# Patient Record
Sex: FEMALE | Race: WHITE | NOT HISPANIC OR LATINO | Employment: PART TIME | URBAN - METROPOLITAN AREA
[De-identification: names, ages, dates, MRNs, and addresses within clinical notes are randomized per-mention and may not be internally consistent; named-entity substitution may affect disease eponyms.]

---

## 2021-05-24 ENCOUNTER — RECORDS - HEALTHEAST (OUTPATIENT)
Dept: ADMINISTRATIVE | Facility: CLINIC | Age: 56
End: 2021-05-24

## 2021-05-26 ENCOUNTER — RECORDS - HEALTHEAST (OUTPATIENT)
Dept: ADMINISTRATIVE | Facility: CLINIC | Age: 56
End: 2021-05-26

## 2021-06-01 ENCOUNTER — RECORDS - HEALTHEAST (OUTPATIENT)
Dept: ADMINISTRATIVE | Facility: CLINIC | Age: 56
End: 2021-06-01

## 2021-07-13 ENCOUNTER — RECORDS - HEALTHEAST (OUTPATIENT)
Dept: ADMINISTRATIVE | Facility: CLINIC | Age: 56
End: 2021-07-13

## 2021-07-21 ENCOUNTER — RECORDS - HEALTHEAST (OUTPATIENT)
Dept: ADMINISTRATIVE | Facility: CLINIC | Age: 56
End: 2021-07-21

## 2022-08-29 ENCOUNTER — TRANSFERRED RECORDS (OUTPATIENT)
Dept: HEALTH INFORMATION MANAGEMENT | Facility: CLINIC | Age: 57
End: 2022-08-29

## 2023-03-17 ENCOUNTER — PATIENT OUTREACH (OUTPATIENT)
Dept: ONCOLOGY | Facility: CLINIC | Age: 58
End: 2023-03-17
Payer: COMMERCIAL

## 2023-03-17 ENCOUNTER — TRANSCRIBE ORDERS (OUTPATIENT)
Dept: OTHER | Age: 58
End: 2023-03-17

## 2023-03-17 DIAGNOSIS — Z85.43 PERSONAL HISTORY OF OVARIAN CANCER: Primary | ICD-10-CM

## 2023-03-17 NOTE — PROGRESS NOTES
New Patient Hematology / Oncology Nurse Navigator Note     Referral Date: 3/16/23    Referring provider: Dr. Patino, transfer from [facility]    Referring Clinic/Organization: [facility]      Referred to: GynOnc    Requested provider (if applicable): Dr. Patino    Evaluation for : Transfer of care, ovarian cancer      Clinical History (per Nurse review of records provided):      3/15 Office Visit with Dr. Patino at [facility]:  SCHEDULING:  -CA-125 in 3 months ().  -Abdomen, pelvic CT in 3 months ([facility]).   -RTC in 3 months for surveillance (MD, in-person, [facility] clinic). You should receive a phone call to schedule, but if you do not: [facility] [phone]    Clinical Assessment / Barriers to Care (Per Nurse):  Pt lives in [facility] Location: Care Everywhere     Records Needed:   Records from  per protocol (do NOT need to request path) including CT scan being done at [facility] in      Additional testing needed prior to consult:   CT, labs being done through /[facility] prior    Referral updates and Plan:   Chart reviewed. Will route to NPS to arrange in-person consult with Dr. Patino in 3 months as requested.     Flor Merrill, BSN, RN, PHN, OCN  Hematology/Oncology Nurse Navigator  [facility]  [phone]

## 2023-03-22 NOTE — TELEPHONE ENCOUNTER
Imaging Received  June 2, 2023 8:01 AM ABT   Action: Images from  received and resolved to PACS.     Imaging Received  March 22, 2023 3:48 PM ABT   Action: Images from HP received and resolved to PACS.     RECORDS STATUS - ALL OTHER DIAGNOSIS      RECORDS RECEIVED FROM:    DATE RECEIVED:    NOTES STATUS DETAILS   OFFICE NOTE from referring provider Self-Transfer of care    OFFICE NOTE from medical oncologist [facility] 03/15/23: Dr. Charis Patino   DISCHARGE SUMMARY from hospital [facility] 08/29/22: [facility]   OPERATIVE REPORT [facility] 08/29/22: Pelvic exam under anesthesia, diagnostic laparoscopy, conversion to laparotomy for optimal interval tumor debulking to no gross residual disease including peritoneal and diaphragm biopsies, bilateral salpingo-oophorectomy   MEDICATION LIST [facility]    LABS     PATHOLOGY REPORTS Reports in [facility] (slides not requested per provider protocol) 08/29/22: [id number]  06/16/22: [id number]   ANYTHING RELATED TO DIAGNOSIS [facility] Most recent 03/15/23   IMAGING (NEED IMAGES & REPORT)     CT SCANS PACS LV:  05/25/23: CT Abd Pel    HP:  02/16/23-08/19/22: CT Chest Abd Pel   ULTRASOUND PACS HP:  06/21/22: US Abd  05/24/22: US Pelvic

## 2023-06-04 ENCOUNTER — HEALTH MAINTENANCE LETTER (OUTPATIENT)
Age: 58
End: 2023-06-04

## 2023-06-04 NOTE — PROGRESS NOTES
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       History of Present Illness:  Jacki \"Michelle\" IVY Smith (she/her/hers)  is a 58 year old seen at the [facility] at the [facility] for exploration of clinical trial options in the setting of progressive stage IIIC high-grade serous ovarian carcinoma. History as follows:    Ovarian Cancer History:  22: Presented to an ED in [place] for evaluation of abdominal bloating and discomfort.  -Abdomen, pelvic CT: Radiographic Stage CAL ovarian cancer. I have personally reviewed the CT images.   22: [id number].   22: Pelvic ultrasound: c/w metastatic cancer. I have personally reviewed the ultrasound images.   22: Pelvic exam under anesthesia, diagnostic laparoscopy, biopsies, evacuation of ascites.   -Findings: On pelvic exam under anesthesia, there was a 6 cm firm, fixed mass adherent to the vaginal cuff. Vagina otherwise smooth. On laparoscopic examination, there was ascites filling the pelvis/abdomen, with >1 liter of fluid evacuated. The palpated mass was arising from the right ovary. Left ovary relatively normal in appearance. There was diffuse carcinomatosis covering the entire peritoneal surface falciform ligament, liver capsule, and mesentery. The omentum was replaced with tumor. Findings were not amenable to optimal tumor debulking so biopsies were taken for histologic examination.   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      22, 22, 22: Cycles 1-3 of neoadjuvant chemotherapy with " IV carboplatin + paclitaxel 175 mg/m2 every 21 days.   -Cycles 1,2: Carboplatin AUC 6.   -Cycle 3: Carboplatin AUC 5 with dose-reduction due to thrombcytopenia.   -8/19/22: Chest, abdomen, pelvic CT: Partial response. I have personally reviewed the CT images.     8/29/22: Pelvic exam under anesthesia, diagnostic laparoscopy, conversion to laparotomy for optimal interval tumor debulking to no gross residual disease including peritoneal and diaphragm biopsies, bilateral salpingo-oophorectomy, infragastric omentectomy, bilateral hemidiaphragm stripping, peritoneal and mesenteric argon beam ablation, appendectomy.   -Pathology: High-grade serous carcinoma involving all resected specimens.     9/28/22, 10/19/22, 11/17/22: Cycles 4-6 of primary chemotherapy with IV carboplatin AUC 5 + paclitaxel 175 mg/m2.  -12/2/22: Chest, abdomen, pelvic CT: No definitive evidence of disease. I have personally reviewed the CT images.   CHEST: A few tiny perifissural nodules are unchanged right upper lobe compatible with subpleural lymph nodes.  ABDOMEN: Trace amount of residual ascites in the cul-de-sac. Mild peritoneal thickening persists within the left midabdomen. No measurable peritoneal implants identified.  MUSCULOSKELETAL: Tiny fat-containing left inguinal hernia unchanged.  -12/7/22: CA-125=23.   -2/16/23: Chest, abdomen, pelvic CT to evaluate bloating: Stable findings, no definitive evidence of disease. I have personally reviewed the CT images.   ABDOMEN, PELVIS: There is mild peritoneal thickening in the left mid abdomen again visualized without measurable peritoneal implants noted. Tiny amount of fluid in the pelvis. This is decreased from the prior exam.  -5/25/23: Admitted  to [facility] for management of malignant ascites s/p therapeutic paracentesis, and partial SBO resolved with conservative management.   -5/25/23: Abdomen, pelvic CT: Progressive disease.       Genetic/Genomic/Molecular Testing History:  10/5/22:  [facility], reflexed to Common Hereditary Cancer Panel.   -No identifiable germline variants identified in ABRAXAS1, AKT1, APC, DEION, AXIN2, BARD1, BMPR1A, BRCA1, BRCA2, BRIP1, CDC73, CDH1, CDK4, CDKN2A, CHEK2, CTNNA1, DICER1, EPCAM, FANCC, FANCM, GREM1, HOXB13, KIT, MEN1, MLH1, MRE11, MSH2, MSH6, MUTYH, NBN, NF1, NTHL1, PALB2, PDGFRA, PIK3CA, PMS2, POLD1, POLE, PTEN, RAD50, RAD51C, RAD51D, RECQL, RINT1, SDHA, SDHB, SDHC, SDHD, SMAD4, SMARCA4, STK11, TP53, TSC1, TSC2, VHL, XRCC2.   -Variant of uncertain significance in MSH3 c.16C>T (p.Jao3Fay)    10/9/22 (tumor 8/29/22): [facility] Testing Results.  -Genomic ROXI low (6%)   -TMB low (1mut/Mb)  -Microsatellite stable/Mismatch Repair proficient  -PD-L1- (CPS 0%)  -NTRK 1/2/3 fusion not detected.   -ER-, KS+  -Genomic alterations in:  --TP53  -No genomic alterations in BRCA1,2.      Subjective:  Michelle presents to the [facility] clinic today for discussion of management including possible clinical trial options for progressive ovarian cancer. She is accompanied by her  and daughter.  She was recently admitted to [facility] for abdominal discomrot and diagnosed with malignant ascites and partial small bowel obstruction, resolved quickly with conservative management and therapeutic paracentesis.   Michelle has been eating well since discharge from the hospital. +bowel movements. Occasional abdominal pain, but unclear if this is due to disease or the knowledge that disease is there.   She is anxious about treatment recommendations. She continues to take her antidepressant, and is requesting a refill of her ativan.       Review of Systems:   ROS: 10 point ROS neg other than the symptoms noted above in the HPI.       Past Medical History:  Past Medical History:   Diagnosis Date     Female stress incontinence      Ovarian cancer, right (H) 06/16/2022    Stage IIIC high-grade serous carcinoma     Recurrent cold sores        Past Surgical History:  Past Surgical  History:   Procedure Laterality Date     APPENDECTOMY  2022    Part of ovarian cancer tumor debulking     BLADDER SUSPENSION  2009     HYSTERECTOMY  2009    For prolapse     LAPAROSCOPY DIAGNOSTIC (GYN)  2022     SALPINGO-OOPHORECTOMY, COMBINED Bilateral 2022    Pelvic exam under anesthesia, diagnostic laparoscopy, conversion to laparotomy for optimal interval tumor debulking to no gross residual disease including peritoneal and diaphragm biopsies, bilateral salpingo-oophorectomy, infragastric omentectomy, bilateral hemidiaphragm stripping, peritoneal and mesenteric argon beam ablation, appendectomy.     TONSILLECTOMY  1973     TUBAL LIGATION Bilateral 1998       Gynecologic History:  Surgical menopause. Hysterectomy in  for prolapse.  Was on HRT with estrogen for less than 2 years  No history of abnormal cervical cancer screening.  No history of STIs.  Sexually active, no dyspareunia, no postcoital bleeding.      Obstetric History:  OB History    Para Term  AB Living   3 3 3 0 0 3   SAB IAB Ectopic Multiple Live Births   0 0 0 0 3      # Outcome Date GA Lbr Sukumar/2nd Weight Sex Delivery Anes PTL Lv   3 Term      Vag-Spont      2 Term      Vag-Spont      1 Term      Vag-Spont           Current Medications:   Current Outpatient Medications   Medication     citalopram (CELEXA) 40 MG tablet     cyclobenzaprine (FLEXERIL) 5 MG tablet     diphenhydrAMINE-acetaminophen (TYLENOL PM)  MG tablet     docusate sodium (DSS) 100 MG capsule     ibuprofen (ADVIL/MOTRIN) 200 MG capsule     lidocaine-prilocaine (EMLA) 2.5-2.5 % external cream     LORazepam (ATIVAN) 0.5 MG tablet     Multiple Vitamin (MULTI-VITAMIN DAILY PO)     ondansetron (ZOFRAN) 8 MG tablet     prochlorperazine (COMPAZINE) 10 MG tablet     sennosides (SENOKOT) 8.6 MG tablet     valACYclovir (VALTREX) 1000 mg tablet     No current facility-administered medications for this visit.        Allergies:   No  "Known Allergies        Social History:  Patient lives with Eligio and two daughters. Works as a . She does not have Advanced Directives, but has identified Eligio Smith as her desired Healthcare Power of .  Social History     Tobacco Use     Smoking status: Former     Packs/day: 1.00     Years: 42.00     Pack years: 42.00     Types: Cigarettes     Quit date: 2022     Years since quittin.0     Smokeless tobacco: Never   Vaping Use     Vaping status: Not on file   Substance Use Topics     Alcohol use: Yes     Comment: Beer ~Q3 months.       History   Drug Use Unknown       Family History:   The patient's family history is notable for a first-degree paternal relative with prostate cancer, and a first-degree relative with breast cancer and melanoma, and a second-degree maternal relative with colon cancer. No known family history of ovarian, uterine, urothelial/renal, or pancreatic cancers.   Family History   Problem Relation Age of Onset     Prostate Cancer Father      Breast Cancer Sister 48     Melanoma Sister      Skin Cancer Sister      Colon Cancer Maternal Grandmother        Physical Exam:   /60 (BP Location: Right arm, Patient Position: Sitting, Cuff Size: Adult Regular)   Pulse 58   Temp 98.3  F (36.8  C) (Tympanic)   Resp 16   Ht 1.571 m (5' 1.85\")   Wt 67.5 kg (148 lb 14.4 oz)   SpO2 100%   BMI 27.37 kg/m    Body mass index is 27.37 kg/m .    General Appearance: healthy and alert, no distress     HEENT:  no thyromegaly, no palpable nodules or masses        Cardiovascular: regular rate and rhythm, no gallops, rubs or murmurs     Respiratory: lungs clear, no rales, rhonchi or wheezes, normal diaphragmatic excursion    Musculoskeletal: extremities non tender and without edema    Skin: no lesions or rashes     Neurological: normal gait, no gross defects     Psychiatric: appropriate mood and affect                               Hematological: normal cervical, " supraclavicular and inguinal lymph nodes     Gastrointestinal:       abdomen soft, non-tender, non-distended, no organomegaly or masses    Genitourinary: Deferred.      Laboratory Examination:  5/25/23 CBC: WBC 6.7, hemoglobin 13.0, platelets 236.   6/2/23 Creatinine: 0.92.  5/26/23 BMP: Electrolytes within normal limits.  5/26/23 Magnesium: 2.1.  CA-125 trend (since surveillance):  12/7/22  23  3/15/23  38  6/2/23  99      Radiographic Examination:  5/25/23: Abdomen, pelvic CT: Progressive disease.   LOWER CHEST: OMAIRA.   BOWEL: Development of moderate malignant ascites with small peritoneal and serosal implants present. There is mild distention of a few loops of small bowel within the right side of the abdomen with regions of narrowing present compatible with an early or partial small bowel obstruction. Wall thickening in a few of the involved segments with fecalization of the internal contents of the small bowel compatible with stasis.   PELVIC ORGANS: Lobulated 4.5 cm soft tissue mass in the dependent pelvis in the expected location of the uterus.      Performance Status:   ECOG Grade 0.       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     Malignant partial small bowel obstruction, resolved with bowel rest.     A total of 60 minutes was spent with the patient, 50 minutes of which were spent in counseling the patient and/or treatment planning; an additional 5 minutes was spent in chart review/documentation.      Plan:     1.)    Ovarian cancer: I reviewed the CT result with Michelle and discussed management options. Discussed options of second-line chemotherapy vs supportive care without anticancer therapy. Currently she is not eligible for any clinical trials at [facility], but we discussed potential options for future therapy. Also discussed limitations in therapy  given current platinum shortages. Discussed the palliative nature of all options.    Future Clinical Trial Options:  -Phase II elmer-ovary trial (8084EX848; [id number])l: This is a clinical trial of pembrolizumab for the immunoreactive subtype of ovarian cancer. Pre-screening is required, and only tumor that are the TCGA immunoreactive subtype will be eligible for the trial. Up to 5 prior lines of therapy allowed.  Pre-screening consent for tumor testing signed today.   -Phase I TORL-1-23 clinical trial ([id number]; [id number]):  This is a phase I study of the monoclonal antibody TORL-1-23 against CLDN6. CLDN6 is expressed in approximately 30% of ovarian carcinomas, 11% of endometrial carcinomas, and non-small cell lung cancer. The therapy is administered via IV every 3 weeks for up to 36 cycles (~2 years), progressive disease or unacceptable toxicity. Part 1 (dose-escalation) is not limited to CLDN-6+ tumor; part 2 (dose-expansion) is limited to CLDN-6+ tumors. Up to 4 prior lines of therapy are allowed.   -Phase I Fusion FPX-01-01 (6699RW952; [id number]): This is a phase I trial of a radioimmunotherapy agent targeting IGF-1R, which is highly expressed in ovarian cancer. This trial consists of a 2-step enrollment, with the first enrollment comprising imaging for IGF-1R expression, and the second enrollment only occurring among patients with adequate IGF-1R tumor expression--only these patients will receive the therapeutic agent. No limit on prior number of therapies.   -Phase I HCW-9218 (5880DJ360; [id number]): This is a phase I immunotherapy trial testing a novel bifunctional fusion protein consisting of two TGFBRII domains (human tissue factor & human IL-15) and a second soluble fusion of two human TGFBRII domains and a sushi domain IL-15Ralpha. It is administered subcutaneously every 3 weeks.  No limit on number of previous therapies.   --Phase I [employer] trial of TC-510 for advanced  mesothelin-expressing cancer (9571XT597; [id number]): This is a first-in-human dose-escalation and expansion immunotherapy study. TC-510 is a novel T cell therapy engineered to express a PD-1:CD28 switch receptor independent of the T cell receptor complex. The T cell therapy targets mesothelin fused to the CD3E subunit. To be eligible, >50% of tumor cells must express mesothelin on IHC. Eligible study participants undergo leukopheresis followed by lymphodepleting chemotherapy (bridging therapy allowed prior to lymphodepleting chemo) then TC-510 infusion either as a single or fractionated infusion. Eligibility allows up to 5 previous therapies. She cannot have had paracentesis within 4 weeks prior to TCR infusion or anticipated within 8 weeks after infusion.     After discussion of risks/benefits of all options, Michelle would like to proceed with second-line therapy with palliative intent with IV carboplatin AUC 5 + pegylated lipoosomal doxorubicin (PLD) 30 mg/m2 every 28 days. Plan as follows:  -Return return to the infusion center to initiate therapy in 1-2 weeks. Will eventually plan for therapy at [facility] or [facility].  -Plan for 4 cycles of therapy followed by repeat chest, abdomen, pelvic CT to assess disease response (NP for chemotherapy visits, virtual visits okay; MD for imaging prior to cycle 5, in-person).   -Pre-screening consent signed today for tumor testing for the immunoreactive subtype for possible future participation in the phase II elmer-ovary study.  -Will send tumor to [facility] for folate receptor-alpha testing for consideration of therapy with mirvetuximab in the platinum-resistant setting.     Side-effects:  -No residual  --Chemotherapy counseling and written information provided today by CHIKA Mcclain.  --Referral to palliative care for supportive care.     2.) Genetic risk factors were assessed and the patient does not meet the qualifications for a referral.      3.) Labs and/or tests ordered  include:  1) Prechemotherapy labs: CBC with diff, CMP, magnesium, CA-125.      4.) Health maintenance issues addressed today include none.    5.) Code status:  Full-code.    6.) Prescriptions: None; standard prophylactic antiemetics to be prescribed at the time of chemothearpy.       Charis Patino MD, MS, FACOG, FACS  6/6/2023  9:07 AM          CC  Patient Care Team:  Linda Robert APRN [facility] as PCP - General Patino, Charis Meadows MD as MD (Gynecologic Oncology)  SELF, REFERRED

## 2023-06-04 NOTE — PATIENT INSTRUCTIONS
SCHEDULING:  -Pre-screening for the elmer-ovary study (Amanda salmon)  -Send tumor to [facility] for folate-receptor alpha testing only.  -Infusion visit in 1-2 weeks to initiate chemotherapy with IV carboplatin + doxil (eventually would like therapy at [facility] or [facility]).  -Pre-chemotherapy labs: CBC with diff, CMP, magnesium, CA-125.   -RTC 4 weeks later for consideration of cycle #2 of therapy (NP, virtual visit).      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  Treatment History:  -6/22/22-8/4/22: Neoadjuvant chemotherapy with IV carboplatin + paclitaxel x3 cycles. Partial response.   -8/29/22: Pelvic exam under anesthesia, diagnostic laparoscopy (look inside the pelvis/abdomen with a camera), conversion to laparotomy (large incision) for optimal interval tumor debulking to no gross residual disease including peritoneal and diaphragm biopsies, bilateral salpingo-oophorectomy (removal of bilateral ovaries & fallopian tubes), infragastric omentectomy (removal of the fat curtain hanging off the stomach/colon), bilateral hemidiaphragm stripping, peritoneal and mesenteric argon beam ablation (destruction of tumor), appendectomy.   -9/28/22-11/17/22: First-line chemotherapy with carboplatin + paclitaxel x3 cycles (total 6 cycles).         PLAN:  1) Ovarian cancer: Plan for second-line chemotherapy with palliative intent.  DRUGS: Carboplatin + pegylated liposomal doxorubicin (PLD/doxil)  SCHEDULE: 1 day every 4 weeks  PLAN: 4 cycles followed by repeat chest, abdomen, pelvic CT to assess disease response.     Chemotherapy counseling and written information provided by CHIKA Mcclain.     -Will send tumor for testing for the immunoreactive subtype for possible future enrollment in the phase II immunotherapy trial of pembrolizumab.  -Will send tumor for folate receptor alpha testing for possible future treatment with mirvetuximab.     2) Genetic testing: negative  for any identifiable germline variants. No additional testing recommended for you or your family.      Please call with any questions or concerns. [phone]       Charis Patino MD, MS, FACOG, FACS  6/6/2023  9:10 AM

## 2023-06-06 ENCOUNTER — OFFICE VISIT (OUTPATIENT)
Dept: ONCOLOGY | Facility: CLINIC | Age: 58
End: 2023-06-06
Attending: OBSTETRICS & GYNECOLOGY
Payer: COMMERCIAL

## 2023-06-06 ENCOUNTER — ALLIED HEALTH/NURSE VISIT (OUTPATIENT)
Dept: ONCOLOGY | Facility: CLINIC | Age: 58
End: 2023-06-06

## 2023-06-06 VITALS
OXYGEN SATURATION: 100 % | RESPIRATION RATE: 16 BRPM | HEIGHT: 62 IN | BODY MASS INDEX: 27.4 KG/M2 | DIASTOLIC BLOOD PRESSURE: 60 MMHG | HEART RATE: 58 BPM | WEIGHT: 148.9 LBS | SYSTOLIC BLOOD PRESSURE: 110 MMHG | TEMPERATURE: 98.3 F

## 2023-06-06 DIAGNOSIS — C56.1 OVARIAN CANCER, RIGHT (H): Primary | ICD-10-CM

## 2023-06-06 DIAGNOSIS — F41.9 ANXIETY: ICD-10-CM

## 2023-06-06 PROCEDURE — 99205 OFFICE O/P NEW HI 60 MIN: CPT | Performed by: OBSTETRICS & GYNECOLOGY

## 2023-06-06 PROCEDURE — 99213 OFFICE O/P EST LOW 20 MIN: CPT | Performed by: OBSTETRICS & GYNECOLOGY

## 2023-06-06 RX ORDER — CITALOPRAM HYDROBROMIDE 40 MG/1
1 TABLET ORAL DAILY
COMMUNITY
Start: 2012-07-01 | End: 2024-01-26

## 2023-06-06 RX ORDER — PROCHLORPERAZINE MALEATE 10 MG
10 TABLET ORAL PRN
COMMUNITY
Start: 2022-06-20 | End: 2023-07-11

## 2023-06-06 RX ORDER — SENNOSIDES 8.6 MG
TABLET ORAL
COMMUNITY
Start: 2019-09-02

## 2023-06-06 RX ORDER — LORAZEPAM 0.5 MG/1
TABLET ORAL
COMMUNITY
Start: 2023-05-05 | End: 2024-02-28

## 2023-06-06 RX ORDER — ONDANSETRON 8 MG/1
8 TABLET, FILM COATED ORAL
COMMUNITY
Start: 2022-06-20 | End: 2023-06-13 | Stop reason: ALTCHOICE

## 2023-06-06 RX ORDER — DOCUSATE SODIUM 100 MG/1
100 CAPSULE, LIQUID FILLED ORAL PRN
COMMUNITY
Start: 2019-09-01

## 2023-06-06 RX ORDER — OMEGA-3 FATTY ACIDS/FISH OIL 300-1000MG
CAPSULE ORAL
COMMUNITY
Start: 1978-09-30 | End: 2023-09-28

## 2023-06-06 RX ORDER — VALACYCLOVIR HYDROCHLORIDE 1 G/1
1000 TABLET, FILM COATED ORAL PRN
COMMUNITY
Start: 2001-05-31

## 2023-06-06 RX ORDER — CYCLOBENZAPRINE HCL 5 MG
TABLET ORAL
COMMUNITY
Start: 2023-03-16

## 2023-06-06 RX ORDER — LIDOCAINE/PRILOCAINE 2.5 %-2.5%
CREAM (GRAM) TOPICAL
COMMUNITY
Start: 2022-07-11 | End: 2023-06-13 | Stop reason: ALTCHOICE

## 2023-06-06 RX ORDER — LORAZEPAM 0.5 MG/1
0.5 TABLET ORAL EVERY 6 HOURS PRN
Qty: 20 TABLET | Refills: 3 | Status: SHIPPED | OUTPATIENT
Start: 2023-06-06 | End: 2024-01-19

## 2023-06-06 ASSESSMENT — PAIN SCALES - GENERAL: PAINLEVEL: NO PAIN (0)

## 2023-06-06 NOTE — NURSING NOTE
Return appt after cycle 4    Chemo carbo/doxil  Q 4 weeks  4 cycles  CT scan after cycle 4th    Path sent to [facility] for folate receptor alpha testing

## 2023-06-06 NOTE — LETTER
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       History of Present Illness:  Jacki \"Michelle\" IVY Smith (she/her/hers)  is a 58 year old seen at the [facility] at the [facility] for exploration of clinical trial options in the setting of progressive stage IIIC high-grade serous ovarian carcinoma. History as follows:    Ovarian Cancer History:  22: Presented to an ED in [place] for evaluation of abdominal bloating and discomfort.  -Abdomen, pelvic CT: Radiographic Stage CAL ovarian cancer. I have personally reviewed the CT images.   22: [id number].   22: Pelvic ultrasound: c/w metastatic cancer. I have personally reviewed the ultrasound images.   22: Pelvic exam under anesthesia, diagnostic laparoscopy, biopsies, evacuation of ascites.   -Findings: On pelvic exam under anesthesia, there was a 6 cm firm, fixed mass adherent to the vaginal cuff. Vagina otherwise smooth. On laparoscopic examination, there was ascites filling the pelvis/abdomen, with >1 liter of fluid evacuated. The palpated mass was arising from the right ovary. Left ovary relatively normal in appearance. There was diffuse carcinomatosis covering the entire peritoneal surface falciform ligament, liver capsule, and mesentery. The omentum was replaced with tumor. Findings were not amenable to optimal tumor debulking so biopsies were taken for histologic examination.   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      6/22/22, 7/13/22, 8/4/22: Cycles 1-3 of neoadjuvant chemotherapy with IV carboplatin + paclitaxel 175 mg/m2 every 21 days.   -Cycles 1,2: Carboplatin AUC 6.   -Cycle 3: Carboplatin AUC 5 with dose-reduction due to thrombcytopenia.   -8/19/22: Chest, abdomen, pelvic CT: Partial response. I have personally reviewed the CT images.     8/29/22: Pelvic exam under anesthesia, diagnostic laparoscopy, conversion to laparotomy for optimal interval tumor debulking to no gross residual disease including peritoneal and diaphragm biopsies, bilateral salpingo-oophorectomy, infragastric omentectomy, bilateral hemidiaphragm stripping, peritoneal and mesenteric argon beam ablation, appendectomy.   -Pathology: High-grade serous carcinoma involving all resected specimens.     9/28/22, 10/19/22, 11/17/22: Cycles 4-6 of primary chemotherapy with IV carboplatin AUC 5 + paclitaxel 175 mg/m2.  -12/2/22: Chest, abdomen, pelvic CT: No definitive evidence of disease. I have personally reviewed the CT images.   CHEST: A few tiny perifissural nodules are unchanged right upper lobe compatible with subpleural lymph nodes.  ABDOMEN: Trace amount of residual ascites in the cul-de-sac. Mild peritoneal thickening persists within the left midabdomen. No measurable peritoneal implants identified.  MUSCULOSKELETAL: Tiny fat-containing left inguinal hernia unchanged.  -12/7/22: CA-125=23.   -2/16/23: Chest, abdomen, pelvic CT to evaluate bloating: Stable findings, no definitive evidence of disease. I have personally reviewed the CT images.   ABDOMEN, PELVIS: There is mild peritoneal thickening in the left mid abdomen again visualized without measurable peritoneal implants noted. Tiny amount of fluid in the pelvis. This is decreased from the prior exam.  -5/25/23: Admitted  to [facility] for management of malignant ascites s/p therapeutic paracentesis, and partial SBO resolved with conservative management.   -5/25/23: Abdomen,  pelvic CT: Progressive disease.       Genetic/Genomic/Molecular Testing History:  10/5/22: [facility], reflexed to Common Hereditary Cancer Panel.   -No identifiable germline variants identified in ABRAXAS1, AKT1, APC, DEION, AXIN2, BARD1, BMPR1A, BRCA1, BRCA2, BRIP1, CDC73, CDH1, CDK4, CDKN2A, CHEK2, CTNNA1, DICER1, EPCAM, FANCC, FANCM, GREM1, HOXB13, KIT, MEN1, MLH1, MRE11, MSH2, MSH6, MUTYH, NBN, NF1, NTHL1, PALB2, PDGFRA, PIK3CA, PMS2, POLD1, POLE, PTEN, RAD50, RAD51C, RAD51D, RECQL, RINT1, SDHA, SDHB, SDHC, SDHD, SMAD4, SMARCA4, STK11, TP53, TSC1, TSC2, VHL, XRCC2.   -Variant of uncertain significance in MSH3 c.16C>T (p.Vfy2Skt)    10/9/22 (tumor 8/29/22): [facility] Testing Results.  -Genomic ROXI low (6%)   -TMB low (1mut/Mb)  -Microsatellite stable/Mismatch Repair proficient  -PD-L1- (CPS 0%)  -NTRK 1/2/3 fusion not detected.   -ER-, UT+  -Genomic alterations in:  --TP53  -No genomic alterations in BRCA1,2.      Subjective:  Michelle presents to the [facility] clinic today for discussion of management including possible clinical trial options for progressive ovarian cancer. She is accompanied by her  and daughter.  She was recently admitted to [facility] for abdominal discomrot and diagnosed with malignant ascites and partial small bowel obstruction, resolved quickly with conservative management and therapeutic paracentesis.   Michelle has been eating well since discharge from the hospital. +bowel movements. Occasional abdominal pain, but unclear if this is due to disease or the knowledge that disease is there.   She is anxious about treatment recommendations. She continues to take her antidepressant, and is requesting a refill of her ativan.       Review of Systems:   ROS: 10 point ROS neg other than the symptoms noted above in the HPI.       Past Medical History:  Past Medical History:   Diagnosis Date    Female stress incontinence     Ovarian cancer, right (H) 06/16/2022    Stage IIIC  high-grade serous carcinoma    Recurrent cold sores        Past Surgical History:  Past Surgical History:   Procedure Laterality Date    APPENDECTOMY  2022    Part of ovarian cancer tumor debulking    BLADDER SUSPENSION  2009    HYSTERECTOMY  2009    For prolapse    LAPAROSCOPY DIAGNOSTIC (GYN)  2022    SALPINGO-OOPHORECTOMY, COMBINED Bilateral 2022    Pelvic exam under anesthesia, diagnostic laparoscopy, conversion to laparotomy for optimal interval tumor debulking to no gross residual disease including peritoneal and diaphragm biopsies, bilateral salpingo-oophorectomy, infragastric omentectomy, bilateral hemidiaphragm stripping, peritoneal and mesenteric argon beam ablation, appendectomy.    TONSILLECTOMY  1973    TUBAL LIGATION Bilateral 1998       Gynecologic History:  Surgical menopause. Hysterectomy in  for prolapse.  Was on HRT with estrogen for less than 2 years  No history of abnormal cervical cancer screening.  No history of STIs.  Sexually active, no dyspareunia, no postcoital bleeding.      Obstetric History:  OB History    Para Term  AB Living   3 3 3 0 0 3   SAB IAB Ectopic Multiple Live Births   0 0 0 0 3      # Outcome Date GA Lbr Sukumar/2nd Weight Sex Delivery Anes PTL Lv   3 Term      Vag-Spont      2 Term      Vag-Spont      1 Term      Vag-Spont           Current Medications:   Current Outpatient Medications   Medication    citalopram (CELEXA) 40 MG tablet    cyclobenzaprine (FLEXERIL) 5 MG tablet    diphenhydrAMINE-acetaminophen (TYLENOL PM)  MG tablet    docusate sodium (DSS) 100 MG capsule    ibuprofen (ADVIL/MOTRIN) 200 MG capsule    lidocaine-prilocaine (EMLA) 2.5-2.5 % external cream    LORazepam (ATIVAN) 0.5 MG tablet    Multiple Vitamin (MULTI-VITAMIN DAILY PO)    ondansetron (ZOFRAN) 8 MG tablet    prochlorperazine (COMPAZINE) 10 MG tablet    sennosides (SENOKOT) 8.6 MG tablet    valACYclovir (VALTREX) 1000 mg tablet     No current  "facility-administered medications for this visit.        Allergies:   No Known Allergies        Social History:  Patient lives with Eligio and two daughters. Works as a . She does not have Advanced Directives, but has identified Eligio Smith as her desired Healthcare Power of .  Social History     Tobacco Use    Smoking status: Former     Packs/day: 1.00     Years: 42.00     Pack years: 42.00     Types: Cigarettes     Quit date: 2022     Years since quittin.0    Smokeless tobacco: Never   Vaping Use    Vaping status: Not on file   Substance Use Topics    Alcohol use: Yes     Comment: Beer ~Q3 months.       History   Drug Use Unknown       Family History:   The patient's family history is notable for a first-degree paternal relative with prostate cancer, and a first-degree relative with breast cancer and melanoma, and a second-degree maternal relative with colon cancer. No known family history of ovarian, uterine, urothelial/renal, or pancreatic cancers.   Family History   Problem Relation Age of Onset    Prostate Cancer Father     Breast Cancer Sister 48    Melanoma Sister     Skin Cancer Sister     Colon Cancer Maternal Grandmother        Physical Exam:   /60 (BP Location: Right arm, Patient Position: Sitting, Cuff Size: Adult Regular)   Pulse 58   Temp 98.3  F (36.8  C) (Tympanic)   Resp 16   Ht 1.571 m (5' 1.85\")   Wt 67.5 kg (148 lb 14.4 oz)   SpO2 100%   BMI 27.37 kg/m    Body mass index is 27.37 kg/m .    General Appearance: healthy and alert, no distress     HEENT:  no thyromegaly, no palpable nodules or masses        Cardiovascular: regular rate and rhythm, no gallops, rubs or murmurs     Respiratory: lungs clear, no rales, rhonchi or wheezes, normal diaphragmatic excursion    Musculoskeletal: extremities non tender and without edema    Skin: no lesions or rashes     Neurological: normal gait, no gross defects     Psychiatric: appropriate mood and affect         "                       Hematological: normal cervical, supraclavicular and inguinal lymph nodes     Gastrointestinal:       abdomen soft, non-tender, non-distended, no organomegaly or masses    Genitourinary: Deferred.      Laboratory Examination:  5/25/23 CBC: WBC 6.7, hemoglobin 13.0, platelets 236.   6/2/23 Creatinine: 0.92.  5/26/23 BMP: Electrolytes within normal limits.  5/26/23 Magnesium: 2.1.  CA-125 trend (since surveillance):  12/7/22  23  3/15/23  38  6/2/23  99      Radiographic Examination:  5/25/23: Abdomen, pelvic CT: Progressive disease.   LOWER CHEST: OMAIRA.   BOWEL: Development of moderate malignant ascites with small peritoneal and serosal implants present. There is mild distention of a few loops of small bowel within the right side of the abdomen with regions of narrowing present compatible with an early or partial small bowel obstruction. Wall thickening in a few of the involved segments with fecalization of the internal contents of the small bowel compatible with stasis.   PELVIC ORGANS: Lobulated 4.5 cm soft tissue mass in the dependent pelvis in the expected location of the uterus.      Performance Status:   ECOG Grade 0.       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     Malignant partial small bowel obstruction, resolved with bowel rest.     A total of 60 minutes was spent with the patient, 50 minutes of which were spent in counseling the patient and/or treatment planning; an additional 5 minutes was spent in chart review/documentation.      Plan:     1.)    Ovarian cancer: I reviewed the CT result with Michelle and discussed management options. Discussed options of second-line chemotherapy vs supportive care without anticancer therapy. Currently she is not eligible for any clinical trials at [facility], but we discussed potential options for  future therapy. Also discussed limitations in therapy given current platinum shortages. Discussed the palliative nature of all options.    Future Clinical Trial Options:  -Phase II elmer-ovary trial (6875FP559; [id number])l: This is a clinical trial of pembrolizumab for the immunoreactive subtype of ovarian cancer. Pre-screening is required, and only tumor that are the TCGA immunoreactive subtype will be eligible for the trial. Up to 5 prior lines of therapy allowed.  Pre-screening consent for tumor testing signed today.   -Phase I TORL-1-23 clinical trial (8554VW275; [id number]):  This is a phase I study of the monoclonal antibody TORL-1-23 against CLDN6. CLDN6 is expressed in approximately 30% of ovarian carcinomas, 11% of endometrial carcinomas, and non-small cell lung cancer. The therapy is administered via IV every 3 weeks for up to 36 cycles (~2 years), progressive disease or unacceptable toxicity. Part 1 (dose-escalation) is not limited to CLDN-6+ tumor; part 2 (dose-expansion) is limited to CLDN-6+ tumors. Up to 4 prior lines of therapy are allowed.   -Phase I Fusion FPX-01-01 (5838UJ588; [id number]): This is a phase I trial of a radioimmunotherapy agent targeting IGF-1R, which is highly expressed in ovarian cancer. This trial consists of a 2-step enrollment, with the first enrollment comprising imaging for IGF-1R expression, and the second enrollment only occurring among patients with adequate IGF-1R tumor expression--only these patients will receive the therapeutic agent. No limit on prior number of therapies.   -Phase I HCW-9218 (9878MV995; [id number]): This is a phase I immunotherapy trial testing a novel bifunctional fusion protein consisting of two TGFBRII domains (human tissue factor & human IL-15) and a second soluble fusion of two human TGFBRII domains and a sushi domain IL-15Ralpha. It is administered subcutaneously every 3 weeks.  No limit on number of previous therapies.   --Phase I TCR^2  Therapeutics trial of TC-510 for advanced mesothelin-expressing cancer (4140XC261; [id number]): This is a first-in-human dose-escalation and expansion immunotherapy study. TC-510 is a novel T cell therapy engineered to express a PD-1:CD28 switch receptor independent of the T cell receptor complex. The T cell therapy targets mesothelin fused to the CD3E subunit. To be eligible, >50% of tumor cells must express mesothelin on IHC. Eligible study participants undergo leukopheresis followed by lymphodepleting chemotherapy (bridging therapy allowed prior to lymphodepleting chemo) then TC-510 infusion either as a single or fractionated infusion. Eligibility allows up to 5 previous therapies. She cannot have had paracentesis within 4 weeks prior to TCR infusion or anticipated within 8 weeks after infusion.     After discussion of risks/benefits of all options, Michelle would like to proceed with second-line therapy with palliative intent with IV carboplatin AUC 5 + pegylated lipoosomal doxorubicin (PLD) 30 mg/m2 every 28 days. Plan as follows:  -Return return to the infusion center to initiate therapy in 1-2 weeks. Will eventually plan for therapy at [facility] or [facility].  -Plan for 4 cycles of therapy followed by repeat chest, abdomen, pelvic CT to assess disease response (NP for chemotherapy visits, virtual visits okay; MD for imaging prior to cycle 5, in-person).   -Pre-screening consent signed today for tumor testing for the immunoreactive subtype for possible future participation in the phase II elmer-ovary study.  -Will send tumor to [facility] for folate receptor-alpha testing for consideration of therapy with mirvetuximab in the platinum-resistant setting.     Side-effects:  -No residual  --Chemotherapy counseling and written information provided today by CHIKA Mcclain.  --Referral to palliative care for supportive care.     2.) Genetic risk factors were assessed and the patient does not meet the qualifications for a  referral.      3.) Labs and/or tests ordered include:  1) Prechemotherapy labs: CBC with diff, CMP, magnesium, CA-125.      4.) Health maintenance issues addressed today include none.    5.) Code status:  Full-code.    6.) Prescriptions: None; standard prophylactic antiemetics to be prescribed at the time of chemothearpy.       Charis Patino MD, MS, FACOG, FACS  6/6/2023  9:07 AM          CC  Patient Care Team:  Linda Robert APRN CNP as PCP - General  Carey, Charis Meadows MD as MD (Gynecologic Oncology)  SELF, REFERRED

## 2023-06-06 NOTE — PROGRESS NOTES
Date: 6/6/2023     Jamilavary Clinical Trial  [facility]# [id number]     PRESCREENING CONSENT:     The prescreening consent form, including purpose, risks and benefits, was reviewed with Jacki Smith and all questions were answered before she signed. The patient understands that the study involves an active treatment phase as well as a post-treatment follow up phase.      Present during the discussion were myself, the patient, her spouse and her daughter. A copy of the signed forms were provided to the patient. No procedures specific to this study were performed prior to the patient signing the consent form.     Consent version date: [id number]  Consent obtained by: Amanda Garcia  HIPAA authorization version date: [id number]        Amanda Garcia, Clinical Research Coordinator  Department of Obstetrics, Gynecology and Women's Health  [employer] [facility] [employer]

## 2023-06-06 NOTE — NURSING NOTE
"Oncology Rooming Note    June 6, 2023 8:02 AM   Jacki Smith is a 58 year old female who presents for:    Chief Complaint   Patient presents with     Oncology Clinic Visit     OVARIAN CANCER. RIGHT        Initial Vitals: /60 (BP Location: Right arm, Patient Position: Sitting, Cuff Size: Adult Regular)   Pulse 58   Temp 98.3  F (36.8  C) (Tympanic)   Resp 16   Ht 1.571 m (5' 1.85\")   Wt 67.5 kg (148 lb 14.4 oz)   SpO2 100%   BMI 27.37 kg/m   Estimated body mass index is 27.37 kg/m  as calculated from the following:    Height as of this encounter: 1.571 m (5' 1.85\").    Weight as of this encounter: 67.5 kg (148 lb 14.4 oz). Body surface area is 1.72 meters squared.  No Pain (0) Comment: Data Unavailable   No LMP recorded. Patient has had a hysterectomy.  Allergies reviewed: Yes  Medications reviewed: Yes    Medications: MEDICATION REFILLS NEEDED TODAY. Provider was NOT notified. refill needed on Ativan   Pharmacy name entered into [employer]: [id number] [place], [place] - [address]    Clinical concerns: New patient.        Allie Peace CMA            "

## 2023-06-06 NOTE — LETTER
"    2023         RE: Jacki Smith  [address] [place] [address]        Dear Colleague,    Thank you for referring your patient, Jacki Smith, to the [facility]. Please see a copy of my visit note below.    Consult Note on Referred Patient  [facility]      Date: 2023       Primary Oncologist:  Charis Patino MD  [facility]      Primary Care Provider:  JALEN Woods, CNP  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       History of Present Illness:  Jacki \"Michelle\" IVY Smith (she/her/hers)  is a 58 year old seen at the [facility] at the [facility] for exploration of clinical trial options in the setting of progressive stage IIIC high-grade serous ovarian carcinoma. History as follows:    Ovarian Cancer History:  22: Presented to an ED in [place] for evaluation of abdominal bloating and discomfort.  -Abdomen, pelvic CT: Radiographic Stage CAL ovarian cancer. I have personally reviewed the CT images.   22: [id number].   22: Pelvic ultrasound: c/w metastatic cancer. I have personally reviewed the ultrasound images.   22: Pelvic exam under anesthesia, diagnostic laparoscopy, biopsies, evacuation of ascites.   -Findings: On pelvic exam under anesthesia, there was a 6 cm firm, fixed mass adherent to the vaginal cuff. Vagina otherwise smooth. On laparoscopic examination, there was ascites filling the pelvis/abdomen, with >1 liter of fluid evacuated. The palpated mass was arising from the right ovary. Left ovary relatively normal in appearance. There was diffuse carcinomatosis covering the entire peritoneal surface falciform ligament, liver capsule, and mesentery. The omentum was replaced with tumor. Findings were not amenable to " optimal tumor debulking so biopsies were taken for histologic examination.   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      6/22/22, 7/13/22, 8/4/22: Cycles 1-3 of neoadjuvant chemotherapy with IV carboplatin + paclitaxel 175 mg/m2 every 21 days.   -Cycles 1,2: Carboplatin AUC 6.   -Cycle 3: Carboplatin AUC 5 with dose-reduction due to thrombcytopenia.   -8/19/22: Chest, abdomen, pelvic CT: Partial response. I have personally reviewed the CT images.     8/29/22: Pelvic exam under anesthesia, diagnostic laparoscopy, conversion to laparotomy for optimal interval tumor debulking to no gross residual disease including peritoneal and diaphragm biopsies, bilateral salpingo-oophorectomy, infragastric omentectomy, bilateral hemidiaphragm stripping, peritoneal and mesenteric argon beam ablation, appendectomy.   -Pathology: High-grade serous carcinoma involving all resected specimens.     9/28/22, 10/19/22, 11/17/22: Cycles 4-6 of primary chemotherapy with IV carboplatin AUC 5 + paclitaxel 175 mg/m2.  -12/2/22: Chest, abdomen, pelvic CT: No definitive evidence of disease. I have personally reviewed the CT images.   CHEST: A few tiny perifissural nodules are unchanged right upper lobe compatible with subpleural lymph nodes.  ABDOMEN: Trace amount of residual ascites in the cul-de-sac. Mild peritoneal thickening persists within the left midabdomen. No measurable peritoneal implants identified.  MUSCULOSKELETAL: Tiny fat-containing left inguinal hernia unchanged.  -12/7/22: CA-125=23.   -2/16/23: Chest, abdomen, pelvic CT to evaluate bloating: Stable findings, no definitive evidence of disease. I have personally reviewed the CT images.   ABDOMEN, PELVIS: There is mild peritoneal thickening in the left mid abdomen again visualized without measurable peritoneal implants noted. Tiny amount of fluid in the pelvis. This is decreased from the prior exam.  -5/25/23:  Admitted  to [facility] for management of malignant ascites s/p therapeutic paracentesis, and partial SBO resolved with conservative management.   -5/25/23: Abdomen, pelvic CT: Progressive disease.       Genetic/Genomic/Molecular Testing History:  10/5/22: [facility], reflexed to Common Hereditary Cancer Panel.   -No identifiable germline variants identified in ABRAXAS1, AKT1, APC, DEION, AXIN2, BARD1, BMPR1A, BRCA1, BRCA2, BRIP1, CDC73, CDH1, CDK4, CDKN2A, CHEK2, CTNNA1, DICER1, EPCAM, FANCC, FANCM, GREM1, HOXB13, KIT, MEN1, MLH1, MRE11, MSH2, MSH6, MUTYH, NBN, NF1, NTHL1, PALB2, PDGFRA, PIK3CA, PMS2, POLD1, POLE, PTEN, RAD50, RAD51C, RAD51D, RECQL, RINT1, SDHA, SDHB, SDHC, SDHD, SMAD4, SMARCA4, STK11, TP53, TSC1, TSC2, VHL, XRCC2.   -Variant of uncertain significance in MSH3 c.16C>T (p.[id number])    10/9/22 (tumor 8/29/22): [facility] Testing Results.  -Genomic ROXI low (6%)   -TMB low (1mut/Mb)  -Microsatellite stable/Mismatch Repair proficient  -PD-L1- (CPS 0%)  -NTRK 1/2/3 fusion not detected.   -ER-, MS+  -Genomic alterations in:  --TP53  -No genomic alterations in BRCA1,2.      Subjective:  Michelle presents to the [facility] clinic today for discussion of management including possible clinical trial options for progressive ovarian cancer. She is accompanied by her  and daughter.  She was recently admitted to [facility] for abdominal discomrot and diagnosed with malignant ascites and partial small bowel obstruction, resolved quickly with conservative management and therapeutic paracentesis.   Michelle has been eating well since discharge from the hospital. +bowel movements. Occasional abdominal pain, but unclear if this is due to disease or the knowledge that disease is there.   She is anxious about treatment recommendations. She continues to take her antidepressant, and is requesting a refill of her ativan.       Review of Systems:   ROS: 10 point ROS neg other than the symptoms noted above  in the HPI.       Past Medical History:  Past Medical History:   Diagnosis Date    Female stress incontinence     Ovarian cancer, right (H) 2022    Stage IIIC high-grade serous carcinoma    Recurrent cold sores        Past Surgical History:  Past Surgical History:   Procedure Laterality Date    APPENDECTOMY  2022    Part of ovarian cancer tumor debulking    BLADDER SUSPENSION  2009    HYSTERECTOMY  2009    For prolapse    LAPAROSCOPY DIAGNOSTIC (GYN)  2022    SALPINGO-OOPHORECTOMY, COMBINED Bilateral 2022    Pelvic exam under anesthesia, diagnostic laparoscopy, conversion to laparotomy for optimal interval tumor debulking to no gross residual disease including peritoneal and diaphragm biopsies, bilateral salpingo-oophorectomy, infragastric omentectomy, bilateral hemidiaphragm stripping, peritoneal and mesenteric argon beam ablation, appendectomy.    TONSILLECTOMY  1973    TUBAL LIGATION Bilateral 1998       Gynecologic History:  Surgical menopause. Hysterectomy in  for prolapse.  Was on HRT with estrogen for less than 2 years  No history of abnormal cervical cancer screening.  No history of STIs.  Sexually active, no dyspareunia, no postcoital bleeding.      Obstetric History:  OB History    Para Term  AB Living   3 3 3 0 0 3   SAB IAB Ectopic Multiple Live Births   0 0 0 0 3      # Outcome Date GA Lbr Sukumar/2nd Weight Sex Delivery Anes PTL Lv   3 Term      Vag-Spont      2 Term      Vag-Spont      1 Term      Vag-Spont           Current Medications:   Current Outpatient Medications   Medication    citalopram (CELEXA) 40 MG tablet    cyclobenzaprine (FLEXERIL) 5 MG tablet    diphenhydrAMINE-acetaminophen (TYLENOL PM)  MG tablet    docusate sodium (DSS) 100 MG capsule    ibuprofen (ADVIL/MOTRIN) 200 MG capsule    lidocaine-prilocaine (EMLA) 2.5-2.5 % external cream    LORazepam (ATIVAN) 0.5 MG tablet    Multiple Vitamin (MULTI-VITAMIN DAILY PO)     "ondansetron (ZOFRAN) 8 MG tablet    prochlorperazine (COMPAZINE) 10 MG tablet    sennosides (SENOKOT) 8.6 MG tablet    valACYclovir (VALTREX) 1000 mg tablet     No current facility-administered medications for this visit.        Allergies:   No Known Allergies        Social History:  Patient lives with Eligio and two daughters. Works as a . She does not have Advanced Directives, but has identified Eligio Smith as her desired Healthcare Power of .  Social History     Tobacco Use    Smoking status: Former     Packs/day: 1.00     Years: 42.00     Pack years: 42.00     Types: Cigarettes     Quit date: 2022     Years since quittin.0    Smokeless tobacco: Never   Vaping Use    Vaping status: Not on file   Substance Use Topics    Alcohol use: Yes     Comment: Beer ~Q3 months.       History   Drug Use Unknown       Family History:   The patient's family history is notable for a first-degree paternal relative with prostate cancer, and a first-degree relative with breast cancer and melanoma, and a second-degree maternal relative with colon cancer. No known family history of ovarian, uterine, urothelial/renal, or pancreatic cancers.   Family History   Problem Relation Age of Onset    Prostate Cancer Father     Breast Cancer Sister 48    Melanoma Sister     Skin Cancer Sister     Colon Cancer Maternal Grandmother        Physical Exam:   /60 (BP Location: Right arm, Patient Position: Sitting, Cuff Size: Adult Regular)   Pulse 58   Temp 98.3  F (36.8  C) (Tympanic)   Resp 16   Ht 1.571 m (5' 1.85\")   Wt 67.5 kg (148 lb 14.4 oz)   SpO2 100%   BMI 27.37 kg/m    Body mass index is 27.37 kg/m .    General Appearance: healthy and alert, no distress     HEENT:  no thyromegaly, no palpable nodules or masses        Cardiovascular: regular rate and rhythm, no gallops, rubs or murmurs     Respiratory: lungs clear, no rales, rhonchi or wheezes, normal diaphragmatic " excursion    Musculoskeletal: extremities non tender and without edema    Skin: no lesions or rashes     Neurological: normal gait, no gross defects     Psychiatric: appropriate mood and affect                               Hematological: normal cervical, supraclavicular and inguinal lymph nodes     Gastrointestinal:       abdomen soft, non-tender, non-distended, no organomegaly or masses    Genitourinary: Deferred.      Laboratory Examination:  5/25/23 CBC: WBC 6.7, hemoglobin 13.0, platelets 236.   6/2/23 Creatinine: 0.92.  5/26/23 BMP: Electrolytes within normal limits.  5/26/23 Magnesium: 2.1.  CA-125 trend (since surveillance):  12/7/22  23  3/15/23  38  6/2/23  99      Radiographic Examination:  5/25/23: Abdomen, pelvic CT: Progressive disease.   LOWER CHEST: OMAIRA.   BOWEL: Development of moderate malignant ascites with small peritoneal and serosal implants present. There is mild distention of a few loops of small bowel within the right side of the abdomen with regions of narrowing present compatible with an early or partial small bowel obstruction. Wall thickening in a few of the involved segments with fecalization of the internal contents of the small bowel compatible with stasis.   PELVIC ORGANS: Lobulated 4.5 cm soft tissue mass in the dependent pelvis in the expected location of the uterus.      Performance Status:   ECOG Grade 0.       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     Malignant partial small bowel obstruction, resolved with bowel rest.     A total of 60 minutes was spent with the patient, 50 minutes of which were spent in counseling the patient and/or treatment planning; an additional 5 minutes was spent in chart review/documentation.      Plan:     1.)    Ovarian cancer: I reviewed the CT result with Michelle and discussed management  options. Discussed options of second-line chemotherapy vs supportive care without anticancer therapy. Currently she is not eligible for any clinical trials at [facility], but we discussed potential options for future therapy. Also discussed limitations in therapy given current platinum shortages. Discussed the palliative nature of all options.    Future Clinical Trial Options:  -Phase II elmer-ovary trial (1512AN251; [id number])l: This is a clinical trial of pembrolizumab for the immunoreactive subtype of ovarian cancer. Pre-screening is required, and only tumor that are the TCGA immunoreactive subtype will be eligible for the trial. Up to 5 prior lines of therapy allowed.  Pre-screening consent for tumor testing signed today.   -Phase I TORL-1-23 clinical trial ([id number]; [id number]):  This is a phase I study of the monoclonal antibody TORL-1-23 against CLDN6. CLDN6 is expressed in approximately 30% of ovarian carcinomas, 11% of endometrial carcinomas, and non-small cell lung cancer. The therapy is administered via IV every 3 weeks for up to 36 cycles (~2 years), progressive disease or unacceptable toxicity. Part 1 (dose-escalation) is not limited to CLDN-6+ tumor; part 2 (dose-expansion) is limited to CLDN-6+ tumors. Up to 4 prior lines of therapy are allowed.   -Phase I Fusion FPX-01-01 (0018QB799; [id number]): This is a phase I trial of a radioimmunotherapy agent targeting IGF-1R, which is highly expressed in ovarian cancer. This trial consists of a 2-step enrollment, with the first enrollment comprising imaging for IGF-1R expression, and the second enrollment only occurring among patients with adequate IGF-1R tumor expression--only these patients will receive the therapeutic agent. No limit on prior number of therapies.   -Phase I HCW-9218 (1985YI002; [id number]): This is a phase I immunotherapy trial testing a novel bifunctional fusion protein consisting of two TGFBRII domains (human tissue factor & human IL-15)  and a second soluble fusion of two human TGFBRII domains and a sushi domain IL-15Ralpha. It is administered subcutaneously every 3 weeks.  No limit on number of previous therapies.   --Phase I [employer] trial of TC-510 for advanced mesothelin-expressing cancer ([id number]; [id number]): This is a first-in-human dose-escalation and expansion immunotherapy study. TC-510 is a novel T cell therapy engineered to express a PD-1:CD28 switch receptor independent of the T cell receptor complex. The T cell therapy targets mesothelin fused to the CD3E subunit. To be eligible, >50% of tumor cells must express mesothelin on IHC. Eligible study participants undergo leukopheresis followed by lymphodepleting chemotherapy (bridging therapy allowed prior to lymphodepleting chemo) then TC-510 infusion either as a single or fractionated infusion. Eligibility allows up to 5 previous therapies. She cannot have had paracentesis within 4 weeks prior to TCR infusion or anticipated within 8 weeks after infusion.     After discussion of risks/benefits of all options, Michelle would like to proceed with second-line therapy with palliative intent with IV carboplatin AUC 5 + pegylated lipoosomal doxorubicin (PLD) 30 mg/m2 every 28 days. Plan as follows:  -Return return to the infusion center to initiate therapy in 1-2 weeks. Will eventually plan for therapy at [facility] or [facility].  -Plan for 4 cycles of therapy followed by repeat chest, abdomen, pelvic CT to assess disease response (NP for chemotherapy visits, virtual visits okay; MD for imaging prior to cycle 5, in-person).   -Pre-screening consent signed today for tumor testing for the immunoreactive subtype for possible future participation in the phase II elmer-ovary study.  -Will send tumor to [facility] for folate receptor-alpha testing for consideration of therapy with mirvetuximab in the platinum-resistant setting.     Side-effects:  -No residual  --Chemotherapy counseling and written  information provided today by CHIKA Mcclain.  --Referral to palliative care for supportive care.     2.) Genetic risk factors were assessed and the patient does not meet the qualifications for a referral.      3.) Labs and/or tests ordered include:  1) Prechemotherapy labs: CBC with diff, CMP, magnesium, CA-125.      4.) Health maintenance issues addressed today include none.    5.) Code status:  Full-code.    6.) Prescriptions: None; standard prophylactic antiemetics to be prescribed at the time of chemothearpy.       Charis Patino MD, MS, FACOG, FACS  6/6/2023  9:07 AM          CC  Patient Care Team:  Linda Robert APRN RUTHIE as PCP - General  Charis Patino MD as MD (Gynecologic Oncology)

## 2023-06-12 RX ORDER — DEXAMETHASONE 4 MG/1
8 TABLET ORAL DAILY
Qty: 6 TABLET | Refills: 2 | Status: SHIPPED | OUTPATIENT
Start: 2023-06-12 | End: 2024-01-26

## 2023-06-12 RX ORDER — LIDOCAINE/PRILOCAINE 2.5 %-2.5%
CREAM (GRAM) TOPICAL PRN
Qty: 30 G | Refills: 6 | Status: SHIPPED | OUTPATIENT
Start: 2023-06-12 | End: 2024-04-12

## 2023-06-12 RX ORDER — ONDANSETRON 8 MG/1
8 TABLET, FILM COATED ORAL EVERY 8 HOURS PRN
Qty: 30 TABLET | Refills: 2 | Status: SHIPPED | OUTPATIENT
Start: 2023-06-12 | End: 2024-04-12

## 2023-06-12 RX ORDER — PROCHLORPERAZINE MALEATE 10 MG
10 TABLET ORAL EVERY 6 HOURS PRN
Qty: 30 TABLET | Refills: 2 | Status: SHIPPED | OUTPATIENT
Start: 2023-06-12 | End: 2024-04-12

## 2023-06-12 NOTE — PROGRESS NOTES
Virtual Visit Details    Type of service:  Video Visit   Video Start Time:1058  Video End Time:1117    Originating Location (pt. 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  She is here today for follow up and disease management by video.     Ovarian Cancer History:  22: Presented to an ED in [place] for evaluation of abdominal bloating and discomfort.  -Abdomen, pelvic CT: Radiographic Stage CAL ovarian cancer.   22: [id number].   22: Pelvic ultrasound: c/w metastatic cancer.    22: Pelvic exam under anesthesia, diagnostic laparoscopy, biopsies, evacuation of ascites.   -Findings: On pelvic exam under anesthesia, there was a 6 cm firm, fixed mass adherent to the vaginal cuff. Vagina otherwise smooth. On laparoscopic examination, there was ascites filling the pelvis/abdomen, with >1 liter of fluid evacuated. The palpated mass was arising from the right ovary. Left ovary relatively normal in appearance. There was diffuse carcinomatosis covering the entire peritoneal surface falciform ligament, liver capsule, and mesentery. The omentum was replaced with tumor. Findings were not amenable to optimal tumor debulking so biopsies were taken for histologic examination.   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      22, 22, 22: Cycles 1-3 of neoadjuvant chemotherapy with IV carboplatin + paclitaxel 175 mg/m2 every 21 days.   -Cycles 1,2: Carboplatin AUC 6.   -Cycle 3: Carboplatin AUC 5 with dose-reduction due to thrombcytopenia.   -22: Chest, abdomen, pelvic CT: Partial  response.    8/29/22: Pelvic exam under anesthesia, diagnostic laparoscopy, conversion to laparotomy for optimal interval tumor debulking to no gross residual disease including peritoneal and diaphragm biopsies, bilateral salpingo-oophorectomy, infragastric omentectomy, bilateral hemidiaphragm stripping, peritoneal and mesenteric argon beam ablation, appendectomy.   -Pathology: High-grade serous carcinoma involving all resected specimens.     9/28/22, 10/19/22, 11/17/22: Cycles 4-6 of primary chemotherapy with IV carboplatin AUC 5 + paclitaxel 175 mg/m2.  -12/2/22: Chest, abdomen, pelvic CT: No definitive evidence of disease.    CHEST: A few tiny perifissural nodules are unchanged right upper lobe compatible with subpleural lymph nodes.  ABDOMEN: Trace amount of residual ascites in the cul-de-sac. Mild peritoneal thickening persists within the left midabdomen. No measurable peritoneal implants identified.  MUSCULOSKELETAL: Tiny fat-containing left inguinal hernia unchanged.  -12/7/22: CA-125=23.   -2/16/23: Chest, abdomen, pelvic CT to evaluate bloating: Stable findings, no definitive evidence of disease.   ABDOMEN, PELVIS: There is mild peritoneal thickening in the left mid abdomen again visualized without measurable peritoneal implants noted. Tiny amount of fluid in the pelvis. This is decreased from the prior exam.  -5/25/23: Admitted  to [facility] for management of malignant ascites s/p therapeutic paracentesis, and partial SBO resolved with conservative management.   -5/25/23: Abdomen, pelvic CT: Progressive disease.        Genetic/Genomic/Molecular Testing History:  10/5/22: [facility], reflexed to Common Hereditary Cancer Panel.   -No identifiable germline variants identified in ABRAXAS1, AKT1, APC, DEION, AXIN2, BARD1, BMPR1A, BRCA1, BRCA2, BRIP1, CDC73, CDH1, CDK4, CDKN2A, CHEK2, CTNNA1, DICER1, EPCAM, FANCC, FANCM, GREM1, HOXB13, KIT, MEN1, MLH1, MRE11, MSH2, MSH6, MUTYH, NBN, NF1, NTHL1,  PALB2, PDGFRA, PIK3CA, PMS2, POLD1, POLE, PTEN, RAD50, RAD51C, RAD51D, RECQL, RINT1, SDHA, SDHB, SDHC, SDHD, SMAD4, SMARCA4, STK11, TP53, TSC1, TSC2, VHL, XRCC2.   -Variant of uncertain significance in MSH3 c.16C>T (p.Nty2Vov)    10/9/22 (tumor 8/29/22): [facility] Testing Results.  -Genomic ROXI low (6%)   -TMB low (1mut/Mb)  -Microsatellite stable/Mismatch Repair proficient  -PD-L1- (CPS 0%)  -NTRK 1/2/3 fusion not detected.   -ER-, NE+  -Genomic alterations in:  --TP53  -No genomic alterations in BRCA1,2.      Plan: carboplatin AUC 5 + pegylated lipoosomal doxorubicin (PLD) 30 mg/m2 every 28 days x 4 cycles then CT      6/13/23: Cycle #1 Carboplatin/Doxil.  pending.         Today she reports she is doing well. She states she tolerated her Carboplatin treatment well and states her nausea was not bad. She denies any vaginal bleeding, no changes in her bowel or bladder habits, no nausea/emesis, no lower extremity edema, and no difficulties eating or sleeping. She denies any abdominal discomfort/bloating, no fevers or chills, and no chest pain or shortness of breath. She has some questions about her new regimen.         Review of Systems:    Systemic           no weight changes; no fever; no chills; no night sweats; no appetite changes  Skin           no rashes, or lesions  Eye           no irritation; no changes in vision  Day-Laryngeal           no dysphagia; no hoarseness   Pulmonary    no cough; no shortness of breath  Cardiovascular    no chest pain; no palpitations  Gastrointestinal    no diarrhea; no constipation; no abdominal pain; no changes in bowel habits; no blood in stool  Genitourinary   no urinary frequency; no urinary urgency; no dysuria; no pain; no abnormal vaginal discharge; no abnormal vaginal bleeding  Breast    no breast discharge; no breast changes; no breast pain  Musculoskeletal    no myalgias; no arthralgias; no back pain  Psychiatric           no depressed mood; no anxiety    Hematologic               no tender lymph nodes; no noticeable swellings or lumps   Endocrine    no hot flashes; no heat/cold intolerance         Neurological   no tremor; no numbness and tingling; no headaches; no difficulty sleeping      Past Medical History:    Past Medical History:   Diagnosis Date     Female stress incontinence      Ovarian cancer, right (H) 06/16/2022    Stage IIIC high-grade serous carcinoma     Recurrent cold sores          Past Surgical History:    Past Surgical History:   Procedure Laterality Date     APPENDECTOMY  08/29/2022    Part of ovarian cancer tumor debulking     BLADDER SUSPENSION  08/13/2009     HYSTERECTOMY  08/13/2009    For prolapse     LAPAROSCOPY DIAGNOSTIC (GYN)  06/16/2022     SALPINGO-OOPHORECTOMY, COMBINED Bilateral 08/29/2022    Pelvic exam under anesthesia, diagnostic laparoscopy, conversion to laparotomy for optimal interval tumor debulking to no gross residual disease including peritoneal and diaphragm biopsies, bilateral salpingo-oophorectomy, infragastric omentectomy, bilateral hemidiaphragm stripping, peritoneal and mesenteric argon beam ablation, appendectomy.     TONSILLECTOMY  1973     TUBAL LIGATION Bilateral 09/01/1998         Health Maintenance Due   Topic Date Due     YEARLY PREVENTIVE VISIT  Never done     ADVANCE CARE PLANNING  Never done     HEPATITIS B IMMUNIZATION (1 of 3 - 3-dose series) Never done     COLORECTAL CANCER SCREENING  Never done     HIV SCREENING  Never done     HEPATITIS C SCREENING  Never done     PAP  Never done     LIPID  Never done     ZOSTER IMMUNIZATION (1 of 2) Never done     DTAP/TDAP/TD IMMUNIZATION (2 - Td or Tdap) 12/15/2021     PHQ-2 (once per calendar year)  Never done     MAMMO SCREENING  01/27/2023     LUNG CANCER SCREENING  06/10/2023       Current Medications:     Current Outpatient Medications   Medication Sig Dispense Refill     citalopram (CELEXA) 40 MG tablet Take 1 tablet by mouth daily       cyclobenzaprine (FLEXERIL) 5 MG tablet  Take 1-2 Tablets (5-10 mg) by mouth three times a day.       dexamethasone (DECADRON) 4 MG tablet Take 2 tablets (8 mg) by mouth daily Take for 3 days, starting the day after chemo. Take with food. 6 tablet 2     diphenhydrAMINE-acetaminophen (TYLENOL PM)  MG tablet Take 1 tablet by mouth as needed       docusate sodium (DSS) 100 MG capsule Take 100 mg by mouth as needed       ibuprofen (ADVIL/MOTRIN) 200 MG capsule        lidocaine-prilocaine (EMLA) 2.5-2.5 % external cream Apply topically as needed (pain due to port access) Apply to port 30 minutes prior to lab appointment. 30 g 6     lidocaine-prilocaine (EMLA) 2.5-2.5 % external cream Apply to port 30-60 minutes prior to appointment       LORazepam (ATIVAN) 0.5 MG tablet Take 1-2 Tablets (0.5-1 mg) by mouth every 4 hours as needed for Anxiety (and or nausea and vomiting).*       LORazepam (ATIVAN) 0.5 MG tablet Take 1 tablet (0.5 mg) by mouth every 6 hours as needed for anxiety 20 tablet 3     Multiple Vitamin (MULTI-VITAMIN DAILY PO) Take 1 tablet by mouth daily       ondansetron (ZOFRAN) 8 MG tablet Take 1 tablet (8 mg) by mouth every 8 hours as needed for nausea (vomiting) 30 tablet 2     ondansetron (ZOFRAN) 8 MG tablet Take 8 mg by mouth       prochlorperazine (COMPAZINE) 10 MG tablet Take 1 tablet (10 mg) by mouth every 6 hours as needed for nausea or vomiting 30 tablet 2     prochlorperazine (COMPAZINE) 10 MG tablet Take 10 mg by mouth as needed       sennosides (SENOKOT) 8.6 MG tablet        valACYclovir (VALTREX) 1000 mg tablet Take 1,000 mg by mouth as needed           Allergies:      No Known Allergies     Social History:     Social History     Tobacco Use     Smoking status: Former     Packs/day: 1.00     Years: 42.00     Pack years: 42.00     Types: Cigarettes     Quit date: 2022     Years since quittin.0     Smokeless tobacco: Never   Vaping Use     Vaping status: Not on file   Substance Use Topics     Alcohol use: Yes     Comment:  Beer ~Q3 months.       History   Drug Use Unknown         Family History:     The patient's family history is notable for:    Family History   Problem Relation Age of Onset     Prostate Cancer Father      Breast Cancer Sister 48     Melanoma Sister      Skin Cancer Sister      Colon Cancer Maternal Grandmother          Physical Exam:     There were no vitals taken for this visit.  There is no height or weight on file to calculate BMI.    General Appearance: healthy and alert, no distress    Eyes:  Eyes grossly normal to inspection.  No discharge or erythema, or obvious scleral/conjunctival abnormalities.    Respiratory: No audible wheeze, cough, or visible cyanosis.  No visible retractions or increased work of breathing.     Musculoskeletal: extremities non tender and without edema    Skin: no lesions or rashes on visible skin    Neurological: normal gait, no gross defects     Psychiatric: appropriate mood and affect. Mentation appears normal, affect normal/bright, judgement and insight intact, normal speech and appearance well-groomed                            The rest of a comprehensive physical examination is deferred due to video visit restrictions.      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  She is here today for follow up and disease management by video.     28 minutes spent on the date of the encounter doing chart review, history and exam, documentation and further activities as noted above      Plan:     1.)        Reviewed CBC, CMP, Mg, and Ok to proceed with planned treatment. Discussed importance of eating smaller meals with lean proteins/hard boiled eggs more often, adequate hydration of at least 64 oz water throughout the day, and pacing activities. Reviewed skin care precautions while on Doxil. She will return in 4 weeks for her next visit; provider visit scheduled only thus far. She prefers  to have her labs done the day before to avoid any delays in treatment start. Reviewed signs and symptoms for when she should contact the clinic or seek additional care. Patient to contact the clinic with any questions or concerns in the interim.  Answered all of her questions to the best of my ability.     2.) Genetic risk factors were assessed and she has a VUS No identifiable germline variants identified in ABRAXAS1, AKT1, APC, DEION, AXIN2, BARD1, BMPR1A, BRCA1, BRCA2, BRIP1, CDC73, CDH1, CDK4, CDKN2A, CHEK2, CTNNA1, DICER1, EPCAM, FANCC, FANCM, GREM1, HOXB13, KIT, MEN1, MLH1, MRE11, MSH2, MSH6, MUTYH, NBN, NF1, NTHL1, PALB2, PDGFRA, PIK3CA, PMS2, POLD1, POLE, PTEN, RAD50, RAD51C, RAD51D, RECQL, RINT1, SDHA, SDHB, SDHC, SDHD, SMAD4, SMARCA4, STK11, TP53, TSC1, TSC2, VHL, XRCC2.     3.) Labs and/or tests ordered include: CBC, CMP, Mg, .     4.) Health maintenance issues addressed today include annual health maintenance and non-gynecologic issues with PCP.    JALEN Zamarripa, WHNP-BC, ANP-BC  Women's Health Nurse Practitioner  Adult Nurse Practitioner  Division of Gynecologic Oncology          CC  Patient Care Team:  Linda Robert APRN CNP as PCP - General  Charis Patino MD as MD (Gynecologic Oncology)  SELF, REFERRED

## 2023-06-13 ENCOUNTER — VIRTUAL VISIT (OUTPATIENT)
Dept: ONCOLOGY | Facility: CLINIC | Age: 58
End: 2023-06-13
Attending: NURSE PRACTITIONER
Payer: COMMERCIAL

## 2023-06-13 ENCOUNTER — LAB (OUTPATIENT)
Dept: INFUSION THERAPY | Facility: CLINIC | Age: 58
End: 2023-06-13
Attending: OBSTETRICS & GYNECOLOGY
Payer: COMMERCIAL

## 2023-06-13 VITALS
HEART RATE: 58 BPM | SYSTOLIC BLOOD PRESSURE: 137 MMHG | RESPIRATION RATE: 18 BRPM | WEIGHT: 151 LBS | DIASTOLIC BLOOD PRESSURE: 81 MMHG | TEMPERATURE: 97.6 F | OXYGEN SATURATION: 99 % | BODY MASS INDEX: 27.75 KG/M2

## 2023-06-13 DIAGNOSIS — C56.1 OVARIAN CANCER, RIGHT (H): Primary | ICD-10-CM

## 2023-06-13 DIAGNOSIS — Z51.11 ENCOUNTER FOR ANTINEOPLASTIC CHEMOTHERAPY: ICD-10-CM

## 2023-06-13 LAB
ALBUMIN SERPL-MCNC: 3.7 G/DL (ref 3.5–5)
ALP SERPL-CCNC: 94 U/L (ref 45–120)
ALT SERPL W P-5'-P-CCNC: 11 U/L (ref 0–45)
ANION GAP SERPL CALCULATED.3IONS-SCNC: 8 MMOL/L (ref 5–18)
AST SERPL W P-5'-P-CCNC: 19 U/L (ref 0–40)
BASOPHILS # BLD AUTO: 0 10E3/UL (ref 0–0.2)
BASOPHILS NFR BLD AUTO: 1 %
BILIRUB SERPL-MCNC: 0.4 MG/DL (ref 0–1)
BUN SERPL-MCNC: 12 MG/DL (ref 8–22)
CALCIUM SERPL-MCNC: 9.1 MG/DL (ref 8.5–10.5)
CANCER AG125 SERPL-ACNC: 78 U/ML
CHLORIDE BLD-SCNC: 101 MMOL/L (ref 98–107)
CO2 SERPL-SCNC: 26 MMOL/L (ref 22–31)
CREAT SERPL-MCNC: 0.9 MG/DL (ref 0.6–1.1)
EOSINOPHIL # BLD AUTO: 0.3 10E3/UL (ref 0–0.7)
EOSINOPHIL NFR BLD AUTO: 5 %
ERYTHROCYTE [DISTWIDTH] IN BLOOD BY AUTOMATED COUNT: 13.3 % (ref 10–15)
GFR SERPL CREATININE-BSD FRML MDRD: 74 ML/MIN/1.73M2
GLUCOSE BLD-MCNC: 80 MG/DL (ref 70–125)
HCT VFR BLD AUTO: 35.5 % (ref 35–47)
HGB BLD-MCNC: 11.5 G/DL (ref 11.7–15.7)
IMM GRANULOCYTES # BLD: 0 10E3/UL
IMM GRANULOCYTES NFR BLD: 0 %
LYMPHOCYTES # BLD AUTO: 0.9 10E3/UL (ref 0.8–5.3)
LYMPHOCYTES NFR BLD AUTO: 19 %
MAGNESIUM SERPL-MCNC: 1.8 MG/DL (ref 1.8–2.6)
MCH RBC QN AUTO: 29 PG (ref 26.5–33)
MCHC RBC AUTO-ENTMCNC: 32.4 G/DL (ref 31.5–36.5)
MCV RBC AUTO: 90 FL (ref 78–100)
MONOCYTES # BLD AUTO: 0.4 10E3/UL (ref 0–1.3)
MONOCYTES NFR BLD AUTO: 8 %
NEUTROPHILS # BLD AUTO: 3.3 10E3/UL (ref 1.6–8.3)
NEUTROPHILS NFR BLD AUTO: 67 %
NRBC # BLD AUTO: 0 10E3/UL
NRBC BLD AUTO-RTO: 0 /100
PLATELET # BLD AUTO: 186 10E3/UL (ref 150–450)
POTASSIUM BLD-SCNC: 4.1 MMOL/L (ref 3.5–5)
PROT SERPL-MCNC: 6.5 G/DL (ref 6–8)
RBC # BLD AUTO: 3.96 10E6/UL (ref 3.8–5.2)
SODIUM SERPL-SCNC: 135 MMOL/L (ref 136–145)
WBC # BLD AUTO: 4.9 10E3/UL (ref 4–11)

## 2023-06-13 PROCEDURE — 86304 IMMUNOASSAY TUMOR CA 125: CPT | Performed by: OBSTETRICS & GYNECOLOGY

## 2023-06-13 PROCEDURE — 258N000003 HC RX IP 258 OP 636: Performed by: NURSE PRACTITIONER

## 2023-06-13 PROCEDURE — 36415 COLL VENOUS BLD VENIPUNCTURE: CPT | Performed by: OBSTETRICS & GYNECOLOGY

## 2023-06-13 PROCEDURE — 250N000011 HC RX IP 250 OP 636: Performed by: NURSE PRACTITIONER

## 2023-06-13 PROCEDURE — 96417 CHEMO IV INFUS EACH ADDL SEQ: CPT

## 2023-06-13 PROCEDURE — 96375 TX/PRO/DX INJ NEW DRUG ADDON: CPT

## 2023-06-13 PROCEDURE — 83735 ASSAY OF MAGNESIUM: CPT | Performed by: OBSTETRICS & GYNECOLOGY

## 2023-06-13 PROCEDURE — 85025 COMPLETE CBC W/AUTO DIFF WBC: CPT | Performed by: OBSTETRICS & GYNECOLOGY

## 2023-06-13 PROCEDURE — 99214 OFFICE O/P EST MOD 30 MIN: CPT | Mod: 95 | Performed by: NURSE PRACTITIONER

## 2023-06-13 PROCEDURE — 258N000003 HC RX IP 258 OP 636: Performed by: OBSTETRICS & GYNECOLOGY

## 2023-06-13 PROCEDURE — 80053 COMPREHEN METABOLIC PANEL: CPT | Performed by: OBSTETRICS & GYNECOLOGY

## 2023-06-13 PROCEDURE — 250N000011 HC RX IP 250 OP 636: Performed by: OBSTETRICS & GYNECOLOGY

## 2023-06-13 PROCEDURE — 96367 TX/PROPH/DG ADDL SEQ IV INF: CPT

## 2023-06-13 PROCEDURE — 96413 CHEMO IV INFUSION 1 HR: CPT

## 2023-06-13 RX ORDER — HEPARIN SODIUM (PORCINE) LOCK FLUSH IV SOLN 100 UNIT/ML 100 UNIT/ML
5 SOLUTION INTRAVENOUS
Status: DISCONTINUED | OUTPATIENT
Start: 2023-06-13 | End: 2023-06-13 | Stop reason: HOSPADM

## 2023-06-13 RX ORDER — METHYLPREDNISOLONE SODIUM SUCCINATE 125 MG/2ML
125 INJECTION, POWDER, LYOPHILIZED, FOR SOLUTION INTRAMUSCULAR; INTRAVENOUS
Status: CANCELLED
Start: 2023-06-13

## 2023-06-13 RX ORDER — ALBUTEROL SULFATE 0.83 MG/ML
2.5 SOLUTION RESPIRATORY (INHALATION)
Status: DISCONTINUED | OUTPATIENT
Start: 2023-06-13 | End: 2023-06-13 | Stop reason: HOSPADM

## 2023-06-13 RX ORDER — ALBUTEROL SULFATE 0.83 MG/ML
2.5 SOLUTION RESPIRATORY (INHALATION)
Status: CANCELLED | OUTPATIENT
Start: 2023-06-13

## 2023-06-13 RX ORDER — PALONOSETRON 0.05 MG/ML
0.25 INJECTION, SOLUTION INTRAVENOUS ONCE
Status: COMPLETED | OUTPATIENT
Start: 2023-06-13 | End: 2023-06-13

## 2023-06-13 RX ORDER — LORAZEPAM 2 MG/ML
0.5 INJECTION INTRAMUSCULAR EVERY 4 HOURS PRN
Status: CANCELLED | OUTPATIENT
Start: 2023-06-13

## 2023-06-13 RX ORDER — EPINEPHRINE 1 MG/ML
0.3 INJECTION, SOLUTION INTRAMUSCULAR; SUBCUTANEOUS EVERY 5 MIN PRN
Status: CANCELLED | OUTPATIENT
Start: 2023-06-13

## 2023-06-13 RX ORDER — HEPARIN SODIUM (PORCINE) LOCK FLUSH IV SOLN 100 UNIT/ML 100 UNIT/ML
5 SOLUTION INTRAVENOUS
Status: CANCELLED | OUTPATIENT
Start: 2023-06-13

## 2023-06-13 RX ORDER — PALONOSETRON 0.05 MG/ML
0.25 INJECTION, SOLUTION INTRAVENOUS ONCE
Status: CANCELLED | OUTPATIENT
Start: 2023-06-13

## 2023-06-13 RX ORDER — METHYLPREDNISOLONE SODIUM SUCCINATE 125 MG/2ML
125 INJECTION, POWDER, LYOPHILIZED, FOR SOLUTION INTRAMUSCULAR; INTRAVENOUS
Status: DISCONTINUED | OUTPATIENT
Start: 2023-06-13 | End: 2023-06-13 | Stop reason: HOSPADM

## 2023-06-13 RX ORDER — DIPHENHYDRAMINE HYDROCHLORIDE 50 MG/ML
50 INJECTION INTRAMUSCULAR; INTRAVENOUS
Status: DISCONTINUED | OUTPATIENT
Start: 2023-06-13 | End: 2023-06-13 | Stop reason: HOSPADM

## 2023-06-13 RX ORDER — HEPARIN SODIUM,PORCINE 10 UNIT/ML
5 VIAL (ML) INTRAVENOUS
Status: CANCELLED | OUTPATIENT
Start: 2023-06-13

## 2023-06-13 RX ORDER — DIPHENHYDRAMINE HYDROCHLORIDE 50 MG/ML
50 INJECTION INTRAMUSCULAR; INTRAVENOUS
Status: CANCELLED
Start: 2023-06-13

## 2023-06-13 RX ORDER — EPINEPHRINE 1 MG/ML
0.3 INJECTION, SOLUTION INTRAMUSCULAR; SUBCUTANEOUS EVERY 5 MIN PRN
Status: DISCONTINUED | OUTPATIENT
Start: 2023-06-13 | End: 2023-06-13 | Stop reason: HOSPADM

## 2023-06-13 RX ORDER — ALBUTEROL SULFATE 90 UG/1
1-2 AEROSOL, METERED RESPIRATORY (INHALATION)
Status: DISCONTINUED | OUTPATIENT
Start: 2023-06-13 | End: 2023-06-13 | Stop reason: HOSPADM

## 2023-06-13 RX ORDER — ALBUTEROL SULFATE 90 UG/1
1-2 AEROSOL, METERED RESPIRATORY (INHALATION)
Status: CANCELLED
Start: 2023-06-13

## 2023-06-13 RX ORDER — MEPERIDINE HYDROCHLORIDE 50 MG/ML
25 INJECTION INTRAMUSCULAR; INTRAVENOUS; SUBCUTANEOUS EVERY 30 MIN PRN
Status: DISCONTINUED | OUTPATIENT
Start: 2023-06-13 | End: 2023-06-13 | Stop reason: HOSPADM

## 2023-06-13 RX ORDER — MEPERIDINE HYDROCHLORIDE 25 MG/ML
25 INJECTION INTRAMUSCULAR; INTRAVENOUS; SUBCUTANEOUS EVERY 30 MIN PRN
Status: CANCELLED | OUTPATIENT
Start: 2023-06-13

## 2023-06-13 RX ADMIN — HEPARIN 5 ML: 100 SYRINGE at 14:32

## 2023-06-13 RX ADMIN — DEXTROSE MONOHYDRATE 250 ML: 50 INJECTION, SOLUTION INTRAVENOUS at 11:24

## 2023-06-13 RX ADMIN — DOXORUBICIN HYDROCHLORIDE 52 MG: 2 INJECTABLE, LIPOSOMAL INTRAVENOUS at 12:21

## 2023-06-13 RX ADMIN — FOSAPREPITANT: 150 INJECTION, POWDER, LYOPHILIZED, FOR SOLUTION INTRAVENOUS at 11:32

## 2023-06-13 RX ADMIN — PALONOSETRON 0.25 MG: 0.05 INJECTION, SOLUTION INTRAVENOUS at 11:25

## 2023-06-13 RX ADMIN — CARBOPLATIN 395 MG: 10 INJECTION, SOLUTION INTRAVENOUS at 13:42

## 2023-06-13 NOTE — PROGRESS NOTES
The patient attended chemotherapy class today.  The patient was educated on:  -New home Rxs, including dexamethasone, zofran, compazine and EMLA cream.  -Described a typical day at the treatment center and what the patient can expect  -Chemotherapy side effects and appropriate management of these side effects. SE discussed included and not limited to: Fatigue, nausea and vomiting, constipation, diarrhea, mucositis, alopecia, peripheral neuropathy, pain, anemia, thrombocytopenia, and neutropenia.    -Reasons to call the physician, including, fever>100.4, shaking chills, unusual bleeding or bruising, shortness of breath, vomiting>12hours, nausea >24 hours, unable to eat/drink >24 hours, painful urination, blood in urine or stool, soreness at the IV site, and painful mouth sores.  The triage phone number was given to the patient and the patient was encouraged to call with any questions.  The patient was given a tour of the infusion center.  All questions were addressed to the best of my abilities and the patient was encouraged to call with any questions.    Michelle also received eduction from her care team at the [facility].

## 2023-06-13 NOTE — NURSING NOTE
Is the patient currently in the state of [place]? YES    Visit mode:VIDEO    If the visit is dropped, the patient can be reconnected by: VIDEO VISIT: Text to cell phone: [phone]    Will anyone else be joining the visit? NO      How would you like to obtain your AVS? MyChart    Are changes needed to the allergy or medication list? NO  Patient verified medications and allergies are correct via eCheck-in. Patient confirms no changes at this time and/or since last reviewed by clinic staff.      Reason for visit: RECHECK (Oncology Video Visit Return, ovarian cancer)

## 2023-06-13 NOTE — LETTER
2023         RE: Jacki Smith  [address] [facility] [address]        Dear Colleague,    Thank you for referring your patient, Jacki Smith, to the [facility]. Please see a copy of my visit note below.    Virtual Visit Details    Type of service:  Video Visit   Video Start Time:1058  Video End Time:1117    Originating Location (pt. 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  She is here today for follow up and disease management by video.     Ovarian Cancer History:  22: Presented to an ED in [place] for evaluation of abdominal bloating and discomfort.  -Abdomen, pelvic CT: Radiographic Stage CAL ovarian cancer.   22: [id number].   22: Pelvic ultrasound: c/w metastatic cancer.    22: Pelvic exam under anesthesia, diagnostic laparoscopy, biopsies, evacuation of ascites.   -Findings: On pelvic exam under anesthesia, there was a 6 cm firm, fixed mass adherent to the vaginal cuff. Vagina otherwise smooth. On laparoscopic examination, there was ascites filling the pelvis/abdomen, with >1 liter of fluid evacuated. The palpated mass was arising from the right ovary. Left ovary relatively normal in appearance. There was diffuse carcinomatosis covering the entire peritoneal surface falciform ligament, liver capsule, and mesentery. The omentum was replaced with tumor. Findings were not amenable to optimal tumor debulking so biopsies were taken for histologic examination.   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      22, 22, 22:  Cycles 1-3 of neoadjuvant chemotherapy with IV carboplatin + paclitaxel 175 mg/m2 every 21 days.   -Cycles 1,2: Carboplatin AUC 6.   -Cycle 3: Carboplatin AUC 5 with dose-reduction due to thrombcytopenia.   -8/19/22: Chest, abdomen, pelvic CT: Partial response.    8/29/22: Pelvic exam under anesthesia, diagnostic laparoscopy, conversion to laparotomy for optimal interval tumor debulking to no gross residual disease including peritoneal and diaphragm biopsies, bilateral salpingo-oophorectomy, infragastric omentectomy, bilateral hemidiaphragm stripping, peritoneal and mesenteric argon beam ablation, appendectomy.   -Pathology: High-grade serous carcinoma involving all resected specimens.     9/28/22, 10/19/22, 11/17/22: Cycles 4-6 of primary chemotherapy with IV carboplatin AUC 5 + paclitaxel 175 mg/m2.  -12/2/22: Chest, abdomen, pelvic CT: No definitive evidence of disease.    CHEST: A few tiny perifissural nodules are unchanged right upper lobe compatible with subpleural lymph nodes.  ABDOMEN: Trace amount of residual ascites in the cul-de-sac. Mild peritoneal thickening persists within the left midabdomen. No measurable peritoneal implants identified.  MUSCULOSKELETAL: Tiny fat-containing left inguinal hernia unchanged.  -12/7/22: CA-125=23.   -2/16/23: Chest, abdomen, pelvic CT to evaluate bloating: Stable findings, no definitive evidence of disease.   ABDOMEN, PELVIS: There is mild peritoneal thickening in the left mid abdomen again visualized without measurable peritoneal implants noted. Tiny amount of fluid in the pelvis. This is decreased from the prior exam.  -5/25/23: Admitted  to [facility] for management of malignant ascites s/p therapeutic paracentesis, and partial SBO resolved with conservative management.   -5/25/23: Abdomen, pelvic CT: Progressive disease.        Genetic/Genomic/Molecular Testing History:  10/5/22: [facility], reflexed to Common Hereditary Cancer Panel.   -No  identifiable germline variants identified in ABRAXAS1, AKT1, APC, DEION, AXIN2, BARD1, BMPR1A, BRCA1, BRCA2, BRIP1, CDC73, CDH1, CDK4, CDKN2A, CHEK2, CTNNA1, DICER1, EPCAM, FANCC, FANCM, GREM1, HOXB13, KIT, MEN1, MLH1, MRE11, MSH2, MSH6, MUTYH, NBN, NF1, NTHL1, PALB2, PDGFRA, PIK3CA, PMS2, POLD1, POLE, PTEN, RAD50, RAD51C, RAD51D, RECQL, RINT1, SDHA, SDHB, SDHC, SDHD, SMAD4, SMARCA4, STK11, TP53, TSC1, TSC2, VHL, XRCC2.   -Variant of uncertain significance in MSH3 c.16C>T (p.Tnr6Cnb)    10/9/22 (tumor 8/29/22): [facility] Testing Results.  -Genomic ROXI low (6%)   -TMB low (1mut/Mb)  -Microsatellite stable/Mismatch Repair proficient  -PD-L1- (CPS 0%)  -NTRK 1/2/3 fusion not detected.   -ER-, IN+  -Genomic alterations in:  --TP53  -No genomic alterations in BRCA1,2.      Plan: carboplatin AUC 5 + pegylated lipoosomal doxorubicin (PLD) 30 mg/m2 every 28 days x 4 cycles then CT      6/13/23: Cycle #1 Carboplatin/Doxil.  pending.         Today she reports she is doing well. She states she tolerated her Carboplatin treatment well and states her nausea was not bad. She denies any vaginal bleeding, no changes in her bowel or bladder habits, no nausea/emesis, no lower extremity edema, and no difficulties eating or sleeping. She denies any abdominal discomfort/bloating, no fevers or chills, and no chest pain or shortness of breath. She has some questions about her new regimen.         Review of Systems:    Systemic           no weight changes; no fever; no chills; no night sweats; no appetite changes  Skin           no rashes, or lesions  Eye           no irritation; no changes in vision  Day-Laryngeal           no dysphagia; no hoarseness   Pulmonary    no cough; no shortness of breath  Cardiovascular    no chest pain; no palpitations  Gastrointestinal    no diarrhea; no constipation; no abdominal pain; no changes in bowel habits; no blood in stool  Genitourinary   no urinary frequency; no urinary urgency; no dysuria; no  pain; no abnormal vaginal discharge; no abnormal vaginal bleeding  Breast    no breast discharge; no breast changes; no breast pain  Musculoskeletal    no myalgias; no arthralgias; no back pain  Psychiatric           no depressed mood; no anxiety    Hematologic              no tender lymph nodes; no noticeable swellings or lumps   Endocrine    no hot flashes; no heat/cold intolerance         Neurological   no tremor; no numbness and tingling; no headaches; no difficulty sleeping      Past Medical History:    Past Medical History:   Diagnosis Date     Female stress incontinence      Ovarian cancer, right (H) 06/16/2022    Stage IIIC high-grade serous carcinoma     Recurrent cold sores          Past Surgical History:    Past Surgical History:   Procedure Laterality Date     APPENDECTOMY  08/29/2022    Part of ovarian cancer tumor debulking     BLADDER SUSPENSION  08/13/2009     HYSTERECTOMY  08/13/2009    For prolapse     LAPAROSCOPY DIAGNOSTIC (GYN)  06/16/2022     SALPINGO-OOPHORECTOMY, COMBINED Bilateral 08/29/2022    Pelvic exam under anesthesia, diagnostic laparoscopy, conversion to laparotomy for optimal interval tumor debulking to no gross residual disease including peritoneal and diaphragm biopsies, bilateral salpingo-oophorectomy, infragastric omentectomy, bilateral hemidiaphragm stripping, peritoneal and mesenteric argon beam ablation, appendectomy.     TONSILLECTOMY  1973     TUBAL LIGATION Bilateral 09/01/1998         Health Maintenance Due   Topic Date Due     YEARLY PREVENTIVE VISIT  Never done     ADVANCE CARE PLANNING  Never done     HEPATITIS B IMMUNIZATION (1 of 3 - 3-dose series) Never done     COLORECTAL CANCER SCREENING  Never done     HIV SCREENING  Never done     HEPATITIS C SCREENING  Never done     PAP  Never done     LIPID  Never done     ZOSTER IMMUNIZATION (1 of 2) Never done     DTAP/TDAP/TD IMMUNIZATION (2 - Td or Tdap) 12/15/2021     PHQ-2 (once per calendar year)  Never done     MAMMO  SCREENING  01/27/2023     LUNG CANCER SCREENING  06/10/2023       Current Medications:     Current Outpatient Medications   Medication Sig Dispense Refill     citalopram (CELEXA) 40 MG tablet Take 1 tablet by mouth daily       cyclobenzaprine (FLEXERIL) 5 MG tablet Take 1-2 Tablets (5-10 mg) by mouth three times a day.       dexamethasone (DECADRON) 4 MG tablet Take 2 tablets (8 mg) by mouth daily Take for 3 days, starting the day after chemo. Take with food. 6 tablet 2     diphenhydrAMINE-acetaminophen (TYLENOL PM)  MG tablet Take 1 tablet by mouth as needed       docusate sodium (DSS) 100 MG capsule Take 100 mg by mouth as needed       ibuprofen (ADVIL/MOTRIN) 200 MG capsule        lidocaine-prilocaine (EMLA) 2.5-2.5 % external cream Apply topically as needed (pain due to port access) Apply to port 30 minutes prior to lab appointment. 30 g 6     lidocaine-prilocaine (EMLA) 2.5-2.5 % external cream Apply to port 30-60 minutes prior to appointment       LORazepam (ATIVAN) 0.5 MG tablet Take 1-2 Tablets (0.5-1 mg) by mouth every 4 hours as needed for Anxiety (and or nausea and vomiting).*       LORazepam (ATIVAN) 0.5 MG tablet Take 1 tablet (0.5 mg) by mouth every 6 hours as needed for anxiety 20 tablet 3     Multiple Vitamin (MULTI-VITAMIN DAILY PO) Take 1 tablet by mouth daily       ondansetron (ZOFRAN) 8 MG tablet Take 1 tablet (8 mg) by mouth every 8 hours as needed for nausea (vomiting) 30 tablet 2     ondansetron (ZOFRAN) 8 MG tablet Take 8 mg by mouth       prochlorperazine (COMPAZINE) 10 MG tablet Take 1 tablet (10 mg) by mouth every 6 hours as needed for nausea or vomiting 30 tablet 2     prochlorperazine (COMPAZINE) 10 MG tablet Take 10 mg by mouth as needed       sennosides (SENOKOT) 8.6 MG tablet        valACYclovir (VALTREX) 1000 mg tablet Take 1,000 mg by mouth as needed           Allergies:      No Known Allergies     Social History:     Social History     Tobacco Use     Smoking status: Former      Packs/day: 1.00     Years: 42.00     Pack years: 42.00     Types: Cigarettes     Quit date: 2022     Years since quittin.0     Smokeless tobacco: Never   Vaping Use     Vaping status: Not on file   Substance Use Topics     Alcohol use: Yes     Comment: Beer ~Q3 months.       History   Drug Use Unknown         Family History:     The patient's family history is notable for:    Family History   Problem Relation Age of Onset     Prostate Cancer Father      Breast Cancer Sister 48     Melanoma Sister      Skin Cancer Sister      Colon Cancer Maternal Grandmother          Physical Exam:     There were no vitals taken for this visit.  There is no height or weight on file to calculate BMI.    General Appearance: healthy and alert, no distress    Eyes:  Eyes grossly normal to inspection.  No discharge or erythema, or obvious scleral/conjunctival abnormalities.    Respiratory: No audible wheeze, cough, or visible cyanosis.  No visible retractions or increased work of breathing.     Musculoskeletal: extremities non tender and without edema    Skin: no lesions or rashes on visible skin    Neurological: normal gait, no gross defects     Psychiatric: appropriate mood and affect. Mentation appears normal, affect normal/bright, judgement and insight intact, normal speech and appearance well-groomed                            The rest of a comprehensive physical examination is deferred due to video visit restrictions.      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  She is here today for follow up and disease management by video.     28 minutes spent on the date of the encounter doing chart review, history and exam, documentation and further activities as noted above      Plan:     1.)        Reviewed CBC, CMP, Mg, and Ok to proceed with planned treatment. Discussed importance of eating smaller meals with lean  proteins/hard boiled eggs more often, adequate hydration of at least 64 oz water throughout the day, and pacing activities. Reviewed skin care precautions while on Doxil. She will return in 4 weeks for her next visit; provider visit scheduled only thus far. She prefers to have her labs done the day before to avoid any delays in treatment start. Reviewed signs and symptoms for when she should contact the clinic or seek additional care. Patient to contact the clinic with any questions or concerns in the interim.  Answered all of her questions to the best of my ability.     2.) Genetic risk factors were assessed and she has a VUS No identifiable germline variants identified in ABRAXAS1, AKT1, APC, DEION, AXIN2, BARD1, BMPR1A, BRCA1, BRCA2, BRIP1, CDC73, CDH1, CDK4, CDKN2A, CHEK2, CTNNA1, DICER1, EPCAM, FANCC, FANCM, GREM1, HOXB13, KIT, MEN1, MLH1, MRE11, MSH2, MSH6, MUTYH, NBN, NF1, NTHL1, PALB2, PDGFRA, PIK3CA, PMS2, POLD1, POLE, PTEN, RAD50, RAD51C, RAD51D, RECQL, RINT1, SDHA, SDHB, SDHC, SDHD, SMAD4, SMARCA4, STK11, TP53, TSC1, TSC2, VHL, XRCC2.     3.) Labs and/or tests ordered include: CBC, CMP, Mg, .     4.) Health maintenance issues addressed today include annual health maintenance and non-gynecologic issues with PCP.    JALEN Zamarripa, NP-BC, ANP-BC  Women's Health Nurse Practitioner  Adult Nurse Practitioner  Division of Gynecologic Oncology          CC  Patient Care Team:  Linda Robert APRN CNP as PCP - General  Charis Patino MD as MD (Gynecologic Oncology)  SELF, REFERRED      Again, thank you for allowing me to participate in the care of your patient.        Sincerely,        JALEN Owen CNP

## 2023-06-20 ENCOUNTER — PRE VISIT (OUTPATIENT)
Dept: ONCOLOGY | Facility: CLINIC | Age: 58
End: 2023-06-20
Payer: COMMERCIAL

## 2023-06-21 ENCOUNTER — VIRTUAL VISIT (OUTPATIENT)
Dept: PALLIATIVE CARE | Facility: CLINIC | Age: 58
End: 2023-06-21
Attending: FAMILY MEDICINE
Payer: COMMERCIAL

## 2023-06-21 DIAGNOSIS — C56.1 OVARIAN CANCER, RIGHT (H): ICD-10-CM

## 2023-06-21 DIAGNOSIS — F41.9 ANXIETY: ICD-10-CM

## 2023-06-21 DIAGNOSIS — Z51.5 PALLIATIVE CARE PATIENT: Primary | ICD-10-CM

## 2023-06-21 PROCEDURE — 99205 OFFICE O/P NEW HI 60 MIN: CPT | Mod: VID | Performed by: FAMILY MEDICINE

## 2023-06-21 RX ORDER — HYDROXYZINE HYDROCHLORIDE 10 MG/1
10-20 TABLET, FILM COATED ORAL EVERY 6 HOURS PRN
Qty: 120 TABLET | Refills: 3 | Status: SHIPPED | OUTPATIENT
Start: 2023-06-21 | End: 2024-08-08

## 2023-06-21 NOTE — PROGRESS NOTES
Virtual Visit Details    Type of service:  Video Visit     Originating Location (pt. Location): Home    Distant Location (provider location):  On-site  Platform used for Video Visit: [facility]     Palliative Care Outpatient Clinic Consultation Note    Patient:  Jacki Smith    Chief Complaint:   Jacki Smith 58 year old female who is presenting to the palliative medicine clinic today at the request of  for a palliative care consultation secondary to high-grade serous carcinoma of the right ovary.   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    Kikes cancer history is well summarized in Rylie Roman's note from June 13th and was reviewed.    Distressing Symptom/s:  Anxiety--uses ativan prn and it causes constipation and some abdominal pain which them makes her worry it is her cancer causing problems and worsens her anxiety.  On many days she can push the intrusive thoughts aside but today has been a particularly bad day.  She has three more cycles of chemo and then staging imaging and the time periods around those episodes are very high in anxiety. She is tolerating her chemo well except she worries that feeling well might mean the chemo isn't working or by chance her chemo was diverted and she didn't receive it.  She's not sure if her anxiety is worse on the days she takes dexamethasone.    Patient's Disease Understanding: Michelle feels her cancer is one that she can 'live with' and she also feels worried that 'any one little thing could go wrong and that will be 'it' and I won't survive.'    Coping:  Struggling with anxiety; prayer can be helpful for her at times;    Social History  Born: [place]  Education: [employer] grad and then Business college -a ten month program in bookkeeping  Living Situation: [facility]  with  (Sukumar) and one daughter (Astrid, 28)  Relationships:  to Sukumar for 31 years  Children: 2 daughters of her own and 1 son and a step daughter; all very close to home; 1 grand child (2 in August) and 2 step grandkids (6, 2 in August)  Actual/Potential Caregiver(s): daughter Astrid  Support System: work family, sisters (older sister had breast ca 9 years ago and is cancer free  Occupation:  at a [employer] in [place]  Hobbies: gardening and conrado; baking; needle point, reading (rom coms)  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     Drug use: Never       Family History  Family History   Problem Relation Age of Onset     Prostate Cancer Father      Breast Cancer Sister 48     Melanoma Sister      Skin Cancer Sister      Colon Cancer Maternal Grandmother      Patient's Involvement with Prior History of Serious Illness in Family: both parents-mother with dementia for 7 years.    Advance Care Planning:  Advance Directive:    Not completed  Where is written copy located: n/a  Health Care Agent Contact Information:  with input from children  POLST:   n/a  CODE STATUS: FULL Code    No Known Allergies  Current Outpatient Medications   Medication Sig Dispense Refill     citalopram (CELEXA) 40 MG tablet Take 1 tablet by mouth daily       cyclobenzaprine (FLEXERIL) 5 MG tablet Take 1-2 Tablets (5-10 mg) by mouth three times a day.       dexamethasone  (DECADRON) 4 MG tablet Take 2 tablets (8 mg) by mouth daily Take for 3 days, starting the day after chemo. Take with food. 6 tablet 2     diphenhydrAMINE-acetaminophen (TYLENOL PM)  MG tablet Take 1 tablet by mouth as needed       docusate sodium (DSS) 100 MG capsule Take 100 mg by mouth as needed       ibuprofen (ADVIL/MOTRIN) 200 MG capsule        lidocaine-prilocaine (EMLA) 2.5-2.5 % external cream Apply topically as needed (pain due to port access) Apply to port 30 minutes prior to lab appointment. 30 g 6     LORazepam (ATIVAN) 0.5 MG tablet Take 1-2 Tablets (0.5-1 mg) by mouth every 4 hours as needed for Anxiety (and or nausea and vomiting).*       LORazepam (ATIVAN) 0.5 MG tablet Take 1 tablet (0.5 mg) by mouth every 6 hours as needed for anxiety 20 tablet 3     Multiple Vitamin (MULTI-VITAMIN DAILY PO) Take 1 tablet by mouth daily       ondansetron (ZOFRAN) 8 MG tablet Take 1 tablet (8 mg) by mouth every 8 hours as needed for nausea (vomiting) 30 tablet 2     prochlorperazine (COMPAZINE) 10 MG tablet Take 1 tablet (10 mg) by mouth every 6 hours as needed for nausea or vomiting 30 tablet 2     prochlorperazine (COMPAZINE) 10 MG tablet Take 10 mg by mouth as needed       sennosides (SENOKOT) 8.6 MG tablet        valACYclovir (VALTREX) 1000 mg tablet Take 1,000 mg by mouth as needed       Past Medical History:   Diagnosis Date     Female stress incontinence      Ovarian cancer, right (H) 06/16/2022    Stage IIIC high-grade serous carcinoma     Recurrent cold sores      Past Surgical History:   Procedure Laterality Date     APPENDECTOMY  08/29/2022    Part of ovarian cancer tumor debulking     BLADDER SUSPENSION  08/13/2009     HYSTERECTOMY  08/13/2009    For prolapse     LAPAROSCOPY DIAGNOSTIC (GYN)  06/16/2022     SALPINGO-OOPHORECTOMY, COMBINED Bilateral 08/29/2022    Pelvic exam under anesthesia, diagnostic laparoscopy, conversion to laparotomy for optimal interval tumor debulking to no gross residual  disease including peritoneal and diaphragm biopsies, bilateral salpingo-oophorectomy, infragastric omentectomy, bilateral hemidiaphragm stripping, peritoneal and mesenteric argon beam ablation, appendectomy.     TONSILLECTOMY  1973     TUBAL LIGATION Bilateral 1998       REVIEW OF SYSTEMS:   ROS: 10 point ROS neg other than the symptoms noted above in the HPI and here:  Palliative Symptom Review (0=no symptom/no concern, 1=mild, 2=moderate, 3=severe):      Pain: 0      Fatigue: 0      Nausea: 0      Constipation: 1-2      Diarrhea: 0      Depressive Symptoms: 0      Anxiety: 3      Drowsiness: 0      Poor Appetite: 0      Shortness of Breath: 0      Insomnia: slight trouble falling asleep      Overall (0 good/no concerns, 3 very poor):  2    GENERAL APPEARANCE: healthy, alert and no distress; neatly groomed, tearful  EYES: Eyes grossly normal to inspection, PERRLA, conjunctivae and sclerae without injection or discharge, EOM intact   RESP:  no increased work of breathing; speaks in complete sentences;   MS: No musculoskeletal defects are noted  SKIN: No suspicious lesions or rashes, hydration status appears adequate with normal skin turgor   PSYCH: Alert and oriented x3; speech- coherent , normal rate and volume; able to articulate logical thoughts, able to abstract reason, no tangential thoughts, no hallucinations or delusions, mentation appears normal, Mood is euthymic. Affect is appropriate for this mood state and bright. Thought content is free of suicidal ideation, hallucinations, and delusions.  Eye contact is good during conversation.       Data Reviewed:  LABS: 2023 Cr 0.90; alb 3.7; Hgb 11.5;  IMAGIN/10/2022 CT CHEST W/CONTRAST    IMPRESSION:   1.  No definitive findings of metastatic disease within the chest.     2.  Small bilateral pleural effusions and tiny 3-4 mm subpleural triangular nodular density in the right middle lobe which could be an incidental lymph node. No additional  suspicious pleural nodularity is seen. Special attention on follow up imaging is suggested.     3.  Upper abdominal ascites with peritoneal thickening, similar to the recent CT of the abdomen.      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    Recommendations & Counseling:  Continue oncology care with Dr. Patino and the GYN Oncology team  Hydroxyzine 10-20 mg po q 6 hours prn anxiety   OK to use ativan for severe anxiety but try hydroxyzine first  Will consider adding mirtazapine if the above isn't helpful  AVS has directions for using senna and/or Miralax for managing constipation with goal of an easy to pass BM QOD    Referral to PC  for supportive counseling  CHERRY has other ideas for skill building to help with anxiety    Michelle was directed to the [employer] program for ACP documents and she is encouraged to complete them by her next visit.    Follow up in 4 weeks, sooner prn; [facility] will call for symptom update in a week.    Counseling: All of the above was explained to the patient in lay language. The patient has verbalized a clear understanding of the discussion, asked appropriate questions, which have been answered to patient's apparent satisfaction. The patient is in agreement with the above plan.    74 minutes spent on the date of the encounter doing chart review, history and exam, patient education & counseling, documentation and other activities as noted above.    Anish Monroy MD MS FAAFP    [facility] Palliative Care Service  Office [phone]  Fax [phone]

## 2023-06-21 NOTE — LETTER
6/21/2023       RE: Jacki Smith  [address] [facility] [address]     Dear Colleague,    Thank you for referring your patient, [facility] Smith[facility]. Please see a copy of my visit note below.    Virtual Visit Details    Type of service:  Video Visit     Originating Location (pt. 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    Michelle's cancer history is well summarized in Rylie Roman's note from June 13th and was reviewed.    Distressing Symptom/s:  Anxiety--uses ativan prn and it causes constipation and some abdominal pain which them makes her worry it is her cancer causing problems and worsens her anxiety.  On many days she can push the intrusive thoughts aside but today has been a particularly bad day.  She has three more cycles of chemo and then staging imaging and the time periods around those episodes are very high in anxiety. She is tolerating her chemo well except she worries that feeling well might mean the chemo isn't working or by chance her chemo was diverted and she didn't receive it.  She's not sure if her anxiety is worse on the days she takes dexamethasone.    Patient's Disease Understanding: Michelle feels her cancer is one that she can 'live with' and she also feels worried that  'any one little thing could go wrong and that will be 'it' and I won't survive.'    Coping:  Struggling with anxiety; prayer can be helpful for her at times;    Social History  Born: [place]  Education: [employer] grad and then Business college -a ten month program in bookkeeping  Living Situation: 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    Drug use: Never       Family History  Family History   Problem Relation Age of Onset    Prostate Cancer Father     Breast Cancer Sister 48    Melanoma Sister     Skin Cancer Sister     Colon Cancer Maternal Grandmother      Patient's Involvement with Prior History of Serious Illness in Family: both parents-mother with dementia for 7 years.    Advance Care Planning:  Advance Directive:    Not completed  Where is written copy located: n/a  Health Care Agent Contact Information:  with input from children  POLST:    n/a  CODE STATUS: FULL Code    No Known Allergies  Current Outpatient Medications   Medication Sig Dispense Refill    citalopram (CELEXA) 40 MG tablet Take 1 tablet by mouth daily      cyclobenzaprine (FLEXERIL) 5 MG tablet Take 1-2 Tablets (5-10 mg) by mouth three times a day.      dexamethasone (DECADRON) 4 MG tablet Take 2 tablets (8 mg) by mouth daily Take for 3 days, starting the day after chemo. Take with food. 6 tablet 2    diphenhydrAMINE-acetaminophen (TYLENOL PM)  MG tablet Take 1 tablet by mouth as needed      docusate sodium (DSS) 100 MG capsule Take 100 mg by mouth as needed      ibuprofen (ADVIL/MOTRIN) 200 MG capsule       lidocaine-prilocaine (EMLA) 2.5-2.5 % external cream Apply topically as needed (pain due to port access) Apply to port 30 minutes prior to lab appointment. 30 g 6    LORazepam (ATIVAN) 0.5 MG tablet Take 1-2 Tablets (0.5-1 mg) by mouth every 4 hours as needed for Anxiety (and or nausea and vomiting).*      LORazepam (ATIVAN) 0.5 MG tablet Take 1 tablet (0.5 mg) by mouth every 6 hours as needed for anxiety 20 tablet 3    Multiple Vitamin (MULTI-VITAMIN DAILY PO) Take 1 tablet by mouth daily      ondansetron (ZOFRAN) 8 MG tablet Take 1 tablet (8 mg) by mouth every 8 hours as needed for nausea (vomiting) 30 tablet 2    prochlorperazine (COMPAZINE) 10 MG tablet Take 1 tablet (10 mg) by mouth every 6 hours as needed for nausea or vomiting 30 tablet 2    prochlorperazine (COMPAZINE) 10 MG tablet Take 10 mg by mouth as needed      sennosides (SENOKOT) 8.6 MG tablet       valACYclovir (VALTREX) 1000 mg tablet Take 1,000 mg by mouth as needed       Past Medical History:   Diagnosis Date    Female stress incontinence     Ovarian cancer, right (H) 06/16/2022    Stage IIIC high-grade serous carcinoma    Recurrent cold sores      Past Surgical History:   Procedure Laterality Date    APPENDECTOMY  08/29/2022    Part of ovarian cancer tumor debulking    BLADDER SUSPENSION  08/13/2009     HYSTERECTOMY  2009    For prolapse    LAPAROSCOPY DIAGNOSTIC (GYN)  2022    SALPINGO-OOPHORECTOMY, COMBINED Bilateral 2022    Pelvic exam under anesthesia, diagnostic laparoscopy, conversion to laparotomy for optimal interval tumor debulking to no gross residual disease including peritoneal and diaphragm biopsies, bilateral salpingo-oophorectomy, infragastric omentectomy, bilateral hemidiaphragm stripping, peritoneal and mesenteric argon beam ablation, appendectomy.    TONSILLECTOMY  1973    TUBAL LIGATION Bilateral 1998       REVIEW OF SYSTEMS:   ROS: 10 point ROS neg other than the symptoms noted above in the HPI and here:  Palliative Symptom Review (0=no symptom/no concern, 1=mild, 2=moderate, 3=severe):      Pain: 0      Fatigue: 0      Nausea: 0      Constipation: 1-2      Diarrhea: 0      Depressive Symptoms: 0      Anxiety: 3      Drowsiness: 0      Poor Appetite: 0      Shortness of Breath: 0      Insomnia: slight trouble falling asleep      Overall (0 good/no concerns, 3 very poor):  2    GENERAL APPEARANCE: healthy, alert and no distress; neatly groomed, tearful  EYES: Eyes grossly normal to inspection, PERRLA, conjunctivae and sclerae without injection or discharge, EOM intact   RESP:  no increased work of breathing; speaks in complete sentences;   MS: No musculoskeletal defects are noted  SKIN: No suspicious lesions or rashes, hydration status appears adequate with normal skin turgor   PSYCH: Alert and oriented x3; speech- coherent , normal rate and volume; able to articulate logical thoughts, able to abstract reason, no tangential thoughts, no hallucinations or delusions, mentation appears normal, Mood is euthymic. Affect is appropriate for this mood state and bright. Thought content is free of suicidal ideation, hallucinations, and delusions.  Eye contact is good during conversation.       Data Reviewed:  LABS: 2023 Cr 0.90; alb 3.7; Hgb 11.5;  IMAGIN/10/2022 CT CHEST  W/CONTRAST    IMPRESSION:   1.  No definitive findings of metastatic disease within the chest.     2.  Small bilateral pleural effusions and tiny 3-4 mm subpleural triangular nodular density in the right middle lobe which could be an incidental lymph node. No additional suspicious pleural nodularity is seen. Special attention on follow up imaging is suggested.     3.  Upper abdominal ascites with peritoneal thickening, similar to the recent CT of the abdomen.      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    Recommendations & Counseling:  Continue oncology care with Dr. Patino and the GYN Oncology team  Hydroxyzine 10-20 mg po q 6 hours prn anxiety   OK to use ativan for severe anxiety but try hydroxyzine first  Will consider adding mirtazapine if the above isn't helpful  AVS has directions for using senna and/or Miralax for managing constipation with goal of an easy to pass BM QOD    Referral to   for supportive counseling  AVS has other ideas for skill building to help with anxiety    Michelle was directed to the [employer] program for ACP documents and she is encouraged to complete them by her next visit.    Follow up in 4 weeks, sooner prn; RNCC will call for symptom update in a week.    Counseling: All of the above was explained to the patient in lay language. The patient has verbalized a clear understanding of the discussion, asked appropriate questions, which have been answered to patient's apparent satisfaction. The patient is in agreement with the above plan.    74 minutes spent on the date of the encounter doing chart review, history and exam, patient education & counseling, documentation and other activities as noted above.    Anish Monroy MD MS FAAFP    [facility]iative [facility]  Service  Office [phone]  Fax [phone]

## 2023-06-21 NOTE — PATIENT INSTRUCTIONS
It was good to see you today, Michelle.    Here are the things we talked about:  Chino Cannon's book, Full Catastrophe Living    [facility] guided meditations on YouTube    Get 20 minutes of activity in a day    Use hydroxyzine 10-20 mg (1-2 pills) every six hours as needed for anxiety    Stay on Celexa 40 mg/day    Constipation and Opioid (Pain Medicine) Use    Constipation is when you are not able to have a bowel movement (BM) for several days. Constipation can also mean that stools that are hard or difficult to pass without straining and pushing. Constipation is a common problem and the reason many people are admitted to the hospital. Even if you are not eating, you still need to have a bowel movement at least every other day.    Taking opioids (pain medicine) will make you constipated. This problem will not go away as long as you are taking opioids. Common opioids which are prescribed are: hydrocodone (Vicodin , Norco , Lortab ); morphine (MSContin , MSIR ); oxycodone (OxyContin , Percocet , OxyIR , Roxicodone); hydromorphone (Dilaudid , Exalgo ); fentanyl (Duragesic ); methadone.    What can I do to avoid constipation?  Make sure you are getting plenty of fluids throughout the day--aim for eight 8 ounce glasses of water if possible and also try to get fiber through your diet by eating fresh fruits and vegetables.  You can also use a fiber supplement like Metamucil or Fibercon.  The generic versions available at your pharmacy work just fine.      Most people taking opioids need to take laxatives every day to prevent constipation.  See the guide below for using laxatives.       Step Medicine Directions     1.  You should start taking these medicines the same day you start taking opioids (pain medication).    Senna 8.6mg tablets  Also known as Senokot or Ex-lax.  I sent a prescription for this to your pharmacy. 1-2 tablets twice a day. May increase up to 4 tablets twice a day if needed for a daily bowel  movement and may need to decrease if your bowels become loose.   2.  If you have not had a bowel movement in 48 hours    Miralax (polyethylene glycol) please call if you need a prescription 17g dissolved in 4-8oz of liquid once or twice a day. Start using this once a day and, again, may need to increase to twice a day if not having a daily bowel movement or decrease if stools become loose.         When should I call my doctor?  Call your care team if you have not had a bowel movement 24 hours after following the above steps.    You should also call your team if you have any of the following symptoms:  Watery stools (this can be a sign of severe constipation or too much bowel medicine)  No bowel movement in 72 hours (3 days) in spite of trying the idea outlined above.  Small stools; Hard stools  Pain          Someone from the team will reach out to schedule a follow up appointment in  4 weeks and call sooner if needed    Annabelle [employer] [place] to get directed to the living will documents for [place].  Once you complete them you can bring them to any appointment in our system and the staff will help get them scanned into our medical record.     How to get a hold of us:  For non-urgent matters, MyChart works best.    For more urgent matters, or if you prefer not to use MyChart, call our clinic nurse coordinator Astrid Noble RN at [phone]    We have an on-call number for evenings and weekends. Please call this only if you are having uncontrolled symptoms or serious side effects from your medicines: [phone].     For refills, please give us a week (5 working days) notice. We don't always have providers available everyday to do refills. If you call the day you run out of your medicine, we may not be able to refill it in time, so call 5 days in advance!    Anish Monroy MD MS FAAFP CAQHPM  [facility] Palliative Care Service  Office [phone]  Fax [phone]

## 2023-06-21 NOTE — NURSING NOTE
Is the patient currently in the state of [place]? YES    Visit mode:VIDEO    If the visit is dropped, the patient can be reconnected by: VIDEO VISIT: Text to cell phone: [phone]    Will anyone else be joining the visit? NO      How would you like to obtain your AVS? MyChart    Are changes needed to the allergy or medication list? NO    Reason for visit: Consult    Ana GUZMAN

## 2023-07-06 ENCOUNTER — VIRTUAL VISIT (OUTPATIENT)
Dept: PALLIATIVE CARE | Facility: CLINIC | Age: 58
End: 2023-07-06
Attending: SOCIAL WORKER
Payer: COMMERCIAL

## 2023-07-06 VITALS — WEIGHT: 150 LBS | HEIGHT: 62 IN | BODY MASS INDEX: 27.6 KG/M2

## 2023-07-06 DIAGNOSIS — F43.23 ADJUSTMENT DISORDER WITH MIXED ANXIETY AND DEPRESSED MOOD: Primary | ICD-10-CM

## 2023-07-06 PROCEDURE — 90791 PSYCH DIAGNOSTIC EVALUATION: CPT | Mod: VID | Performed by: SOCIAL WORKER

## 2023-07-06 ASSESSMENT — PAIN SCALES - GENERAL: PAINLEVEL: NO PAIN (0)

## 2023-07-06 NOTE — NURSING NOTE
Is the patient currently in the state of [place]? YES    Visit mode:VIDEO    If the visit is dropped, the patient can be reconnected by: VIDEO VISIT: Text to cell phone: [phone]    Will anyone else be joining the visit? NO      How would you like to obtain your AVS? MyChart    Are changes needed to the allergy or medication list? Patient declined individual medication review by support staff because they deny any changes and state that all information remains accurate since last reviewed/verified.    Reason for visit: Consult      No other pt vitals to report per pt    Jennifer Mcgill VF

## 2023-07-06 NOTE — PROGRESS NOTES
Virtual Visit Details    Type of service:  Video Visit     Originating Location (pt. Location): Home - work in [place]     Distant Location (provider location):  Off-site  Platform used for Video Visit: [employer]     Palliative Care Counseling Services - Initial Assessment    PLEASE NOTE:  THIS IS A MENTAL HEALTH NOTE.  OTHER PROVIDERS VIEWING THIS NOTE SHOULD USE THIS ONLY FOR UNDERSTANDING THE CONTEXT OF THE PATIENT S EXPERIENCE.  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 Seen today for initial palliative care counseling assessment via [employer].  Referred by: Dr. Monroy, Palliative Care    Presenting Issues: Coping with illness and Anxiety    Preferred Name: Michelle     Mental Status Exam: (List all that apply)      Appearance: Appropriate      Eye Contact: Good       Orientation: Yes, x4      Mood: Normal      Affect: Appropriate      Thought Content: Clear         Thought Form: Logical      Psychomotor Behavior: Normal    Family:       Marital status:     Years : 31        Name of spouse/partner: Sukumar      Children: 3 adult children son/2 daughters and step daughters       Parents:       Siblings: two sisters.     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 Managing second course of treatment. Living with cancer and treatment.     Pain/Discomfort Issues: Pain - abdominal     Coping: \"I have tearful days\"    Sleep: Medicine helps her sleep - hydroxyzine.     Sexual Health/Intimacy: Not explored     Mental Health History and Current Review of Symptoms (patient account):     Depression in past?: Yes    Treatment in past?:  Yes medication/celexa   Treatment currently?:  Yes- medication only     Depression symptoms currently?:  - Anhedonia:  Yes  - Hopeless or down mood:  Yes  - Sleep problems (too much or too little):  No  - Fatigue:  Yes  - Appetite (too much or too little):  No  - Excessively negative self perception:  No  - Trouble concentrating:  Yes  - Motor slowness:  No  - Current and/or recent thoughts of suicide:  No    Suicide risk screen:  - Past thoughts of suicide?  No  - Past attempts to end own life?  No  - If yes, how? n.a  - Protective factors currently? n/a  - Safety plan needed currently? n/a    Anxiety in past?: Yes   Treatment in past?  Yes medication and therapy  Treatment currently?  Yes medication     Anxiety symptoms currently?  - Nervous, on edge:  Yes sometimes   - Sleep problems:  No  - Worrying a lot/hard to manage worries:  Yes  Specific recent areas of worry/fear/concern: mostly cancer related   - Tense, hard to relax:  Yes  - Feeling restless, hard to sit still:  Yes  - Irritable:  Yes  - Feeling of dread/ afraid that something awful may happen:  Yes  - How long? Daily since diagnosed with cancer.   - Impacts on daily life? Relationship with spouse has been more stressful.     Grief vs " "Depression:  Endorses symptoms for: Grief    Psychological Trauma and/or Major Losses:   Parents  a few years ago. No trauma history.     Safety Screen:  History of being harmed or controlled by someone close to you?  No  Being hurt or controlled by someone close to you?  No  Worried will be harmed in future?  No  Worried will harm someone else in future?  No    CD History:   Using alcohol actively? No    Amount per week: 0  Current concerns (self or other) about alcohol or drug use? No  Past concerns and/or CD treatment? No      Salome/Spirituality:       Belong to a salome community: Yes         Identify with a particular Cheondoism: Yazdanism       Identify as spiritual: Yes      How find expression: Meditation    Hope:      What do you hope for:    \"I want to be better and I want to live\".       What gives you hope:    When test results come back and are positive or stable.     Internal Resources (positive memories, sources of irma): Seeing my family - Kids are grown - Her daughter that she lives with is sweet and kind. Reinstalled above ground pool.     Perceived Needs: Support following more anxious days.     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 Seen today for initial palliative care counseling assessment via [facility].  In addition to their medical diagnosis also meets criteria for adjustment disorder with mixed anxiety and depression. Their symptoms include feeling hopeless, not interested in activies once enjoyed, trouble concentrating, feeling nervous, irritability, sense of dread.  These symptoms began on diagnosis of cancer, however, intensified with hospital/ED visits . .  and the client has experienced functional impairment in relationships since those visits.   Michelle presents as a  woman, facing cancer and treatment. Their relationships are stressed with her disease. No cultural " concerns. Their medical condition has the potential to influence their mental health in the following ways: decreased mood as physical symptoms increase..   Other mental health diagnoses that were considered include: depressive episode and generalized anxiety disorder. The symptoms related to these possible diagnoses can currently be explained by adjusting to managing cancer. Diagnosis may change as more information is shared.   It is medically necessary for this client to receive outpatient psychotherapy services at this time. If their current mental health symptoms go untreated it is likely mood will decrease. My recommendations for services for this client include narrative therapy/mindfulness techniques. The client's prognosis from a mental health perspective is good.    Intervention: Initial palliative care counseling / clinical social work evaluation was conducted.  Palliative Care Counseling interventions available were discussed, including counseling related to serious illness, behavioral interventions for symptom management, consultation regarding goals of care/health care directive/POLST, and other interventions specific to the patient's situation or concerns.     Plan: 2-4 week follow up with Palliative Care SW.  Sent brief 5 minute guided meditations to start.     DSM5 Diagnoses:   Adjustment Disorders  309.28 (F43.23) With mixed anxiety and depressed mood    Time Spent with Patient/Family: 30 min  (Start 2:01p, end 2:30pm)    DEONTE Bob, [facility]   Palliative Care    [phone]      DO NOT SEND ANY LETTERS

## 2023-07-06 NOTE — LETTER
2023       RE: Jacki Smith  [address] [facility] [id number]     Dear Colleague,    Thank you for referring your patient, Jacki Smith, [facility]. Please see a copy of my visit note below.      Palliative Care Counseling Services - Initial Assessment    PLEASE NOTE:  THIS IS A MENTAL HEALTH NOTE.  OTHER PROVIDERS VIEWING THIS NOTE SHOULD USE THIS ONLY FOR UNDERSTANDING THE CONTEXT OF THE PATIENT S EXPERIENCE.  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 Seen today for initial palliative care counseling assessment via [facility].  Referred by: Dr. Monroy, Palliative Care    Presenting Issues: Coping with illness and Anxiety    Preferred Name: Michelle     Mental Status Exam: (List all that apply)      Appearance: Appropriate      Eye Contact: Good       Orientation: Yes, x4      Mood: Normal      Affect: Appropriate      Thought Content: Clear         Thought Form: Logical      Psychomotor Behavior: Normal    Family:       Marital status:     Years : 31        Name of spouse/partner: Sukumar      Children: 3 adult children son/2 daughters and step daughters       Parents:       Siblings: two sisters.     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 Managing second course of treatment. Living with cancer and treatment.     Pain/Discomfort Issues: Pain - abdominal     Coping: \"I have tearful days\"    Sleep: Medicine helps her sleep - hydroxyzine.     Sexual Health/Intimacy: Not explored     Mental Health History and Current Review of Symptoms (patient account):     Depression in past?: Yes    Treatment in past?:  Yes medication/celexa   Treatment currently?:  Yes- medication only     Depression symptoms currently?:  - Anhedonia:  Yes  - Hopeless or down mood:  Yes  - Sleep problems (too much or too little):  No  - Fatigue:  Yes  - Appetite (too much or too little):  No  - Excessively negative self perception:  No  - Trouble concentrating:  Yes  - Motor slowness:  No  - Current and/or recent thoughts of suicide:  No    Suicide risk screen:  - Past thoughts of suicide?  No  - Past attempts to end own life?  No  - If yes, how? n.a  - Protective factors currently? n/a  - Safety plan needed currently? n/a    Anxiety in past?: Yes   Treatment in past?  Yes medication and therapy  Treatment currently?  Yes medication     Anxiety symptoms currently?  - Nervous, on edge:  Yes sometimes   - Sleep problems:  No  - Worrying a lot/hard to manage worries:  Yes  Specific recent areas of worry/fear/concern: mostly cancer related   - Tense, hard to relax:  Yes  - Feeling restless, hard to sit still:  Yes  - Irritable:  Yes  - Feeling of dread/ afraid that something awful may happen:  Yes  - How long? Daily since diagnosed with " "cancer.   - Impacts on daily life? Relationship with spouse has been more stressful.     Grief vs Depression:  Endorses symptoms for: Grief    Psychological Trauma and/or Major Losses:   Parents  a few years ago. No trauma history.     Safety Screen:  History of being harmed or controlled by someone close to you?  No  Being hurt or controlled by someone close to you?  No  Worried will be harmed in future?  No  Worried will harm someone else in future?  No    CD History:   Using alcohol actively? No    Amount per week: 0  Current concerns (self or other) about alcohol or drug use? No  Past concerns and/or CD treatment? No      Salome/Spirituality:       Belong to a salome community: Yes         Identify with a particular Nondenominational: Mandaen       Identify as spiritual: Yes      How find expression: Meditation    Hope:      What do you hope for:    \"I want to be better and I want to live\".       What gives you hope:    When test results come back and are positive or stable.     Internal Resources (positive memories, sources of irma): Seeing my family - Kids are grown - Her daughter that she lives with is sweet and kind. Reinstalled above ground pool.     Perceived Needs: Support following more anxious days.     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 Seen today for initial palliative care counseling assessment via [facility].  In addition to their medical diagnosis also meets criteria for adjustment disorder with mixed anxiety and depression. Their symptoms include feeling hopeless, not interested in activies once enjoyed, trouble concentrating, feeling nervous, irritability, sense of dread.  These symptoms began on diagnosis of cancer, however, intensified with hospital/ED visits . .  and the client has experienced functional impairment in relationships since those visits.   Michelle presents as a  " woman, facing cancer and treatment. Their relationships are stressed with her disease. No cultural concerns. Their medical condition has the potential to influence their mental health in the following ways: decreased mood as physical symptoms increase..   Other mental health diagnoses that were considered include: depressive episode and generalized anxiety disorder. The symptoms related to these possible diagnoses can currently be explained by adjusting to managing cancer. Diagnosis may change as more information is shared.   It is medically necessary for this client to receive outpatient psychotherapy services at this time. If their current mental health symptoms go untreated it is likely mood will decrease. My recommendations for services for this client include narrative therapy/mindfulness techniques. The client's prognosis from a mental health perspective is good.    Intervention: Initial palliative care counseling / clinical social work evaluation was conducted.  Palliative Care Counseling interventions available were discussed, including counseling related to serious illness, behavioral interventions for symptom management, consultation regarding goals of care/health care directive/POLST, and other interventions specific to the patient's situation or concerns.     Plan: 2-4 week follow up with Palliative Care SW.  Sent brief 5 minute guided meditations to start.     DSM5 Diagnoses:   Adjustment Disorders  309.28 (F43.23) With mixed anxiety and depressed mood    Time Spent with Patient/Family: 30 min  (Start 2:01p, end 2:30pm)      DO NOT SEND ANY LETTERS            Again, thank you for allowing me to participate in the care of your patient.      Sincerely,    TAMI Bob

## 2023-07-10 NOTE — PROGRESS NOTES
Virtual Visit Details    Type of service:  Video Visit     Originating Location (pt. Location): Home    Distant Location (provider location):  On-site  Platform used for Video Visit: PinaWell                            Follow Up Notes on Referred Patient    Date: 2023      RE: Jacki Smith  : 1965  RUDDY: 2023        Jacki Smith is a 58 year old woman with a diagnosis of progressive Stage IIIC high-grade serous carcinoma of the right ovary.  She is here today for follow up and disease management by video.      Ovarian Cancer History:  22: Presented to an ED in [place] for evaluation of abdominal bloating and discomfort.  -Abdomen, pelvic CT: Radiographic Stage CAL ovarian cancer.   22: [id number].   22: Pelvic ultrasound: c/w metastatic cancer.    22: Pelvic exam under anesthesia, diagnostic laparoscopy, biopsies, evacuation of ascites.   -Findings: On pelvic exam under anesthesia, there was a 6 cm firm, fixed mass adherent to the vaginal cuff. Vagina otherwise smooth. On laparoscopic examination, there was ascites filling the pelvis/abdomen, with >1 liter of fluid evacuated. The palpated mass was arising from the right ovary. Left ovary relatively normal in appearance. There was diffuse carcinomatosis covering the entire peritoneal surface falciform ligament, liver capsule, and mesentery. The omentum was replaced with tumor. Findings were not amenable to optimal tumor debulking so biopsies were taken for histologic examination.   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      22, 22, 22: Cycles 1-3 of neoadjuvant chemotherapy with IV carboplatin + paclitaxel 175 mg/m2 every 21 days.   -Cycles 1,2: Carboplatin AUC 6.   -Cycle 3: Carboplatin AUC 5 with dose-reduction due to thrombcytopenia.   -22: Chest, abdomen, pelvic CT: Partial response.    22: Pelvic exam under anesthesia,  diagnostic laparoscopy, conversion to laparotomy for optimal interval tumor debulking to no gross residual disease including peritoneal and diaphragm biopsies, bilateral salpingo-oophorectomy, infragastric omentectomy, bilateral hemidiaphragm stripping, peritoneal and mesenteric argon beam ablation, appendectomy.   -Pathology: High-grade serous carcinoma involving all resected specimens.     9/28/22, 10/19/22, 11/17/22: Cycles 4-6 of primary chemotherapy with IV carboplatin AUC 5 + paclitaxel 175 mg/m2.  -12/2/22: Chest, abdomen, pelvic CT: No definitive evidence of disease.    CHEST: A few tiny perifissural nodules are unchanged right upper lobe compatible with subpleural lymph nodes.  ABDOMEN: Trace amount of residual ascites in the cul-de-sac. Mild peritoneal thickening persists within the left midabdomen. No measurable peritoneal implants identified.  MUSCULOSKELETAL: Tiny fat-containing left inguinal hernia unchanged.  -12/7/22: CA-125=23.   -2/16/23: Chest, abdomen, pelvic CT to evaluate bloating: Stable findings, no definitive evidence of disease.   ABDOMEN, PELVIS: There is mild peritoneal thickening in the left mid abdomen again visualized without measurable peritoneal implants noted. Tiny amount of fluid in the pelvis. This is decreased from the prior exam.  -5/25/23: Admitted  to [facility] for management of malignant ascites s/p therapeutic paracentesis, and partial SBO resolved with conservative management.   -5/25/23: Abdomen, pelvic CT: Progressive disease.        Genetic/Genomic/Molecular Testing History:  10/5/22: [facility], reflexed to Common Hereditary Cancer Panel.   -No identifiable germline variants identified in ABRAXAS1, AKT1, APC, DEION, AXIN2, BARD1, BMPR1A, BRCA1, BRCA2, BRIP1, CDC73, CDH1, CDK4, CDKN2A, CHEK2, CTNNA1, DICER1, EPCAM, FANCC, FANCM, GREM1, HOXB13, KIT, MEN1, MLH1, MRE11, MSH2, MSH6, MUTYH, NBN, NF1, NTHL1, PALB2, PDGFRA, PIK3CA, PMS2, POLD1, POLE, PTEN,  "RAD50, RAD51C, RAD51D, RECQL, RINT1, SDHA, SDHB, SDHC, SDHD, SMAD4, SMARCA4, STK11, TP53, TSC1, TSC2, VHL, XRCC2.   -Variant of uncertain significance in MSH3 c.16C>T ([id number])    10/9/22 (tumor 8/29/22): [facility] Testing Results.  -Genomic ROXI low (6%)   -TMB low (1mut/Mb)  -Microsatellite stable/Mismatch Repair proficient  -PD-L1- (CPS 0%)  -NTRK 1/2/3 fusion not detected.   -ER-, CO+  -Genomic alterations in:  --TP53  -No genomic alterations in BRCA1,2.        Plan: carboplatin AUC 5 + pegylated lipoosomal doxorubicin (PLD) 30 mg/m2 every 28 days x 4 cycles then CT        6/13/23: Cycle #1 Carboplatin/Doxil.  78.  6/28/23: ED for abdominal pain. CT ap IMPRESSION:   1.  Moderate volume ascites, modestly decreased compared to most recent prior CT. Redemonstrated evidence of thickening and enhancement of the peritoneal lining consistent with carcinomatosis. An enhancing pelvic mass measures slightly smaller than on 05/25/2023.     2.  Fluid-filled loops of small bowel throughout the abdomen with gradual caliber narrowing in the terminal ileum. A partial or intermittent obstructive process is not excludable. No free air.    7/11/23: Cycle #2 Carboplatin/Doxil planned for 7/12.  78.          Today she reports her first cycle went really well; in fact she was wondering if she actually got treatment. She did take Decadron for the 3 days and it did make her feel jittery; she denies having nausea. She denies any skin issues. She \"always\" has issues with constipation and takes 1-2 stool softeners/day as well as Miralax prn. She notices that if she eats too much fiber it gets \"really bad\" and she states she loves her fruits/fiber foods. She drinks 4-5 glasses of water/day in addition to coffee/soda/juice. She denies any vaginal bleeding, no changes in her bowel or bladder habits, no nausea/emesis, no lower extremity edema, and no difficulties eating or sleeping. She denies any abdominal discomfort/bloating, no " fevers or chills, and no chest pain or shortness of breath.        Review of Systems:    Systemic           no weight changes; no fever; no chills; no night sweats; no appetite changes  Skin           no rashes, or lesions  Eye           no irritation; no changes in vision  Day-Laryngeal           no dysphagia; no hoarseness   Pulmonary    no cough; no shortness of breath  Cardiovascular    no chest pain; no palpitations  Gastrointestinal    no diarrhea; no constipation; no abdominal pain; no changes in bowel habits; no blood in stool  Genitourinary   no urinary frequency; no urinary urgency; no dysuria; no pain; no abnormal vaginal discharge; no abnormal vaginal bleeding  Breast    no breast discharge; no breast changes; no breast pain  Musculoskeletal    no myalgias; no arthralgias; no back pain  Psychiatric           no depressed mood; no anxiety    Hematologic              no tender lymph nodes; no noticeable swellings or lumps   Endocrine    no hot flashes; no heat/cold intolerance         Neurological   no tremor; no numbness and tingling; no headaches; no difficulty sleeping      Past Medical History:    Past Medical History:   Diagnosis Date     Female stress incontinence      Ovarian cancer, right (H) 06/16/2022    Stage IIIC high-grade serous carcinoma     Recurrent cold sores          Past Surgical History:    Past Surgical History:   Procedure Laterality Date     APPENDECTOMY  08/29/2022    Part of ovarian cancer tumor debulking     BLADDER SUSPENSION  08/13/2009     HYSTERECTOMY  08/13/2009    For prolapse     LAPAROSCOPY DIAGNOSTIC (GYN)  06/16/2022     SALPINGO-OOPHORECTOMY, COMBINED Bilateral 08/29/2022    Pelvic exam under anesthesia, diagnostic laparoscopy, conversion to laparotomy for optimal interval tumor debulking to no gross residual disease including peritoneal and diaphragm biopsies, bilateral salpingo-oophorectomy, infragastric omentectomy, bilateral hemidiaphragm stripping, peritoneal and  mesenteric argon beam ablation, appendectomy.     TONSILLECTOMY  1973     TUBAL LIGATION Bilateral 09/01/1998         Health Maintenance Due   Topic Date Due     YEARLY PREVENTIVE VISIT  Never done     ADVANCE CARE PLANNING  Never done     HEPATITIS B IMMUNIZATION (1 of 3 - 3-dose series) Never done     COLORECTAL CANCER SCREENING  Never done     HIV SCREENING  Never done     HEPATITIS C SCREENING  Never done     PAP  Never done     LIPID  Never done     ZOSTER IMMUNIZATION (1 of 2) Never done     DTAP/TDAP/TD IMMUNIZATION (2 - Td or Tdap) 12/15/2021     PHQ-2 (once per calendar year)  Never done     MAMMO SCREENING  01/27/2023     LUNG CANCER SCREENING  06/10/2023       Current Medications:     Current Outpatient Medications   Medication Sig Dispense Refill     citalopram (CELEXA) 40 MG tablet Take 1 tablet by mouth daily       cyclobenzaprine (FLEXERIL) 5 MG tablet Take 1-2 Tablets (5-10 mg) by mouth three times a day.       dexamethasone (DECADRON) 4 MG tablet Take 2 tablets (8 mg) by mouth daily Take for 3 days, starting the day after chemo. Take with food. 6 tablet 2     diphenhydrAMINE-acetaminophen (TYLENOL PM)  MG tablet Take 1 tablet by mouth as needed       docusate sodium (DSS) 100 MG capsule Take 100 mg by mouth as needed       hydrOXYzine (ATARAX) 10 MG tablet Take 1-2 tablets (10-20 mg) by mouth every 6 hours as needed for itching 120 tablet 3     ibuprofen (ADVIL/MOTRIN) 200 MG capsule        lidocaine-prilocaine (EMLA) 2.5-2.5 % external cream Apply topically as needed (pain due to port access) Apply to port 30 minutes prior to lab appointment. 30 g 6     LORazepam (ATIVAN) 0.5 MG tablet Take 1-2 Tablets (0.5-1 mg) by mouth every 4 hours as needed for Anxiety (and or nausea and vomiting).*       LORazepam (ATIVAN) 0.5 MG tablet Take 1 tablet (0.5 mg) by mouth every 6 hours as needed for anxiety 20 tablet 3     Multiple Vitamin (MULTI-VITAMIN DAILY PO) Take 1 tablet by mouth daily        ondansetron (ZOFRAN) 8 MG tablet Take 1 tablet (8 mg) by mouth every 8 hours as needed for nausea (vomiting) 30 tablet 2     prochlorperazine (COMPAZINE) 10 MG tablet Take 1 tablet (10 mg) by mouth every 6 hours as needed for nausea or vomiting 30 tablet 2     prochlorperazine (COMPAZINE) 10 MG tablet Take 10 mg by mouth as needed       sennosides (SENOKOT) 8.6 MG tablet        valACYclovir (VALTREX) 1000 mg tablet Take 1,000 mg by mouth as needed           Allergies:      No Known Allergies     Social History:     Social History     Tobacco Use     Smoking status: Former     Packs/day: 1.00     Years: 42.00     Pack years: 42.00     Types: Cigarettes     Quit date: 2022     Years since quittin.1     Smokeless tobacco: Never   Substance Use Topics     Alcohol use: Yes     Comment: Beer ~Q3 months.       History   Drug Use Unknown         Family History:     The patient's family history is notable for:    Family History   Problem Relation Age of Onset     Prostate Cancer Father      Breast Cancer Sister 48     Melanoma Sister      Skin Cancer Sister      Colon Cancer Maternal Grandmother          Physical Exam:     There were no vitals taken for this visit.  There is no height or weight on file to calculate BMI.    General Appearance: healthy and alert, no distress    Eyes:  Eyes grossly normal to inspection.  No discharge or erythema, or obvious scleral/conjunctival abnormalities.    Respiratory: No audible wheeze, cough, or visible cyanosis.  No visible retractions or increased work of breathing.     Musculoskeletal: extremities non tender and without edema    Skin: no lesions or rashes on visible skin    Neurological: normal gait, no gross defects     Psychiatric: appropriate mood and affect. Mentation appears normal, affect normal/bright, judgement and insight intact, normal speech and appearance well-groomed                            The rest of a comprehensive physical examination is deferred due to  video visit restrictions.      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  She is here today for follow up and disease management by video.     32 minutes spent on the date of the encounter doing chart review, history and exam, documentation and further activities as noted above      Plan:     1.)        Reviewed CBC, CMP, Mg,  and Ok to proceed with planned treatment tomorrow.  She will return in 4 weeks for her next cycle; this is already scheduled. Discussed she does not need to take Decadron for the 3 days following her infusion given she did not have nausea (and she had symptoms). Reviewed signs and symptoms for when she should contact the clinic or seek additional care. Patient to contact the clinic with any questions or concerns in the interim. .Discussed importance of eating smaller meals with lean proteins/hard boiled eggs more often, adequate hydration of at least 64 oz water throughout the day, and pacing activities. Answered all of her questions to the best of my ability.     2.) Genetic risk factors were assessed and she has a VUS No identifiable germline variants identified in ABRAXAS1, AKT1, APC, DEION, AXIN2, BARD1, BMPR1A, BRCA1, BRCA2, BRIP1, CDC73, CDH1, CDK4, CDKN2A, CHEK2, CTNNA1, DICER1, EPCAM, FANCC, FANCM, GREM1, HOXB13, KIT, MEN1, MLH1, MRE11, MSH2, MSH6, MUTYH, NBN, NF1, NTHL1, PALB2, PDGFRA, PIK3CA, PMS2, POLD1, POLE, PTEN, RAD50, RAD51C, RAD51D, RECQL, RINT1, SDHA, SDHB, SDHC, SDHD, SMAD4, SMARCA4, STK11, TP53, TSC1, TSC2, VHL, XRCC2.      3.) Labs and/or tests ordered include:  CBC, CMP, Mg, .      4.) Health maintenance issues addressed today include annual health maintenance and non-gynecologic issues with PCP.    5.)        Hyponatremia: encouraged to drink 16-32 oz Gatorade zero, Pedialyte, or Propel for sodium.     6.)         Reviewed having a good bowel regimen; adequate  fluids, use of stool softeners and Miralax.     JALEN Zamarripa, WHNP-BC, ANP-BC  Women's Health Nurse Practitioner  Adult Nurse Practitioner  Division of Gynecologic Oncology          CC  Patient Care Team:  Linda Robert APRN CNP as PCP - General  [facility], Charis Meadows MD as MD (Gynecologic Oncology)  Susannah Roman APRN CNP as Assigned Cancer Care Provider  SELF, REFERRED

## 2023-07-11 ENCOUNTER — LAB (OUTPATIENT)
Dept: INFUSION THERAPY | Facility: CLINIC | Age: 58
End: 2023-07-11
Attending: INTERNAL MEDICINE
Payer: COMMERCIAL

## 2023-07-11 ENCOUNTER — VIRTUAL VISIT (OUTPATIENT)
Dept: ONCOLOGY | Facility: CLINIC | Age: 58
End: 2023-07-11
Attending: NURSE PRACTITIONER
Payer: COMMERCIAL

## 2023-07-11 VITALS — HEIGHT: 62 IN | WEIGHT: 151 LBS | BODY MASS INDEX: 27.79 KG/M2

## 2023-07-11 DIAGNOSIS — Z51.11 ENCOUNTER FOR ANTINEOPLASTIC CHEMOTHERAPY: ICD-10-CM

## 2023-07-11 DIAGNOSIS — C56.1 OVARIAN CANCER, RIGHT (H): Primary | ICD-10-CM

## 2023-07-11 DIAGNOSIS — E87.1 HYPONATREMIA: ICD-10-CM

## 2023-07-11 LAB
ALBUMIN SERPL-MCNC: 3.7 G/DL (ref 3.5–5)
ALP SERPL-CCNC: 116 U/L (ref 45–120)
ALT SERPL W P-5'-P-CCNC: 14 U/L (ref 0–45)
ANION GAP SERPL CALCULATED.3IONS-SCNC: 8 MMOL/L (ref 5–18)
AST SERPL W P-5'-P-CCNC: 21 U/L (ref 0–40)
BASOPHILS # BLD AUTO: 0.1 10E3/UL (ref 0–0.2)
BASOPHILS NFR BLD AUTO: 1 %
BILIRUB SERPL-MCNC: 0.2 MG/DL (ref 0–1)
BUN SERPL-MCNC: 11 MG/DL (ref 8–22)
CALCIUM SERPL-MCNC: 9.2 MG/DL (ref 8.5–10.5)
CANCER AG125 SERPL-ACNC: 78 U/ML
CHLORIDE BLD-SCNC: 100 MMOL/L (ref 98–107)
CO2 SERPL-SCNC: 25 MMOL/L (ref 22–31)
CREAT SERPL-MCNC: 0.93 MG/DL (ref 0.6–1.1)
EOSINOPHIL # BLD AUTO: 0.1 10E3/UL (ref 0–0.7)
EOSINOPHIL NFR BLD AUTO: 2 %
ERYTHROCYTE [DISTWIDTH] IN BLOOD BY AUTOMATED COUNT: 14.1 % (ref 10–15)
GFR SERPL CREATININE-BSD FRML MDRD: 71 ML/MIN/1.73M2
GLUCOSE BLD-MCNC: 87 MG/DL (ref 70–125)
HCT VFR BLD AUTO: 36 % (ref 35–47)
HGB BLD-MCNC: 11.7 G/DL (ref 11.7–15.7)
IMM GRANULOCYTES # BLD: 0 10E3/UL
IMM GRANULOCYTES NFR BLD: 1 %
LYMPHOCYTES # BLD AUTO: 1 10E3/UL (ref 0.8–5.3)
LYMPHOCYTES NFR BLD AUTO: 25 %
MAGNESIUM SERPL-MCNC: 2 MG/DL (ref 1.8–2.6)
MCH RBC QN AUTO: 29 PG (ref 26.5–33)
MCHC RBC AUTO-ENTMCNC: 32.5 G/DL (ref 31.5–36.5)
MCV RBC AUTO: 89 FL (ref 78–100)
MONOCYTES # BLD AUTO: 0.5 10E3/UL (ref 0–1.3)
MONOCYTES NFR BLD AUTO: 14 %
NEUTROPHILS # BLD AUTO: 2.3 10E3/UL (ref 1.6–8.3)
NEUTROPHILS NFR BLD AUTO: 57 %
NRBC # BLD AUTO: 0 10E3/UL
NRBC BLD AUTO-RTO: 0 /100
PLATELET # BLD AUTO: 264 10E3/UL (ref 150–450)
POTASSIUM BLD-SCNC: 4.3 MMOL/L (ref 3.5–5)
PROT SERPL-MCNC: 6.9 G/DL (ref 6–8)
RBC # BLD AUTO: 4.04 10E6/UL (ref 3.8–5.2)
SODIUM SERPL-SCNC: 133 MMOL/L (ref 136–145)
WBC # BLD AUTO: 3.9 10E3/UL (ref 4–11)

## 2023-07-11 PROCEDURE — 83735 ASSAY OF MAGNESIUM: CPT | Performed by: OBSTETRICS & GYNECOLOGY

## 2023-07-11 PROCEDURE — 80053 COMPREHEN METABOLIC PANEL: CPT | Performed by: OBSTETRICS & GYNECOLOGY

## 2023-07-11 PROCEDURE — 86304 IMMUNOASSAY TUMOR CA 125: CPT | Performed by: OBSTETRICS & GYNECOLOGY

## 2023-07-11 PROCEDURE — 36415 COLL VENOUS BLD VENIPUNCTURE: CPT | Performed by: OBSTETRICS & GYNECOLOGY

## 2023-07-11 PROCEDURE — 85025 COMPLETE CBC W/AUTO DIFF WBC: CPT | Performed by: OBSTETRICS & GYNECOLOGY

## 2023-07-11 PROCEDURE — 99214 OFFICE O/P EST MOD 30 MIN: CPT | Mod: VID | Performed by: NURSE PRACTITIONER

## 2023-07-11 RX ORDER — HEPARIN SODIUM (PORCINE) LOCK FLUSH IV SOLN 100 UNIT/ML 100 UNIT/ML
5 SOLUTION INTRAVENOUS
Status: CANCELLED | OUTPATIENT
Start: 2023-07-12

## 2023-07-11 RX ORDER — METHYLPREDNISOLONE SODIUM SUCCINATE 125 MG/2ML
125 INJECTION, POWDER, LYOPHILIZED, FOR SOLUTION INTRAMUSCULAR; INTRAVENOUS
Status: CANCELLED
Start: 2023-07-12

## 2023-07-11 RX ORDER — ALBUTEROL SULFATE 90 UG/1
1-2 AEROSOL, METERED RESPIRATORY (INHALATION)
Status: CANCELLED
Start: 2023-07-12

## 2023-07-11 RX ORDER — LORAZEPAM 2 MG/ML
0.5 INJECTION INTRAMUSCULAR EVERY 4 HOURS PRN
Status: CANCELLED | OUTPATIENT
Start: 2023-07-12

## 2023-07-11 RX ORDER — EPINEPHRINE 1 MG/ML
0.3 INJECTION, SOLUTION INTRAMUSCULAR; SUBCUTANEOUS EVERY 5 MIN PRN
Status: CANCELLED | OUTPATIENT
Start: 2023-07-12

## 2023-07-11 RX ORDER — HEPARIN SODIUM,PORCINE 10 UNIT/ML
5 VIAL (ML) INTRAVENOUS
Status: CANCELLED | OUTPATIENT
Start: 2023-07-12

## 2023-07-11 RX ORDER — PALONOSETRON 0.05 MG/ML
0.25 INJECTION, SOLUTION INTRAVENOUS ONCE
Status: CANCELLED | OUTPATIENT
Start: 2023-07-12

## 2023-07-11 RX ORDER — ALBUTEROL SULFATE 0.83 MG/ML
2.5 SOLUTION RESPIRATORY (INHALATION)
Status: CANCELLED | OUTPATIENT
Start: 2023-07-12

## 2023-07-11 RX ORDER — DIPHENHYDRAMINE HYDROCHLORIDE 50 MG/ML
50 INJECTION INTRAMUSCULAR; INTRAVENOUS
Status: CANCELLED
Start: 2023-07-12

## 2023-07-11 RX ORDER — MEPERIDINE HYDROCHLORIDE 25 MG/ML
25 INJECTION INTRAMUSCULAR; INTRAVENOUS; SUBCUTANEOUS EVERY 30 MIN PRN
Status: CANCELLED | OUTPATIENT
Start: 2023-07-12

## 2023-07-11 ASSESSMENT — PAIN SCALES - GENERAL: PAINLEVEL: NO PAIN (0)

## 2023-07-11 NOTE — LETTER
2023         RE: Jacki Smith  [address] [facility] [address]        Dear Colleague,    Thank you for referring your patient, Jacki Smith, to the [facility]. Please see a copy of my visit note below.                  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  She is here today for follow up and disease management by video.      Ovarian Cancer History:  22: Presented to an ED in [place] for evaluation of abdominal bloating and discomfort.  -Abdomen, pelvic CT: Radiographic Stage CAL ovarian cancer.   22: CA-144=181.   22: Pelvic ultrasound: c/w metastatic cancer.    22: Pelvic exam under anesthesia, diagnostic laparoscopy, biopsies, evacuation of ascites.   -Findings: On pelvic exam under anesthesia, there was a 6 cm firm, fixed mass adherent to the vaginal cuff. Vagina otherwise smooth. On laparoscopic examination, there was ascites filling the pelvis/abdomen, with >1 liter of fluid evacuated. The palpated mass was arising from the right ovary. Left ovary relatively normal in appearance. There was diffuse carcinomatosis covering the entire peritoneal surface falciform ligament, liver capsule, and mesentery. The omentum was replaced with tumor. Findings were not amenable to optimal tumor debulking so biopsies were taken for histologic examination.   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      22, 22, 22: Cycles 1-3 of neoadjuvant chemotherapy with IV carboplatin + paclitaxel 175 mg/m2 every 21 days.   -Cycles 1,2: Carboplatin AUC 6.   -Cycle 3: Carboplatin AUC 5 with dose-reduction due to thrombcytopenia.   -22: Chest, abdomen, pelvic CT:  Partial response.    8/29/22: Pelvic exam under anesthesia, diagnostic laparoscopy, conversion to laparotomy for optimal interval tumor debulking to no gross residual disease including peritoneal and diaphragm biopsies, bilateral salpingo-oophorectomy, infragastric omentectomy, bilateral hemidiaphragm stripping, peritoneal and mesenteric argon beam ablation, appendectomy.   -Pathology: High-grade serous carcinoma involving all resected specimens.     9/28/22, 10/19/22, 11/17/22: Cycles 4-6 of primary chemotherapy with IV carboplatin AUC 5 + paclitaxel 175 mg/m2.  -12/2/22: Chest, abdomen, pelvic CT: No definitive evidence of disease.    CHEST: A few tiny perifissural nodules are unchanged right upper lobe compatible with subpleural lymph nodes.  ABDOMEN: Trace amount of residual ascites in the cul-de-sac. Mild peritoneal thickening persists within the left midabdomen. No measurable peritoneal implants identified.  MUSCULOSKELETAL: Tiny fat-containing left inguinal hernia unchanged.  -12/7/22: CA-125=23.   -2/16/23: Chest, abdomen, pelvic CT to evaluate bloating: Stable findings, no definitive evidence of disease.   ABDOMEN, PELVIS: There is mild peritoneal thickening in the left mid abdomen again visualized without measurable peritoneal implants noted. Tiny amount of fluid in the pelvis. This is decreased from the prior exam.  -5/25/23: Admitted  to [facility] for management of malignant ascites s/p therapeutic paracentesis, and partial SBO resolved with conservative management.   -5/25/23: Abdomen, pelvic CT: Progressive disease.        Genetic/Genomic/Molecular Testing History:  10/5/22: [facility], reflexed to Common Hereditary Cancer Panel.   -No identifiable germline variants identified in ABRAXAS1, AKT1, APC, DEION, AXIN2, BARD1, BMPR1A, BRCA1, BRCA2, BRIP1, CDC73, CDH1, CDK4, CDKN2A, CHEK2, CTNNA1, DICER1, EPCAM, FANCC, FANCM, GREM1, HOXB13, KIT, MEN1, MLH1, MRE11, MSH2, MSH6, MUTYH, NBN,  "NF1, NTHL1, PALB2, PDGFRA, PIK3CA, PMS2, POLD1, POLE, PTEN, RAD50, RAD51C, RAD51D, RECQL, RINT1, SDHA, SDHB, SDHC, SDHD, SMAD4, SMARCA4, STK11, TP53, TSC1, TSC2, VHL, XRCC2.   -Variant of uncertain significance in MSH3 c.16C>T (p.Air0Uwr)    10/9/22 (tumor 8/29/22): [facility] Testing Results.  -Genomic ROXI low (6%)   -TMB low (1mut/Mb)  -Microsatellite stable/Mismatch Repair proficient  -PD-L1- (CPS 0%)  -NTRK 1/2/3 fusion not detected.   -ER-, WI+  -Genomic alterations in:  --TP53  -No genomic alterations in BRCA1,2.        Plan: carboplatin AUC 5 + pegylated lipoosomal doxorubicin (PLD) 30 mg/m2 every 28 days x 4 cycles then CT        6/13/23: Cycle #1 Carboplatin/Doxil.  78.  6/28/23: ED for abdominal pain. CT ap IMPRESSION:   1.  Moderate volume ascites, modestly decreased compared to most recent prior CT. Redemonstrated evidence of thickening and enhancement of the peritoneal lining consistent with carcinomatosis. An enhancing pelvic mass measures slightly smaller than on 05/25/2023.     2.  Fluid-filled loops of small bowel throughout the abdomen with gradual caliber narrowing in the terminal ileum. A partial or intermittent obstructive process is not excludable. No free air.    7/11/23: Cycle #2 Carboplatin/Doxil planned for 7/12.  78.          Today she reports her first cycle went really well; in fact she was wondering if she actually got treatment. She did take Decadron for the 3 days and it did make her feel jittery; she denies having nausea. She denies any skin issues. She \"always\" has issues with constipation and takes 1-2 stool softeners/day as well as Miralax prn. She notices that if she eats too much fiber it gets \"really bad\" and she states she loves her fruits/fiber foods. She drinks 4-5 glasses of water/day in addition to coffee/soda/juice. She denies any vaginal bleeding, no changes in her bowel or bladder habits, no nausea/emesis, no lower extremity edema, and no difficulties eating or " sleeping. She denies any abdominal discomfort/bloating, no fevers or chills, and no chest pain or shortness of breath.        Review of Systems:    Systemic           no weight changes; no fever; no chills; no night sweats; no appetite changes  Skin           no rashes, or lesions  Eye           no irritation; no changes in vision  Day-Laryngeal           no dysphagia; no hoarseness   Pulmonary    no cough; no shortness of breath  Cardiovascular    no chest pain; no palpitations  Gastrointestinal    no diarrhea; no constipation; no abdominal pain; no changes in bowel habits; no blood in stool  Genitourinary   no urinary frequency; no urinary urgency; no dysuria; no pain; no abnormal vaginal discharge; no abnormal vaginal bleeding  Breast    no breast discharge; no breast changes; no breast pain  Musculoskeletal    no myalgias; no arthralgias; no back pain  Psychiatric           no depressed mood; no anxiety    Hematologic              no tender lymph nodes; no noticeable swellings or lumps   Endocrine    no hot flashes; no heat/cold intolerance         Neurological   no tremor; no numbness and tingling; no headaches; no difficulty sleeping      Past Medical History:    Past Medical History:   Diagnosis Date    Female stress incontinence     Ovarian cancer, right (H) 06/16/2022    Stage IIIC high-grade serous carcinoma    Recurrent cold sores          Past Surgical History:    Past Surgical History:   Procedure Laterality Date    APPENDECTOMY  08/29/2022    Part of ovarian cancer tumor debulking    BLADDER SUSPENSION  08/13/2009    HYSTERECTOMY  08/13/2009    For prolapse    LAPAROSCOPY DIAGNOSTIC (GYN)  06/16/2022    SALPINGO-OOPHORECTOMY, COMBINED Bilateral 08/29/2022    Pelvic exam under anesthesia, diagnostic laparoscopy, conversion to laparotomy for optimal interval tumor debulking to no gross residual disease including peritoneal and diaphragm biopsies, bilateral salpingo-oophorectomy, infragastric omentectomy,  bilateral hemidiaphragm stripping, peritoneal and mesenteric argon beam ablation, appendectomy.    TONSILLECTOMY  1973    TUBAL LIGATION Bilateral 09/01/1998         Health Maintenance Due   Topic Date Due    YEARLY PREVENTIVE VISIT  Never done    ADVANCE CARE PLANNING  Never done    HEPATITIS B IMMUNIZATION (1 of 3 - 3-dose series) Never done    COLORECTAL CANCER SCREENING  Never done    HIV SCREENING  Never done    HEPATITIS C SCREENING  Never done    PAP  Never done    LIPID  Never done    ZOSTER IMMUNIZATION (1 of 2) Never done    DTAP/TDAP/TD IMMUNIZATION (2 - Td or Tdap) 12/15/2021    PHQ-2 (once per calendar year)  Never done    MAMMO SCREENING  01/27/2023    LUNG CANCER SCREENING  06/10/2023       Current Medications:     Current Outpatient Medications   Medication Sig Dispense Refill    citalopram (CELEXA) 40 MG tablet Take 1 tablet by mouth daily      cyclobenzaprine (FLEXERIL) 5 MG tablet Take 1-2 Tablets (5-10 mg) by mouth three times a day.      dexamethasone (DECADRON) 4 MG tablet Take 2 tablets (8 mg) by mouth daily Take for 3 days, starting the day after chemo. Take with food. 6 tablet 2    diphenhydrAMINE-acetaminophen (TYLENOL PM)  MG tablet Take 1 tablet by mouth as needed      docusate sodium (DSS) 100 MG capsule Take 100 mg by mouth as needed      hydrOXYzine (ATARAX) 10 MG tablet Take 1-2 tablets (10-20 mg) by mouth every 6 hours as needed for itching 120 tablet 3    ibuprofen (ADVIL/MOTRIN) 200 MG capsule       lidocaine-prilocaine (EMLA) 2.5-2.5 % external cream Apply topically as needed (pain due to port access) Apply to port 30 minutes prior to lab appointment. 30 g 6    LORazepam (ATIVAN) 0.5 MG tablet Take 1-2 Tablets (0.5-1 mg) by mouth every 4 hours as needed for Anxiety (and or nausea and vomiting).*      LORazepam (ATIVAN) 0.5 MG tablet Take 1 tablet (0.5 mg) by mouth every 6 hours as needed for anxiety 20 tablet 3    Multiple Vitamin (MULTI-VITAMIN DAILY PO) Take 1 tablet by  mouth daily      ondansetron (ZOFRAN) 8 MG tablet Take 1 tablet (8 mg) by mouth every 8 hours as needed for nausea (vomiting) 30 tablet 2    prochlorperazine (COMPAZINE) 10 MG tablet Take 1 tablet (10 mg) by mouth every 6 hours as needed for nausea or vomiting 30 tablet 2    prochlorperazine (COMPAZINE) 10 MG tablet Take 10 mg by mouth as needed      sennosides (SENOKOT) 8.6 MG tablet       valACYclovir (VALTREX) 1000 mg tablet Take 1,000 mg by mouth as needed           Allergies:      No Known Allergies     Social History:     Social History     Tobacco Use    Smoking status: Former     Packs/day: 1.00     Years: 42.00     Pack years: 42.00     Types: Cigarettes     Quit date: 2022     Years since quittin.1    Smokeless tobacco: Never   Substance Use Topics    Alcohol use: Yes     Comment: Beer ~Q3 months.       History   Drug Use Unknown         Family History:     The patient's family history is notable for:    Family History   Problem Relation Age of Onset    Prostate Cancer Father     Breast Cancer Sister 48    Melanoma Sister     Skin Cancer Sister     Colon Cancer Maternal Grandmother          Physical Exam:     There were no vitals taken for this visit.  There is no height or weight on file to calculate BMI.    General Appearance: healthy and alert, no distress    Eyes:  Eyes grossly normal to inspection.  No discharge or erythema, or obvious scleral/conjunctival abnormalities.    Respiratory: No audible wheeze, cough, or visible cyanosis.  No visible retractions or increased work of breathing.     Musculoskeletal: extremities non tender and without edema    Skin: no lesions or rashes on visible skin    Neurological: normal gait, no gross defects     Psychiatric: appropriate mood and affect. Mentation appears normal, affect normal/bright, judgement and insight intact, normal speech and appearance well-groomed                            The rest of a comprehensive physical examination is deferred due  to video visit restrictions.      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  She is here today for follow up and disease management by video.     32 minutes spent on the date of the encounter doing chart review, history and exam, documentation and further activities as noted above      Plan:     1.)        Reviewed CBC, CMP, Mg,  and Ok to proceed with planned treatment tomorrow.  She will return in 4 weeks for her next cycle; this is already scheduled. Discussed she does not need to take Decadron for the 3 days following her infusion given she did not have nausea (and she had symptoms). Reviewed signs and symptoms for when she should contact the clinic or seek additional care. Patient to contact the clinic with any questions or concerns in the interim. .Discussed importance of eating smaller meals with lean proteins/hard boiled eggs more often, adequate hydration of at least 64 oz water throughout the day, and pacing activities. Answered all of her questions to the best of my ability.     2.) Genetic risk factors were assessed and she has a VUS No identifiable germline variants identified in ABRAXAS1, AKT1, APC, DEION, AXIN2, BARD1, BMPR1A, BRCA1, BRCA2, BRIP1, CDC73, CDH1, CDK4, CDKN2A, CHEK2, CTNNA1, DICER1, EPCAM, FANCC, FANCM, GREM1, HOXB13, KIT, MEN1, MLH1, MRE11, MSH2, MSH6, MUTYH, NBN, NF1, NTHL1, PALB2, PDGFRA, PIK3CA, PMS2, POLD1, POLE, PTEN, RAD50, RAD51C, RAD51D, RECQL, RINT1, SDHA, SDHB, SDHC, SDHD, SMAD4, SMARCA4, STK11, TP53, TSC1, TSC2, VHL, XRCC2.      3.) Labs and/or tests ordered include:  CBC, CMP, Mg, .      4.) Health maintenance issues addressed today include annual health maintenance and non-gynecologic issues with PCP.    5.)        Hyponatremia: encouraged to drink 16-32 oz Gatorade zero, Pedialyte, or Propel for sodium.     6.)         Reviewed having a good bowel regimen; adequate  fluids, use of stool softeners and Miralax.     JALEN Zamarripa, WHNP-BC, ANP-BC  Women's Health Nurse Practitioner  Adult Nurse Practitioner  Division of Gynecologic Oncology      CC  Patient Care Team:  Linda Robert APRN CNP as PCP - General

## 2023-07-12 ENCOUNTER — INFUSION THERAPY VISIT (OUTPATIENT)
Dept: INFUSION THERAPY | Facility: CLINIC | Age: 58
End: 2023-07-12
Attending: INTERNAL MEDICINE
Payer: COMMERCIAL

## 2023-07-12 VITALS
HEART RATE: 63 BPM | SYSTOLIC BLOOD PRESSURE: 125 MMHG | BODY MASS INDEX: 27.84 KG/M2 | OXYGEN SATURATION: 100 % | WEIGHT: 152.2 LBS | RESPIRATION RATE: 16 BRPM | TEMPERATURE: 98.2 F | DIASTOLIC BLOOD PRESSURE: 59 MMHG

## 2023-07-12 DIAGNOSIS — C56.1 OVARIAN CANCER, RIGHT (H): Primary | ICD-10-CM

## 2023-07-12 PROCEDURE — 258N000003 HC RX IP 258 OP 636: Performed by: NURSE PRACTITIONER

## 2023-07-12 PROCEDURE — 96367 TX/PROPH/DG ADDL SEQ IV INF: CPT

## 2023-07-12 PROCEDURE — 96417 CHEMO IV INFUS EACH ADDL SEQ: CPT

## 2023-07-12 PROCEDURE — 96375 TX/PRO/DX INJ NEW DRUG ADDON: CPT

## 2023-07-12 PROCEDURE — 250N000011 HC RX IP 250 OP 636: Mod: JZ | Performed by: NURSE PRACTITIONER

## 2023-07-12 PROCEDURE — 96413 CHEMO IV INFUSION 1 HR: CPT

## 2023-07-12 RX ORDER — ALBUTEROL SULFATE 90 UG/1
1-2 AEROSOL, METERED RESPIRATORY (INHALATION)
Status: DISCONTINUED | OUTPATIENT
Start: 2023-07-12 | End: 2023-07-12 | Stop reason: HOSPADM

## 2023-07-12 RX ORDER — EPINEPHRINE 1 MG/ML
0.3 INJECTION, SOLUTION INTRAMUSCULAR; SUBCUTANEOUS EVERY 5 MIN PRN
Status: DISCONTINUED | OUTPATIENT
Start: 2023-07-12 | End: 2023-07-12 | Stop reason: HOSPADM

## 2023-07-12 RX ORDER — DIPHENHYDRAMINE HYDROCHLORIDE 50 MG/ML
50 INJECTION INTRAMUSCULAR; INTRAVENOUS
Status: DISCONTINUED | OUTPATIENT
Start: 2023-07-12 | End: 2023-07-12 | Stop reason: HOSPADM

## 2023-07-12 RX ORDER — HEPARIN SODIUM (PORCINE) LOCK FLUSH IV SOLN 100 UNIT/ML 100 UNIT/ML
5 SOLUTION INTRAVENOUS
Status: DISCONTINUED | OUTPATIENT
Start: 2023-07-12 | End: 2023-07-12 | Stop reason: HOSPADM

## 2023-07-12 RX ORDER — MEPERIDINE HYDROCHLORIDE 50 MG/ML
25 INJECTION INTRAMUSCULAR; INTRAVENOUS; SUBCUTANEOUS EVERY 30 MIN PRN
Status: DISCONTINUED | OUTPATIENT
Start: 2023-07-12 | End: 2023-07-12 | Stop reason: HOSPADM

## 2023-07-12 RX ORDER — PALONOSETRON 0.05 MG/ML
0.25 INJECTION, SOLUTION INTRAVENOUS ONCE
Status: COMPLETED | OUTPATIENT
Start: 2023-07-12 | End: 2023-07-12

## 2023-07-12 RX ORDER — ALBUTEROL SULFATE 0.83 MG/ML
2.5 SOLUTION RESPIRATORY (INHALATION)
Status: DISCONTINUED | OUTPATIENT
Start: 2023-07-12 | End: 2023-07-12 | Stop reason: HOSPADM

## 2023-07-12 RX ORDER — METHYLPREDNISOLONE SODIUM SUCCINATE 125 MG/2ML
125 INJECTION, POWDER, LYOPHILIZED, FOR SOLUTION INTRAMUSCULAR; INTRAVENOUS
Status: DISCONTINUED | OUTPATIENT
Start: 2023-07-12 | End: 2023-07-12 | Stop reason: HOSPADM

## 2023-07-12 RX ADMIN — DEXTROSE MONOHYDRATE 250 ML: 50 INJECTION, SOLUTION INTRAVENOUS at 08:54

## 2023-07-12 RX ADMIN — CARBOPLATIN 385 MG: 10 INJECTION, SOLUTION INTRAVENOUS at 10:51

## 2023-07-12 RX ADMIN — PALONOSETRON 0.25 MG: 0.05 INJECTION, SOLUTION INTRAVENOUS at 08:54

## 2023-07-12 RX ADMIN — FOSAPREPITANT: 150 INJECTION, POWDER, LYOPHILIZED, FOR SOLUTION INTRAVENOUS at 09:03

## 2023-07-12 RX ADMIN — DOXORUBICIN HYDROCHLORIDE 50 MG: 2 INJECTION, SUSPENSION, LIPOSOMAL INTRAVENOUS at 09:36

## 2023-07-12 RX ADMIN — HEPARIN 5 ML: 100 SYRINGE at 11:50

## 2023-07-12 NOTE — PROGRESS NOTES
Infusion Nursing Note:   Jacki HAYNES Rangelaldair presents today for Provider visit, labs and .    Patient seen by provider today: Yes:    present during visit today: Not Applicable.    Note: Patient presented for MD visit, labs, and Chemo. Patient was premedicated with Emend and Aloxi.      Intravenous Access:  Implanted Port.    Treatment Conditions:  Results reviewed, labs MET treatment parameters, ok to proceed with treatment.  Biological Infusion Checklist:  ~~~ NOTE: If the patient answers yes to any of the questions below, hold the infusion and contact ordering provider or on-call provider.    1. Have you recently had an elevated temperature, fever, chills, productive cough, coughing for 3 weeks or longer or hemoptysis,  abnormal vital signs, night sweats,  chest pain or have you noticed a decrease in your appetite, unexplained weight loss or fatigue? No  2. Do you have any open wounds or new incisions? No  3. Do you have any upcoming hospitalizations or surgeries? Does not include esophagogastroduodenoscopy, colonoscopy, endoscopic retrograde cholangiopancreatography (ERCP), endoscopic ultrasound (EUS), dental procedures or joint aspiration/steroid injections No  4. Do you currently have any signs of illness or infection or are you on any antibiotics? No  5. Have you had any new, sudden or worsening abdominal pain? No  6. Have you or anyone in your household received a live vaccination in the past 4 weeks? Please note: No live vaccines while on biologic/chemotherapy until 6 months after the last treatment. Patient can receive the flu vaccine (shot only), pneumovax and the Covid vaccine. It is optimal for the patient to get these vaccines mid cycle, but they can be given at any time as long as it is not on the day of the infusion. No  7. Have you recently been diagnosed with any new nervous system diseases (ie. Multiple sclerosis, Guillain Ray, seizures, neurological changes) or cancer diagnosis? Are  you on any form of radiation or chemotherapy? No  8. Are you pregnant or breast feeding or do you have plans of pregnancy in the future? No  9. Have you been having any signs of worsening depression or suicidal ideations?  (benlysta only) No  10. Have there been any other new onset medical symptoms? No  11. Have you had any new blood clots? (IVIG only) No        Post Infusion Assessment:  Patient tolerated infusion without incident.  Blood return noted pre and post infusion.  Blood return noted during administration.  No evidence of extravasations.  Access discontinued per protocol.       Discharge Plan:   Patient discharged in stable condition accompanied by: shantel.      Samuel Valencia RN

## 2023-07-13 ENCOUNTER — TELEPHONE (OUTPATIENT)
Dept: ONCOLOGY | Facility: CLINIC | Age: 58
End: 2023-07-13
Payer: COMMERCIAL

## 2023-07-13 NOTE — TELEPHONE ENCOUNTER
Writer contacted patient regarding tissue results for research study [id number]. 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 Study requires immunoreactive molecular subtype. Results have been forwarded to Dr. Patino and study PI Dr. Meeks. Patient voiced understanding of results and would like to discuss this at her next visit on 8/11/23.

## 2023-07-26 ENCOUNTER — VIRTUAL VISIT (OUTPATIENT)
Dept: PALLIATIVE CARE | Facility: CLINIC | Age: 58
End: 2023-07-26
Attending: FAMILY MEDICINE
Payer: COMMERCIAL

## 2023-07-26 VITALS — WEIGHT: 150 LBS | HEIGHT: 62 IN | BODY MASS INDEX: 27.6 KG/M2

## 2023-07-26 DIAGNOSIS — F43.23 ADJUSTMENT DISORDER WITH MIXED ANXIETY AND DEPRESSED MOOD: ICD-10-CM

## 2023-07-26 DIAGNOSIS — Z51.5 PALLIATIVE CARE PATIENT: Primary | ICD-10-CM

## 2023-07-26 DIAGNOSIS — C56.1 OVARIAN CANCER, RIGHT (H): ICD-10-CM

## 2023-07-26 PROCEDURE — 99214 OFFICE O/P EST MOD 30 MIN: CPT | Mod: VID | Performed by: FAMILY MEDICINE

## 2023-07-26 ASSESSMENT — PAIN SCALES - GENERAL: PAINLEVEL: NO PAIN (0)

## 2023-07-26 NOTE — PROGRESS NOTES
Virtual Visit Details    Type of service:  Video Visit     Originating Location (pt. 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     Recommendations & Counseling:  Continue oncology care with Dr. Patino and the GYN Oncology team  Continue Hydroxyzine 5-20 mg po q 6 hours prn anxiety     OK to use ativan for severe anxiety but try hydroxyzine first  Will consider adding mirtazapine if the above isn't helpful       Referral to PC  for supportive counseling  AVS has other ideas for skill building to help with anxiety     Michelle was directed to the [employer] program for ACP documents and she is encouraged to complete them by her next visit.     Follow up in early October (after next staging scan), sooner prn; [facility] will call for symptom update in a week.     Counseling: All of the above was explained to the patient in lay language. The patient has verbalized a clear understanding of the discussion, asked appropriate questions, which have been answered to patient's apparent satisfaction. The patient is in agreement with the above plan.      Chief Complaint/Patient ID:  Jcaki Smith is a 58 year old woman with a diagnosis of Progressive Stage IIIC high-grade serous carcinoma of the right ovary. She has not needed any paracentesis since the end of May.    Last Palliative care appointment: 2023 with me     Reviewed: yes, no concerns    Interim History:  Jacki Smith is a 58 year old female who is seen today for  follow up with Palliative Care via billable video visit.  Her anxiety is well controlled with prn hydroxyzine and she hasn't used lorazepam for a couple of weeks.     Pain:  none    Appetite/Nausea: good     Bowels: constipation managed with Miralax, increased fiber and fluids     Sleep: well if uses hydroxyzine at hs     Mood: anxiety much better controlled      Coping: much better; Michelle feels like she has a sense for the rhythm of her cancer treatments and continuing to work full time helps keep her mind busy.    Family History- Reviewed in Epic.    No Known Allergies    Social History:  Pertinent changes to social history/social situation since last visit: none  Key support resources: family and work friends  Advance Directive Status:  not completed yet.    Social History     Tobacco Use    Smoking status: Former     Packs/day: 1.00     Years: 42.00     Pack years: 42.00     Types: Cigarettes     Quit date: 2022     Years since quittin.1    Smokeless tobacco: Never   Substance Use Topics    Alcohol use: Yes     Comment: Beer ~Q3 months.    Drug use: Never         No Known Allergies  Current Outpatient Medications   Medication Sig Dispense Refill    citalopram (CELEXA) 40 MG tablet Take 1 tablet by mouth daily      cyclobenzaprine (FLEXERIL) 5 MG tablet Take 1-2 Tablets (5-10 mg) by mouth three times a day.      dexamethasone (DECADRON) 4 MG tablet Take 2 tablets (8 mg) by mouth daily Take for 3 days, starting the day after chemo. Take with food. 6 tablet 2    diphenhydrAMINE-acetaminophen (TYLENOL PM)  MG tablet Take 1 tablet by mouth as needed      docusate sodium (DSS) 100 MG capsule Take 100 mg by mouth as needed      hydrOXYzine (ATARAX) 10 MG tablet Take 1-2 tablets (10-20 mg) by mouth every 6 hours as needed for itching 120 tablet 3    ibuprofen (ADVIL/MOTRIN) 200 MG capsule       lidocaine-prilocaine (EMLA) 2.5-2.5 % external cream Apply topically as needed (pain due to port access) Apply  to port 30 minutes prior to lab appointment. 30 g 6    LORazepam (ATIVAN) 0.5 MG tablet Take 1-2 Tablets (0.5-1 mg) by mouth every 4 hours as needed for Anxiety (and or nausea and vomiting).*      LORazepam (ATIVAN) 0.5 MG tablet Take 1 tablet (0.5 mg) by mouth every 6 hours as needed for anxiety 20 tablet 3    Multiple Vitamin (MULTI-VITAMIN DAILY PO) Take 1 tablet by mouth daily      ondansetron (ZOFRAN) 8 MG tablet Take 1 tablet (8 mg) by mouth every 8 hours as needed for nausea (vomiting) 30 tablet 2    prochlorperazine (COMPAZINE) 10 MG tablet Take 1 tablet (10 mg) by mouth every 6 hours as needed for nausea or vomiting 30 tablet 2    sennosides (SENOKOT) 8.6 MG tablet       valACYclovir (VALTREX) 1000 mg tablet Take 1,000 mg by mouth as needed       Past Medical History:   Diagnosis Date    Female stress incontinence     Ovarian cancer, right (H) 06/16/2022    Stage IIIC high-grade serous carcinoma    Recurrent cold sores      Past Surgical History:   Procedure Laterality Date    APPENDECTOMY  08/29/2022    Part of ovarian cancer tumor debulking    BLADDER SUSPENSION  08/13/2009    HYSTERECTOMY  08/13/2009    For prolapse    LAPAROSCOPY DIAGNOSTIC (GYN)  06/16/2022    SALPINGO-OOPHORECTOMY, COMBINED Bilateral 08/29/2022    Pelvic exam under anesthesia, diagnostic laparoscopy, conversion to laparotomy for optimal interval tumor debulking to no gross residual disease including peritoneal and diaphragm biopsies, bilateral salpingo-oophorectomy, infragastric omentectomy, bilateral hemidiaphragm stripping, peritoneal and mesenteric argon beam ablation, appendectomy.    TONSILLECTOMY  1973    TUBAL LIGATION Bilateral 09/01/1998       Physical Exam:   GENERAL APPEARANCE: healthy, alert and no distress; neatly groomed  EYES: Eyes grossly normal to inspection, PERRLA, conjunctivae and sclerae without injection or discharge, EOM intact   RESP:  no increased work of breathing; speaks in complete sentences;   MS: No  musculoskeletal defects are noted  SKIN: No suspicious lesions or rashes, hydration status appears adequate with normal skin turgor   PSYCH: Alert and oriented x3; speech- coherent , normal rate and volume; able to articulate logical thoughts, able to abstract reason, no tangential thoughts, no hallucinations or delusions, mentation appears normal, Mood is euthymic. Affect is appropriate for this mood state and bright. Thought content is free of suicidal ideation, hallucinations, and delusions.  Eye contact is good during conversation.       Key Data Reviewed:  LABS: 7/11/2023- Cr 0.93, Albumin 3.7,  Hgb 11.7,      IMAGING: no recent imaging to review    25 minutes spent on the date of the encounter doing chart review, history and exam, patient education & counseling, documentation and other activities as noted above.    Anish Monroy MD MS FAAFP CAQHPM  [facility]  Office [phone]  Fax [phone]

## 2023-07-26 NOTE — NURSING NOTE
Is the patient currently in the state of [place]? YES    Visit mode:VIDEO    If the visit is dropped, the patient can be reconnected by: VIDEO VISIT: Text to cell phone: [phone]    Will anyone else be joining the visit? NO    How would you like to obtain your AVS? MyChart    Are changes needed to the allergy or medication list? NO    Reason for visit: FRED Murray, VF/LPN

## 2023-07-26 NOTE — LETTER
2023       RE: Jacki Smith  [address] [facility] [address]     Dear Colleague,    [facility] you [facility] referring your [facility], [facility] Smith[facility]. Please see a copy of my visit note below.    Virtual Visit Details    Type of service:  Video Visit     Originating Location (pt. Location): Other work    Distant Location (provider location):  On-site  Platform used for Video Visit: 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     Recommendations & Counseling:  Continue oncology care with Dr. Patino and the GYN Oncology team  Continue Hydroxyzine 5-20 mg po q 6 hours prn anxiety     OK to use ativan for severe anxiety but try hydroxyzine first  Will consider adding mirtazapine if the above isn't helpful       Referral to PC  for supportive counseling  AVS has other ideas for skill building to help with anxiety     Michelle was directed to the [place] [employer] program for ACP documents and she is encouraged to complete them by her next visit.     Follow up in early October (after next staging scan), sooner prn; [facility] will call for symptom update in a week.     Counseling: All of the above was explained to the patient in lay language. The patient has verbalized a clear understanding of the discussion, asked appropriate questions, which have been answered to patient's apparent satisfaction. The patient is in agreement with the above plan.      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 She has not needed any paracentesis since the end of May.    Last Palliative care appointment: 2023 with me     Reviewed: yes, no concerns    Interim History:  Jacki Smith is a 58 year old female who is seen today for follow up with Palliative Care via billable video visit.  Her anxiety is well controlled with prn hydroxyzine and she hasn't used lorazepam for a couple of weeks.     Pain:  none    Appetite/Nausea: good     Bowels: constipation managed with Miralax, increased fiber and fluids     Sleep: well if uses hydroxyzine at hs     Mood: anxiety much better controlled      Coping: much better; Michelle feels like she has a sense for the rhythm of her cancer treatments and continuing to work full time helps keep her mind busy.    Family History- Reviewed in Epic.    No Known Allergies    Social History:  Pertinent changes to social history/social situation since last visit: none  Key support resources: family and work friends  Advance Directive Status:  not completed yet.    Social History     Tobacco Use    Smoking status: Former     Packs/day: 1.00     Years: 42.00     Pack years: 42.00     Types: Cigarettes     Quit date: 2022     Years since quittin.1    Smokeless tobacco: Never   Substance Use Topics    Alcohol use: Yes     Comment: Beer ~Q3 months.    Drug use: Never         No Known Allergies  Current Outpatient Medications   Medication Sig Dispense Refill    citalopram (CELEXA) 40 MG tablet Take 1 tablet by mouth daily      cyclobenzaprine (FLEXERIL) 5 MG tablet Take 1-2 Tablets (5-10 mg) by mouth three times a day.      dexamethasone (DECADRON) 4 MG tablet Take 2 tablets (8 mg) by mouth daily Take for 3 days, starting the day after chemo. Take with food. 6 tablet 2    diphenhydrAMINE-acetaminophen (TYLENOL PM)  MG tablet Take 1 tablet by mouth as needed      docusate sodium (DSS) 100 MG capsule Take 100 mg  by mouth as needed      hydrOXYzine (ATARAX) 10 MG tablet Take 1-2 tablets (10-20 mg) by mouth every 6 hours as needed for itching 120 tablet 3    ibuprofen (ADVIL/MOTRIN) 200 MG capsule       lidocaine-prilocaine (EMLA) 2.5-2.5 % external cream Apply topically as needed (pain due to port access) Apply to port 30 minutes prior to lab appointment. 30 g 6    LORazepam (ATIVAN) 0.5 MG tablet Take 1-2 Tablets (0.5-1 mg) by mouth every 4 hours as needed for Anxiety (and or nausea and vomiting).*      LORazepam (ATIVAN) 0.5 MG tablet Take 1 tablet (0.5 mg) by mouth every 6 hours as needed for anxiety 20 tablet 3    Multiple Vitamin (MULTI-VITAMIN DAILY PO) Take 1 tablet by mouth daily      ondansetron (ZOFRAN) 8 MG tablet Take 1 tablet (8 mg) by mouth every 8 hours as needed for nausea (vomiting) 30 tablet 2    prochlorperazine (COMPAZINE) 10 MG tablet Take 1 tablet (10 mg) by mouth every 6 hours as needed for nausea or vomiting 30 tablet 2    sennosides (SENOKOT) 8.6 MG tablet       valACYclovir (VALTREX) 1000 mg tablet Take 1,000 mg by mouth as needed       Past Medical History:   Diagnosis Date    Female stress incontinence     Ovarian cancer, right (H) 06/16/2022    Stage IIIC high-grade serous carcinoma    Recurrent cold sores      Past Surgical History:   Procedure Laterality Date    APPENDECTOMY  08/29/2022    Part of ovarian cancer tumor debulking    BLADDER SUSPENSION  08/13/2009    HYSTERECTOMY  08/13/2009    For prolapse    LAPAROSCOPY DIAGNOSTIC (GYN)  06/16/2022    SALPINGO-OOPHORECTOMY, COMBINED Bilateral 08/29/2022    Pelvic exam under anesthesia, diagnostic laparoscopy, conversion to laparotomy for optimal interval tumor debulking to no gross residual disease including peritoneal and diaphragm biopsies, bilateral salpingo-oophorectomy, infragastric omentectomy, bilateral hemidiaphragm stripping, peritoneal and mesenteric argon beam ablation, appendectomy.    TONSILLECTOMY  1973    TUBAL LIGATION Bilateral  09/01/1998       Physical Exam:   GENERAL APPEARANCE: healthy, alert and no distress; neatly groomed  EYES: Eyes grossly normal to inspection, PERRLA, conjunctivae and sclerae without injection or discharge, EOM intact   RESP:  no increased work of breathing; speaks in complete sentences;   MS: No musculoskeletal defects are noted  SKIN: No suspicious lesions or rashes, hydration status appears adequate with normal skin turgor   PSYCH: Alert and oriented x3; speech- coherent , normal rate and volume; able to articulate logical thoughts, able to abstract reason, no tangential thoughts, no hallucinations or delusions, mentation appears normal, Mood is euthymic. Affect is appropriate for this mood state and bright. Thought content is free of suicidal ideation, hallucinations, and delusions.  Eye contact is good during conversation.       Key Data Reviewed:  LABS: 7/11/2023- Cr 0.93, Albumin 3.7,  Hgb 11.7,      IMAGING: no recent imaging to review    25 minutes spent on the date of the encounter doing chart review, history and exam, patient education & counseling, documentation and other activities as noted above.          Again, thank you for allowing me to participate in the care of your patient.      Sincerely,    Anish Monroy MD

## 2023-07-26 NOTE — PATIENT INSTRUCTIONS
"It was good to see you today, Michelle.  I'm glad things are going better for you.    Here are the things we talked about:  Keep using the hydroxyzine 1/2-1 tablets every six hours as needed for anxiety.    We talked about starting to look at completing a healthcare directive.  I recommend you check out- https://www.wismed.org/[place]/wismed/about-us/advance-care-planning/wismed/about-us/advance-care-planning.aspx     They have several tools and some advice about getting started.  Under the \"How do I document my choices\" section, I am a fan of the full length healthcare directive (for adults with serious illness) because I think it has a good combination of the medical questions that are important as well as the social and personal questions that allow healthcare providers to get to know you as a person if you are unable to speak for yourself.  You do not have to complete every question on the document.  I generally recommend patients start with 3 or 4 questions on the goals page and work from there.     There is also a Short Form, which primarily serves to designate health care agents. These are people who can speak on your behalf about your healthcare wishes/goals if you are not able to speak for yourself.    Once you complete this you can bring a copy to any of your in person visits and team members can scan it and get it uploaded into our system.  Or you can email me a copy [email or web address]    Someone from the team will reach out to schedule a follow up appointment in early October and I can always see you sooner, if needed.     How to get a hold of us:  For non-urgent matters, MyChart works best.    For more urgent matters, or if you prefer not to use MyChart, call our clinic nurse coordinator Astrid Noble RN at [phone]    We have an on-call number for evenings and weekends. Please call this only if you are having uncontrolled symptoms or serious side effects from your medicines: [phone]. "     For refills, please give us a week (5 working days) notice. We don't always have providers available everyday to do refills. If you call the day you run out of your medicine, we may not be able to refill it in time, so call 5 days in advance!    Anish Monroy MD MS FAAFP CAQHPM  [facility]  Office [phone]  Fax [phone]

## 2023-08-11 ENCOUNTER — LAB (OUTPATIENT)
Dept: INFUSION THERAPY | Facility: CLINIC | Age: 58
End: 2023-08-11
Attending: INTERNAL MEDICINE
Payer: COMMERCIAL

## 2023-08-11 ENCOUNTER — VIRTUAL VISIT (OUTPATIENT)
Dept: ONCOLOGY | Facility: CLINIC | Age: 58
End: 2023-08-11
Attending: NURSE PRACTITIONER
Payer: COMMERCIAL

## 2023-08-11 VITALS — BODY MASS INDEX: 27.44 KG/M2 | WEIGHT: 150 LBS

## 2023-08-11 DIAGNOSIS — Z51.11 ENCOUNTER FOR ANTINEOPLASTIC CHEMOTHERAPY: ICD-10-CM

## 2023-08-11 DIAGNOSIS — C56.1 OVARIAN CANCER, RIGHT (H): Primary | ICD-10-CM

## 2023-08-11 LAB
ALBUMIN SERPL-MCNC: 3.7 G/DL (ref 3.5–5)
ALP SERPL-CCNC: 119 U/L (ref 45–120)
ALT SERPL W P-5'-P-CCNC: 16 U/L (ref 0–45)
ANION GAP SERPL CALCULATED.3IONS-SCNC: 8 MMOL/L (ref 5–18)
AST SERPL W P-5'-P-CCNC: 27 U/L (ref 0–40)
BASOPHILS # BLD AUTO: 0.1 10E3/UL (ref 0–0.2)
BASOPHILS NFR BLD AUTO: 1 %
BILIRUB SERPL-MCNC: 0.2 MG/DL (ref 0–1)
BUN SERPL-MCNC: 12 MG/DL (ref 8–22)
CALCIUM SERPL-MCNC: 9.9 MG/DL (ref 8.5–10.5)
CANCER AG125 SERPL-ACNC: 68 U/ML
CHLORIDE BLD-SCNC: 102 MMOL/L (ref 98–107)
CO2 SERPL-SCNC: 25 MMOL/L (ref 22–31)
CREAT SERPL-MCNC: 1.07 MG/DL (ref 0.6–1.1)
EOSINOPHIL # BLD AUTO: 0.2 10E3/UL (ref 0–0.7)
EOSINOPHIL NFR BLD AUTO: 4 %
ERYTHROCYTE [DISTWIDTH] IN BLOOD BY AUTOMATED COUNT: 15.7 % (ref 10–15)
GFR SERPL CREATININE-BSD FRML MDRD: 60 ML/MIN/1.73M2
GLUCOSE BLD-MCNC: 71 MG/DL (ref 70–125)
HCT VFR BLD AUTO: 37 % (ref 35–47)
HGB BLD-MCNC: 12.1 G/DL (ref 11.7–15.7)
IMM GRANULOCYTES # BLD: 0 10E3/UL
IMM GRANULOCYTES NFR BLD: 1 %
LYMPHOCYTES # BLD AUTO: 0.9 10E3/UL (ref 0.8–5.3)
LYMPHOCYTES NFR BLD AUTO: 17 %
MAGNESIUM SERPL-MCNC: 1.8 MG/DL (ref 1.8–2.6)
MCH RBC QN AUTO: 29.7 PG (ref 26.5–33)
MCHC RBC AUTO-ENTMCNC: 32.7 G/DL (ref 31.5–36.5)
MCV RBC AUTO: 91 FL (ref 78–100)
MONOCYTES # BLD AUTO: 0.4 10E3/UL (ref 0–1.3)
MONOCYTES NFR BLD AUTO: 8 %
NEUTROPHILS # BLD AUTO: 3.6 10E3/UL (ref 1.6–8.3)
NEUTROPHILS NFR BLD AUTO: 69 %
NRBC # BLD AUTO: 0 10E3/UL
NRBC BLD AUTO-RTO: 0 /100
PLATELET # BLD AUTO: 246 10E3/UL (ref 150–450)
POTASSIUM BLD-SCNC: 4.3 MMOL/L (ref 3.5–5)
PROT SERPL-MCNC: 6.9 G/DL (ref 6–8)
RBC # BLD AUTO: 4.07 10E6/UL (ref 3.8–5.2)
SODIUM SERPL-SCNC: 135 MMOL/L (ref 136–145)
WBC # BLD AUTO: 5.3 10E3/UL (ref 4–11)

## 2023-08-11 PROCEDURE — 86304 IMMUNOASSAY TUMOR CA 125: CPT | Performed by: OBSTETRICS & GYNECOLOGY

## 2023-08-11 PROCEDURE — 85025 COMPLETE CBC W/AUTO DIFF WBC: CPT | Performed by: OBSTETRICS & GYNECOLOGY

## 2023-08-11 PROCEDURE — 99214 OFFICE O/P EST MOD 30 MIN: CPT | Mod: 95 | Performed by: NURSE PRACTITIONER

## 2023-08-11 PROCEDURE — 83735 ASSAY OF MAGNESIUM: CPT | Performed by: OBSTETRICS & GYNECOLOGY

## 2023-08-11 PROCEDURE — 80053 COMPREHEN METABOLIC PANEL: CPT | Performed by: OBSTETRICS & GYNECOLOGY

## 2023-08-11 PROCEDURE — 36415 COLL VENOUS BLD VENIPUNCTURE: CPT | Performed by: OBSTETRICS & GYNECOLOGY

## 2023-08-11 RX ORDER — HEPARIN SODIUM (PORCINE) LOCK FLUSH IV SOLN 100 UNIT/ML 100 UNIT/ML
5 SOLUTION INTRAVENOUS
Status: CANCELLED | OUTPATIENT
Start: 2023-08-11

## 2023-08-11 RX ORDER — LORAZEPAM 2 MG/ML
0.5 INJECTION INTRAMUSCULAR EVERY 4 HOURS PRN
Status: CANCELLED | OUTPATIENT
Start: 2023-08-11

## 2023-08-11 RX ORDER — HEPARIN SODIUM,PORCINE 10 UNIT/ML
5 VIAL (ML) INTRAVENOUS
Status: CANCELLED | OUTPATIENT
Start: 2023-08-11

## 2023-08-11 RX ORDER — HEPARIN SODIUM (PORCINE) LOCK FLUSH IV SOLN 100 UNIT/ML 100 UNIT/ML
SOLUTION INTRAVENOUS
Status: COMPLETED
Start: 2023-08-11 | End: 2023-08-11

## 2023-08-11 RX ORDER — MEPERIDINE HYDROCHLORIDE 25 MG/ML
25 INJECTION INTRAMUSCULAR; INTRAVENOUS; SUBCUTANEOUS EVERY 30 MIN PRN
Status: CANCELLED | OUTPATIENT
Start: 2023-08-11

## 2023-08-11 RX ORDER — ALBUTEROL SULFATE 0.83 MG/ML
2.5 SOLUTION RESPIRATORY (INHALATION)
Status: CANCELLED | OUTPATIENT
Start: 2023-08-11

## 2023-08-11 RX ORDER — EPINEPHRINE 1 MG/ML
0.3 INJECTION, SOLUTION INTRAMUSCULAR; SUBCUTANEOUS EVERY 5 MIN PRN
Status: CANCELLED | OUTPATIENT
Start: 2023-08-11

## 2023-08-11 RX ORDER — DIPHENHYDRAMINE HYDROCHLORIDE 50 MG/ML
50 INJECTION INTRAMUSCULAR; INTRAVENOUS
Status: CANCELLED
Start: 2023-08-11

## 2023-08-11 RX ORDER — METHYLPREDNISOLONE SODIUM SUCCINATE 125 MG/2ML
125 INJECTION, POWDER, LYOPHILIZED, FOR SOLUTION INTRAMUSCULAR; INTRAVENOUS
Status: CANCELLED
Start: 2023-08-11

## 2023-08-11 RX ORDER — ALBUTEROL SULFATE 90 UG/1
1-2 AEROSOL, METERED RESPIRATORY (INHALATION)
Status: CANCELLED
Start: 2023-08-11

## 2023-08-11 RX ORDER — PALONOSETRON 0.05 MG/ML
0.25 INJECTION, SOLUTION INTRAVENOUS ONCE
Status: CANCELLED | OUTPATIENT
Start: 2023-08-11

## 2023-08-11 ASSESSMENT — PAIN SCALES - GENERAL: PAINLEVEL: NO PAIN (0)

## 2023-08-11 NOTE — LETTER
2023         RE: Jacki Smith  [address] [facility] [address]        Dear Colleague,    Thank you for referring your patient, Jacki Smith, to the [facility]. Please see a copy of my visit note below.    Virtual Visit Details    Type of service:  Video Visit     Originating Location (pt. Location): Home    Distant Location (provider location):  On-site  Platform used for Video Visit: 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  She presents for follow up and disease management by video.     Oncology History:  22: Presented to an ED in [place] for evaluation of abdominal bloating and discomfort.  -Abdomen, pelvic CT: Radiographic Stage CAL ovarian cancer.   22: [id number].   22: Pelvic ultrasound: c/w metastatic cancer.    22: Pelvic exam under anesthesia, diagnostic laparoscopy, biopsies, evacuation of ascites.   -Findings: On pelvic exam under anesthesia, there was a 6 cm firm, fixed mass adherent to the vaginal cuff. Vagina otherwise smooth. On laparoscopic examination, there was ascites filling the pelvis/abdomen, with >1 liter of fluid evacuated. The palpated mass was arising from the right ovary. Left ovary relatively normal in appearance. There was diffuse carcinomatosis covering the entire peritoneal surface falciform ligament, liver capsule, and mesentery. The omentum was replaced with tumor. Findings were not amenable to optimal tumor debulking so biopsies were taken for histologic examination.   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      22, 22,  8/4/22: Cycles 1-3 of neoadjuvant chemotherapy with IV carboplatin + paclitaxel 175 mg/m2 every 21 days.   -Cycles 1,2: Carboplatin AUC 6.   -Cycle 3: Carboplatin AUC 5 with dose-reduction due to thrombcytopenia.   -8/19/22: Chest, abdomen, pelvic CT: Partial response.    8/29/22: Pelvic exam under anesthesia, diagnostic laparoscopy, conversion to laparotomy for optimal interval tumor debulking to no gross residual disease including peritoneal and diaphragm biopsies, bilateral salpingo-oophorectomy, infragastric omentectomy, bilateral hemidiaphragm stripping, peritoneal and mesenteric argon beam ablation, appendectomy.   -Pathology: High-grade serous carcinoma involving all resected specimens.     9/28/22, 10/19/22, 11/17/22: Cycles 4-6 of primary chemotherapy with IV carboplatin AUC 5 + paclitaxel 175 mg/m2.  10/5/22: [facility], reflexed to Common Hereditary Cancer Panel.   -No identifiable germline variants identified in ABRAXAS1, AKT1, APC, DEION, AXIN2, BARD1, BMPR1A, BRCA1, BRCA2, BRIP1, CDC73, CDH1, CDK4, CDKN2A, CHEK2, CTNNA1, DICER1, EPCAM, FANCC, FANCM, GREM1, HOXB13, KIT, MEN1, MLH1, MRE11, MSH2, MSH6, MUTYH, NBN, NF1, NTHL1, PALB2, PDGFRA, PIK3CA, PMS2, POLD1, POLE, PTEN, RAD50, RAD51C, RAD51D, RECQL, RINT1, SDHA, SDHB, SDHC, SDHD, SMAD4, SMARCA4, STK11, TP53, TSC1, TSC2, VHL, XRCC2.   -Variant of uncertain significance in MSH3 c.16C>T ([id number])  10/9/22 (tumor 8/29/22): [facility] Testing Results.  -Genomic ROXI low (6%)   -TMB low (1mut/Mb)  -Microsatellite stable/Mismatch Repair proficient  -PD-L1- (CPS 0%)  -NTRK 1/2/3 fusion not detected.   -ER-, ME+  -Genomic alterations in:  --TP53  -No genomic alterations in BRCA1,2.  -12/2/22: Chest, abdomen, pelvic CT: No definitive evidence of disease.    CHEST: A few tiny perifissural nodules are unchanged right upper lobe compatible with subpleural lymph nodes.  ABDOMEN: Trace amount of residual ascites in the cul-de-sac. Mild peritoneal thickening  "persists within the left midabdomen. No measurable peritoneal implants identified.  MUSCULOSKELETAL: Tiny fat-containing left inguinal hernia unchanged.  -12/7/22: CA-125=23.   -2/16/23: Chest, abdomen, pelvic CT to evaluate bloating: Stable findings, no definitive evidence of disease.   ABDOMEN, PELVIS: There is mild peritoneal thickening in the left mid abdomen again visualized without measurable peritoneal implants noted. Tiny amount of fluid in the pelvis. This is decreased from the prior exam.  -5/25/23: Admitted  to [facility] for management of malignant ascites s/p therapeutic paracentesis, and partial SBO resolved with conservative management.   -5/25/23: Abdomen, pelvic CT: Progressive disease.           Plan: Carboplatin AUC 5 + pegylated lipoosomal doxorubicin (PLD) 30 mg/m2 every 28 days x 4 cycles then CT        6/13/23: Cycle #1 Carboplatin/Doxil.  78.    6/28/23: ED for abdominal pain.   CT ap IMPRESSION:   1.  Moderate volume ascites, modestly decreased compared to most recent prior CT. Redemonstrated evidence of thickening and enhancement of the peritoneal lining consistent with carcinomatosis. An enhancing pelvic mass measures slightly smaller than on 05/25/2023.   2.  Fluid-filled loops of small bowel throughout the abdomen with gradual caliber narrowing in the terminal ileum. A partial or intermittent obstructive process is not excludable. No free air.     7/11/23: Cycle #2 Carboplatin/Doxil planned for 7/12.  78.  8/14/23: Cycle #3 Carboplatin/Doxil planned.   68.              Today she reports feeling \"really good\" and ready for her next cycle of treatment.  She feels as though she is having no side effects to her current regimen other than being \"a little more tired\".  No nausea or vomiting after chemo.  She has perhaps gained a little bit of weight.  She continues with baseline constipation managed with stool softeners daily and MiraLAX as needed.  She also tries to " eat a lot of fiber and drink a lot of water.  She feels as though her constipation has improved since her last cycle.  No rashes or skin lesions.  No mouth sores.  No leg swelling.  No fevers or chills.  No chest pain or shortness of breath.            Review of Systems:    Systemic           no weight changes; no fever; no chills; no night sweats; no appetite changes  Skin           no rashes, or lesions  Eye           no irritation; no changes in vision  Day-Laryngeal           no dysphagia; no hoarseness   Pulmonary    no cough; no shortness of breath  Cardiovascular    no chest pain; no palpitations  Gastrointestinal    no diarrhea; + constipation; no abdominal pain; no changes in bowel habits; no blood in stool  Genitourinary   no urinary frequency; no urinary urgency; no dysuria; no pain; no abnormal vaginal discharge; no abnormal vaginal bleeding  Breast   no breast discharge; no breast changes; no breast pain  Musculoskeletal    no myalgias; no arthralgias; no back pain  Psychiatric           no depressed mood; no anxiety    Hematologic   no tender lymph nodes; no noticeable swellings or lumps   Endocrine    no hot flashes; no heat/cold intolerance         Neurological   no tremor; no numbness and tingling; no headaches; no difficulty sleeping      Past Medical History:    Past Medical History:   Diagnosis Date     Female stress incontinence      Ovarian cancer, right (H) 06/16/2022    Stage IIIC high-grade serous carcinoma     Recurrent cold sores          Past Surgical History:    Past Surgical History:   Procedure Laterality Date     APPENDECTOMY  08/29/2022    Part of ovarian cancer tumor debulking     BLADDER SUSPENSION  08/13/2009     HYSTERECTOMY  08/13/2009    For prolapse     LAPAROSCOPY DIAGNOSTIC (GYN)  06/16/2022     SALPINGO-OOPHORECTOMY, COMBINED Bilateral 08/29/2022    Pelvic exam under anesthesia, diagnostic laparoscopy, conversion to laparotomy for optimal interval tumor debulking to no  gross residual disease including peritoneal and diaphragm biopsies, bilateral salpingo-oophorectomy, infragastric omentectomy, bilateral hemidiaphragm stripping, peritoneal and mesenteric argon beam ablation, appendectomy.     TONSILLECTOMY  1973     TUBAL LIGATION Bilateral 09/01/1998         Health Maintenance Due   Topic Date Due     YEARLY PREVENTIVE VISIT  Never done     ADVANCE CARE PLANNING  Never done     HEPATITIS B IMMUNIZATION (1 of 3 - 3-dose series) Never done     COLORECTAL CANCER SCREENING  Never done     HIV SCREENING  Never done     HEPATITIS C SCREENING  Never done     PAP  Never done     LIPID  Never done     ZOSTER IMMUNIZATION (1 of 2) Never done     DTAP/TDAP/TD IMMUNIZATION (2 - Td or Tdap) 12/15/2021     PHQ-2 (once per calendar year)  Never done     LUNG CANCER SCREENING  06/10/2023       Current Medications:     Current Outpatient Medications   Medication Sig Dispense Refill     citalopram (CELEXA) 40 MG tablet Take 1 tablet by mouth daily       cyclobenzaprine (FLEXERIL) 5 MG tablet Take 1-2 Tablets (5-10 mg) by mouth three times a day.       dexamethasone (DECADRON) 4 MG tablet Take 2 tablets (8 mg) by mouth daily Take for 3 days, starting the day after chemo. Take with food. 6 tablet 2     diphenhydrAMINE-acetaminophen (TYLENOL PM)  MG tablet Take 1 tablet by mouth as needed       docusate sodium (DSS) 100 MG capsule Take 100 mg by mouth as needed       hydrOXYzine (ATARAX) 10 MG tablet Take 1-2 tablets (10-20 mg) by mouth every 6 hours as needed for itching 120 tablet 3     ibuprofen (ADVIL/MOTRIN) 200 MG capsule        lidocaine-prilocaine (EMLA) 2.5-2.5 % external cream Apply topically as needed (pain due to port access) Apply to port 30 minutes prior to lab appointment. 30 g 6     LORazepam (ATIVAN) 0.5 MG tablet Take 1-2 Tablets (0.5-1 mg) by mouth every 4 hours as needed for Anxiety (and or nausea and vomiting).*       LORazepam (ATIVAN) 0.5 MG tablet Take 1 tablet (0.5 mg)  by mouth every 6 hours as needed for anxiety 20 tablet 3     Multiple Vitamin (MULTI-VITAMIN DAILY PO) Take 1 tablet by mouth daily       ondansetron (ZOFRAN) 8 MG tablet Take 1 tablet (8 mg) by mouth every 8 hours as needed for nausea (vomiting) 30 tablet 2     prochlorperazine (COMPAZINE) 10 MG tablet Take 1 tablet (10 mg) by mouth every 6 hours as needed for nausea or vomiting 30 tablet 2     sennosides (SENOKOT) 8.6 MG tablet        valACYclovir (VALTREX) 1000 mg tablet Take 1,000 mg by mouth as needed           Allergies:      No Known Allergies     Social History:     Social History     Tobacco Use     Smoking status: Former     Packs/day: 1.00     Years: 42.00     Pack years: 42.00     Types: Cigarettes     Quit date: 2022     Years since quittin.2     Smokeless tobacco: Never   Substance Use Topics     Alcohol use: Yes     Comment: Beer ~Q3 months.       History   Drug Use Unknown         Family History:     The patient's family history is notable for:    Family History   Problem Relation Age of Onset     Prostate Cancer Father      Breast Cancer Sister 48     Melanoma Sister      Skin Cancer Sister      Colon Cancer Maternal Grandmother          Physical Exam:     Wt 68 kg (150 lb)   BMI 27.44 kg/m    Body mass index is 27.44 kg/m .    General Appearance: healthy and alert, no distress    Eyes:  Eyes grossly normal to inspection.  No discharge or erythema, or obvious scleral/conjunctival abnormalities.    Respiratory: No audible wheeze, cough, or visible cyanosis.  No visible retractions or increased work of breathing.     Musculoskeletal: extremities non tender and without edema    Skin: no lesions or rashes on visible skin    Neurological: normal gait, no gross defects     Psychiatric: appropriate mood and affect. Mentation appears normal, affect normal/bright, judgement and insight intact, normal speech and appearance well-groomed                            The rest of a comprehensive physical  examination is deferred due to video visit restrictions.    Lab Results   Component Value Date    WBC 5.3 08/11/2023     Lab Results   Component Value Date    RBC 4.07 08/11/2023     Lab Results   Component Value Date    HGB 12.1 08/11/2023     Lab Results   Component Value Date    HCT 37.0 08/11/2023     No components found for: MCT  Lab Results   Component Value Date    MCV 91 08/11/2023     Lab Results   Component Value Date    MCH 29.7 08/11/2023     Lab Results   Component Value Date    MCHC 32.7 08/11/2023     Lab Results   Component Value Date    RDW 15.7 08/11/2023     Lab Results   Component Value Date     08/11/2023     % Neutrophils   Date Value Ref Range Status   08/11/2023 69 % Final     % Lymphocytes   Date Value Ref Range Status   08/11/2023 17 % Final     % Monocytes   Date Value Ref Range Status   08/11/2023 8 % Final     % Eosinophils   Date Value Ref Range Status   08/11/2023 4 % Final     % Basophils   Date Value Ref Range Status   08/11/2023 1 % Final     Absolute Immature Granulocytes   Date Value Ref Range Status   08/11/2023 0.0 <=0.4 10e3/uL Final     Last Comprehensive Metabolic Panel:  Sodium   Date Value Ref Range Status   08/11/2023 135 (L) 136 - 145 mmol/L Final     Potassium   Date Value Ref Range Status   08/11/2023 4.3 3.5 - 5.0 mmol/L Final     Chloride   Date Value Ref Range Status   08/11/2023 102 98 - 107 mmol/L Final     Carbon Dioxide (CO2)   Date Value Ref Range Status   08/11/2023 25 22 - 31 mmol/L Final     Anion Gap   Date Value Ref Range Status   08/11/2023 8 5 - 18 mmol/L Final     Glucose   Date Value Ref Range Status   08/11/2023 71 70 - 125 mg/dL Final     Urea Nitrogen   Date Value Ref Range Status   08/11/2023 12 8 - 22 mg/dL Final     Creatinine   Date Value Ref Range Status   08/11/2023 1.07 0.60 - 1.10 mg/dL Final     GFR Estimate   Date Value Ref Range Status   08/11/2023 60 (L) >60 mL/min/1.73m2 Final     Calcium   Date Value Ref Range Status   08/11/2023  9.9 8.5 - 10.5 mg/dL Final     Bilirubin Total   Date Value Ref Range Status   08/11/2023 0.2 0.0 - 1.0 mg/dL Final     Alkaline Phosphatase   Date Value Ref Range Status   08/11/2023 119 45 - 120 U/L Final     ALT   Date Value Ref Range Status   08/11/2023 16 0 - 45 U/L Final     AST   Date Value Ref Range Status   08/11/2023 27 0 - 40 U/L Final     Lab Results   Component Value Date    ALBUMIN 3.7 08/11/2023     Lab Results   Component Value Date    PROTTOTAL 6.9 08/11/2023     Magnesium   Date Value Ref Range Status   08/11/2023 1.8 1.8 - 2.6 mg/dL Final         Assessment:    Jacki Smith is a 58 year old woman with a diagnosis of 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  She presents for follow up and disease management by video.     30 minutes spent on the date of the encounter doing chart review, history and exam, documentation, and further activities as noted above.      Plan:     1.) Ovarian cancer:  OK to proceed with planned treatment Monday as labs are WDL.  RTC in 4 weeks for labs, provider visit, and next cycle; this is already scheduled.  Plan for CT CAP and follow up with Dr. Patino after cycle 4.  Message sent to [facility] about coordination of CT scan and follow up with Dr. Patino at the beginning of October.  Reviewed that her treatment goal is no longer cure but to decrease her disease burden as much as possible to allow her to live her life.  Reviewed signs and symptoms for when she should contact the clinic or seek additional care.  Patient to contact the clinic with any questions or concerns in the interim.      Genetic risk factors were assessed and she has a VUS No identifiable germline variants identified in ABRAXAS1, AKT1, APC, DEION, AXIN2, BARD1, BMPR1A, BRCA1, BRCA2, BRIP1, CDC73, CDH1, CDK4, CDKN2A, CHEK2, CTNNA1, DICER1, EPCAM, FANCC, FANCM, GREM1, HOXB13, KIT, MEN1, MLH1, MRE11, MSH2, MSH6, MUTYH, NBN, NF1, NTHL1, PALB2, PDGFRA, PIK3CA, PMS2, POLD1,  POLE, PTEN, RAD50, RAD51C, RAD51D, RECQL, RINT1, SDHA, SDHB, SDHC, SDHD, SMAD4, SMARCA4, STK11, TP53, TSC1, TSC2, VHL, XRCC2.    Labs and/or tests ordered include:  CBC. CMP. Mag. .     2.) Health maintenance:  Issues addressed today include following up with PCP for annual health maintenance and non-gynecologic issues.     Echo Sanz DNP, APRN, FNP-C  Nurse Practitioner  Division of Gynecologic Oncology  Pager: [phone]     CC  Patient Care Team:  Linda Robert APRN CNP as PCP - Charis Verdugo MD as MD (Gynecologic Oncology)  Susannah Roman APRN CNP as Assigned Cancer Care Provider  Anish Monroy MD as Assigned Palliative Care Provider  SELF, REFERRED      Again, thank you for allowing me to participate in the care of your patient.        Sincerely,        JALEN Barrera CNP

## 2023-08-11 NOTE — NURSING NOTE
Is the patient currently in the state of [place]? NO    Visit mode:VIDEO    If the visit is dropped, the patient can be reconnected by: VIDEO VISIT: Text to cell phone: [phone]    Will anyone else be joining the visit? NO      How would you like to obtain your AVS? MyChart    Are changes needed to the allergy or medication list? NO    Patient declined individual allergy and medication review by support staff because patient denies any changes since echeck-in completion and states all information entered during echeck-in remains accurate.    Reason for visit: RECHECK (No other vitals to report today/)    Abdulkadir Hall, VF/CMA

## 2023-08-11 NOTE — PROGRESS NOTES
Virtual Visit Details    Type of service:  Video Visit     Originating Location (pt. 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  She presents for follow up and disease management by video.     Oncology History:  22: Presented to an ED in [place] for evaluation of abdominal bloating and discomfort.  -Abdomen, pelvic CT: Radiographic Stage CAL ovarian cancer.   22: [id number].   22: Pelvic ultrasound: c/w metastatic cancer.    22: Pelvic exam under anesthesia, diagnostic laparoscopy, biopsies, evacuation of ascites.   -Findings: On pelvic exam under anesthesia, there was a 6 cm firm, fixed mass adherent to the vaginal cuff. Vagina otherwise smooth. On laparoscopic examination, there was ascites filling the pelvis/abdomen, with >1 liter of fluid evacuated. The palpated mass was arising from the right ovary. Left ovary relatively normal in appearance. There was diffuse carcinomatosis covering the entire peritoneal surface falciform ligament, liver capsule, and mesentery. The omentum was replaced with tumor. Findings were not amenable to optimal tumor debulking so biopsies were taken for histologic examination.   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      22, 22, 22: Cycles 1-3 of neoadjuvant chemotherapy with IV carboplatin + paclitaxel 175 mg/m2 every 21 days.   -Cycles 1,2: Carboplatin AUC 6.   -Cycle 3: Carboplatin AUC 5 with dose-reduction due to thrombcytopenia.   -22: Chest, abdomen, pelvic CT:  Partial response.    8/29/22: Pelvic exam under anesthesia, diagnostic laparoscopy, conversion to laparotomy for optimal interval tumor debulking to no gross residual disease including peritoneal and diaphragm biopsies, bilateral salpingo-oophorectomy, infragastric omentectomy, bilateral hemidiaphragm stripping, peritoneal and mesenteric argon beam ablation, appendectomy.   -Pathology: High-grade serous carcinoma involving all resected specimens.     9/28/22, 10/19/22, 11/17/22: Cycles 4-6 of primary chemotherapy with IV carboplatin AUC 5 + paclitaxel 175 mg/m2.  10/5/22: [facility], reflexed to Common Hereditary Cancer Panel.   -No identifiable germline variants identified in ABRAXAS1, AKT1, APC, DEION, AXIN2, BARD1, BMPR1A, BRCA1, BRCA2, BRIP1, CDC73, CDH1, CDK4, CDKN2A, CHEK2, CTNNA1, DICER1, EPCAM, FANCC, FANCM, GREM1, HOXB13, KIT, MEN1, MLH1, MRE11, MSH2, MSH6, MUTYH, NBN, NF1, NTHL1, PALB2, PDGFRA, PIK3CA, PMS2, POLD1, POLE, PTEN, RAD50, RAD51C, RAD51D, RECQL, RINT1, SDHA, SDHB, SDHC, SDHD, SMAD4, SMARCA4, STK11, TP53, TSC1, TSC2, VHL, XRCC2.   -Variant of uncertain significance in MSH3 c.16C>T (p.[id number])  10/9/22 (tumor 8/29/22): [facility] Testing Results.  -Genomic ROXI low (6%)   -TMB low (1mut/Mb)  -Microsatellite stable/Mismatch Repair proficient  -PD-L1- (CPS 0%)  -NTRK 1/2/3 fusion not detected.   -ER-, MT+  -Genomic alterations in:  --TP53  -No genomic alterations in BRCA1,2.  -12/2/22: Chest, abdomen, pelvic CT: No definitive evidence of disease.    CHEST: A few tiny perifissural nodules are unchanged right upper lobe compatible with subpleural lymph nodes.  ABDOMEN: Trace amount of residual ascites in the cul-de-sac. Mild peritoneal thickening persists within the left midabdomen. No measurable peritoneal implants identified.  MUSCULOSKELETAL: Tiny fat-containing left inguinal hernia unchanged.  -12/7/22: CA-125=23.   -2/16/23: Chest, abdomen, pelvic CT to evaluate bloating: Stable findings, no  "definitive evidence of disease.   ABDOMEN, PELVIS: There is mild peritoneal thickening in the left mid abdomen again visualized without measurable peritoneal implants noted. Tiny amount of fluid in the pelvis. This is decreased from the prior exam.  -5/25/23: Admitted  to [facility] for management of malignant ascites s/p therapeutic paracentesis, and partial SBO resolved with conservative management.   -5/25/23: Abdomen, pelvic CT: Progressive disease.           Plan: Carboplatin AUC 5 + pegylated lipoosomal doxorubicin (PLD) 30 mg/m2 every 28 days x 4 cycles then CT        6/13/23: Cycle #1 Carboplatin/Doxil.  78.    6/28/23: ED for abdominal pain.   CT ap IMPRESSION:   1.  Moderate volume ascites, modestly decreased compared to most recent prior CT. Redemonstrated evidence of thickening and enhancement of the peritoneal lining consistent with carcinomatosis. An enhancing pelvic mass measures slightly smaller than on 05/25/2023.   2.  Fluid-filled loops of small bowel throughout the abdomen with gradual caliber narrowing in the terminal ileum. A partial or intermittent obstructive process is not excludable. No free air.     7/11/23: Cycle #2 Carboplatin/Doxil planned for 7/12.  78.  8/14/23: Cycle #3 Carboplatin/Doxil planned.   68.              Today she reports feeling \"really good\" and ready for her next cycle of treatment.  She feels as though she is having no side effects to her current regimen other than being \"a little more tired\".  No nausea or vomiting after chemo.  She has perhaps gained a little bit of weight.  She continues with baseline constipation managed with stool softeners daily and MiraLAX as needed.  She also tries to eat a lot of fiber and drink a lot of water.  She feels as though her constipation has improved since her last cycle.  No rashes or skin lesions.  No mouth sores.  No leg swelling.  No fevers or chills.  No chest pain or shortness of " breath.            Review of Systems:    Systemic           no weight changes; no fever; no chills; no night sweats; no appetite changes  Skin           no rashes, or lesions  Eye           no irritation; no changes in vision  Day-Laryngeal           no dysphagia; no hoarseness   Pulmonary    no cough; no shortness of breath  Cardiovascular    no chest pain; no palpitations  Gastrointestinal    no diarrhea; + constipation; no abdominal pain; no changes in bowel habits; no blood in stool  Genitourinary   no urinary frequency; no urinary urgency; no dysuria; no pain; no abnormal vaginal discharge; no abnormal vaginal bleeding  Breast   no breast discharge; no breast changes; no breast pain  Musculoskeletal    no myalgias; no arthralgias; no back pain  Psychiatric           no depressed mood; no anxiety    Hematologic   no tender lymph nodes; no noticeable swellings or lumps   Endocrine    no hot flashes; no heat/cold intolerance         Neurological   no tremor; no numbness and tingling; no headaches; no difficulty sleeping      Past Medical History:    Past Medical History:   Diagnosis Date    Female stress incontinence     Ovarian cancer, right (H) 06/16/2022    Stage IIIC high-grade serous carcinoma    Recurrent cold sores          Past Surgical History:    Past Surgical History:   Procedure Laterality Date    APPENDECTOMY  08/29/2022    Part of ovarian cancer tumor debulking    BLADDER SUSPENSION  08/13/2009    HYSTERECTOMY  08/13/2009    For prolapse    LAPAROSCOPY DIAGNOSTIC (GYN)  06/16/2022    SALPINGO-OOPHORECTOMY, COMBINED Bilateral 08/29/2022    Pelvic exam under anesthesia, diagnostic laparoscopy, conversion to laparotomy for optimal interval tumor debulking to no gross residual disease including peritoneal and diaphragm biopsies, bilateral salpingo-oophorectomy, infragastric omentectomy, bilateral hemidiaphragm stripping, peritoneal and mesenteric argon beam ablation, appendectomy.    TONSILLECTOMY  1973     TUBAL LIGATION Bilateral 09/01/1998         Health Maintenance Due   Topic Date Due    YEARLY PREVENTIVE VISIT  Never done    ADVANCE CARE PLANNING  Never done    HEPATITIS B IMMUNIZATION (1 of 3 - 3-dose series) Never done    COLORECTAL CANCER SCREENING  Never done    HIV SCREENING  Never done    HEPATITIS C SCREENING  Never done    PAP  Never done    LIPID  Never done    ZOSTER IMMUNIZATION (1 of 2) Never done    DTAP/TDAP/TD IMMUNIZATION (2 - Td or Tdap) 12/15/2021    PHQ-2 (once per calendar year)  Never done    LUNG CANCER SCREENING  06/10/2023       Current Medications:     Current Outpatient Medications   Medication Sig Dispense Refill    citalopram (CELEXA) 40 MG tablet Take 1 tablet by mouth daily      cyclobenzaprine (FLEXERIL) 5 MG tablet Take 1-2 Tablets (5-10 mg) by mouth three times a day.      dexamethasone (DECADRON) 4 MG tablet Take 2 tablets (8 mg) by mouth daily Take for 3 days, starting the day after chemo. Take with food. 6 tablet 2    diphenhydrAMINE-acetaminophen (TYLENOL PM)  MG tablet Take 1 tablet by mouth as needed      docusate sodium (DSS) 100 MG capsule Take 100 mg by mouth as needed      hydrOXYzine (ATARAX) 10 MG tablet Take 1-2 tablets (10-20 mg) by mouth every 6 hours as needed for itching 120 tablet 3    ibuprofen (ADVIL/MOTRIN) 200 MG capsule       lidocaine-prilocaine (EMLA) 2.5-2.5 % external cream Apply topically as needed (pain due to port access) Apply to port 30 minutes prior to lab appointment. 30 g 6    LORazepam (ATIVAN) 0.5 MG tablet Take 1-2 Tablets (0.5-1 mg) by mouth every 4 hours as needed for Anxiety (and or nausea and vomiting).*      LORazepam (ATIVAN) 0.5 MG tablet Take 1 tablet (0.5 mg) by mouth every 6 hours as needed for anxiety 20 tablet 3    Multiple Vitamin (MULTI-VITAMIN DAILY PO) Take 1 tablet by mouth daily      ondansetron (ZOFRAN) 8 MG tablet Take 1 tablet (8 mg) by mouth every 8 hours as needed for nausea (vomiting) 30 tablet 2     prochlorperazine (COMPAZINE) 10 MG tablet Take 1 tablet (10 mg) by mouth every 6 hours as needed for nausea or vomiting 30 tablet 2    sennosides (SENOKOT) 8.6 MG tablet       valACYclovir (VALTREX) 1000 mg tablet Take 1,000 mg by mouth as needed           Allergies:      No Known Allergies     Social History:     Social History     Tobacco Use    Smoking status: Former     Packs/day: 1.00     Years: 42.00     Pack years: 42.00     Types: Cigarettes     Quit date: 2022     Years since quittin.2    Smokeless tobacco: Never   Substance Use Topics    Alcohol use: Yes     Comment: Beer ~Q3 months.       History   Drug Use Unknown         Family History:     The patient's family history is notable for:    Family History   Problem Relation Age of Onset    Prostate Cancer Father     Breast Cancer Sister 48    Melanoma Sister     Skin Cancer Sister     Colon Cancer Maternal Grandmother          Physical Exam:     Wt 68 kg (150 lb)   BMI 27.44 kg/m    Body mass index is 27.44 kg/m .    General Appearance: healthy and alert, no distress    Eyes:  Eyes grossly normal to inspection.  No discharge or erythema, or obvious scleral/conjunctival abnormalities.    Respiratory: No audible wheeze, cough, or visible cyanosis.  No visible retractions or increased work of breathing.     Musculoskeletal: extremities non tender and without edema    Skin: no lesions or rashes on visible skin    Neurological: normal gait, no gross defects     Psychiatric: appropriate mood and affect. Mentation appears normal, affect normal/bright, judgement and insight intact, normal speech and appearance well-groomed                            The rest of a comprehensive physical examination is deferred due to video visit restrictions.    Lab Results   Component Value Date    WBC 5.3 2023     Lab Results   Component Value Date    RBC 4.07 2023     Lab Results   Component Value Date    HGB 12.1 2023     Lab Results   Component  Value Date    HCT 37.0 08/11/2023     No components found for: MCT  Lab Results   Component Value Date    MCV 91 08/11/2023     Lab Results   Component Value Date    MCH 29.7 08/11/2023     Lab Results   Component Value Date    MCHC 32.7 08/11/2023     Lab Results   Component Value Date    RDW 15.7 08/11/2023     Lab Results   Component Value Date     08/11/2023     % Neutrophils   Date Value Ref Range Status   08/11/2023 69 % Final     % Lymphocytes   Date Value Ref Range Status   08/11/2023 17 % Final     % Monocytes   Date Value Ref Range Status   08/11/2023 8 % Final     % Eosinophils   Date Value Ref Range Status   08/11/2023 4 % Final     % Basophils   Date Value Ref Range Status   08/11/2023 1 % Final     Absolute Immature Granulocytes   Date Value Ref Range Status   08/11/2023 0.0 <=0.4 10e3/uL Final     Last Comprehensive Metabolic Panel:  Sodium   Date Value Ref Range Status   08/11/2023 135 (L) 136 - 145 mmol/L Final     Potassium   Date Value Ref Range Status   08/11/2023 4.3 3.5 - 5.0 mmol/L Final     Chloride   Date Value Ref Range Status   08/11/2023 102 98 - 107 mmol/L Final     Carbon Dioxide (CO2)   Date Value Ref Range Status   08/11/2023 25 22 - 31 mmol/L Final     Anion Gap   Date Value Ref Range Status   08/11/2023 8 5 - 18 mmol/L Final     Glucose   Date Value Ref Range Status   08/11/2023 71 70 - 125 mg/dL Final     Urea Nitrogen   Date Value Ref Range Status   08/11/2023 12 8 - 22 mg/dL Final     Creatinine   Date Value Ref Range Status   08/11/2023 1.07 0.60 - 1.10 mg/dL Final     GFR Estimate   Date Value Ref Range Status   08/11/2023 60 (L) >60 mL/min/1.73m2 Final     Calcium   Date Value Ref Range Status   08/11/2023 9.9 8.5 - 10.5 mg/dL Final     Bilirubin Total   Date Value Ref Range Status   08/11/2023 0.2 0.0 - 1.0 mg/dL Final     Alkaline Phosphatase   Date Value Ref Range Status   08/11/2023 119 45 - 120 U/L Final     ALT   Date Value Ref Range Status   08/11/2023 16 0 - 45  U/L Final     AST   Date Value Ref Range Status   08/11/2023 27 0 - 40 U/L Final     Lab Results   Component Value Date    ALBUMIN 3.7 08/11/2023     Lab Results   Component Value Date    PROTTOTAL 6.9 08/11/2023     Magnesium   Date Value Ref Range Status   08/11/2023 1.8 1.8 - 2.6 mg/dL Final         Assessment:    Jacki Smith is a 58 year old woman with a diagnosis of 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  She presents for follow up and disease management by video.     30 minutes spent on the date of the encounter doing chart review, history and exam, documentation, and further activities as noted above.      Plan:     1.) Ovarian cancer:  OK to proceed with planned treatment Monday as labs are WDL.  RTC in 4 weeks for labs, provider visit, and next cycle; this is already scheduled.  Plan for CT CAP and follow up with Dr. Patino after cycle 4.  Message sent to [facility] about coordination of CT scan and follow up with Dr. Patino at the beginning of October.  Reviewed that her treatment goal is no longer cure but to decrease her disease burden as much as possible to allow her to live her life.  Reviewed signs and symptoms for when she should contact the clinic or seek additional care.  Patient to contact the clinic with any questions or concerns in the interim.      Genetic risk factors were assessed and she has a VUS No identifiable germline variants identified in ABRAXAS1, AKT1, APC, DEION, AXIN2, BARD1, BMPR1A, BRCA1, BRCA2, BRIP1, CDC73, CDH1, CDK4, CDKN2A, CHEK2, CTNNA1, DICER1, EPCAM, FANCC, FANCM, GREM1, HOXB13, KIT, MEN1, MLH1, MRE11, MSH2, MSH6, MUTYH, NBN, NF1, NTHL1, PALB2, PDGFRA, PIK3CA, PMS2, POLD1, POLE, PTEN, RAD50, RAD51C, RAD51D, RECQL, RINT1, SDHA, SDHB, SDHC, SDHD, SMAD4, SMARCA4, STK11, TP53, TSC1, TSC2, VHL, XRCC2.    Labs and/or tests ordered include:  CBC. CMP. Mag. .     2.) Health maintenance:  Issues addressed today include following up with PCP  for annual health maintenance and non-gynecologic issues.     Echo Sanz, DNP, APRN, FNP-C  Nurse Practitioner  Division of Gynecologic Oncology  Pager: [phone]     CC  Patient Care Team:  Linda Robert APRN CNP as PCP - General  [facility] CampusCharis MD as MD (Gynecologic Oncology)  Susannah Roman APRN CNP as Assigned Cancer Care Provider  Anish Monroy MD as Assigned Palliative Care Provider  SELF, REFERRED

## 2023-08-14 ENCOUNTER — INFUSION THERAPY VISIT (OUTPATIENT)
Dept: INFUSION THERAPY | Facility: CLINIC | Age: 58
End: 2023-08-14
Attending: INTERNAL MEDICINE
Payer: COMMERCIAL

## 2023-08-14 VITALS
TEMPERATURE: 98.3 F | RESPIRATION RATE: 16 BRPM | OXYGEN SATURATION: 100 % | DIASTOLIC BLOOD PRESSURE: 71 MMHG | HEART RATE: 57 BPM | SYSTOLIC BLOOD PRESSURE: 137 MMHG

## 2023-08-14 DIAGNOSIS — C56.1 OVARIAN CANCER, RIGHT (H): Primary | ICD-10-CM

## 2023-08-14 PROCEDURE — 258N000003 HC RX IP 258 OP 636: Performed by: NURSE PRACTITIONER

## 2023-08-14 PROCEDURE — 96367 TX/PROPH/DG ADDL SEQ IV INF: CPT

## 2023-08-14 PROCEDURE — 96413 CHEMO IV INFUSION 1 HR: CPT

## 2023-08-14 PROCEDURE — 96417 CHEMO IV INFUS EACH ADDL SEQ: CPT

## 2023-08-14 PROCEDURE — 96375 TX/PRO/DX INJ NEW DRUG ADDON: CPT

## 2023-08-14 PROCEDURE — 250N000011 HC RX IP 250 OP 636: Mod: JZ | Performed by: NURSE PRACTITIONER

## 2023-08-14 RX ORDER — DIPHENHYDRAMINE HYDROCHLORIDE 50 MG/ML
50 INJECTION INTRAMUSCULAR; INTRAVENOUS
Status: DISCONTINUED | OUTPATIENT
Start: 2023-08-14 | End: 2023-08-14 | Stop reason: HOSPADM

## 2023-08-14 RX ORDER — LORAZEPAM 2 MG/ML
0.5 INJECTION INTRAMUSCULAR EVERY 4 HOURS PRN
Status: DISCONTINUED | OUTPATIENT
Start: 2023-08-14 | End: 2023-08-14 | Stop reason: HOSPADM

## 2023-08-14 RX ORDER — METHYLPREDNISOLONE SODIUM SUCCINATE 125 MG/2ML
125 INJECTION, POWDER, LYOPHILIZED, FOR SOLUTION INTRAMUSCULAR; INTRAVENOUS
Status: DISCONTINUED | OUTPATIENT
Start: 2023-08-14 | End: 2023-08-14 | Stop reason: HOSPADM

## 2023-08-14 RX ORDER — MEPERIDINE HYDROCHLORIDE 50 MG/ML
25 INJECTION INTRAMUSCULAR; INTRAVENOUS; SUBCUTANEOUS EVERY 30 MIN PRN
Status: DISCONTINUED | OUTPATIENT
Start: 2023-08-14 | End: 2023-08-14 | Stop reason: HOSPADM

## 2023-08-14 RX ORDER — HEPARIN SODIUM (PORCINE) LOCK FLUSH IV SOLN 100 UNIT/ML 100 UNIT/ML
5 SOLUTION INTRAVENOUS
Status: DISCONTINUED | OUTPATIENT
Start: 2023-08-14 | End: 2023-08-14 | Stop reason: HOSPADM

## 2023-08-14 RX ORDER — PALONOSETRON 0.05 MG/ML
0.25 INJECTION, SOLUTION INTRAVENOUS ONCE
Status: COMPLETED | OUTPATIENT
Start: 2023-08-14 | End: 2023-08-14

## 2023-08-14 RX ORDER — ALBUTEROL SULFATE 90 UG/1
1-2 AEROSOL, METERED RESPIRATORY (INHALATION)
Status: DISCONTINUED | OUTPATIENT
Start: 2023-08-14 | End: 2023-08-14 | Stop reason: HOSPADM

## 2023-08-14 RX ORDER — ALBUTEROL SULFATE 0.83 MG/ML
2.5 SOLUTION RESPIRATORY (INHALATION)
Status: DISCONTINUED | OUTPATIENT
Start: 2023-08-14 | End: 2023-08-14 | Stop reason: HOSPADM

## 2023-08-14 RX ORDER — HEPARIN SODIUM,PORCINE 10 UNIT/ML
5 VIAL (ML) INTRAVENOUS
Status: DISCONTINUED | OUTPATIENT
Start: 2023-08-14 | End: 2023-08-14 | Stop reason: HOSPADM

## 2023-08-14 RX ORDER — EPINEPHRINE 1 MG/ML
0.3 INJECTION, SOLUTION INTRAMUSCULAR; SUBCUTANEOUS EVERY 5 MIN PRN
Status: DISCONTINUED | OUTPATIENT
Start: 2023-08-14 | End: 2023-08-14 | Stop reason: HOSPADM

## 2023-08-14 RX ADMIN — DOXORUBICIN HYDROCHLORIDE 50 MG: 2 INJECTABLE, LIPOSOMAL INTRAVENOUS at 10:06

## 2023-08-14 RX ADMIN — FOSAPREPITANT: 150 INJECTION, POWDER, LYOPHILIZED, FOR SOLUTION INTRAVENOUS at 09:01

## 2023-08-14 RX ADMIN — CARBOPLATIN 385 MG: 10 INJECTION, SOLUTION INTRAVENOUS at 11:22

## 2023-08-14 RX ADMIN — HEPARIN 5 ML: 100 SYRINGE at 12:13

## 2023-08-14 RX ADMIN — DEXTROSE MONOHYDRATE 250 ML: 50 INJECTION, SOLUTION INTRAVENOUS at 08:48

## 2023-08-14 RX ADMIN — PALONOSETRON 0.25 MG: 0.05 INJECTION, SOLUTION INTRAVENOUS at 09:45

## 2023-08-14 NOTE — PROGRESS NOTES
Infusion Nursing Note:  Jacki Smith presents today for Infusions of Doxil and Carboplatin.    Patient seen by provider today: No   present during visit today: Not Applicable.    Note: Patient ambulated into Infusion Care accompanied by her ,Sukumar. Patient is alert and oriented and VSS. Labs were drawn on 08/11/23 PORT accessed with slow blood return. Premeds administered and PPE donned. Doxil and Carboplatin double checked with Tammie ALMANZAR RN. .Patient tolerated infusions of Doxil and Carboplatin without any problems. PORT flushed with Normal Saline and 6 ml Heparin and de-accessed. Dry 2 x 2 gauze taped over PORT site.      Intravenous Access:  Implanted Port.    Treatment Conditions:  Results reviewed, labs MET treatment parameters, ok to proceed with treatment.      Post Infusion Assessment:  Patient tolerated infusion without incident.  Site patent and intact, free from redness, edema or discomfort.  No evidence of extravasations.  Access discontinued per protocol.       Discharge Plan:   Patient and/or family verbalized understanding of discharge instructions and all questions answered.  Patient discharged in stable condition accompanied by: .  Departure Mode: Ambulatory.      Cristin Rinaldi RN,OCN

## 2023-08-22 DIAGNOSIS — C56.1 OVARIAN CANCER, RIGHT (H): Primary | ICD-10-CM

## 2023-09-12 ENCOUNTER — LAB (OUTPATIENT)
Dept: INFUSION THERAPY | Facility: CLINIC | Age: 58
End: 2023-09-12
Attending: INTERNAL MEDICINE
Payer: COMMERCIAL

## 2023-09-12 ENCOUNTER — VIRTUAL VISIT (OUTPATIENT)
Dept: ONCOLOGY | Facility: CLINIC | Age: 58
End: 2023-09-12
Attending: NURSE PRACTITIONER
Payer: COMMERCIAL

## 2023-09-12 VITALS — BODY MASS INDEX: 27.44 KG/M2 | WEIGHT: 150 LBS

## 2023-09-12 DIAGNOSIS — C56.1 OVARIAN CANCER, RIGHT (H): Primary | ICD-10-CM

## 2023-09-12 DIAGNOSIS — Z51.11 ENCOUNTER FOR ANTINEOPLASTIC CHEMOTHERAPY: ICD-10-CM

## 2023-09-12 LAB
ALBUMIN SERPL-MCNC: 3.6 G/DL (ref 3.5–5)
ALP SERPL-CCNC: 119 U/L (ref 45–120)
ALT SERPL W P-5'-P-CCNC: 16 U/L (ref 0–45)
ANION GAP SERPL CALCULATED.3IONS-SCNC: 4 MMOL/L (ref 5–18)
AST SERPL W P-5'-P-CCNC: 23 U/L (ref 0–40)
BASOPHILS # BLD AUTO: 0 10E3/UL (ref 0–0.2)
BASOPHILS NFR BLD AUTO: 1 %
BILIRUB SERPL-MCNC: 0.4 MG/DL (ref 0–1)
BUN SERPL-MCNC: 16 MG/DL (ref 8–22)
CALCIUM SERPL-MCNC: 9.2 MG/DL (ref 8.5–10.5)
CANCER AG125 SERPL-ACNC: 50 U/ML
CHLORIDE BLD-SCNC: 101 MMOL/L (ref 98–107)
CO2 SERPL-SCNC: 29 MMOL/L (ref 22–31)
CREAT SERPL-MCNC: 0.96 MG/DL (ref 0.6–1.1)
EGFRCR SERPLBLD CKD-EPI 2021: 68 ML/MIN/1.73M2
EOSINOPHIL # BLD AUTO: 0.2 10E3/UL (ref 0–0.7)
EOSINOPHIL NFR BLD AUTO: 4 %
ERYTHROCYTE [DISTWIDTH] IN BLOOD BY AUTOMATED COUNT: 16.2 % (ref 10–15)
GLUCOSE BLD-MCNC: 89 MG/DL (ref 70–125)
HCT VFR BLD AUTO: 35 % (ref 35–47)
HGB BLD-MCNC: 11.2 G/DL (ref 11.7–15.7)
IMM GRANULOCYTES # BLD: 0 10E3/UL
IMM GRANULOCYTES NFR BLD: 1 %
LYMPHOCYTES # BLD AUTO: 0.8 10E3/UL (ref 0.8–5.3)
LYMPHOCYTES NFR BLD AUTO: 20 %
MAGNESIUM SERPL-MCNC: 1.9 MG/DL (ref 1.8–2.6)
MCH RBC QN AUTO: 30 PG (ref 26.5–33)
MCHC RBC AUTO-ENTMCNC: 32 G/DL (ref 31.5–36.5)
MCV RBC AUTO: 94 FL (ref 78–100)
MONOCYTES # BLD AUTO: 0.4 10E3/UL (ref 0–1.3)
MONOCYTES NFR BLD AUTO: 10 %
NEUTROPHILS # BLD AUTO: 2.6 10E3/UL (ref 1.6–8.3)
NEUTROPHILS NFR BLD AUTO: 64 %
NRBC # BLD AUTO: 0 10E3/UL
NRBC BLD AUTO-RTO: 0 /100
PLATELET # BLD AUTO: 151 10E3/UL (ref 150–450)
POTASSIUM BLD-SCNC: 4.4 MMOL/L (ref 3.5–5)
PROT SERPL-MCNC: 6.7 G/DL (ref 6–8)
RBC # BLD AUTO: 3.73 10E6/UL (ref 3.8–5.2)
SODIUM SERPL-SCNC: 134 MMOL/L (ref 136–145)
WBC # BLD AUTO: 4.1 10E3/UL (ref 4–11)

## 2023-09-12 PROCEDURE — 99213 OFFICE O/P EST LOW 20 MIN: CPT | Mod: 95 | Performed by: NURSE PRACTITIONER

## 2023-09-12 PROCEDURE — 80053 COMPREHEN METABOLIC PANEL: CPT | Performed by: OBSTETRICS & GYNECOLOGY

## 2023-09-12 PROCEDURE — 36415 COLL VENOUS BLD VENIPUNCTURE: CPT | Performed by: OBSTETRICS & GYNECOLOGY

## 2023-09-12 PROCEDURE — 86304 IMMUNOASSAY TUMOR CA 125: CPT | Performed by: OBSTETRICS & GYNECOLOGY

## 2023-09-12 PROCEDURE — 85025 COMPLETE CBC W/AUTO DIFF WBC: CPT | Performed by: OBSTETRICS & GYNECOLOGY

## 2023-09-12 PROCEDURE — 83735 ASSAY OF MAGNESIUM: CPT | Performed by: OBSTETRICS & GYNECOLOGY

## 2023-09-12 RX ORDER — HEPARIN SODIUM (PORCINE) LOCK FLUSH IV SOLN 100 UNIT/ML 100 UNIT/ML
5 SOLUTION INTRAVENOUS
Status: CANCELLED | OUTPATIENT
Start: 2023-09-12

## 2023-09-12 RX ORDER — MEPERIDINE HYDROCHLORIDE 25 MG/ML
25 INJECTION INTRAMUSCULAR; INTRAVENOUS; SUBCUTANEOUS EVERY 30 MIN PRN
Status: CANCELLED | OUTPATIENT
Start: 2023-09-12

## 2023-09-12 RX ORDER — METHYLPREDNISOLONE SODIUM SUCCINATE 125 MG/2ML
125 INJECTION, POWDER, LYOPHILIZED, FOR SOLUTION INTRAMUSCULAR; INTRAVENOUS
Status: CANCELLED
Start: 2023-09-12

## 2023-09-12 RX ORDER — HEPARIN SODIUM,PORCINE 10 UNIT/ML
5 VIAL (ML) INTRAVENOUS
Status: CANCELLED | OUTPATIENT
Start: 2023-09-12

## 2023-09-12 RX ORDER — PALONOSETRON 0.05 MG/ML
0.25 INJECTION, SOLUTION INTRAVENOUS ONCE
Status: CANCELLED | OUTPATIENT
Start: 2023-09-12

## 2023-09-12 RX ORDER — ALBUTEROL SULFATE 0.83 MG/ML
2.5 SOLUTION RESPIRATORY (INHALATION)
Status: CANCELLED | OUTPATIENT
Start: 2023-09-12

## 2023-09-12 RX ORDER — EPINEPHRINE 1 MG/ML
0.3 INJECTION, SOLUTION INTRAMUSCULAR; SUBCUTANEOUS EVERY 5 MIN PRN
Status: CANCELLED | OUTPATIENT
Start: 2023-09-12

## 2023-09-12 RX ORDER — DIPHENHYDRAMINE HYDROCHLORIDE 50 MG/ML
50 INJECTION INTRAMUSCULAR; INTRAVENOUS
Status: CANCELLED
Start: 2023-09-12

## 2023-09-12 RX ORDER — HEPARIN SODIUM (PORCINE) LOCK FLUSH IV SOLN 100 UNIT/ML 100 UNIT/ML
5 SOLUTION INTRAVENOUS
Status: DISCONTINUED | OUTPATIENT
Start: 2023-09-12 | End: 2023-09-12 | Stop reason: HOSPADM

## 2023-09-12 RX ORDER — LORAZEPAM 2 MG/ML
0.5 INJECTION INTRAMUSCULAR EVERY 4 HOURS PRN
Status: CANCELLED | OUTPATIENT
Start: 2023-09-12

## 2023-09-12 RX ORDER — ALBUTEROL SULFATE 90 UG/1
1-2 AEROSOL, METERED RESPIRATORY (INHALATION)
Status: CANCELLED
Start: 2023-09-12

## 2023-09-12 RX ORDER — HEPARIN SODIUM (PORCINE) LOCK FLUSH IV SOLN 100 UNIT/ML 100 UNIT/ML
SOLUTION INTRAVENOUS
Status: COMPLETED
Start: 2023-09-12 | End: 2023-09-12

## 2023-09-12 ASSESSMENT — PAIN SCALES - GENERAL: PAINLEVEL: NO PAIN (0)

## 2023-09-12 NOTE — NURSING NOTE
Is the patient currently in the state of [place]? YES    Visit mode:VIDEO    If the visit is dropped, the patient can be reconnected by: VIDEO VISIT: Text to cell phone:   Telephone Information:   Mobile [phone]       Will anyone else be joining the visit? NO    How would you like to obtain your AVS? MyChart    Are changes needed to the allergy or medication list? Pt stated no changes to allergies and Pt stated no med changes    Reason for visit: FRED Murray LPN

## 2023-09-12 NOTE — PROGRESS NOTES
Virtual Visit Details    Type of service:  Video Visit     Originating Location (pt. 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 She is here for follow up and disease management by video.      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  She presents for follow up and disease management by video.      Oncology History:  22: Presented to an ED in [place] for evaluation of abdominal bloating and discomfort.  -Abdomen, pelvic CT: Radiographic Stage CAL ovarian cancer.   22: CA-125 439.   22: Pelvic ultrasound: c/w metastatic cancer.    22: Pelvic exam under anesthesia, diagnostic laparoscopy, biopsies, evacuation of ascites.   -Findings: On pelvic exam under anesthesia, there was a 6 cm firm, fixed mass adherent to the vaginal cuff. Vagina otherwise smooth. On laparoscopic examination, there was ascites filling the pelvis/abdomen, with >1 liter of fluid evacuated. The palpated mass was arising from the right ovary. Left ovary relatively normal in appearance. There was diffuse carcinomatosis covering the entire peritoneal surface falciform ligament, liver capsule, and mesentery. The omentum was replaced with tumor. Findings were not amenable to optimal tumor debulking so biopsies were taken for histologic examination.   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      22, 22, 22: Cycles 1-3 of neoadjuvant chemotherapy with IV carboplatin + paclitaxel 175 mg/m2 every 21 days.   -Cycles 1,2: Carboplatin AUC 6.    -Cycle 3: Carboplatin AUC 5 with dose-reduction due to thrombcytopenia.   -8/19/22: Chest, abdomen, pelvic CT: Partial response.    8/29/22: Pelvic exam under anesthesia, diagnostic laparoscopy, conversion to laparotomy for optimal interval tumor debulking to no gross residual disease including peritoneal and diaphragm biopsies, bilateral salpingo-oophorectomy, infragastric omentectomy, bilateral hemidiaphragm stripping, peritoneal and mesenteric argon beam ablation, appendectomy.   -Pathology: High-grade serous carcinoma involving all resected specimens.     9/28/22, 10/19/22, 11/17/22: Cycles 4-6 of primary chemotherapy with IV carboplatin AUC 5 + paclitaxel 175 mg/m2.  10/5/22: [facility], reflexed to Common Hereditary Cancer Panel.   -No identifiable germline variants identified in ABRAXAS1, AKT1, APC, DEION, AXIN2, BARD1, BMPR1A, BRCA1, BRCA2, BRIP1, CDC73, CDH1, CDK4, CDKN2A, CHEK2, CTNNA1, DICER1, EPCAM, FANCC, FANCM, GREM1, HOXB13, KIT, MEN1, MLH1, MRE11, MSH2, MSH6, MUTYH, NBN, NF1, NTHL1, PALB2, PDGFRA, PIK3CA, PMS2, POLD1, POLE, PTEN, RAD50, RAD51C, RAD51D, RECQL, RINT1, SDHA, SDHB, SDHC, SDHD, SMAD4, SMARCA4, STK11, TP53, TSC1, TSC2, VHL, XRCC2.   -Variant of uncertain significance in MSH3 c.16C>T (p.Ozh2Gdw)  10/9/22 (tumor 8/29/22): [facility] Testing Results.  -Genomic ROXI low (6%)   -TMB low (1mut/Mb)  -Microsatellite stable/Mismatch Repair proficient  -PD-L1- (CPS 0%)  -NTRK 1/2/3 fusion not detected.   -ER-, AL+  -Genomic alterations in:  --TP53  -No genomic alterations in BRCA1,2.  -12/2/22: Chest, abdomen, pelvic CT: No definitive evidence of disease.    CHEST: A few tiny perifissural nodules are unchanged right upper lobe compatible with subpleural lymph nodes.  ABDOMEN: Trace amount of residual ascites in the cul-de-sac. Mild peritoneal thickening persists within the left midabdomen. No measurable peritoneal implants identified.  MUSCULOSKELETAL: Tiny fat-containing left inguinal hernia  unchanged.  -12/7/22: CA-125=23.   -2/16/23: Chest, abdomen, pelvic CT to evaluate bloating: Stable findings, no definitive evidence of disease.   ABDOMEN, PELVIS: There is mild peritoneal thickening in the left mid abdomen again visualized without measurable peritoneal implants noted. Tiny amount of fluid in the pelvis. This is decreased from the prior exam.  -5/25/23: Admitted  to [facility] for management of malignant ascites s/p therapeutic paracentesis, and partial SBO resolved with conservative management.   -5/25/23: Abdomen, pelvic CT: Progressive disease.      Plan: Carboplatin AUC 5 + pegylated lipoosomal doxorubicin (PLD) 30 mg/m2 every 28 days x 4 cycles then CT        6/13/23: Cycle #1 Carboplatin/Doxil.  78.     6/28/23: ED for abdominal pain.   CT ap IMPRESSION:   1.  Moderate volume ascites, modestly decreased compared to most recent prior CT. Redemonstrated evidence of thickening and enhancement of the peritoneal lining consistent with carcinomatosis. An enhancing pelvic mass measures slightly smaller than on 05/25/2023.   2.  Fluid-filled loops of small bowel throughout the abdomen with gradual caliber narrowing in the terminal ileum. A partial or intermittent obstructive process is not excludable. No free air.     7/11/23: Cycle #2 Carboplatin/Doxil planned for 7/12.  78.  8/14/23: Cycle #3 Carboplatin/Doxil planned. 68.  9/12/23: Cycle #4 Carboplatin/Doxil planned for 9/13.  50.        Today she states she is tolerating her treatment well. She denies any skin issues (no rashes, redness, or peeling) and states she is eating well. She is drinking about 40 oz water/day along with 3 cups of coffee and 2 sodas. She has not taken Decadron since her first cycle (did not have nausea); she will take Compazine if needed and took it 1-2 times after her last cycle. She has not needed any Zofran. She is taking a stool softener daily for her bowels.       Review of  Systems:    Systemic           no weight changes; no fever; no chills; no night sweats; no appetite changes  Skin           no rashes, or lesions  Eye           no irritation; no changes in vision  Day-Laryngeal           no dysphagia; no hoarseness   Pulmonary    no cough; no shortness of breath  Cardiovascular    no chest pain; no palpitations  Gastrointestinal    no diarrhea; no constipation; no abdominal pain; no changes in bowel habits; no blood in stool  Genitourinary   no urinary frequency; no urinary urgency; no dysuria; no pain; no abnormal vaginal discharge; no abnormal vaginal bleeding  Breast   no breast discharge; no breast changes; no breast pain  Musculoskeletal    no myalgias; no arthralgias; no back pain  Psychiatric           no depressed mood; no anxiety    Hematologic             no tender lymph nodes; no noticeable swellings or lumps   Endocrine    no hot flashes; no heat/cold intolerance         Neurological   no tremor; no numbness and tingling; no headaches; no difficulty sleeping      Past Medical History:    Past Medical History:   Diagnosis Date    Female stress incontinence     Ovarian cancer, right (H) 06/16/2022    Stage IIIC high-grade serous carcinoma    Recurrent cold sores          Past Surgical History:    Past Surgical History:   Procedure Laterality Date    APPENDECTOMY  08/29/2022    Part of ovarian cancer tumor debulking    BLADDER SUSPENSION  08/13/2009    HYSTERECTOMY  08/13/2009    For prolapse    LAPAROSCOPY DIAGNOSTIC (GYN)  06/16/2022    SALPINGO-OOPHORECTOMY, COMBINED Bilateral 08/29/2022    Pelvic exam under anesthesia, diagnostic laparoscopy, conversion to laparotomy for optimal interval tumor debulking to no gross residual disease including peritoneal and diaphragm biopsies, bilateral salpingo-oophorectomy, infragastric omentectomy, bilateral hemidiaphragm stripping, peritoneal and mesenteric argon beam ablation, appendectomy.    TONSILLECTOMY  1973    TUBAL LIGATION  Bilateral 09/01/1998         Health Maintenance Due   Topic Date Due    YEARLY PREVENTIVE VISIT  Never done    ADVANCE CARE PLANNING  Never done    HEPATITIS B IMMUNIZATION (1 of 3 - 3-dose series) Never done    COLORECTAL CANCER SCREENING  Never done    HIV SCREENING  Never done    HEPATITIS C SCREENING  Never done    PAP  Never done    LIPID  Never done    ZOSTER IMMUNIZATION (1 of 2) Never done    DTAP/TDAP/TD IMMUNIZATION (2 - Td or Tdap) 12/15/2021    COVID-19 Vaccine (5 - Pfizer risk series) 11/18/2022    PHQ-2 (once per calendar year)  Never done    LUNG CANCER SCREENING  06/10/2023    INFLUENZA VACCINE (1) 09/01/2023       Current Medications:     Current Outpatient Medications   Medication Sig Dispense Refill    citalopram (CELEXA) 40 MG tablet Take 1 tablet by mouth daily      cyclobenzaprine (FLEXERIL) 5 MG tablet Take 1-2 Tablets (5-10 mg) by mouth three times a day.      dexamethasone (DECADRON) 4 MG tablet Take 2 tablets (8 mg) by mouth daily Take for 3 days, starting the day after chemo. Take with food. 6 tablet 2    diphenhydrAMINE-acetaminophen (TYLENOL PM)  MG tablet Take 1 tablet by mouth as needed      docusate sodium (DSS) 100 MG capsule Take 100 mg by mouth as needed      hydrOXYzine (ATARAX) 10 MG tablet Take 1-2 tablets (10-20 mg) by mouth every 6 hours as needed for itching 120 tablet 3    ibuprofen (ADVIL/MOTRIN) 200 MG capsule       lidocaine-prilocaine (EMLA) 2.5-2.5 % external cream Apply topically as needed (pain due to port access) Apply to port 30 minutes prior to lab appointment. 30 g 6    LORazepam (ATIVAN) 0.5 MG tablet Take 1-2 Tablets (0.5-1 mg) by mouth every 4 hours as needed for Anxiety (and or nausea and vomiting).*      LORazepam (ATIVAN) 0.5 MG tablet Take 1 tablet (0.5 mg) by mouth every 6 hours as needed for anxiety 20 tablet 3    Multiple Vitamin (MULTI-VITAMIN DAILY PO) Take 1 tablet by mouth daily      ondansetron (ZOFRAN) 8 MG tablet Take 1 tablet (8 mg) by  mouth every 8 hours as needed for nausea (vomiting) 30 tablet 2    prochlorperazine (COMPAZINE) 10 MG tablet Take 1 tablet (10 mg) by mouth every 6 hours as needed for nausea or vomiting 30 tablet 2    sennosides (SENOKOT) 8.6 MG tablet       valACYclovir (VALTREX) 1000 mg tablet Take 1,000 mg by mouth as needed           Allergies:      No Known Allergies     Social History:     Social History     Tobacco Use    Smoking status: Former     Packs/day: 1.00     Years: 42.00     Pack years: 42.00     Types: Cigarettes     Quit date: 2022     Years since quittin.3    Smokeless tobacco: Never   Substance Use Topics    Alcohol use: Yes     Comment: Beer ~Q3 months.       History   Drug Use Unknown         Family History:     The patient's family history is notable for:    Family History   Problem Relation Age of Onset    Prostate Cancer Father     Breast Cancer Sister 48    Melanoma Sister     Skin Cancer Sister     Colon Cancer Maternal Grandmother          Physical Exam:     Wt 68 kg (150 lb)   BMI 27.44 kg/m    Body mass index is 27.44 kg/m .    General Appearance: healthy and alert, no distress    Eyes:  Eyes grossly normal to inspection.  No discharge or erythema, or obvious scleral/conjunctival abnormalities.    Respiratory: No audible wheeze, cough, or visible cyanosis.  No visible retractions or increased work of breathing.     Musculoskeletal: extremities non tender and without edema    Skin: no lesions or rashes on visible skin    Neurological: normal gait, no gross defects     Psychiatric: appropriate mood and affect. Mentation appears normal, affect normal/bright, judgement and insight intact, normal speech and appearance well-groomed                            The rest of a comprehensive physical examination is deferred due to video visit restrictions.      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 She is  here for follow up and disease management by video.     20 minutes spent on the date of the encounter doing chart review, history and exam, documentation and further activities as noted above      Plan:     1.)        Reviewed CBC, CMP, Mg, and Ok to proceed with planned treatment tomorrow. She will have imaging and then see Dr. Patino, this is already scheduled. Encouraged her to increase her water intake or drink an electrolyte beverage to help with her sodium; and to decrease her caffeine intake. Discussed importance of eating smaller meals with lean proteins/hard boiled eggs more often, adequate hydration of at least 64 oz water throughout the day, and pacing activities. Reviewed signs and symptoms for when she should contact the clinic or seek additional care. Patient to contact the clinic with any questions or concerns in the interim.  Answered all of her questions to the best of my ability.     2.) Genetic risk factors were assessed and she has a VUS No identifiable germline variants identified in ABRAXAS1, AKT1, APC, DEION, AXIN2, BARD1, BMPR1A, BRCA1, BRCA2, BRIP1, CDC73, CDH1, CDK4, CDKN2A, CHEK2, CTNNA1, DICER1, EPCAM, FANCC, FANCM, GREM1, HOXB13, KIT, MEN1, MLH1, MRE11, MSH2, MSH6, MUTYH, NBN, NF1, NTHL1, PALB2, PDGFRA, PIK3CA, PMS2, POLD1, POLE, PTEN, RAD50, RAD51C, RAD51D, RECQL, RINT1, SDHA, SDHB, SDHC, SDHD, SMAD4, SMARCA4, STK11, TP53, TSC1, TSC2, VHL, XRCC2.       3.) Labs and/or tests ordered include:  CBC, CMP, Mg, . .     4.) Health maintenance issues addressed today include annual health maintenance and non-gynecologic issues with PCP.    JALEN Zamarripa, WHNP-BC, ANP-BC  Women's Health Nurse Practitioner  Adult Nurse Practitioner  Division of Gynecologic Oncology  .      CC  Patient Care Team:  Domenica Christianson MD as PCP - Charis Verdugo MD as MD (Gynecologic Oncology)  Susannah Roman APRN CNP as Assigned Cancer Care Provider  Anish Monroy MD as Assigned  Palliative Care Provider  SELF, REFERRED

## 2023-09-12 NOTE — LETTER
2023         RE: Jacki Smith  [address] [facility] [address]        Dear Colleague,    Thank you for referring your patient, Jacki Smith, to the [facility]. Please see a copy of my visit note below.                    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 She is here for follow up and disease management by video.      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  She presents for follow up and disease management by video.      Oncology History:  22: Presented to an ED in [place] for evaluation of abdominal bloating and discomfort.  -Abdomen, pelvic CT: Radiographic Stage CAL ovarian cancer.   22: CA-125 439.   22: Pelvic ultrasound: c/w metastatic cancer.    22: Pelvic exam under anesthesia, diagnostic laparoscopy, biopsies, evacuation of ascites.   -Findings: On pelvic exam under anesthesia, there was a 6 cm firm, fixed mass adherent to the vaginal cuff. Vagina otherwise smooth. On laparoscopic examination, there was ascites filling the pelvis/abdomen, with >1 liter of fluid evacuated. The palpated mass was arising from the right ovary. Left ovary relatively normal in appearance. There was diffuse carcinomatosis covering the entire peritoneal surface falciform ligament, liver capsule, and mesentery. The omentum was replaced with tumor. Findings were not amenable to optimal tumor debulking so biopsies were taken for histologic examination.   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      22, 22, 22: Cycles 1-3 of neoadjuvant chemotherapy with IV carboplatin +  paclitaxel 175 mg/m2 every 21 days.   -Cycles 1,2: Carboplatin AUC 6.   -Cycle 3: Carboplatin AUC 5 with dose-reduction due to thrombcytopenia.   -8/19/22: Chest, abdomen, pelvic CT: Partial response.    8/29/22: Pelvic exam under anesthesia, diagnostic laparoscopy, conversion to laparotomy for optimal interval tumor debulking to no gross residual disease including peritoneal and diaphragm biopsies, bilateral salpingo-oophorectomy, infragastric omentectomy, bilateral hemidiaphragm stripping, peritoneal and mesenteric argon beam ablation, appendectomy.   -Pathology: High-grade serous carcinoma involving all resected specimens.     9/28/22, 10/19/22, 11/17/22: Cycles 4-6 of primary chemotherapy with IV carboplatin AUC 5 + paclitaxel 175 mg/m2.  10/5/22: [facility], reflexed to Common Hereditary Cancer Panel.   -No identifiable germline variants identified in ABRAXAS1, AKT1, APC, DEION, AXIN2, BARD1, BMPR1A, BRCA1, BRCA2, BRIP1, CDC73, CDH1, CDK4, CDKN2A, CHEK2, CTNNA1, DICER1, EPCAM, FANCC, FANCM, GREM1, HOXB13, KIT, MEN1, MLH1, MRE11, MSH2, MSH6, MUTYH, NBN, NF1, NTHL1, PALB2, PDGFRA, PIK3CA, PMS2, POLD1, POLE, PTEN, RAD50, RAD51C, RAD51D, RECQL, RINT1, SDHA, SDHB, SDHC, SDHD, SMAD4, SMARCA4, STK11, TP53, TSC1, TSC2, VHL, XRCC2.   -Variant of uncertain significance in MSH3 c.16C>T (p.[id number])  10/9/22 (tumor 8/29/22): [facility] Testing Results.  -Genomic ROXI low (6%)   -TMB low (1mut/Mb)  -Microsatellite stable/Mismatch Repair proficient  -PD-L1- (CPS 0%)  -NTRK 1/2/3 fusion not detected.   -ER-, AZ+  -Genomic alterations in:  --TP53  -No genomic alterations in BRCA1,2.  -12/2/22: Chest, abdomen, pelvic CT: No definitive evidence of disease.    CHEST: A few tiny perifissural nodules are unchanged right upper lobe compatible with subpleural lymph nodes.  ABDOMEN: Trace amount of residual ascites in the cul-de-sac. Mild peritoneal thickening persists within the left midabdomen. No measurable peritoneal implants  identified.  MUSCULOSKELETAL: Tiny fat-containing left inguinal hernia unchanged.  -12/7/22: CA-125=23.   -2/16/23: Chest, abdomen, pelvic CT to evaluate bloating: Stable findings, no definitive evidence of disease.   ABDOMEN, PELVIS: There is mild peritoneal thickening in the left mid abdomen again visualized without measurable peritoneal implants noted. Tiny amount of fluid in the pelvis. This is decreased from the prior exam.  -5/25/23: Admitted  to [facility] for management of malignant ascites s/p therapeutic paracentesis, and partial SBO resolved with conservative management.   -5/25/23: Abdomen, pelvic CT: Progressive disease.      Plan: Carboplatin AUC 5 + pegylated lipoosomal doxorubicin (PLD) 30 mg/m2 every 28 days x 4 cycles then CT        6/13/23: Cycle #1 Carboplatin/Doxil.  78.     6/28/23: ED for abdominal pain.   CT ap IMPRESSION:   1.  Moderate volume ascites, modestly decreased compared to most recent prior CT. Redemonstrated evidence of thickening and enhancement of the peritoneal lining consistent with carcinomatosis. An enhancing pelvic mass measures slightly smaller than on 05/25/2023.   2.  Fluid-filled loops of small bowel throughout the abdomen with gradual caliber narrowing in the terminal ileum. A partial or intermittent obstructive process is not excludable. No free air.     7/11/23: Cycle #2 Carboplatin/Doxil planned for 7/12.  78.  8/14/23: Cycle #3 Carboplatin/Doxil planned. 68.  9/12/23: Cycle #4 Carboplatin/Doxil planned for 9/13.  50.        Today she states she is tolerating her treatment well. She denies any skin issues (no rashes, redness, or peeling) and states she is eating well. She is drinking about 40 oz water/day along with 3 cups of coffee and 2 sodas. She has not taken Decadron since her first cycle (did not have nausea); she will take Compazine if needed and took it 1-2 times after her last cycle. She has not needed any Zofran. She is  taking a stool softener daily for her bowels.       Review of Systems:    Systemic           no weight changes; no fever; no chills; no night sweats; no appetite changes  Skin           no rashes, or lesions  Eye           no irritation; no changes in vision  Day-Laryngeal           no dysphagia; no hoarseness   Pulmonary    no cough; no shortness of breath  Cardiovascular    no chest pain; no palpitations  Gastrointestinal    no diarrhea; no constipation; no abdominal pain; no changes in bowel habits; no blood in stool  Genitourinary   no urinary frequency; no urinary urgency; no dysuria; no pain; no abnormal vaginal discharge; no abnormal vaginal bleeding  Breast   no breast discharge; no breast changes; no breast pain  Musculoskeletal    no myalgias; no arthralgias; no back pain  Psychiatric           no depressed mood; no anxiety    Hematologic             no tender lymph nodes; no noticeable swellings or lumps   Endocrine    no hot flashes; no heat/cold intolerance         Neurological   no tremor; no numbness and tingling; no headaches; no difficulty sleeping      Past Medical History:    Past Medical History:   Diagnosis Date    Female stress incontinence     Ovarian cancer, right (H) 06/16/2022    Stage IIIC high-grade serous carcinoma    Recurrent cold sores          Past Surgical History:    Past Surgical History:   Procedure Laterality Date    APPENDECTOMY  08/29/2022    Part of ovarian cancer tumor debulking    BLADDER SUSPENSION  08/13/2009    HYSTERECTOMY  08/13/2009    For prolapse    LAPAROSCOPY DIAGNOSTIC (GYN)  06/16/2022    SALPINGO-OOPHORECTOMY, COMBINED Bilateral 08/29/2022    Pelvic exam under anesthesia, diagnostic laparoscopy, conversion to laparotomy for optimal interval tumor debulking to no gross residual disease including peritoneal and diaphragm biopsies, bilateral salpingo-oophorectomy, infragastric omentectomy, bilateral hemidiaphragm stripping, peritoneal and mesenteric argon beam  ablation, appendectomy.    TONSILLECTOMY  1973    TUBAL LIGATION Bilateral 09/01/1998         Health Maintenance Due   Topic Date Due    YEARLY PREVENTIVE VISIT  Never done    ADVANCE CARE PLANNING  Never done    HEPATITIS B IMMUNIZATION (1 of 3 - 3-dose series) Never done    COLORECTAL CANCER SCREENING  Never done    HIV SCREENING  Never done    HEPATITIS C SCREENING  Never done    PAP  Never done    LIPID  Never done    ZOSTER IMMUNIZATION (1 of 2) Never done    DTAP/TDAP/TD IMMUNIZATION (2 - Td or Tdap) 12/15/2021    COVID-19 Vaccine (5 - Pfizer risk series) 11/18/2022    PHQ-2 (once per calendar year)  Never done    LUNG CANCER SCREENING  06/10/2023    INFLUENZA VACCINE (1) 09/01/2023       Current Medications:     Current Outpatient Medications   Medication Sig Dispense Refill    citalopram (CELEXA) 40 MG tablet Take 1 tablet by mouth daily      cyclobenzaprine (FLEXERIL) 5 MG tablet Take 1-2 Tablets (5-10 mg) by mouth three times a day.      dexamethasone (DECADRON) 4 MG tablet Take 2 tablets (8 mg) by mouth daily Take for 3 days, starting the day after chemo. Take with food. 6 tablet 2    diphenhydrAMINE-acetaminophen (TYLENOL PM)  MG tablet Take 1 tablet by mouth as needed      docusate sodium (DSS) 100 MG capsule Take 100 mg by mouth as needed      hydrOXYzine (ATARAX) 10 MG tablet Take 1-2 tablets (10-20 mg) by mouth every 6 hours as needed for itching 120 tablet 3    ibuprofen (ADVIL/MOTRIN) 200 MG capsule       lidocaine-prilocaine (EMLA) 2.5-2.5 % external cream Apply topically as needed (pain due to port access) Apply to port 30 minutes prior to lab appointment. 30 g 6    LORazepam (ATIVAN) 0.5 MG tablet Take 1-2 Tablets (0.5-1 mg) by mouth every 4 hours as needed for Anxiety (and or nausea and vomiting).*      LORazepam (ATIVAN) 0.5 MG tablet Take 1 tablet (0.5 mg) by mouth every 6 hours as needed for anxiety 20 tablet 3    Multiple Vitamin (MULTI-VITAMIN DAILY PO) Take 1 tablet by mouth daily       ondansetron (ZOFRAN) 8 MG tablet Take 1 tablet (8 mg) by mouth every 8 hours as needed for nausea (vomiting) 30 tablet 2    prochlorperazine (COMPAZINE) 10 MG tablet Take 1 tablet (10 mg) by mouth every 6 hours as needed for nausea or vomiting 30 tablet 2    sennosides (SENOKOT) 8.6 MG tablet       valACYclovir (VALTREX) 1000 mg tablet Take 1,000 mg by mouth as needed           Allergies:      No Known Allergies     Social History:     Social History     Tobacco Use    Smoking status: Former     Packs/day: 1.00     Years: 42.00     Pack years: 42.00     Types: Cigarettes     Quit date: 2022     Years since quittin.3    Smokeless tobacco: Never   Substance Use Topics    Alcohol use: Yes     Comment: Beer ~Q3 months.       History   Drug Use Unknown         Family History:     The patient's family history is notable for:    Family History   Problem Relation Age of Onset    Prostate Cancer Father     Breast Cancer Sister 48    Melanoma Sister     Skin Cancer Sister     Colon Cancer Maternal Grandmother          Physical Exam:     Wt 68 kg (150 lb)   BMI 27.44 kg/m    Body mass index is 27.44 kg/m .    General Appearance: healthy and alert, no distress    Eyes:  Eyes grossly normal to inspection.  No discharge or erythema, or obvious scleral/conjunctival abnormalities.    Respiratory: No audible wheeze, cough, or visible cyanosis.  No visible retractions or increased work of breathing.     Musculoskeletal: extremities non tender and without edema    Skin: no lesions or rashes on visible skin    Neurological: normal gait, no gross defects     Psychiatric: appropriate mood and affect. Mentation appears normal, affect normal/bright, judgement and insight intact, normal speech and appearance well-groomed                            The rest of a comprehensive physical examination is deferred due to video visit restrictions.      Assessment:    Jacki Smtih is a 58 year old woman with a Progressive stage  IIIC high-grade serous carcinoma of the right ovary. She is here for follow up and disease management by video.     20 minutes spent on the date of the encounter doing chart review, history and exam, documentation and further activities as noted above      Plan:     1.)        Reviewed CBC, CMP, Mg, and Ok to proceed with planned treatment tomorrow. She will have imaging and then see Dr. Patino, this is already scheduled. Encouraged her to increase her water intake or drink an electrolyte beverage to help with her sodium; and to decrease her caffeine intake. Discussed importance of eating smaller meals with lean proteins/hard boiled eggs more often, adequate hydration of at least 64 oz water throughout the day, and pacing activities. Reviewed signs and symptoms for when she should contact the clinic or seek additional care. Patient to contact the clinic with any questions or concerns in the interim.  Answered all of her questions to the best of my ability.     2.) Genetic risk factors were assessed and she has a VUS No identifiable germline variants identified in ABRAXAS1, AKT1, APC, DEION, AXIN2, BARD1, BMPR1A, BRCA1, BRCA2, BRIP1, CDC73, CDH1, CDK4, CDKN2A, CHEK2, CTNNA1, DICER1, EPCAM, FANCC, FANCM, GREM1, HOXB13, KIT, MEN1, MLH1, MRE11, MSH2, MSH6, MUTYH, NBN, NF1, NTHL1, PALB2, PDGFRA, PIK3CA, PMS2, POLD1, POLE, PTEN, RAD50, RAD51C, RAD51D, RECQL, RINT1, SDHA, SDHB, SDHC, SDHD, SMAD4, SMARCA4, STK11, TP53, TSC1, TSC2, VHL, XRCC2.       3.) Labs and/or tests ordered include:  CBC, CMP, Mg, . .     4.) Health maintenance issues addressed today include annual health maintenance and non-gynecologic issues with PCP.    JALEN Zamarripa, WHNP-BC, ANP-BC  Women's Health Nurse Practitioner  Adult Nurse Practitioner  Division of Gynecologic Oncology  .      CC  Patient Care Team:  Domenica Christianson MD as PCP - General

## 2023-09-13 ENCOUNTER — INFUSION THERAPY VISIT (OUTPATIENT)
Dept: INFUSION THERAPY | Facility: CLINIC | Age: 58
End: 2023-09-13
Attending: INTERNAL MEDICINE
Payer: COMMERCIAL

## 2023-09-13 VITALS
SYSTOLIC BLOOD PRESSURE: 121 MMHG | TEMPERATURE: 97.7 F | BODY MASS INDEX: 27.75 KG/M2 | WEIGHT: 151.7 LBS | RESPIRATION RATE: 18 BRPM | DIASTOLIC BLOOD PRESSURE: 65 MMHG | HEART RATE: 64 BPM

## 2023-09-13 DIAGNOSIS — C56.1 OVARIAN CANCER, RIGHT (H): Primary | ICD-10-CM

## 2023-09-13 PROCEDURE — 96375 TX/PRO/DX INJ NEW DRUG ADDON: CPT

## 2023-09-13 PROCEDURE — 258N000003 HC RX IP 258 OP 636: Performed by: NURSE PRACTITIONER

## 2023-09-13 PROCEDURE — 96367 TX/PROPH/DG ADDL SEQ IV INF: CPT

## 2023-09-13 PROCEDURE — 96413 CHEMO IV INFUSION 1 HR: CPT

## 2023-09-13 PROCEDURE — 250N000011 HC RX IP 250 OP 636: Mod: JZ | Performed by: NURSE PRACTITIONER

## 2023-09-13 PROCEDURE — 96417 CHEMO IV INFUS EACH ADDL SEQ: CPT

## 2023-09-13 RX ORDER — HEPARIN SODIUM,PORCINE 10 UNIT/ML
5 VIAL (ML) INTRAVENOUS
Status: DISCONTINUED | OUTPATIENT
Start: 2023-09-13 | End: 2023-09-13 | Stop reason: HOSPADM

## 2023-09-13 RX ORDER — HEPARIN SODIUM (PORCINE) LOCK FLUSH IV SOLN 100 UNIT/ML 100 UNIT/ML
5 SOLUTION INTRAVENOUS
Status: DISCONTINUED | OUTPATIENT
Start: 2023-09-13 | End: 2023-09-13 | Stop reason: HOSPADM

## 2023-09-13 RX ORDER — MEPERIDINE HYDROCHLORIDE 50 MG/ML
25 INJECTION INTRAMUSCULAR; INTRAVENOUS; SUBCUTANEOUS EVERY 30 MIN PRN
Status: DISCONTINUED | OUTPATIENT
Start: 2023-09-13 | End: 2023-09-13 | Stop reason: HOSPADM

## 2023-09-13 RX ORDER — METHYLPREDNISOLONE SODIUM SUCCINATE 125 MG/2ML
125 INJECTION, POWDER, LYOPHILIZED, FOR SOLUTION INTRAMUSCULAR; INTRAVENOUS
Status: DISCONTINUED | OUTPATIENT
Start: 2023-09-13 | End: 2023-09-13 | Stop reason: HOSPADM

## 2023-09-13 RX ORDER — EPINEPHRINE 1 MG/ML
0.3 INJECTION, SOLUTION INTRAMUSCULAR; SUBCUTANEOUS EVERY 5 MIN PRN
Status: DISCONTINUED | OUTPATIENT
Start: 2023-09-13 | End: 2023-09-13 | Stop reason: HOSPADM

## 2023-09-13 RX ORDER — LORAZEPAM 2 MG/ML
0.5 INJECTION INTRAMUSCULAR EVERY 4 HOURS PRN
Status: DISCONTINUED | OUTPATIENT
Start: 2023-09-13 | End: 2023-09-13 | Stop reason: HOSPADM

## 2023-09-13 RX ORDER — ALBUTEROL SULFATE 0.83 MG/ML
2.5 SOLUTION RESPIRATORY (INHALATION)
Status: DISCONTINUED | OUTPATIENT
Start: 2023-09-13 | End: 2023-09-13 | Stop reason: HOSPADM

## 2023-09-13 RX ORDER — ALBUTEROL SULFATE 90 UG/1
1-2 AEROSOL, METERED RESPIRATORY (INHALATION)
Status: DISCONTINUED | OUTPATIENT
Start: 2023-09-13 | End: 2023-09-13 | Stop reason: HOSPADM

## 2023-09-13 RX ORDER — DIPHENHYDRAMINE HYDROCHLORIDE 50 MG/ML
50 INJECTION INTRAMUSCULAR; INTRAVENOUS
Status: DISCONTINUED | OUTPATIENT
Start: 2023-09-13 | End: 2023-09-13 | Stop reason: HOSPADM

## 2023-09-13 RX ORDER — PALONOSETRON 0.05 MG/ML
0.25 INJECTION, SOLUTION INTRAVENOUS ONCE
Status: COMPLETED | OUTPATIENT
Start: 2023-09-13 | End: 2023-09-13

## 2023-09-13 RX ADMIN — PALONOSETRON 0.25 MG: 0.05 INJECTION, SOLUTION INTRAVENOUS at 08:53

## 2023-09-13 RX ADMIN — FOSAPREPITANT: 150 INJECTION, POWDER, LYOPHILIZED, FOR SOLUTION INTRAVENOUS at 09:07

## 2023-09-13 RX ADMIN — DOXORUBICIN HYDROCHLORIDE 50 MG: 2 INJECTION, SUSPENSION, LIPOSOMAL INTRAVENOUS at 09:57

## 2023-09-13 RX ADMIN — CARBOPLATIN 375 MG: 10 INJECTION, SOLUTION INTRAVENOUS at 11:02

## 2023-09-13 RX ADMIN — HEPARIN 5 ML: 100 SYRINGE at 11:56

## 2023-09-13 RX ADMIN — DEXTROSE MONOHYDRATE 250 ML: 50 INJECTION, SOLUTION INTRAVENOUS at 10:00

## 2023-09-13 NOTE — PROGRESS NOTES
Infusion Nursing Note:  Jacki Smith presents today for Doxorubicin and carboplatin.    Patient seen by provider today: No   present during visit today: Not Applicable.    Note: pre meds aloxi and emend/dex given.      Intravenous Access:  Implanted Port.    Treatment Conditions:  Lab Results   Component Value Date    HGB 11.2 (L) 09/12/2023    WBC 4.1 09/12/2023    ANEUTAUTO 2.6 09/12/2023     09/12/2023        Lab Results   Component Value Date     (L) 09/12/2023    POTASSIUM 4.4 09/12/2023    MAG 1.9 09/12/2023    CR 0.96 09/12/2023    KINDRA 9.2 09/12/2023    BILITOTAL 0.4 09/12/2023    ALBUMIN 3.6 09/12/2023    ALT 16 09/12/2023    AST 23 09/12/2023       Results reviewed, labs MET treatment parameters, ok to proceed with treatment.      Post Infusion Assessment:  Patient tolerated infusion without incident.  Blood return noted pre and post infusion.  Site patent and intact, free from redness, edema or discomfort.  Access discontinued per protocol.       Discharge Plan:   Patient and/or family verbalized understanding of discharge instructions and all questions answered.      Ligia Arellano RN

## 2023-09-27 ENCOUNTER — HOSPITAL ENCOUNTER (OUTPATIENT)
Dept: CT IMAGING | Facility: CLINIC | Age: 58
Discharge: HOME | End: 2023-09-27
Attending: OBSTETRICS & GYNECOLOGY | Admitting: OBSTETRICS & GYNECOLOGY
Payer: COMMERCIAL

## 2023-09-27 DIAGNOSIS — C56.1 OVARIAN CANCER, RIGHT (H): ICD-10-CM

## 2023-09-27 PROCEDURE — 250N000011 HC RX IP 250 OP 636: Mod: JZ | Performed by: OBSTETRICS & GYNECOLOGY

## 2023-09-27 PROCEDURE — 74177 CT ABD & PELVIS W/CONTRAST: CPT

## 2023-09-27 PROCEDURE — 250N000011 HC RX IP 250 OP 636: Mod: JZ | Performed by: RADIOLOGY

## 2023-09-27 RX ORDER — IOPAMIDOL 755 MG/ML
100 INJECTION, SOLUTION INTRAVASCULAR ONCE
Status: COMPLETED | OUTPATIENT
Start: 2023-09-27 | End: 2023-09-27

## 2023-09-27 RX ORDER — HEPARIN SODIUM (PORCINE) LOCK FLUSH IV SOLN 100 UNIT/ML 100 UNIT/ML
5-10 SOLUTION INTRAVENOUS
Status: DISCONTINUED | OUTPATIENT
Start: 2023-09-27 | End: 2023-09-28 | Stop reason: HOSPADM

## 2023-09-27 RX ORDER — HEPARIN SODIUM,PORCINE 10 UNIT/ML
5-10 VIAL (ML) INTRAVENOUS EVERY 24 HOURS
Status: DISCONTINUED | OUTPATIENT
Start: 2023-09-27 | End: 2023-09-28 | Stop reason: HOSPADM

## 2023-09-27 RX ORDER — HEPARIN SODIUM,PORCINE 10 UNIT/ML
5-10 VIAL (ML) INTRAVENOUS
Status: DISCONTINUED | OUTPATIENT
Start: 2023-09-27 | End: 2023-09-28 | Stop reason: HOSPADM

## 2023-09-27 RX ADMIN — IOPAMIDOL 100 ML: 755 INJECTION, SOLUTION INTRAVENOUS at 14:06

## 2023-09-27 RX ADMIN — HEPARIN 5 ML: 100 SYRINGE at 14:31

## 2023-09-28 ENCOUNTER — PATIENT OUTREACH (OUTPATIENT)
Dept: ONCOLOGY | Facility: CLINIC | Age: 58
End: 2023-09-28
Payer: COMMERCIAL

## 2023-09-28 DIAGNOSIS — I82.90 VTE (VENOUS THROMBOEMBOLISM): Primary | ICD-10-CM

## 2023-09-28 RX ORDER — APIXABAN 5 MG (74)
KIT ORAL
Qty: 74 EACH | Refills: 4 | Status: SHIPPED | OUTPATIENT
Start: 2023-09-28 | End: 2023-10-03

## 2023-09-28 NOTE — PROGRESS NOTES
Radiologist from [facility] calling with test results from CT done yesterday  Per Dr Smith  Incidental finding   non occlusive clot at tip of port    Information sent to md

## 2023-09-28 NOTE — PROGRESS NOTES
Prescription for apixaban sent to her local [facility] pharmacy.  -Take 10 mg  twice daily x7 days, then 5 mg twice daily for as long as port-a-cath in place.   -DISCONTINUE ibuprofen.       Charis Patino MD, MS, FACOG, FACS  9/28/2023  12:59 PM

## 2023-10-02 NOTE — PROGRESS NOTES
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 Chemotherapy encounter.       History of Present Illness:  Jacki \"Michelle\" IVY Smith (she/her/hers)  is a 58 year old seen at the [facility] for management of progressive stage IIIC high-grade serous ovarian carcinoma. History as follows:    Ovarian Cancer History:  22: Presented to an ED in [place] for evaluation of abdominal bloating and discomfort.  -Abdomen, pelvic CT: Radiographic Stage CAL ovarian cancer. I have personally reviewed the CT images.   22: CA-492=[id number].   22: Pelvic ultrasound: c/w metastatic cancer. I have personally reviewed the ultrasound images.   22: Pelvic exam under anesthesia, diagnostic laparoscopy, biopsies, evacuation of ascites.   -Findings: On pelvic exam under anesthesia, there was a 6 cm firm, fixed mass adherent to the vaginal cuff. Vagina otherwise smooth. On laparoscopic examination, there was ascites filling the pelvis/abdomen, with >1 liter of fluid evacuated. The palpated mass was arising from the right ovary. Left ovary relatively normal in appearance. There was diffuse carcinomatosis covering the entire peritoneal surface falciform ligament, liver capsule, and mesentery. The omentum was replaced with tumor. Findings were not amenable to optimal tumor debulking so biopsies were taken for histologic examination.   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      22, 22, 22: Cycles 1-3 of neoadjuvant chemotherapy with IV carboplatin + paclitaxel 175 " mg/m2 every 21 days.   -Cycles 1,2: Carboplatin AUC 6.   -Cycle 3: Carboplatin AUC 5 with dose-reduction due to thrombcytopenia.   -8/19/22: Chest, abdomen, pelvic CT: Partial response. I have personally reviewed the CT images.     8/29/22: Pelvic exam under anesthesia, diagnostic laparoscopy, conversion to laparotomy for optimal interval tumor debulking to no gross residual disease including peritoneal and diaphragm biopsies, bilateral salpingo-oophorectomy, infragastric omentectomy, bilateral hemidiaphragm stripping, peritoneal and mesenteric argon beam ablation, appendectomy.   -Pathology: High-grade serous carcinoma involving all resected specimens.     9/28/22, 10/19/22, 11/17/22: Cycles 4-6 of primary chemotherapy with IV carboplatin AUC 5 + paclitaxel 175 mg/m2.  -12/2/22: Chest, abdomen, pelvic CT: No definitive evidence of disease. I have personally reviewed the CT images.   -12/7/22: CA-125=23.   -2/16/23: Chest, abdomen, pelvic CT to evaluate bloating: Stable findings, no definitive evidence of disease. I have personally reviewed the CT images.   -5/25/23: Admitted  to [facility] for management of malignant ascites s/p therapeutic paracentesis, and partial SBO resolved with conservative management.   5/25/23: Abdomen, pelvic CT: Progressive disease.   LOWER CHEST: OMAIRA.   BOWEL: Development of moderate malignant ascites with small peritoneal and serosal implants present. There is mild distention of a few loops of small bowel within the right side of the abdomen with regions of narrowing present compatible with an early or partial small bowel obstruction. Wall thickening in a few of the involved segments with fecalization of the internal contents of the small bowel compatible with stasis.   PELVIC ORGANS: Lobulated 4.5 cm soft tissue mass in the dependent pelvis in the expected location of the uterus.  -6/13/23: CA-125=78.    6/13/23, 7/12/23, 8/14/23, 9/13/23: Cycles 1-4 of second-line chemotherapy  with IV carboplatin AUC 5 + pegylated liposomal doxorubicin (PLD) 30 mg/m2 every 28 days.       Genetic/Genomic/Molecular Testing History:  10/5/22: [facility], reflexed to Common Hereditary Cancer Panel.   -No identifiable germline variants identified in ABRAXAS1, AKT1, APC, DEION, AXIN2, BARD1, BMPR1A, BRCA1, BRCA2, BRIP1, CDC73, CDH1, CDK4, CDKN2A, CHEK2, CTNNA1, DICER1, EPCAM, FANCC, FANCM, GREM1, HOXB13, KIT, MEN1, MLH1, MRE11, MSH2, MSH6, MUTYH, NBN, NF1, NTHL1, PALB2, PDGFRA, PIK3CA, PMS2, POLD1, POLE, PTEN, RAD50, RAD51C, RAD51D, RECQL, RINT1, SDHA, SDHB, SDHC, SDHD, SMAD4, SMARCA4, STK11, TP53, TSC1, TSC2, VHL, XRCC2.   -Variant of uncertain significance in MSH3 c.16C>T (p.Ewr2Iqv)    10/9/22 (tumor 8/29/22): [facility] Testing Results.  -Genomic ROXI low (6%)   -TMB low (1mut/Mb)  -Microsatellite stable/Mismatch Repair proficient  -PD-L1- (CPS 0%)  -NTRK 1/2/3 fusion not detected.   -ER-, VT+  -Genomic alterations in:  --TP53  -No genomic alterations in BRCA1,2.    Jamila-ovary clinical trial screening: Negative for the immunoreactive subtype.     6/19/23 (tumor 8/29/22): [facility] Test results.   -FOLR1+ (2+, 75%).       Subjective:  Michelle returns to the gyn onc clinic today for review of CT results and consideration of cycle #5 of chemotherapy, accompanied by her  and daughter.  Michelle reports that she has no side-effects related to chemotherapy. Her daughter reports increased irritability after chemotherapy.   Michelle reports that she has some mild fatigue, but overall is able to maintain activities.   Michelle has to eat fiber to maintain bowel movements, and occasionally adds a stool softener or water.   After her CT she was prescribed apixabn but has not started  it due to a $700 co-pay.         Past Medical History:  Past Medical History:   Diagnosis Date    Female stress incontinence     Ovarian cancer, right (H) 06/16/2022    Stage IIIC high-grade serous carcinoma    Recurrent cold sores         Past Surgical History:  Past Surgical History:   Procedure Laterality Date    APPENDECTOMY  2022    Part of ovarian cancer tumor debulking    BLADDER SUSPENSION  2009    HYSTERECTOMY  2009    For prolapse    LAPAROSCOPY DIAGNOSTIC (GYN)  2022    SALPINGO-OOPHORECTOMY, COMBINED Bilateral 2022    Pelvic exam under anesthesia, diagnostic laparoscopy, conversion to laparotomy for optimal interval tumor debulking to no gross residual disease including peritoneal and diaphragm biopsies, bilateral salpingo-oophorectomy, infragastric omentectomy, bilateral hemidiaphragm stripping, peritoneal and mesenteric argon beam ablation, appendectomy.    TONSILLECTOMY  1973    TUBAL LIGATION Bilateral 1998       Gynecologic History:  Surgical menopause. Hysterectomy in  for prolapse.  Was on HRT with estrogen for less than 2 years  No history of abnormal cervical cancer screening.  No history of STIs.  Sexually active, no dyspareunia, no postcoital bleeding.      Obstetric History:  OB History    Para Term  AB Living   3 3 3 0 0 3   SAB IAB Ectopic Multiple Live Births   0 0 0 0 3      # Outcome Date GA Lbr Sukumar/2nd Weight Sex Delivery Anes PTL Lv   3 Term      Vag-Spont      2 Term      Vag-Spont      1 Term      Vag-Spont           Current Medications:   Current Outpatient Medications   Medication    Apixaban Starter Pack (ELIQUIS DVT/PE STARTER PACK) 5 MG TBPK    citalopram (CELEXA) 40 MG tablet    cyclobenzaprine (FLEXERIL) 5 MG tablet    dexamethasone (DECADRON) 4 MG tablet    diphenhydrAMINE-acetaminophen (TYLENOL PM)  MG tablet    docusate sodium (DSS) 100 MG capsule    hydrOXYzine (ATARAX) 10 MG tablet    lidocaine-prilocaine (EMLA) 2.5-2.5 % external cream    LORazepam (ATIVAN) 0.5 MG tablet    LORazepam (ATIVAN) 0.5 MG tablet    Multiple Vitamin (MULTI-VITAMIN DAILY PO)    ondansetron (ZOFRAN) 8 MG tablet    prochlorperazine (COMPAZINE) 10 MG tablet     sennosides (SENOKOT) 8.6 MG tablet    valACYclovir (VALTREX) 1000 mg tablet     No current facility-administered medications for this visit.        Allergies:   No Known Allergies        Social History:  Patient lives with Eligio and two daughters. Works as a . She does not have Advanced Directives, but has identified Eligio Smith as her desired Healthcare Power of .  Social History     Tobacco Use    Smoking status: Former     Packs/day: 1.00     Years: 42.00     Pack years: 42.00     Types: Cigarettes     Quit date: 2022     Years since quittin.3    Smokeless tobacco: Never   Substance Use Topics    Alcohol use: Yes     Comment: Beer ~Q3 months.       History   Drug Use Unknown       Family History:   The patient's family history is notable for a first-degree paternal relative with prostate cancer, and a first-degree relative with breast cancer and melanoma, and a second-degree maternal relative with colon cancer. No known family history of ovarian, uterine, urothelial/renal, or pancreatic cancers.   Family History   Problem Relation Age of Onset    Prostate Cancer Father     Breast Cancer Sister 48    Melanoma Sister     Skin Cancer Sister     Colon Cancer Maternal Grandmother        Physical Exam:   /74   Pulse 66   Temp 98  F (36.7  C)   Wt 68.9 kg (152 lb)   SpO2 98%   BMI 27.80 kg/m    Body mass index is 27.8 kg/m .    General Appearance: healthy and alert, no distress     Musculoskeletal: extremities non tender and without edema    Skin: no lesions or rashes. Bruised left 3rd toe.      Neurological: normal gait, no gross defects     Psychiatric: appropriate mood and affect                                 Laboratory Examination:  CA-125 trend (since second-line therapy):  23  78  23  78  23  68  23  50      Radiographic Examination:  23: Chest, abdomen, pelvic CT: Partial response. Catheter-associated thrombus.   LUNGS AND PLEURA: Mild  emphysema. Stable subpleural 4 mm nodule in the right middle lobe. There is a small thrombus at the tip of the catheter.   BOWEL: Persistent but marginally interval decrease in the volume of the ascites. There is persistent peritoneal and omental nodular thickening. No obstruction.   PELVIC ORGANS: Slightly decreased size of the pelvic mass measuring 3 cm (previously 4.5 cm). There is interval development of calcification within the mass.       Performance Status:   ECOG Grade 0.       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     History of malignant partial small bowel obstruction, resolved with bowel rest.           Plan:     1.)    Ovarian cancer: I reviewed the CT results with Michelle and provided her with a copy of the CT report; she was also previously provided with a copy of the CT report via [facility]. Given the partial response, recommend continuing current chemotherapy.     After discussion of risks/benefits, Michelle would like to continue second-line chemotherapy with palliative intent with IV carboplatin AUC 5 + PLD 30 mg/m2 every 28 days. Plan as follows:   -Proceed with cycle #5 of chemotherapy 10/11/23 as scheduled. CANCEL NP visit 10/10/23 since Michelle saw me today.   -Plan for a total of 8 cycles followed by repeat chest, abdomen, pelvic CT to assess disease response (NP for chemotherapy visits; MD for imaging).  -Reviewed FOLR1 test results, which indicate mirvetuximab is a treatment option when she develops platinum-resistant disease.    -Will add to the [facility] pre-screening waitlist for testing for future therapy.     Side-effects:  None.    2.)  Catheter-associated thrombus: Prescription for apixaban, but not filled due to $700 co-pay. Will work on options for free drug. Also discussed option of  LMWH, but patent was unable to tolerate daily dosing when prescribed as postop prophylaxis.   Continue anticoagulation until port-a-cath removed, with plan to keep port-a-cath in place as long as it allows blood draws and infusions.     3.) Genetic risk factors were assessed: s/p germline testing which was negative for identifiable germline pathogenic variants (see HPI).     4.) Labs and/or tests ordered include:  1) Prechemotherapy labs: CBC with diff, CMP, magnesium, CA-125.      5.) Health maintenance issues addressed today include none.    6.) Code status:  Full-code.    7.) Prescriptions: None.      A total of 45 minutes was spent with the patient, 40 minutes of which were spent in counseling the patient and/or treatment planning; an additional 5 minutes was spent in chart review/documentation.      Charis Patino MD, MS, FACOG, FACS  10/3/2023  11:54 AM            CC  Patient Care Team:  Domenica Christianson MD as PCP - Charis Verdugo MD as MD (Gynecologic Oncology)  Susannah Roman APRN CNP as Assigned Cancer Care Provider  Anish Monroy MD as Assigned Palliative Care Provider  SELF, REFERRED

## 2023-10-02 NOTE — PATIENT INSTRUCTIONS
SCHEDULING:  -Continue with plan for cycle #5 of chemotherapy 10/11/23  -CANCEL NP visit 10/10/23 since patient had a chemotherapy visit with me today.   -Keep 11/7/23 NP visit for consideration of cycle #6.   -Work on free drug coverage for apixaban or other DOAC if available.       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  Treatment History:  -6/22/22-8/4/22: Neoadjuvant chemotherapy with IV carboplatin + paclitaxel x3 cycles. Partial response.   -8/29/22: Pelvic exam under anesthesia, diagnostic laparoscopy (look inside the pelvis/abdomen with a camera), conversion to laparotomy (large incision) for optimal interval tumor debulking to no gross residual disease including peritoneal and diaphragm biopsies, bilateral salpingo-oophorectomy (removal of bilateral ovaries & fallopian tubes), infragastric omentectomy (removal of the fat curtain hanging off the stomach/colon), bilateral hemidiaphragm stripping, peritoneal and mesenteric argon beam ablation (destruction of tumor), appendectomy.   -9/28/22-11/17/22: First-line chemotherapy with carboplatin + paclitaxel x3 cycles (total 6 cycles).   -6/13/23-Present: Second-line chemotherapy with IV carboplatin + pegylated liposomal doxorubicin (PLD/doxil).         PLAN:  1) Ovarian cancer: Continue second-line chemotherapy with palliative intent.  DRUGS: Carboplatin + pegylated liposomal doxorubicin (PLD/doxil)  SCHEDULE: 1 day every 4 weeks  PLAN: Total of 8 cycles followed by repeat chest, abdomen, pelvic CT to assess disease response.   -Tumor testing for the immunoreactive subtype negative, so will not consider enrollment in the Jamila-ovary clinical trial in the future.  -Folate receptor alpha testing positive, so will consider mirvetuximab therapy in the future when the cancer becomes platinum-resistant.     2) Genetic testing: negative for any identifiable germline variants.  No additional testing recommended for you or your family.    3) Catheter-associated clot: Plan for anticoagulation until port-a-cath removed, and plan to continue port-a-cath unless it does not allow blood draw or infusion.       Please call with any questions or concerns. [phone]       Charis Patino MD, MS, FACOG, FACS  10/3/2023  11:55 AM

## 2023-10-03 ENCOUNTER — ONCOLOGY VISIT (OUTPATIENT)
Dept: ONCOLOGY | Facility: CLINIC | Age: 58
End: 2023-10-03
Attending: OBSTETRICS & GYNECOLOGY
Payer: COMMERCIAL

## 2023-10-03 VITALS
BODY MASS INDEX: 27.8 KG/M2 | SYSTOLIC BLOOD PRESSURE: 116 MMHG | DIASTOLIC BLOOD PRESSURE: 74 MMHG | TEMPERATURE: 98 F | HEART RATE: 66 BPM | OXYGEN SATURATION: 98 % | WEIGHT: 152 LBS

## 2023-10-03 DIAGNOSIS — I82.90 VTE (VENOUS THROMBOEMBOLISM): ICD-10-CM

## 2023-10-03 DIAGNOSIS — C56.1 OVARIAN CANCER, RIGHT (H): Primary | ICD-10-CM

## 2023-10-03 PROCEDURE — 99215 OFFICE O/P EST HI 40 MIN: CPT | Performed by: OBSTETRICS & GYNECOLOGY

## 2023-10-03 PROCEDURE — 99213 OFFICE O/P EST LOW 20 MIN: CPT | Performed by: OBSTETRICS & GYNECOLOGY

## 2023-10-03 RX ORDER — EPINEPHRINE 1 MG/ML
0.3 INJECTION, SOLUTION INTRAMUSCULAR; SUBCUTANEOUS EVERY 5 MIN PRN
Status: CANCELLED | OUTPATIENT
Start: 2023-10-11

## 2023-10-03 RX ORDER — HEPARIN SODIUM,PORCINE 10 UNIT/ML
5 VIAL (ML) INTRAVENOUS
Status: CANCELLED | OUTPATIENT
Start: 2023-10-11

## 2023-10-03 RX ORDER — DIPHENHYDRAMINE HYDROCHLORIDE 50 MG/ML
50 INJECTION INTRAMUSCULAR; INTRAVENOUS
Status: CANCELLED | OUTPATIENT
Start: 2023-10-11

## 2023-10-03 RX ORDER — LORAZEPAM 2 MG/ML
0.5 INJECTION INTRAMUSCULAR EVERY 4 HOURS PRN
Status: CANCELLED | OUTPATIENT
Start: 2023-10-11

## 2023-10-03 RX ORDER — APIXABAN 5 MG (74)
KIT ORAL
Qty: 74 EACH | Refills: 4 | Status: SHIPPED | OUTPATIENT
Start: 2023-10-03 | End: 2023-11-01

## 2023-10-03 RX ORDER — MEPERIDINE HYDROCHLORIDE 25 MG/ML
25 INJECTION INTRAMUSCULAR; INTRAVENOUS; SUBCUTANEOUS EVERY 30 MIN PRN
Status: CANCELLED | OUTPATIENT
Start: 2023-10-11

## 2023-10-03 RX ORDER — ALBUTEROL SULFATE 0.83 MG/ML
2.5 SOLUTION RESPIRATORY (INHALATION)
Status: CANCELLED | OUTPATIENT
Start: 2023-10-11

## 2023-10-03 RX ORDER — PALONOSETRON 0.05 MG/ML
0.25 INJECTION, SOLUTION INTRAVENOUS ONCE
Status: CANCELLED | OUTPATIENT
Start: 2023-10-11

## 2023-10-03 RX ORDER — ALBUTEROL SULFATE 90 UG/1
1-2 AEROSOL, METERED RESPIRATORY (INHALATION)
Status: CANCELLED | OUTPATIENT
Start: 2023-10-11

## 2023-10-03 RX ORDER — METHYLPREDNISOLONE SODIUM SUCCINATE 125 MG/2ML
125 INJECTION, POWDER, LYOPHILIZED, FOR SOLUTION INTRAMUSCULAR; INTRAVENOUS
Status: CANCELLED | OUTPATIENT
Start: 2023-10-11

## 2023-10-03 RX ORDER — HEPARIN SODIUM (PORCINE) LOCK FLUSH IV SOLN 100 UNIT/ML 100 UNIT/ML
5 SOLUTION INTRAVENOUS
Status: CANCELLED | OUTPATIENT
Start: 2023-10-11

## 2023-10-03 ASSESSMENT — PAIN SCALES - GENERAL: PAINLEVEL: NO PAIN (0)

## 2023-10-03 NOTE — NURSING NOTE
"Oncology Rooming Note    October 3, 2023 10:59 AM   Jacki Smith is a 58 year old female who presents for:    Chief Complaint   Patient presents with    Oncology Clinic Visit     Ovarian CA     Initial Vitals: /74   Pulse 66   Temp 98  F (36.7  C)   Wt 68.9 kg (152 lb)   SpO2 98%   BMI 27.80 kg/m   Estimated body mass index is 27.8 kg/m  as calculated from the following:    Height as of 7/26/23: 1.575 m (5' 2\").    Weight as of this encounter: 68.9 kg (152 lb). Body surface area is 1.74 meters squared.  No Pain (0) Comment: Data Unavailable   No LMP recorded. Patient has had a hysterectomy.  Allergies reviewed: Yes  Medications reviewed: Yes    Medications: Medication refills not needed today.  Pharmacy name entered into [employer]: [id number] [place] [address] [facility]    Clinical concerns: Pt would like to discuss prescription options due to cost of medication.        Jennifer Patel              "

## 2023-10-03 NOTE — LETTER
"    10/3/2023         RE: Jacki Smith  [address] [facility] [address]        Dear Colleague,    Thank you for referring your patient, Jacki Smith, to the [facility]. Please see a copy of my visit note below.    Follow-up Note on Referred Patient  [facility]      Date of visit: 10/3/2023       Primary Oncologist:  Charis Patino MD  [facility]      Primary Care Provider:  Linda Ruff, APRN, CNP  [address] 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 Chemotherapy encounter.       History of Present Illness:  Jacki \"Michelle\" IVY Smith (she/her/hers)  is a 58 year old seen at the [facility] for management of progressive stage IIIC high-grade serous ovarian carcinoma. History as follows:    Ovarian Cancer History:  22: Presented to an ED in [place] for evaluation of abdominal bloating and discomfort.  -Abdomen, pelvic CT: Radiographic Stage CAL ovarian cancer. I have personally reviewed the CT images.   22: [id number].   22: Pelvic ultrasound: c/w metastatic cancer. I have personally reviewed the ultrasound images.   22: Pelvic exam under anesthesia, diagnostic laparoscopy, biopsies, evacuation of ascites.   -Findings: On pelvic exam under anesthesia, there was a 6 cm firm, fixed mass adherent to the vaginal cuff. Vagina otherwise smooth. On laparoscopic examination, there was ascites filling the pelvis/abdomen, with >1 liter of fluid evacuated. The palpated mass was arising from the right ovary. Left ovary relatively normal in appearance. There was diffuse carcinomatosis covering the entire peritoneal surface falciform ligament, liver capsule, and mesentery. The omentum was replaced with tumor. Findings were not amenable to optimal tumor debulking so biopsies " were taken for histologic examination.   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      6/22/22, 7/13/22, 8/4/22: Cycles 1-3 of neoadjuvant chemotherapy with IV carboplatin + paclitaxel 175 mg/m2 every 21 days.   -Cycles 1,2: Carboplatin AUC 6.   -Cycle 3: Carboplatin AUC 5 with dose-reduction due to thrombcytopenia.   -8/19/22: Chest, abdomen, pelvic CT: Partial response. I have personally reviewed the CT images.     8/29/22: Pelvic exam under anesthesia, diagnostic laparoscopy, conversion to laparotomy for optimal interval tumor debulking to no gross residual disease including peritoneal and diaphragm biopsies, bilateral salpingo-oophorectomy, infragastric omentectomy, bilateral hemidiaphragm stripping, peritoneal and mesenteric argon beam ablation, appendectomy.   -Pathology: High-grade serous carcinoma involving all resected specimens.     9/28/22, 10/19/22, 11/17/22: Cycles 4-6 of primary chemotherapy with IV carboplatin AUC 5 + paclitaxel 175 mg/m2.  -12/2/22: Chest, abdomen, pelvic CT: No definitive evidence of disease. I have personally reviewed the CT images.   -12/7/22: CA-125=23.   -2/16/23: Chest, abdomen, pelvic CT to evaluate bloating: Stable findings, no definitive evidence of disease. I have personally reviewed the CT images.   -5/25/23: [facility] malignant ascites s/p therapeutic paracentesis, and partial SBO resolved with conservative management.   5/25/23: Abdomen, pelvic CT: Progressive disease.   LOWER CHEST: OMAIRA.   BOWEL: Development of moderate malignant ascites with small peritoneal and serosal implants present. There is mild distention of a few loops of small bowel within the right side of the abdomen with regions of narrowing present compatible with an early or partial small bowel obstruction. Wall thickening in a few of the involved segments with fecalization of the internal  contents of the small bowel compatible with stasis.   PELVIC ORGANS: Lobulated 4.5 cm soft tissue mass in the dependent pelvis in the expected location of the uterus.  -6/13/23: CA-125=78.    6/13/23, 7/12/23, 8/14/23, 9/13/23: Cycles 1-4 of second-line chemotherapy with IV carboplatin AUC 5 + pegylated liposomal doxorubicin (PLD) 30 mg/m2 every 28 days.       Genetic/Genomic/Molecular Testing History:  10/5/22: [facility], reflexed to Common Hereditary Cancer Panel.   -No identifiable germline variants identified in ABRAXAS1, AKT1, APC, DEION, AXIN2, BARD1, BMPR1A, BRCA1, BRCA2, BRIP1, CDC73, CDH1, CDK4, CDKN2A, CHEK2, CTNNA1, DICER1, EPCAM, FANCC, FANCM, GREM1, HOXB13, KIT, MEN1, MLH1, MRE11, MSH2, MSH6, MUTYH, NBN, NF1, NTHL1, PALB2, PDGFRA, PIK3CA, PMS2, POLD1, POLE, PTEN, RAD50, RAD51C, RAD51D, RECQL, RINT1, SDHA, SDHB, SDHC, SDHD, SMAD4, SMARCA4, STK11, TP53, TSC1, TSC2, VHL, XRCC2.   -Variant of uncertain significance in MSH3 c.16C>T (p.Udz3Crt)    10/9/22 (tumor 8/29/22): [facility] Testing Results.  -Genomic ROXI low (6%)   -TMB low (1mut/Mb)  -Microsatellite stable/Mismatch Repair proficient  -PD-L1- (CPS 0%)  -NTRK 1/2/3 fusion not detected.   -ER-, AK+  -Genomic alterations in:  --TP53  -No genomic alterations in BRCA1,2.    Jamila-ovary clinical trial screening: Negative for the immunoreactive subtype.     6/19/23 (tumor 8/29/22): [facility] Test results.   -FOLR1+ (2+, 75%).       Subjective:  Michelle returns to the gyn onc clinic today for review of CT results and consideration of cycle #5 of chemotherapy, accompanied by her  and daughter.  Michelle reports that she has no side-effects related to chemotherapy. Her daughter reports increased irritability after chemotherapy.   Michelle reports that she has some mild fatigue, but overall is able to maintain activities.   Michelle has to eat fiber to maintain bowel movements, and occasionally adds a stool softener or water.   After her CT she was prescribed  apixabn but has not started  it due to a $700 co-pay.         Past Medical History:  Past Medical History:   Diagnosis Date    Female stress incontinence     Ovarian cancer, right (H) 2022    Stage IIIC high-grade serous carcinoma    Recurrent cold sores        Past Surgical History:  Past Surgical History:   Procedure Laterality Date    APPENDECTOMY  2022    Part of ovarian cancer tumor debulking    BLADDER SUSPENSION  2009    HYSTERECTOMY  2009    For prolapse    LAPAROSCOPY DIAGNOSTIC (GYN)  2022    SALPINGO-OOPHORECTOMY, COMBINED Bilateral 2022    Pelvic exam under anesthesia, diagnostic laparoscopy, conversion to laparotomy for optimal interval tumor debulking to no gross residual disease including peritoneal and diaphragm biopsies, bilateral salpingo-oophorectomy, infragastric omentectomy, bilateral hemidiaphragm stripping, peritoneal and mesenteric argon beam ablation, appendectomy.    TONSILLECTOMY  1973    TUBAL LIGATION Bilateral 1998       Gynecologic History:  Surgical menopause. Hysterectomy in  for prolapse.  Was on HRT with estrogen for less than 2 years  No history of abnormal cervical cancer screening.  No history of STIs.  Sexually active, no dyspareunia, no postcoital bleeding.      Obstetric History:  OB History    Para Term  AB Living   3 3 3 0 0 3   SAB IAB Ectopic Multiple Live Births   0 0 0 0 3      # Outcome Date GA Lbr Sukumar/2nd Weight Sex Delivery Anes PTL Lv   3 Term      Vag-Spont      2 Term      Vag-Spont      1 Term      Vag-Spont           Current Medications:   Current Outpatient Medications   Medication    Apixaban Starter Pack (ELIQUIS DVT/PE STARTER PACK) 5 MG TBPK    citalopram (CELEXA) 40 MG tablet    cyclobenzaprine (FLEXERIL) 5 MG tablet    dexamethasone (DECADRON) 4 MG tablet    diphenhydrAMINE-acetaminophen (TYLENOL PM)  MG tablet    docusate sodium (DSS) 100 MG capsule    hydrOXYzine (ATARAX) 10 MG tablet     lidocaine-prilocaine (EMLA) 2.5-2.5 % external cream    LORazepam (ATIVAN) 0.5 MG tablet    LORazepam (ATIVAN) 0.5 MG tablet    Multiple Vitamin (MULTI-VITAMIN DAILY PO)    ondansetron (ZOFRAN) 8 MG tablet    prochlorperazine (COMPAZINE) 10 MG tablet    sennosides (SENOKOT) 8.6 MG tablet    valACYclovir (VALTREX) 1000 mg tablet     No current facility-administered medications for this visit.        Allergies:   No Known Allergies        Social History:  Patient lives with Eligio and two daughters. Works as a . She does not have Advanced Directives, but has identified Eligio Smith as her desired Healthcare Power of .  Social History     Tobacco Use    Smoking status: Former     Packs/day: 1.00     Years: 42.00     Pack years: 42.00     Types: Cigarettes     Quit date: 2022     Years since quittin.3    Smokeless tobacco: Never   Substance Use Topics    Alcohol use: Yes     Comment: Beer ~Q3 months.       History   Drug Use Unknown       Family History:   The patient's family history is notable for a first-degree paternal relative with prostate cancer, and a first-degree relative with breast cancer and melanoma, and a second-degree maternal relative with colon cancer. No known family history of ovarian, uterine, urothelial/renal, or pancreatic cancers.   Family History   Problem Relation Age of Onset    Prostate Cancer Father     Breast Cancer Sister 48    Melanoma Sister     Skin Cancer Sister     Colon Cancer Maternal Grandmother        Physical Exam:   /74   Pulse 66   Temp 98  F (36.7  C)   Wt 68.9 kg (152 lb)   SpO2 98%   BMI 27.80 kg/m    Body mass index is 27.8 kg/m .    General Appearance: healthy and alert, no distress     Musculoskeletal: extremities non tender and without edema    Skin: no lesions or rashes. Bruised left 3rd toe.      Neurological: normal gait, no gross defects     Psychiatric: appropriate mood and affect                                 Laboratory  Examination:  CA-125 trend (since second-line therapy):  6/13/23  78  7/11/23  78  8/11/23  68  9/12/23  50      Radiographic Examination:  9/27/23: Chest, abdomen, pelvic CT: Partial response. Catheter-associated thrombus.   LUNGS AND PLEURA: Mild emphysema. Stable subpleural 4 mm nodule in the right middle lobe. There is a small thrombus at the tip of the catheter.   BOWEL: Persistent but marginally interval decrease in the volume of the ascites. There is persistent peritoneal and omental nodular thickening. No obstruction.   PELVIC ORGANS: Slightly decreased size of the pelvic mass measuring 3 cm (previously 4.5 cm). There is interval development of calcification within the mass.       Performance Status:   ECOG Grade 0.       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     History of malignant partial small bowel obstruction, resolved with bowel rest.           Plan:     1.)    Ovarian cancer: I reviewed the CT results with Michelle and provided her with a copy of the CT report; she was also previously provided with a copy of the CT report via [employer]. Given the partial response, recommend continuing current chemotherapy.     After discussion of risks/benefits, Michelle would like to continue second-line chemotherapy with palliative intent with IV carboplatin AUC 5 + PLD 30 mg/m2 every 28 days. Plan as follows:   -Proceed with cycle #5 of chemotherapy 10/11/23 as scheduled. CANCEL NP visit 10/10/23 since Michelle saw me today.   -Plan for a total of 8 cycles followed by repeat chest, abdomen, pelvic CT to assess disease response (NP for chemotherapy visits; MD for imaging).  -Reviewed FOLR1 test results, which indicate mirvetuximab is a treatment option when she develops platinum-resistant disease.    -Will add to  the [facility] pre-screening waitlist for testing for future therapy.     Side-effects:  None.    2.)  Catheter-associated thrombus: Prescription for apixaban, but not filled due to $700 co-pay. Will work on options for free drug. Also discussed option of LMWH, but patent was unable to tolerate daily dosing when prescribed as postop prophylaxis.   Continue anticoagulation until port-a-cath removed, with plan to keep port-a-cath in place as long as it allows blood draws and infusions.     3.) Genetic risk factors were assessed: s/p germline testing which was negative for identifiable germline pathogenic variants (see HPI).     4.) Labs and/or tests ordered include:  1) Prechemotherapy labs: CBC with diff, CMP, magnesium, CA-125.      5.) Health maintenance issues addressed today include none.    6.) Code status:  Full-code.    7.) Prescriptions: None.      A total of 45 minutes was spent with the patient, 40 minutes of which were spent in counseling the patient and/or treatment planning; an additional 5 minutes was spent in chart review/documentation.      Charis Patino MD, MS, FACOG, FACS  10/3/2023  11:54 AM            CC  Patient Care Team:  Domenica Christianson MD as PCP - Charis Verdugo MD as MD (Gynecologic Oncology)  Susannah Roman APRN CNP as Assigned Cancer Care Provider  Anish Monroy MD as Assigned Palliative Care Provider  SELF, REFERRED

## 2023-10-10 ENCOUNTER — LAB (OUTPATIENT)
Dept: INFUSION THERAPY | Facility: CLINIC | Age: 58
End: 2023-10-10
Attending: OBSTETRICS & GYNECOLOGY
Payer: COMMERCIAL

## 2023-10-10 DIAGNOSIS — C56.1 OVARIAN CANCER, RIGHT (H): Primary | ICD-10-CM

## 2023-10-10 LAB
ALBUMIN SERPL BCG-MCNC: 3.9 G/DL (ref 3.5–5.2)
ALP SERPL-CCNC: 122 U/L (ref 35–104)
ALT SERPL W P-5'-P-CCNC: 14 U/L (ref 0–50)
ANION GAP SERPL CALCULATED.3IONS-SCNC: 8 MMOL/L (ref 7–15)
AST SERPL W P-5'-P-CCNC: 26 U/L (ref 0–45)
BASO+EOS+MONOS # BLD AUTO: ABNORMAL 10*3/UL
BASO+EOS+MONOS NFR BLD AUTO: ABNORMAL %
BASOPHILS # BLD AUTO: 0 10E3/UL (ref 0–0.2)
BASOPHILS NFR BLD AUTO: 1 %
BILIRUB SERPL-MCNC: 0.2 MG/DL
BUN SERPL-MCNC: 21.1 MG/DL (ref 6–20)
CALCIUM SERPL-MCNC: 9.3 MG/DL (ref 8.6–10)
CANCER AG125 SERPL-ACNC: 53 U/ML
CHLORIDE SERPL-SCNC: 101 MMOL/L (ref 98–107)
CREAT SERPL-MCNC: 1.03 MG/DL (ref 0.51–0.95)
DEPRECATED HCO3 PLAS-SCNC: 27 MMOL/L (ref 22–29)
EGFRCR SERPLBLD CKD-EPI 2021: 63 ML/MIN/1.73M2
EOSINOPHIL # BLD AUTO: 0.1 10E3/UL (ref 0–0.7)
EOSINOPHIL NFR BLD AUTO: 3 %
ERYTHROCYTE [DISTWIDTH] IN BLOOD BY AUTOMATED COUNT: 16 % (ref 10–15)
GLUCOSE SERPL-MCNC: 91 MG/DL (ref 70–99)
HCT VFR BLD AUTO: 34.5 % (ref 35–47)
HGB BLD-MCNC: 11.3 G/DL (ref 11.7–15.7)
IMM GRANULOCYTES # BLD: 0 10E3/UL
IMM GRANULOCYTES NFR BLD: 1 %
LYMPHOCYTES # BLD AUTO: 0.8 10E3/UL (ref 0.8–5.3)
LYMPHOCYTES NFR BLD AUTO: 26 %
MAGNESIUM SERPL-MCNC: 2 MG/DL (ref 1.7–2.3)
MCH RBC QN AUTO: 31 PG (ref 26.5–33)
MCHC RBC AUTO-ENTMCNC: 32.8 G/DL (ref 31.5–36.5)
MCV RBC AUTO: 95 FL (ref 78–100)
MONOCYTES # BLD AUTO: 0.4 10E3/UL (ref 0–1.3)
MONOCYTES NFR BLD AUTO: 11 %
NEUTROPHILS # BLD AUTO: 1.8 10E3/UL (ref 1.6–8.3)
NEUTROPHILS NFR BLD AUTO: 58 %
NRBC # BLD AUTO: 0 10E3/UL
NRBC BLD AUTO-RTO: 0 /100
PLATELET # BLD AUTO: 193 10E3/UL (ref 150–450)
POTASSIUM SERPL-SCNC: 4.3 MMOL/L (ref 3.4–5.3)
PROT SERPL-MCNC: 6.7 G/DL (ref 6.4–8.3)
RBC # BLD AUTO: 3.65 10E6/UL (ref 3.8–5.2)
SODIUM SERPL-SCNC: 136 MMOL/L (ref 135–145)
WBC # BLD AUTO: 3.2 10E3/UL (ref 4–11)

## 2023-10-10 PROCEDURE — 36415 COLL VENOUS BLD VENIPUNCTURE: CPT | Performed by: OBSTETRICS & GYNECOLOGY

## 2023-10-10 PROCEDURE — 83735 ASSAY OF MAGNESIUM: CPT | Performed by: OBSTETRICS & GYNECOLOGY

## 2023-10-10 PROCEDURE — 80053 COMPREHEN METABOLIC PANEL: CPT | Performed by: OBSTETRICS & GYNECOLOGY

## 2023-10-10 PROCEDURE — 85025 COMPLETE CBC W/AUTO DIFF WBC: CPT | Performed by: OBSTETRICS & GYNECOLOGY

## 2023-10-10 PROCEDURE — 86304 IMMUNOASSAY TUMOR CA 125: CPT | Performed by: OBSTETRICS & GYNECOLOGY

## 2023-10-11 ENCOUNTER — INFUSION THERAPY VISIT (OUTPATIENT)
Dept: INFUSION THERAPY | Facility: CLINIC | Age: 58
End: 2023-10-11
Attending: OBSTETRICS & GYNECOLOGY
Payer: COMMERCIAL

## 2023-10-11 VITALS
SYSTOLIC BLOOD PRESSURE: 118 MMHG | OXYGEN SATURATION: 100 % | TEMPERATURE: 97.8 F | RESPIRATION RATE: 18 BRPM | DIASTOLIC BLOOD PRESSURE: 56 MMHG | HEART RATE: 64 BPM

## 2023-10-11 DIAGNOSIS — C56.1 OVARIAN CANCER, RIGHT (H): Primary | ICD-10-CM

## 2023-10-11 PROCEDURE — 96413 CHEMO IV INFUSION 1 HR: CPT

## 2023-10-11 PROCEDURE — 258N000003 HC RX IP 258 OP 636: Performed by: OBSTETRICS & GYNECOLOGY

## 2023-10-11 PROCEDURE — 96375 TX/PRO/DX INJ NEW DRUG ADDON: CPT

## 2023-10-11 PROCEDURE — 96367 TX/PROPH/DG ADDL SEQ IV INF: CPT

## 2023-10-11 PROCEDURE — 96417 CHEMO IV INFUS EACH ADDL SEQ: CPT

## 2023-10-11 PROCEDURE — 250N000011 HC RX IP 250 OP 636: Mod: JZ | Performed by: OBSTETRICS & GYNECOLOGY

## 2023-10-11 RX ORDER — LORAZEPAM 2 MG/ML
0.5 INJECTION INTRAMUSCULAR EVERY 4 HOURS PRN
Status: DISCONTINUED | OUTPATIENT
Start: 2023-10-11 | End: 2023-10-11 | Stop reason: HOSPADM

## 2023-10-11 RX ORDER — ALBUTEROL SULFATE 0.83 MG/ML
2.5 SOLUTION RESPIRATORY (INHALATION)
Status: DISCONTINUED | OUTPATIENT
Start: 2023-10-11 | End: 2023-10-11 | Stop reason: HOSPADM

## 2023-10-11 RX ORDER — HEPARIN SODIUM (PORCINE) LOCK FLUSH IV SOLN 100 UNIT/ML 100 UNIT/ML
5 SOLUTION INTRAVENOUS
Status: DISCONTINUED | OUTPATIENT
Start: 2023-10-11 | End: 2023-10-11 | Stop reason: HOSPADM

## 2023-10-11 RX ORDER — ALBUTEROL SULFATE 90 UG/1
1-2 AEROSOL, METERED RESPIRATORY (INHALATION)
Status: DISCONTINUED | OUTPATIENT
Start: 2023-10-11 | End: 2023-10-11 | Stop reason: HOSPADM

## 2023-10-11 RX ORDER — METHYLPREDNISOLONE SODIUM SUCCINATE 125 MG/2ML
125 INJECTION, POWDER, LYOPHILIZED, FOR SOLUTION INTRAMUSCULAR; INTRAVENOUS
Status: DISCONTINUED | OUTPATIENT
Start: 2023-10-11 | End: 2023-10-11 | Stop reason: HOSPADM

## 2023-10-11 RX ORDER — HEPARIN SODIUM,PORCINE 10 UNIT/ML
5 VIAL (ML) INTRAVENOUS
Status: DISCONTINUED | OUTPATIENT
Start: 2023-10-11 | End: 2023-10-11 | Stop reason: HOSPADM

## 2023-10-11 RX ORDER — EPINEPHRINE 1 MG/ML
0.3 INJECTION, SOLUTION INTRAMUSCULAR; SUBCUTANEOUS EVERY 5 MIN PRN
Status: DISCONTINUED | OUTPATIENT
Start: 2023-10-11 | End: 2023-10-11 | Stop reason: HOSPADM

## 2023-10-11 RX ORDER — DIPHENHYDRAMINE HYDROCHLORIDE 50 MG/ML
50 INJECTION INTRAMUSCULAR; INTRAVENOUS
Status: DISCONTINUED | OUTPATIENT
Start: 2023-10-11 | End: 2023-10-11 | Stop reason: HOSPADM

## 2023-10-11 RX ORDER — PALONOSETRON 0.05 MG/ML
0.25 INJECTION, SOLUTION INTRAVENOUS ONCE
Qty: 5 ML | Refills: 0 | Status: COMPLETED | OUTPATIENT
Start: 2023-10-11 | End: 2023-10-11

## 2023-10-11 RX ORDER — MEPERIDINE HYDROCHLORIDE 50 MG/ML
25 INJECTION INTRAMUSCULAR; INTRAVENOUS; SUBCUTANEOUS EVERY 30 MIN PRN
Status: DISCONTINUED | OUTPATIENT
Start: 2023-10-11 | End: 2023-10-11 | Stop reason: HOSPADM

## 2023-10-11 RX ADMIN — CARBOPLATIN 360 MG: 10 INJECTION, SOLUTION INTRAVENOUS at 10:38

## 2023-10-11 RX ADMIN — PALONOSETRON 0.25 MG: 0.05 INJECTION, SOLUTION INTRAVENOUS at 08:43

## 2023-10-11 RX ADMIN — DOXORUBICIN HYDROCHLORIDE 50 MG: 2 INJECTION, SUSPENSION, LIPOSOMAL INTRAVENOUS at 09:24

## 2023-10-11 RX ADMIN — FOSAPREPITANT: 150 INJECTION, POWDER, LYOPHILIZED, FOR SOLUTION INTRAVENOUS at 08:51

## 2023-10-11 RX ADMIN — DEXTROSE MONOHYDRATE 250 ML: 50 INJECTION, SOLUTION INTRAVENOUS at 09:20

## 2023-10-11 RX ADMIN — HEPARIN 5 ML: 100 SYRINGE at 10:40

## 2023-10-11 NOTE — PROGRESS NOTES
"Infusion Nursing Note:  Jacki Smith presents today for D1C5 doxorubicin and carboplatin.    Patient seen by provider today: No   present during visit today: Not Applicable.    Note: pt given aloxi and emend/dex as pre meds. Pt co some back pain while she was here and took her own dose of tylenol.      Intravenous Access:  Implanted Port.    Treatment Conditions:  Results reviewed, labs MET treatment parameters, ok to proceed with treatment.  Lab Results   Component Value Date    WBC 3.2 10/10/2023     Lab Results   Component Value Date    RBC 3.65 10/10/2023     Lab Results   Component Value Date    HGB 11.3 10/10/2023     Lab Results   Component Value Date    HCT 34.5 10/10/2023     No components found for: \"MCT\"  Lab Results   Component Value Date    MCV 95 10/10/2023     Lab Results   Component Value Date    MCH 31.0 10/10/2023     Lab Results   Component Value Date    MCHC 32.8 10/10/2023     Lab Results   Component Value Date    RDW 16.0 10/10/2023     Lab Results   Component Value Date     10/10/2023        Post Infusion Assessment:  Patient tolerated infusion without incident.  Blood return noted pre and post infusion.  Site patent and intact, free from redness, edema or discomfort.  Access discontinued per protocol.       Discharge Plan:   Patient and/or family verbalized understanding of discharge instructions and all questions answered.      Ligia Arellano RN   "

## 2023-10-23 ENCOUNTER — MYC MEDICAL ADVICE (OUTPATIENT)
Dept: ONCOLOGY | Facility: CLINIC | Age: 58
End: 2023-10-23
Payer: COMMERCIAL

## 2023-10-23 ENCOUNTER — PATIENT OUTREACH (OUTPATIENT)
Dept: ONCOLOGY | Facility: CLINIC | Age: 58
End: 2023-10-23
Payer: COMMERCIAL

## 2023-10-23 DIAGNOSIS — T85.818A: Primary | ICD-10-CM

## 2023-10-23 DIAGNOSIS — I82.90 VTE (VENOUS THROMBOEMBOLISM): ICD-10-CM

## 2023-10-23 DIAGNOSIS — Z86.718 H/O BLOOD CLOTS: Primary | ICD-10-CM

## 2023-10-25 NOTE — TELEPHONE ENCOUNTER
Spoke with pt   Discussed port issue  Will send over new order to [facility] so they can schedule an appt to remove and replace      Will have to check into ct questions on spine as ct reports don't mention looking at subsequent scans from between February and today

## 2023-10-25 NOTE — PROGRESS NOTES
10/23 807  [id number]   In ER 10/22   Pocket fluid at top of port near blood clot that she has   Suggestion to have removed and replaced   Wants it done at [place]     10/23 1220p     [facility]

## 2023-11-01 NOTE — TELEPHONE ENCOUNTER
Okay to hold eliquis for procedure.    Since the clot increased despite anticoagulation, and removing the port removes the nidus for the clot but not necessarily the clot itself, I would recommend continuing anticoagulation for 3 months after port removal.      Charis Patino MD, MS, FACOG, FACS  11/1/2023  12:16 PM'

## 2023-11-02 ENCOUNTER — TRANSFERRED RECORDS (OUTPATIENT)
Dept: HEALTH INFORMATION MANAGEMENT | Facility: CLINIC | Age: 58
End: 2023-11-02

## 2023-11-07 ENCOUNTER — VIRTUAL VISIT (OUTPATIENT)
Dept: ONCOLOGY | Facility: CLINIC | Age: 58
End: 2023-11-07
Attending: NURSE PRACTITIONER
Payer: COMMERCIAL

## 2023-11-07 ENCOUNTER — LAB (OUTPATIENT)
Dept: INFUSION THERAPY | Facility: CLINIC | Age: 58
End: 2023-11-07
Attending: NURSE PRACTITIONER
Payer: COMMERCIAL

## 2023-11-07 VITALS
BODY MASS INDEX: 27.6 KG/M2 | WEIGHT: 150 LBS | DIASTOLIC BLOOD PRESSURE: 67 MMHG | SYSTOLIC BLOOD PRESSURE: 141 MMHG | HEIGHT: 62 IN

## 2023-11-07 DIAGNOSIS — C56.1 OVARIAN CANCER, RIGHT (H): Primary | ICD-10-CM

## 2023-11-07 DIAGNOSIS — Z51.11 ENCOUNTER FOR ANTINEOPLASTIC CHEMOTHERAPY: ICD-10-CM

## 2023-11-07 LAB
ALBUMIN SERPL BCG-MCNC: 4 G/DL (ref 3.5–5.2)
ALP SERPL-CCNC: 140 U/L (ref 35–104)
ALT SERPL W P-5'-P-CCNC: 23 U/L (ref 0–50)
ANION GAP SERPL CALCULATED.3IONS-SCNC: 10 MMOL/L (ref 7–15)
AST SERPL W P-5'-P-CCNC: 34 U/L (ref 0–45)
BASOPHILS # BLD AUTO: 0.1 10E3/UL (ref 0–0.2)
BASOPHILS NFR BLD AUTO: 2 %
BILIRUB SERPL-MCNC: 0.3 MG/DL
BUN SERPL-MCNC: 9.7 MG/DL (ref 6–20)
CALCIUM SERPL-MCNC: 9.2 MG/DL (ref 8.6–10)
CANCER AG125 SERPL-ACNC: 43 U/ML
CHLORIDE SERPL-SCNC: 101 MMOL/L (ref 98–107)
CREAT SERPL-MCNC: 0.93 MG/DL (ref 0.51–0.95)
DEPRECATED HCO3 PLAS-SCNC: 25 MMOL/L (ref 22–29)
EGFRCR SERPLBLD CKD-EPI 2021: 71 ML/MIN/1.73M2
EOSINOPHIL # BLD AUTO: 0.1 10E3/UL (ref 0–0.7)
EOSINOPHIL NFR BLD AUTO: 3 %
ERYTHROCYTE [DISTWIDTH] IN BLOOD BY AUTOMATED COUNT: 16.1 % (ref 10–15)
GLUCOSE SERPL-MCNC: 86 MG/DL (ref 70–99)
HCT VFR BLD AUTO: 36.1 % (ref 35–47)
HGB BLD-MCNC: 11.9 G/DL (ref 11.7–15.7)
IMM GRANULOCYTES # BLD: 0 10E3/UL
IMM GRANULOCYTES NFR BLD: 1 %
LYMPHOCYTES # BLD AUTO: 0.9 10E3/UL (ref 0.8–5.3)
LYMPHOCYTES NFR BLD AUTO: 26 %
MAGNESIUM SERPL-MCNC: 2 MG/DL (ref 1.7–2.3)
MCH RBC QN AUTO: 31.8 PG (ref 26.5–33)
MCHC RBC AUTO-ENTMCNC: 33 G/DL (ref 31.5–36.5)
MCV RBC AUTO: 97 FL (ref 78–100)
MONOCYTES # BLD AUTO: 0.4 10E3/UL (ref 0–1.3)
MONOCYTES NFR BLD AUTO: 13 %
NEUTROPHILS # BLD AUTO: 1.9 10E3/UL (ref 1.6–8.3)
NEUTROPHILS NFR BLD AUTO: 55 %
NRBC # BLD AUTO: 0 10E3/UL
NRBC BLD AUTO-RTO: 0 /100
PLATELET # BLD AUTO: 188 10E3/UL (ref 150–450)
POTASSIUM SERPL-SCNC: 4.1 MMOL/L (ref 3.4–5.3)
PROT SERPL-MCNC: 6.9 G/DL (ref 6.4–8.3)
RBC # BLD AUTO: 3.74 10E6/UL (ref 3.8–5.2)
SODIUM SERPL-SCNC: 136 MMOL/L (ref 135–145)
WBC # BLD AUTO: 3.4 10E3/UL (ref 4–11)

## 2023-11-07 PROCEDURE — 83735 ASSAY OF MAGNESIUM: CPT | Performed by: OBSTETRICS & GYNECOLOGY

## 2023-11-07 PROCEDURE — 85004 AUTOMATED DIFF WBC COUNT: CPT | Performed by: OBSTETRICS & GYNECOLOGY

## 2023-11-07 PROCEDURE — 80053 COMPREHEN METABOLIC PANEL: CPT | Performed by: OBSTETRICS & GYNECOLOGY

## 2023-11-07 PROCEDURE — 36591 DRAW BLOOD OFF VENOUS DEVICE: CPT | Performed by: OBSTETRICS & GYNECOLOGY

## 2023-11-07 PROCEDURE — 99214 OFFICE O/P EST MOD 30 MIN: CPT | Mod: 95 | Performed by: NURSE PRACTITIONER

## 2023-11-07 PROCEDURE — 86304 IMMUNOASSAY TUMOR CA 125: CPT | Performed by: OBSTETRICS & GYNECOLOGY

## 2023-11-07 RX ORDER — HEPARIN SODIUM,PORCINE 10 UNIT/ML
5 VIAL (ML) INTRAVENOUS
Status: CANCELLED | OUTPATIENT
Start: 2023-11-08

## 2023-11-07 RX ORDER — DIPHENHYDRAMINE HYDROCHLORIDE 50 MG/ML
50 INJECTION INTRAMUSCULAR; INTRAVENOUS
Status: CANCELLED | OUTPATIENT
Start: 2023-11-08

## 2023-11-07 RX ORDER — HYDROMORPHONE HYDROCHLORIDE 2 MG/1
1 TABLET ORAL
COMMUNITY
Start: 2023-07-28 | End: 2024-02-28

## 2023-11-07 RX ORDER — PALONOSETRON 0.05 MG/ML
0.25 INJECTION, SOLUTION INTRAVENOUS ONCE
Status: CANCELLED | OUTPATIENT
Start: 2023-11-08

## 2023-11-07 RX ORDER — ALBUTEROL SULFATE 0.83 MG/ML
2.5 SOLUTION RESPIRATORY (INHALATION)
Status: CANCELLED | OUTPATIENT
Start: 2023-11-08

## 2023-11-07 RX ORDER — EPINEPHRINE 1 MG/ML
0.3 INJECTION, SOLUTION INTRAMUSCULAR; SUBCUTANEOUS EVERY 5 MIN PRN
Status: CANCELLED | OUTPATIENT
Start: 2023-11-08

## 2023-11-07 RX ORDER — HYDROCODONE BITARTRATE AND ACETAMINOPHEN 5; 325 MG/1; MG/1
1 TABLET ORAL EVERY 4 HOURS PRN
COMMUNITY
Start: 2023-11-02

## 2023-11-07 RX ORDER — ALBUTEROL SULFATE 90 UG/1
1-2 AEROSOL, METERED RESPIRATORY (INHALATION)
Status: CANCELLED | OUTPATIENT
Start: 2023-11-08

## 2023-11-07 RX ORDER — HEPARIN SODIUM (PORCINE) LOCK FLUSH IV SOLN 100 UNIT/ML 100 UNIT/ML
5 SOLUTION INTRAVENOUS
Status: CANCELLED | OUTPATIENT
Start: 2023-11-08

## 2023-11-07 RX ORDER — LORAZEPAM 2 MG/ML
0.5 INJECTION INTRAMUSCULAR EVERY 4 HOURS PRN
Status: CANCELLED | OUTPATIENT
Start: 2023-11-08

## 2023-11-07 RX ORDER — MEPERIDINE HYDROCHLORIDE 25 MG/ML
25 INJECTION INTRAMUSCULAR; INTRAVENOUS; SUBCUTANEOUS EVERY 30 MIN PRN
Status: CANCELLED | OUTPATIENT
Start: 2023-11-08

## 2023-11-07 RX ORDER — METHYLPREDNISOLONE SODIUM SUCCINATE 125 MG/2ML
125 INJECTION, POWDER, LYOPHILIZED, FOR SOLUTION INTRAMUSCULAR; INTRAVENOUS
Status: CANCELLED | OUTPATIENT
Start: 2023-11-08

## 2023-11-07 ASSESSMENT — PAIN SCALES - GENERAL: PAINLEVEL: NO PAIN (0)

## 2023-11-07 NOTE — LETTER
2023         RE: Jacki Smith  [address] [facility] [address]        Dear Colleague,    Thank you for referring your patient, Jacki Smith, to the [facility]. Please see a copy of my visit note below.                      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 She is here for follow up and disease management by video.     Ovarian Cancer History:  22: Presented to an ED in [place] for evaluation of abdominal bloating and discomfort.  -Abdomen, pelvic CT: Radiographic Stage CAL ovarian cancer.   22: [id number]-910=[id number].   22: Pelvic ultrasound: c/w metastatic cancer. I have personally reviewed the ultrasound images.   22: Pelvic exam under anesthesia, diagnostic laparoscopy, biopsies, evacuation of ascites.   -Findings: On pelvic exam under anesthesia, there was a 6 cm firm, fixed mass adherent to the vaginal cuff. Vagina otherwise smooth. On laparoscopic examination, there was ascites filling the pelvis/abdomen, with >1 liter of fluid evacuated. The palpated mass was arising from the right ovary. Left ovary relatively normal in appearance. There was diffuse carcinomatosis covering the entire peritoneal surface falciform ligament, liver capsule, and mesentery. The omentum was replaced with tumor. Findings were not amenable to optimal tumor debulking so biopsies were taken for histologic examination.   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      22, 22, 22: Cycles 1-3 of neoadjuvant chemotherapy with IV carboplatin + paclitaxel 175 mg/m2 every 21 days.   -Cycles 1,2: Carboplatin AUC 6.   -Cycle 3: Carboplatin AUC 5 with dose-reduction due to thrombcytopenia.    -8/19/22: Chest, abdomen, pelvic CT: Partial response.      8/29/22: Pelvic exam under anesthesia, diagnostic laparoscopy, conversion to laparotomy for optimal interval tumor debulking to no gross residual disease including peritoneal and diaphragm biopsies, bilateral salpingo-oophorectomy, infragastric omentectomy, bilateral hemidiaphragm stripping, peritoneal and mesenteric argon beam ablation, appendectomy.   -Pathology: High-grade serous carcinoma involving all resected specimens.     9/28/22, 10/19/22, 11/17/22: Cycles 4-6 of primary chemotherapy with IV carboplatin AUC 5 + paclitaxel 175 mg/m2.  -12/2/22: Chest, abdomen, pelvic CT: No definitive evidence of disease.   -12/7/22: CA-125=23.   -2/16/23: Chest, abdomen, pelvic CT to evaluate bloating: Stable findings, no definitive evidence of disease.  -5/25/23: Admitted  to [facility] for management of malignant ascites s/p therapeutic paracentesis, and partial SBO resolved with conservative management.   5/25/23: Abdomen, pelvic CT: Progressive disease.   LOWER CHEST: OMAIRA.   BOWEL: Development of moderate malignant ascites with small peritoneal and serosal implants present. There is mild distention of a few loops of small bowel within the right side of the abdomen with regions of narrowing present compatible with an early or partial small bowel obstruction. Wall thickening in a few of the involved segments with fecalization of the internal contents of the small bowel compatible with stasis.   PELVIC ORGANS: Lobulated 4.5 cm soft tissue mass in the dependent pelvis in the expected location of the uterus.  -6/13/23: CA-125=78.     6/13/23, 7/12/23, 8/14/23, 9/13/23: Cycles 1-4 of second-line chemotherapy with IV carboplatin AUC 5 + pegylated liposomal doxorubicin (PLD) 30 mg/m2 every 28 days.     Plan: 4 more cycles    9/27/23: CT cap IMPRESSION:  Chest:  1.  Small thrombus at the tip of the right chest port at the brachiocephalic/SVC confluence versus  less likely fibrin sheath.  2.  Stable 4 mm subpleural nodule in the right middle lobe. No new or enlarging pulmonary nodules.     Abdomen and pelvis:  1.  Slightly decreased size of the pelvic mass measuring 3 cm with interlobular and calcification suggestive of favorable response to treatment.  2.  Persistent but marginally decreased volume of the malignant ascites. Persistent manifestations of peritoneal carcinomatosis.     10/11/23: Cycle 5  carboplatin AUC 5 + pegylated liposomal doxorubicin (PLD) 30 mg/m2 every 28 days.  11/7/23: Cycle 6 carboplatin AUC 5 + pegylated liposomal doxorubicin (PLD) 30 mg/m2 every 28 days planned for 11/8.  43.    Genetic/Genomic/Molecular Testing History:  10/5/22: [facility], reflexed to Common Hereditary Cancer Panel.   -No identifiable germline variants identified in ABRAXAS1, AKT1, APC, DEION, AXIN2, BARD1, BMPR1A, BRCA1, BRCA2, BRIP1, CDC73, CDH1, CDK4, CDKN2A, CHEK2, CTNNA1, DICER1, EPCAM, FANCC, FANCM, GREM1, HOXB13, KIT, MEN1, MLH1, MRE11, MSH2, MSH6, MUTYH, NBN, NF1, NTHL1, PALB2, PDGFRA, PIK3CA, PMS2, POLD1, POLE, PTEN, RAD50, RAD51C, RAD51D, RECQL, RINT1, SDHA, SDHB, SDHC, SDHD, SMAD4, SMARCA4, STK11, TP53, TSC1, TSC2, VHL, XRCC2.   -Variant of uncertain significance in MSH3 c.16C>T (p.[id number])    10/9/22 (tumor 8/29/22): [facility] Testing Results.  -Genomic ROXI low (6%)   -TMB low (1mut/Mb)  -Microsatellite stable/Mismatch Repair proficient  -PD-L1- (CPS 0%)  -NTRK 1/2/3 fusion not detected.   -ER-, NE+  -Genomic alterations in:  --TP53  -No genomic alterations in BRCA1,2.     Jamila-ovary clinical trial screening: Negative for the immunoreactive subtype.      6/19/23 (tumor 8/29/22): [facility] Test results.   -FOLR1+ (2+, 75%).        Today she reports she is doing well. She reports the following:         Nausea - taking very rarely; take Compazine  Decadron - not taking  Bowels - take stool softeners daily 1-2/day  Skin-denies any issues  Hydration - drinking  "coffee/soda ~3 cups coffee and 2 cups soda/day; crystal light, non caffeine tea  Chest pain/SOB/leg swelling - denies  Urinating well  Taking Eliquis and wondering how long she needs to take it and if she can take 1/2 dose due to some dizziness  Had chest port removed last week; \"cleaned out vein\" and repositioned port  Will get some dizziness if she turns her head too quickly  Last eye exam a year ago  Works at [employer] all day long      Review of Systems:    Systemic           no weight changes; no fever; no chills; no night sweats; no appetite changes  Skin           no rashes, or lesions  Eye           no irritation; no changes in vision  Day-Laryngeal           no dysphagia; no hoarseness   Pulmonary    no cough; no shortness of breath  Cardiovascular    no chest pain; no palpitations  Gastrointestinal    no diarrhea; no constipation; no abdominal pain; no changes in bowel habits; no blood in stool  Genitourinary   no urinary frequency; no urinary urgency; no dysuria; no pain; no abnormal vaginal discharge; no abnormal vaginal bleeding  Breast   no breast discharge; no breast changes; no breast pain  Musculoskeletal    no myalgias; no arthralgias; no back pain  Psychiatric           no depressed mood; no anxiety    Hematologic              no tender lymph nodes; no noticeable swellings or lumps   Endocrine    no hot flashes; no heat/cold intolerance         Neurological   no tremor; no numbness and tingling; no headaches; no difficulty sleeping      Past Medical History:    Past Medical History:   Diagnosis Date    Female stress incontinence     Ovarian cancer, right (H) 06/16/2022    Stage IIIC high-grade serous carcinoma    Recurrent cold sores          Past Surgical History:    Past Surgical History:   Procedure Laterality Date    APPENDECTOMY  08/29/2022    Part of ovarian cancer tumor debulking    BLADDER SUSPENSION  08/13/2009    HYSTERECTOMY  08/13/2009    For prolapse    LAPAROSCOPY DIAGNOSTIC (GYN)  " 06/16/2022    SALPINGO-OOPHORECTOMY, COMBINED Bilateral 08/29/2022    Pelvic exam under anesthesia, diagnostic laparoscopy, conversion to laparotomy for optimal interval tumor debulking to no gross residual disease including peritoneal and diaphragm biopsies, bilateral salpingo-oophorectomy, infragastric omentectomy, bilateral hemidiaphragm stripping, peritoneal and mesenteric argon beam ablation, appendectomy.    TONSILLECTOMY  1973    TUBAL LIGATION Bilateral 09/01/1998         Health Maintenance Due   Topic Date Due    YEARLY PREVENTIVE VISIT  Never done    ADVANCE CARE PLANNING  Never done    HEPATITIS B IMMUNIZATION (1 of 3 - 3-dose series) Never done    Pneumococcal Vaccine: Pediatrics (0 to 5 Years) and At-Risk Patients (6 to 64 Years) (1 - PCV) Never done    COLORECTAL CANCER SCREENING  Never done    HIV SCREENING  Never done    HEPATITIS C SCREENING  Never done    ZOSTER IMMUNIZATION (1 of 2) Never done    PAP  Never done    LIPID  Never done    DTAP/TDAP/TD IMMUNIZATION (2 - Td or Tdap) 12/15/2021    PHQ-2 (once per calendar year)  Never done    INFLUENZA VACCINE (1) 09/01/2023    COVID-19 Vaccine (5 - 2023-24 season) 09/01/2023       Current Medications:     Current Outpatient Medications   Medication Sig Dispense Refill    apixaban ANTICOAGULANT (ELIQUIS) 5 MG tablet Take 1 tablet (5 mg) by mouth 2 times daily 60 tablet 11    citalopram (CELEXA) 40 MG tablet Take 1 tablet by mouth daily      cyclobenzaprine (FLEXERIL) 5 MG tablet Take 1-2 Tablets (5-10 mg) by mouth three times a day.      diphenhydrAMINE-acetaminophen (TYLENOL PM)  MG tablet Take 1 tablet by mouth as needed      docusate sodium (DSS) 100 MG capsule Take 100 mg by mouth as needed      HYDROcodone-acetaminophen (NORCO) 5-325 MG tablet Take 1 tablet by mouth every 4 hours as needed      HYDROmorphone (DILAUDID) 2 MG tablet 1 tablet      hydrOXYzine (ATARAX) 10 MG tablet Take 1-2 tablets (10-20 mg) by mouth every 6 hours as needed for  "itching 120 tablet 3    lidocaine-prilocaine (EMLA) 2.5-2.5 % external cream Apply topically as needed (pain due to port access) Apply to port 30 minutes prior to lab appointment. 30 g 6    LORazepam (ATIVAN) 0.5 MG tablet Take 1-2 Tablets (0.5-1 mg) by mouth every 4 hours as needed for Anxiety (and or nausea and vomiting).*      Multiple Vitamin (MULTI-VITAMIN DAILY PO) Take 1 tablet by mouth daily      ondansetron (ZOFRAN) 8 MG tablet Take 1 tablet (8 mg) by mouth every 8 hours as needed for nausea (vomiting) 30 tablet 2    prochlorperazine (COMPAZINE) 10 MG tablet Take 1 tablet (10 mg) by mouth every 6 hours as needed for nausea or vomiting 30 tablet 2    sennosides (SENOKOT) 8.6 MG tablet       valACYclovir (VALTREX) 1000 mg tablet Take 1,000 mg by mouth as needed      dexamethasone (DECADRON) 4 MG tablet Take 2 tablets (8 mg) by mouth daily Take for 3 days, starting the day after chemo. Take with food. (Patient not taking: Reported on 2023) 6 tablet 2    LORazepam (ATIVAN) 0.5 MG tablet Take 1 tablet (0.5 mg) by mouth every 6 hours as needed for anxiety (Patient not taking: Reported on 2023) 20 tablet 3         Allergies:      No Known Allergies     Social History:     Social History     Tobacco Use    Smoking status: Former     Packs/day: 1.00     Years: 42.00     Additional pack years: 0.00     Total pack years: 42.00     Types: Cigarettes     Quit date: 2022     Years since quittin.4    Smokeless tobacco: Never   Substance Use Topics    Alcohol use: Yes     Comment: Beer ~Q3 months.       History   Drug Use Unknown         Family History:     The patient's family history is notable for     Family History   Problem Relation Age of Onset    Prostate Cancer Father     Breast Cancer Sister 48    Melanoma Sister     Skin Cancer Sister     Colon Cancer Maternal Grandmother          Physical Exam:     BP (!) 141/67   Ht 1.575 m (5' 2\")   Wt 68 kg (150 lb)   BMI 27.44 kg/m    Body mass index is " 27.44 kg/m .    General Appearance: healthy and alert, no distress    Eyes:  Eyes grossly normal to inspection.  No discharge or erythema, or obvious scleral/conjunctival abnormalities.    Respiratory: No audible wheeze, cough, or visible cyanosis.  No visible retractions or increased work of breathing.     Musculoskeletal: extremities non tender and without edema    Skin: no lesions or rashes on visible skin    Neurological: normal gait, no gross defects     Psychiatric: appropriate mood and affect. Mentation appears normal, affect normal/bright, judgement and insight intact, normal speech and appearance well-groomed                            The rest of a comprehensive physical examination is deferred due to video visit restrictions.      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 She is here for follow up and disease management by video..     31 minutes spent on the date of the encounter doing chart review, history and exam, documentation and further activities as noted above      Plan:     1.)        Reviewed CBC, CMP, Mg, and Ok to proceed with planned treatment tomorrow.  She will return in 3 weeks for her next cycle; this is already scheduled. Discussed importance of eating smaller meals with lean proteins/hard boiled eggs more often, adequate hydration of at least 64 oz water throughout the day, and pacing activities. Patient to contact the clinic with any questions or concerns in the interim.  Reviewed signs and symptoms for when she should contact the clinic or seek additional care. Answered all of her questions to the best of my ability.     2.) Genetic risk factors were assessed and she has a VUS No identifiable germline variants identified in ABRAXAS1, AKT1, APC, DEION, AXIN2, BARD1, BMPR1A, BRCA1, BRCA2, BRIP1, CDC73, CDH1, CDK4, CDKN2A, CHEK2, CTNNA1, DICER1, EPCAM, FANCC, FANCM, GREM1, HOXB13, KIT,  MEN1, MLH1, MRE11, MSH2, MSH6, MUTYH, NBN, NF1, NTHL1, PALB2, PDGFRA, PIK3CA, PMS2, POLD1, POLE, PTEN, RAD50, RAD51C, RAD51D, RECQL, RINT1, SDHA, SDHB, SDHC, SDHD, SMAD4, SMARCA4, STK11, TP53, TSC1, TSC2, VHL, XRCC2.       3.) Labs and/or tests ordered include:  CBC, CMP, Mg..     4.) Health maintenance issues addressed today include annual health maintenance and non-gynecologic issues with PCP. Discussed slow position changes, staying well hydrated (increasing her water intake given her caffeine intake), and getting an eye exam.     5.)        Eliquis: per Dr. Patino's 10/3 note: Continue anticoagulation until port-a-cath removed, with plan to keep port-a-cath in place as long as it allows blood draws and infusions. Will clarify if she is referring to the port that was removed/cleaned or a her port during treatment.     JALEN Zamarripa, WHNP-BC, ANP-BC  Women's Health Nurse Practitioner  Adult Nurse Practitioner  Division of Gynecologic Oncology  .      CC  Patient Care Team:  Domenica Christianson MD as PCP - General

## 2023-11-07 NOTE — NURSING NOTE
Is the patient currently in the state of [place]? YES    Visit mode:VIDEO    If the visit is dropped, the patient can be reconnected by: VIDEO VISIT: Text to cell phone:   Telephone Information:   Mobile [phone]       Will anyone else be joining the visit? NO  (If patient encounters technical issues they should call [phone]    How would you like to obtain your AVS? MyChart    Are changes needed to the allergy or medication list? No    Reason for visit: RECHECK    Kayleigh ROSSF

## 2023-11-07 NOTE — PROGRESS NOTES
Virtual Visit Details    Type of service:  Video Visit     Originating Location (pt. 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 She is here for follow up and disease management by video.     Ovarian Cancer History:  22: Presented to an ED in [place] for evaluation of abdominal bloating and discomfort.  -Abdomen, pelvic CT: Radiographic Stage CAL ovarian cancer.   22: [id number].   22: Pelvic ultrasound: c/w metastatic cancer. I have personally reviewed the ultrasound images.   22: Pelvic exam under anesthesia, diagnostic laparoscopy, biopsies, evacuation of ascites.   -Findings: On pelvic exam under anesthesia, there was a 6 cm firm, fixed mass adherent to the vaginal cuff. Vagina otherwise smooth. On laparoscopic examination, there was ascites filling the pelvis/abdomen, with >1 liter of fluid evacuated. The palpated mass was arising from the right ovary. Left ovary relatively normal in appearance. There was diffuse carcinomatosis covering the entire peritoneal surface falciform ligament, liver capsule, and mesentery. The omentum was replaced with tumor. Findings were not amenable to optimal tumor debulking so biopsies were taken for histologic examination.   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      22, 22, 22: Cycles 1-3 of neoadjuvant chemotherapy with IV carboplatin + paclitaxel 175 mg/m2 every 21 days.   -Cycles 1,2: Carboplatin AUC 6.   -Cycle 3: Carboplatin AUC 5 with dose-reduction due to thrombcytopenia.   -22: Chest, abdomen, pelvic CT: Partial response.       8/29/22: Pelvic exam under anesthesia, diagnostic laparoscopy, conversion to laparotomy for optimal interval tumor debulking to no gross residual disease including peritoneal and diaphragm biopsies, bilateral salpingo-oophorectomy, infragastric omentectomy, bilateral hemidiaphragm stripping, peritoneal and mesenteric argon beam ablation, appendectomy.   -Pathology: High-grade serous carcinoma involving all resected specimens.     9/28/22, 10/19/22, 11/17/22: Cycles 4-6 of primary chemotherapy with IV carboplatin AUC 5 + paclitaxel 175 mg/m2.  -12/2/22: Chest, abdomen, pelvic CT: No definitive evidence of disease.   -12/7/22: CA-125=23.   -2/16/23: Chest, abdomen, pelvic CT to evaluate bloating: Stable findings, no definitive evidence of disease.  -5/25/23: Admitted  to [facility] for management of malignant ascites s/p therapeutic paracentesis, and partial SBO resolved with conservative management.   5/25/23: Abdomen, pelvic CT: Progressive disease.   LOWER CHEST: OMAIRA.   BOWEL: Development of moderate malignant ascites with small peritoneal and serosal implants present. There is mild distention of a few loops of small bowel within the right side of the abdomen with regions of narrowing present compatible with an early or partial small bowel obstruction. Wall thickening in a few of the involved segments with fecalization of the internal contents of the small bowel compatible with stasis.   PELVIC ORGANS: Lobulated 4.5 cm soft tissue mass in the dependent pelvis in the expected location of the uterus.  -6/13/23: CA-125=78.     6/13/23, 7/12/23, 8/14/23, 9/13/23: Cycles 1-4 of second-line chemotherapy with IV carboplatin AUC 5 + pegylated liposomal doxorubicin (PLD) 30 mg/m2 every 28 days.     Plan: 4 more cycles    9/27/23: CT cap IMPRESSION:  Chest:  1.  Small thrombus at the tip of the right chest port at the brachiocephalic/SVC confluence versus less likely fibrin sheath.  2.  Stable 4 mm subpleural  nodule in the right middle lobe. No new or enlarging pulmonary nodules.     Abdomen and pelvis:  1.  Slightly decreased size of the pelvic mass measuring 3 cm with interlobular and calcification suggestive of favorable response to treatment.  2.  Persistent but marginally decreased volume of the malignant ascites. Persistent manifestations of peritoneal carcinomatosis.     10/11/23: Cycle 5  carboplatin AUC 5 + pegylated liposomal doxorubicin (PLD) 30 mg/m2 every 28 days.  11/7/23: Cycle 6 carboplatin AUC 5 + pegylated liposomal doxorubicin (PLD) 30 mg/m2 every 28 days planned for 11/8.  43.    Genetic/Genomic/Molecular Testing History:  10/5/22: [facility], reflexed to Common Hereditary Cancer Panel.   -No identifiable germline variants identified in ABRAXAS1, AKT1, APC, DEION, AXIN2, BARD1, BMPR1A, BRCA1, BRCA2, BRIP1, CDC73, CDH1, CDK4, CDKN2A, CHEK2, CTNNA1, DICER1, EPCAM, FANCC, FANCM, GREM1, HOXB13, KIT, MEN1, MLH1, MRE11, MSH2, MSH6, MUTYH, NBN, NF1, NTHL1, PALB2, PDGFRA, PIK3CA, PMS2, POLD1, POLE, PTEN, RAD50, RAD51C, RAD51D, RECQL, RINT1, SDHA, SDHB, SDHC, SDHD, SMAD4, SMARCA4, STK11, TP53, TSC1, TSC2, VHL, XRCC2.   -Variant of uncertain significance in MSH3 c.16C>T (p.[id number])    10/9/22 (tumor 8/29/22): [facility] Testing Results.  -Genomic ROXI low (6%)   -TMB low (1mut/Mb)  -Microsatellite stable/Mismatch Repair proficient  -PD-L1- (CPS 0%)  -NTRK 1/2/3 fusion not detected.   -ER-, AZ+  -Genomic alterations in:  --TP53  -No genomic alterations in BRCA1,2.     Jamila-ovary clinical trial screening: Negative for the immunoreactive subtype.      6/19/23 (tumor 8/29/22): [facility] Test results.   -FOLR1+ (2+, 75%).        Today she reports she is doing well. She reports the following:         Nausea - taking very rarely; take Compazine  Decadron - not taking  Bowels - take stool softeners daily 1-2/day  Skin-denies any issues  Hydration - drinking coffee/soda ~3 cups coffee and 2 cups soda/day; crystal  "light, non caffeine tea  Chest pain/SOB/leg swelling - denies  Urinating well  Taking Eliquis and wondering how long she needs to take it and if she can take 1/2 dose due to some dizziness  Had chest port removed last week; \"cleaned out vein\" and repositioned port  Will get some dizziness if she turns her head too quickly  Last eye exam a year ago  Works at [employer] all day long      Review of Systems:    Systemic           no weight changes; no fever; no chills; no night sweats; no appetite changes  Skin           no rashes, or lesions  Eye           no irritation; no changes in vision  Day-Laryngeal           no dysphagia; no hoarseness   Pulmonary    no cough; no shortness of breath  Cardiovascular    no chest pain; no palpitations  Gastrointestinal    no diarrhea; no constipation; no abdominal pain; no changes in bowel habits; no blood in stool  Genitourinary   no urinary frequency; no urinary urgency; no dysuria; no pain; no abnormal vaginal discharge; no abnormal vaginal bleeding  Breast   no breast discharge; no breast changes; no breast pain  Musculoskeletal    no myalgias; no arthralgias; no back pain  Psychiatric           no depressed mood; no anxiety    Hematologic              no tender lymph nodes; no noticeable swellings or lumps   Endocrine    no hot flashes; no heat/cold intolerance         Neurological   no tremor; no numbness and tingling; no headaches; no difficulty sleeping      Past Medical History:    Past Medical History:   Diagnosis Date    Female stress incontinence     Ovarian cancer, right (H) 06/16/2022    Stage IIIC high-grade serous carcinoma    Recurrent cold sores          Past Surgical History:    Past Surgical History:   Procedure Laterality Date    APPENDECTOMY  08/29/2022    Part of ovarian cancer tumor debulking    BLADDER SUSPENSION  08/13/2009    HYSTERECTOMY  08/13/2009    For prolapse    LAPAROSCOPY DIAGNOSTIC (GYN)  06/16/2022    SALPINGO-OOPHORECTOMY, COMBINED Bilateral " 08/29/2022    Pelvic exam under anesthesia, diagnostic laparoscopy, conversion to laparotomy for optimal interval tumor debulking to no gross residual disease including peritoneal and diaphragm biopsies, bilateral salpingo-oophorectomy, infragastric omentectomy, bilateral hemidiaphragm stripping, peritoneal and mesenteric argon beam ablation, appendectomy.    TONSILLECTOMY  1973    TUBAL LIGATION Bilateral 09/01/1998         Health Maintenance Due   Topic Date Due    YEARLY PREVENTIVE VISIT  Never done    ADVANCE CARE PLANNING  Never done    HEPATITIS B IMMUNIZATION (1 of 3 - 3-dose series) Never done    Pneumococcal Vaccine: Pediatrics (0 to 5 Years) and At-Risk Patients (6 to 64 Years) (1 - PCV) Never done    COLORECTAL CANCER SCREENING  Never done    HIV SCREENING  Never done    HEPATITIS C SCREENING  Never done    ZOSTER IMMUNIZATION (1 of 2) Never done    PAP  Never done    LIPID  Never done    DTAP/TDAP/TD IMMUNIZATION (2 - Td or Tdap) 12/15/2021    PHQ-2 (once per calendar year)  Never done    INFLUENZA VACCINE (1) 09/01/2023    COVID-19 Vaccine (5 - 2023-24 season) 09/01/2023       Current Medications:     Current Outpatient Medications   Medication Sig Dispense Refill    apixaban ANTICOAGULANT (ELIQUIS) 5 MG tablet Take 1 tablet (5 mg) by mouth 2 times daily 60 tablet 11    citalopram (CELEXA) 40 MG tablet Take 1 tablet by mouth daily      cyclobenzaprine (FLEXERIL) 5 MG tablet Take 1-2 Tablets (5-10 mg) by mouth three times a day.      diphenhydrAMINE-acetaminophen (TYLENOL PM)  MG tablet Take 1 tablet by mouth as needed      docusate sodium (DSS) 100 MG capsule Take 100 mg by mouth as needed      HYDROcodone-acetaminophen (NORCO) 5-325 MG tablet Take 1 tablet by mouth every 4 hours as needed      HYDROmorphone (DILAUDID) 2 MG tablet 1 tablet      hydrOXYzine (ATARAX) 10 MG tablet Take 1-2 tablets (10-20 mg) by mouth every 6 hours as needed for itching 120 tablet 3    lidocaine-prilocaine (EMLA)  "2.5-2.5 % external cream Apply topically as needed (pain due to port access) Apply to port 30 minutes prior to lab appointment. 30 g 6    LORazepam (ATIVAN) 0.5 MG tablet Take 1-2 Tablets (0.5-1 mg) by mouth every 4 hours as needed for Anxiety (and or nausea and vomiting).*      Multiple Vitamin (MULTI-VITAMIN DAILY PO) Take 1 tablet by mouth daily      ondansetron (ZOFRAN) 8 MG tablet Take 1 tablet (8 mg) by mouth every 8 hours as needed for nausea (vomiting) 30 tablet 2    prochlorperazine (COMPAZINE) 10 MG tablet Take 1 tablet (10 mg) by mouth every 6 hours as needed for nausea or vomiting 30 tablet 2    sennosides (SENOKOT) 8.6 MG tablet       valACYclovir (VALTREX) 1000 mg tablet Take 1,000 mg by mouth as needed      dexamethasone (DECADRON) 4 MG tablet Take 2 tablets (8 mg) by mouth daily Take for 3 days, starting the day after chemo. Take with food. (Patient not taking: Reported on 2023) 6 tablet 2    LORazepam (ATIVAN) 0.5 MG tablet Take 1 tablet (0.5 mg) by mouth every 6 hours as needed for anxiety (Patient not taking: Reported on 2023) 20 tablet 3         Allergies:      No Known Allergies     Social History:     Social History     Tobacco Use    Smoking status: Former     Packs/day: 1.00     Years: 42.00     Additional pack years: 0.00     Total pack years: 42.00     Types: Cigarettes     Quit date: 2022     Years since quittin.4    Smokeless tobacco: Never   Substance Use Topics    Alcohol use: Yes     Comment: Beer ~Q3 months.       History   Drug Use Unknown         Family History:     The patient's family history is notable for     Family History   Problem Relation Age of Onset    Prostate Cancer Father     Breast Cancer Sister 48    Melanoma Sister     Skin Cancer Sister     Colon Cancer Maternal Grandmother          Physical Exam:     BP (!) 141/67   Ht 1.575 m (5' 2\")   Wt 68 kg (150 lb)   BMI 27.44 kg/m    Body mass index is 27.44 kg/m .    General Appearance: healthy and " alert, no distress    Eyes:  Eyes grossly normal to inspection.  No discharge or erythema, or obvious scleral/conjunctival abnormalities.    Respiratory: No audible wheeze, cough, or visible cyanosis.  No visible retractions or increased work of breathing.     Musculoskeletal: extremities non tender and without edema    Skin: no lesions or rashes on visible skin    Neurological: normal gait, no gross defects     Psychiatric: appropriate mood and affect. Mentation appears normal, affect normal/bright, judgement and insight intact, normal speech and appearance well-groomed                            The rest of a comprehensive physical examination is deferred due to video visit restrictions.      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 She is here for follow up and disease management by video..     31 minutes spent on the date of the encounter doing chart review, history and exam, documentation and further activities as noted above      Plan:     1.)        Reviewed CBC, CMP, Mg, and Ok to proceed with planned treatment tomorrow.  She will return in 3 weeks for her next cycle; this is already scheduled. Discussed importance of eating smaller meals with lean proteins/hard boiled eggs more often, adequate hydration of at least 64 oz water throughout the day, and pacing activities. Patient to contact the clinic with any questions or concerns in the interim.  Reviewed signs and symptoms for when she should contact the clinic or seek additional care. Answered all of her questions to the best of my ability.     2.) Genetic risk factors were assessed and she has a VUS No identifiable germline variants identified in ABRAXAS1, AKT1, APC, DEION, AXIN2, BARD1, BMPR1A, BRCA1, BRCA2, BRIP1, CDC73, CDH1, CDK4, CDKN2A, CHEK2, CTNNA1, DICER1, EPCAM, FANCC, FANCM, GREM1, HOXB13, KIT, MEN1, MLH1, MRE11, MSH2, MSH6, MUTYH, NBN, NF1,  NTHL1, PALB2, PDGFRA, PIK3CA, PMS2, POLD1, POLE, PTEN, RAD50, RAD51C, RAD51D, RECQL, RINT1, SDHA, SDHB, SDHC, SDHD, SMAD4, SMARCA4, STK11, TP53, TSC1, TSC2, VHL, XRCC2.       3.) Labs and/or tests ordered include:  CBC, CMP, Mg..     4.) Health maintenance issues addressed today include annual health maintenance and non-gynecologic issues with PCP. Discussed slow position changes, staying well hydrated (increasing her water intake given her caffeine intake), and getting an eye exam.     5.)        Eliquis: per Dr. Patino's 10/3 note: Continue anticoagulation until port-a-cath removed, with plan to keep port-a-cath in place as long as it allows blood draws and infusions. Will clarify if she is referring to the port that was removed/cleaned or a her port during treatment.     JALEN Zamarripa, WHNP-BC, ANP-BC  Women's Health Nurse Practitioner  Adult Nurse Practitioner  Division of Gynecologic Oncology  .      CC  Patient Care Team:  Domenica Christianson MD as PCP - Charis Verdugo MD as MD (Gynecologic Oncology)  Susannah Roman APRN CNP as Assigned Cancer Care Provider  Anish Monroy MD as Assigned Palliative Care Provider  SELF, REFERRED

## 2023-11-08 ENCOUNTER — INFUSION THERAPY VISIT (OUTPATIENT)
Dept: INFUSION THERAPY | Facility: CLINIC | Age: 58
End: 2023-11-08
Attending: OBSTETRICS & GYNECOLOGY
Payer: COMMERCIAL

## 2023-11-08 VITALS
RESPIRATION RATE: 18 BRPM | TEMPERATURE: 98 F | SYSTOLIC BLOOD PRESSURE: 116 MMHG | OXYGEN SATURATION: 99 % | HEART RATE: 67 BPM | DIASTOLIC BLOOD PRESSURE: 57 MMHG

## 2023-11-08 DIAGNOSIS — C56.1 OVARIAN CANCER, RIGHT (H): Primary | ICD-10-CM

## 2023-11-08 PROCEDURE — 96361 HYDRATE IV INFUSION ADD-ON: CPT

## 2023-11-08 PROCEDURE — 96367 TX/PROPH/DG ADDL SEQ IV INF: CPT

## 2023-11-08 PROCEDURE — 258N000003 HC RX IP 258 OP 636: Performed by: NURSE PRACTITIONER

## 2023-11-08 PROCEDURE — 96417 CHEMO IV INFUS EACH ADDL SEQ: CPT

## 2023-11-08 PROCEDURE — 96413 CHEMO IV INFUSION 1 HR: CPT

## 2023-11-08 PROCEDURE — 96375 TX/PRO/DX INJ NEW DRUG ADDON: CPT

## 2023-11-08 PROCEDURE — 250N000011 HC RX IP 250 OP 636: Performed by: NURSE PRACTITIONER

## 2023-11-08 RX ORDER — MEPERIDINE HYDROCHLORIDE 50 MG/ML
25 INJECTION INTRAMUSCULAR; INTRAVENOUS; SUBCUTANEOUS EVERY 30 MIN PRN
Status: DISCONTINUED | OUTPATIENT
Start: 2023-11-08 | End: 2023-11-08 | Stop reason: HOSPADM

## 2023-11-08 RX ORDER — EPINEPHRINE 1 MG/ML
0.3 INJECTION, SOLUTION INTRAMUSCULAR; SUBCUTANEOUS EVERY 5 MIN PRN
Status: DISCONTINUED | OUTPATIENT
Start: 2023-11-08 | End: 2023-11-08 | Stop reason: HOSPADM

## 2023-11-08 RX ORDER — HEPARIN SODIUM (PORCINE) LOCK FLUSH IV SOLN 100 UNIT/ML 100 UNIT/ML
5 SOLUTION INTRAVENOUS
Status: DISCONTINUED | OUTPATIENT
Start: 2023-11-08 | End: 2023-11-08 | Stop reason: HOSPADM

## 2023-11-08 RX ORDER — ALBUTEROL SULFATE 0.83 MG/ML
2.5 SOLUTION RESPIRATORY (INHALATION)
Status: DISCONTINUED | OUTPATIENT
Start: 2023-11-08 | End: 2023-11-08 | Stop reason: HOSPADM

## 2023-11-08 RX ORDER — METHYLPREDNISOLONE SODIUM SUCCINATE 125 MG/2ML
125 INJECTION, POWDER, LYOPHILIZED, FOR SOLUTION INTRAMUSCULAR; INTRAVENOUS
Status: DISCONTINUED | OUTPATIENT
Start: 2023-11-08 | End: 2023-11-08 | Stop reason: HOSPADM

## 2023-11-08 RX ORDER — LORAZEPAM 2 MG/ML
0.5 INJECTION INTRAMUSCULAR EVERY 4 HOURS PRN
Status: DISCONTINUED | OUTPATIENT
Start: 2023-11-08 | End: 2023-11-08 | Stop reason: HOSPADM

## 2023-11-08 RX ORDER — DIPHENHYDRAMINE HYDROCHLORIDE 50 MG/ML
50 INJECTION INTRAMUSCULAR; INTRAVENOUS
Status: DISCONTINUED | OUTPATIENT
Start: 2023-11-08 | End: 2023-11-08 | Stop reason: HOSPADM

## 2023-11-08 RX ORDER — PALONOSETRON 0.05 MG/ML
0.25 INJECTION, SOLUTION INTRAVENOUS ONCE
Status: COMPLETED | OUTPATIENT
Start: 2023-11-08 | End: 2023-11-08

## 2023-11-08 RX ORDER — ALBUTEROL SULFATE 90 UG/1
1-2 AEROSOL, METERED RESPIRATORY (INHALATION)
Status: DISCONTINUED | OUTPATIENT
Start: 2023-11-08 | End: 2023-11-08 | Stop reason: HOSPADM

## 2023-11-08 RX ADMIN — CARBOPLATIN 385 MG: 10 INJECTION, SOLUTION INTRAVENOUS at 10:53

## 2023-11-08 RX ADMIN — DOXORUBICIN HYDROCHLORIDE 50 MG: 2 INJECTION, SUSPENSION, LIPOSOMAL INTRAVENOUS at 09:48

## 2023-11-08 RX ADMIN — PALONOSETRON 0.25 MG: 0.05 INJECTION, SOLUTION INTRAVENOUS at 08:47

## 2023-11-08 RX ADMIN — DEXTROSE MONOHYDRATE 250 ML: 50 INJECTION, SOLUTION INTRAVENOUS at 09:18

## 2023-11-08 RX ADMIN — FOSAPREPITANT: 150 INJECTION, POWDER, LYOPHILIZED, FOR SOLUTION INTRAVENOUS at 08:52

## 2023-11-08 RX ADMIN — HEPARIN 5 ML: 100 SYRINGE at 11:39

## 2023-11-08 NOTE — PROGRESS NOTES
Infusion Nursing Note:  Jacki Smith presents today for D1C6 Doxorubicin and carboplatin.    Patient seen by provider today: No   present during visit today: Not Applicable.    Note: Pt had aloxi, emend/dex as premeds.      Intravenous Access:  Implanted Port.    Treatment Conditions:  Lab Results   Component Value Date    HGB 11.9 11/07/2023    WBC 3.4 (L) 11/07/2023    ANEUTAUTO 1.9 11/07/2023     11/07/2023        Lab Results   Component Value Date     11/07/2023    POTASSIUM 4.1 11/07/2023    MAG 2.0 11/07/2023    CR 0.93 11/07/2023    KINDRA 9.2 11/07/2023    BILITOTAL 0.3 11/07/2023    ALBUMIN 4.0 11/07/2023    ALT 23 11/07/2023    AST 34 11/07/2023       Results reviewed, labs MET treatment parameters, ok to proceed with treatment.      Post Infusion Assessment:  Patient tolerated infusion without incident.       Discharge Plan:   Patient and/or family verbalized understanding of discharge instructions and all questions answered.      Ligia Arellano RN

## 2023-11-09 ENCOUNTER — VIRTUAL VISIT (OUTPATIENT)
Dept: PALLIATIVE CARE | Facility: CLINIC | Age: 58
End: 2023-11-09
Attending: FAMILY MEDICINE
Payer: COMMERCIAL

## 2023-11-09 VITALS — HEIGHT: 62 IN | BODY MASS INDEX: 27.79 KG/M2 | WEIGHT: 151 LBS

## 2023-11-09 DIAGNOSIS — C56.1 OVARIAN CANCER, RIGHT (H): ICD-10-CM

## 2023-11-09 DIAGNOSIS — F43.23 ADJUSTMENT DISORDER WITH MIXED ANXIETY AND DEPRESSED MOOD: ICD-10-CM

## 2023-11-09 DIAGNOSIS — Z51.5 PALLIATIVE CARE PATIENT: Primary | ICD-10-CM

## 2023-11-09 PROCEDURE — 99214 OFFICE O/P EST MOD 30 MIN: CPT | Mod: VID | Performed by: FAMILY MEDICINE

## 2023-11-09 ASSESSMENT — PAIN SCALES - GENERAL: PAINLEVEL: NO PAIN (0)

## 2023-11-09 NOTE — PATIENT INSTRUCTIONS
It was good to see you today, Michelle.    Here are the things we talked about:  I don't want to change anything since you're doing well.      Someone from the team will reach out to schedule a follow up appointment in the end of January after your next scan results. And I can always see you sooner, if needed.       How to get a hold of us:  For non-urgent matters, MyChart works best.    For more urgent matters, or if you prefer not to use MyChart, call our clinic nurse coordinator Astrid Noble RN at [phone]    We have an on-call number for evenings and weekends. Please call this only if you are having uncontrolled symptoms or serious side effects from your medicines: [phone].     For refills, please give us a week (5 working days) notice. We don't always have providers available everyday to do refills. If you call the day you run out of your medicine, we may not be able to refill it in time, so call 5 days in advance!    Anish Monroy MD MS FAAFP CAQHPM  [facility]  Office [phone]  Fax [phone]

## 2023-11-09 NOTE — NURSING NOTE
Is the patient currently in the state of [place]? YES    Visit mode:VIDEO    If the visit is dropped, the patient can be reconnected by: VIDEO VISIT: Text to cell phone:   Telephone Information:   Mobile [phone]       Will anyone else be joining the visit? NO  (If patient encounters technical issues they should call [phone]    How would you like to obtain your AVS? MyChart    Are changes needed to the allergy or medication list? Pt stated no changes to allergies and Pt stated no med changes    Reason for visit: FRED Murray LPN

## 2023-11-09 NOTE — PROGRESS NOTES
Virtual Visit Details    Type of service:  Video Visit     Originating Location (pt. Location): Other work    Distant Location (provider location):  On-site  Platform used for Video Visit: 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     Recommendations & Counseling:  Continue oncology care with Dr. Patino and the GYN Oncology team  Continue Hydroxyzine 5-20 mg po q 6 hours prn anxiety      OK to use ativan for severe anxiety but try hydroxyzine first  Will consider adding mirtazapine if the above isn't helpful        Michelle was directed to the [employer] program for ACP documents and she is encouraged to complete them by her next visit.     Follow up in late January (after next staging scan), sooner prn.     Counseling: All of the above was explained to the patient in lay language. The patient has verbalized a clear understanding of the discussion, asked appropriate questions, which have been answered to patient's apparent satisfaction. The patient is in agreement with the above plan.        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 She has not needed any paracentesis since the end of May.       Last Palliative care appointment: 2023 with me     Reviewed: yes, no concerns    Interim History:  Jacki Smith is a 58 year old female who is seen today for follow up with Palliative Care via billable video visit. She is doing well and has 'scanxiety.' Uses hydroxyzine and it helps and she has Ativan in reserve when  things are really bad.  Had port removed and replaced. The area is a little sore but manageable.     Pain:  no pain from her cancer, no neuropathy    Appetite/Nausea: good     Bowels: no constipation     Sleep: no concerns unless when waiting for scans     Mood: feels good that chemo has gone well.  Her kids report she is 'crabby' for about a week after chemo.     Coping:  overall well.  She is pleased the scans show the tumor is shrinking.    Family History- Reviewed in Epic.    No Known Allergies    Social History:  Pertinent changes to social history/social situation since last visit: none  Key support resources: family  Advance Directive Status:  no ACP documents on file.    Social History     Tobacco Use    Smoking status: Former     Packs/day: 1.00     Years: 42.00     Additional pack years: 0.00     Total pack years: 42.00     Types: Cigarettes     Quit date: 2022     Years since quittin.4    Smokeless tobacco: Never   Substance Use Topics    Alcohol use: Yes     Comment: Beer ~Q3 months.    Drug use: Never         No Known Allergies  Current Outpatient Medications   Medication Sig Dispense Refill    apixaban ANTICOAGULANT (ELIQUIS) 5 MG tablet Take 1 tablet (5 mg) by mouth 2 times daily 60 tablet 11    citalopram (CELEXA) 40 MG tablet Take 1 tablet by mouth daily      cyclobenzaprine (FLEXERIL) 5 MG tablet Take 1-2 Tablets (5-10 mg) by mouth three times a day.      diphenhydrAMINE-acetaminophen (TYLENOL PM)  MG tablet Take 1 tablet by mouth as needed      docusate sodium (DSS) 100 MG capsule Take 100 mg by mouth as needed      HYDROcodone-acetaminophen (NORCO) 5-325 MG tablet Take 1 tablet by mouth every 4 hours as needed      HYDROmorphone (DILAUDID) 2 MG tablet 1 tablet      hydrOXYzine (ATARAX) 10 MG tablet Take 1-2 tablets (10-20 mg) by mouth every 6 hours as needed for itching 120 tablet 3    lidocaine-prilocaine (EMLA) 2.5-2.5 % external cream Apply topically as needed (pain due to port  access) Apply to port 30 minutes prior to lab appointment. 30 g 6    LORazepam (ATIVAN) 0.5 MG tablet Take 1-2 Tablets (0.5-1 mg) by mouth every 4 hours as needed for Anxiety (and or nausea and vomiting).*      LORazepam (ATIVAN) 0.5 MG tablet Take 1 tablet (0.5 mg) by mouth every 6 hours as needed for anxiety 20 tablet 3    Multiple Vitamin (MULTI-VITAMIN DAILY PO) Take 1 tablet by mouth daily      ondansetron (ZOFRAN) 8 MG tablet Take 1 tablet (8 mg) by mouth every 8 hours as needed for nausea (vomiting) 30 tablet 2    prochlorperazine (COMPAZINE) 10 MG tablet Take 1 tablet (10 mg) by mouth every 6 hours as needed for nausea or vomiting 30 tablet 2    sennosides (SENOKOT) 8.6 MG tablet       valACYclovir (VALTREX) 1000 mg tablet Take 1,000 mg by mouth as needed      dexamethasone (DECADRON) 4 MG tablet Take 2 tablets (8 mg) by mouth daily Take for 3 days, starting the day after chemo. Take with food. (Patient not taking: Reported on 11/7/2023) 6 tablet 2     Past Medical History:   Diagnosis Date    Female stress incontinence     Ovarian cancer, right (H) 06/16/2022    Stage IIIC high-grade serous carcinoma    Recurrent cold sores      Past Surgical History:   Procedure Laterality Date    APPENDECTOMY  08/29/2022    Part of ovarian cancer tumor debulking    BLADDER SUSPENSION  08/13/2009    HYSTERECTOMY  08/13/2009    For prolapse    LAPAROSCOPY DIAGNOSTIC (GYN)  06/16/2022    SALPINGO-OOPHORECTOMY, COMBINED Bilateral 08/29/2022    Pelvic exam under anesthesia, diagnostic laparoscopy, conversion to laparotomy for optimal interval tumor debulking to no gross residual disease including peritoneal and diaphragm biopsies, bilateral salpingo-oophorectomy, infragastric omentectomy, bilateral hemidiaphragm stripping, peritoneal and mesenteric argon beam ablation, appendectomy.    TONSILLECTOMY  1973    TUBAL LIGATION Bilateral 09/01/1998       Physical Exam:   GENERAL APPEARANCE: vital appearing, alert and no distress;  neatly groomed  EYES: Eyes grossly normal to inspection, PERRLA, conjunctivae and sclerae without injection or discharge, EOM intact   RESP:  no increased work of breathing; speaks in complete sentences;   MS: No musculoskeletal defects are noted  SKIN: No suspicious lesions or rashes, hydration status appears adequate with normal skin turgor   PSYCH: Alert and oriented x3; speech- coherent , normal rate and volume; able to articulate logical thoughts, able to abstract reason, no tangential thoughts, no hallucinations or delusions, mentation appears normal, Mood is euthymic. Affect is appropriate for this mood state and bright. Thought content is free of suicidal ideation, hallucinations, and delusions.  Eye contact is good during conversation.       Key Data Reviewed:  LABS: 11/7/2023- Cr 0.93, Albumin 4.0,  Hgb 11.9,      IMAGING: 10/22/2023  CT CHEST W/CONTRAST  IMPRESSION:   1.  Thrombus in the right brachiocephalic vein adjacent to right chest port tip slightly larger since 09/27/2023.   2.  Indeterminate new sclerosis of C4-C6 vertebral bodies since 02/16/2023, potentially metastatic.   3.  Small bilateral pulmonary nodules measuring up to 4 mm unchanged since December 2022.   4.  Mild emphysema.   5.  Unchanged small upper abdominal ascites.     30 minutes were spent on the day of the encounter in review of medical record, performing video visit, documentation and care coordination as described above    Anish Monroy MD MS FAAFP CAQHPM  [facility]

## 2023-11-09 NOTE — LETTER
2023       RE: Jacki Smith  [address] [facility] [address]     Dear Colleague,    [facility] you [facility] Smith[facility]. Please see a copy of my visit note below.      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     Recommendations & Counseling:  Continue oncology care with Dr. Patino and the GYN Oncology team  Continue Hydroxyzine 5-20 mg po q 6 hours prn anxiety      OK to use ativan for severe anxiety but try hydroxyzine first  Will consider adding mirtazapine if the above isn't helpful        Michelle was directed to the [place] [employer] program for ACP documents and she is encouraged to complete them by her next visit.     Follow up in late January (after next staging scan), sooner prn.     Counseling: All of the above was explained to the patient in lay language. The patient has verbalized a clear understanding of the discussion, asked appropriate questions, which have been answered to patient's apparent satisfaction. The patient is in agreement with the above plan.        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 She has not needed any paracentesis since the end of May.       Last Palliative care appointment: 2023 with me     Reviewed: yes, no concerns    Interim History:  Jacki Smith is a 58 year old female who is seen today for follow up with Palliative Care via billable video  visit. She is doing well and has 'scanxiety.' Uses hydroxyzine and it helps and she has Ativan in reserve when things are really bad.  Had port removed and replaced. The area is a little sore but manageable.     Pain:  no pain from her cancer, no neuropathy    Appetite/Nausea: good     Bowels: no constipation     Sleep: no concerns unless when waiting for scans     Mood: feels good that chemo has gone well.  Her kids report she is 'crabby' for about a week after chemo.     Coping:  overall well.  She is pleased the scans show the tumor is shrinking.    Family History- Reviewed in Epic.    No Known Allergies    Social History:  Pertinent changes to social history/social situation since last visit: none  Key support resources: family  Advance Directive Status:  no ACP documents on file.    Social History     Tobacco Use    Smoking status: Former     Packs/day: 1.00     Years: 42.00     Additional pack years: 0.00     Total pack years: 42.00     Types: Cigarettes     Quit date: 2022     Years since quittin.4    Smokeless tobacco: Never   Substance Use Topics    Alcohol use: Yes     Comment: Beer ~Q3 months.    Drug use: Never         No Known Allergies  Current Outpatient Medications   Medication Sig Dispense Refill    apixaban ANTICOAGULANT (ELIQUIS) 5 MG tablet Take 1 tablet (5 mg) by mouth 2 times daily 60 tablet 11    citalopram (CELEXA) 40 MG tablet Take 1 tablet by mouth daily      cyclobenzaprine (FLEXERIL) 5 MG tablet Take 1-2 Tablets (5-10 mg) by mouth three times a day.      diphenhydrAMINE-acetaminophen (TYLENOL PM)  MG tablet Take 1 tablet by mouth as needed      docusate sodium (DSS) 100 MG capsule Take 100 mg by mouth as needed      HYDROcodone-acetaminophen (NORCO) 5-325 MG tablet Take 1 tablet by mouth every 4 hours as needed      HYDROmorphone (DILAUDID) 2 MG tablet 1 tablet      hydrOXYzine (ATARAX) 10 MG tablet Take 1-2 tablets (10-20 mg) by mouth every 6 hours as needed for itching  120 tablet 3    lidocaine-prilocaine (EMLA) 2.5-2.5 % external cream Apply topically as needed (pain due to port access) Apply to port 30 minutes prior to lab appointment. 30 g 6    LORazepam (ATIVAN) 0.5 MG tablet Take 1-2 Tablets (0.5-1 mg) by mouth every 4 hours as needed for Anxiety (and or nausea and vomiting).*      LORazepam (ATIVAN) 0.5 MG tablet Take 1 tablet (0.5 mg) by mouth every 6 hours as needed for anxiety 20 tablet 3    Multiple Vitamin (MULTI-VITAMIN DAILY PO) Take 1 tablet by mouth daily      ondansetron (ZOFRAN) 8 MG tablet Take 1 tablet (8 mg) by mouth every 8 hours as needed for nausea (vomiting) 30 tablet 2    prochlorperazine (COMPAZINE) 10 MG tablet Take 1 tablet (10 mg) by mouth every 6 hours as needed for nausea or vomiting 30 tablet 2    sennosides (SENOKOT) 8.6 MG tablet       valACYclovir (VALTREX) 1000 mg tablet Take 1,000 mg by mouth as needed      dexamethasone (DECADRON) 4 MG tablet Take 2 tablets (8 mg) by mouth daily Take for 3 days, starting the day after chemo. Take with food. (Patient not taking: Reported on 11/7/2023) 6 tablet 2     Past Medical History:   Diagnosis Date    Female stress incontinence     Ovarian cancer, right (H) 06/16/2022    Stage IIIC high-grade serous carcinoma    Recurrent cold sores      Past Surgical History:   Procedure Laterality Date    APPENDECTOMY  08/29/2022    Part of ovarian cancer tumor debulking    BLADDER SUSPENSION  08/13/2009    HYSTERECTOMY  08/13/2009    For prolapse    LAPAROSCOPY DIAGNOSTIC (GYN)  06/16/2022    SALPINGO-OOPHORECTOMY, COMBINED Bilateral 08/29/2022    Pelvic exam under anesthesia, diagnostic laparoscopy, conversion to laparotomy for optimal interval tumor debulking to no gross residual disease including peritoneal and diaphragm biopsies, bilateral salpingo-oophorectomy, infragastric omentectomy, bilateral hemidiaphragm stripping, peritoneal and mesenteric argon beam ablation, appendectomy.    TONSILLECTOMY  1973    TUBAL  LIGATION Bilateral 09/01/1998       Physical Exam:   GENERAL APPEARANCE: vital appearing, alert and no distress; neatly groomed  EYES: Eyes grossly normal to inspection, PERRLA, conjunctivae and sclerae without injection or discharge, EOM intact   RESP:  no increased work of breathing; speaks in complete sentences;   MS: No musculoskeletal defects are noted  SKIN: No suspicious lesions or rashes, hydration status appears adequate with normal skin turgor   PSYCH: Alert and oriented x3; speech- coherent , normal rate and volume; able to articulate logical thoughts, able to abstract reason, no tangential thoughts, no hallucinations or delusions, mentation appears normal, Mood is euthymic. Affect is appropriate for this mood state and bright. Thought content is free of suicidal ideation, hallucinations, and delusions.  Eye contact is good during conversation.       Key Data Reviewed:  LABS: 11/7/2023- Cr 0.93, Albumin 4.0,  Hgb 11.9,      IMAGING: 10/22/2023  CT CHEST W/CONTRAST  IMPRESSION:   1.  Thrombus in the right brachiocephalic vein adjacent to right chest port tip slightly larger since 09/27/2023.   2.  Indeterminate new sclerosis of C4-C6 vertebral bodies since 02/16/2023, potentially metastatic.   3.  Small bilateral pulmonary nodules measuring up to 4 mm unchanged since December 2022.   4.  Mild emphysema.   5.  Unchanged small upper abdominal ascites.     30 minutes were spent on the day of the encounter in review of medical record, performing video visit, documentation and care coordination as described above         Again, thank you for allowing me to participate in the care of your patient.      Sincerely,    Anish Monroy MD

## 2023-12-05 ENCOUNTER — LAB (OUTPATIENT)
Dept: INFUSION THERAPY | Facility: CLINIC | Age: 58
End: 2023-12-05
Attending: OBSTETRICS & GYNECOLOGY
Payer: COMMERCIAL

## 2023-12-05 ENCOUNTER — VIRTUAL VISIT (OUTPATIENT)
Dept: ONCOLOGY | Facility: CLINIC | Age: 58
End: 2023-12-05
Attending: NURSE PRACTITIONER
Payer: COMMERCIAL

## 2023-12-05 VITALS — BODY MASS INDEX: 27.6 KG/M2 | WEIGHT: 150 LBS | HEIGHT: 62 IN

## 2023-12-05 DIAGNOSIS — C56.1 OVARIAN CANCER, RIGHT (H): Primary | ICD-10-CM

## 2023-12-05 DIAGNOSIS — Z51.11 ENCOUNTER FOR ANTINEOPLASTIC CHEMOTHERAPY: ICD-10-CM

## 2023-12-05 LAB
ALBUMIN SERPL BCG-MCNC: 3.8 G/DL (ref 3.5–5.2)
ALP SERPL-CCNC: 140 U/L (ref 40–150)
ALT SERPL W P-5'-P-CCNC: 14 U/L (ref 0–50)
ANION GAP SERPL CALCULATED.3IONS-SCNC: 10 MMOL/L (ref 7–15)
AST SERPL W P-5'-P-CCNC: 27 U/L (ref 0–45)
BASOPHILS # BLD AUTO: 0 10E3/UL (ref 0–0.2)
BASOPHILS NFR BLD AUTO: 1 %
BILIRUB SERPL-MCNC: 0.3 MG/DL
BUN SERPL-MCNC: 11.4 MG/DL (ref 6–20)
CALCIUM SERPL-MCNC: 9.3 MG/DL (ref 8.6–10)
CANCER AG125 SERPL-ACNC: 42 U/ML
CHLORIDE SERPL-SCNC: 99 MMOL/L (ref 98–107)
CREAT SERPL-MCNC: 0.92 MG/DL (ref 0.51–0.95)
DEPRECATED HCO3 PLAS-SCNC: 27 MMOL/L (ref 22–29)
EGFRCR SERPLBLD CKD-EPI 2021: 72 ML/MIN/1.73M2
EOSINOPHIL # BLD AUTO: 0.2 10E3/UL (ref 0–0.7)
EOSINOPHIL NFR BLD AUTO: 5 %
ERYTHROCYTE [DISTWIDTH] IN BLOOD BY AUTOMATED COUNT: 15.5 % (ref 10–15)
GLUCOSE SERPL-MCNC: 86 MG/DL (ref 70–99)
HCT VFR BLD AUTO: 35.1 % (ref 35–47)
HGB BLD-MCNC: 11.5 G/DL (ref 11.7–15.7)
IMM GRANULOCYTES # BLD: 0 10E3/UL
IMM GRANULOCYTES NFR BLD: 1 %
LYMPHOCYTES # BLD AUTO: 0.9 10E3/UL (ref 0.8–5.3)
LYMPHOCYTES NFR BLD AUTO: 26 %
MAGNESIUM SERPL-MCNC: 2 MG/DL (ref 1.7–2.3)
MCH RBC QN AUTO: 31.8 PG (ref 26.5–33)
MCHC RBC AUTO-ENTMCNC: 32.8 G/DL (ref 31.5–36.5)
MCV RBC AUTO: 97 FL (ref 78–100)
MONOCYTES # BLD AUTO: 0.3 10E3/UL (ref 0–1.3)
MONOCYTES NFR BLD AUTO: 10 %
NEUTROPHILS # BLD AUTO: 2 10E3/UL (ref 1.6–8.3)
NEUTROPHILS NFR BLD AUTO: 57 %
NRBC # BLD AUTO: 0 10E3/UL
NRBC BLD AUTO-RTO: 0 /100
PLATELET # BLD AUTO: 212 10E3/UL (ref 150–450)
POTASSIUM SERPL-SCNC: 4.3 MMOL/L (ref 3.4–5.3)
PROT SERPL-MCNC: 6.6 G/DL (ref 6.4–8.3)
RBC # BLD AUTO: 3.62 10E6/UL (ref 3.8–5.2)
SODIUM SERPL-SCNC: 136 MMOL/L (ref 135–145)
WBC # BLD AUTO: 3.5 10E3/UL (ref 4–11)

## 2023-12-05 PROCEDURE — 86304 IMMUNOASSAY TUMOR CA 125: CPT | Performed by: NURSE PRACTITIONER

## 2023-12-05 PROCEDURE — 85025 COMPLETE CBC W/AUTO DIFF WBC: CPT | Performed by: NURSE PRACTITIONER

## 2023-12-05 PROCEDURE — 80053 COMPREHEN METABOLIC PANEL: CPT | Performed by: NURSE PRACTITIONER

## 2023-12-05 PROCEDURE — 99213 OFFICE O/P EST LOW 20 MIN: CPT | Mod: VID | Performed by: NURSE PRACTITIONER

## 2023-12-05 PROCEDURE — 83735 ASSAY OF MAGNESIUM: CPT | Performed by: NURSE PRACTITIONER

## 2023-12-05 PROCEDURE — 36591 DRAW BLOOD OFF VENOUS DEVICE: CPT | Performed by: NURSE PRACTITIONER

## 2023-12-05 RX ORDER — EPINEPHRINE 1 MG/ML
0.3 INJECTION, SOLUTION INTRAMUSCULAR; SUBCUTANEOUS EVERY 5 MIN PRN
Status: CANCELLED | OUTPATIENT
Start: 2023-12-06

## 2023-12-05 RX ORDER — HEPARIN SODIUM,PORCINE 10 UNIT/ML
5 VIAL (ML) INTRAVENOUS
Status: CANCELLED | OUTPATIENT
Start: 2023-12-06

## 2023-12-05 RX ORDER — METHYLPREDNISOLONE SODIUM SUCCINATE 125 MG/2ML
125 INJECTION, POWDER, LYOPHILIZED, FOR SOLUTION INTRAMUSCULAR; INTRAVENOUS
Status: CANCELLED | OUTPATIENT
Start: 2023-12-06

## 2023-12-05 RX ORDER — MEPERIDINE HYDROCHLORIDE 25 MG/ML
25 INJECTION INTRAMUSCULAR; INTRAVENOUS; SUBCUTANEOUS EVERY 30 MIN PRN
Status: CANCELLED | OUTPATIENT
Start: 2023-12-06

## 2023-12-05 RX ORDER — ALBUTEROL SULFATE 0.83 MG/ML
2.5 SOLUTION RESPIRATORY (INHALATION)
Status: CANCELLED | OUTPATIENT
Start: 2023-12-06

## 2023-12-05 RX ORDER — HEPARIN SODIUM (PORCINE) LOCK FLUSH IV SOLN 100 UNIT/ML 100 UNIT/ML
5 SOLUTION INTRAVENOUS
Status: CANCELLED | OUTPATIENT
Start: 2023-12-06

## 2023-12-05 RX ORDER — ALBUTEROL SULFATE 90 UG/1
1-2 AEROSOL, METERED RESPIRATORY (INHALATION)
Status: CANCELLED | OUTPATIENT
Start: 2023-12-06

## 2023-12-05 RX ORDER — PALONOSETRON 0.05 MG/ML
0.25 INJECTION, SOLUTION INTRAVENOUS ONCE
Status: CANCELLED | OUTPATIENT
Start: 2023-12-06

## 2023-12-05 RX ORDER — LORAZEPAM 2 MG/ML
0.5 INJECTION INTRAMUSCULAR EVERY 4 HOURS PRN
Status: CANCELLED | OUTPATIENT
Start: 2023-12-06

## 2023-12-05 RX ORDER — DIPHENHYDRAMINE HYDROCHLORIDE 50 MG/ML
50 INJECTION INTRAMUSCULAR; INTRAVENOUS
Status: CANCELLED | OUTPATIENT
Start: 2023-12-06

## 2023-12-05 ASSESSMENT — PAIN SCALES - GENERAL: PAINLEVEL: NO PAIN (0)

## 2023-12-05 NOTE — PROGRESS NOTES
Virtual Visit Details    Type of service:  Video Visit     Originating Location (pt. 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 She is here for follow up and disease management by video.     Ovarian Cancer History:  22: Presented to an ED in [place] for evaluation of abdominal bloating and discomfort.  -Abdomen, pelvic CT: Radiographic Stage CAL ovarian cancer.   22: CA-000=604.   22: Pelvic ultrasound: c/w metastatic cancer. I have personally reviewed the ultrasound images.   22: Pelvic exam under anesthesia, diagnostic laparoscopy, biopsies, evacuation of ascites.   -Findings: On pelvic exam under anesthesia, there was a 6 cm firm, fixed mass adherent to the vaginal cuff. Vagina otherwise smooth. On laparoscopic examination, there was ascites filling the pelvis/abdomen, with >1 liter of fluid evacuated. The palpated mass was arising from the right ovary. Left ovary relatively normal in appearance. There was diffuse carcinomatosis covering the entire peritoneal surface falciform ligament, liver capsule, and mesentery. The omentum was replaced with tumor. Findings were not amenable to optimal tumor debulking so biopsies were taken for histologic examination.   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      22, 22, 22: Cycles 1-3 of neoadjuvant chemotherapy with IV carboplatin + paclitaxel 175 mg/m2 every 21 days.   -Cycles 1,2: Carboplatin AUC 6.   -Cycle 3: Carboplatin AUC 5 with dose-reduction due to thrombcytopenia.   -22: Chest, abdomen, pelvic CT: Partial response.      22:  Pelvic exam under anesthesia, diagnostic laparoscopy, conversion to laparotomy for optimal interval tumor debulking to no gross residual disease including peritoneal and diaphragm biopsies, bilateral salpingo-oophorectomy, infragastric omentectomy, bilateral hemidiaphragm stripping, peritoneal and mesenteric argon beam ablation, appendectomy.   -Pathology: High-grade serous carcinoma involving all resected specimens.     9/28/22, 10/19/22, 11/17/22: Cycles 4-6 of primary chemotherapy with IV carboplatin AUC 5 + paclitaxel 175 mg/m2.  -12/2/22: Chest, abdomen, pelvic CT: No definitive evidence of disease.   -12/7/22: CA-125=23.   -2/16/23: Chest, abdomen, pelvic CT to evaluate bloating: Stable findings, no definitive evidence of disease.  -5/25/23: Admitted  to [facility] for management of malignant ascites s/p therapeutic paracentesis, and partial SBO resolved with conservative management.   5/25/23: Abdomen, pelvic CT: Progressive disease.   LOWER CHEST: OMAIRA.   BOWEL: Development of moderate malignant ascites with small peritoneal and serosal implants present. There is mild distention of a few loops of small bowel within the right side of the abdomen with regions of narrowing present compatible with an early or partial small bowel obstruction. Wall thickening in a few of the involved segments with fecalization of the internal contents of the small bowel compatible with stasis.   PELVIC ORGANS: Lobulated 4.5 cm soft tissue mass in the dependent pelvis in the expected location of the uterus.  -6/13/23:  78.     6/13/23, 7/12/23, 8/14/23, 9/13/23: Cycles 1-4 of second-line chemotherapy with IV carboplatin AUC 5 + pegylated liposomal doxorubicin (PLD) 30 mg/m2 every 28 days.      Plan: 4 more cycles     9/27/23: CT cap IMPRESSION:  Chest:  1.  Small thrombus at the tip of the right chest port at the brachiocephalic/SVC confluence versus less likely fibrin sheath.  2.  Stable 4 mm subpleural nodule in the  right middle lobe. No new or enlarging pulmonary nodules.     Abdomen and pelvis:  1.  Slightly decreased size of the pelvic mass measuring 3 cm with interlobular and calcification suggestive of favorable response to treatment.  2.  Persistent but marginally decreased volume of the malignant ascites. Persistent manifestations of peritoneal carcinomatosis.     10/11/23: Cycle 5  carboplatin AUC 5 + pegylated liposomal doxorubicin (PLD) 30 mg/m2 every 28 days.  11/7/23: Cycle 6 carboplatin AUC 5 + pegylated liposomal doxorubicin (PLD) 30 mg/m2 every 28 days planned for 11/8.  43.  11/14-11/16/23: Admission for pSBO; did not need NG  -CT ap IMPRESSION: I personally reviewed imaging  1.  Mild small bowel dilatation with transition point in the central abdomen and CT position of upstream bowel loops compatible with small bowel obstruction.   2.  Redemonstrated findings of peritoneal carcinomatosis. Slight decrease in small to moderate volume partially loculated ascites. Decreased size of pelvic nodule.   12/5/23: Cycle 7 carboplatin AUC 5 + pegylated liposomal doxorubicin (PLD) 30 mg/m2 every 28 days planned for 12/6.  42.    Genetic/Genomic/Molecular Testing History:  10/5/22: [facility], reflexed to Common Hereditary Cancer Panel.   -No identifiable germline variants identified in ABRAXAS1, AKT1, APC, DEION, AXIN2, BARD1, BMPR1A, BRCA1, BRCA2, BRIP1, CDC73, CDH1, CDK4, CDKN2A, CHEK2, CTNNA1, DICER1, EPCAM, FANCC, FANCM, GREM1, HOXB13, KIT, MEN1, MLH1, MRE11, MSH2, MSH6, MUTYH, NBN, NF1, NTHL1, PALB2, PDGFRA, PIK3CA, PMS2, POLD1, POLE, PTEN, RAD50, RAD51C, RAD51D, RECQL, RINT1, SDHA, SDHB, SDHC, SDHD, SMAD4, SMARCA4, STK11, TP53, TSC1, TSC2, VHL, XRCC2.   -Variant of uncertain significance in MSH3 c.16C>T (p.[id number])    10/9/22 (tumor 8/29/22): [facility] Testing Results.  -Genomic ROXI low (6%)   -TMB low (1mut/Mb)  -Microsatellite stable/Mismatch Repair proficient  -PD-L1- (CPS 0%)  -NTRK 1/2/3 fusion  not detected.   -ER-, NM+  -Genomic alterations in:  --TP53  -No genomic alterations in BRCA1,2.     Jamila-ovary clinical trial screening: Negative for the immunoreactive subtype.      6/19/23 (tumor 8/29/22): [facility] Test results.   -FOLR1+ (2+, 75%).            Today she reports the following:       Nausea: little bit but not bad; took Compazine once  Skin: no redness/peeling  States did too much after her last cycle and did not get enough rest  Bowels: taking stool softeners 1 BID; Miralax prn; eating low fiber diet  Hydration: 5-6 glasses water/day at least; coffee in morning, 1-2 soda in afternoon  Has been admitted twice for SBO and also has done bowel rest at home to get bowels moving      Review of Systems:    Systemic           no weight changes; no fever; no chills; no night sweats; no appetite changes  Skin           no rashes, or lesions  Eye           no irritation; no changes in vision  Day-Laryngeal           no dysphagia; no hoarseness   Pulmonary    no cough; no shortness of breath  Cardiovascular    no chest pain; no palpitations  Gastrointestinal    no diarrhea; no constipation; no abdominal pain; no changes in bowel  habits; no blood in stool  Genitourinary   no urinary frequency; no urinary urgency; no dysuria; no pain; no abnormal vaginal discharge; no abnormal vaginal bleeding  Breast   no breast discharge; no breast changes; no breast pain  Musculoskeletal    no myalgias; no arthralgias; no back pain  Psychiatric           no depressed mood; no anxiety    Hematologic              no tender lymph nodes; no noticeable swellings or lumps   Endocrine    no hot flashes; no heat/cold intolerance         Neurological   no tremor; no numbness and tingling; no headaches; no difficulty  sleeping      Past Medical History:    Past Medical History:   Diagnosis Date    Female stress incontinence     Ovarian cancer, right (H) 06/16/2022    Stage IIIC high-grade serous carcinoma    Recurrent cold sores           Past Surgical History:    Past Surgical History:   Procedure Laterality Date    APPENDECTOMY  08/29/2022    Part of ovarian cancer tumor debulking    BLADDER SUSPENSION  08/13/2009    HYSTERECTOMY  08/13/2009    For prolapse    LAPAROSCOPY DIAGNOSTIC (GYN)  06/16/2022    SALPINGO-OOPHORECTOMY, COMBINED Bilateral 08/29/2022    Pelvic exam under anesthesia, diagnostic laparoscopy, conversion to laparotomy for optimal interval tumor debulking to no gross residual disease including peritoneal and diaphragm biopsies, bilateral salpingo-oophorectomy, infragastric omentectomy, bilateral hemidiaphragm stripping, peritoneal and mesenteric argon beam ablation, appendectomy.    TONSILLECTOMY  1973    TUBAL LIGATION Bilateral 09/01/1998         Health Maintenance Due   Topic Date Due    YEARLY PREVENTIVE VISIT  Never done    ADVANCE CARE PLANNING  Never done    HEPATITIS B IMMUNIZATION (1 of 3 - 3-dose series) Never done    Pneumococcal Vaccine: Pediatrics (0 to 5 Years) and At-Risk Patients (6 to 64 Years) (1 - PCV) Never done    COLORECTAL CANCER SCREENING  Never done    HIV SCREENING  Never done    HEPATITIS C SCREENING  Never done    ZOSTER IMMUNIZATION (1 of 2) Never done    PAP  Never done    LIPID  Never done    DTAP/TDAP/TD IMMUNIZATION (2 - Td or Tdap) 12/15/2021    PHQ-2 (once per calendar year)  Never done    INFLUENZA VACCINE (1) 09/01/2023    COVID-19 Vaccine (5 - 2023-24 season) 09/01/2023       Current Medications:     Current Outpatient Medications   Medication Sig Dispense Refill    apixaban ANTICOAGULANT (ELIQUIS) 5 MG tablet Take 1 tablet (5 mg) by mouth 2 times daily 60 tablet 11    citalopram (CELEXA) 40 MG tablet Take 1 tablet by mouth daily      cyclobenzaprine (FLEXERIL) 5 MG tablet Take 1-2 Tablets (5-10 mg) by mouth three times a day.      dexamethasone (DECADRON) 4 MG tablet Take 2 tablets (8 mg) by mouth daily Take for 3 days, starting the day after chemo. Take with food. (Patient not taking:  Reported on 2023) 6 tablet 2    diphenhydrAMINE-acetaminophen (TYLENOL PM)  MG tablet Take 1 tablet by mouth as needed      docusate sodium (DSS) 100 MG capsule Take 100 mg by mouth as needed      HYDROcodone-acetaminophen (NORCO) 5-325 MG tablet Take 1 tablet by mouth every 4 hours as needed      HYDROmorphone (DILAUDID) 2 MG tablet 1 tablet      hydrOXYzine (ATARAX) 10 MG tablet Take 1-2 tablets (10-20 mg) by mouth every 6 hours as needed for itching 120 tablet 3    lidocaine-prilocaine (EMLA) 2.5-2.5 % external cream Apply topically as needed (pain due to port access) Apply to port 30 minutes prior to lab appointment. 30 g 6    LORazepam (ATIVAN) 0.5 MG tablet Take 1-2 Tablets (0.5-1 mg) by mouth every 4 hours as needed for Anxiety (and or nausea and vomiting).*      LORazepam (ATIVAN) 0.5 MG tablet Take 1 tablet (0.5 mg) by mouth every 6 hours as needed for anxiety 20 tablet 3    Multiple Vitamin (MULTI-VITAMIN DAILY PO) Take 1 tablet by mouth daily      ondansetron (ZOFRAN) 8 MG tablet Take 1 tablet (8 mg) by mouth every 8 hours as needed for nausea (vomiting) 30 tablet 2    prochlorperazine (COMPAZINE) 10 MG tablet Take 1 tablet (10 mg) by mouth every 6 hours as needed for nausea or vomiting 30 tablet 2    sennosides (SENOKOT) 8.6 MG tablet       valACYclovir (VALTREX) 1000 mg tablet Take 1,000 mg by mouth as needed           Allergies:      No Known Allergies     Social History:     Social History     Tobacco Use    Smoking status: Former     Packs/day: 1.00     Years: 42.00     Additional pack years: 0.00     Total pack years: 42.00     Types: Cigarettes     Quit date: 2022     Years since quittin.5    Smokeless tobacco: Never   Substance Use Topics    Alcohol use: Yes     Comment: Beer ~Q3 months.       History   Drug Use Unknown         Family History:     The patient's family history is notable for     Family History   Problem Relation Age of Onset    Prostate Cancer Father     Breast  "Cancer Sister 48    Melanoma Sister     Skin Cancer Sister     Colon Cancer Maternal Grandmother          Physical Exam:     Ht 1.575 m (5' 2\")   Wt 68 kg (150 lb)   BMI 27.44 kg/m    Body mass index is 27.44 kg/m .    General Appearance: healthy and alert, no distress    Eyes:  Eyes grossly normal to inspection.  No discharge or erythema, or obvious scleral/conjunctival abnormalities.    Respiratory: No audible wheeze, cough, or visible cyanosis.  No visible retractions or increased work of breathing.     Musculoskeletal: extremities non tender and without edema    Skin: no lesions or rashes on visible skin    Neurological: normal gait, no gross defects     Psychiatric: appropriate mood and affect. Mentation appears normal, affect normal/bright, judgement and insight intact, normal speech and appearance well-groomed                            The rest of a comprehensive physical examination is deferred due to video visit restrictions.      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 She is here for follow up and disease management by video..     23 minutes spent on the date of the encounter doing chart review, history and exam, documentation and further activities as noted above      Plan:     1.)        Reviewed CBC, CMP, Mg,  and Ok to proceed with planned treatment. VSS, and weight still need to be done. She will return in 3 weeks for next cycle; this is already scheduled as is her CT and MD follow up. Discussed importance of eating smaller meals with lean proteins/hard boiled eggs more often, adequate hydration of at least 64 oz water throughout the day, and pacing activities. Patient to contact the clinic with any questions or concerns in the interim.  Answered all of her questions to the best of my ability.     2.) Genetic risk factors were assessed and she has a VUS No identifiable germline variants " identified in ABRAXAS1, AKT1, APC, DEION, AXIN2, BARD1, BMPR1A, BRCA1, BRCA2, BRIP1, CDC73, CDH1, CDK4, CDKN2A, CHEK2, CTNNA1, DICER1, EPCAM, FANCC, FANCM, GREM1, HOXB13, KIT, MEN1, MLH1, MRE11, MSH2, MSH6, MUTYH, NBN, NF1, NTHL1, PALB2, PDGFRA, PIK3CA, PMS2, POLD1, POLE, PTEN, RAD50, RAD51C, RAD51D, RECQL, RINT1, SDHA, SDHB, SDHC, SDHD, SMAD4, SMARCA4, STK11, TP53, TSC1, TSC2, VHL, XRCC2.      3.) Labs and/or tests ordered include:  CBC, CMP, Mg, ..     4.) Health maintenance issues addressed today include annual health maintenance and non-gynecologic issues with PCP.    JALEN Zamarripa, WHNP-BC, ANP-BC  Women's Health Nurse Practitioner  Adult Nurse Practitioner  Division of Gynecologic Oncology  .      CC  Patient Care Team:  Domenica Christianson MD as PCP - General  [facility] (Gynecologic Oncology)  Susannah Roman APRN CNP as Assigned Cancer Care Provider  Anish Monroy MD as Assigned Palliative Care Provider  Pratima Vega RN as Specialty Care Coordinator (Hematology & Oncology)  SELF, REFERRED

## 2023-12-05 NOTE — NURSING NOTE
No other vitals to report today      Is the patient currently in the state of [place]? YES    Visit mode:VIDEO    If the visit is dropped, the patient can be reconnected by: VIDEO VISIT: Text to cell phone:   Telephone Information:   Mobile [phone]       Will anyone else be joining the visit? NO  (If patient encounters technical issues they should call [phone]    How would you like to obtain your AVS? MyChart    Are changes needed to the allergy or medication list?  Pt flagged Decadron from removal     Patient declined individual allergy and medication review by support staff because patient denies any changes since echeck-in completion and states all information entered during echeck-in remains accurate.    Reason for visit: FRED Hall MA VVF

## 2023-12-05 NOTE — LETTER
2023         RE: Jacki Smith  [address] [facility] [address]        Dear Colleague,    Thank you for referring your patient, Jacki Smith, to the [facility]. Please see a copy of my visit note below.                    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 She is here for follow up and disease management by video.     Ovarian Cancer History:  22: Presented to an ED in [place] for evaluation of abdominal bloating and discomfort.  -Abdomen, pelvic CT: Radiographic Stage CAL ovarian cancer.   22: [id number].   22: Pelvic ultrasound: c/w metastatic cancer. I have personally reviewed the ultrasound images.   22: Pelvic exam under anesthesia, diagnostic laparoscopy, biopsies, evacuation of ascites.   -Findings: On pelvic exam under anesthesia, there was a 6 cm firm, fixed mass adherent to the vaginal cuff. Vagina otherwise smooth. On laparoscopic examination, there was ascites filling the pelvis/abdomen, with >1 liter of fluid evacuated. The palpated mass was arising from the right ovary. Left ovary relatively normal in appearance. There was diffuse carcinomatosis covering the entire peritoneal surface falciform ligament, liver capsule, and mesentery. The omentum was replaced with tumor. Findings were not amenable to optimal tumor debulking so biopsies were taken for histologic examination.   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      22, 22, 22: Cycles 1-3 of neoadjuvant chemotherapy with IV carboplatin + paclitaxel 175 mg/m2 every 21 days.   -Cycles 1,2: Carboplatin AUC 6.   -Cycle 3: Carboplatin AUC 5 with dose-reduction due to thrombcytopenia.    -8/19/22: Chest, abdomen, pelvic CT: Partial response.      8/29/22: Pelvic exam under anesthesia, diagnostic laparoscopy, conversion to laparotomy for optimal interval tumor debulking to no gross residual disease including peritoneal and diaphragm biopsies, bilateral salpingo-oophorectomy, infragastric omentectomy, bilateral hemidiaphragm stripping, peritoneal and mesenteric argon beam ablation, appendectomy.   -Pathology: High-grade serous carcinoma involving all resected specimens.     9/28/22, 10/19/22, 11/17/22: Cycles 4-6 of primary chemotherapy with IV carboplatin AUC 5 + paclitaxel 175 mg/m2.  -12/2/22: Chest, abdomen, pelvic CT: No definitive evidence of disease.   -12/7/22: CA-125=23.   -2/16/23: Chest, abdomen, pelvic CT to evaluate bloating: Stable findings, no definitive evidence of disease.  -5/25/23: Admitted  to [facility] for management of malignant ascites s/p therapeutic paracentesis, and partial SBO resolved with conservative management.   5/25/23: Abdomen, pelvic CT: Progressive disease.   LOWER CHEST: OMAIRA.   BOWEL: Development of moderate malignant ascites with small peritoneal and serosal implants present. There is mild distention of a few loops of small bowel within the right side of the abdomen with regions of narrowing present compatible with an early or partial small bowel obstruction. Wall thickening in a few of the involved segments with fecalization of the internal contents of the small bowel compatible with stasis.   PELVIC ORGANS: Lobulated 4.5 cm soft tissue mass in the dependent pelvis in the expected location of the uterus.  -6/13/23:  78.     6/13/23, 7/12/23, 8/14/23, 9/13/23: Cycles 1-4 of second-line chemotherapy with IV carboplatin AUC 5 + pegylated liposomal doxorubicin (PLD) 30 mg/m2 every 28 days.      Plan: 4 more cycles     9/27/23: CT cap IMPRESSION:  Chest:  1.  Small thrombus at the tip of the right chest port at the brachiocephalic/SVC confluence versus  less likely fibrin sheath.  2.  Stable 4 mm subpleural nodule in the right middle lobe. No new or enlarging pulmonary nodules.     Abdomen and pelvis:  1.  Slightly decreased size of the pelvic mass measuring 3 cm with interlobular and calcification suggestive of favorable response to treatment.  2.  Persistent but marginally decreased volume of the malignant ascites. Persistent manifestations of peritoneal carcinomatosis.     10/11/23: Cycle 5  carboplatin AUC 5 + pegylated liposomal doxorubicin (PLD) 30 mg/m2 every 28 days.  11/7/23: Cycle 6 carboplatin AUC 5 + pegylated liposomal doxorubicin (PLD) 30 mg/m2 every 28 days planned for 11/8.  43.  11/14-11/16/23: Admission for pSBO; did not need NG  -CT ap IMPRESSION: I personally reviewed imaging  1.  Mild small bowel dilatation with transition point in the central abdomen and CT position of upstream bowel loops compatible with small bowel obstruction.   2.  Redemonstrated findings of peritoneal carcinomatosis. Slight decrease in small to moderate volume partially loculated ascites. Decreased size of pelvic nodule.   12/5/23: Cycle 7 carboplatin AUC 5 + pegylated liposomal doxorubicin (PLD) 30 mg/m2 every 28 days planned for 12/6.  42.    Genetic/Genomic/Molecular Testing History:  10/5/22: [facility], reflexed to Common Hereditary Cancer Panel.   -No identifiable germline variants identified in ABRAXAS1, AKT1, APC, DEION, AXIN2, BARD1, BMPR1A, BRCA1, BRCA2, BRIP1, CDC73, CDH1, CDK4, CDKN2A, CHEK2, CTNNA1, DICER1, EPCAM, FANCC, FANCM, GREM1, HOXB13, KIT, MEN1, MLH1, MRE11, MSH2, MSH6, MUTYH, NBN, NF1, NTHL1, PALB2, PDGFRA, PIK3CA, PMS2, POLD1, POLE, PTEN, RAD50, RAD51C, RAD51D, RECQL, RINT1, SDHA, SDHB, SDHC, SDHD, SMAD4, SMARCA4, STK11, TP53, TSC1, TSC2, VHL, XRCC2.   -Variant of uncertain significance in MSH3 c.16C>T ([id number])    10/9/22 (tumor 8/29/22): [facility] Testing Results.  -Genomic ROXI low (6%)   -TMB low (1mut/Mb)  -Microsatellite  stable/Mismatch Repair proficient  -PD-L1- (CPS 0%)  -NTRK 1/2/3 fusion not detected.   -ER-, AR+  -Genomic alterations in:  --TP53  -No genomic alterations in BRCA1,2.     Jamila-ovary clinical trial screening: Negative for the immunoreactive subtype.      6/19/23 (tumor 8/29/22): [facility] Test results.   -FOLR1+ (2+, 75%).            Today she reports the following:       Nausea: little bit but not bad; took Compazine once  Skin: no redness/peeling  States did too much after her last cycle and did not get enough rest  Bowels: taking stool softeners 1 BID; Miralax prn; eating low fiber diet  Hydration: 5-6 glasses water/day at least; coffee in morning, 1-2 soda in afternoon  Has been admitted twice for SBO and also has done bowel rest at home to get bowels moving      Review of Systems:    Systemic           no weight changes; no fever; no chills; no night sweats; no appetite changes  Skin           no rashes, or lesions  Eye           no irritation; no changes in vision  Day-Laryngeal           no dysphagia; no hoarseness   Pulmonary    no cough; no shortness of breath  Cardiovascular    no chest pain; no palpitations  Gastrointestinal    no diarrhea; no constipation; no abdominal pain; no changes in bowel  habits; no blood in stool  Genitourinary   no urinary frequency; no urinary urgency; no dysuria; no pain; no abnormal vaginal discharge; no abnormal vaginal bleeding  Breast   no breast discharge; no breast changes; no breast pain  Musculoskeletal    no myalgias; no arthralgias; no back pain  Psychiatric           no depressed mood; no anxiety    Hematologic              no tender lymph nodes; no noticeable swellings or lumps   Endocrine    no hot flashes; no heat/cold intolerance         Neurological   no tremor; no numbness and tingling; no headaches; no difficulty  sleeping      Past Medical History:    Past Medical History:   Diagnosis Date    Female stress incontinence     Ovarian cancer, right (H) 06/16/2022     Stage IIIC high-grade serous carcinoma    Recurrent cold sores          Past Surgical History:    Past Surgical History:   Procedure Laterality Date    APPENDECTOMY  08/29/2022    Part of ovarian cancer tumor debulking    BLADDER SUSPENSION  08/13/2009    HYSTERECTOMY  08/13/2009    For prolapse    LAPAROSCOPY DIAGNOSTIC (GYN)  06/16/2022    SALPINGO-OOPHORECTOMY, COMBINED Bilateral 08/29/2022    Pelvic exam under anesthesia, diagnostic laparoscopy, conversion to laparotomy for optimal interval tumor debulking to no gross residual disease including peritoneal and diaphragm biopsies, bilateral salpingo-oophorectomy, infragastric omentectomy, bilateral hemidiaphragm stripping, peritoneal and mesenteric argon beam ablation, appendectomy.    TONSILLECTOMY  1973    TUBAL LIGATION Bilateral 09/01/1998         Health Maintenance Due   Topic Date Due    YEARLY PREVENTIVE VISIT  Never done    ADVANCE CARE PLANNING  Never done    HEPATITIS B IMMUNIZATION (1 of 3 - 3-dose series) Never done    Pneumococcal Vaccine: Pediatrics (0 to 5 Years) and At-Risk Patients (6 to 64 Years) (1 - PCV) Never done    COLORECTAL CANCER SCREENING  Never done    HIV SCREENING  Never done    HEPATITIS C SCREENING  Never done    ZOSTER IMMUNIZATION (1 of 2) Never done    PAP  Never done    LIPID  Never done    DTAP/TDAP/TD IMMUNIZATION (2 - Td or Tdap) 12/15/2021    PHQ-2 (once per calendar year)  Never done    INFLUENZA VACCINE (1) 09/01/2023    COVID-19 Vaccine (5 - 2023-24 season) 09/01/2023       Current Medications:     Current Outpatient Medications   Medication Sig Dispense Refill    apixaban ANTICOAGULANT (ELIQUIS) 5 MG tablet Take 1 tablet (5 mg) by mouth 2 times daily 60 tablet 11    citalopram (CELEXA) 40 MG tablet Take 1 tablet by mouth daily      cyclobenzaprine (FLEXERIL) 5 MG tablet Take 1-2 Tablets (5-10 mg) by mouth three times a day.      dexamethasone (DECADRON) 4 MG tablet Take 2 tablets (8 mg) by mouth daily Take for 3 days,  starting the day after chemo. Take with food. (Patient not taking: Reported on 2023) 6 tablet 2    diphenhydrAMINE-acetaminophen (TYLENOL PM)  MG tablet Take 1 tablet by mouth as needed      docusate sodium (DSS) 100 MG capsule Take 100 mg by mouth as needed      HYDROcodone-acetaminophen (NORCO) 5-325 MG tablet Take 1 tablet by mouth every 4 hours as needed      HYDROmorphone (DILAUDID) 2 MG tablet 1 tablet      hydrOXYzine (ATARAX) 10 MG tablet Take 1-2 tablets (10-20 mg) by mouth every 6 hours as needed for itching 120 tablet 3    lidocaine-prilocaine (EMLA) 2.5-2.5 % external cream Apply topically as needed (pain due to port access) Apply to port 30 minutes prior to lab appointment. 30 g 6    LORazepam (ATIVAN) 0.5 MG tablet Take 1-2 Tablets (0.5-1 mg) by mouth every 4 hours as needed for Anxiety (and or nausea and vomiting).*      LORazepam (ATIVAN) 0.5 MG tablet Take 1 tablet (0.5 mg) by mouth every 6 hours as needed for anxiety 20 tablet 3    Multiple Vitamin (MULTI-VITAMIN DAILY PO) Take 1 tablet by mouth daily      ondansetron (ZOFRAN) 8 MG tablet Take 1 tablet (8 mg) by mouth every 8 hours as needed for nausea (vomiting) 30 tablet 2    prochlorperazine (COMPAZINE) 10 MG tablet Take 1 tablet (10 mg) by mouth every 6 hours as needed for nausea or vomiting 30 tablet 2    sennosides (SENOKOT) 8.6 MG tablet       valACYclovir (VALTREX) 1000 mg tablet Take 1,000 mg by mouth as needed           Allergies:      No Known Allergies     Social History:     Social History     Tobacco Use    Smoking status: Former     Packs/day: 1.00     Years: 42.00     Additional pack years: 0.00     Total pack years: 42.00     Types: Cigarettes     Quit date: 2022     Years since quittin.5    Smokeless tobacco: Never   Substance Use Topics    Alcohol use: Yes     Comment: Beer ~Q3 months.       History   Drug Use Unknown         Family History:     The patient's family history is notable for     Family History  "  Problem Relation Age of Onset    Prostate Cancer Father     Breast Cancer Sister 48    Melanoma Sister     Skin Cancer Sister     Colon Cancer Maternal Grandmother          Physical Exam:     Ht 1.575 m (5' 2\")   Wt 68 kg (150 lb)   BMI 27.44 kg/m    Body mass index is 27.44 kg/m .    General Appearance: healthy and alert, no distress    Eyes:  Eyes grossly normal to inspection.  No discharge or erythema, or obvious scleral/conjunctival abnormalities.    Respiratory: No audible wheeze, cough, or visible cyanosis.  No visible retractions or increased work of breathing.     Musculoskeletal: extremities non tender and without edema    Skin: no lesions or rashes on visible skin    Neurological: normal gait, no gross defects     Psychiatric: appropriate mood and affect. Mentation appears normal, affect normal/bright, judgement and insight intact, normal speech and appearance well-groomed                            The rest of a comprehensive physical examination is deferred due to video visit restrictions.      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 She is here for follow up and disease management by video..     23 minutes spent on the date of the encounter doing chart review, history and exam, documentation and further activities as noted above      Plan:     1.)        Reviewed CBC, CMP, Mg,  and Ok to proceed with planned treatment. VSS, and weight still need to be done. She will return in 3 weeks for next cycle; this is already scheduled as is her CT and MD follow up. Discussed importance of eating smaller meals with lean proteins/hard boiled eggs more often, adequate hydration of at least 64 oz water throughout the day, and pacing activities. Patient to contact the clinic with any questions or concerns in the interim.  Answered all of her questions to the best of my ability.     2.) Genetic risk factors " were assessed and she has a VUS No identifiable germline variants identified in ABRAXAS1, AKT1, APC, DEION, AXIN2, BARD1, BMPR1A, BRCA1, BRCA2, BRIP1, CDC73, CDH1, CDK4, CDKN2A, CHEK2, CTNNA1, DICER1, EPCAM, FANCC, FANCM, GREM1, HOXB13, KIT, MEN1, MLH1, MRE11, MSH2, MSH6, MUTYH, NBN, NF1, NTHL1, PALB2, PDGFRA, PIK3CA, PMS2, POLD1, POLE, PTEN, RAD50, RAD51C, RAD51D, RECQL, RINT1, SDHA, SDHB, SDHC, SDHD, SMAD4, SMARCA4, STK11, TP53, TSC1, TSC2, VHL, XRCC2.      3.) Labs and/or tests ordered include:  CBC, CMP, Mg, ..     4.) Health maintenance issues addressed today include annual health maintenance and non-gynecologic issues with PCP.    JALEN Zamarripa, WHNP-BC, ANP-BC  Women's Health Nurse Practitioner  Adult Nurse Practitioner  Division of Gynecologic Oncology  .      CC  Patient Care Team:  Domenica Christianson MD as PCP - General

## 2023-12-06 ENCOUNTER — INFUSION THERAPY VISIT (OUTPATIENT)
Dept: INFUSION THERAPY | Facility: CLINIC | Age: 58
End: 2023-12-06
Attending: OBSTETRICS & GYNECOLOGY
Payer: COMMERCIAL

## 2023-12-06 VITALS
HEART RATE: 74 BPM | RESPIRATION RATE: 18 BRPM | BODY MASS INDEX: 27.44 KG/M2 | DIASTOLIC BLOOD PRESSURE: 68 MMHG | WEIGHT: 150 LBS | SYSTOLIC BLOOD PRESSURE: 128 MMHG | OXYGEN SATURATION: 99 % | TEMPERATURE: 98.2 F

## 2023-12-06 DIAGNOSIS — C56.1 OVARIAN CANCER, RIGHT (H): Primary | ICD-10-CM

## 2023-12-06 PROCEDURE — 96413 CHEMO IV INFUSION 1 HR: CPT

## 2023-12-06 PROCEDURE — 258N000003 HC RX IP 258 OP 636: Performed by: NURSE PRACTITIONER

## 2023-12-06 PROCEDURE — 96417 CHEMO IV INFUS EACH ADDL SEQ: CPT

## 2023-12-06 PROCEDURE — 250N000011 HC RX IP 250 OP 636: Mod: JZ | Performed by: NURSE PRACTITIONER

## 2023-12-06 PROCEDURE — 96375 TX/PRO/DX INJ NEW DRUG ADDON: CPT

## 2023-12-06 PROCEDURE — 96367 TX/PROPH/DG ADDL SEQ IV INF: CPT

## 2023-12-06 RX ORDER — MEPERIDINE HYDROCHLORIDE 50 MG/ML
25 INJECTION INTRAMUSCULAR; INTRAVENOUS; SUBCUTANEOUS EVERY 30 MIN PRN
Status: DISCONTINUED | OUTPATIENT
Start: 2023-12-06 | End: 2023-12-06 | Stop reason: HOSPADM

## 2023-12-06 RX ORDER — ALBUTEROL SULFATE 0.83 MG/ML
2.5 SOLUTION RESPIRATORY (INHALATION)
Status: DISCONTINUED | OUTPATIENT
Start: 2023-12-06 | End: 2023-12-06 | Stop reason: HOSPADM

## 2023-12-06 RX ORDER — ALBUTEROL SULFATE 90 UG/1
1-2 AEROSOL, METERED RESPIRATORY (INHALATION)
Status: DISCONTINUED | OUTPATIENT
Start: 2023-12-06 | End: 2023-12-06 | Stop reason: HOSPADM

## 2023-12-06 RX ORDER — EPINEPHRINE 1 MG/ML
0.3 INJECTION, SOLUTION INTRAMUSCULAR; SUBCUTANEOUS EVERY 5 MIN PRN
Status: DISCONTINUED | OUTPATIENT
Start: 2023-12-06 | End: 2023-12-06 | Stop reason: HOSPADM

## 2023-12-06 RX ORDER — DIPHENHYDRAMINE HYDROCHLORIDE 50 MG/ML
50 INJECTION INTRAMUSCULAR; INTRAVENOUS
Status: DISCONTINUED | OUTPATIENT
Start: 2023-12-06 | End: 2023-12-06 | Stop reason: HOSPADM

## 2023-12-06 RX ORDER — METHYLPREDNISOLONE SODIUM SUCCINATE 125 MG/2ML
125 INJECTION, POWDER, LYOPHILIZED, FOR SOLUTION INTRAMUSCULAR; INTRAVENOUS
Status: DISCONTINUED | OUTPATIENT
Start: 2023-12-06 | End: 2023-12-06 | Stop reason: HOSPADM

## 2023-12-06 RX ORDER — HEPARIN SODIUM (PORCINE) LOCK FLUSH IV SOLN 100 UNIT/ML 100 UNIT/ML
5 SOLUTION INTRAVENOUS
Status: DISCONTINUED | OUTPATIENT
Start: 2023-12-06 | End: 2023-12-06 | Stop reason: HOSPADM

## 2023-12-06 RX ORDER — PALONOSETRON 0.05 MG/ML
0.25 INJECTION, SOLUTION INTRAVENOUS ONCE
Status: COMPLETED | OUTPATIENT
Start: 2023-12-06 | End: 2023-12-06

## 2023-12-06 RX ADMIN — CARBOPLATIN 425 MG: 10 INJECTION, SOLUTION INTRAVENOUS at 10:44

## 2023-12-06 RX ADMIN — DEXTROSE MONOHYDRATE 250 ML: 50 INJECTION, SOLUTION INTRAVENOUS at 08:50

## 2023-12-06 RX ADMIN — FOSAPREPITANT: 150 INJECTION, POWDER, LYOPHILIZED, FOR SOLUTION INTRAVENOUS at 08:53

## 2023-12-06 RX ADMIN — DOXORUBICIN HYDROCHLORIDE 50 MG: 2 INJECTION, SUSPENSION, LIPOSOMAL INTRAVENOUS at 09:42

## 2023-12-06 RX ADMIN — PALONOSETRON 0.25 MG: 0.05 INJECTION, SOLUTION INTRAVENOUS at 08:50

## 2023-12-06 RX ADMIN — HEPARIN 5 ML: 100 SYRINGE at 11:33

## 2023-12-06 NOTE — PROGRESS NOTES
Infusion Nursing Note:  Jacki Smith presents today for D1C7 Doxil and Carbo.    Patient seen by provider today: No   present during visit today: Not Applicable.    Note: Reviewed plan of care. Pt premedicated with IV Aloxi and Emend/dexamethasone.      Intravenous Access:  Implanted Port, excellent blood return.    Treatment Conditions:  Lab Results   Component Value Date    HGB 11.5 (L) 12/05/2023    WBC 3.5 (L) 12/05/2023    ANEUTAUTO 2.0 12/05/2023     12/05/2023        Lab Results   Component Value Date     12/05/2023    POTASSIUM 4.3 12/05/2023    MAG 2.0 12/05/2023    CR 0.92 12/05/2023    KINDRA 9.3 12/05/2023    BILITOTAL 0.3 12/05/2023    ALBUMIN 3.8 12/05/2023    ALT 14 12/05/2023    AST 27 12/05/2023       Results reviewed, labs MET treatment parameters, ok to proceed with treatment.      Post Infusion Assessment:  Patient tolerated infusion without incident.  Blood return noted pre and post infusion.  Site patent and intact, free from redness, edema or discomfort.  No evidence of extravasations.  Access discontinued per protocol.       Discharge Plan:   Patient and/or family verbalized understanding of discharge instructions and all questions answered.  Patient discharged in stable condition accompanied by: , Simon.  Departure Mode: Ambulatory.      Radha Wilson RN

## 2024-01-02 ENCOUNTER — LAB (OUTPATIENT)
Dept: INFUSION THERAPY | Facility: HOSPITAL | Age: 59
End: 2024-01-02
Attending: OBSTETRICS & GYNECOLOGY
Payer: COMMERCIAL

## 2024-01-02 ENCOUNTER — VIRTUAL VISIT (OUTPATIENT)
Dept: ONCOLOGY | Facility: CLINIC | Age: 59
End: 2024-01-02
Attending: NURSE PRACTITIONER
Payer: COMMERCIAL

## 2024-01-02 VITALS — BODY MASS INDEX: 28.51 KG/M2 | WEIGHT: 155.9 LBS

## 2024-01-02 VITALS — WEIGHT: 155 LBS | BODY MASS INDEX: 28.35 KG/M2

## 2024-01-02 DIAGNOSIS — C56.1 OVARIAN CANCER, RIGHT (H): Primary | ICD-10-CM

## 2024-01-02 DIAGNOSIS — Z51.11 ENCOUNTER FOR ANTINEOPLASTIC CHEMOTHERAPY: ICD-10-CM

## 2024-01-02 LAB
ALBUMIN SERPL BCG-MCNC: 4 G/DL (ref 3.5–5.2)
ALP SERPL-CCNC: 166 U/L (ref 40–150)
ALT SERPL W P-5'-P-CCNC: 23 U/L (ref 0–50)
ANION GAP SERPL CALCULATED.3IONS-SCNC: 5 MMOL/L (ref 7–15)
AST SERPL W P-5'-P-CCNC: 36 U/L (ref 0–45)
BASOPHILS # BLD AUTO: 0 10E3/UL (ref 0–0.2)
BASOPHILS NFR BLD AUTO: 1 %
BILIRUB SERPL-MCNC: 0.3 MG/DL
BUN SERPL-MCNC: 16 MG/DL (ref 6–20)
CALCIUM SERPL-MCNC: 9.4 MG/DL (ref 8.6–10)
CHLORIDE SERPL-SCNC: 102 MMOL/L (ref 98–107)
CREAT SERPL-MCNC: 0.89 MG/DL (ref 0.51–0.95)
DEPRECATED HCO3 PLAS-SCNC: 30 MMOL/L (ref 22–29)
EGFRCR SERPLBLD CKD-EPI 2021: 75 ML/MIN/1.73M2
EOSINOPHIL # BLD AUTO: 0.1 10E3/UL (ref 0–0.7)
EOSINOPHIL NFR BLD AUTO: 2 %
ERYTHROCYTE [DISTWIDTH] IN BLOOD BY AUTOMATED COUNT: 16.1 % (ref 10–15)
GLUCOSE SERPL-MCNC: 90 MG/DL (ref 70–99)
HCT VFR BLD AUTO: 35.6 % (ref 35–47)
HGB BLD-MCNC: 11.6 G/DL (ref 11.7–15.7)
IMM GRANULOCYTES # BLD: 0 10E3/UL
IMM GRANULOCYTES NFR BLD: 1 %
LYMPHOCYTES # BLD AUTO: 1 10E3/UL (ref 0.8–5.3)
LYMPHOCYTES NFR BLD AUTO: 27 %
MAGNESIUM SERPL-MCNC: 1.9 MG/DL (ref 1.7–2.3)
MCH RBC QN AUTO: 32 PG (ref 26.5–33)
MCHC RBC AUTO-ENTMCNC: 32.6 G/DL (ref 31.5–36.5)
MCV RBC AUTO: 98 FL (ref 78–100)
MONOCYTES # BLD AUTO: 0.5 10E3/UL (ref 0–1.3)
MONOCYTES NFR BLD AUTO: 15 %
NEUTROPHILS # BLD AUTO: 2 10E3/UL (ref 1.6–8.3)
NEUTROPHILS NFR BLD AUTO: 54 %
NRBC # BLD AUTO: 0 10E3/UL
NRBC BLD AUTO-RTO: 0 /100
PLATELET # BLD AUTO: 200 10E3/UL (ref 150–450)
POTASSIUM SERPL-SCNC: 4.4 MMOL/L (ref 3.4–5.3)
PROT SERPL-MCNC: 6.7 G/DL (ref 6.4–8.3)
RBC # BLD AUTO: 3.62 10E6/UL (ref 3.8–5.2)
SODIUM SERPL-SCNC: 137 MMOL/L (ref 135–145)
WBC # BLD AUTO: 3.7 10E3/UL (ref 4–11)

## 2024-01-02 PROCEDURE — 86304 IMMUNOASSAY TUMOR CA 125: CPT | Performed by: NURSE PRACTITIONER

## 2024-01-02 PROCEDURE — 99213 OFFICE O/P EST LOW 20 MIN: CPT | Mod: 95 | Performed by: NURSE PRACTITIONER

## 2024-01-02 PROCEDURE — 83735 ASSAY OF MAGNESIUM: CPT | Performed by: NURSE PRACTITIONER

## 2024-01-02 PROCEDURE — 82040 ASSAY OF SERUM ALBUMIN: CPT | Performed by: NURSE PRACTITIONER

## 2024-01-02 PROCEDURE — 85025 COMPLETE CBC W/AUTO DIFF WBC: CPT | Performed by: NURSE PRACTITIONER

## 2024-01-02 PROCEDURE — 36591 DRAW BLOOD OFF VENOUS DEVICE: CPT | Performed by: NURSE PRACTITIONER

## 2024-01-02 RX ORDER — MEPERIDINE HYDROCHLORIDE 25 MG/ML
25 INJECTION INTRAMUSCULAR; INTRAVENOUS; SUBCUTANEOUS EVERY 30 MIN PRN
Status: CANCELLED | OUTPATIENT
Start: 2024-01-03

## 2024-01-02 RX ORDER — DIPHENHYDRAMINE HYDROCHLORIDE 50 MG/ML
50 INJECTION INTRAMUSCULAR; INTRAVENOUS
Status: CANCELLED | OUTPATIENT
Start: 2024-01-03

## 2024-01-02 RX ORDER — HEPARIN SODIUM,PORCINE 10 UNIT/ML
5 VIAL (ML) INTRAVENOUS
Status: CANCELLED | OUTPATIENT
Start: 2024-01-03

## 2024-01-02 RX ORDER — HEPARIN SODIUM (PORCINE) LOCK FLUSH IV SOLN 100 UNIT/ML 100 UNIT/ML
5 SOLUTION INTRAVENOUS
Status: CANCELLED | OUTPATIENT
Start: 2024-01-03

## 2024-01-02 RX ORDER — EPINEPHRINE 1 MG/ML
0.3 INJECTION, SOLUTION INTRAMUSCULAR; SUBCUTANEOUS EVERY 5 MIN PRN
Status: CANCELLED | OUTPATIENT
Start: 2024-01-03

## 2024-01-02 RX ORDER — METHYLPREDNISOLONE SODIUM SUCCINATE 125 MG/2ML
125 INJECTION, POWDER, LYOPHILIZED, FOR SOLUTION INTRAMUSCULAR; INTRAVENOUS
Status: CANCELLED | OUTPATIENT
Start: 2024-01-03

## 2024-01-02 RX ORDER — LORAZEPAM 2 MG/ML
0.5 INJECTION INTRAMUSCULAR EVERY 4 HOURS PRN
Status: CANCELLED | OUTPATIENT
Start: 2024-01-03

## 2024-01-02 RX ORDER — ALBUTEROL SULFATE 0.83 MG/ML
2.5 SOLUTION RESPIRATORY (INHALATION)
Status: CANCELLED | OUTPATIENT
Start: 2024-01-03

## 2024-01-02 RX ORDER — ALBUTEROL SULFATE 90 UG/1
1-2 AEROSOL, METERED RESPIRATORY (INHALATION)
Status: CANCELLED | OUTPATIENT
Start: 2024-01-03

## 2024-01-02 RX ORDER — PALONOSETRON 0.05 MG/ML
0.25 INJECTION, SOLUTION INTRAVENOUS ONCE
Status: CANCELLED | OUTPATIENT
Start: 2024-01-03

## 2024-01-02 ASSESSMENT — PAIN SCALES - GENERAL: PAINLEVEL: NO PAIN (0)

## 2024-01-02 NOTE — LETTER
2024         RE: Jacki Smith  [address] [facility] [address]        Dear Colleague,    Thank you for referring your patient, Jacki Smith, to the [facility]. Please see a copy of my visit note below.                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 She is here for follow up and disease management by video.     Ovarian Cancer History:  22: Presented to an ED in [place] for evaluation of abdominal bloating and discomfort.  -Abdomen, pelvic CT: Radiographic Stage CAL ovarian cancer.   22:  439.   22: Pelvic ultrasound: c/w metastatic cancer. I have personally reviewed the ultrasound images.   22: Pelvic exam under anesthesia, diagnostic laparoscopy, biopsies, evacuation of ascites.   -Findings: On pelvic exam under anesthesia, there was a 6 cm firm, fixed mass adherent to the vaginal cuff. Vagina otherwise smooth. On laparoscopic examination, there was ascites filling the pelvis/abdomen, with >1 liter of fluid evacuated. The palpated mass was arising from the right ovary. Left ovary relatively normal in appearance. There was diffuse carcinomatosis covering the entire peritoneal surface falciform ligament, liver capsule, and mesentery. The omentum was replaced with tumor. Findings were not amenable to optimal tumor debulking so biopsies were taken for histologic examination.   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      22, 22, 22: Cycles 1-3 of neoadjuvant chemotherapy with IV carboplatin + paclitaxel 175 mg/m2 every 21 days.   -Cycles 1,2: Carboplatin AUC 6.   -Cycle 3: Carboplatin AUC 5 with dose-reduction due to thrombcytopenia.   -22: Chest,  abdomen, pelvic CT: Partial response.      8/29/22: Pelvic exam under anesthesia, diagnostic laparoscopy, conversion to laparotomy for optimal interval tumor debulking to no gross residual disease including peritoneal and diaphragm biopsies, bilateral salpingo-oophorectomy, infragastric omentectomy, bilateral hemidiaphragm stripping, peritoneal and mesenteric argon beam ablation, appendectomy.   -Pathology: High-grade serous carcinoma involving all resected specimens.     9/28/22, 10/19/22, 11/17/22: Cycles 4-6 of primary chemotherapy with IV carboplatin AUC 5 + paclitaxel 175 mg/m2.  -12/2/22: Chest, abdomen, pelvic CT: No definitive evidence of disease.   -12/7/22: CA-125=23.   -2/16/23: Chest, abdomen, pelvic CT to evaluate bloating: Stable findings, no definitive evidence of disease.  -5/25/23: Admitted  to [facility] for management of malignant ascites s/p therapeutic paracentesis, and partial SBO resolved with conservative management.   5/25/23: Abdomen, pelvic CT: Progressive disease.   LOWER CHEST: OMAIRA.   BOWEL: Development of moderate malignant ascites with small peritoneal and serosal implants present. There is mild distention of a few loops of small bowel within the right side of the abdomen with regions of narrowing present compatible with an early or partial small bowel obstruction. Wall thickening in a few of the involved segments with fecalization of the internal contents of the small bowel compatible with stasis.   PELVIC ORGANS: Lobulated 4.5 cm soft tissue mass in the dependent pelvis in the expected location of the uterus.  -6/13/23:  78.     6/13/23, 7/12/23, 8/14/23, 9/13/23: Cycles 1-4 of second-line chemotherapy with IV carboplatin AUC 5 + pegylated liposomal doxorubicin (PLD) 30 mg/m2 every 28 days.      Plan: 4 more cycles     9/27/23: CT cap IMPRESSION:  Chest:  1.  Small thrombus at the tip of the right chest port at the brachiocephalic/SVC confluence versus less likely fibrin  sheath.  2.  Stable 4 mm subpleural nodule in the right middle lobe. No new or enlarging pulmonary nodules.     Abdomen and pelvis:  1.  Slightly decreased size of the pelvic mass measuring 3 cm with interlobular and calcification suggestive of favorable response to treatment.  2.  Persistent but marginally decreased volume of the malignant ascites. Persistent manifestations of peritoneal carcinomatosis.     10/11/23: Cycle 5  carboplatin AUC 5 + pegylated liposomal doxorubicin (PLD) 30 mg/m2 every 28 days.  11/7/23: Cycle 6 carboplatin AUC 5 + pegylated liposomal doxorubicin (PLD) 30 mg/m2 every 28 days planned for 11/8.  43.  11/14-11/16/23: Admission for pSBO; did not need NG  -CT ap IMPRESSION:  1.  Mild small bowel dilatation with transition point in the central abdomen and CT position of upstream bowel loops compatible with small bowel obstruction.   2.  Redemonstrated findings of peritoneal carcinomatosis. Slight decrease in small to moderate volume partially loculated ascites. Decreased size of pelvic nodule.   12/5/23: Cycle 7 carboplatin AUC 5 + pegylated liposomal doxorubicin (PLD) 30 mg/m2 every 28 days planned for 12/6.  42.  1/2/24: Cycle 8 carboplatin AUC 5 + pegylated liposomal doxorubicin (PLD) 30 mg/m2 every 28 days planned for 1/3.  pending.     Genetic/Genomic/Molecular Testing History:  10/5/22: [facility], reflexed to Common Hereditary Cancer Panel.   -No identifiable germline variants identified in ABRAXAS1, AKT1, APC, DEION, AXIN2, BARD1, BMPR1A, BRCA1, BRCA2, BRIP1, CDC73, CDH1, CDK4, CDKN2A, CHEK2, CTNNA1, DICER1, EPCAM, FANCC, FANCM, GREM1, HOXB13, KIT, MEN1, MLH1, MRE11, MSH2, MSH6, MUTYH, NBN, NF1, NTHL1, PALB2, PDGFRA, PIK3CA, PMS2, POLD1, POLE, PTEN, RAD50, RAD51C, RAD51D, RECQL, RINT1, SDHA, SDHB, SDHC, SDHD, SMAD4, SMARCA4, STK11, TP53, TSC1, TSC2, VHL, XRCC2.   -Variant of uncertain significance in MSH3 c.16C>T ([id number])    10/9/22 (tumor 8/29/22):  [facility] Testing Results.  -Genomic ROXI low (6%)   -TMB low (1mut/Mb)  -Microsatellite stable/Mismatch Repair proficient  -PD-L1- (CPS 0%)  -NTRK 1/2/3 fusion not detected.   -ER-, SD+  -Genomic alterations in:  --TP53  -No genomic alterations in BRCA1,2.     Jamila-ovary clinical trial screening: Negative for the immunoreactive subtype.      6/19/23 (tumor 8/29/22): [facility] Test results.   -FOLR1+ (2+, 75%).        Today she reports she is doing ok. She states she put herself on clear liquids before [place]. She reports the following:         Skin: denies any peeling/redness  Bowels: takes a stool softener BID; Miralax prn; eats activia; last BM this morning and was normal  Nausea: not taking Decadron; will take Zofran if needed after the 3 days  Hydration: doing well on getting in at least 64 oz  SOB/chest pain: denies  Leg swelling: denies      Review of Systems:    Systemic           no weight changes; no fever; no chills; no night sweats; no appetite changes  Skin           no rashes, or lesions  Eye           no irritation; no changes in vision  Day-Laryngeal           no dysphagia; no hoarseness   Pulmonary    no cough; no shortness of breath  Cardiovascular    no chest pain; no palpitations  Gastrointestinal    no diarrhea; no constipation; no abdominal pain; no changes in bowel habits; no blood in stool  Genitourinary   no urinary frequency; no urinary urgency; no dysuria; no pain; no abnormal vaginal discharge; no abnormal vaginal bleeding  Breast   no breast discharge; no breast changes; no breast pain  Musculoskeletal    no myalgias; no arthralgias; no back pain  Psychiatric           no depressed mood; no anxiety    Hematologic              no tender lymph nodes; no noticeable swellings or lumps   Endocrine    no hot flashes; no heat/cold intolerance         Neurological   no tremor; no numbness and tingling; no headaches; no difficulty sleeping      Past Medical History:    Past Medical History:   Diagnosis  Date    Female stress incontinence     Ovarian cancer, right (H) 06/16/2022    Stage IIIC high-grade serous carcinoma    Recurrent cold sores          Past Surgical History:    Past Surgical History:   Procedure Laterality Date    APPENDECTOMY  08/29/2022    Part of ovarian cancer tumor debulking    BLADDER SUSPENSION  08/13/2009    HYSTERECTOMY  08/13/2009    For prolapse    IR PARACENTESIS  6/6/2022    IR PARACENTESIS  6/14/2022    IR PARACENTESIS  5/26/2023    LAPAROSCOPY DIAGNOSTIC (GYN)  06/16/2022    SALPINGO-OOPHORECTOMY, COMBINED Bilateral 08/29/2022    Pelvic exam under anesthesia, diagnostic laparoscopy, conversion to laparotomy for optimal interval tumor debulking to no gross residual disease including peritoneal and diaphragm biopsies, bilateral salpingo-oophorectomy, infragastric omentectomy, bilateral hemidiaphragm stripping, peritoneal and mesenteric argon beam ablation, appendectomy.    TONSILLECTOMY  1973    TUBAL LIGATION Bilateral 09/01/1998         Health Maintenance Due   Topic Date Due    YEARLY PREVENTIVE VISIT  Never done    ADVANCE CARE PLANNING  Never done    HEPATITIS B IMMUNIZATION (1 of 3 - 3-dose series) Never done    Pneumococcal Vaccine: Pediatrics (0 to 5 Years) and At-Risk Patients (6 to 64 Years) (1 of 2 - PCV) Never done    COLORECTAL CANCER SCREENING  Never done    HIV SCREENING  Never done    HEPATITIS C SCREENING  Never done    ZOSTER IMMUNIZATION (1 of 2) Never done    PAP  Never done    LIPID  Never done    DTAP/TDAP/TD IMMUNIZATION (2 - Td or Tdap) 12/15/2021    INFLUENZA VACCINE (1) 09/01/2023    COVID-19 Vaccine (5 - 2023-24 season) 09/01/2023    PHQ-2 (once per calendar year)  Never done       Current Medications:     Current Outpatient Medications   Medication Sig Dispense Refill    apixaban ANTICOAGULANT (ELIQUIS) 5 MG tablet Take 1 tablet (5 mg) by mouth 2 times daily 60 tablet 11    cyclobenzaprine (FLEXERIL) 5 MG tablet Take 1-2 Tablets (5-10 mg) by mouth three times  a day.      diphenhydrAMINE-acetaminophen (TYLENOL PM)  MG tablet Take 1 tablet by mouth as needed      docusate sodium (DSS) 100 MG capsule Take 100 mg by mouth as needed      HYDROcodone-acetaminophen (NORCO) 5-325 MG tablet Take 1 tablet by mouth every 4 hours as needed      HYDROmorphone (DILAUDID) 2 MG tablet 1 tablet      hydrOXYzine (ATARAX) 10 MG tablet Take 1-2 tablets (10-20 mg) by mouth every 6 hours as needed for itching 120 tablet 3    lidocaine-prilocaine (EMLA) 2.5-2.5 % external cream Apply topically as needed (pain due to port access) Apply to port 30 minutes prior to lab appointment. 30 g 6    LORazepam (ATIVAN) 0.5 MG tablet Take 1-2 Tablets (0.5-1 mg) by mouth every 4 hours as needed for Anxiety (and or nausea and vomiting).*      LORazepam (ATIVAN) 0.5 MG tablet Take 1 tablet (0.5 mg) by mouth every 6 hours as needed for anxiety 20 tablet 3    Multiple Vitamin (MULTI-VITAMIN DAILY PO) Take 1 tablet by mouth daily      ondansetron (ZOFRAN) 8 MG tablet Take 1 tablet (8 mg) by mouth every 8 hours as needed for nausea (vomiting) 30 tablet 2    prochlorperazine (COMPAZINE) 10 MG tablet Take 1 tablet (10 mg) by mouth every 6 hours as needed for nausea or vomiting 30 tablet 2    sennosides (SENOKOT) 8.6 MG tablet       valACYclovir (VALTREX) 1000 mg tablet Take 1,000 mg by mouth as needed      citalopram (CELEXA) 40 MG tablet Take 1 tablet by mouth daily      dexamethasone (DECADRON) 4 MG tablet Take 2 tablets (8 mg) by mouth daily Take for 3 days, starting the day after chemo. Take with food. (Patient not taking: Reported on 2023) 6 tablet 2         Allergies:      No Known Allergies     Social History:     Social History     Tobacco Use    Smoking status: Former     Packs/day: 1.00     Years: 42.00     Additional pack years: 0.00     Total pack years: 42.00     Types: Cigarettes     Quit date: 2022     Years since quittin.6    Smokeless tobacco: Never   Substance Use Topics     Alcohol use: Yes     Comment: Beer ~Q3 months.       History   Drug Use Unknown         Family History:     The patient's family history is notable for     Family History   Problem Relation Age of Onset    Prostate Cancer Father     Breast Cancer Sister 48    Melanoma Sister     Skin Cancer Sister     Colon Cancer Maternal Grandmother          Physical Exam:     Wt 70.7 kg (155 lb 14.4 oz)   BMI 28.51 kg/m    Body mass index is 28.51 kg/m .    General Appearance: healthy and alert, no distress    Eyes:  Eyes grossly normal to inspection.  No discharge or erythema, or obvious scleral/conjunctival abnormalities.    Respiratory: No audible wheeze, cough, or visible cyanosis.  No visible retractions or increased work of breathing.     Musculoskeletal: extremities non tender and without edema    Skin: no lesions or rashes on visible skin    Neurological: normal gait, no gross defects     Psychiatric: appropriate mood and affect. Mentation appears normal, affect normal/bright, judgement and insight intact, normal speech and appearance well-groomed                            The rest of a comprehensive physical examination is deferred due to video visit restrictions.      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 She is here for follow up and disease management by video.    21 minutes spent on the date of the encounter doing chart review, history and exam, documentation and further activities as noted above    Video start: 1420  Video end: 1435  Patient at home  Provider onsite; Shabbir      Plan:     1.)      Reviewed CBC, CMP, Mg, and Ok to proceed with planned treatment 1/3. She will have imaging and then see MD; this is already scheduled. Reviewed signs and symptoms for when she should contact the clinic or seek additional care. Patient to contact the clinic with any questions or concerns in the interim.  Answered all of her  questions to the best of my ability.     2.) Genetic risk factors were assessed and she has a VUS No identifiable germline variants identified in ABRAXAS1, AKT1, APC, DEION, AXIN2, BARD1, BMPR1A, BRCA1, BRCA2, BRIP1, CDC73, CDH1, CDK4, CDKN2A, CHEK2, CTNNA1, DICER1, EPCAM, FANCC, FANCM, GREM1, HOXB13, KIT, MEN1, MLH1, MRE11, MSH2, MSH6, MUTYH, NBN, NF1, NTHL1, PALB2, PDGFRA, PIK3CA, PMS2, POLD1, POLE, PTEN, RAD50, RAD51C, RAD51D, RECQL, RINT1, SDHA, SDHB, SDHC, SDHD, SMAD4, SMARCA4, STK11, TP53, TSC1, TSC2, VHL, XRCC2.       3.) Labs and/or tests ordered include:  CBC, CMP, Mg.     4.) Health maintenance issues addressed today include annual health maintenance and non-gynecologic issues with PCP.    JALEN Zamarripa, WHNP-BC, ANP-BC, AOCNP  Women's Health Nurse Practitioner  Adult Nurse Practitioner  Division of Gynecologic Oncology  .      CC  Patient Care Team:  Domenica Christianson MD as PCP - General

## 2024-01-02 NOTE — PROGRESS NOTES
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 She is here for follow up and disease management by video.     Ovarian Cancer History:  22: Presented to an ED in [place] for evaluation of abdominal bloating and discomfort.  -Abdomen, pelvic CT: Radiographic Stage CAL ovarian cancer.   22:  439.   22: Pelvic ultrasound: c/w metastatic cancer. I have personally reviewed the ultrasound images.   22: Pelvic exam under anesthesia, diagnostic laparoscopy, biopsies, evacuation of ascites.   -Findings: On pelvic exam under anesthesia, there was a 6 cm firm, fixed mass adherent to the vaginal cuff. Vagina otherwise smooth. On laparoscopic examination, there was ascites filling the pelvis/abdomen, with >1 liter of fluid evacuated. The palpated mass was arising from the right ovary. Left ovary relatively normal in appearance. There was diffuse carcinomatosis covering the entire peritoneal surface falciform ligament, liver capsule, and mesentery. The omentum was replaced with tumor. Findings were not amenable to optimal tumor debulking so biopsies were taken for histologic examination.   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      22, 22, 22: Cycles 1-3 of neoadjuvant chemotherapy with IV carboplatin + paclitaxel 175 mg/m2 every 21 days.   -Cycles 1,2: Carboplatin AUC 6.   -Cycle 3: Carboplatin AUC 5 with dose-reduction due to thrombcytopenia.   -22: Chest, abdomen, pelvic CT: Partial response.      22: Pelvic exam under anesthesia, diagnostic laparoscopy, conversion to laparotomy for optimal interval tumor debulking to no gross residual disease including peritoneal and diaphragm biopsies, bilateral  salpingo-oophorectomy, infragastric omentectomy, bilateral hemidiaphragm stripping, peritoneal and mesenteric argon beam ablation, appendectomy.   -Pathology: High-grade serous carcinoma involving all resected specimens.     9/28/22, 10/19/22, 11/17/22: Cycles 4-6 of primary chemotherapy with IV carboplatin AUC 5 + paclitaxel 175 mg/m2.  -12/2/22: Chest, abdomen, pelvic CT: No definitive evidence of disease.   -12/7/22: CA-125=23.   -2/16/23: Chest, abdomen, pelvic CT to evaluate bloating: Stable findings, no definitive evidence of disease.  -5/25/23: Admitted  to [facility] for management of malignant ascites s/p therapeutic paracentesis, and partial SBO resolved with conservative management.   5/25/23: Abdomen, pelvic CT: Progressive disease.   LOWER CHEST: OMAIRA.   BOWEL: Development of moderate malignant ascites with small peritoneal and serosal implants present. There is mild distention of a few loops of small bowel within the right side of the abdomen with regions of narrowing present compatible with an early or partial small bowel obstruction. Wall thickening in a few of the involved segments with fecalization of the internal contents of the small bowel compatible with stasis.   PELVIC ORGANS: Lobulated 4.5 cm soft tissue mass in the dependent pelvis in the expected location of the uterus.  -6/13/23:  78.     6/13/23, 7/12/23, 8/14/23, 9/13/23: Cycles 1-4 of second-line chemotherapy with IV carboplatin AUC 5 + pegylated liposomal doxorubicin (PLD) 30 mg/m2 every 28 days.      Plan: 4 more cycles     9/27/23: CT cap IMPRESSION:  Chest:  1.  Small thrombus at the tip of the right chest port at the brachiocephalic/SVC confluence versus less likely fibrin sheath.  2.  Stable 4 mm subpleural nodule in the right middle lobe. No new or enlarging pulmonary nodules.     Abdomen and pelvis:  1.  Slightly decreased size of the pelvic mass measuring 3 cm with interlobular and calcification suggestive of  favorable response to treatment.  2.  Persistent but marginally decreased volume of the malignant ascites. Persistent manifestations of peritoneal carcinomatosis.     10/11/23: Cycle 5  carboplatin AUC 5 + pegylated liposomal doxorubicin (PLD) 30 mg/m2 every 28 days.  11/7/23: Cycle 6 carboplatin AUC 5 + pegylated liposomal doxorubicin (PLD) 30 mg/m2 every 28 days planned for 11/8.  43.  11/14-11/16/23: Admission for pSBO; did not need NG  -CT ap IMPRESSION:  1.  Mild small bowel dilatation with transition point in the central abdomen and CT position of upstream bowel loops compatible with small bowel obstruction.   2.  Redemonstrated findings of peritoneal carcinomatosis. Slight decrease in small to moderate volume partially loculated ascites. Decreased size of pelvic nodule.   12/5/23: Cycle 7 carboplatin AUC 5 + pegylated liposomal doxorubicin (PLD) 30 mg/m2 every 28 days planned for 12/6.  42.  1/2/24: Cycle 8 carboplatin AUC 5 + pegylated liposomal doxorubicin (PLD) 30 mg/m2 every 28 days planned for 1/3.  pending.     Genetic/Genomic/Molecular Testing History:  10/5/22: [facility], reflexed to Common Hereditary Cancer Panel.   -No identifiable germline variants identified in ABRAXAS1, AKT1, APC, DEION, AXIN2, BARD1, BMPR1A, BRCA1, BRCA2, BRIP1, CDC73, CDH1, CDK4, CDKN2A, CHEK2, CTNNA1, DICER1, EPCAM, FANCC, FANCM, GREM1, HOXB13, KIT, MEN1, MLH1, MRE11, MSH2, MSH6, MUTYH, NBN, NF1, NTHL1, PALB2, PDGFRA, PIK3CA, PMS2, POLD1, POLE, PTEN, RAD50, RAD51C, RAD51D, RECQL, RINT1, SDHA, SDHB, SDHC, SDHD, SMAD4, SMARCA4, STK11, TP53, TSC1, TSC2, VHL, XRCC2.   -Variant of uncertain significance in MSH3 c.16C>T ([id number])    10/9/22 (tumor 8/29/22): [facility] Testing Results.  -Genomic ROXI low (6%)   -TMB low (1mut/Mb)  -Microsatellite stable/Mismatch Repair proficient  -PD-L1- (CPS 0%)  -NTRK 1/2/3 fusion not detected.   -ER-, [place]+  -Genomic alterations in:  --TP53  -No genomic alterations in  BRCA1,2.     Jamila-ovary clinical trial screening: Negative for the immunoreactive subtype.      6/19/23 (tumor 8/29/22): [facility] Test results.   -FOLR1+ (2+, 75%).        Today she reports she is doing ok. She states she put herself on clear liquids before Damien. She reports the following:         Skin: denies any peeling/redness  Bowels: takes a stool softener BID; Miralax prn; eats activia; last BM this morning and was normal  Nausea: not taking Decadron; will take Zofran if needed after the 3 days  Hydration: doing well on getting in at least 64 oz  SOB/chest pain: denies  Leg swelling: denies      Review of Systems:    Systemic           no weight changes; no fever; no chills; no night sweats; no appetite changes  Skin           no rashes, or lesions  Eye           no irritation; no changes in vision  Day-Laryngeal           no dysphagia; no hoarseness   Pulmonary    no cough; no shortness of breath  Cardiovascular    no chest pain; no palpitations  Gastrointestinal    no diarrhea; no constipation; no abdominal pain; no changes in bowel habits; no blood in stool  Genitourinary   no urinary frequency; no urinary urgency; no dysuria; no pain; no abnormal vaginal discharge; no abnormal vaginal bleeding  Breast   no breast discharge; no breast changes; no breast pain  Musculoskeletal    no myalgias; no arthralgias; no back pain  Psychiatric           no depressed mood; no anxiety    Hematologic              no tender lymph nodes; no noticeable swellings or lumps   Endocrine    no hot flashes; no heat/cold intolerance         Neurological   no tremor; no numbness and tingling; no headaches; no difficulty sleeping      Past Medical History:    Past Medical History:   Diagnosis Date    Female stress incontinence     Ovarian cancer, right (H) 06/16/2022    Stage IIIC high-grade serous carcinoma    Recurrent cold sores          Past Surgical History:    Past Surgical History:   Procedure Laterality Date    APPENDECTOMY   08/29/2022    Part of ovarian cancer tumor debulking    BLADDER SUSPENSION  08/13/2009    HYSTERECTOMY  08/13/2009    For prolapse    IR PARACENTESIS  6/6/2022    IR PARACENTESIS  6/14/2022    IR PARACENTESIS  5/26/2023    LAPAROSCOPY DIAGNOSTIC (GYN)  06/16/2022    SALPINGO-OOPHORECTOMY, COMBINED Bilateral 08/29/2022    Pelvic exam under anesthesia, diagnostic laparoscopy, conversion to laparotomy for optimal interval tumor debulking to no gross residual disease including peritoneal and diaphragm biopsies, bilateral salpingo-oophorectomy, infragastric omentectomy, bilateral hemidiaphragm stripping, peritoneal and mesenteric argon beam ablation, appendectomy.    TONSILLECTOMY  1973    TUBAL LIGATION Bilateral 09/01/1998         Health Maintenance Due   Topic Date Due    YEARLY PREVENTIVE VISIT  Never done    ADVANCE CARE PLANNING  Never done    HEPATITIS B IMMUNIZATION (1 of 3 - 3-dose series) Never done    Pneumococcal Vaccine: Pediatrics (0 to 5 Years) and At-Risk Patients (6 to 64 Years) (1 of 2 - PCV) Never done    COLORECTAL CANCER SCREENING  Never done    HIV SCREENING  Never done    HEPATITIS C SCREENING  Never done    ZOSTER IMMUNIZATION (1 of 2) Never done    PAP  Never done    LIPID  Never done    DTAP/TDAP/TD IMMUNIZATION (2 - Td or Tdap) 12/15/2021    INFLUENZA VACCINE (1) 09/01/2023    COVID-19 Vaccine (5 - 2023-24 season) 09/01/2023    PHQ-2 (once per calendar year)  Never done       Current Medications:     Current Outpatient Medications   Medication Sig Dispense Refill    apixaban ANTICOAGULANT (ELIQUIS) 5 MG tablet Take 1 tablet (5 mg) by mouth 2 times daily 60 tablet 11    cyclobenzaprine (FLEXERIL) 5 MG tablet Take 1-2 Tablets (5-10 mg) by mouth three times a day.      diphenhydrAMINE-acetaminophen (TYLENOL PM)  MG tablet Take 1 tablet by mouth as needed      docusate sodium (DSS) 100 MG capsule Take 100 mg by mouth as needed      HYDROcodone-acetaminophen (NORCO) 5-325 MG tablet Take 1  tablet by mouth every 4 hours as needed      HYDROmorphone (DILAUDID) 2 MG tablet 1 tablet      hydrOXYzine (ATARAX) 10 MG tablet Take 1-2 tablets (10-20 mg) by mouth every 6 hours as needed for itching 120 tablet 3    lidocaine-prilocaine (EMLA) 2.5-2.5 % external cream Apply topically as needed (pain due to port access) Apply to port 30 minutes prior to lab appointment. 30 g 6    LORazepam (ATIVAN) 0.5 MG tablet Take 1-2 Tablets (0.5-1 mg) by mouth every 4 hours as needed for Anxiety (and or nausea and vomiting).*      LORazepam (ATIVAN) 0.5 MG tablet Take 1 tablet (0.5 mg) by mouth every 6 hours as needed for anxiety 20 tablet 3    Multiple Vitamin (MULTI-VITAMIN DAILY PO) Take 1 tablet by mouth daily      ondansetron (ZOFRAN) 8 MG tablet Take 1 tablet (8 mg) by mouth every 8 hours as needed for nausea (vomiting) 30 tablet 2    prochlorperazine (COMPAZINE) 10 MG tablet Take 1 tablet (10 mg) by mouth every 6 hours as needed for nausea or vomiting 30 tablet 2    sennosides (SENOKOT) 8.6 MG tablet       valACYclovir (VALTREX) 1000 mg tablet Take 1,000 mg by mouth as needed      citalopram (CELEXA) 40 MG tablet Take 1 tablet by mouth daily      dexamethasone (DECADRON) 4 MG tablet Take 2 tablets (8 mg) by mouth daily Take for 3 days, starting the day after chemo. Take with food. (Patient not taking: Reported on 2023) 6 tablet 2         Allergies:      No Known Allergies     Social History:     Social History     Tobacco Use    Smoking status: Former     Packs/day: 1.00     Years: 42.00     Additional pack years: 0.00     Total pack years: 42.00     Types: Cigarettes     Quit date: 2022     Years since quittin.6    Smokeless tobacco: Never   Substance Use Topics    Alcohol use: Yes     Comment: Beer ~Q3 months.       History   Drug Use Unknown         Family History:     The patient's family history is notable for     Family History   Problem Relation Age of Onset    Prostate Cancer Father     Breast  Cancer Sister 48    Melanoma Sister     Skin Cancer Sister     Colon Cancer Maternal Grandmother          Physical Exam:     Wt 70.7 kg (155 lb 14.4 oz)   BMI 28.51 kg/m    Body mass index is 28.51 kg/m .    General Appearance: healthy and alert, no distress    Eyes:  Eyes grossly normal to inspection.  No discharge or erythema, or obvious scleral/conjunctival abnormalities.    Respiratory: No audible wheeze, cough, or visible cyanosis.  No visible retractions or increased work of breathing.     Musculoskeletal: extremities non tender and without edema    Skin: no lesions or rashes on visible skin    Neurological: normal gait, no gross defects     Psychiatric: appropriate mood and affect. Mentation appears normal, affect normal/bright, judgement and insight intact, normal speech and appearance well-groomed                            The rest of a comprehensive physical examination is deferred due to video visit restrictions.      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 She is here for follow up and disease management by video.    21 minutes spent on the date of the encounter doing chart review, history and exam, documentation and further activities as noted above    Video start: 1420  Video end: 1435  Patient at home  Provider onsite; [facility]      Plan:     1.)      Reviewed CBC, CMP, Mg, and Ok to proceed with planned treatment 1/3. She will have imaging and then see MD; this is already scheduled. Reviewed signs and symptoms for when she should contact the clinic or seek additional care. Patient to contact the clinic with any questions or concerns in the interim.  Answered all of her questions to the best of my ability.     2.) Genetic risk factors were assessed and she has a VUS No identifiable germline variants identified in ABRAXAS1, AKT1, APC, DEION, AXIN2, BARD1, BMPR1A, BRCA1, BRCA2, BRIP1, CDC73, CDH1, CDK4, CDKN2A,  CHEK2, CTNNA1, DICER1, EPCAM, FANCC, FANCM, GREM1, HOXB13, KIT, MEN1, MLH1, MRE11, MSH2, MSH6, MUTYH, NBN, NF1, NTHL1, PALB2, PDGFRA, PIK3CA, PMS2, POLD1, POLE, PTEN, RAD50, RAD51C, RAD51D, RECQL, RINT1, SDHA, SDHB, SDHC, SDHD, SMAD4, SMARCA4, STK11, TP53, TSC1, TSC2, VHL, XRCC2.       3.) Labs and/or tests ordered include:  CBC, CMP, Mg.     4.) Health maintenance issues addressed today include annual health maintenance and non-gynecologic issues with PCP.    JALEN Zamarripa, WHNP-BC, ANP-BC, AOCNP  Women's Health Nurse Practitioner  Adult Nurse Practitioner  Division of Gynecologic Oncology  .      CC  Patient Care Team:  Domenica Christianson MD as PCP - General  [facility], Charis Meadows MD as MD (Gynecologic Oncology)  Susannah Roman APRN CNP as Assigned Cancer Care Provider  Anish Mnoroy MD as Assigned Palliative Care Provider  Pratima Vega RN as Specialty Care Coordinator (Hematology & Oncology)  SELF, REFERRED

## 2024-01-02 NOTE — NURSING NOTE
Is the patient currently in the state of [place]? YES    Visit mode:VIDEO    If the visit is dropped, the patient can be reconnected by: VIDEO VISIT: Text to cell phone:   Telephone Information:   Mobile [phone]       Will anyone else be joining the visit? NO  (If patient encounters technical issues they should call [phone]    How would you like to obtain your AVS? MyChart    Are changes needed to the allergy or medication list? No    Reason for visit: FRED BOOKER

## 2024-01-03 ENCOUNTER — INFUSION THERAPY VISIT (OUTPATIENT)
Dept: INFUSION THERAPY | Facility: HOSPITAL | Age: 59
End: 2024-01-03
Attending: OBSTETRICS & GYNECOLOGY
Payer: COMMERCIAL

## 2024-01-03 VITALS
SYSTOLIC BLOOD PRESSURE: 125 MMHG | HEART RATE: 64 BPM | TEMPERATURE: 98.6 F | OXYGEN SATURATION: 100 % | DIASTOLIC BLOOD PRESSURE: 82 MMHG

## 2024-01-03 DIAGNOSIS — C56.1 OVARIAN CANCER, RIGHT (H): Primary | ICD-10-CM

## 2024-01-03 PROCEDURE — 250N000011 HC RX IP 250 OP 636: Performed by: NURSE PRACTITIONER

## 2024-01-03 PROCEDURE — 96375 TX/PRO/DX INJ NEW DRUG ADDON: CPT

## 2024-01-03 PROCEDURE — 258N000003 HC RX IP 258 OP 636: Performed by: NURSE PRACTITIONER

## 2024-01-03 PROCEDURE — 96413 CHEMO IV INFUSION 1 HR: CPT

## 2024-01-03 PROCEDURE — 96367 TX/PROPH/DG ADDL SEQ IV INF: CPT

## 2024-01-03 PROCEDURE — 96417 CHEMO IV INFUS EACH ADDL SEQ: CPT

## 2024-01-03 RX ORDER — METHYLPREDNISOLONE SODIUM SUCCINATE 125 MG/2ML
125 INJECTION, POWDER, LYOPHILIZED, FOR SOLUTION INTRAMUSCULAR; INTRAVENOUS
Status: DISCONTINUED | OUTPATIENT
Start: 2024-01-03 | End: 2024-01-03 | Stop reason: HOSPADM

## 2024-01-03 RX ORDER — MEPERIDINE HYDROCHLORIDE 50 MG/ML
25 INJECTION INTRAMUSCULAR; INTRAVENOUS; SUBCUTANEOUS EVERY 30 MIN PRN
Status: DISCONTINUED | OUTPATIENT
Start: 2024-01-03 | End: 2024-01-03 | Stop reason: HOSPADM

## 2024-01-03 RX ORDER — HEPARIN SODIUM (PORCINE) LOCK FLUSH IV SOLN 100 UNIT/ML 100 UNIT/ML
5 SOLUTION INTRAVENOUS
Status: DISCONTINUED | OUTPATIENT
Start: 2024-01-03 | End: 2024-01-03 | Stop reason: HOSPADM

## 2024-01-03 RX ORDER — DIPHENHYDRAMINE HYDROCHLORIDE 50 MG/ML
50 INJECTION INTRAMUSCULAR; INTRAVENOUS
Status: DISCONTINUED | OUTPATIENT
Start: 2024-01-03 | End: 2024-01-03 | Stop reason: HOSPADM

## 2024-01-03 RX ORDER — PALONOSETRON 0.05 MG/ML
0.25 INJECTION, SOLUTION INTRAVENOUS ONCE
Qty: 5 ML | Refills: 0 | Status: COMPLETED | OUTPATIENT
Start: 2024-01-03 | End: 2024-01-03

## 2024-01-03 RX ORDER — ALBUTEROL SULFATE 90 UG/1
1-2 AEROSOL, METERED RESPIRATORY (INHALATION)
Status: DISCONTINUED | OUTPATIENT
Start: 2024-01-03 | End: 2024-01-03 | Stop reason: HOSPADM

## 2024-01-03 RX ORDER — EPINEPHRINE 1 MG/ML
0.3 INJECTION, SOLUTION INTRAMUSCULAR; SUBCUTANEOUS EVERY 5 MIN PRN
Status: DISCONTINUED | OUTPATIENT
Start: 2024-01-03 | End: 2024-01-03 | Stop reason: HOSPADM

## 2024-01-03 RX ORDER — HEPARIN SODIUM,PORCINE 10 UNIT/ML
5 VIAL (ML) INTRAVENOUS
Status: DISCONTINUED | OUTPATIENT
Start: 2024-01-03 | End: 2024-01-03 | Stop reason: HOSPADM

## 2024-01-03 RX ORDER — ALBUTEROL SULFATE 0.83 MG/ML
2.5 SOLUTION RESPIRATORY (INHALATION)
Status: DISCONTINUED | OUTPATIENT
Start: 2024-01-03 | End: 2024-01-03 | Stop reason: HOSPADM

## 2024-01-03 RX ADMIN — DOXORUBICIN HYDROCHLORIDE 50 MG: 2 INJECTION, SUSPENSION, LIPOSOMAL INTRAVENOUS at 09:53

## 2024-01-03 RX ADMIN — PALONOSETRON 0.25 MG: 0.05 INJECTION, SOLUTION INTRAVENOUS at 08:44

## 2024-01-03 RX ADMIN — HEPARIN 5 ML: 100 SYRINGE at 11:50

## 2024-01-03 RX ADMIN — FOSAPREPITANT: 150 INJECTION, POWDER, LYOPHILIZED, FOR SOLUTION INTRAVENOUS at 08:56

## 2024-01-03 RX ADMIN — DEXTROSE MONOHYDRATE 250 ML: 50 INJECTION, SOLUTION INTRAVENOUS at 08:44

## 2024-01-03 RX ADMIN — CARBOPLATIN 440 MG: 10 INJECTION, SOLUTION INTRAVENOUS at 10:59

## 2024-01-03 NOTE — PROGRESS NOTES
Infusion Nursing Note:  Jacki HAYNES Rangellittletamaraolena presents today for C8D1 Doxil and Carboplatin.    Patient seen by provider today: No   present during visit today: Not Applicable.    Note: Virtual visit with MD and labs performed yesterday. Premedicated with Aloxi, and Emend/Dexamethasone.      Intravenous Access:  Implanted Port.    Treatment Conditions:  Results reviewed, labs MET treatment parameters, ok to proceed with treatment.      Post Infusion Assessment:  Patient tolerated infusion without incident.  Blood return noted pre and post infusion.  Site patent and intact, free from redness, edema or discomfort.  No evidence of extravasations.  Access discontinued per protocol.       Discharge Plan:   Patient and/or family verbalized understanding of discharge instructions and all questions answered.      Staci Piedra RN

## 2024-01-04 LAB — CANCER AG125 SERPL-ACNC: 45 U/ML

## 2024-01-19 DIAGNOSIS — F41.9 ANXIETY: ICD-10-CM

## 2024-01-19 NOTE — TELEPHONE ENCOUNTER
Pending Prescriptions:                       Disp   Refills    LORazepam (ATIVAN) 0.5 MG tablet          20 tab*3            Sig: Take 1 tablet (0.5 mg) by mouth every 6 hours as           needed for anxiety    Last prescribing provider:     Last clinic visit date: 01/02/24 w/Susannah Roman    Recommendations for requested medication (if none, N/A): N/A    Any other pertinent information (if none, N/A): N/A    Refilled: Y/N, if NO, why?

## 2024-01-20 RX ORDER — LORAZEPAM 0.5 MG/1
0.5 TABLET ORAL EVERY 6 HOURS PRN
Qty: 20 TABLET | Refills: 3 | Status: SHIPPED | OUTPATIENT
Start: 2024-01-20 | End: 2024-02-28

## 2024-01-23 ENCOUNTER — HOSPITAL ENCOUNTER (OUTPATIENT)
Dept: CT IMAGING | Facility: CLINIC | Age: 59
Discharge: HOME | End: 2024-01-23
Attending: OBSTETRICS & GYNECOLOGY | Admitting: OBSTETRICS & GYNECOLOGY
Payer: COMMERCIAL

## 2024-01-23 DIAGNOSIS — C56.1 OVARIAN CANCER, RIGHT (H): ICD-10-CM

## 2024-01-23 PROCEDURE — 250N000011 HC RX IP 250 OP 636: Performed by: RADIOLOGY

## 2024-01-23 PROCEDURE — 71260 CT THORAX DX C+: CPT

## 2024-01-23 PROCEDURE — 250N000011 HC RX IP 250 OP 636: Performed by: OBSTETRICS & GYNECOLOGY

## 2024-01-23 RX ORDER — IOPAMIDOL 755 MG/ML
90 INJECTION, SOLUTION INTRAVASCULAR ONCE
Status: COMPLETED | OUTPATIENT
Start: 2024-01-23 | End: 2024-01-23

## 2024-01-23 RX ORDER — HEPARIN SODIUM (PORCINE) LOCK FLUSH IV SOLN 100 UNIT/ML 100 UNIT/ML
5-10 SOLUTION INTRAVENOUS
Status: DISCONTINUED | OUTPATIENT
Start: 2024-01-23 | End: 2024-01-24 | Stop reason: HOSPADM

## 2024-01-23 RX ADMIN — IOPAMIDOL 90 ML: 755 INJECTION, SOLUTION INTRAVENOUS at 14:03

## 2024-01-23 RX ADMIN — HEPARIN 5 ML: 100 SYRINGE at 14:11

## 2024-01-25 NOTE — PROGRESS NOTES
"Follow-up Note on Referred Patient  [facility]      Date of visit: 2024       Primary Oncologist:  Charis Patino MD  [facility]      Primary Care Provider:  JALEN Woods, CNP  [address] 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 Chemotherapy encounter.       History of Present Illness:  Jacki \"Michelle\" IVY Smith (she/her/hers)  is a 58 year old seen at the [facility] for management of progressive stage IIIC high-grade serous ovarian carcinoma. History as follows:    Ovarian Cancer History:  22: Presented to an ED in [place] for evaluation of abdominal bloating and discomfort.  -Abdomen, pelvic CT: Radiographic Stage CAL ovarian cancer. I have personally reviewed the CT images.   22: [id number].   22: Pelvic ultrasound: c/w metastatic cancer. I have personally reviewed the ultrasound images.   22: Pelvic exam under anesthesia, diagnostic laparoscopy, biopsies, evacuation of ascites.   -Findings: On pelvic exam under anesthesia, there was a 6 cm firm, fixed mass adherent to the vaginal cuff. Vagina otherwise smooth. On laparoscopic examination, there was ascites filling the pelvis/abdomen, with >1 liter of fluid evacuated. The palpated mass was arising from the right ovary. Left ovary relatively normal in appearance. There was diffuse carcinomatosis covering the entire peritoneal surface falciform ligament, liver capsule, and mesentery. The omentum was replaced with tumor. Findings were not amenable to optimal tumor debulking so biopsies were taken for histologic examination.   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      22, 22, 22: Cycles 1-3 of neoadjuvant chemotherapy with IV carboplatin + paclitaxel 175 " mg/m2 every 21 days.   -Cycles 1,2: Carboplatin AUC 6.   -Cycle 3: Carboplatin AUC 5 with dose-reduction due to thrombcytopenia.   -8/19/22: Chest, abdomen, pelvic CT: Partial response. I have personally reviewed the CT images.     8/29/22: Pelvic exam under anesthesia, diagnostic laparoscopy, conversion to laparotomy for optimal interval tumor debulking to no gross residual disease including peritoneal and diaphragm biopsies, bilateral salpingo-oophorectomy, infragastric omentectomy, bilateral hemidiaphragm stripping, peritoneal and mesenteric argon beam ablation, appendectomy.   -Pathology: High-grade serous carcinoma involving all resected specimens.     9/28/22, 10/19/22, 11/17/22: Cycles 4-6 of primary chemotherapy with IV carboplatin AUC 5 + paclitaxel 175 mg/m2.  -12/2/22: Chest, abdomen, pelvic CT: No definitive evidence of disease. I have personally reviewed the CT images.   -12/7/22: CA-125=23.   -2/16/23: Chest, abdomen, pelvic CT to evaluate bloating: Stable findings, no definitive evidence of disease. I have personally reviewed the CT images.   -5/25/23: Admitted  to [facility] for management of malignant ascites s/p therapeutic paracentesis, and partial SBO resolved with conservative management.   5/25/23: Abdomen, pelvic CT: Progressive disease.   LOWER CHEST: OMAIRA.   BOWEL: Development of moderate malignant ascites with small peritoneal and serosal implants present. There is mild distention of a few loops of small bowel within the right side of the abdomen with regions of narrowing present compatible with an early or partial small bowel obstruction. Wall thickening in a few of the involved segments with fecalization of the internal contents of the small bowel compatible with stasis.   PELVIC ORGANS: Lobulated 4.5 cm soft tissue mass in the dependent pelvis in the expected location of the uterus.  -6/13/23: CA-125=78.    6/13/23, 7/12/23, 8/14/23, 9/13/23, 10/11/23, 11/8/23, 12/6/23, 1/3/24:  Cycles 1-8 of second-line chemotherapy with IV carboplatin AUC 5 + pegylated liposomal doxorubicin (PLD) 30 mg/m2 every 28 days.   -9/27/23: Chest, abdomen, pelvic CT: Partial response. Catheter-associated thrombus.   LUNGS AND PLEURA: Mild emphysema. Stable subpleural 4 mm nodule in the right middle lobe. There is a small thrombus at the tip of the catheter.   BOWEL: Persistent but marginally interval decrease in the volume of the ascites. There is persistent peritoneal and omental nodular thickening. No obstruction.   PELVIC ORGANS: Slightly decreased size of the pelvic mass measuring 3 cm (previously 4.5 cm). There is interval development of calcification within the mass.       Genetic/Genomic/Molecular Testing History:  10/5/22: [facility], reflexed to Common Hereditary Cancer Panel.   -No identifiable germline variants identified in ABRAXAS1, AKT1, APC, DEION, AXIN2, BARD1, BMPR1A, BRCA1, BRCA2, BRIP1, CDC73, CDH1, CDK4, CDKN2A, CHEK2, CTNNA1, DICER1, EPCAM, FANCC, FANCM, GREM1, HOXB13, KIT, MEN1, MLH1, MRE11, MSH2, MSH6, MUTYH, NBN, NF1, NTHL1, PALB2, PDGFRA, PIK3CA, PMS2, POLD1, POLE, PTEN, RAD50, RAD51C, RAD51D, RECQL, RINT1, SDHA, SDHB, SDHC, SDHD, SMAD4, SMARCA4, STK11, TP53, TSC1, TSC2, VHL, XRCC2.   -Variant of uncertain significance in MSH3 c.16C>T (p.Uwn6Hol)    10/9/22 (tumor 8/29/22): [facility] Testing Results.  -Genomic ROXI low (6%)   -TMB low (1mut/Mb)  -Microsatellite stable/Mismatch Repair proficient  -PD-L1- (CPS 0%)  -NTRK 1/2/3 fusion not detected.   -ER-, CT+  -Genomic alterations in:  --TP53  -No genomic alterations in BRCA1,2.    Jamila-ovary clinical trial screening: Negative for the immunoreactive subtype.     6/19/23 (tumor 8/29/22): [facility] Test results.   -FOLR1+ (2+, 75%).       Subjective:  Michelle returns to the gyn onc clinic today for review of CT results and discussion of ongoing management, accompanied by her  and daughter.  Michelle is tolerating chemotherapy well  overall.   She has some fatigue, but has remained active and continues to work full-time. Her family notes that she was more irritable last cycle.   She met with a nutritionist to help optimize her low-residue diet and maintain nutrition. She has added miralax, and is having daily bowel movements.   Michelle reports a pimple-like rash right after chemotherapy, especially in her bra line or other places where her clothes are tight on her skin. This resolves spontaneously, not itchy or painful.       Past Medical History:  Past Medical History:   Diagnosis Date    Female stress incontinence     Ovarian cancer, right (H) 2022    Stage IIIC high-grade serous carcinoma    Recurrent cold sores        Past Surgical History:  Past Surgical History:   Procedure Laterality Date    APPENDECTOMY  2022    Part of ovarian cancer tumor debulking    BLADDER SUSPENSION  2009    HYSTERECTOMY  2009    For prolapse    IR PARACENTESIS  2022    IR PARACENTESIS  2022    IR PARACENTESIS  2023    LAPAROSCOPY DIAGNOSTIC (GYN)  2022    SALPINGO-OOPHORECTOMY, COMBINED Bilateral 2022    Pelvic exam under anesthesia, diagnostic laparoscopy, conversion to laparotomy for optimal interval tumor debulking to no gross residual disease including peritoneal and diaphragm biopsies, bilateral salpingo-oophorectomy, infragastric omentectomy, bilateral hemidiaphragm stripping, peritoneal and mesenteric argon beam ablation, appendectomy.    TONSILLECTOMY  1973    TUBAL LIGATION Bilateral 1998       Gynecologic History:  Surgical menopause. Hysterectomy in  for prolapse.  Was on HRT with estrogen for less than 2 years  No history of abnormal cervical cancer screening.  No history of STIs.  Sexually active, no dyspareunia, no postcoital bleeding.      Obstetric History:  OB History    Para Term  AB Living   3 3 3 0 0 3   SAB IAB Ectopic Multiple Live Births   0 0 0 0 3      # Outcome Date  GA Lbr Sukumar/2nd Weight Sex Delivery Anes PTL Lv   3 Term      Vag-Spont      2 Term      Vag-Spont      1 Term      Vag-Spont           Current Medications:   Current Outpatient Medications   Medication    apixaban ANTICOAGULANT (ELIQUIS) 5 MG tablet    cyclobenzaprine (FLEXERIL) 5 MG tablet    diphenhydrAMINE-acetaminophen (TYLENOL PM)  MG tablet    docusate sodium (DSS) 100 MG capsule    HYDROcodone-acetaminophen (NORCO) 5-325 MG tablet    hydrOXYzine (ATARAX) 10 MG tablet    lidocaine-prilocaine (EMLA) 2.5-2.5 % external cream    Multiple Vitamin (MULTI-VITAMIN DAILY PO)    sennosides (SENOKOT) 8.6 MG tablet    valACYclovir (VALTREX) 1000 mg tablet    HYDROmorphone (DILAUDID) 2 MG tablet    LORazepam (ATIVAN) 0.5 MG tablet    LORazepam (ATIVAN) 0.5 MG tablet    ondansetron (ZOFRAN) 8 MG tablet    prochlorperazine (COMPAZINE) 10 MG tablet     No current facility-administered medications for this visit.        Allergies:   No Known Allergies        Social History:  Patient lives with Eligio and two daughters. Works as a . She does not have Advanced Directives, but has identified Eligio Smith as her desired Healthcare Power of .  Social History     Tobacco Use    Smoking status: Former     Packs/day: 1.00     Years: 42.00     Additional pack years: 0.00     Total pack years: 42.00     Types: Cigarettes     Quit date: 2022     Years since quittin.6    Smokeless tobacco: Never   Substance Use Topics    Alcohol use: Yes     Comment: Beer ~Q3 months.       History   Drug Use Unknown       Family History:   The patient's family history is notable for a first-degree paternal relative with prostate cancer, and a first-degree relative with breast cancer and melanoma, and a second-degree maternal relative with colon cancer. No known family history of ovarian, uterine, urothelial/renal, or pancreatic cancers.   Family History   Problem Relation Age of Onset    Prostate Cancer Father      "Breast Cancer Sister 48    Melanoma Sister     Skin Cancer Sister     Colon Cancer Maternal Grandmother        Physical Exam:   /77 (BP Location: Right arm, Patient Position: Chair, Cuff Size: Adult Large)   Pulse 82   Temp 98.3  F (36.8  C) (Oral)   Resp 16   Ht 1.575 m (5' 2.01\")   Wt 69.6 kg (153 lb 6.4 oz)   SpO2 99%   BMI 28.05 kg/m    Body mass index is 28.05 kg/m .    General Appearance: healthy and alert, no distress     HEENT:  no thyromegaly, no palpable nodules or masses        Cardiovascular: regular rate and rhythm, no gallops, rubs or murmurs     Respiratory: lungs clear, no rales, rhonchi or wheezes, normal diaphragmatic excursion    Musculoskeletal: extremities non tender and without edema    Skin: no lesions or rashes     Neurological: normal gait, no gross defects     Psychiatric: appropriate mood and affect                               Hematological: normal cervical, supraclavicular and inguinal lymph nodes     Gastrointestinal:       abdomen soft, non-tender, non-distended, no organomegaly or masses      Laboratory Examination:  CA-125 trend (since second-line therapy):  6/13/23  78  7/11/23  78  8/11/23  68  9/12/23  50  10/10/23 53  11/7/23  43  12/5/23  42  1/2/24  45      Radiographic Examination:  1/23/24: Chest, abdomen, pelvic CT: Stable disease. I have personally reviewed and interpreted the CT images.    CHEST: OMAIRA.    Few sub-6 mm pulmonary nodules, unchanged from prior.  Chest wall port catheter. Interval resolution of previous adjacent thrombus within the right brachiocephalic vein.   HEPATOBILIARY: Liver surface nodularity, suggesting underlying cirrhosis. Small ovoid low-attenuation within the falciform ligament, new from 09/27/2023 and likely reflecting extension of peritoneal carcinomatosis. Tiny   low-attenuation lesions of the right hepatic lobe, unchanged.  BOWEL: Pelvic nodule within the central pelvis, tethers adjacent bowel loops and the urinary bladder, " measuring 11 x 17 mm (previously 14 x 19 mm).   Small volume loculated ascites, improved from prior.       Performance Status:   ECOG Grade 0.       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     History of malignant partial small bowel obstruction, resolved with bowel rest.       Plan:     1.)    Ovarian cancer: I reviewed the CT results with Michelle and provided her with a copy of the CT report; she was also previously provided with a copy of the CT report via [facility]. Given the stable disease, discussed options of continuing current chemotherapy vs chemotherapy break.      continue second-line chemotherapy with palliative intent with IV carboplatin AUC 5 + PLD 30 mg/m2 every 28 days. Plan as follows:   -Proceed with cycle #9 of chemotherapy  -Plan for a total of 11 cycles followed by repeat chest, abdomen, pelvic CT to assess disease response (NP for chemotherapy visits; MD for imaging).      Side-effects:  None.  -Counseled Michelle that additional cycles of chemotherapy are unlikely to result in significant disease response. If she develops prolonged myelosuppression or additional significant adverse events, will discontinue chemotherapy prior to cycle 11 since risk will outweigh potential benefit.     2.)  Catheter-associated thrombus: Resolved. Discontinue apixaban for this low-risk VTE.     3.) Genetic risk factors were assessed: s/p germline testing which was negative for identifiable germline pathogenic variants (see HPI).     4.) Labs and/or tests ordered include:  1) Prechemotherapy labs: CBC with diff, CMP, magnesium, CA-125.      5.) Health maintenance issues addressed today include none.    6.) Code status:  Full-code.    7.) Prescriptions: None.      A total of 40 minutes was spent with the patient,  38 minutes of which were spent in counseling the patient and/or treatment planning; an additional 5 minutes was spent in chart review/documentation.      Charis Patino MD, MS, FACOG, FACS  1/26/2024  10:53 AM              CC  Patient Care Team:  Domenica Christianson MD as PCP - General  Carey, Charis Meadows MD as MD (Gynecologic Oncology)  Susannah Roman APRN CNP as Assigned Cancer Care Provider  Anish Monroy MD as Assigned Palliative Care Provider  Pratima Vega RN as Specialty Care Coordinator (Hematology & Oncology)  SELF, REFERRED

## 2024-01-25 NOTE — PATIENT INSTRUCTIONS
SCHEDULING:  -RTC for cycle #7 (infusion only, no NP visit needed).  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   Treatment History:  -6/22/22-8/4/22: Neoadjuvant chemotherapy with IV carboplatin + paclitaxel x3 cycles. Partial response.   -8/29/22: Pelvic exam under anesthesia, diagnostic laparoscopy (look inside the pelvis/abdomen with a camera), conversion to laparotomy (large incision) for optimal interval tumor debulking to no gross residual disease including peritoneal and diaphragm biopsies, bilateral salpingo-oophorectomy (removal of bilateral ovaries & fallopian tubes), infragastric omentectomy (removal of the fat curtain hanging off the stomach/colon), bilateral hemidiaphragm stripping, peritoneal and mesenteric argon beam ablation (destruction of tumor), appendectomy.   -9/28/22-11/17/22: First-line chemotherapy with carboplatin + paclitaxel x3 cycles (total 6 cycles).   -6/13/23-1/3/24: Second-line chemotherapy with IV carboplatin + pegylated liposomal doxorubicin (PLD/doxil) x8 cycles. Partial response.         PLAN:  1) Ovarian cancer: Continue second-line chemotherapy with palliative intent.  DRUGS: Carboplatin + pegylated liposomal doxorubicin (PLD/doxil)  SCHEDULE: 1 day every 4 weeks  PLAN: Total of 11 cycles followed by repeat chest, abdomen, pelvic CT to assess disease response.   -Tumor testing for the immunoreactive subtype negative, so will not consider enrollment in the Jamila-ovary clinical trial in the future.  -Folate receptor alpha testing positive, so will consider mirvetuximab therapy in the future when the cancer becomes platinum-resistant.      2) Genetic testing: negative for any identifiable germline variants. No additional testing recommended for you or your family.    3) Catheter-associated clot: Resolved. Discontinue anticoagulation  therapy.       Please call with any questions or concerns. [phone]       Charis Patino MD, MS, FACOG, FACS  1/26/2024  10:54 AM

## 2024-01-26 ENCOUNTER — ONCOLOGY VISIT (OUTPATIENT)
Dept: ONCOLOGY | Facility: CLINIC | Age: 59
End: 2024-01-26
Attending: OBSTETRICS & GYNECOLOGY
Payer: COMMERCIAL

## 2024-01-26 VITALS
RESPIRATION RATE: 16 BRPM | DIASTOLIC BLOOD PRESSURE: 77 MMHG | TEMPERATURE: 98.3 F | WEIGHT: 153.4 LBS | HEART RATE: 82 BPM | HEIGHT: 62 IN | BODY MASS INDEX: 28.23 KG/M2 | SYSTOLIC BLOOD PRESSURE: 117 MMHG | OXYGEN SATURATION: 99 %

## 2024-01-26 DIAGNOSIS — C56.1 OVARIAN CANCER, RIGHT (H): Primary | ICD-10-CM

## 2024-01-26 DIAGNOSIS — I82.90 VTE (VENOUS THROMBOEMBOLISM): ICD-10-CM

## 2024-01-26 PROCEDURE — 99215 OFFICE O/P EST HI 40 MIN: CPT | Performed by: OBSTETRICS & GYNECOLOGY

## 2024-01-26 PROCEDURE — 99213 OFFICE O/P EST LOW 20 MIN: CPT | Performed by: OBSTETRICS & GYNECOLOGY

## 2024-01-26 RX ORDER — DIPHENHYDRAMINE HYDROCHLORIDE 50 MG/ML
50 INJECTION INTRAMUSCULAR; INTRAVENOUS
Status: CANCELLED | OUTPATIENT
Start: 2024-01-31

## 2024-01-26 RX ORDER — EPINEPHRINE 1 MG/ML
0.3 INJECTION, SOLUTION INTRAMUSCULAR; SUBCUTANEOUS EVERY 5 MIN PRN
Status: CANCELLED | OUTPATIENT
Start: 2024-01-31

## 2024-01-26 RX ORDER — METHYLPREDNISOLONE SODIUM SUCCINATE 125 MG/2ML
125 INJECTION, POWDER, LYOPHILIZED, FOR SOLUTION INTRAMUSCULAR; INTRAVENOUS
Status: CANCELLED | OUTPATIENT
Start: 2024-01-31

## 2024-01-26 RX ORDER — PALONOSETRON 0.05 MG/ML
0.25 INJECTION, SOLUTION INTRAVENOUS ONCE
Status: CANCELLED | OUTPATIENT
Start: 2024-01-31

## 2024-01-26 RX ORDER — ALBUTEROL SULFATE 0.83 MG/ML
2.5 SOLUTION RESPIRATORY (INHALATION)
Status: CANCELLED | OUTPATIENT
Start: 2024-01-31

## 2024-01-26 RX ORDER — LORAZEPAM 2 MG/ML
0.5 INJECTION INTRAMUSCULAR EVERY 4 HOURS PRN
Status: CANCELLED | OUTPATIENT
Start: 2024-01-31

## 2024-01-26 RX ORDER — HEPARIN SODIUM,PORCINE 10 UNIT/ML
5 VIAL (ML) INTRAVENOUS
Status: CANCELLED | OUTPATIENT
Start: 2024-01-31

## 2024-01-26 RX ORDER — MEPERIDINE HYDROCHLORIDE 25 MG/ML
25 INJECTION INTRAMUSCULAR; INTRAVENOUS; SUBCUTANEOUS EVERY 30 MIN PRN
Status: CANCELLED | OUTPATIENT
Start: 2024-01-31

## 2024-01-26 RX ORDER — ALBUTEROL SULFATE 90 UG/1
1-2 AEROSOL, METERED RESPIRATORY (INHALATION)
Status: CANCELLED | OUTPATIENT
Start: 2024-01-31

## 2024-01-26 RX ORDER — HEPARIN SODIUM (PORCINE) LOCK FLUSH IV SOLN 100 UNIT/ML 100 UNIT/ML
5 SOLUTION INTRAVENOUS
Status: CANCELLED | OUTPATIENT
Start: 2024-01-31

## 2024-01-26 ASSESSMENT — PAIN SCALES - GENERAL: PAINLEVEL: NO PAIN (0)

## 2024-01-26 NOTE — NURSING NOTE
"Oncology Rooming Note    January 26, 2024 9:56 AM   Jacki Smith is a 58 year old female who presents for:    Chief Complaint   Patient presents with    Oncology Clinic Visit     P RETURN - OVARIAN CANCER     Initial Vitals: /77 (BP Location: Right arm, Patient Position: Chair, Cuff Size: Adult Large)   Pulse 82   Temp 98.3  F (36.8  C) (Oral)   Resp 16   Ht 1.575 m (5' 2.01\")   Wt 69.6 kg (153 lb 6.4 oz)   SpO2 99%   BMI 28.05 kg/m   Estimated body mass index is 28.05 kg/m  as calculated from the following:    Height as of this encounter: 1.575 m (5' 2.01\").    Weight as of this encounter: 69.6 kg (153 lb 6.4 oz). Body surface area is 1.74 meters squared.  No Pain (0) Comment: Data Unavailable   No LMP recorded. Patient has had a hysterectomy.  Allergies reviewed: Yes  Medications reviewed: Yes    Medications: Medication refills not needed today.  Pharmacy name entered into [facility] [place]: [id number] [place] [facility] to [facility] [address] [facility]    Frailty Screening:   Is the patient here for a new oncology consult visit in cancer care? 2. No      Sebastián Bledsoe LPN              "

## 2024-01-26 NOTE — LETTER
"    2024         RE: Jacki Smith  [address] [facility] [address]        Dear Colleague,    Thank you for referring your patient, Jacki Smith, to the [facility]. Please see a copy of my visit note below.    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 Chemotherapy encounter.       History of Present Illness:  Jacki \"Michelle\" IVY Smith (she/her/hers)  is a 58 year old seen at the [facility] for management of progressive stage IIIC high-grade serous ovarian carcinoma. History as follows:    Ovarian Cancer History:  22: Presented to an ED in [place] for evaluation of abdominal bloating and discomfort.  -Abdomen, pelvic CT: Radiographic Stage CAL ovarian cancer. I have personally reviewed the CT images.   22: CA-925=350.   22: Pelvic ultrasound: c/w metastatic cancer. I have personally reviewed the ultrasound images.   22: Pelvic exam under anesthesia, diagnostic laparoscopy, biopsies, evacuation of ascites.   -Findings: On pelvic exam under anesthesia, there was a 6 cm firm, fixed mass adherent to the vaginal cuff. Vagina otherwise smooth. On laparoscopic examination, there was ascites filling the pelvis/abdomen, with >1 liter of fluid evacuated. The palpated mass was arising from the right ovary. Left ovary relatively normal in appearance. There was diffuse carcinomatosis covering the entire peritoneal surface falciform ligament, liver capsule, and mesentery. The omentum was replaced with tumor. Findings were not amenable to optimal tumor debulking so biopsies " were taken for histologic examination.   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      6/22/22, 7/13/22, 8/4/22: Cycles 1-3 of neoadjuvant chemotherapy with IV carboplatin + paclitaxel 175 mg/m2 every 21 days.   -Cycles 1,2: Carboplatin AUC 6.   -Cycle 3: Carboplatin AUC 5 with dose-reduction due to thrombcytopenia.   -8/19/22: Chest, abdomen, pelvic CT: Partial response. I have personally reviewed the CT images.     8/29/22: Pelvic exam under anesthesia, diagnostic laparoscopy, conversion to laparotomy for optimal interval tumor debulking to no gross residual disease including peritoneal and diaphragm biopsies, bilateral salpingo-oophorectomy, infragastric omentectomy, bilateral hemidiaphragm stripping, peritoneal and mesenteric argon beam ablation, appendectomy.   -Pathology: High-grade serous carcinoma involving all resected specimens.     9/28/22, 10/19/22, 11/17/22: Cycles 4-6 of primary chemotherapy with IV carboplatin AUC 5 + paclitaxel 175 mg/m2.  -12/2/22: Chest, abdomen, pelvic CT: No definitive evidence of disease. I have personally reviewed the CT images.   -12/7/22: CA-125=23.   -2/16/23: Chest, abdomen, pelvic CT to evaluate bloating: Stable findings, no definitive evidence of disease. I have personally reviewed the CT images.   -5/25/23: Admitted  to [facility] for management of malignant ascites s/p therapeutic paracentesis, and partial SBO resolved with conservative management.   5/25/23: Abdomen, pelvic CT: Progressive disease.   LOWER CHEST: OMAIRA.   BOWEL: Development of moderate malignant ascites with small peritoneal and serosal implants present. There is mild distention of a few loops of small bowel within the right side of the abdomen with regions of narrowing present compatible with an early or partial small bowel obstruction. Wall thickening in a few of the involved segments with fecalization of the internal  contents of the small bowel compatible with stasis.   PELVIC ORGANS: Lobulated 4.5 cm soft tissue mass in the dependent pelvis in the expected location of the uterus.  -6/13/23: CA-125=78.    6/13/23, 7/12/23, 8/14/23, 9/13/23, 10/11/23, 11/8/23, 12/6/23, 1/3/24: Cycles 1-8 of second-line chemotherapy with IV carboplatin AUC 5 + pegylated liposomal doxorubicin (PLD) 30 mg/m2 every 28 days.   -9/27/23: Chest, abdomen, pelvic CT: Partial response. Catheter-associated thrombus.   LUNGS AND PLEURA: Mild emphysema. Stable subpleural 4 mm nodule in the right middle lobe. There is a small thrombus at the tip of the catheter.   BOWEL: Persistent but marginally interval decrease in the volume of the ascites. There is persistent peritoneal and omental nodular thickening. No obstruction.   PELVIC ORGANS: Slightly decreased size of the pelvic mass measuring 3 cm (previously 4.5 cm). There is interval development of calcification within the mass.       Genetic/Genomic/Molecular Testing History:  10/5/22: [facility], reflexed to Common Hereditary Cancer Panel.   -No identifiable germline variants identified in ABRAXAS1, AKT1, APC, DEION, AXIN2, BARD1, BMPR1A, BRCA1, BRCA2, BRIP1, CDC73, CDH1, CDK4, CDKN2A, CHEK2, CTNNA1, DICER1, EPCAM, FANCC, FANCM, GREM1, HOXB13, KIT, MEN1, MLH1, MRE11, MSH2, MSH6, MUTYH, NBN, NF1, NTHL1, PALB2, PDGFRA, PIK3CA, PMS2, POLD1, POLE, PTEN, RAD50, RAD51C, RAD51D, RECQL, RINT1, SDHA, SDHB, SDHC, SDHD, SMAD4, SMARCA4, STK11, TP53, TSC1, TSC2, VHL, XRCC2.   -Variant of uncertain significance in MSH3 c.16C>T (p.[id number])    10/9/22 (tumor 8/29/22): [facility] Testing Results.  -Genomic ROXI low (6%)   -TMB low (1mut/Mb)  -Microsatellite stable/Mismatch Repair proficient  -PD-L1- (CPS 0%)  -NTRK 1/2/3 fusion not detected.   -ER-, IL+  -Genomic alterations in:  --TP53  -No genomic alterations in BRCA1,2.    Jamila-ovary clinical trial screening: Negative for the immunoreactive subtype.     6/19/23 (tumor  8/29/22): [facility] Test results.   -FOLR1+ (2+, 75%).       Subjective:  Michelle returns to the gyn onc clinic today for review of CT results and discussion of ongoing management, accompanied by her  and daughter.  Michelle is tolerating chemotherapy well overall.   She has some fatigue, but has remained active and continues to work full-time. Her family notes that she was more irritable last cycle.   She met with a nutritionist to help optimize her low-residue diet and maintain nutrition. She has added miralax, and is having daily bowel movements.   Michelle reports a pimple-like rash right after chemotherapy, especially in her bra line or other places where her clothes are tight on her skin. This resolves spontaneously, not itchy or painful.       Past Medical History:  Past Medical History:   Diagnosis Date    Female stress incontinence     Ovarian cancer, right (H) 06/16/2022    Stage IIIC high-grade serous carcinoma    Recurrent cold sores        Past Surgical History:  Past Surgical History:   Procedure Laterality Date    APPENDECTOMY  08/29/2022    Part of ovarian cancer tumor debulking    BLADDER SUSPENSION  08/13/2009    HYSTERECTOMY  08/13/2009    For prolapse    IR PARACENTESIS  6/6/2022    IR PARACENTESIS  6/14/2022    IR PARACENTESIS  5/26/2023    LAPAROSCOPY DIAGNOSTIC (GYN)  06/16/2022    SALPINGO-OOPHORECTOMY, COMBINED Bilateral 08/29/2022    Pelvic exam under anesthesia, diagnostic laparoscopy, conversion to laparotomy for optimal interval tumor debulking to no gross residual disease including peritoneal and diaphragm biopsies, bilateral salpingo-oophorectomy, infragastric omentectomy, bilateral hemidiaphragm stripping, peritoneal and mesenteric argon beam ablation, appendectomy.    TONSILLECTOMY  1973    TUBAL LIGATION Bilateral 09/01/1998       Gynecologic History:  Surgical menopause. Hysterectomy in 2009 for prolapse.  Was on HRT with estrogen for less than 2 years  No history of abnormal  cervical cancer screening.  No history of STIs.  Sexually active, no dyspareunia, no postcoital bleeding.      Obstetric History:  OB History    Para Term  AB Living   3 3 3 0 0 3   SAB IAB Ectopic Multiple Live Births   0 0 0 0 3      # Outcome Date GA Lbr Sukumar/2nd Weight Sex Delivery Anes PTL Lv   3 Term      Vag-Spont      2 Term      Vag-Spont      1 Term      Vag-Spont           Current Medications:   Current Outpatient Medications   Medication    apixaban ANTICOAGULANT (ELIQUIS) 5 MG tablet    cyclobenzaprine (FLEXERIL) 5 MG tablet    diphenhydrAMINE-acetaminophen (TYLENOL PM)  MG tablet    docusate sodium (DSS) 100 MG capsule    HYDROcodone-acetaminophen (NORCO) 5-325 MG tablet    hydrOXYzine (ATARAX) 10 MG tablet    lidocaine-prilocaine (EMLA) 2.5-2.5 % external cream    Multiple Vitamin (MULTI-VITAMIN DAILY PO)    sennosides (SENOKOT) 8.6 MG tablet    valACYclovir (VALTREX) 1000 mg tablet    HYDROmorphone (DILAUDID) 2 MG tablet    LORazepam (ATIVAN) 0.5 MG tablet    LORazepam (ATIVAN) 0.5 MG tablet    ondansetron (ZOFRAN) 8 MG tablet    prochlorperazine (COMPAZINE) 10 MG tablet     No current facility-administered medications for this visit.        Allergies:   No Known Allergies        Social History:  Patient lives with Eligio and two daughters. Works as a . She does not have Advanced Directives, but has identified Eligio Smith as her desired Healthcare Power of .  Social History     Tobacco Use    Smoking status: Former     Packs/day: 1.00     Years: 42.00     Additional pack years: 0.00     Total pack years: 42.00     Types: Cigarettes     Quit date: 2022     Years since quittin.6    Smokeless tobacco: Never   Substance Use Topics    Alcohol use: Yes     Comment: Beer ~Q3 months.       History   Drug Use Unknown       Family History:   The patient's family history is notable for a first-degree paternal relative with prostate cancer, and a first-degree  "relative with breast cancer and melanoma, and a second-degree maternal relative with colon cancer. No known family history of ovarian, uterine, urothelial/renal, or pancreatic cancers.   Family History   Problem Relation Age of Onset    Prostate Cancer Father     Breast Cancer Sister 48    Melanoma Sister     Skin Cancer Sister     Colon Cancer Maternal Grandmother        Physical Exam:   /77 (BP Location: Right arm, Patient Position: Chair, Cuff Size: Adult Large)   Pulse 82   Temp 98.3  F (36.8  C) (Oral)   Resp 16   Ht 1.575 m (5' 2.01\")   Wt 69.6 kg (153 lb 6.4 oz)   SpO2 99%   BMI 28.05 kg/m    Body mass index is 28.05 kg/m .    General Appearance: healthy and alert, no distress     HEENT:  no thyromegaly, no palpable nodules or masses        Cardiovascular: regular rate and rhythm, no gallops, rubs or murmurs     Respiratory: lungs clear, no rales, rhonchi or wheezes, normal diaphragmatic excursion    Musculoskeletal: extremities non tender and without edema    Skin: no lesions or rashes     Neurological: normal gait, no gross defects     Psychiatric: appropriate mood and affect                               Hematological: normal cervical, supraclavicular and inguinal lymph nodes     Gastrointestinal:       abdomen soft, non-tender, non-distended, no organomegaly or masses      Laboratory Examination:  CA-125 trend (since second-line therapy):  6/13/23  78  7/11/23  78  8/11/23  68  9/12/23  50  10/10/23 53  11/7/23  43  12/5/23  42  1/2/24  45      Radiographic Examination:  1/23/24: Chest, abdomen, pelvic CT: Stable disease. I have personally reviewed and interpreted the CT images.    CHEST: OMAIRA.    Few sub-6 mm pulmonary nodules, unchanged from prior.  Chest wall port catheter. Interval resolution of previous adjacent thrombus within the right brachiocephalic vein.   HEPATOBILIARY: Liver surface nodularity, suggesting underlying cirrhosis. Small ovoid low-attenuation within the falciform " ligament, new from 09/27/2023 and likely reflecting extension of peritoneal carcinomatosis. Tiny   low-attenuation lesions of the right hepatic lobe, unchanged.  BOWEL: Pelvic nodule within the central pelvis, tethers adjacent bowel loops and the urinary bladder, measuring 11 x 17 mm (previously 14 x 19 mm).   Small volume loculated ascites, improved from prior.       Performance Status:   ECOG Grade 0.       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     History of malignant partial small bowel obstruction, resolved with bowel rest.       Plan:     1.)    Ovarian cancer: I reviewed the CT results with Michelle and provided her with a copy of the CT report; she was also previously provided with a copy of the CT report via [facility]. Given the stable disease, discussed options of continuing current chemotherapy vs chemotherapy break.      continue second-line chemotherapy with palliative intent with IV carboplatin AUC 5 + PLD 30 mg/m2 every 28 days. Plan as follows:   -Proceed with cycle #9 of chemotherapy  -Plan for a total of 11 cycles followed by repeat chest, abdomen, pelvic CT to assess disease response (NP for chemotherapy visits; MD for imaging).      Side-effects:  None.  -Counseled Michelle that additional cycles of chemotherapy are unlikely to result in significant disease response. If she develops prolonged myelosuppression or additional significant adverse events, will discontinue chemotherapy prior to cycle 11 since risk will outweigh potential benefit.     2.)  Catheter-associated thrombus: Resolved. Discontinue apixaban for this low-risk VTE.     3.) Genetic risk factors were assessed: s/p germline testing which was negative for identifiable germline pathogenic variants (see HPI).     4.) Labs and/or tests  ordered include:  1) Prechemotherapy labs: CBC with diff, CMP, magnesium, CA-125.      5.) Health maintenance issues addressed today include none.    6.) Code status:  Full-code.    7.) Prescriptions: None.      A total of 40 minutes was spent with the patient, 38 minutes of which were spent in counseling the patient and/or treatment planning; an additional 5 minutes was spent in chart review/documentation.      Charis Patino MD, MS, FACOG, FACS  1/26/2024  10:53 AM              CC  Patient Care Team:  Domenica Christianson MD as PCP - General

## 2024-02-01 ENCOUNTER — INFUSION THERAPY VISIT (OUTPATIENT)
Dept: INFUSION THERAPY | Facility: HOSPITAL | Age: 59
End: 2024-02-01
Attending: OBSTETRICS & GYNECOLOGY
Payer: COMMERCIAL

## 2024-02-01 VITALS
OXYGEN SATURATION: 98 % | BODY MASS INDEX: 27.5 KG/M2 | TEMPERATURE: 98.2 F | RESPIRATION RATE: 16 BRPM | SYSTOLIC BLOOD PRESSURE: 139 MMHG | DIASTOLIC BLOOD PRESSURE: 69 MMHG | HEART RATE: 70 BPM | WEIGHT: 150.4 LBS

## 2024-02-01 DIAGNOSIS — C56.1 OVARIAN CANCER, RIGHT (H): Primary | ICD-10-CM

## 2024-02-01 LAB
ALBUMIN SERPL BCG-MCNC: 3.9 G/DL (ref 3.5–5.2)
ALP SERPL-CCNC: 197 U/L (ref 40–150)
ALT SERPL W P-5'-P-CCNC: 29 U/L (ref 0–50)
ANION GAP SERPL CALCULATED.3IONS-SCNC: 9 MMOL/L (ref 7–15)
AST SERPL W P-5'-P-CCNC: 40 U/L (ref 0–45)
BASOPHILS # BLD AUTO: 0 10E3/UL (ref 0–0.2)
BASOPHILS NFR BLD AUTO: 1 %
BILIRUB SERPL-MCNC: 0.3 MG/DL
BUN SERPL-MCNC: 10.5 MG/DL (ref 6–20)
CALCIUM SERPL-MCNC: 9.7 MG/DL (ref 8.6–10)
CANCER AG125 SERPL-ACNC: 44 U/ML
CHLORIDE SERPL-SCNC: 101 MMOL/L (ref 98–107)
CREAT SERPL-MCNC: 0.87 MG/DL (ref 0.51–0.95)
DEPRECATED HCO3 PLAS-SCNC: 27 MMOL/L (ref 22–29)
EGFRCR SERPLBLD CKD-EPI 2021: 77 ML/MIN/1.73M2
EOSINOPHIL # BLD AUTO: 0.1 10E3/UL (ref 0–0.7)
EOSINOPHIL NFR BLD AUTO: 2 %
ERYTHROCYTE [DISTWIDTH] IN BLOOD BY AUTOMATED COUNT: 15.6 % (ref 10–15)
GLUCOSE SERPL-MCNC: 155 MG/DL (ref 70–99)
HCT VFR BLD AUTO: 34.1 % (ref 35–47)
HGB BLD-MCNC: 11.3 G/DL (ref 11.7–15.7)
IMM GRANULOCYTES # BLD: 0 10E3/UL
IMM GRANULOCYTES NFR BLD: 1 %
LYMPHOCYTES # BLD AUTO: 1 10E3/UL (ref 0.8–5.3)
LYMPHOCYTES NFR BLD AUTO: 23 %
MAGNESIUM SERPL-MCNC: 1.8 MG/DL (ref 1.7–2.3)
MCH RBC QN AUTO: 32.7 PG (ref 26.5–33)
MCHC RBC AUTO-ENTMCNC: 33.1 G/DL (ref 31.5–36.5)
MCV RBC AUTO: 99 FL (ref 78–100)
MONOCYTES # BLD AUTO: 0.6 10E3/UL (ref 0–1.3)
MONOCYTES NFR BLD AUTO: 14 %
NEUTROPHILS # BLD AUTO: 2.7 10E3/UL (ref 1.6–8.3)
NEUTROPHILS NFR BLD AUTO: 59 %
NRBC # BLD AUTO: 0 10E3/UL
NRBC BLD AUTO-RTO: 0 /100
PLATELET # BLD AUTO: 173 10E3/UL (ref 150–450)
POTASSIUM SERPL-SCNC: 3.9 MMOL/L (ref 3.4–5.3)
PROT SERPL-MCNC: 6.7 G/DL (ref 6.4–8.3)
RBC # BLD AUTO: 3.46 10E6/UL (ref 3.8–5.2)
SODIUM SERPL-SCNC: 137 MMOL/L (ref 135–145)
WBC # BLD AUTO: 4.4 10E3/UL (ref 4–11)

## 2024-02-01 PROCEDURE — 258N000003 HC RX IP 258 OP 636: Performed by: OBSTETRICS & GYNECOLOGY

## 2024-02-01 PROCEDURE — 96375 TX/PRO/DX INJ NEW DRUG ADDON: CPT

## 2024-02-01 PROCEDURE — 36591 DRAW BLOOD OFF VENOUS DEVICE: CPT | Performed by: OBSTETRICS & GYNECOLOGY

## 2024-02-01 PROCEDURE — 85004 AUTOMATED DIFF WBC COUNT: CPT | Performed by: OBSTETRICS & GYNECOLOGY

## 2024-02-01 PROCEDURE — 96417 CHEMO IV INFUS EACH ADDL SEQ: CPT

## 2024-02-01 PROCEDURE — 96367 TX/PROPH/DG ADDL SEQ IV INF: CPT

## 2024-02-01 PROCEDURE — 83735 ASSAY OF MAGNESIUM: CPT | Performed by: OBSTETRICS & GYNECOLOGY

## 2024-02-01 PROCEDURE — 96413 CHEMO IV INFUSION 1 HR: CPT

## 2024-02-01 PROCEDURE — 86304 IMMUNOASSAY TUMOR CA 125: CPT | Performed by: OBSTETRICS & GYNECOLOGY

## 2024-02-01 PROCEDURE — 250N000011 HC RX IP 250 OP 636: Performed by: OBSTETRICS & GYNECOLOGY

## 2024-02-01 PROCEDURE — 82040 ASSAY OF SERUM ALBUMIN: CPT | Performed by: OBSTETRICS & GYNECOLOGY

## 2024-02-01 RX ORDER — METHYLPREDNISOLONE SODIUM SUCCINATE 125 MG/2ML
125 INJECTION, POWDER, LYOPHILIZED, FOR SOLUTION INTRAMUSCULAR; INTRAVENOUS
Status: DISCONTINUED | OUTPATIENT
Start: 2024-02-01 | End: 2024-02-01 | Stop reason: HOSPADM

## 2024-02-01 RX ORDER — DIPHENHYDRAMINE HYDROCHLORIDE 50 MG/ML
50 INJECTION INTRAMUSCULAR; INTRAVENOUS
Status: DISCONTINUED | OUTPATIENT
Start: 2024-02-01 | End: 2024-02-01 | Stop reason: HOSPADM

## 2024-02-01 RX ORDER — PALONOSETRON 0.05 MG/ML
0.25 INJECTION, SOLUTION INTRAVENOUS ONCE
Status: COMPLETED | OUTPATIENT
Start: 2024-02-01 | End: 2024-02-01

## 2024-02-01 RX ORDER — EPINEPHRINE 1 MG/ML
0.3 INJECTION, SOLUTION INTRAMUSCULAR; SUBCUTANEOUS EVERY 5 MIN PRN
Status: DISCONTINUED | OUTPATIENT
Start: 2024-02-01 | End: 2024-02-01 | Stop reason: HOSPADM

## 2024-02-01 RX ORDER — ALBUTEROL SULFATE 90 UG/1
1-2 AEROSOL, METERED RESPIRATORY (INHALATION)
Status: DISCONTINUED | OUTPATIENT
Start: 2024-02-01 | End: 2024-02-01 | Stop reason: HOSPADM

## 2024-02-01 RX ORDER — HEPARIN SODIUM (PORCINE) LOCK FLUSH IV SOLN 100 UNIT/ML 100 UNIT/ML
5 SOLUTION INTRAVENOUS
Status: DISCONTINUED | OUTPATIENT
Start: 2024-02-01 | End: 2024-02-01 | Stop reason: HOSPADM

## 2024-02-01 RX ORDER — ALBUTEROL SULFATE 0.83 MG/ML
2.5 SOLUTION RESPIRATORY (INHALATION)
Status: DISCONTINUED | OUTPATIENT
Start: 2024-02-01 | End: 2024-02-01 | Stop reason: HOSPADM

## 2024-02-01 RX ORDER — MEPERIDINE HYDROCHLORIDE 50 MG/ML
25 INJECTION INTRAMUSCULAR; INTRAVENOUS; SUBCUTANEOUS EVERY 30 MIN PRN
Status: DISCONTINUED | OUTPATIENT
Start: 2024-02-01 | End: 2024-02-01 | Stop reason: HOSPADM

## 2024-02-01 RX ADMIN — HEPARIN 5 ML: 100 SYRINGE at 12:39

## 2024-02-01 RX ADMIN — PALONOSETRON 0.25 MG: 0.05 INJECTION, SOLUTION INTRAVENOUS at 09:38

## 2024-02-01 RX ADMIN — FOSAPREPITANT: 150 INJECTION, POWDER, LYOPHILIZED, FOR SOLUTION INTRAVENOUS at 09:42

## 2024-02-01 RX ADMIN — DOXORUBICIN HYDROCHLORIDE 50 MG: 2 INJECTABLE, LIPOSOMAL INTRAVENOUS at 10:35

## 2024-02-01 RX ADMIN — CARBOPLATIN 445 MG: 10 INJECTION, SOLUTION INTRAVENOUS at 11:51

## 2024-02-01 RX ADMIN — DEXTROSE MONOHYDRATE 250 ML: 50 INJECTION, SOLUTION INTRAVENOUS at 09:37

## 2024-02-01 NOTE — PROGRESS NOTES
Infusion Nursing Note:  Jacki Smith presents today for D1C9 Doxil and carboplatin.    Patient seen by provider today: No   present during visit today: Not Applicable.    Note: Reviewed plan of care. Pt premedicated with Emend/dex and Aloxi.      Intravenous Access:  Implanted Port accessed by Ashley POSADAS, patent throughout infusions.    Treatment Conditions:  Lab Results   Component Value Date    HGB 11.3 (L) 02/01/2024    WBC 4.4 02/01/2024    ANEUTAUTO 2.7 02/01/2024     02/01/2024        Lab Results   Component Value Date     02/01/2024    POTASSIUM 3.9 02/01/2024    MAG 1.8 02/01/2024    CR 0.87 02/01/2024    KINDRA 9.7 02/01/2024    BILITOTAL 0.3 02/01/2024    ALBUMIN 3.9 02/01/2024    ALT 29 02/01/2024    AST 40 02/01/2024       Results reviewed, labs MET treatment parameters, ok to proceed with treatment.      Post Infusion Assessment:  Patient tolerated infusion without incident.  Blood return noted pre and post infusion.  Site patent and intact, free from redness, edema or discomfort.  No evidence of extravasations.  Access discontinued per protocol.       Discharge Plan:   Patient discharged in stable condition accompanied by: .  Departure Mode: Ambulatory.      Radha Wilson RN

## 2024-02-21 ENCOUNTER — VIRTUAL VISIT (OUTPATIENT)
Dept: PALLIATIVE CARE | Facility: CLINIC | Age: 59
End: 2024-02-21
Attending: FAMILY MEDICINE
Payer: COMMERCIAL

## 2024-02-21 VITALS
DIASTOLIC BLOOD PRESSURE: 69 MMHG | HEIGHT: 62 IN | BODY MASS INDEX: 27.6 KG/M2 | WEIGHT: 150 LBS | SYSTOLIC BLOOD PRESSURE: 139 MMHG

## 2024-02-21 DIAGNOSIS — C78.6 PERITONEAL CARCINOMATOSIS (H): ICD-10-CM

## 2024-02-21 DIAGNOSIS — Z51.5 PALLIATIVE CARE PATIENT: Primary | ICD-10-CM

## 2024-02-21 DIAGNOSIS — F43.23 ADJUSTMENT DISORDER WITH MIXED ANXIETY AND DEPRESSED MOOD: ICD-10-CM

## 2024-02-21 DIAGNOSIS — C56.1 OVARIAN CANCER, RIGHT (H): ICD-10-CM

## 2024-02-21 DIAGNOSIS — F41.9 ANXIETY: ICD-10-CM

## 2024-02-21 PROCEDURE — 99214 OFFICE O/P EST MOD 30 MIN: CPT | Mod: 95 | Performed by: FAMILY MEDICINE

## 2024-02-21 ASSESSMENT — PAIN SCALES - GENERAL: PAINLEVEL: NO PAIN (0)

## 2024-02-21 NOTE — PROGRESS NOTES
Virtual Visit Details    Type of service:  Video Visit     Originating Location (pt. 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     Recommendations & Counseling:  Continue oncology care with Dr. Patino and the GYN Oncology team  Continue Hydroxyzine 5-20 mg po q 6 hours prn anxiety      OK to use ativan for severe anxiety but try hydroxyzine first  Will consider adding mirtazapine if the above isn't helpful     Michelle was directed to the [employer] program for ACP documents and she is encouraged to complete them by there start of her doxil holiday in a few months.     Contact information provided for [facility] palliative SW in the community via AVS.    Follow up in 2 months, sooner, prn     Counseling: All of the above was explained to the patient in lay language. The patient has verbalized a clear understanding of the discussion, asked appropriate questions, which have been answered to patient's apparent satisfaction. The patient is in agreement with the above plan.        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 She has not needed any paracentesis since the end of May.    Last Palliative care appointment: 2023 with me     Reviewed: yes, no concerns    Interim History:  Jacki Smith is a 58 year old female who is seen today for follow up with Palliative Care via billable video  visit. She's had a lingering cold for a month.  She will check with her PCP for next steps. She is tolerating her chemo and has two more cycles of doxil planned and then take a treatment break.     Pain:  was diagnosed with Raynaud's prior to her cancer and it isn't any worse with doxil; she strives to keep her hands and feet cold during her infusions.    Appetite/Nausea: weight stable, appetite is OK     Bowels: had a couple of problems with bowel obstructions and now seeing a dietician and it hasn't been a problem     Sleep: good response to hydroxyzine and uses ativan rarely     Mood: feels like she should see a counselor again; worries about the future;     Coping: overall OK but could use more emotional support    Family History- Reviewed in Epic.    No Known Allergies    Social History:  Pertinent changes to social history/social situation since last visit: none  Key support resources: family and friends  Advance Directive Status:  not completed and we discussed the need    Social History     Tobacco Use    Smoking status: Former     Packs/day: 1.00     Years: 42.00     Additional pack years: 0.00     Total pack years: 42.00     Types: Cigarettes     Quit date: 2022     Years since quittin.7    Smokeless tobacco: Never   Substance Use Topics    Alcohol use: Yes     Comment: Beer ~Q3 months.    Drug use: Never         No Known Allergies  Current Outpatient Medications   Medication Sig Dispense Refill    apixaban ANTICOAGULANT (ELIQUIS) 5 MG tablet Take 1 tablet (5 mg) by mouth 2 times daily 60 tablet 11    cyclobenzaprine (FLEXERIL) 5 MG tablet Take 1-2 Tablets (5-10 mg) by mouth three times a day.      diphenhydrAMINE-acetaminophen (TYLENOL PM)  MG tablet Take 1 tablet by mouth as needed      docusate sodium (DSS) 100 MG capsule Take 100 mg by mouth as needed      HYDROcodone-acetaminophen (NORCO) 5-325 MG tablet Take 1 tablet by mouth every 4 hours as needed      HYDROmorphone (DILAUDID) 2 MG  tablet 1 tablet (Patient not taking: Reported on 1/26/2024)      hydrOXYzine (ATARAX) 10 MG tablet Take 1-2 tablets (10-20 mg) by mouth every 6 hours as needed for itching 120 tablet 3    lidocaine-prilocaine (EMLA) 2.5-2.5 % external cream Apply topically as needed (pain due to port access) Apply to port 30 minutes prior to lab appointment. 30 g 6    LORazepam (ATIVAN) 0.5 MG tablet Take 1 tablet (0.5 mg) by mouth every 6 hours as needed for anxiety (Patient not taking: Reported on 1/26/2024) 20 tablet 3    LORazepam (ATIVAN) 0.5 MG tablet Take 1-2 Tablets (0.5-1 mg) by mouth every 4 hours as needed for Anxiety (and or nausea and vomiting).* (Patient not taking: Reported on 1/26/2024)      Multiple Vitamin (MULTI-VITAMIN DAILY PO) Take 1 tablet by mouth daily      ondansetron (ZOFRAN) 8 MG tablet Take 1 tablet (8 mg) by mouth every 8 hours as needed for nausea (vomiting) (Patient not taking: Reported on 1/26/2024) 30 tablet 2    prochlorperazine (COMPAZINE) 10 MG tablet Take 1 tablet (10 mg) by mouth every 6 hours as needed for nausea or vomiting (Patient not taking: Reported on 1/26/2024) 30 tablet 2    sennosides (SENOKOT) 8.6 MG tablet       valACYclovir (VALTREX) 1000 mg tablet Take 1,000 mg by mouth as needed       Past Medical History:   Diagnosis Date    Female stress incontinence     Ovarian cancer, right (H) 06/16/2022    Stage IIIC high-grade serous carcinoma    Recurrent cold sores      Past Surgical History:   Procedure Laterality Date    APPENDECTOMY  08/29/2022    Part of ovarian cancer tumor debulking    BLADDER SUSPENSION  08/13/2009    HYSTERECTOMY  08/13/2009    For prolapse    IR PARACENTESIS  6/6/2022    IR PARACENTESIS  6/14/2022    IR PARACENTESIS  5/26/2023    LAPAROSCOPY DIAGNOSTIC (GYN)  06/16/2022    SALPINGO-OOPHORECTOMY, COMBINED Bilateral 08/29/2022    Pelvic exam under anesthesia, diagnostic laparoscopy, conversion to laparotomy for optimal interval tumor debulking to no gross residual  disease including peritoneal and diaphragm biopsies, bilateral salpingo-oophorectomy, infragastric omentectomy, bilateral hemidiaphragm stripping, peritoneal and mesenteric argon beam ablation, appendectomy.    TONSILLECTOMY  1973    TUBAL LIGATION Bilateral 1998       Physical Exam:   GENERAL APPEARANCE: healthy, alert and no distress; neatly groomed; tearful at points in our visit discussing her worries for the future and discussing completing an ACP.  EYES: Eyes grossly normal to inspection, PERRLA, conjunctivae and sclerae without injection or discharge, EOM intact   RESP:  no increased work of breathing; speaks in complete sentences;   MS: No musculoskeletal defects are noted  SKIN: No suspicious lesions or rashes, hydration status appears adequate with normal skin turgor   PSYCH: Alert and oriented x3; speech- coherent, normal rate and volume; able to articulate logical thoughts, able to abstract reason, no tangential thoughts, no hallucinations or delusions, mentation appears normal, Mood is euthymic. Affect is appropriate for this mood state and bright. Thought content is free of suicidal ideation, hallucinations, and delusions.  Eye contact is good during conversation.       Key Data Reviewed:  LABS: 2024- Cr 0.87, Albumin 3.9,  Hgb 11.3,   44 (stable)    IMAGIN/23/CT CAP W/CONTRAST  IMPRESSION:   1.  Small volume loculated ascites, in keeping with peritoneal carcinomatosis, slightly improved from prior.  2.  Slight interval decrease in size of a central pelvic nodule, which tethers adjacent bowel loops in the urinary bladder.  3.  Few sub-6 mm pulmonary nodules, unchanged from prior.  4.  Interval resolution of thrombus within the right brachiocephalic vein.  5.  Small nonobstructing right renal stone.    35 minutes spent on the date of the encounter doing chart review, history and exam, patient education & counseling, documentation and other activities as noted above.    Anish DEL CID  Porfirio GRIFFIN MS FAAFP CAQHPM  [facility]  Office [phone]  Fax [phone]

## 2024-02-21 NOTE — LETTER
2024       RE: Jacki Smith  [address] [facility] [address]       Dear Colleague,    Thank you for referring your patient, Jacki Smith, to the [facility]. Please see a copy of my visit note below.    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     Recommendations & Counseling:  Continue oncology care with Dr. Patino and the GYN Oncology team  Continue Hydroxyzine 5-20 mg po q 6 hours prn anxiety      OK to use ativan for severe anxiety but try hydroxyzine first  Will consider adding mirtazapine if the above isn't helpful     Michelle was directed to the [place] [facility] program for ACP documents and she is encouraged to complete them by there start of her doxil holiday in a few months.     Contact information provided for Alix richards palliative SW in the community via AVS.    Follow up in 2 months, sooner, prn     Counseling: All of the above was explained to the patient in lay language. The patient has verbalized a clear understanding of the discussion, asked appropriate questions, which have been answered to patient's apparent satisfaction. The patient is in agreement with the above plan.        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 She has not needed any paracentesis since the end of May.    Last Palliative care appointment: 2023 with me     Reviewed: yes, no concerns    Interim  History:  Jacki Smith is a 58 year old female who is seen today for follow up with Palliative Care via billable video visit. She's had a lingering cold for a month.  She will check with her PCP for next steps. She is tolerating her chemo and has two more cycles of doxil planned and then take a treatment break.     Pain:  was diagnosed with Raynaud's prior to her cancer and it isn't any worse with doxil; she strives to keep her hands and feet cold during her infusions.    Appetite/Nausea: weight stable, appetite is OK     Bowels: had a couple of problems with bowel obstructions and now seeing a dietician and it hasn't been a problem     Sleep: good response to hydroxyzine and uses ativan rarely     Mood: feels like she should see a counselor again; worries about the future;     Coping: overall OK but could use more emotional support    Family History- Reviewed in Epic.    No Known Allergies    Social History:  Pertinent changes to social history/social situation since last visit: none  Key support resources: family and friends  Advance Directive Status:  not completed and we discussed the need    Social History     Tobacco Use    Smoking status: Former     Packs/day: 1.00     Years: 42.00     Additional pack years: 0.00     Total pack years: 42.00     Types: Cigarettes     Quit date: 2022     Years since quittin.7    Smokeless tobacco: Never   Substance Use Topics    Alcohol use: Yes     Comment: Beer ~Q3 months.    Drug use: Never         No Known Allergies  Current Outpatient Medications   Medication Sig Dispense Refill    apixaban ANTICOAGULANT (ELIQUIS) 5 MG tablet Take 1 tablet (5 mg) by mouth 2 times daily 60 tablet 11    cyclobenzaprine (FLEXERIL) 5 MG tablet Take 1-2 Tablets (5-10 mg) by mouth three times a day.      diphenhydrAMINE-acetaminophen (TYLENOL PM)  MG tablet Take 1 tablet by mouth as needed      docusate sodium (DSS) 100 MG capsule Take 100 mg by mouth as needed       HYDROcodone-acetaminophen (NORCO) 5-325 MG tablet Take 1 tablet by mouth every 4 hours as needed      HYDROmorphone (DILAUDID) 2 MG tablet 1 tablet (Patient not taking: Reported on 1/26/2024)      hydrOXYzine (ATARAX) 10 MG tablet Take 1-2 tablets (10-20 mg) by mouth every 6 hours as needed for itching 120 tablet 3    lidocaine-prilocaine (EMLA) 2.5-2.5 % external cream Apply topically as needed (pain due to port access) Apply to port 30 minutes prior to lab appointment. 30 g 6    LORazepam (ATIVAN) 0.5 MG tablet Take 1 tablet (0.5 mg) by mouth every 6 hours as needed for anxiety (Patient not taking: Reported on 1/26/2024) 20 tablet 3    LORazepam (ATIVAN) 0.5 MG tablet Take 1-2 Tablets (0.5-1 mg) by mouth every 4 hours as needed for Anxiety (and or nausea and vomiting).* (Patient not taking: Reported on 1/26/2024)      Multiple Vitamin (MULTI-VITAMIN DAILY PO) Take 1 tablet by mouth daily      ondansetron (ZOFRAN) 8 MG tablet Take 1 tablet (8 mg) by mouth every 8 hours as needed for nausea (vomiting) (Patient not taking: Reported on 1/26/2024) 30 tablet 2    prochlorperazine (COMPAZINE) 10 MG tablet Take 1 tablet (10 mg) by mouth every 6 hours as needed for nausea or vomiting (Patient not taking: Reported on 1/26/2024) 30 tablet 2    sennosides (SENOKOT) 8.6 MG tablet       valACYclovir (VALTREX) 1000 mg tablet Take 1,000 mg by mouth as needed       Past Medical History:   Diagnosis Date    Female stress incontinence     Ovarian cancer, right (H) 06/16/2022    Stage IIIC high-grade serous carcinoma    Recurrent cold sores      Past Surgical History:   Procedure Laterality Date    APPENDECTOMY  08/29/2022    Part of ovarian cancer tumor debulking    BLADDER SUSPENSION  08/13/2009    HYSTERECTOMY  08/13/2009    For prolapse    IR PARACENTESIS  6/6/2022    IR PARACENTESIS  6/14/2022    IR PARACENTESIS  5/26/2023    LAPAROSCOPY DIAGNOSTIC (GYN)  06/16/2022    SALPINGO-OOPHORECTOMY, COMBINED Bilateral 08/29/2022     Pelvic exam under anesthesia, diagnostic laparoscopy, conversion to laparotomy for optimal interval tumor debulking to no gross residual disease including peritoneal and diaphragm biopsies, bilateral salpingo-oophorectomy, infragastric omentectomy, bilateral hemidiaphragm stripping, peritoneal and mesenteric argon beam ablation, appendectomy.    TONSILLECTOMY  1973    TUBAL LIGATION Bilateral 1998       Physical Exam:   GENERAL APPEARANCE: healthy, alert and no distress; neatly groomed; tearful at points in our visit discussing her worries for the future and discussing completing an ACP.  EYES: Eyes grossly normal to inspection, PERRLA, conjunctivae and sclerae without injection or discharge, EOM intact   RESP:  no increased work of breathing; speaks in complete sentences;   MS: No musculoskeletal defects are noted  SKIN: No suspicious lesions or rashes, hydration status appears adequate with normal skin turgor   PSYCH: Alert and oriented x3; speech- coherent, normal rate and volume; able to articulate logical thoughts, able to abstract reason, no tangential thoughts, no hallucinations or delusions, mentation appears normal, Mood is euthymic. Affect is appropriate for this mood state and bright. Thought content is free of suicidal ideation, hallucinations, and delusions.  Eye contact is good during conversation.       Key Data Reviewed:  LABS: 2024- Cr 0.87, Albumin 3.9,  Hgb 11.3,   44 (stable)    IMAGIN/23/CT CAP W/CONTRAST  IMPRESSION:   1.  Small volume loculated ascites, in keeping with peritoneal carcinomatosis, slightly improved from prior.  2.  Slight interval decrease in size of a central pelvic nodule, which tethers adjacent bowel loops in the urinary bladder.  3.  Few sub-6 mm pulmonary nodules, unchanged from prior.  4.  Interval resolution of thrombus within the right brachiocephalic vein.  5.  Small nonobstructing right renal stone.    35 minutes spent on the date of the encounter  doing chart review, history and exam, patient education & counseling, documentation and other activities as noted above.        Again, thank you for allowing me to participate in the care of your patient.      Sincerely,    Anish Monroy MD

## 2024-02-21 NOTE — PATIENT INSTRUCTIONS
It was good to see you today, Michelle.  I think you're doing well.    Here are the things we talked about:  No change in medications    Here is the contact information for [facility]:    [phone]; [facility] [employer]. [email or web address]          Someone from the team will reach out to schedule a follow up appointment in 2 months and I can see you sooner, if needed.       How to get a hold of us:  For non-urgent matters, MyChart works best.    For more urgent matters, or if you prefer not to use MyChart, call our clinic nurse coordinator Astrid Noble RN at [phone]    We have an on-call number for evenings and weekends. Please call this only if you are having uncontrolled symptoms or serious side effects from your medicines: [phone].     For refills, please give us a week (5 working days) notice. We don't always have providers available everyday to do refills. If you call the day you run out of your medicine, we may not be able to refill it in time, so call 5 days in advance!    Anish Monroy MD MS FAAFP CAQHPM  [facility]  Office [phone]  Fax [phone]

## 2024-02-21 NOTE — NURSING NOTE
Is the patient currently in the state of [place]? YES    Visit mode:VIDEO    If the visit is dropped, the patient can be reconnected by: VIDEO VISIT: Text to cell phone:   Telephone Information:   Mobile [phone]       Will anyone else be joining the visit? NO  (If patient encounters technical issues they should call [phone]    How would you like to obtain your AVS? MyChart    Are changes needed to the allergy or medication list? No    Reason for visit: RECHECK    Shakila BOOKER

## 2024-02-27 ENCOUNTER — LAB (OUTPATIENT)
Dept: INFUSION THERAPY | Facility: HOSPITAL | Age: 59
End: 2024-02-27
Attending: NURSE PRACTITIONER
Payer: COMMERCIAL

## 2024-02-27 VITALS — BODY MASS INDEX: 27.55 KG/M2 | WEIGHT: 150.6 LBS

## 2024-02-27 DIAGNOSIS — C56.1 OVARIAN CANCER, RIGHT (H): Primary | ICD-10-CM

## 2024-02-27 LAB
ALBUMIN SERPL BCG-MCNC: 3.9 G/DL (ref 3.5–5.2)
ALP SERPL-CCNC: 193 U/L (ref 40–150)
ALT SERPL W P-5'-P-CCNC: 19 U/L (ref 0–50)
ANION GAP SERPL CALCULATED.3IONS-SCNC: 8 MMOL/L (ref 7–15)
AST SERPL W P-5'-P-CCNC: 36 U/L (ref 0–45)
BASOPHILS # BLD AUTO: 0 10E3/UL (ref 0–0.2)
BASOPHILS NFR BLD AUTO: 1 %
BILIRUB SERPL-MCNC: 0.3 MG/DL
BUN SERPL-MCNC: 12.1 MG/DL (ref 6–20)
CALCIUM SERPL-MCNC: 10 MG/DL (ref 8.6–10)
CANCER AG125 SERPL-ACNC: 41 U/ML
CHLORIDE SERPL-SCNC: 101 MMOL/L (ref 98–107)
CREAT SERPL-MCNC: 0.94 MG/DL (ref 0.51–0.95)
DEPRECATED HCO3 PLAS-SCNC: 27 MMOL/L (ref 22–29)
EGFRCR SERPLBLD CKD-EPI 2021: 70 ML/MIN/1.73M2
EOSINOPHIL # BLD AUTO: 0.1 10E3/UL (ref 0–0.7)
EOSINOPHIL NFR BLD AUTO: 3 %
ERYTHROCYTE [DISTWIDTH] IN BLOOD BY AUTOMATED COUNT: 15.2 % (ref 10–15)
GLUCOSE SERPL-MCNC: 94 MG/DL (ref 70–99)
HCT VFR BLD AUTO: 33.7 % (ref 35–47)
HGB BLD-MCNC: 11.1 G/DL (ref 11.7–15.7)
IMM GRANULOCYTES # BLD: 0 10E3/UL
IMM GRANULOCYTES NFR BLD: 1 %
LYMPHOCYTES # BLD AUTO: 0.8 10E3/UL (ref 0.8–5.3)
LYMPHOCYTES NFR BLD AUTO: 25 %
MAGNESIUM SERPL-MCNC: 1.8 MG/DL (ref 1.7–2.3)
MCH RBC QN AUTO: 32.5 PG (ref 26.5–33)
MCHC RBC AUTO-ENTMCNC: 32.9 G/DL (ref 31.5–36.5)
MCV RBC AUTO: 99 FL (ref 78–100)
MONOCYTES # BLD AUTO: 0.5 10E3/UL (ref 0–1.3)
MONOCYTES NFR BLD AUTO: 14 %
NEUTROPHILS # BLD AUTO: 2 10E3/UL (ref 1.6–8.3)
NEUTROPHILS NFR BLD AUTO: 56 %
NRBC # BLD AUTO: 0 10E3/UL
NRBC BLD AUTO-RTO: 0 /100
PLATELET # BLD AUTO: 169 10E3/UL (ref 150–450)
POTASSIUM SERPL-SCNC: 4.1 MMOL/L (ref 3.4–5.3)
PROT SERPL-MCNC: 6.7 G/DL (ref 6.4–8.3)
RBC # BLD AUTO: 3.42 10E6/UL (ref 3.8–5.2)
SODIUM SERPL-SCNC: 136 MMOL/L (ref 135–145)
WBC # BLD AUTO: 3.4 10E3/UL (ref 4–11)

## 2024-02-27 PROCEDURE — 85025 COMPLETE CBC W/AUTO DIFF WBC: CPT | Performed by: OBSTETRICS & GYNECOLOGY

## 2024-02-27 PROCEDURE — 86304 IMMUNOASSAY TUMOR CA 125: CPT | Performed by: OBSTETRICS & GYNECOLOGY

## 2024-02-27 PROCEDURE — 80053 COMPREHEN METABOLIC PANEL: CPT | Performed by: OBSTETRICS & GYNECOLOGY

## 2024-02-27 PROCEDURE — 83735 ASSAY OF MAGNESIUM: CPT | Performed by: OBSTETRICS & GYNECOLOGY

## 2024-02-27 PROCEDURE — 36415 COLL VENOUS BLD VENIPUNCTURE: CPT | Performed by: OBSTETRICS & GYNECOLOGY

## 2024-02-28 ENCOUNTER — VIRTUAL VISIT (OUTPATIENT)
Dept: ONCOLOGY | Facility: CLINIC | Age: 59
End: 2024-02-28
Attending: OBSTETRICS & GYNECOLOGY
Payer: COMMERCIAL

## 2024-02-28 VITALS — WEIGHT: 150.6 LBS | BODY MASS INDEX: 27.71 KG/M2 | HEIGHT: 62 IN

## 2024-02-28 DIAGNOSIS — Z51.81 ENCOUNTER FOR THERAPEUTIC DRUG MONITORING: ICD-10-CM

## 2024-02-28 DIAGNOSIS — C56.1 OVARIAN CANCER, RIGHT (H): Primary | ICD-10-CM

## 2024-02-28 DIAGNOSIS — Z51.11 ENCOUNTER FOR ANTINEOPLASTIC CHEMOTHERAPY: ICD-10-CM

## 2024-02-28 PROCEDURE — G2211 COMPLEX E/M VISIT ADD ON: HCPCS | Mod: 95 | Performed by: NURSE PRACTITIONER

## 2024-02-28 PROCEDURE — 99213 OFFICE O/P EST LOW 20 MIN: CPT | Mod: 95 | Performed by: NURSE PRACTITIONER

## 2024-02-28 RX ORDER — PALONOSETRON 0.05 MG/ML
0.25 INJECTION, SOLUTION INTRAVENOUS ONCE
Status: CANCELLED | OUTPATIENT
Start: 2024-02-28

## 2024-02-28 RX ORDER — METHYLPREDNISOLONE SODIUM SUCCINATE 125 MG/2ML
125 INJECTION, POWDER, LYOPHILIZED, FOR SOLUTION INTRAMUSCULAR; INTRAVENOUS
Status: CANCELLED | OUTPATIENT
Start: 2024-02-28

## 2024-02-28 RX ORDER — DIPHENHYDRAMINE HYDROCHLORIDE 50 MG/ML
50 INJECTION INTRAMUSCULAR; INTRAVENOUS
Status: CANCELLED | OUTPATIENT
Start: 2024-02-28

## 2024-02-28 RX ORDER — HEPARIN SODIUM,PORCINE 10 UNIT/ML
5 VIAL (ML) INTRAVENOUS
Status: CANCELLED | OUTPATIENT
Start: 2024-02-28

## 2024-02-28 RX ORDER — HEPARIN SODIUM (PORCINE) LOCK FLUSH IV SOLN 100 UNIT/ML 100 UNIT/ML
5 SOLUTION INTRAVENOUS
Status: CANCELLED | OUTPATIENT
Start: 2024-02-28

## 2024-02-28 RX ORDER — ALBUTEROL SULFATE 0.83 MG/ML
2.5 SOLUTION RESPIRATORY (INHALATION)
Status: CANCELLED | OUTPATIENT
Start: 2024-02-28

## 2024-02-28 RX ORDER — ALBUTEROL SULFATE 90 UG/1
1-2 AEROSOL, METERED RESPIRATORY (INHALATION)
Status: CANCELLED | OUTPATIENT
Start: 2024-02-28

## 2024-02-28 RX ORDER — EPINEPHRINE 1 MG/ML
0.3 INJECTION, SOLUTION INTRAMUSCULAR; SUBCUTANEOUS EVERY 5 MIN PRN
Status: CANCELLED | OUTPATIENT
Start: 2024-02-28

## 2024-02-28 RX ORDER — MEPERIDINE HYDROCHLORIDE 25 MG/ML
25 INJECTION INTRAMUSCULAR; INTRAVENOUS; SUBCUTANEOUS EVERY 30 MIN PRN
Status: CANCELLED | OUTPATIENT
Start: 2024-02-28

## 2024-02-28 NOTE — LETTER
2024         RE: Jacki Smith  [address] [facility] [facility] [address]        Dear Colleague,    Thank you for referring your patient, Jacki Smith, to the [facility]. Please see a copy of my visit note below.    Virtual Visit Details    Type of service:  Video Visit     Originating Location (pt. Location): Home    Distant Location (provider location):  On-site  Platform used for Video Visit: Well                           Follow Up Notes on Referred Patient    Date: 2024        RE: Jacki Smith  : 1965  RUDDY: 2024      Jacki Smith is a 58 year old woman with a diagnosis of progressive Stage IIIC high-grade serous carcinoma of the right ovary. She is here today for follow up and disease management by video.    Oncology history:     22: Presented to an ED in [place] for evaluation of abdominal bloating and discomfort.  -Abdomen, pelvic CT: Radiographic Stage CAL ovarian cancer.   22:  439.   22: Pelvic ultrasound: c/w metastatic cancer. I have personally reviewed the ultrasound images.   22: Pelvic exam under anesthesia, diagnostic laparoscopy, biopsies, evacuation of ascites.   -Findings: On pelvic exam under anesthesia, there was a 6 cm firm, fixed mass adherent to the vaginal cuff. Vagina otherwise smooth. On laparoscopic examination, there was ascites filling the pelvis/abdomen, with >1 liter of fluid evacuated. The palpated mass was arising from the right ovary. Left ovary relatively normal in appearance. There was diffuse carcinomatosis covering the entire peritoneal surface falciform ligament, liver capsule, and mesentery. The omentum was replaced with tumor. Findings were not amenable to optimal tumor debulking so biopsies were taken for histologic examination.   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      22, 22, 22:  Cycles 1-3 of neoadjuvant chemotherapy with IV carboplatin + paclitaxel 175 mg/m2 every 21 days.   -Cycles 1,2: Carboplatin AUC 6.   -Cycle 3: Carboplatin AUC 5 with dose-reduction due to thrombcytopenia.   -8/19/22: Chest, abdomen, pelvic CT: Partial response.      8/29/22: Pelvic exam under anesthesia, diagnostic laparoscopy, conversion to laparotomy for optimal interval tumor debulking to no gross residual disease including peritoneal and diaphragm biopsies, bilateral salpingo-oophorectomy, infragastric omentectomy, bilateral hemidiaphragm stripping, peritoneal and mesenteric argon beam ablation, appendectomy.   -Pathology: High-grade serous carcinoma involving all resected specimens.     9/28/22, 10/19/22, 11/17/22: Cycles 4-6 of primary chemotherapy with IV carboplatin AUC 5 + paclitaxel 175 mg/m2.  -12/2/22: Chest, abdomen, pelvic CT: No definitive evidence of disease.   -12/7/22: CA-125=23.   -2/16/23: Chest, abdomen, pelvic CT to evaluate bloating: Stable findings, no definitive evidence of disease.  -5/25/23: Admitted  to [facility] for management of malignant ascites s/p therapeutic paracentesis, and partial SBO resolved with conservative management.   5/25/23: Abdomen, pelvic CT: Progressive disease.   LOWER CHEST: OMAIRA.   BOWEL: Development of moderate malignant ascites with small peritoneal and serosal implants present. There is mild distention of a few loops of small bowel within the right side of the abdomen with regions of narrowing present compatible with an early or partial small bowel obstruction. Wall thickening in a few of the involved segments with fecalization of the internal contents of the small bowel compatible with stasis.   PELVIC ORGANS: Lobulated 4.5 cm soft tissue mass in the dependent pelvis in the expected location of the uterus.  -6/13/23:  78.     6/13/23, 7/12/23, 8/14/23, 9/13/23: Cycles 1-4 of second-line chemotherapy with IV carboplatin AUC 5 + pegylated liposomal  doxorubicin (PLD) 30 mg/m2 every 28 days.      Plan: 4 more cycles     9/27/23: CT cap IMPRESSION:  Chest:  1.  Small thrombus at the tip of the right chest port at the brachiocephalic/SVC confluence versus less likely fibrin sheath.  2.  Stable 4 mm subpleural nodule in the right middle lobe. No new or enlarging pulmonary nodules.     Abdomen and pelvis:  1.  Slightly decreased size of the pelvic mass measuring 3 cm with interlobular and calcification suggestive of favorable response to treatment.  2.  Persistent but marginally decreased volume of the malignant ascites. Persistent manifestations of peritoneal carcinomatosis.     10/11/23: Cycle 5  carboplatin AUC 5 + pegylated liposomal doxorubicin (PLD) 30 mg/m2 every 28 days.  11/7/23: Cycle 6 carboplatin AUC 5 + pegylated liposomal doxorubicin (PLD) 30 mg/m2 every 28 days planned for 11/8.  43.  11/14-11/16/23: Admission for pSBO; did not need NG  -CT ap IMPRESSION:  1.  Mild small bowel dilatation with transition point in the central abdomen and CT position of upstream bowel loops compatible with small bowel obstruction.   2.  Redemonstrated findings of peritoneal carcinomatosis. Slight decrease in small to moderate volume partially loculated ascites. Decreased size of pelvic nodule.   12/5/23: Cycle 7 carboplatin AUC 5 + pegylated liposomal doxorubicin (PLD) 30 mg/m2 every 28 days planned for 12/6.  42.  1/2/24: Cycle 8 carboplatin AUC 5 + pegylated liposomal doxorubicin (PLD) 30 mg/m2 every 28 days planned for 1/3.  45  1/23/24: CT cap IMPRESSION:   1.  Small volume loculated ascites, in keeping with peritoneal carcinomatosis, slightly improved from prior.  2.  Slight interval decrease in size of a central pelvic nodule, which tethers adjacent bowel loops in the urinary bladder.  3.  Few sub-6 mm pulmonary nodules, unchanged from prior.  4.  Interval resolution of thrombus within the right brachiocephalic vein.  5.  Small nonobstructing  right renal stone.    Plan: total of 11 cycles then CT    2/1/24: Cycle 9 carboplatin AUC 5 + pegylated liposomal doxorubicin (PLD) 30 mg/m2.  44.  2/28/24: Cycle 10 carboplatin AUC 5 + pegylated liposomal doxorubicin (PLD) 30 mg/m2  planned for 2/29.  41.    Genetic/Genomic/Molecular Testing History:  10/5/22: [facility], reflexed to Common Hereditary Cancer Panel.   -No identifiable germline variants identified in ABRAXAS1, AKT1, APC, DEION, AXIN2, BARD1, BMPR1A, BRCA1, BRCA2, BRIP1, CDC73, CDH1, CDK4, CDKN2A, CHEK2, CTNNA1, DICER1, EPCAM, FANCC, FANCM, GREM1, HOXB13, KIT, MEN1, MLH1, MRE11, MSH2, MSH6, MUTYH, NBN, NF1, NTHL1, PALB2, PDGFRA, PIK3CA, PMS2, POLD1, POLE, PTEN, RAD50, RAD51C, RAD51D, RECQL, RINT1, SDHA, SDHB, SDHC, SDHD, SMAD4, SMARCA4, STK11, TP53, TSC1, TSC2, VHL, XRCC2.   -Variant of uncertain significance in MSH3 c.16C>T (p.Hqv3Mbp)    10/9/22 (tumor 8/29/22): [facility] Testing Results.  -Genomic ROXI low (6%)   -TMB low (1mut/Mb)  -Microsatellite stable/Mismatch Repair proficient  -PD-L1- (CPS 0%)  -NTRK 1/2/3 fusion not detected.   -ER-, NH+  -Genomic alterations in:  --TP53  -No genomic alterations in BRCA1,2.     Jamila-ovary clinical trial screening: Negative for the immunoreactive subtype.      6/19/23 (tumor 8/29/22): [facility] Test results.   -FOLR1+ (2+, 75%).            Today she reports the following:     -Has been fighting a cold for over a month; was tested for flu/covid (negative); feeling better now; did have a cough; no fevers  -Nausea: little bit the first week afterwards; takes Compazine or Zofran (after 3 day); no emesis; feels queezy  -Bowels:takes stool softener 1 BID every day; Miralax prn  -Hydration: drinking at least 64 oz water; 2 cups coffee  -Skin: denies any issues; no redness/peeling  -Chest pain: denies  -SOB: denies; only DANIELLE when had chest congesting  -Leg swelling: denies  -Fevers/chills: deneis  -Abdominal pain/bloating: denies  -Eliquis stopped  after  port and blood clot removed  -Ataraz 1-2 times per week for anxiety        Review of Systems  See HPI      Past Medical History:    Past Medical History:   Diagnosis Date     Female stress incontinence      Ovarian cancer, right (H) 06/16/2022    Stage IIIC high-grade serous carcinoma     Recurrent cold sores          Past Surgical History:    Past Surgical History:   Procedure Laterality Date     APPENDECTOMY  08/29/2022    Part of ovarian cancer tumor debulking     BLADDER SUSPENSION  08/13/2009     HYSTERECTOMY  08/13/2009    For prolapse     IR PARACENTESIS  6/6/2022     IR PARACENTESIS  6/14/2022     IR PARACENTESIS  5/26/2023     LAPAROSCOPY DIAGNOSTIC (GYN)  06/16/2022     SALPINGO-OOPHORECTOMY, COMBINED Bilateral 08/29/2022    Pelvic exam under anesthesia, diagnostic laparoscopy, conversion to laparotomy for optimal interval tumor debulking to no gross residual disease including peritoneal and diaphragm biopsies, bilateral salpingo-oophorectomy, infragastric omentectomy, bilateral hemidiaphragm stripping, peritoneal and mesenteric argon beam ablation, appendectomy.     TONSILLECTOMY  1973     TUBAL LIGATION Bilateral 09/01/1998       Current Medications:     Current Outpatient Medications   Medication Sig Dispense Refill     cyclobenzaprine (FLEXERIL) 5 MG tablet Take 1-2 Tablets (5-10 mg) by mouth three times a day.       diphenhydrAMINE-acetaminophen (TYLENOL PM)  MG tablet Take 1 tablet by mouth as needed       docusate sodium (DSS) 100 MG capsule Take 100 mg by mouth as needed       HYDROcodone-acetaminophen (NORCO) 5-325 MG tablet Take 1 tablet by mouth every 4 hours as needed       hydrOXYzine (ATARAX) 10 MG tablet Take 1-2 tablets (10-20 mg) by mouth every 6 hours as needed for itching 120 tablet 3     lidocaine-prilocaine (EMLA) 2.5-2.5 % external cream Apply topically as needed (pain due to port access) Apply to port 30 minutes prior to lab appointment. 30 g 6     Multiple Vitamin  "(MULTI-VITAMIN DAILY PO) Take 1 tablet by mouth daily       sennosides (SENOKOT) 8.6 MG tablet        valACYclovir (VALTREX) 1000 mg tablet Take 1,000 mg by mouth as needed       ondansetron (ZOFRAN) 8 MG tablet Take 1 tablet (8 mg) by mouth every 8 hours as needed for nausea (vomiting) (Patient not taking: Reported on 2024) 30 tablet 2     prochlorperazine (COMPAZINE) 10 MG tablet Take 1 tablet (10 mg) by mouth every 6 hours as needed for nausea or vomiting (Patient not taking: Reported on 2024) 30 tablet 2         Allergies:      No Known Allergies     Social History:     Social History     Tobacco Use     Smoking status: Former     Packs/day: 1.00     Years: 42.00     Additional pack years: 0.00     Total pack years: 42.00     Types: Cigarettes     Quit date: 2022     Years since quittin.7     Smokeless tobacco: Never   Substance Use Topics     Alcohol use: Yes     Comment: Beer ~Q3 months.       History   Drug Use Unknown         Family History:     The patient's family history is notable for     Family History   Problem Relation Age of Onset     Prostate Cancer Father      Breast Cancer Sister 48     Melanoma Sister      Skin Cancer Sister      Colon Cancer Maternal Grandmother          Physical Exam:     Ht 1.575 m (5' 2\")   Wt 68.3 kg (150 lb 9.6 oz)   BMI 27.55 kg/m    Body mass index is 27.55 kg/m .    General Appearance: healthy and alert, no distress    Eyes:  Eyes grossly normal to inspection.  No discharge or erythema, or obvious scleral/conjunctival abnormalities.    Respiratory: No audible wheeze, cough, or visible cyanosis.  No visible retractions or increased work of breathing.     Musculoskeletal: extremities non tender and without edema    Skin: no lesions or rashes on visible skin    Neurological: normal gait, no gross defects     Psychiatric: appropriate mood and affect. Mentation appears normal, affect normal/bright, judgement and insight intact, normal speech and " appearance well-groomed                            The rest of a comprehensive physical examination is deferred due to video visit restrictions.      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   She is here today for follow up and disease management by video..     22 minutes spent on the date of the encounter doing chart review, history and exam, documentation and further activities as noted above      Plan:     1.)      Reviewed CBC, CMP, Mg, and Ok to proceed with planned treatment. She will return in 4 weeks for her next cycle, this is already scheduled (and labs have been signed as she gets these done the day prior). She will then have imaging and see MD; this is already scheduled.Reviewed skin care precautions while  on Doxil; continue to monitor   Reviewed signs and symptoms for when she should contact the clinic or seek additional care. Patient to contact the clinic with any questions or concerns in the interim.  Answered all of her questions to the best of my ability.    The longitudinal plan of care for the diagnosis(es)/condition(s) as documented were addressed during this visit. Due to the added complexity in care, I will continue to support Michelle in the subsequent management and with ongoing continuity of care.       2.) Genetic risk factors were assessed and she has a VUS No identifiable germline variants identified in ABRAXAS1, AKT1, APC, DEION, AXIN2, BARD1, BMPR1A, BRCA1, BRCA2, BRIP1, CDC73, CDH1, CDK4, CDKN2A, CHEK2, CTNNA1, DICER1, EPCAM, FANCC, FANCM, GREM1, HOXB13, KIT, MEN1, MLH1, MRE11, MSH2, MSH6, MUTYH, NBN, NF1, NTHL1, PALB2, PDGFRA, PIK3CA, PMS2, POLD1, POLE, PTEN, RAD50, RAD51C, RAD51D, RECQL, RINT1, SDHA, SDHB, SDHC, SDHD, SMAD4, SMARCA4, STK11, TP53, TSC1, TSC2, VHL, XRCC2.      3.) Labs and/or tests ordered include:  CBC,CMP,Mg, .     4.) Health maintenance issues addressed  today include annual health maintenance and non-gynecologic issues with PCP.    JALEN Zamarripa, WHNP-BC, ANP-BC, AOCNP  Women's Health Nurse Practitioner  Adult Nurse Practitioner  Division of Gynecologic Oncology        CC  Patient Care Team:  Domenica Christianson MD as PCP - General  [facility], Charis Meadows MD as MD (Gynecologic Oncology)  Susannah Roman APRN CNP as Assigned Cancer Care Provider  Anish Monroy MD as Assigned Palliative Care Provider  Pratima Vega RN as Specialty Care Coordinator (Hematology & Oncology)      Again, thank you for allowing me to participate in the care of your patient.        Sincerely,        JALEN Owen CNP

## 2024-02-28 NOTE — PROGRESS NOTES
Virtual Visit Details    Type of service:  Video Visit     Originating Location (pt. 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 She is here today for follow up and disease management by video.    Oncology history:     22: Presented to an ED in [place] for evaluation of abdominal bloating and discomfort.  -Abdomen, pelvic CT: Radiographic Stage CAL ovarian cancer.   22:  439.   22: Pelvic ultrasound: c/w metastatic cancer. I have personally reviewed the ultrasound images.   22: Pelvic exam under anesthesia, diagnostic laparoscopy, biopsies, evacuation of ascites.   -Findings: On pelvic exam under anesthesia, there was a 6 cm firm, fixed mass adherent to the vaginal cuff. Vagina otherwise smooth. On laparoscopic examination, there was ascites filling the pelvis/abdomen, with >1 liter of fluid evacuated. The palpated mass was arising from the right ovary. Left ovary relatively normal in appearance. There was diffuse carcinomatosis covering the entire peritoneal surface falciform ligament, liver capsule, and mesentery. The omentum was replaced with tumor. Findings were not amenable to optimal tumor debulking so biopsies were taken for histologic examination.   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      22, 22, 22: Cycles 1-3 of neoadjuvant chemotherapy with IV carboplatin + paclitaxel 175 mg/m2 every 21 days.   -Cycles 1,2: Carboplatin AUC 6.   -Cycle 3: Carboplatin AUC 5 with dose-reduction due to thrombcytopenia.   -22: Chest, abdomen, pelvic CT: Partial response.       8/29/22: Pelvic exam under anesthesia, diagnostic laparoscopy, conversion to laparotomy for optimal interval tumor debulking to no gross residual disease including peritoneal and diaphragm biopsies, bilateral salpingo-oophorectomy, infragastric omentectomy, bilateral hemidiaphragm stripping, peritoneal and mesenteric argon beam ablation, appendectomy.   -Pathology: High-grade serous carcinoma involving all resected specimens.     9/28/22, 10/19/22, 11/17/22: Cycles 4-6 of primary chemotherapy with IV carboplatin AUC 5 + paclitaxel 175 mg/m2.  -12/2/22: Chest, abdomen, pelvic CT: No definitive evidence of disease.   -12/7/22: CA-125=23.   -2/16/23: Chest, abdomen, pelvic CT to evaluate bloating: Stable findings, no definitive evidence of disease.  -5/25/23: Admitted  to [facility] for management of malignant ascites s/p therapeutic paracentesis, and partial SBO resolved with conservative management.   5/25/23: Abdomen, pelvic CT: Progressive disease.   LOWER CHEST: OMAIRA.   BOWEL: Development of moderate malignant ascites with small peritoneal and serosal implants present. There is mild distention of a few loops of small bowel within the right side of the abdomen with regions of narrowing present compatible with an early or partial small bowel obstruction. Wall thickening in a few of the involved segments with fecalization of the internal contents of the small bowel compatible with stasis.   PELVIC ORGANS: Lobulated 4.5 cm soft tissue mass in the dependent pelvis in the expected location of the uterus.  -6/13/23:  78.     6/13/23, 7/12/23, 8/14/23, 9/13/23: Cycles 1-4 of second-line chemotherapy with IV carboplatin AUC 5 + pegylated liposomal doxorubicin (PLD) 30 mg/m2 every 28 days.      Plan: 4 more cycles     9/27/23: CT cap IMPRESSION:  Chest:  1.  Small thrombus at the tip of the right chest port at the brachiocephalic/SVC confluence versus less likely fibrin sheath.  2.  Stable 4 mm subpleural  nodule in the right middle lobe. No new or enlarging pulmonary nodules.     Abdomen and pelvis:  1.  Slightly decreased size of the pelvic mass measuring 3 cm with interlobular and calcification suggestive of favorable response to treatment.  2.  Persistent but marginally decreased volume of the malignant ascites. Persistent manifestations of peritoneal carcinomatosis.     10/11/23: Cycle 5  carboplatin AUC 5 + pegylated liposomal doxorubicin (PLD) 30 mg/m2 every 28 days.  11/7/23: Cycle 6 carboplatin AUC 5 + pegylated liposomal doxorubicin (PLD) 30 mg/m2 every 28 days planned for 11/8.  43.  11/14-11/16/23: Admission for pSBO; did not need NG  -CT ap IMPRESSION:  1.  Mild small bowel dilatation with transition point in the central abdomen and CT position of upstream bowel loops compatible with small bowel obstruction.   2.  Redemonstrated findings of peritoneal carcinomatosis. Slight decrease in small to moderate volume partially loculated ascites. Decreased size of pelvic nodule.   12/5/23: Cycle 7 carboplatin AUC 5 + pegylated liposomal doxorubicin (PLD) 30 mg/m2 every 28 days planned for 12/6.  42.  1/2/24: Cycle 8 carboplatin AUC 5 + pegylated liposomal doxorubicin (PLD) 30 mg/m2 every 28 days planned for 1/3.  45  1/23/24: CT cap IMPRESSION:   1.  Small volume loculated ascites, in keeping with peritoneal carcinomatosis, slightly improved from prior.  2.  Slight interval decrease in size of a central pelvic nodule, which tethers adjacent bowel loops in the urinary bladder.  3.  Few sub-6 mm pulmonary nodules, unchanged from prior.  4.  Interval resolution of thrombus within the right brachiocephalic vein.  5.  Small nonobstructing right renal stone.    Plan: total of 11 cycles then CT    2/1/24: Cycle 9 carboplatin AUC 5 + pegylated liposomal doxorubicin (PLD) 30 mg/m2.  44.  2/28/24: Cycle 10 carboplatin AUC 5 + pegylated liposomal doxorubicin (PLD) 30 mg/m2  planned for 2/29.   41.    Genetic/Genomic/Molecular Testing History:  10/5/22: [facility], reflexed to Common Hereditary Cancer Panel.   -No identifiable germline variants identified in ABRAXAS1, AKT1, APC, DEION, AXIN2, BARD1, BMPR1A, BRCA1, BRCA2, BRIP1, CDC73, CDH1, CDK4, CDKN2A, CHEK2, CTNNA1, DICER1, EPCAM, FANCC, FANCM, GREM1, HOXB13, KIT, MEN1, MLH1, MRE11, MSH2, MSH6, MUTYH, NBN, NF1, NTHL1, PALB2, PDGFRA, PIK3CA, PMS2, POLD1, POLE, PTEN, RAD50, RAD51C, RAD51D, RECQL, RINT1, SDHA, SDHB, SDHC, SDHD, SMAD4, SMARCA4, STK11, TP53, TSC1, TSC2, VHL, XRCC2.   -Variant of uncertain significance in MSH3 c.16C>T (p.Olh2Mdx)    10/9/22 (tumor 8/29/22): [facility] Testing Results.  -Genomic ROXI low (6%)   -TMB low (1mut/Mb)  -Microsatellite stable/Mismatch Repair proficient  -PD-L1- (CPS 0%)  -NTRK 1/2/3 fusion not detected.   -ER-, OH+  -Genomic alterations in:  --TP53  -No genomic alterations in BRCA1,2.     Jamlia-ovary clinical trial screening: Negative for the immunoreactive subtype.      6/19/23 (tumor 8/29/22): [facility] Test results.   -FOLR1+ (2+, 75%).            Today she reports the following:     -Has been fighting a cold for over a month; was tested for flu/covid (negative); feeling better now; did have a cough; no fevers  -Nausea: little bit the first week afterwards; takes Compazine or Zofran (after 3 day); no emesis; feels queezy  -Bowels:takes stool softener 1 BID every day; Miralax prn  -Hydration: drinking at least 64 oz water; 2 cups coffee  -Skin: denies any issues; no redness/peeling  -Chest pain: denies  -SOB: denies; only DANIELLE when had chest congesting  -Leg swelling: denies  -Fevers/chills: deneis  -Abdominal pain/bloating: denies  -Eliquis stopped  after port and blood clot removed  -Ataraz 1-2 times per week for anxiety        Review of Systems  See HPI      Past Medical History:    Past Medical History:   Diagnosis Date    Female stress incontinence     Ovarian cancer, right (H) 06/16/2022    Stage IIIC  high-grade serous carcinoma    Recurrent cold sores          Past Surgical History:    Past Surgical History:   Procedure Laterality Date    APPENDECTOMY  08/29/2022    Part of ovarian cancer tumor debulking    BLADDER SUSPENSION  08/13/2009    HYSTERECTOMY  08/13/2009    For prolapse    IR PARACENTESIS  6/6/2022    IR PARACENTESIS  6/14/2022    IR PARACENTESIS  5/26/2023    LAPAROSCOPY DIAGNOSTIC (GYN)  06/16/2022    SALPINGO-OOPHORECTOMY, COMBINED Bilateral 08/29/2022    Pelvic exam under anesthesia, diagnostic laparoscopy, conversion to laparotomy for optimal interval tumor debulking to no gross residual disease including peritoneal and diaphragm biopsies, bilateral salpingo-oophorectomy, infragastric omentectomy, bilateral hemidiaphragm stripping, peritoneal and mesenteric argon beam ablation, appendectomy.    TONSILLECTOMY  1973    TUBAL LIGATION Bilateral 09/01/1998       Current Medications:     Current Outpatient Medications   Medication Sig Dispense Refill    cyclobenzaprine (FLEXERIL) 5 MG tablet Take 1-2 Tablets (5-10 mg) by mouth three times a day.      diphenhydrAMINE-acetaminophen (TYLENOL PM)  MG tablet Take 1 tablet by mouth as needed      docusate sodium (DSS) 100 MG capsule Take 100 mg by mouth as needed      HYDROcodone-acetaminophen (NORCO) 5-325 MG tablet Take 1 tablet by mouth every 4 hours as needed      hydrOXYzine (ATARAX) 10 MG tablet Take 1-2 tablets (10-20 mg) by mouth every 6 hours as needed for itching 120 tablet 3    lidocaine-prilocaine (EMLA) 2.5-2.5 % external cream Apply topically as needed (pain due to port access) Apply to port 30 minutes prior to lab appointment. 30 g 6    Multiple Vitamin (MULTI-VITAMIN DAILY PO) Take 1 tablet by mouth daily      sennosides (SENOKOT) 8.6 MG tablet       valACYclovir (VALTREX) 1000 mg tablet Take 1,000 mg by mouth as needed      ondansetron (ZOFRAN) 8 MG tablet Take 1 tablet (8 mg) by mouth every 8 hours as needed for nausea (vomiting)  "(Patient not taking: Reported on 2024) 30 tablet 2    prochlorperazine (COMPAZINE) 10 MG tablet Take 1 tablet (10 mg) by mouth every 6 hours as needed for nausea or vomiting (Patient not taking: Reported on 2024) 30 tablet 2         Allergies:      No Known Allergies     Social History:     Social History     Tobacco Use    Smoking status: Former     Packs/day: 1.00     Years: 42.00     Additional pack years: 0.00     Total pack years: 42.00     Types: Cigarettes     Quit date: 2022     Years since quittin.7    Smokeless tobacco: Never   Substance Use Topics    Alcohol use: Yes     Comment: Beer ~Q3 months.       History   Drug Use Unknown         Family History:     The patient's family history is notable for     Family History   Problem Relation Age of Onset    Prostate Cancer Father     Breast Cancer Sister 48    Melanoma Sister     Skin Cancer Sister     Colon Cancer Maternal Grandmother          Physical Exam:     Ht 1.575 m (5' 2\")   Wt 68.3 kg (150 lb 9.6 oz)   BMI 27.55 kg/m    Body mass index is 27.55 kg/m .    General Appearance: healthy and alert, no distress    Eyes:  Eyes grossly normal to inspection.  No discharge or erythema, or obvious scleral/conjunctival abnormalities.    Respiratory: No audible wheeze, cough, or visible cyanosis.  No visible retractions or increased work of breathing.     Musculoskeletal: extremities non tender and without edema    Skin: no lesions or rashes on visible skin    Neurological: normal gait, no gross defects     Psychiatric: appropriate mood and affect. Mentation appears normal, affect normal/bright, judgement and insight intact, normal speech and appearance well-groomed                            The rest of a comprehensive physical examination is deferred due to video visit restrictions.      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   She " is here today for follow up and disease management by video..     22 minutes spent on the date of the encounter doing chart review, history and exam, documentation and further activities as noted above      Plan:     1.)      Reviewed CBC, CMP, Mg, and Ok to proceed with planned treatment. She will return in 4 weeks for her next cycle, this is already scheduled (and labs have been signed as she gets these done the day prior). She will then have imaging and see MD; this is already scheduled.Reviewed skin care precautions while  on Doxil; continue to monitor   Reviewed signs and symptoms for when she should contact the clinic or seek additional care. Patient to contact the clinic with any questions or concerns in the interim.  Answered all of her questions to the best of my ability.    The longitudinal plan of care for the diagnosis(es)/condition(s) as documented were addressed during this visit. Due to the added complexity in care, I will continue to support Michelle in the subsequent management and with ongoing continuity of care.       2.) Genetic risk factors were assessed and she has a VUS No identifiable germline variants identified in ABRAXAS1, AKT1, APC, DEION, AXIN2, BARD1, BMPR1A, BRCA1, BRCA2, BRIP1, CDC73, CDH1, CDK4, CDKN2A, CHEK2, CTNNA1, DICER1, EPCAM, FANCC, FANCM, GREM1, HOXB13, KIT, MEN1, MLH1, MRE11, MSH2, MSH6, MUTYH, NBN, NF1, NTHL1, PALB2, PDGFRA, PIK3CA, PMS2, POLD1, POLE, PTEN, RAD50, RAD51C, RAD51D, RECQL, RINT1, SDHA, SDHB, SDHC, SDHD, SMAD4, SMARCA4, STK11, TP53, TSC1, TSC2, VHL, XRCC2.      3.) Labs and/or tests ordered include:  CBC,CMP,Mg, .     4.) Health maintenance issues addressed today include annual health maintenance and non-gynecologic issues with PCP.    JALEN Zamarripa, WHNP-BC, ANP-BC, AOCNP  Women's Health Nurse Practitioner  Adult Nurse Practitioner  Division of Gynecologic Oncology        CC  Patient Care Team:  Domenica Christianson MD as PCP - General  Charis Patino  MD Dori as MD (Gynecologic Oncology)  Susannah Roman APRN CNP as Assigned Cancer Care Provider  Anish Monroy MD as Assigned Palliative Care Provider  Pratima Vega RN as Specialty Care Coordinator (Hematology & Oncology)

## 2024-02-28 NOTE — NURSING NOTE
Is the patient currently in the state of [place]? YES    Visit mode:VIDEO    If the visit is dropped, the patient can be reconnected by: TELEPHONE VISIT: Phone number: [phone]    Will anyone else be joining the visit? NO  (If patient encounters technical issues they should call [phone])    How would you like to obtain your AVS? MyChart    Are changes needed to the allergy or medication list? No    Reason for visit: RECHECK    Aminah BOOKER

## 2024-02-29 ENCOUNTER — INFUSION THERAPY VISIT (OUTPATIENT)
Dept: INFUSION THERAPY | Facility: HOSPITAL | Age: 59
End: 2024-02-29
Attending: OBSTETRICS & GYNECOLOGY
Payer: COMMERCIAL

## 2024-02-29 VITALS
TEMPERATURE: 97.5 F | SYSTOLIC BLOOD PRESSURE: 121 MMHG | OXYGEN SATURATION: 99 % | RESPIRATION RATE: 18 BRPM | HEART RATE: 69 BPM | DIASTOLIC BLOOD PRESSURE: 92 MMHG

## 2024-02-29 DIAGNOSIS — C56.1 OVARIAN CANCER, RIGHT (H): Primary | ICD-10-CM

## 2024-02-29 PROCEDURE — 96417 CHEMO IV INFUS EACH ADDL SEQ: CPT

## 2024-02-29 PROCEDURE — 96367 TX/PROPH/DG ADDL SEQ IV INF: CPT

## 2024-02-29 PROCEDURE — 258N000003 HC RX IP 258 OP 636: Performed by: NURSE PRACTITIONER

## 2024-02-29 PROCEDURE — 96375 TX/PRO/DX INJ NEW DRUG ADDON: CPT

## 2024-02-29 PROCEDURE — 250N000011 HC RX IP 250 OP 636: Performed by: NURSE PRACTITIONER

## 2024-02-29 PROCEDURE — 96413 CHEMO IV INFUSION 1 HR: CPT

## 2024-02-29 RX ORDER — PALONOSETRON 0.05 MG/ML
0.25 INJECTION, SOLUTION INTRAVENOUS ONCE
Status: COMPLETED | OUTPATIENT
Start: 2024-02-29 | End: 2024-02-29

## 2024-02-29 RX ORDER — HEPARIN SODIUM (PORCINE) LOCK FLUSH IV SOLN 100 UNIT/ML 100 UNIT/ML
5 SOLUTION INTRAVENOUS
Status: DISCONTINUED | OUTPATIENT
Start: 2024-02-29 | End: 2024-02-29 | Stop reason: HOSPADM

## 2024-02-29 RX ORDER — EPINEPHRINE 1 MG/ML
0.3 INJECTION, SOLUTION INTRAMUSCULAR; SUBCUTANEOUS EVERY 5 MIN PRN
Status: DISCONTINUED | OUTPATIENT
Start: 2024-02-29 | End: 2024-02-29 | Stop reason: HOSPADM

## 2024-02-29 RX ORDER — METHYLPREDNISOLONE SODIUM SUCCINATE 125 MG/2ML
125 INJECTION, POWDER, LYOPHILIZED, FOR SOLUTION INTRAMUSCULAR; INTRAVENOUS
Status: DISCONTINUED | OUTPATIENT
Start: 2024-02-29 | End: 2024-02-29 | Stop reason: HOSPADM

## 2024-02-29 RX ORDER — ALBUTEROL SULFATE 0.83 MG/ML
2.5 SOLUTION RESPIRATORY (INHALATION)
Status: DISCONTINUED | OUTPATIENT
Start: 2024-02-29 | End: 2024-02-29 | Stop reason: HOSPADM

## 2024-02-29 RX ORDER — MEPERIDINE HYDROCHLORIDE 50 MG/ML
25 INJECTION INTRAMUSCULAR; INTRAVENOUS; SUBCUTANEOUS EVERY 30 MIN PRN
Status: DISCONTINUED | OUTPATIENT
Start: 2024-02-29 | End: 2024-02-29 | Stop reason: HOSPADM

## 2024-02-29 RX ORDER — ALBUTEROL SULFATE 90 UG/1
1-2 AEROSOL, METERED RESPIRATORY (INHALATION)
Status: DISCONTINUED | OUTPATIENT
Start: 2024-02-29 | End: 2024-02-29 | Stop reason: HOSPADM

## 2024-02-29 RX ORDER — DIPHENHYDRAMINE HYDROCHLORIDE 50 MG/ML
50 INJECTION INTRAMUSCULAR; INTRAVENOUS
Status: DISCONTINUED | OUTPATIENT
Start: 2024-02-29 | End: 2024-02-29 | Stop reason: HOSPADM

## 2024-02-29 RX ADMIN — PALONOSETRON 0.25 MG: 0.05 INJECTION, SOLUTION INTRAVENOUS at 10:19

## 2024-02-29 RX ADMIN — DEXAMETHASONE SODIUM PHOSPHATE: 10 INJECTION, SOLUTION INTRAMUSCULAR; INTRAVENOUS at 10:22

## 2024-02-29 RX ADMIN — CARBOPLATIN 420 MG: 10 INJECTION, SOLUTION INTRAVENOUS at 12:03

## 2024-02-29 RX ADMIN — DOXORUBICIN HYDROCHLORIDE 50 MG: 2 INJECTABLE, LIPOSOMAL INTRAVENOUS at 11:02

## 2024-02-29 RX ADMIN — DEXTROSE MONOHYDRATE 250 ML: 50 INJECTION, SOLUTION INTRAVENOUS at 10:15

## 2024-02-29 RX ADMIN — HEPARIN 5 ML: 100 SYRINGE at 12:55

## 2024-02-29 NOTE — PROGRESS NOTES
Infusion Nursing Note:  Jacki Smith presents today for C10D1 Doxil and Carboplatin.    Patient seen by provider today: No   present during visit today: Not Applicable.    Note: Patient presents with  to Infusion center. Patient states she experiences intermittent nausea, but it subsides quickly. Eating fair.  Emend/Dexamethason and Aloxi IV given for premedications.    Intravenous Access:  Implanted Port.    Treatment Conditions:  Results reviewed, labs MET treatment parameters, ok to proceed with treatment.      Post Infusion Assessment:  Patient tolerated infusion without incident.  Site patent and intact, free from redness, edema or discomfort.  No evidence of extravasations.  Access discontinued per protocol.       Discharge Plan:   Patient and/or family verbalized understanding of discharge instructions and all questions answered.      Staci Piedra RN

## 2024-03-25 ENCOUNTER — LAB (OUTPATIENT)
Dept: INFUSION THERAPY | Facility: HOSPITAL | Age: 59
End: 2024-03-25
Attending: NURSE PRACTITIONER
Payer: COMMERCIAL

## 2024-03-25 DIAGNOSIS — C56.1 OVARIAN CANCER, RIGHT (H): Primary | ICD-10-CM

## 2024-03-25 LAB
ALBUMIN SERPL BCG-MCNC: 3.7 G/DL (ref 3.5–5.2)
ALP SERPL-CCNC: 208 U/L (ref 40–150)
ALT SERPL W P-5'-P-CCNC: 28 U/L (ref 0–50)
ANION GAP SERPL CALCULATED.3IONS-SCNC: 6 MMOL/L (ref 7–15)
AST SERPL W P-5'-P-CCNC: 40 U/L (ref 0–45)
BASOPHILS # BLD AUTO: 0 10E3/UL (ref 0–0.2)
BASOPHILS NFR BLD AUTO: 1 %
BILIRUB SERPL-MCNC: 0.3 MG/DL
BUN SERPL-MCNC: 14.1 MG/DL (ref 6–20)
CALCIUM SERPL-MCNC: 9.3 MG/DL (ref 8.6–10)
CANCER AG125 SERPL-ACNC: 44 U/ML
CHLORIDE SERPL-SCNC: 102 MMOL/L (ref 98–107)
CREAT SERPL-MCNC: 0.91 MG/DL (ref 0.51–0.95)
DEPRECATED HCO3 PLAS-SCNC: 28 MMOL/L (ref 22–29)
EGFRCR SERPLBLD CKD-EPI 2021: 73 ML/MIN/1.73M2
EOSINOPHIL # BLD AUTO: 0.1 10E3/UL (ref 0–0.7)
EOSINOPHIL NFR BLD AUTO: 2 %
ERYTHROCYTE [DISTWIDTH] IN BLOOD BY AUTOMATED COUNT: 15.1 % (ref 10–15)
GLUCOSE SERPL-MCNC: 91 MG/DL (ref 70–99)
HCT VFR BLD AUTO: 33.2 % (ref 35–47)
HGB BLD-MCNC: 11.2 G/DL (ref 11.7–15.7)
IMM GRANULOCYTES # BLD: 0 10E3/UL
IMM GRANULOCYTES NFR BLD: 0 %
LYMPHOCYTES # BLD AUTO: 0.8 10E3/UL (ref 0.8–5.3)
LYMPHOCYTES NFR BLD AUTO: 29 %
MAGNESIUM SERPL-MCNC: 1.8 MG/DL (ref 1.7–2.3)
MCH RBC QN AUTO: 33.1 PG (ref 26.5–33)
MCHC RBC AUTO-ENTMCNC: 33.7 G/DL (ref 31.5–36.5)
MCV RBC AUTO: 98 FL (ref 78–100)
MONOCYTES # BLD AUTO: 0.4 10E3/UL (ref 0–1.3)
MONOCYTES NFR BLD AUTO: 14 %
NEUTROPHILS # BLD AUTO: 1.5 10E3/UL (ref 1.6–8.3)
NEUTROPHILS NFR BLD AUTO: 54 %
NRBC # BLD AUTO: 0 10E3/UL
NRBC BLD AUTO-RTO: 0 /100
PLATELET # BLD AUTO: 144 10E3/UL (ref 150–450)
POTASSIUM SERPL-SCNC: 4.1 MMOL/L (ref 3.4–5.3)
PROT SERPL-MCNC: 6.4 G/DL (ref 6.4–8.3)
RBC # BLD AUTO: 3.38 10E6/UL (ref 3.8–5.2)
SODIUM SERPL-SCNC: 136 MMOL/L (ref 135–145)
WBC # BLD AUTO: 2.9 10E3/UL (ref 4–11)

## 2024-03-25 PROCEDURE — 250N000011 HC RX IP 250 OP 636

## 2024-03-25 PROCEDURE — 83735 ASSAY OF MAGNESIUM: CPT | Performed by: NURSE PRACTITIONER

## 2024-03-25 PROCEDURE — 80053 COMPREHEN METABOLIC PANEL: CPT | Performed by: NURSE PRACTITIONER

## 2024-03-25 PROCEDURE — 86304 IMMUNOASSAY TUMOR CA 125: CPT | Performed by: NURSE PRACTITIONER

## 2024-03-25 PROCEDURE — 36591 DRAW BLOOD OFF VENOUS DEVICE: CPT | Performed by: NURSE PRACTITIONER

## 2024-03-25 PROCEDURE — 85025 COMPLETE CBC W/AUTO DIFF WBC: CPT | Performed by: NURSE PRACTITIONER

## 2024-03-25 RX ORDER — HEPARIN SODIUM (PORCINE) LOCK FLUSH IV SOLN 100 UNIT/ML 100 UNIT/ML
5 SOLUTION INTRAVENOUS
Status: CANCELLED | OUTPATIENT
Start: 2024-03-25

## 2024-03-25 RX ORDER — HEPARIN SODIUM (PORCINE) LOCK FLUSH IV SOLN 100 UNIT/ML 100 UNIT/ML
SOLUTION INTRAVENOUS
Status: COMPLETED
Start: 2024-03-25 | End: 2024-03-25

## 2024-03-25 RX ORDER — HEPARIN SODIUM (PORCINE) LOCK FLUSH IV SOLN 100 UNIT/ML 100 UNIT/ML
5 SOLUTION INTRAVENOUS
Status: DISCONTINUED | OUTPATIENT
Start: 2024-03-25 | End: 2024-03-25 | Stop reason: HOSPADM

## 2024-03-26 ENCOUNTER — VIRTUAL VISIT (OUTPATIENT)
Dept: ONCOLOGY | Facility: CLINIC | Age: 59
End: 2024-03-26
Attending: OBSTETRICS & GYNECOLOGY
Payer: COMMERCIAL

## 2024-03-26 VITALS — HEIGHT: 62 IN | WEIGHT: 151 LBS | BODY MASS INDEX: 27.79 KG/M2

## 2024-03-26 DIAGNOSIS — C56.1 OVARIAN CANCER, RIGHT (H): Primary | ICD-10-CM

## 2024-03-26 DIAGNOSIS — Z51.11 ENCOUNTER FOR ANTINEOPLASTIC CHEMOTHERAPY: ICD-10-CM

## 2024-03-26 PROCEDURE — G2211 COMPLEX E/M VISIT ADD ON: HCPCS | Mod: 95 | Performed by: NURSE PRACTITIONER

## 2024-03-26 PROCEDURE — 99213 OFFICE O/P EST LOW 20 MIN: CPT | Mod: 95 | Performed by: NURSE PRACTITIONER

## 2024-03-26 RX ORDER — ALBUTEROL SULFATE 0.83 MG/ML
2.5 SOLUTION RESPIRATORY (INHALATION)
Status: CANCELLED | OUTPATIENT
Start: 2024-03-27

## 2024-03-26 RX ORDER — MEPERIDINE HYDROCHLORIDE 25 MG/ML
25 INJECTION INTRAMUSCULAR; INTRAVENOUS; SUBCUTANEOUS EVERY 30 MIN PRN
Status: CANCELLED | OUTPATIENT
Start: 2024-03-27

## 2024-03-26 RX ORDER — HEPARIN SODIUM (PORCINE) LOCK FLUSH IV SOLN 100 UNIT/ML 100 UNIT/ML
5 SOLUTION INTRAVENOUS
Status: CANCELLED | OUTPATIENT
Start: 2024-03-27

## 2024-03-26 RX ORDER — METHYLPREDNISOLONE SODIUM SUCCINATE 125 MG/2ML
125 INJECTION, POWDER, LYOPHILIZED, FOR SOLUTION INTRAMUSCULAR; INTRAVENOUS
Status: CANCELLED | OUTPATIENT
Start: 2024-03-27

## 2024-03-26 RX ORDER — EPINEPHRINE 1 MG/ML
0.3 INJECTION, SOLUTION INTRAMUSCULAR; SUBCUTANEOUS EVERY 5 MIN PRN
Status: CANCELLED | OUTPATIENT
Start: 2024-03-27

## 2024-03-26 RX ORDER — PALONOSETRON 0.05 MG/ML
0.25 INJECTION, SOLUTION INTRAVENOUS ONCE
Status: CANCELLED | OUTPATIENT
Start: 2024-03-27

## 2024-03-26 RX ORDER — ALBUTEROL SULFATE 90 UG/1
1-2 AEROSOL, METERED RESPIRATORY (INHALATION)
Status: CANCELLED | OUTPATIENT
Start: 2024-03-27

## 2024-03-26 RX ORDER — LORAZEPAM 2 MG/ML
0.5 INJECTION INTRAMUSCULAR EVERY 4 HOURS PRN
Status: CANCELLED | OUTPATIENT
Start: 2024-03-27

## 2024-03-26 RX ORDER — HEPARIN SODIUM,PORCINE 10 UNIT/ML
5 VIAL (ML) INTRAVENOUS
Status: CANCELLED | OUTPATIENT
Start: 2024-03-27

## 2024-03-26 RX ORDER — DIPHENHYDRAMINE HYDROCHLORIDE 50 MG/ML
50 INJECTION INTRAMUSCULAR; INTRAVENOUS
Status: CANCELLED | OUTPATIENT
Start: 2024-03-27

## 2024-03-26 NOTE — LETTER
3/26/2024         RE: Jacki Smith  [address] [facility] [address]        Dear Colleague,    Thank you for referring your patient, Jacki Smith, to the [facility]. Please see a copy of my visit note below.               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 She is here today for follow up and disease management by video.     Oncology history:     22: Presented to an ED in [place] for evaluation of abdominal bloating and discomfort.  -Abdomen, pelvic CT: Radiographic Stage CAL ovarian cancer.   22:  439.   22: Pelvic ultrasound: c/w metastatic cancer. I have personally reviewed the ultrasound images.   22: Pelvic exam under anesthesia, diagnostic laparoscopy, biopsies, evacuation of ascites.   -Findings: On pelvic exam under anesthesia, there was a 6 cm firm, fixed mass adherent to the vaginal cuff. Vagina otherwise smooth. On laparoscopic examination, there was ascites filling the pelvis/abdomen, with >1 liter of fluid evacuated. The palpated mass was arising from the right ovary. Left ovary relatively normal in appearance. There was diffuse carcinomatosis covering the entire peritoneal surface falciform ligament, liver capsule, and mesentery. The omentum was replaced with tumor. Findings were not amenable to optimal tumor debulking so biopsies were taken for histologic examination.   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      22, 22, 22: Cycles 1-3 of neoadjuvant chemotherapy with IV carboplatin + paclitaxel 175 mg/m2 every 21 days.   -Cycles 1,2: Carboplatin AUC 6.   -Cycle 3: Carboplatin AUC 5 with dose-reduction due to thrombcytopenia.   -22:  Chest, abdomen, pelvic CT: Partial response.      8/29/22: Pelvic exam under anesthesia, diagnostic laparoscopy, conversion to laparotomy for optimal interval tumor debulking to no gross residual disease including peritoneal and diaphragm biopsies, bilateral salpingo-oophorectomy, infragastric omentectomy, bilateral hemidiaphragm stripping, peritoneal and mesenteric argon beam ablation, appendectomy.   -Pathology: High-grade serous carcinoma involving all resected specimens.     9/28/22, 10/19/22, 11/17/22: Cycles 4-6 of primary chemotherapy with IV carboplatin AUC 5 + paclitaxel 175 mg/m2.  -12/2/22: Chest, abdomen, pelvic CT: No definitive evidence of disease.   -12/7/22: CA-125=23.   -2/16/23: Chest, abdomen, pelvic CT to evaluate bloating: Stable findings, no definitive evidence of disease.  -5/25/23: Admitted  to [facility] for management of malignant ascites s/p therapeutic paracentesis, and partial SBO resolved with conservative management.   5/25/23: Abdomen, pelvic CT: Progressive disease.   LOWER CHEST: OMAIRA.   BOWEL: Development of moderate malignant ascites with small peritoneal and serosal implants present. There is mild distention of a few loops of small bowel within the right side of the abdomen with regions of narrowing present compatible with an early or partial small bowel obstruction. Wall thickening in a few of the involved segments with fecalization of the internal contents of the small bowel compatible with stasis.   PELVIC ORGANS: Lobulated 4.5 cm soft tissue mass in the dependent pelvis in the expected location of the uterus.  -6/13/23:  78.     6/13/23, 7/12/23, 8/14/23, 9/13/23: Cycles 1-4 of second-line chemotherapy with IV carboplatin AUC 5 + pegylated liposomal doxorubicin (PLD) 30 mg/m2 every 28 days.      Plan: 4 more cycles     9/27/23: CT cap IMPRESSION:  Chest:  1.  Small thrombus at the tip of the right chest port at the brachiocephalic/SVC confluence versus less likely  fibrin sheath.  2.  Stable 4 mm subpleural nodule in the right middle lobe. No new or enlarging pulmonary nodules.     Abdomen and pelvis:  1.  Slightly decreased size of the pelvic mass measuring 3 cm with interlobular and calcification suggestive of favorable response to treatment.  2.  Persistent but marginally decreased volume of the malignant ascites. Persistent manifestations of peritoneal carcinomatosis.     10/11/23: Cycle 5  carboplatin AUC 5 + pegylated liposomal doxorubicin (PLD) 30 mg/m2 every 28 days.  11/7/23: Cycle 6 carboplatin AUC 5 + pegylated liposomal doxorubicin (PLD) 30 mg/m2 every 28 days planned for 11/8.  43.  11/14-11/16/23: Admission for pSBO; did not need NG  -CT ap IMPRESSION:  1.  Mild small bowel dilatation with transition point in the central abdomen and CT position of upstream bowel loops compatible with small bowel obstruction.   2.  Redemonstrated findings of peritoneal carcinomatosis. Slight decrease in small to moderate volume partially loculated ascites. Decreased size of pelvic nodule.   12/5/23: Cycle 7 carboplatin AUC 5 + pegylated liposomal doxorubicin (PLD) 30 mg/m2 every 28 days planned for 12/6.  42.  1/2/24: Cycle 8 carboplatin AUC 5 + pegylated liposomal doxorubicin (PLD) 30 mg/m2 every 28 days planned for 1/3.  45  1/23/24: CT cap IMPRESSION:   1.  Small volume loculated ascites, in keeping with peritoneal carcinomatosis, slightly improved from prior.  2.  Slight interval decrease in size of a central pelvic nodule, which tethers adjacent bowel loops in the urinary bladder.  3.  Few sub-6 mm pulmonary nodules, unchanged from prior.  4.  Interval resolution of thrombus within the right brachiocephalic vein.  5.  Small nonobstructing right renal stone.     Plan: total of 11 cycles then CT     2/1/24: Cycle 9 carboplatin AUC 5 + pegylated liposomal doxorubicin (PLD) 30 mg/m2.  44.  2/28/24: Cycle 10 carboplatin AUC 5 + pegylated liposomal  "doxorubicin (PLD) 30 mg/m2  planned for 2/29.  41.  3/2-3/3/24: admission for SBO; resolved with bowel rest  3/26/24: Cycle 11 carboplatin AUC 5 + pegylated liposomal doxorubicin (PLD) 30 mg/m2 planned for 3/28.  44.    Genetic/Genomic/Molecular Testing History:  10/5/22: [facility], reflexed to Common Hereditary Cancer Panel.   -No identifiable germline variants identified in ABRAXAS1, AKT1, APC, DEION, AXIN2, BARD1, BMPR1A, BRCA1, BRCA2, BRIP1, CDC73, CDH1, CDK4, CDKN2A, CHEK2, CTNNA1, DICER1, EPCAM, FANCC, FANCM, GREM1, HOXB13, KIT, MEN1, MLH1, MRE11, MSH2, MSH6, MUTYH, NBN, NF1, NTHL1, PALB2, PDGFRA, PIK3CA, PMS2, POLD1, POLE, PTEN, RAD50, RAD51C, RAD51D, RECQL, RINT1, SDHA, SDHB, SDHC, SDHD, SMAD4, SMARCA4, STK11, TP53, TSC1, TSC2, VHL, XRCC2.   -Variant of uncertain significance in MSH3 c.16C>T (p.[id number])    10/9/22 (tumor 8/29/22): [facility] Testing Results.  -Genomic ROXI low (6%)   -TMB low (1mut/Mb)  -Microsatellite stable/Mismatch Repair proficient  -PD-L1- (CPS 0%)  -NTRK 1/2/3 fusion not detected.   -ER-, IA+  -Genomic alterations in:  --TP53  -No genomic alterations in BRCA1,2.     Jamila-ovary clinical trial screening: Negative for the immunoreactive subtype.      6/19/23 (tumor 8/29/22): [facility] Test results.   -FOLR1+ (2+, 75%).        Today she reports the following:     -Nausea: denies needing to take typically  -Bowels: \"good\"; tries to walk every night 20 min; uses Miralax prn; taking 1 stool softener nightly  -Hydration/appetite: does not feel hungry or drink very much  -Chest pain: denies  -Skin rash/changes: denies; has some dry skin and uses lotion; has had some redness under breast and at -waistband since mid Jan; comes and goes as better before next cycle  SOB: denies  -Leg swelling: denies  -Fevers/chills: denies  -Getting fluids Friday in [place]  -planning a treatment break after imaging    Review of Systems  See HPI      Past Medical History:    Past Medical History: "   Diagnosis Date    Female stress incontinence     Ovarian cancer, right (H) 06/16/2022    Stage IIIC high-grade serous carcinoma    Recurrent cold sores          Past Surgical History:    Past Surgical History:   Procedure Laterality Date    APPENDECTOMY  08/29/2022    Part of ovarian cancer tumor debulking    BLADDER SUSPENSION  08/13/2009    HYSTERECTOMY  08/13/2009    For prolapse    IR PARACENTESIS  6/6/2022    IR PARACENTESIS  6/14/2022    IR PARACENTESIS  5/26/2023    LAPAROSCOPY DIAGNOSTIC (GYN)  06/16/2022    SALPINGO-OOPHORECTOMY, COMBINED Bilateral 08/29/2022    Pelvic exam under anesthesia, diagnostic laparoscopy, conversion to laparotomy for optimal interval tumor debulking to no gross residual disease including peritoneal and diaphragm biopsies, bilateral salpingo-oophorectomy, infragastric omentectomy, bilateral hemidiaphragm stripping, peritoneal and mesenteric argon beam ablation, appendectomy.    TONSILLECTOMY  1973    TUBAL LIGATION Bilateral 09/01/1998       Current Medications:     Current Outpatient Medications   Medication Sig Dispense Refill    cyclobenzaprine (FLEXERIL) 5 MG tablet Take 1-2 Tablets (5-10 mg) by mouth three times a day.      diphenhydrAMINE-acetaminophen (TYLENOL PM)  MG tablet Take 1 tablet by mouth as needed      docusate sodium (DSS) 100 MG capsule Take 100 mg by mouth as needed      HYDROcodone-acetaminophen (NORCO) 5-325 MG tablet Take 1 tablet by mouth every 4 hours as needed      hydrOXYzine (ATARAX) 10 MG tablet Take 1-2 tablets (10-20 mg) by mouth every 6 hours as needed for itching 120 tablet 3    lidocaine-prilocaine (EMLA) 2.5-2.5 % external cream Apply topically as needed (pain due to port access) Apply to port 30 minutes prior to lab appointment. 30 g 6    Multiple Vitamin (MULTI-VITAMIN DAILY PO) Take 1 tablet by mouth daily      sennosides (SENOKOT) 8.6 MG tablet       valACYclovir (VALTREX) 1000 mg tablet Take 1,000 mg by mouth as needed       "ondansetron (ZOFRAN) 8 MG tablet Take 1 tablet (8 mg) by mouth every 8 hours as needed for nausea (vomiting) (Patient not taking: Reported on 2024) 30 tablet 2    prochlorperazine (COMPAZINE) 10 MG tablet Take 1 tablet (10 mg) by mouth every 6 hours as needed for nausea or vomiting (Patient not taking: Reported on 2024) 30 tablet 2         Allergies:      No Known Allergies     Social History:     Social History     Tobacco Use    Smoking status: Former     Packs/day: 1.00     Years: 42.00     Additional pack years: 0.00     Total pack years: 42.00     Types: Cigarettes     Quit date: 2022     Years since quittin.8    Smokeless tobacco: Never   Substance Use Topics    Alcohol use: Yes     Comment: Beer ~Q3 months.       History   Drug Use Unknown         Family History:     The patient's family history is notable for     Family History   Problem Relation Age of Onset    Prostate Cancer Father     Breast Cancer Sister 48    Melanoma Sister     Skin Cancer Sister     Colon Cancer Maternal Grandmother          Physical Exam:     Ht 1.575 m (5' 2\")   Wt 68.5 kg (151 lb)   BMI 27.62 kg/m    Body mass index is 27.62 kg/m .    General Appearance: healthy and alert, no distress    Eyes:  Eyes grossly normal to inspection.  No discharge or erythema, or obvious scleral/conjunctival abnormalities.    Respiratory: No audible wheeze, cough, or visible cyanosis.  No visible retractions or increased work of breathing.     Musculoskeletal: extremities non tender and without edema    Skin: no lesions or rashes on visible skin    Neurological: normal gait, no gross defects     Psychiatric: appropriate mood and affect. Mentation appears normal, affect normal/bright, judgement and insight intact, normal speech and appearance well-groomed                            The rest of a comprehensive physical examination is deferred due to video visit restrictions.      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 She is here today for follow up and disease management by video.    25 minutes spent on the date of the encounter doing chart review, history and exam, documentation and further activities as noted above      Plan:     1.)       Reviewed CBC, CMP, Mg,  and Ok to proceed with planned treatment on Thursday. May need to consider adding in Neulasta if she continues with treatment after imaging; this and MD visit are already scheduled. . Reviewed signs and symptoms for when she should contact the clinic or seek additional care. Patient to contact the clinic with any questions or concerns in the interim.  Answered all of her questions to the best of my ability.    -reviewed skin care precautions while on Doxil. Continue to monitor.     The longitudinal plan of care for the diagnosis(es)/condition(s) as documented were addressed during this visit. Due to the added complexity in care, I will continue to support Michelle in the subsequent management and with ongoing continuity of care.     2.) Genetic risk factors were assessed and she has a VUS No identifiable germline variants identified in ABRAXAS1, AKT1, APC, DEION, AXIN2, BARD1, BMPR1A, BRCA1, BRCA2, BRIP1, CDC73, CDH1, CDK4, CDKN2A, CHEK2, CTNNA1, DICER1, EPCAM, FANCC, FANCM, GREM1, HOXB13, KIT, MEN1, MLH1, MRE11, MSH2, MSH6, MUTYH, NBN, NF1, NTHL1, PALB2, PDGFRA, PIK3CA, PMS2, POLD1, POLE, PTEN, RAD50, RAD51C, RAD51D, RECQL, RINT1, SDHA, SDHB, SDHC, SDHD, SMAD4, SMARCA4, STK11, TP53, TSC1, TSC2, VHL, XRCC2.  See above for additional results.     3.) Labs and/or tests ordered include:  CBC, CMP, Mg, CA `125.     4.) Health maintenance issues addressed today include annual health maintenance and non-gynecologic issues with PCP.    JALEN Zamarripa, WHNP-BC, ANP-BC, AOCNP  Women's Health Nurse Practitioner  Adult Nurse Practitioner  Division of Gynecologic  Oncology  .      CC  Patient Care Team:  Domenica Christianson MD as PCP - General

## 2024-03-26 NOTE — PROGRESS NOTES
Virtual Visit Details    Type of service:  Video Visit     Originating Location (pt. 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 She is here today for follow up and disease management by video.     Oncology history:     22: Presented to an ED in [place] for evaluation of abdominal bloating and discomfort.  -Abdomen, pelvic CT: Radiographic Stage CAL ovarian cancer.   22:  439.   22: Pelvic ultrasound: c/w metastatic cancer. I have personally reviewed the ultrasound images.   22: Pelvic exam under anesthesia, diagnostic laparoscopy, biopsies, evacuation of ascites.   -Findings: On pelvic exam under anesthesia, there was a 6 cm firm, fixed mass adherent to the vaginal cuff. Vagina otherwise smooth. On laparoscopic examination, there was ascites filling the pelvis/abdomen, with >1 liter of fluid evacuated. The palpated mass was arising from the right ovary. Left ovary relatively normal in appearance. There was diffuse carcinomatosis covering the entire peritoneal surface falciform ligament, liver capsule, and mesentery. The omentum was replaced with tumor. Findings were not amenable to optimal tumor debulking so biopsies were taken for histologic examination.   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      22, 22, 22: Cycles 1-3 of neoadjuvant chemotherapy with IV carboplatin + paclitaxel 175 mg/m2 every 21 days.   -Cycles 1,2: Carboplatin AUC 6.   -Cycle 3: Carboplatin AUC 5 with dose-reduction due to thrombcytopenia.   -22: Chest, abdomen, pelvic CT: Partial response.       8/29/22: Pelvic exam under anesthesia, diagnostic laparoscopy, conversion to laparotomy for optimal interval tumor debulking to no gross residual disease including peritoneal and diaphragm biopsies, bilateral salpingo-oophorectomy, infragastric omentectomy, bilateral hemidiaphragm stripping, peritoneal and mesenteric argon beam ablation, appendectomy.   -Pathology: High-grade serous carcinoma involving all resected specimens.     9/28/22, 10/19/22, 11/17/22: Cycles 4-6 of primary chemotherapy with IV carboplatin AUC 5 + paclitaxel 175 mg/m2.  -12/2/22: Chest, abdomen, pelvic CT: No definitive evidence of disease.   -12/7/22: CA-125=23.   -2/16/23: Chest, abdomen, pelvic CT to evaluate bloating: Stable findings, no definitive evidence of disease.  -5/25/23: Admitted  to [facility] for management of malignant ascites s/p therapeutic paracentesis, and partial SBO resolved with conservative management.   5/25/23: Abdomen, pelvic CT: Progressive disease.   LOWER CHEST: OMAIRA.   BOWEL: Development of moderate malignant ascites with small peritoneal and serosal implants present. There is mild distention of a few loops of small bowel within the right side of the abdomen with regions of narrowing present compatible with an early or partial small bowel obstruction. Wall thickening in a few of the involved segments with fecalization of the internal contents of the small bowel compatible with stasis.   PELVIC ORGANS: Lobulated 4.5 cm soft tissue mass in the dependent pelvis in the expected location of the uterus.  -6/13/23:  78.     6/13/23, 7/12/23, 8/14/23, 9/13/23: Cycles 1-4 of second-line chemotherapy with IV carboplatin AUC 5 + pegylated liposomal doxorubicin (PLD) 30 mg/m2 every 28 days.      Plan: 4 more cycles     9/27/23: CT cap IMPRESSION:  Chest:  1.  Small thrombus at the tip of the right chest port at the brachiocephalic/SVC confluence versus less likely fibrin sheath.  2.  Stable 4 mm subpleural  nodule in the right middle lobe. No new or enlarging pulmonary nodules.     Abdomen and pelvis:  1.  Slightly decreased size of the pelvic mass measuring 3 cm with interlobular and calcification suggestive of favorable response to treatment.  2.  Persistent but marginally decreased volume of the malignant ascites. Persistent manifestations of peritoneal carcinomatosis.     10/11/23: Cycle 5  carboplatin AUC 5 + pegylated liposomal doxorubicin (PLD) 30 mg/m2 every 28 days.  11/7/23: Cycle 6 carboplatin AUC 5 + pegylated liposomal doxorubicin (PLD) 30 mg/m2 every 28 days planned for 11/8.  43.  11/14-11/16/23: Admission for pSBO; did not need NG  -CT ap IMPRESSION:  1.  Mild small bowel dilatation with transition point in the central abdomen and CT position of upstream bowel loops compatible with small bowel obstruction.   2.  Redemonstrated findings of peritoneal carcinomatosis. Slight decrease in small to moderate volume partially loculated ascites. Decreased size of pelvic nodule.   12/5/23: Cycle 7 carboplatin AUC 5 + pegylated liposomal doxorubicin (PLD) 30 mg/m2 every 28 days planned for 12/6.  42.  1/2/24: Cycle 8 carboplatin AUC 5 + pegylated liposomal doxorubicin (PLD) 30 mg/m2 every 28 days planned for 1/3.  45  1/23/24: CT cap IMPRESSION:   1.  Small volume loculated ascites, in keeping with peritoneal carcinomatosis, slightly improved from prior.  2.  Slight interval decrease in size of a central pelvic nodule, which tethers adjacent bowel loops in the urinary bladder.  3.  Few sub-6 mm pulmonary nodules, unchanged from prior.  4.  Interval resolution of thrombus within the right brachiocephalic vein.  5.  Small nonobstructing right renal stone.     Plan: total of 11 cycles then CT     2/1/24: Cycle 9 carboplatin AUC 5 + pegylated liposomal doxorubicin (PLD) 30 mg/m2.  44.  2/28/24: Cycle 10 carboplatin AUC 5 + pegylated liposomal doxorubicin (PLD) 30 mg/m2  planned for 2/29.   "41.  3/2-3/3/24: admission for SBO; resolved with bowel rest  3/26/24: Cycle 11 carboplatin AUC 5 + pegylated liposomal doxorubicin (PLD) 30 mg/m2 planned for 3/28.  44.    Genetic/Genomic/Molecular Testing History:  10/5/22: [facility], reflexed to Common Hereditary Cancer Panel.   -No identifiable germline variants identified in ABRAXAS1, AKT1, APC, DEION, AXIN2, BARD1, BMPR1A, BRCA1, BRCA2, BRIP1, CDC73, CDH1, CDK4, CDKN2A, CHEK2, CTNNA1, DICER1, EPCAM, FANCC, FANCM, GREM1, HOXB13, KIT, MEN1, MLH1, MRE11, MSH2, MSH6, MUTYH, NBN, NF1, NTHL1, PALB2, PDGFRA, PIK3CA, PMS2, POLD1, POLE, PTEN, RAD50, RAD51C, RAD51D, RECQL, RINT1, SDHA, SDHB, SDHC, SDHD, SMAD4, SMARCA4, STK11, TP53, TSC1, TSC2, VHL, XRCC2.   -Variant of uncertain significance in MSH3 c.16C>T (p.Qxi1Lza)    10/9/22 (tumor 8/29/22): [facility] Testing Results.  -Genomic ROXI low (6%)   -TMB low (1mut/Mb)  -Microsatellite stable/Mismatch Repair proficient  -PD-L1- (CPS 0%)  -NTRK 1/2/3 fusion not detected.   -ER-, FL+  -Genomic alterations in:  --TP53  -No genomic alterations in BRCA1,2.     Jamila-ovary clinical trial screening: Negative for the immunoreactive subtype.      6/19/23 (tumor 8/29/22): [facility] Test results.   -FOLR1+ (2+, 75%).        Today she reports the following:     -Nausea: denies needing to take typically  -Bowels: \"good\"; tries to walk every night 20 min; uses Miralax prn; taking 1 stool softener nightly  -Hydration/appetite: does not feel hungry or drink very much  -Chest pain: denies  -Skin rash/changes: denies; has some dry skin and uses lotion; has had some redness under breast and at -waistband since mid Jan; comes and goes as better before next cycle  SOB: denies  -Leg swelling: denies  -Fevers/chills: denies  -Getting fluids Friday in [place]  -planning a treatment break after imaging    Review of Systems  See HPI      Past Medical History:    Past Medical History:   Diagnosis Date    Female stress incontinence     " Ovarian cancer, right (H) 06/16/2022    Stage IIIC high-grade serous carcinoma    Recurrent cold sores          Past Surgical History:    Past Surgical History:   Procedure Laterality Date    APPENDECTOMY  08/29/2022    Part of ovarian cancer tumor debulking    BLADDER SUSPENSION  08/13/2009    HYSTERECTOMY  08/13/2009    For prolapse    IR PARACENTESIS  6/6/2022    IR PARACENTESIS  6/14/2022    IR PARACENTESIS  5/26/2023    LAPAROSCOPY DIAGNOSTIC (GYN)  06/16/2022    SALPINGO-OOPHORECTOMY, COMBINED Bilateral 08/29/2022    Pelvic exam under anesthesia, diagnostic laparoscopy, conversion to laparotomy for optimal interval tumor debulking to no gross residual disease including peritoneal and diaphragm biopsies, bilateral salpingo-oophorectomy, infragastric omentectomy, bilateral hemidiaphragm stripping, peritoneal and mesenteric argon beam ablation, appendectomy.    TONSILLECTOMY  1973    TUBAL LIGATION Bilateral 09/01/1998       Current Medications:     Current Outpatient Medications   Medication Sig Dispense Refill    cyclobenzaprine (FLEXERIL) 5 MG tablet Take 1-2 Tablets (5-10 mg) by mouth three times a day.      diphenhydrAMINE-acetaminophen (TYLENOL PM)  MG tablet Take 1 tablet by mouth as needed      docusate sodium (DSS) 100 MG capsule Take 100 mg by mouth as needed      HYDROcodone-acetaminophen (NORCO) 5-325 MG tablet Take 1 tablet by mouth every 4 hours as needed      hydrOXYzine (ATARAX) 10 MG tablet Take 1-2 tablets (10-20 mg) by mouth every 6 hours as needed for itching 120 tablet 3    lidocaine-prilocaine (EMLA) 2.5-2.5 % external cream Apply topically as needed (pain due to port access) Apply to port 30 minutes prior to lab appointment. 30 g 6    Multiple Vitamin (MULTI-VITAMIN DAILY PO) Take 1 tablet by mouth daily      sennosides (SENOKOT) 8.6 MG tablet       valACYclovir (VALTREX) 1000 mg tablet Take 1,000 mg by mouth as needed      ondansetron (ZOFRAN) 8 MG tablet Take 1 tablet (8 mg) by  "mouth every 8 hours as needed for nausea (vomiting) (Patient not taking: Reported on 2024) 30 tablet 2    prochlorperazine (COMPAZINE) 10 MG tablet Take 1 tablet (10 mg) by mouth every 6 hours as needed for nausea or vomiting (Patient not taking: Reported on 2024) 30 tablet 2         Allergies:      No Known Allergies     Social History:     Social History     Tobacco Use    Smoking status: Former     Packs/day: 1.00     Years: 42.00     Additional pack years: 0.00     Total pack years: 42.00     Types: Cigarettes     Quit date: 2022     Years since quittin.8    Smokeless tobacco: Never   Substance Use Topics    Alcohol use: Yes     Comment: Beer ~Q3 months.       History   Drug Use Unknown         Family History:     The patient's family history is notable for     Family History   Problem Relation Age of Onset    Prostate Cancer Father     Breast Cancer Sister 48    Melanoma Sister     Skin Cancer Sister     Colon Cancer Maternal Grandmother          Physical Exam:     Ht 1.575 m (5' 2\")   Wt 68.5 kg (151 lb)   BMI 27.62 kg/m    Body mass index is 27.62 kg/m .    General Appearance: healthy and alert, no distress    Eyes:  Eyes grossly normal to inspection.  No discharge or erythema, or obvious scleral/conjunctival abnormalities.    Respiratory: No audible wheeze, cough, or visible cyanosis.  No visible retractions or increased work of breathing.     Musculoskeletal: extremities non tender and without edema    Skin: no lesions or rashes on visible skin    Neurological: normal gait, no gross defects     Psychiatric: appropriate mood and affect. Mentation appears normal, affect normal/bright, judgement and insight intact, normal speech and appearance well-groomed                            The rest of a comprehensive physical examination is deferred due to video visit restrictions.      Assessment:    Jacki Smith is a 58 year old woman with a diagnosis of progressive Stage IIIC high-grade " serous carcinoma of the right ovary. She is here today for follow up and disease management by video.    25 minutes spent on the date of the encounter doing chart review, history and exam, documentation and further activities as noted above      Plan:     1.)       Reviewed CBC, CMP, Mg,  and Ok to proceed with planned treatment on Thursday. May need to consider adding in Neulasta if she continues with treatment after imaging; this and MD visit are already scheduled. . Reviewed signs and symptoms for when she should contact the clinic or seek additional care. Patient to contact the clinic with any questions or concerns in the interim.  Answered all of her questions to the best of my ability.    -reviewed skin care precautions while on Doxil. Continue to monitor.     The longitudinal plan of care for the diagnosis(es)/condition(s) as documented were addressed during this visit. Due to the added complexity in care, I will continue to support Michelle in the subsequent management and with ongoing continuity of care.     2.) Genetic risk factors were assessed and she has a VUS No identifiable germline variants identified in ABRAXAS1, AKT1, APC, DEION, AXIN2, BARD1, BMPR1A, BRCA1, BRCA2, BRIP1, CDC73, CDH1, CDK4, CDKN2A, CHEK2, CTNNA1, DICER1, EPCAM, FANCC, FANCM, GREM1, HOXB13, KIT, MEN1, MLH1, MRE11, MSH2, MSH6, MUTYH, NBN, NF1, NTHL1, PALB2, PDGFRA, PIK3CA, PMS2, POLD1, POLE, PTEN, RAD50, RAD51C, RAD51D, RECQL, RINT1, SDHA, SDHB, SDHC, SDHD, SMAD4, SMARCA4, STK11, TP53, TSC1, TSC2, VHL, XRCC2.  See above for additional results.     3.) Labs and/or tests ordered include:  CBC, CMP, Mg, CA `125.     4.) Health maintenance issues addressed today include annual health maintenance and non-gynecologic issues with PCP.    JALEN Zamarripa, WHNP-BC, ANP-BC, AOCNP  Women's Health Nurse Practitioner  Adult Nurse Practitioner  Division of Gynecologic Oncology  .      CC  Patient Care Team:  Domenica Christianson MD as PCP -  Charis Verdugo MD as MD (Gynecologic Oncology)  Susannah Roman APRN CNP as Assigned Cancer Care Provider  Anish Monroy MD as Assigned Palliative Care Provider  Pratima Vega RN as Specialty Care Coordinator (Hematology & Oncology)  SELF, REFERRED

## 2024-03-26 NOTE — NURSING NOTE
Is the patient currently in the state of [place]? YES    Visit mode:VIDEO    If the visit is dropped, the patient can be reconnected by: TELEPHONE VISIT: Phone number: [phone]    Will anyone else be joining the visit? NO  (If patient encounters technical issues they should call [phone]    How would you like to obtain your AVS? MyChart    Are changes needed to the allergy or medication list? No    Reason for visit: RECHECK    Aminah BOOKER

## 2024-03-28 ENCOUNTER — INFUSION THERAPY VISIT (OUTPATIENT)
Dept: INFUSION THERAPY | Facility: HOSPITAL | Age: 59
End: 2024-03-28
Attending: OBSTETRICS & GYNECOLOGY
Payer: COMMERCIAL

## 2024-03-28 VITALS
TEMPERATURE: 97.5 F | HEART RATE: 64 BPM | OXYGEN SATURATION: 100 % | DIASTOLIC BLOOD PRESSURE: 76 MMHG | SYSTOLIC BLOOD PRESSURE: 122 MMHG | RESPIRATION RATE: 18 BRPM

## 2024-03-28 DIAGNOSIS — C56.1 OVARIAN CANCER, RIGHT (H): Primary | ICD-10-CM

## 2024-03-28 PROCEDURE — 96413 CHEMO IV INFUSION 1 HR: CPT

## 2024-03-28 PROCEDURE — 96375 TX/PRO/DX INJ NEW DRUG ADDON: CPT

## 2024-03-28 PROCEDURE — 258N000003 HC RX IP 258 OP 636: Performed by: NURSE PRACTITIONER

## 2024-03-28 PROCEDURE — 96417 CHEMO IV INFUS EACH ADDL SEQ: CPT

## 2024-03-28 PROCEDURE — 250N000011 HC RX IP 250 OP 636: Performed by: NURSE PRACTITIONER

## 2024-03-28 PROCEDURE — 96367 TX/PROPH/DG ADDL SEQ IV INF: CPT

## 2024-03-28 RX ORDER — METHYLPREDNISOLONE SODIUM SUCCINATE 125 MG/2ML
125 INJECTION, POWDER, LYOPHILIZED, FOR SOLUTION INTRAMUSCULAR; INTRAVENOUS
Status: DISCONTINUED | OUTPATIENT
Start: 2024-03-28 | End: 2024-03-28 | Stop reason: HOSPADM

## 2024-03-28 RX ORDER — PALONOSETRON 0.05 MG/ML
0.25 INJECTION, SOLUTION INTRAVENOUS ONCE
Qty: 5 ML | Refills: 0 | Status: COMPLETED | OUTPATIENT
Start: 2024-03-28 | End: 2024-03-28

## 2024-03-28 RX ORDER — ALBUTEROL SULFATE 90 UG/1
1-2 AEROSOL, METERED RESPIRATORY (INHALATION)
Status: DISCONTINUED | OUTPATIENT
Start: 2024-03-28 | End: 2024-03-28 | Stop reason: HOSPADM

## 2024-03-28 RX ORDER — EPINEPHRINE 1 MG/ML
0.3 INJECTION, SOLUTION INTRAMUSCULAR; SUBCUTANEOUS EVERY 5 MIN PRN
Status: DISCONTINUED | OUTPATIENT
Start: 2024-03-28 | End: 2024-03-28 | Stop reason: HOSPADM

## 2024-03-28 RX ORDER — HEPARIN SODIUM (PORCINE) LOCK FLUSH IV SOLN 100 UNIT/ML 100 UNIT/ML
5 SOLUTION INTRAVENOUS
Status: DISCONTINUED | OUTPATIENT
Start: 2024-03-28 | End: 2024-03-28 | Stop reason: HOSPADM

## 2024-03-28 RX ORDER — ALBUTEROL SULFATE 0.83 MG/ML
2.5 SOLUTION RESPIRATORY (INHALATION)
Status: DISCONTINUED | OUTPATIENT
Start: 2024-03-28 | End: 2024-03-28 | Stop reason: HOSPADM

## 2024-03-28 RX ORDER — DIPHENHYDRAMINE HYDROCHLORIDE 50 MG/ML
50 INJECTION INTRAMUSCULAR; INTRAVENOUS
Status: DISCONTINUED | OUTPATIENT
Start: 2024-03-28 | End: 2024-03-28 | Stop reason: HOSPADM

## 2024-03-28 RX ORDER — MEPERIDINE HYDROCHLORIDE 50 MG/ML
25 INJECTION INTRAMUSCULAR; INTRAVENOUS; SUBCUTANEOUS EVERY 30 MIN PRN
Status: DISCONTINUED | OUTPATIENT
Start: 2024-03-28 | End: 2024-03-28 | Stop reason: HOSPADM

## 2024-03-28 RX ADMIN — CARBOPLATIN 430 MG: 10 INJECTION, SOLUTION INTRAVENOUS at 11:27

## 2024-03-28 RX ADMIN — PALONOSETRON 0.25 MG: 0.05 INJECTION, SOLUTION INTRAVENOUS at 09:32

## 2024-03-28 RX ADMIN — FOSAPREPITANT: 150 INJECTION, POWDER, LYOPHILIZED, FOR SOLUTION INTRAVENOUS at 09:37

## 2024-03-28 RX ADMIN — DOXORUBICIN HYDROCHLORIDE 50 MG: 2 INJECTION, SUSPENSION, LIPOSOMAL INTRAVENOUS at 10:21

## 2024-03-28 RX ADMIN — DEXTROSE MONOHYDRATE 250 ML: 50 INJECTION, SOLUTION INTRAVENOUS at 09:32

## 2024-03-28 RX ADMIN — HEPARIN 5 ML: 100 SYRINGE at 12:23

## 2024-03-28 NOTE — PROGRESS NOTES
Infusion Nursing Note:  Jacki Smith presents today for D1C11 Doxil and carboplatin.    Patient seen by provider today: No   present during visit today: Not Applicable.    Note: Reviewed plan of care. Pt premedicated with IV Aloxi and Emend/dexamethasone.      Intravenous Access:  Implanted Port, excellent return.    Treatment Conditions:  Lab Results   Component Value Date    HGB 11.2 (L) 03/25/2024    WBC 2.9 (L) 03/25/2024    ANEUTAUTO 1.5 (L) 03/25/2024     (L) 03/25/2024        Lab Results   Component Value Date     03/25/2024    POTASSIUM 4.1 03/25/2024    MAG 1.8 03/25/2024    CR 0.91 03/25/2024    KINDRA 9.3 03/25/2024    BILITOTAL 0.3 03/25/2024    ALBUMIN 3.7 03/25/2024    ALT 28 03/25/2024    AST 40 03/25/2024       Results reviewed, labs MET treatment parameters, ok to proceed with treatment.      Post Infusion Assessment:  Patient tolerated infusion without incident.  Blood return noted pre and post infusion.  Site patent and intact, free from redness, edema or discomfort.  Access discontinued per protocol.       Discharge Plan:   Patient discharged in stable condition accompanied by: , Sukumar.  Departure Mode: Ambulatory.      Radha Wilson RN

## 2024-04-10 ENCOUNTER — HOSPITAL ENCOUNTER (OUTPATIENT)
Dept: CT IMAGING | Facility: CLINIC | Age: 59
Discharge: HOME | End: 2024-04-10
Attending: OBSTETRICS & GYNECOLOGY | Admitting: OBSTETRICS & GYNECOLOGY
Payer: COMMERCIAL

## 2024-04-10 DIAGNOSIS — C56.1 OVARIAN CANCER, RIGHT (H): ICD-10-CM

## 2024-04-10 PROCEDURE — 250N000011 HC RX IP 250 OP 636: Performed by: OBSTETRICS & GYNECOLOGY

## 2024-04-10 PROCEDURE — 71260 CT THORAX DX C+: CPT

## 2024-04-10 RX ORDER — IOPAMIDOL 755 MG/ML
100 INJECTION, SOLUTION INTRAVASCULAR ONCE
Status: COMPLETED | OUTPATIENT
Start: 2024-04-10 | End: 2024-04-10

## 2024-04-10 RX ORDER — HEPARIN SODIUM (PORCINE) LOCK FLUSH IV SOLN 100 UNIT/ML 100 UNIT/ML
5-10 SOLUTION INTRAVENOUS
Status: DISCONTINUED | OUTPATIENT
Start: 2024-04-10 | End: 2024-04-11 | Stop reason: HOSPADM

## 2024-04-10 RX ADMIN — IOPAMIDOL 90 ML: 755 INJECTION, SOLUTION INTRAVENOUS at 14:24

## 2024-04-10 RX ADMIN — HEPARIN 5 ML: 100 SYRINGE at 14:45

## 2024-04-10 NOTE — PROGRESS NOTES
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 Follow-up visit.       History of Present Illness:  Jacki \"Michelle\" IVY Smith (she/her/hers)  is a 58 year old seen at the [facility] for management of progressive stage IIIC high-grade serous ovarian carcinoma. History as follows:    Ovarian Cancer History:  22: Presented to an ED in [place] for evaluation of abdominal bloating and discomfort.  -Abdomen, pelvic CT: Radiographic Stage CAL ovarian cancer. I have personally reviewed the CT images.   22: CA-195=721.   22: Pelvic ultrasound: c/w metastatic cancer. I have personally reviewed the ultrasound images.   22: Pelvic exam under anesthesia, diagnostic laparoscopy, biopsies, evacuation of ascites.   -Findings: On pelvic exam under anesthesia, there was a 6 cm firm, fixed mass adherent to the vaginal cuff. Vagina otherwise smooth. On laparoscopic examination, there was ascites filling the pelvis/abdomen, with >1 liter of fluid evacuated. The palpated mass was arising from the right ovary. Left ovary relatively normal in appearance. There was diffuse carcinomatosis covering the entire peritoneal surface falciform ligament, liver capsule, and mesentery. The omentum was replaced with tumor. Findings were not amenable to optimal tumor debulking so biopsies were taken for histologic examination.   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      22, 22, 22: Cycles 1-3 of neoadjuvant chemotherapy with IV " carboplatin + paclitaxel 175 mg/m2 every 21 days.   -Cycles 1,2: Carboplatin AUC 6.   -Cycle 3: Carboplatin AUC 5 with dose-reduction due to thrombcytopenia.   -8/19/22: Chest, abdomen, pelvic CT: Partial response. I have personally reviewed the CT images.     8/29/22: Pelvic exam under anesthesia, diagnostic laparoscopy, conversion to laparotomy for optimal interval tumor debulking to no gross residual disease including peritoneal and diaphragm biopsies, bilateral salpingo-oophorectomy, infragastric omentectomy, bilateral hemidiaphragm stripping, peritoneal and mesenteric argon beam ablation, appendectomy.   -Pathology: High-grade serous carcinoma involving all resected specimens.     9/28/22, 10/19/22, 11/17/22: Cycles 4-6 of primary chemotherapy with IV carboplatin AUC 5 + paclitaxel 175 mg/m2.  -12/2/22: Chest, abdomen, pelvic CT: No definitive evidence of disease. I have personally reviewed the CT images.   -12/7/22: CA-125=23.   -2/16/23: Chest, abdomen, pelvic CT to evaluate bloating: Stable findings, no definitive evidence of disease. I have personally reviewed the CT images.   -5/25/23: Admitted  to [facility] for management of malignant ascites s/p therapeutic paracentesis, and partial SBO resolved with conservative management.   5/25/23: Abdomen, pelvic CT: Progressive disease.   LOWER CHEST: OMAIRA.   BOWEL: Development of moderate malignant ascites with small peritoneal and serosal implants present. There is mild distention of a few loops of small bowel within the right side of the abdomen with regions of narrowing present compatible with an early or partial small bowel obstruction. Wall thickening in a few of the involved segments with fecalization of the internal contents of the small bowel compatible with stasis.   PELVIC ORGANS: Lobulated 4.5 cm soft tissue mass in the dependent pelvis in the expected location of the uterus.  -6/13/23: CA-125=78.    6/13/23, 7/12/23, 8/14/23, 9/13/23, 10/11/23,  11/8/23, 12/6/23, 1/3/24, 2/1/24, 2/29/24, 3/28/24: Cycles 1-11 of second-line chemotherapy with IV carboplatin AUC 5 + pegylated liposomal doxorubicin (PLD) 30 mg/m2 every 28 days.   -9/27/23: Chest, abdomen, pelvic CT: Partial response. Catheter-associated thrombus.   -1/23/24: Chest, abdomen, pelvic CT: Stable disease. I have personally reviewed and interpreted the CT images.    CHEST: OMAIRA.    Few sub-6 mm pulmonary nodules, unchanged from prior.  Chest wall port catheter. Interval resolution of previous adjacent thrombus within the right brachiocephalic vein.   HEPATOBILIARY: Liver surface nodularity, suggesting underlying cirrhosis. Small ovoid low-attenuation within the falciform ligament, new from 09/27/2023 and likely reflecting extension of peritoneal carcinomatosis. Tiny   low-attenuation lesions of the right hepatic lobe, unchanged.  BOWEL: Pelvic nodule within the central pelvis, tethers adjacent bowel loops and the urinary bladder, measuring 11 x 17 mm (previously 14 x 19 mm).   Small volume loculated ascites, improved from prior.       Genetic/Genomic/Molecular Testing History:  10/5/22: [facility], reflexed to Common Hereditary Cancer Panel.   -No identifiable germline variants identified in ABRAXAS1, AKT1, APC, DEION, AXIN2, BARD1, BMPR1A, BRCA1, BRCA2, BRIP1, CDC73, CDH1, CDK4, CDKN2A, CHEK2, CTNNA1, DICER1, EPCAM, FANCC, FANCM, GREM1, HOXB13, KIT, MEN1, MLH1, MRE11, MSH2, MSH6, MUTYH, NBN, NF1, NTHL1, PALB2, PDGFRA, PIK3CA, PMS2, POLD1, POLE, PTEN, RAD50, RAD51C, RAD51D, RECQL, RINT1, SDHA, SDHB, SDHC, SDHD, SMAD4, SMARCA4, STK11, TP53, TSC1, TSC2, VHL, XRCC2.   -Variant of uncertain significance in MSH3 c.16C>T ([id number])    10/9/22 (tumor 8/29/22): [facility] Testing Results.  -Genomic ROXI low (6%)   -TMB low (1mut/Mb)  -Microsatellite stable/Mismatch Repair proficient  -PD-L1- (CPS 0%)  -NTRK 1/2/3 fusion not detected.   -ER-, WA+  -Genomic alterations in:  --TP53  -No genomic alterations  in BRCA1,2.    Jamila-ovary clinical trial screening: Negative for the immunoreactive subtype.     23 (tumor 22): [facility] Test results.   -FOLR1+ (2+, 75%).       Subjective:  Michelle returns to the gyn onc clinic today for review of CT results and discussion of ongoing management, accompanied by her  and daughter.   Overall Michelle reports feeling well, tolerating chemotherapy well.   She reports increasing fatigue, but she is still able to complete her daily activities.   Bowel movements are normal overall, with a stool softener nightly and miralax prn.       Past Medical History:  Past Medical History:   Diagnosis Date    Female stress incontinence     Ovarian cancer, right (H) 2022    Stage IIIC high-grade serous carcinoma    Recurrent cold sores        Past Surgical History:  Past Surgical History:   Procedure Laterality Date    APPENDECTOMY  2022    Part of ovarian cancer tumor debulking    BLADDER SUSPENSION  2009    HYSTERECTOMY  2009    For prolapse    IR PARACENTESIS  2022    IR PARACENTESIS  2022    IR PARACENTESIS  2023    LAPAROSCOPY DIAGNOSTIC (GYN)  2022    SALPINGO-OOPHORECTOMY, COMBINED Bilateral 2022    Pelvic exam under anesthesia, diagnostic laparoscopy, conversion to laparotomy for optimal interval tumor debulking to no gross residual disease including peritoneal and diaphragm biopsies, bilateral salpingo-oophorectomy, infragastric omentectomy, bilateral hemidiaphragm stripping, peritoneal and mesenteric argon beam ablation, appendectomy.    TONSILLECTOMY  1973    TUBAL LIGATION Bilateral 1998       Gynecologic History:  Surgical menopause. Hysterectomy in  for prolapse.  Was on HRT with estrogen for less than 2 years  No history of abnormal cervical cancer screening.  No history of STIs.  Sexually active, no dyspareunia, no postcoital bleeding.      Obstetric History:  OB History    Para Term  AB Living   3 3 3  0 0 3   SAB IAB Ectopic Multiple Live Births   0 0 0 0 3      # Outcome Date GA Lbr Sukumar/2nd Weight Sex Type Anes PTL Lv   3 Term      Vag-Spont      2 Term      Vag-Spont      1 Term      Vag-Spont           Current Medications:   Current Outpatient Medications   Medication Sig Dispense Refill    cyclobenzaprine (FLEXERIL) 5 MG tablet Take 1-2 Tablets (5-10 mg) by mouth three times a day.      diphenhydrAMINE-acetaminophen (TYLENOL PM)  MG tablet Take 1 tablet by mouth as needed      docusate sodium (DSS) 100 MG capsule Take 100 mg by mouth as needed      HYDROcodone-acetaminophen (NORCO) 5-325 MG tablet Take 1 tablet by mouth every 4 hours as needed      hydrOXYzine (ATARAX) 10 MG tablet Take 1-2 tablets (10-20 mg) by mouth every 6 hours as needed for itching 120 tablet 3    lidocaine-prilocaine (EMLA) 2.5-2.5 % external cream Apply topically as needed (pain due to port access) Apply to port 30 minutes prior to lab appointment. 30 g 6    Multiple Vitamin (MULTI-VITAMIN DAILY PO) Take 1 tablet by mouth daily      sennosides (SENOKOT) 8.6 MG tablet       valACYclovir (VALTREX) 1000 mg tablet Take 1,000 mg by mouth as needed      ondansetron (ZOFRAN) 8 MG tablet Take 1 tablet (8 mg) by mouth every 8 hours as needed for nausea (vomiting) (Patient not taking: Reported on 1/26/2024) 30 tablet 2    prochlorperazine (COMPAZINE) 10 MG tablet Take 1 tablet (10 mg) by mouth every 6 hours as needed for nausea or vomiting (Patient not taking: Reported on 1/26/2024) 30 tablet 2     No current facility-administered medications for this visit.        Allergies:   No Known Allergies        Social History:  Patient lives with Eligio and two daughters. Works as a . She does not have Advanced Directives, but has identified Eligio Bowentamaraolena as her desired Healthcare Power of .  Social History     Tobacco Use    Smoking status: Former     Current packs/day: 0.00     Average packs/day: 1 pack/day for 42.0 years  "(42.0 ttl pk-yrs)     Types: Cigarettes     Start date: 1980     Quit date: 2022     Years since quittin.8    Smokeless tobacco: Never   Substance Use Topics    Alcohol use: Yes     Comment: Beer ~Q3 months.       History   Drug Use Unknown       Family History:   The patient's family history is notable for a first-degree paternal relative with prostate cancer, and a first-degree relative with breast cancer and melanoma, and a second-degree maternal relative with colon cancer. No known family history of ovarian, uterine, urothelial/renal, or pancreatic cancers.   Family History   Problem Relation Age of Onset    Prostate Cancer Father     Breast Cancer Sister 48    Melanoma Sister     Skin Cancer Sister     Colon Cancer Maternal Grandmother        Physical Exam:   /74 (BP Location: Right arm, Patient Position: Chair, Cuff Size: Adult Large)   Pulse 73   Temp 97.8  F (36.6  C)   Resp 16   Ht 1.575 m (5' 2.01\")   Wt 68.9 kg (152 lb)   SpO2 99%   BMI 27.79 kg/m    Body mass index is 27.79 kg/m .    General Appearance: healthy and alert, no distress     HEENT:  no thyromegaly, no palpable nodules or masses        Cardiovascular: regular rate and rhythm, no gallops, rubs or murmurs     Respiratory: lungs clear, no rales, rhonchi or wheezes, normal diaphragmatic excursion    Musculoskeletal: extremities non tender and without edema    Skin: no lesions or rashes     Neurological: normal gait, no gross defects     Psychiatric: appropriate mood and affect                               Hematological: normal cervical, supraclavicular and inguinal lymph nodes     Gastrointestinal:       abdomen soft, non-tender, non-distended, no organomegaly or masses      Laboratory Examination:  CA-125 trend (since second-line therapy):  23  78  23  78  23  68  23  50  10/10/23 53  23  43  23  42  24  45  24  44  24  41   3/25/24  44  I have independently reviewed & " interpreted the CA-125 trend.       Radiographic Examination:  4/10/24: Chest, abdomen, pelvic CT: Stable disease. I have personally reviewed and interpreted the CT images.    CHEST: OMAIRA.   HEPATOBILIARY: Stable small low-density region near the falciform ligament as well as low density along the subcapsular right hepatic lobe posteriorly.   BOWEL:  Slight superior tethering again demonstrated near in a focal region of scarring or   possible peritoneal disease measuring 1.1 x 1.5 cm (axial 254), not significantly changed.      Performance Status:   ECOG Grade 0.       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     History of malignant partial small bowel obstruction, resolved with bowel rest.       Plan:     1.)    Ovarian cancer: I reviewed the CT results with Michelle and provided her with a copy of the CT report; she was also previously provided with a copy of the CT report via [id number]. Given the stable disease, recommend discontinuation of cytotoxic therapy. Discussed surveillance with or without second- PARPi.     After discussion of risks/benefits of all options, Michelle would like to proceed with second- PARPi therapy with olaparib 300 mg BID. Plan as follows:  -Plan to initiate therapy in ~4 weeks (6 weeks after last chemotherapy).   -RTC 1 month after initiating PARPi therapy to review side-effects.   -Once side-effects well-managed, will resume routine surveillance (per NCCN guidelines):  Every 3 months x2 years, then every 6 months x3 years with CA-125 at every visit and imaging as indicated (NP 3x/year, MD annually, all in-person).   She will call in the interim if she has any concerning symptoms.       Side-effects:  -Grade 1 fatigue: No intervention indicated, will  monitor.   --Olaparib counseling provided by CHIKA Mcclain.   --Myelosuppression risk: Monitor CBC weekly x1 month, then monthly x1 year.   --CMP, CA-125 every 3 months.     2.)  Genetic risk factors were assessed: s/p germline testing which was negative for identifiable germline pathogenic variants (see HPI).     3.) Labs and/or tests ordered include:  CBC with diff, CMP, CA-125 at start of PARPi.      4. ) Health maintenance issues addressed today include resuming routine healthcare maintenance with her primary care provider.     5.) Code status:  Full-code.    6.) Prescriptions: Olaparib.       A total of 40 minutes was spent with the patient, 37 minutes of which were spent in counseling the patient and/or treatment planning; an additional 5 minutes was spent in chart review/documentation.      Charis Patino MD, MS, FACOG, FACS  4/12/2024  10:20 AM                CC  Patient Care Team:  Domenica Christianson MD as PCP - Charis Verdugo MD as MD (Gynecologic Oncology)  Susannah Roman APRN CNP as Assigned Cancer Care Provider  Anish Monroy MD as Assigned Palliative Care Provider  Pratima Vega RN as Specialty Care Coordinator (Hematology & Oncology)  SELF, REFERRED

## 2024-04-12 ENCOUNTER — ONCOLOGY VISIT (OUTPATIENT)
Dept: ONCOLOGY | Facility: CLINIC | Age: 59
End: 2024-04-12
Attending: OBSTETRICS & GYNECOLOGY
Payer: COMMERCIAL

## 2024-04-12 VITALS
TEMPERATURE: 97.8 F | HEIGHT: 62 IN | RESPIRATION RATE: 16 BRPM | SYSTOLIC BLOOD PRESSURE: 113 MMHG | WEIGHT: 152 LBS | OXYGEN SATURATION: 99 % | HEART RATE: 73 BPM | DIASTOLIC BLOOD PRESSURE: 74 MMHG | BODY MASS INDEX: 27.97 KG/M2

## 2024-04-12 DIAGNOSIS — R53.0 NEOPLASTIC MALIGNANT RELATED FATIGUE: ICD-10-CM

## 2024-04-12 DIAGNOSIS — C56.1 OVARIAN CANCER, RIGHT (H): Primary | ICD-10-CM

## 2024-04-12 PROCEDURE — 99215 OFFICE O/P EST HI 40 MIN: CPT | Performed by: OBSTETRICS & GYNECOLOGY

## 2024-04-12 PROCEDURE — 99213 OFFICE O/P EST LOW 20 MIN: CPT | Performed by: OBSTETRICS & GYNECOLOGY

## 2024-04-12 ASSESSMENT — PAIN SCALES - GENERAL: PAINLEVEL: NO PAIN (0)

## 2024-04-12 NOTE — LETTER
"    2024         RE: Jacki Smith  [address] [facility] [address]        Dear Colleague,    Thank you for referring your patient, Jacki Smith, to the [facility] CANCER [facility]. Please see a copy of my visit note below.    Follow-up Note on Referred Patient  [facility]      Date of visit: 2024        Primary Oncologist:  Charis Patino MD  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 Follow-up visit.       History of Present Illness:  Jacki \"Michelle\" IVY Smith (she/her/hers)  is a 58 year old seen at the [facility] for management of progressive stage IIIC high-grade serous ovarian carcinoma. History as follows:    Ovarian Cancer History:  22: Presented to an ED in [place] for evaluation of abdominal bloating and discomfort.  -Abdomen, pelvic CT: Radiographic Stage CAL ovarian cancer. I have personally reviewed the CT images.   22: [id number].   22: Pelvic ultrasound: c/w metastatic cancer. I have personally reviewed the ultrasound images.   22: Pelvic exam under anesthesia, diagnostic laparoscopy, biopsies, evacuation of ascites.   -Findings: On pelvic exam under anesthesia, there was a 6 cm firm, fixed mass adherent to the vaginal cuff. Vagina otherwise smooth. On laparoscopic examination, there was ascites filling the pelvis/abdomen, with >1 liter of fluid evacuated. The palpated mass was arising from the right ovary. Left ovary relatively normal in appearance. There was diffuse carcinomatosis covering the entire peritoneal surface falciform ligament, liver capsule, and mesentery. The omentum was replaced with tumor. Findings were not amenable to optimal tumor " debulking so biopsies were taken for histologic examination.   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      6/22/22, 7/13/22, 8/4/22: Cycles 1-3 of neoadjuvant chemotherapy with IV carboplatin + paclitaxel 175 mg/m2 every 21 days.   -Cycles 1,2: Carboplatin AUC 6.   -Cycle 3: Carboplatin AUC 5 with dose-reduction due to thrombcytopenia.   -8/19/22: Chest, abdomen, pelvic CT: Partial response. I have personally reviewed the CT images.     8/29/22: Pelvic exam under anesthesia, diagnostic laparoscopy, conversion to laparotomy for optimal interval tumor debulking to no gross residual disease including peritoneal and diaphragm biopsies, bilateral salpingo-oophorectomy, infragastric omentectomy, bilateral hemidiaphragm stripping, peritoneal and mesenteric argon beam ablation, appendectomy.   -Pathology: High-grade serous carcinoma involving all resected specimens.     9/28/22, 10/19/22, 11/17/22: Cycles 4-6 of primary chemotherapy with IV carboplatin AUC 5 + paclitaxel 175 mg/m2.  -12/2/22: Chest, abdomen, pelvic CT: No definitive evidence of disease. I have personally reviewed the CT images.   -12/7/22: CA-125=23.   -2/16/23: Chest, abdomen, pelvic CT to evaluate bloating: Stable findings, no definitive evidence of disease. I have personally reviewed the CT images.   -5/25/23: Admitted  to [facility] for management of malignant ascites s/p therapeutic paracentesis, and partial SBO resolved with conservative management.   5/25/23: Abdomen, pelvic CT: Progressive disease.   LOWER CHEST: OMAIRA.   BOWEL: Development of moderate malignant ascites with small peritoneal and serosal implants present. There is mild distention of a few loops of small bowel within the right side of the abdomen with regions of narrowing present compatible with an early or partial small bowel obstruction. Wall thickening in a few of the involved segments with fecalization  of the internal contents of the small bowel compatible with stasis.   PELVIC ORGANS: Lobulated 4.5 cm soft tissue mass in the dependent pelvis in the expected location of the uterus.  -6/13/23: CA-125=78.    6/13/23, 7/12/23, 8/14/23, 9/13/23, 10/11/23, 11/8/23, 12/6/23, 1/3/24, 2/1/24, 2/29/24, 3/28/24: Cycles 1-11 of second-line chemotherapy with IV carboplatin AUC 5 + pegylated liposomal doxorubicin (PLD) 30 mg/m2 every 28 days.   -9/27/23: Chest, abdomen, pelvic CT: Partial response. Catheter-associated thrombus.   -1/23/24: Chest, abdomen, pelvic CT: Stable disease. I have personally reviewed and interpreted the CT images.    CHEST: OMAIRA.    Few sub-6 mm pulmonary nodules, unchanged from prior.  Chest wall port catheter. Interval resolution of previous adjacent thrombus within the right brachiocephalic vein.   HEPATOBILIARY: Liver surface nodularity, suggesting underlying cirrhosis. Small ovoid low-attenuation within the falciform ligament, new from 09/27/2023 and likely reflecting extension of peritoneal carcinomatosis. Tiny   low-attenuation lesions of the right hepatic lobe, unchanged.  BOWEL: Pelvic nodule within the central pelvis, tethers adjacent bowel loops and the urinary bladder, measuring 11 x 17 mm (previously 14 x 19 mm).   Small volume loculated ascites, improved from prior.       Genetic/Genomic/Molecular Testing History:  10/5/22: [facility], reflexed to Common Hereditary Cancer Panel.   -No identifiable germline variants identified in ABRAXAS1, AKT1, APC, DEION, AXIN2, BARD1, BMPR1A, BRCA1, BRCA2, BRIP1, CDC73, CDH1, CDK4, CDKN2A, CHEK2, CTNNA1, DICER1, EPCAM, FANCC, FANCM, GREM1, HOXB13, KIT, MEN1, MLH1, MRE11, MSH2, MSH6, MUTYH, NBN, NF1, NTHL1, PALB2, PDGFRA, PIK3CA, PMS2, POLD1, POLE, PTEN, RAD50, RAD51C, RAD51D, RECQL, RINT1, SDHA, SDHB, SDHC, SDHD, SMAD4, SMARCA4, STK11, TP53, TSC1, TSC2, VHL, XRCC2.   -Variant of uncertain significance in MSH3 c.16C>T (p.Pyq1Bif)    10/9/22  (tumor 8/29/22): [facility] Testing Results.  -Genomic ROXI low (6%)   -TMB low (1mut/Mb)  -Microsatellite stable/Mismatch Repair proficient  -PD-L1- (CPS 0%)  -NTRK 1/2/3 fusion not detected.   -ER-, AL+  -Genomic alterations in:  --TP53  -No genomic alterations in BRCA1,2.    Jamila-ovary clinical trial screening: Negative for the immunoreactive subtype.     6/19/23 (tumor 8/29/22): [facility] Test results.   -FOLR1+ (2+, 75%).       Subjective:  Michelle returns to the gyn onc clinic today for review of CT results and discussion of ongoing management, accompanied by her  and daughter.   Overall Michelle reports feeling well, tolerating chemotherapy well.   She reports increasing fatigue, but she is still able to complete her daily activities.   Bowel movements are normal overall, with a stool softener nightly and miralax prn.       Past Medical History:  Past Medical History:   Diagnosis Date     Female stress incontinence      Ovarian cancer, right (H) 06/16/2022    Stage IIIC high-grade serous carcinoma     Recurrent cold sores        Past Surgical History:  Past Surgical History:   Procedure Laterality Date     APPENDECTOMY  08/29/2022    Part of ovarian cancer tumor debulking     BLADDER SUSPENSION  08/13/2009     HYSTERECTOMY  08/13/2009    For prolapse     IR PARACENTESIS  6/6/2022     IR PARACENTESIS  6/14/2022     IR PARACENTESIS  5/26/2023     LAPAROSCOPY DIAGNOSTIC (GYN)  06/16/2022     SALPINGO-OOPHORECTOMY, COMBINED Bilateral 08/29/2022    Pelvic exam under anesthesia, diagnostic laparoscopy, conversion to laparotomy for optimal interval tumor debulking to no gross residual disease including peritoneal and diaphragm biopsies, bilateral salpingo-oophorectomy, infragastric omentectomy, bilateral hemidiaphragm stripping, peritoneal and mesenteric argon beam ablation, appendectomy.     TONSILLECTOMY  1973     TUBAL LIGATION Bilateral 09/01/1998       Gynecologic History:  Surgical menopause. Hysterectomy in 2009  for prolapse.  Was on HRT with estrogen for less than 2 years  No history of abnormal cervical cancer screening.  No history of STIs.  Sexually active, no dyspareunia, no postcoital bleeding.      Obstetric History:  OB History    Para Term  AB Living   3 3 3 0 0 3   SAB IAB Ectopic Multiple Live Births   0 0 0 0 3      # Outcome Date GA Lbr Sukumar/2nd Weight Sex Type Anes PTL Lv   3 Term      Vag-Spont      2 Term      Vag-Spont      1 Term      Vag-Spont           Current Medications:   Current Outpatient Medications   Medication Sig Dispense Refill     cyclobenzaprine (FLEXERIL) 5 MG tablet Take 1-2 Tablets (5-10 mg) by mouth three times a day.       diphenhydrAMINE-acetaminophen (TYLENOL PM)  MG tablet Take 1 tablet by mouth as needed       docusate sodium (DSS) 100 MG capsule Take 100 mg by mouth as needed       HYDROcodone-acetaminophen (NORCO) 5-325 MG tablet Take 1 tablet by mouth every 4 hours as needed       hydrOXYzine (ATARAX) 10 MG tablet Take 1-2 tablets (10-20 mg) by mouth every 6 hours as needed for itching 120 tablet 3     lidocaine-prilocaine (EMLA) 2.5-2.5 % external cream Apply topically as needed (pain due to port access) Apply to port 30 minutes prior to lab appointment. 30 g 6     Multiple Vitamin (MULTI-VITAMIN DAILY PO) Take 1 tablet by mouth daily       sennosides (SENOKOT) 8.6 MG tablet        valACYclovir (VALTREX) 1000 mg tablet Take 1,000 mg by mouth as needed       ondansetron (ZOFRAN) 8 MG tablet Take 1 tablet (8 mg) by mouth every 8 hours as needed for nausea (vomiting) (Patient not taking: Reported on 2024) 30 tablet 2     prochlorperazine (COMPAZINE) 10 MG tablet Take 1 tablet (10 mg) by mouth every 6 hours as needed for nausea or vomiting (Patient not taking: Reported on 2024) 30 tablet 2     No current facility-administered medications for this visit.        Allergies:   No Known Allergies        Social History:  Patient lives with Eligio and two  "daughters. Works as a . She does not have Advanced Directives, but has identified Eligio Smith as her desired Healthcare Power of .  Social History     Tobacco Use     Smoking status: Former     Current packs/day: 0.00     Average packs/day: 1 pack/day for 42.0 years (42.0 ttl pk-yrs)     Types: Cigarettes     Start date: 1980     Quit date: 2022     Years since quittin.8     Smokeless tobacco: Never   Substance Use Topics     Alcohol use: Yes     Comment: Beer ~Q3 months.       History   Drug Use Unknown       Family History:   The patient's family history is notable for a first-degree paternal relative with prostate cancer, and a first-degree relative with breast cancer and melanoma, and a second-degree maternal relative with colon cancer. No known family history of ovarian, uterine, urothelial/renal, or pancreatic cancers.   Family History   Problem Relation Age of Onset     Prostate Cancer Father      Breast Cancer Sister 48     Melanoma Sister      Skin Cancer Sister      Colon Cancer Maternal Grandmother        Physical Exam:   /74 (BP Location: Right arm, Patient Position: Chair, Cuff Size: Adult Large)   Pulse 73   Temp 97.8  F (36.6  C)   Resp 16   Ht 1.575 m (5' 2.01\")   Wt 68.9 kg (152 lb)   SpO2 99%   BMI 27.79 kg/m    Body mass index is 27.79 kg/m .    General Appearance: healthy and alert, no distress     HEENT:  no thyromegaly, no palpable nodules or masses        Cardiovascular: regular rate and rhythm, no gallops, rubs or murmurs     Respiratory: lungs clear, no rales, rhonchi or wheezes, normal diaphragmatic excursion    Musculoskeletal: extremities non tender and without edema    Skin: no lesions or rashes     Neurological: normal gait, no gross defects     Psychiatric: appropriate mood and affect                               Hematological: normal cervical, supraclavicular and inguinal lymph nodes     Gastrointestinal:       abdomen soft, " non-tender, non-distended, no organomegaly or masses      Laboratory Examination:  CA-125 trend (since second-line therapy):  6/13/23  78  7/11/23  78  8/11/23  68  9/12/23  50  10/10/23 53  11/7/23  43  12/5/23  42  1/2/24  45  2/1/24  44  2/27/24  41   3/25/24  44  I have independently reviewed & interpreted the CA-125 trend.       Radiographic Examination:  4/10/24: Chest, abdomen, pelvic CT: Stable disease. I have personally reviewed and interpreted the CT images.    CHEST: OMAIRA.   HEPATOBILIARY: Stable small low-density region near the falciform ligament as well as low density along the subcapsular right hepatic lobe posteriorly.   BOWEL:  Slight superior tethering again demonstrated near in a focal region of scarring or   possible peritoneal disease measuring 1.1 x 1.5 cm (axial 254), not significantly changed.      Performance Status:   ECOG Grade 0.       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     History of malignant partial small bowel obstruction, resolved with bowel rest.       Plan:     1.)    Ovarian cancer: I reviewed the CT results with Michelle and provided her with a copy of the CT report; she was also previously provided with a copy of the CT report via [facility]. Given the stable disease, recommend discontinuation of cytotoxic therapy. Discussed surveillance with or without second- PARPi.     After discussion of risks/benefits of all options, Michelle would like to proceed with second- PARPi therapy with olaparib 300 mg BID. Plan as follows:  -Plan to initiate therapy in ~4 weeks (6 weeks after last chemotherapy).   -RTC 1 month after initiating PARPi therapy to review side-effects.   -Once side-effects well-managed, will resume routine surveillance (per NCCN  guidelines):  Every 3 months x2 years, then every 6 months x3 years with CA-125 at every visit and imaging as indicated (NP 3x/year, MD annually, all in-person).   She will call in the interim if she has any concerning symptoms.       Side-effects:  -Grade 1 fatigue: No intervention indicated, will monitor.   --Olaparib counseling provided by CHIKA Mcclain.   --Myelosuppression risk: Monitor CBC weekly x1 month, then monthly x1 year.   --CMP, CA-125 every 3 months.     2.)  Genetic risk factors were assessed: s/p germline testing which was negative for identifiable germline pathogenic variants (see HPI).     3.) Labs and/or tests ordered include:  CBC with diff, CMP, CA-125 at start of PARPi.      4. ) Health maintenance issues addressed today include resuming routine healthcare maintenance with her primary care provider.     5.) Code status:  Full-code.    6.) Prescriptions: Olaparib.       A total of 40 minutes was spent with the patient, 37 minutes of which were spent in counseling the patient and/or treatment planning; an additional 5 minutes was spent in chart review/documentation.      Charis Patino MD, MS, FACOG, FACS  4/12/2024  10:20 AM                CC  Patient Care Team:  Domenica Christianson MD as PCP - General Patino, Charis Meadows MD as MD (Gynecologic Oncology)  Susannah Roman APRN CNP as Assigned Cancer Care Provider  Anish Monroy MD as Assigned Palliative Care Provider  Pratima Vega RN as Specialty Care Coordinator (Hematology & Oncology)  SELF, REFERRED    Again, thank you for allowing me to participate in the care of your patient.        Sincerely,        Charis Patino MD

## 2024-04-12 NOTE — PATIENT INSTRUCTIONS
SCHEDULING:  -RTC ~8 weeks (~1 month after olaparib initiation) for follow-up (NP, virtual visit) --order(s) placed   -Baseline CBC with diff, CMP, CA-125 in 1 month at start of olaparib.   -Weekly CBC with diff x1 month after initiation of olaparib.  -After 1 month: Monthly CBC with diff.       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  Treatment History:  -6/22/22-8/4/22: Neoadjuvant chemotherapy with IV carboplatin + paclitaxel x3 cycles. Partial response.   -8/29/22: Pelvic exam under anesthesia, diagnostic laparoscopy (look inside the pelvis/abdomen with a camera), conversion to laparotomy (large incision) for optimal interval tumor debulking to no gross residual disease including peritoneal and diaphragm biopsies, bilateral salpingo-oophorectomy (removal of bilateral ovaries & fallopian tubes), infragastric omentectomy (removal of the fat curtain hanging off the stomach/colon), bilateral hemidiaphragm stripping, peritoneal and mesenteric argon beam ablation (destruction of tumor), appendectomy.   -9/28/22-11/17/22: First-line chemotherapy with carboplatin + paclitaxel x3 cycles (total 6 cycles).   -6/13/23-3/28/24: Second-line chemotherapy with IV carboplatin + pegylated liposomal doxorubicin (PLD/doxil) x11 cycles. Partial response.         PLAN:  1) Ovarian cancer: Plan for second-line PARP-inhibitor maintenance therapy--Initiate 5/10/24.   DRUG: Olaparib   SCHEDULE: 2 tablets twice per day.   PLAN: Until disease progression.   Olaparib counseling & written information provided by CHIKA Mcclain.     Surveillance as follows (per NCCN guidelines):  Every 3 months x2 years, then every 6 months x3 years with CA-125 at every visit and imaging as indicated.   Please call in the interim if you have any concerning symptoms.        2) Genetic testing: negative for any identifiable germline variants. No additional testing recommended for you or  your family.        Please call with any questions or concerns. [phone]       Charis Patino MD, MS, FACOG, FACS  4/12/2024  10:24 AM

## 2024-04-12 NOTE — NURSING NOTE
"Oncology Rooming Note    April 12, 2024 9:33 AM   Jacki Smith is a 58 year old female who presents for:    Chief Complaint   Patient presents with    Oncology Clinic Visit     [facility] RETURN - OVARIAN CANCER     Initial Vitals: /74 (BP Location: Right arm, Patient Position: Chair, Cuff Size: Adult Large)   Pulse 73   Temp 97.8  F (36.6  C)   Resp 16   Ht 1.575 m (5' 2.01\")   Wt 68.9 kg (152 lb)   SpO2 99%   BMI 27.79 kg/m   Estimated body mass index is 27.79 kg/m  as calculated from the following:    Height as of this encounter: 1.575 m (5' 2.01\").    Weight as of this encounter: 68.9 kg (152 lb). Body surface area is 1.74 meters squared.  No Pain (0) Comment: Data Unavailable   No LMP recorded. Patient has had a hysterectomy.  Allergies reviewed: Yes  Medications reviewed: Yes    Medications: Medication refills not needed today.  Pharmacy name entered into [facility]: [id number] [address] [facility]    Frailty Screening:   Is the patient here for a new oncology consult visit in cancer care? 2. No    Sebastián Bledsoe LPN              "

## 2024-04-12 NOTE — NURSING NOTE
Return appt in  2 month with NP and 3 months with md  Labs weekly for 4 weeks after starting parp inhibitor then monthly  Check out note sent to scheduling 4/12/24

## 2024-04-29 ENCOUNTER — PATIENT OUTREACH (OUTPATIENT)
Dept: ONCOLOGY | Facility: CLINIC | Age: 59
End: 2024-04-29
Payer: COMMERCIAL

## 2024-05-14 ENCOUNTER — TELEPHONE (OUTPATIENT)
Dept: ONCOLOGY | Facility: CLINIC | Age: 59
End: 2024-05-14
Payer: COMMERCIAL

## 2024-05-14 DIAGNOSIS — C56.1 OVARIAN CANCER, RIGHT (H): Primary | ICD-10-CM

## 2024-05-14 RX ORDER — ONDANSETRON 8 MG/1
8 TABLET, FILM COATED ORAL EVERY 8 HOURS PRN
Qty: 30 TABLET | Refills: 2 | Status: SHIPPED | OUTPATIENT
Start: 2024-05-14

## 2024-05-14 NOTE — TELEPHONE ENCOUNTER
COPAY CARD OBTAINED    Medication: LYNPARZA 150 MG PO TABS  Sponsor: [employer]  Member ID: [id number]  BIN: [id number]  PCN:  [id number]  Group: [id number]  Expected Copay: $    Copay card Monthly Max Amount: $    Copay card Annual Amount: $ 26,000        Maureen Luna CPhTOncology Pharmacy [facility] [address] [address]  Office: [phone]  Fax: [phone]  [email or web address]

## 2024-05-14 NOTE — TELEPHONE ENCOUNTER
MEDICATION APPEAL APPROVED    Medication: LYNPARZA 150 MG PO TABS  Authorization Effective Date: 5/14/2024  Authorization Expiration Date: 5/14/2025  Approved Dose/Quantity:   Reference #:     [employer] Company:   Expected CoPay: $       CoPay Card Available: Yes  Financial Assistance Needed:   Filling Pharmacy: [facility] - [place], [place] - [address]   Patient Notified:   Comments:           Maureen Luna CPhTOncology Pharmacy [facility] [address] [address]  Office: [phone]  Fax: [phone]  [email or web address]

## 2024-05-14 NOTE — TELEPHONE ENCOUNTER
"Oral Chemotherapy Monitoring Program    Lab Monitoring Plan  CBC and CMP monthly  Per Dr. Patino: Baseline CBC with diff, CMP, CA-125 in 1 month at start of olaparib.   -Weekly CBC with diff x1 month after initiation of olaparib.  -After 1 month: Monthly CBC with diff  Labs drawn outside of [place]: no  Subjective/Objective:  Jacki Smith is a 59 year old female contacted by phone for an initial visit for oral chemotherapy education.          4/12/2024     3:00 PM 5/14/2024     2:00 PM   ORAL CHEMOTHERAPY   Assessment Type Initial Work up Initial Follow up;New Teach   Diagnosis Code Ovarian Cancer Ovarian Cancer   Providers Carey Patino   [facility]   Drug Name Lynparza (olaparib) Lynparza (olaparib)   Dose 300 mg 300 mg   Current Schedule BID BID   Cycle Details Continuous Continuous   Planned next cycle start date 5/9/2024 5/16/2024   Any new drug interactions?  No   Is the dose as ordered appropriate for the patient?  Yes       Last PHQ-2 Score on record:        No data to display                Vitals:  BP:   BP Readings from Last 1 Encounters:   04/12/24 113/74     Wt Readings from Last 1 Encounters:   04/12/24 68.9 kg (152 lb)     Estimated body surface area is 1.74 meters squared as calculated from the following:    Height as of 4/12/24: 1.575 m (5' 2.01\").    Weight as of 4/12/24: 68.9 kg (152 lb).    Labs:  No results found for NA within last 30 days.     No results found for K within last 30 days.     No results found for CA within last 30 days.     No results found for Mag within last 30 days.     No results found for Phos within last 30 days.     No results found for ALBUMIN within last 30 days.     No results found for BUN within last 30 days.     No results found for CR within last 30 days.     No results found for AST within last 30 days.     No results found for ALT within last 30 days.     No results found for BILITOTAL within last 30 days.     No results found for WBC " ----- Message from Anabel Cates sent at 2/6/2018  9:25 AM CST -----  No. [phone]    Patient is in the neighborhood now.   She would like to come by and  her xray disc.    Please call.     within last 30 days.     No results found for HGB within last 30 days.     No results found for PLT within last 30 days.     No results found for ANC within last 30 days.     No results found for ANC within last 30 days.        Assessment:  Patient is appropriate to start therapy pending baseline labs in the next few days.    Plan:  Basic chemotherapy teaching was reviewed with the patient including indication, start date of therapy, dose, administration, adverse effects, missed doses, food and drug interactions, monitoring, side effect management, office contact information, and safe handling. Written materials were provided and all questions answered.    Follow-Up:  5/15: look for baseline labs prior to start of therapy     Grace Myhre, PharmD  Hematology/Oncology Pharmacist  [facility]  [phone]    ADDENDUM 5/15/24 1314:    Oral Chemotherapy Monitoring Program     Reviewed baseline CBC and CMP completed 5/15. Scr slightly elevated, but ok to start Lynparza. Encouraged hydration.    Message sent to Dr. Patino letting her know patient is starting Lynparza 5/16.    Pt has the OC phone number for Lynparza questions/concerns, and the triage phone # for urgent issues.    Will follow up in one week for initial assessment of Lynparza and first set of weekly labs.    Grace Myhre, PharmD  Hematology/Oncology Clinical Pharmacist  [facility]  [phone]      ,

## 2024-05-15 ENCOUNTER — LAB (OUTPATIENT)
Dept: INFUSION THERAPY | Facility: HOSPITAL | Age: 59
End: 2024-05-15
Attending: OBSTETRICS & GYNECOLOGY
Payer: COMMERCIAL

## 2024-05-15 DIAGNOSIS — C56.1 OVARIAN CANCER, RIGHT (H): ICD-10-CM

## 2024-05-15 LAB
ALBUMIN SERPL BCG-MCNC: 3.7 G/DL (ref 3.5–5.2)
ALP SERPL-CCNC: 185 U/L (ref 40–150)
ALT SERPL W P-5'-P-CCNC: 15 U/L (ref 0–50)
ANION GAP SERPL CALCULATED.3IONS-SCNC: 9 MMOL/L (ref 7–15)
AST SERPL W P-5'-P-CCNC: 35 U/L (ref 0–45)
BASOPHILS # BLD AUTO: 0 10E3/UL (ref 0–0.2)
BASOPHILS NFR BLD AUTO: 1 %
BILIRUB SERPL-MCNC: 0.4 MG/DL
BUN SERPL-MCNC: 13.3 MG/DL (ref 8–23)
CALCIUM SERPL-MCNC: 9 MG/DL (ref 8.6–10)
CHLORIDE SERPL-SCNC: 102 MMOL/L (ref 98–107)
CREAT SERPL-MCNC: 1.04 MG/DL (ref 0.51–0.95)
DEPRECATED HCO3 PLAS-SCNC: 25 MMOL/L (ref 22–29)
EGFRCR SERPLBLD CKD-EPI 2021: 62 ML/MIN/1.73M2
EOSINOPHIL # BLD AUTO: 0.3 10E3/UL (ref 0–0.7)
EOSINOPHIL NFR BLD AUTO: 6 %
ERYTHROCYTE [DISTWIDTH] IN BLOOD BY AUTOMATED COUNT: 13.6 % (ref 10–15)
GLUCOSE SERPL-MCNC: 88 MG/DL (ref 70–99)
HCT VFR BLD AUTO: 33.6 % (ref 35–47)
HGB BLD-MCNC: 11 G/DL (ref 11.7–15.7)
IMM GRANULOCYTES # BLD: 0 10E3/UL
IMM GRANULOCYTES NFR BLD: 1 %
LYMPHOCYTES # BLD AUTO: 1.2 10E3/UL (ref 0.8–5.3)
LYMPHOCYTES NFR BLD AUTO: 24 %
MCH RBC QN AUTO: 31.7 PG (ref 26.5–33)
MCHC RBC AUTO-ENTMCNC: 32.7 G/DL (ref 31.5–36.5)
MCV RBC AUTO: 97 FL (ref 78–100)
MONOCYTES # BLD AUTO: 0.5 10E3/UL (ref 0–1.3)
MONOCYTES NFR BLD AUTO: 10 %
NEUTROPHILS # BLD AUTO: 3 10E3/UL (ref 1.6–8.3)
NEUTROPHILS NFR BLD AUTO: 59 %
NRBC # BLD AUTO: 0 10E3/UL
NRBC BLD AUTO-RTO: 0 /100
PLATELET # BLD AUTO: 155 10E3/UL (ref 150–450)
POTASSIUM SERPL-SCNC: 4.7 MMOL/L (ref 3.4–5.3)
PROT SERPL-MCNC: 6.4 G/DL (ref 6.4–8.3)
RBC # BLD AUTO: 3.47 10E6/UL (ref 3.8–5.2)
SODIUM SERPL-SCNC: 136 MMOL/L (ref 135–145)
WBC # BLD AUTO: 5.1 10E3/UL (ref 4–11)

## 2024-05-15 PROCEDURE — 85025 COMPLETE CBC W/AUTO DIFF WBC: CPT

## 2024-05-15 PROCEDURE — 36415 COLL VENOUS BLD VENIPUNCTURE: CPT

## 2024-05-15 PROCEDURE — 86304 IMMUNOASSAY TUMOR CA 125: CPT

## 2024-05-15 PROCEDURE — 80053 COMPREHEN METABOLIC PANEL: CPT

## 2024-05-16 LAB — CANCER AG125 SERPL-ACNC: 38 U/ML

## 2024-05-23 ENCOUNTER — LAB (OUTPATIENT)
Dept: INFUSION THERAPY | Facility: HOSPITAL | Age: 59
End: 2024-05-23
Attending: OBSTETRICS & GYNECOLOGY
Payer: COMMERCIAL

## 2024-05-23 ENCOUNTER — TELEPHONE (OUTPATIENT)
Dept: ONCOLOGY | Facility: CLINIC | Age: 59
End: 2024-05-23

## 2024-05-23 DIAGNOSIS — C56.1 OVARIAN CANCER, RIGHT (H): ICD-10-CM

## 2024-05-23 LAB
ALBUMIN SERPL BCG-MCNC: 3.9 G/DL (ref 3.5–5.2)
ALP SERPL-CCNC: 187 U/L (ref 40–150)
ALT SERPL W P-5'-P-CCNC: 13 U/L (ref 0–50)
ANION GAP SERPL CALCULATED.3IONS-SCNC: 8 MMOL/L (ref 7–15)
AST SERPL W P-5'-P-CCNC: 34 U/L (ref 0–45)
BASOPHILS # BLD AUTO: 0 10E3/UL (ref 0–0.2)
BASOPHILS NFR BLD AUTO: 1 %
BILIRUB SERPL-MCNC: 0.3 MG/DL
BUN SERPL-MCNC: 13.6 MG/DL (ref 8–23)
CALCIUM SERPL-MCNC: 9 MG/DL (ref 8.6–10)
CANCER AG125 SERPL-ACNC: 40 U/ML
CHLORIDE SERPL-SCNC: 100 MMOL/L (ref 98–107)
CREAT SERPL-MCNC: 1.18 MG/DL (ref 0.51–0.95)
DEPRECATED HCO3 PLAS-SCNC: 26 MMOL/L (ref 22–29)
EGFRCR SERPLBLD CKD-EPI 2021: 53 ML/MIN/1.73M2
EOSINOPHIL # BLD AUTO: 0.3 10E3/UL (ref 0–0.7)
EOSINOPHIL NFR BLD AUTO: 6 %
ERYTHROCYTE [DISTWIDTH] IN BLOOD BY AUTOMATED COUNT: 13.3 % (ref 10–15)
GLUCOSE SERPL-MCNC: 79 MG/DL (ref 70–99)
HCT VFR BLD AUTO: 33.7 % (ref 35–47)
HGB BLD-MCNC: 11.2 G/DL (ref 11.7–15.7)
IMM GRANULOCYTES # BLD: 0 10E3/UL
IMM GRANULOCYTES NFR BLD: 1 %
LYMPHOCYTES # BLD AUTO: 1.1 10E3/UL (ref 0.8–5.3)
LYMPHOCYTES NFR BLD AUTO: 19 %
MCH RBC QN AUTO: 31.9 PG (ref 26.5–33)
MCHC RBC AUTO-ENTMCNC: 33.2 G/DL (ref 31.5–36.5)
MCV RBC AUTO: 96 FL (ref 78–100)
MONOCYTES # BLD AUTO: 0.4 10E3/UL (ref 0–1.3)
MONOCYTES NFR BLD AUTO: 7 %
NEUTROPHILS # BLD AUTO: 3.8 10E3/UL (ref 1.6–8.3)
NEUTROPHILS NFR BLD AUTO: 68 %
NRBC # BLD AUTO: 0 10E3/UL
NRBC BLD AUTO-RTO: 0 /100
PLATELET # BLD AUTO: 141 10E3/UL (ref 150–450)
POTASSIUM SERPL-SCNC: 4.2 MMOL/L (ref 3.4–5.3)
PROT SERPL-MCNC: 6.5 G/DL (ref 6.4–8.3)
RBC # BLD AUTO: 3.51 10E6/UL (ref 3.8–5.2)
SODIUM SERPL-SCNC: 134 MMOL/L (ref 135–145)
WBC # BLD AUTO: 5.7 10E3/UL (ref 4–11)

## 2024-05-23 PROCEDURE — 85025 COMPLETE CBC W/AUTO DIFF WBC: CPT

## 2024-05-23 PROCEDURE — 80053 COMPREHEN METABOLIC PANEL: CPT

## 2024-05-23 PROCEDURE — 86304 IMMUNOASSAY TUMOR CA 125: CPT

## 2024-05-23 PROCEDURE — 36415 COLL VENOUS BLD VENIPUNCTURE: CPT

## 2024-05-23 NOTE — ORAL ONC MGMT
Oral Chemotherapy Monitoring Program    Subjective/Objective:  Jacki Smith is a 59 year old female contacted by phone for a follow-up visit for oral chemotherapy.  Michelle confirms taking the appropriate dose of Lynparza 300mg ([id number] tablets) twice daily. Denies missed doses, medication changes, and recent hospital or ED visits. Michelle shares that she has experienced a little nausea occasionally since starting the Lynparza. She is taking ondansetron which is helping.  She is also experiencing some constipation, but she has always had issues with this.  She takes stool softeners and has Miralax to use as well.  Her creatinine was a little higher than normal today.  We discussed pushing fluids.  Michelle said that for about 4-5 hours after she takes her Lynparza dose, she has an aversion to liquids.  They really taste terrible.  She will make a conscious effort to drink more fluids.            4/12/2024     3:00 PM 5/14/2024     2:00 PM 5/15/2024     1:00 PM 5/23/2024    11:00 AM   ORAL CHEMOTHERAPY   Assessment Type Initial Work up Initial Follow up;New Teach Lab Monitoring Lab Monitoring;Initial Follow up   Diagnosis Code Ovarian Cancer Ovarian Cancer Ovarian Cancer Ovarian Cancer   [facility]   [facility]   Drug Name Lynparza (olaparib) Lynparza (olaparib) Lynparza (olaparib) Lynparza (olaparib)   Dose 300 mg 300 mg 300 mg 300 mg   Current Schedule BID BID BID BID   Cycle Details Continuous Continuous Continuous Continuous   Planned next cycle start date 5/9/2024 5/16/2024 5/16/2024 5/16/2024   Doses missed in last 2 weeks    0   Adherence Assessment    Adherent   Adverse Effects    Nausea   Nausea    Grade 1   Pharmacist Intervention(nausea)    No   Any new drug interactions?  No  No   Is the dose as ordered appropriate for the patient?  Yes  Yes   Since the last time we talked, have you been hospitalized or used the emergency room?    No  "      Last PHQ-2 Score on record:        No data to display                Vitals:  BP:   BP Readings from Last 1 Encounters:   04/12/24 113/74     Wt Readings from Last 1 Encounters:   04/12/24 68.9 kg (152 lb)     Estimated body surface area is 1.74 meters squared as calculated from the following:    Height as of 4/12/24: 1.575 m (5' 2.01\").    Weight as of 4/12/24: 68.9 kg (152 lb).    Labs:  _  Result Component Current Result Ref Range   Sodium 134 (L) (5/23/2024) 135 - 145 mmol/L     _  Result Component Current Result Ref Range   Potassium 4.2 (5/23/2024) 3.4 - 5.3 mmol/L     _  Result Component Current Result Ref Range   Calcium 9.0 (5/23/2024) 8.6 - 10.0 mg/dL     No results found for Mag within last 30 days.     No results found for Phos within last 30 days.     _  Result Component Current Result Ref Range   Albumin 3.9 (5/23/2024) 3.5 - 5.2 g/dL     _  Result Component Current Result Ref Range   Urea Nitrogen 13.6 (5/23/2024) 8.0 - 23.0 mg/dL     _  Result Component Current Result Ref Range   Creatinine 1.18 (H) (5/23/2024) 0.51 - 0.95 mg/dL     _  Result Component Current Result Ref Range   AST 34 (5/23/2024) 0 - 45 U/L     _  Result Component Current Result Ref Range   ALT 13 (5/23/2024) 0 - 50 U/L     _  Result Component Current Result Ref Range   Bilirubin Total 0.3 (5/23/2024) <=1.2 mg/dL     _  Result Component Current Result Ref Range   WBC Count 5.7 (5/23/2024) 4.0 - 11.0 10e3/uL     _  Result Component Current Result Ref Range   Hemoglobin 11.2 (L) (5/23/2024) 11.7 - 15.7 g/dL     _  Result Component Current Result Ref Range   Platelet Count 141 (L) (5/23/2024) 150 - 450 10e3/uL     No results found for ANC within last 30 days.     _  Result Component Current Result Ref Range   Absolute Neutrophils 3.8 (5/23/2024) 1.6 - 8.3 10e3/uL          Assessment/Plan:  Michelle is tolerating Lynparza therapy fairly well with some grade 1 nausea that is controlled with ondansetron.  She will monitor for increase " in constipation above her normal and use her stool softeners and Miralax if needed.  Will monitor creatinine going forward.  Will continue plan of care.     Follow-Up:  5/30: Labs    Refill Due:  6/7/24    Jaimee Khan, PharmD  Hematology/Oncology Clinical Pharmacist  [facility]  [phone]

## 2024-05-30 ENCOUNTER — LAB (OUTPATIENT)
Dept: INFUSION THERAPY | Facility: HOSPITAL | Age: 59
End: 2024-05-30
Attending: NURSE PRACTITIONER
Payer: COMMERCIAL

## 2024-05-30 ENCOUNTER — MYC MEDICAL ADVICE (OUTPATIENT)
Dept: ONCOLOGY | Facility: CLINIC | Age: 59
End: 2024-05-30

## 2024-05-30 DIAGNOSIS — C56.1 OVARIAN CANCER, RIGHT (H): Primary | ICD-10-CM

## 2024-05-30 LAB
ALBUMIN SERPL BCG-MCNC: 4.1 G/DL (ref 3.5–5.2)
ALP SERPL-CCNC: 182 U/L (ref 40–150)
ALT SERPL W P-5'-P-CCNC: 18 U/L (ref 0–50)
ANION GAP SERPL CALCULATED.3IONS-SCNC: 9 MMOL/L (ref 7–15)
AST SERPL W P-5'-P-CCNC: 30 U/L (ref 0–45)
BASOPHILS # BLD AUTO: 0 10E3/UL (ref 0–0.2)
BASOPHILS NFR BLD AUTO: 1 %
BILIRUB SERPL-MCNC: 0.3 MG/DL
BUN SERPL-MCNC: 15.1 MG/DL (ref 8–23)
CALCIUM SERPL-MCNC: 9.3 MG/DL (ref 8.6–10)
CANCER AG125 SERPL-ACNC: 44 U/ML
CHLORIDE SERPL-SCNC: 100 MMOL/L (ref 98–107)
CREAT SERPL-MCNC: 1.19 MG/DL (ref 0.51–0.95)
DEPRECATED HCO3 PLAS-SCNC: 27 MMOL/L (ref 22–29)
EGFRCR SERPLBLD CKD-EPI 2021: 52 ML/MIN/1.73M2
EOSINOPHIL # BLD AUTO: 0.2 10E3/UL (ref 0–0.7)
EOSINOPHIL NFR BLD AUTO: 4 %
ERYTHROCYTE [DISTWIDTH] IN BLOOD BY AUTOMATED COUNT: 13.7 % (ref 10–15)
GLUCOSE SERPL-MCNC: 69 MG/DL (ref 70–99)
HCT VFR BLD AUTO: 33.8 % (ref 35–47)
HGB BLD-MCNC: 11.1 G/DL (ref 11.7–15.7)
HOLD SPECIMEN: NORMAL
IMM GRANULOCYTES # BLD: 0 10E3/UL
IMM GRANULOCYTES NFR BLD: 0 %
LYMPHOCYTES # BLD AUTO: 1 10E3/UL (ref 0.8–5.3)
LYMPHOCYTES NFR BLD AUTO: 18 %
MCH RBC QN AUTO: 31.9 PG (ref 26.5–33)
MCHC RBC AUTO-ENTMCNC: 32.8 G/DL (ref 31.5–36.5)
MCV RBC AUTO: 97 FL (ref 78–100)
MONOCYTES # BLD AUTO: 0.4 10E3/UL (ref 0–1.3)
MONOCYTES NFR BLD AUTO: 7 %
NEUTROPHILS # BLD AUTO: 4 10E3/UL (ref 1.6–8.3)
NEUTROPHILS NFR BLD AUTO: 71 %
NRBC # BLD AUTO: 0 10E3/UL
NRBC BLD AUTO-RTO: 0 /100
PLATELET # BLD AUTO: 135 10E3/UL (ref 150–450)
POTASSIUM SERPL-SCNC: 4.1 MMOL/L (ref 3.4–5.3)
PROT SERPL-MCNC: 6.8 G/DL (ref 6.4–8.3)
RBC # BLD AUTO: 3.48 10E6/UL (ref 3.8–5.2)
SODIUM SERPL-SCNC: 136 MMOL/L (ref 135–145)
WBC # BLD AUTO: 5.6 10E3/UL (ref 4–11)

## 2024-05-30 PROCEDURE — 85048 AUTOMATED LEUKOCYTE COUNT: CPT

## 2024-05-30 PROCEDURE — 86304 IMMUNOASSAY TUMOR CA 125: CPT

## 2024-05-30 PROCEDURE — 36415 COLL VENOUS BLD VENIPUNCTURE: CPT

## 2024-05-30 PROCEDURE — 82374 ASSAY BLOOD CARBON DIOXIDE: CPT

## 2024-05-30 PROCEDURE — 82040 ASSAY OF SERUM ALBUMIN: CPT

## 2024-05-30 RX ORDER — HEPARIN SODIUM (PORCINE) LOCK FLUSH IV SOLN 100 UNIT/ML 100 UNIT/ML
5 SOLUTION INTRAVENOUS
Status: DISCONTINUED | OUTPATIENT
Start: 2024-05-30 | End: 2024-05-30 | Stop reason: HOSPADM

## 2024-05-30 RX ORDER — HEPARIN SODIUM (PORCINE) LOCK FLUSH IV SOLN 100 UNIT/ML 100 UNIT/ML
5 SOLUTION INTRAVENOUS
Status: CANCELLED | OUTPATIENT
Start: 2024-05-30

## 2024-05-30 NOTE — ORAL ONC MGMT
Oral Chemotherapy Monitoring Program  Lab Follow Up    Reviewed CBC and CMP lab results from 5/30/24.        4/12/2024     3:00 PM 5/14/2024     2:00 PM 5/15/2024     1:00 PM 5/23/2024    11:00 AM 5/30/2024    10:00 AM   ORAL CHEMOTHERAPY   Assessment Type Initial Work up Initial Follow up;New Teach Lab Monitoring Lab Monitoring;Initial Follow up Lab Monitoring;Initial Follow up   Diagnosis Code Ovarian Cancer Ovarian Cancer Ovarian Cancer Ovarian Cancer Ovarian Cancer   Providers [facility]   [facility]   Drug Name Lynparza (olaparib) Lynparza (olaparib) Lynparza (olaparib) Lynparza (olaparib) Lynparza (olaparib)   Dose 300 mg 300 mg 300 mg 300 mg 300 mg   Current Schedule BID BID BID BID BID   Cycle Details Continuous Continuous Continuous Continuous Continuous   Start Date of Last Cycle     5/16/2024   Planned next cycle start date 5/9/2024 5/16/2024 5/16/2024 5/16/2024 6/15/2024   Doses missed in last 2 weeks    0    Adherence Assessment    Adherent    Adverse Effects    Nausea    Nausea    Grade 1    Pharmacist Intervention(nausea)    No    Any new drug interactions?  No  No    Is the dose as ordered appropriate for the patient?  Yes  Yes    Since the last time we talked, have you been hospitalized or used the emergency room?    No        Labs:  _  Result Component Current Result Ref Range   Sodium 136 (5/30/2024) 135 - 145 mmol/L     _  Result Component Current Result Ref Range   Potassium 4.1 (5/30/2024) 3.4 - 5.3 mmol/L     _  Result Component Current Result Ref Range   Calcium 9.3 (5/30/2024) 8.6 - 10.0 mg/dL     No results found for Mag within last 30 days.     No results found for Phos within last 30 days.     _  Result Component Current Result Ref Range   Albumin 4.1 (5/30/2024) 3.5 - 5.2 g/dL     _  Result Component Current Result Ref Range   Urea Nitrogen 15.1 (5/30/2024) 8.0 - 23.0 mg/dL     _  Result Component Current Result Ref Range    Creatinine 1.19 (H) (5/30/2024) 0.51 - 0.95 mg/dL     _  Result Component Current Result Ref Range   AST 30 (5/30/2024) 0 - 45 U/L     _  Result Component Current Result Ref Range   ALT 18 (5/30/2024) 0 - 50 U/L     _  Result Component Current Result Ref Range   Bilirubin Total 0.3 (5/30/2024) <=1.2 mg/dL     _  Result Component Current Result Ref Range   WBC Count 5.6 (5/30/2024) 4.0 - 11.0 10e3/uL     _  Result Component Current Result Ref Range   Hemoglobin 11.1 (L) (5/30/2024) 11.7 - 15.7 g/dL     _  Result Component Current Result Ref Range   Platelet Count 135 (L) (5/30/2024) 150 - 450 10e3/uL     No results found for ANC within last 30 days.     _  Result Component Current Result Ref Range   Absolute Neutrophils 4.0 (5/30/2024) 1.6 - 8.3 10e3/uL        Assessment & Plan:  No concerning abnormalities.  Of note, creatinine continues to increase.  It's grade 1 as of now, and requires no action.  Patient does struggle with fluids as everything tastes terrible for 4-5 hours after each Lynparza dose.      Sent [employer] message to Michelle.    Follow-Up:  6/6: Labs  6/15: Monthly assessment    Jaimee Khan, Javier  Hematology/Oncology Clinical Pharmacist  [facility]  [phone]

## 2024-06-03 DIAGNOSIS — C56.1 OVARIAN CANCER, RIGHT (H): Primary | ICD-10-CM

## 2024-06-06 ENCOUNTER — LAB (OUTPATIENT)
Dept: INFUSION THERAPY | Facility: HOSPITAL | Age: 59
End: 2024-06-06
Attending: OBSTETRICS & GYNECOLOGY
Payer: COMMERCIAL

## 2024-06-06 DIAGNOSIS — C56.1 OVARIAN CANCER, RIGHT (H): Primary | ICD-10-CM

## 2024-06-06 LAB
ALBUMIN SERPL BCG-MCNC: 3.9 G/DL (ref 3.5–5.2)
ALP SERPL-CCNC: 166 U/L (ref 40–150)
ALT SERPL W P-5'-P-CCNC: 13 U/L (ref 0–50)
ANION GAP SERPL CALCULATED.3IONS-SCNC: 8 MMOL/L (ref 7–15)
AST SERPL W P-5'-P-CCNC: 30 U/L (ref 0–45)
BASOPHILS # BLD AUTO: 0 10E3/UL (ref 0–0.2)
BASOPHILS NFR BLD AUTO: 1 %
BILIRUB SERPL-MCNC: 0.4 MG/DL
BUN SERPL-MCNC: 15.2 MG/DL (ref 8–23)
CALCIUM SERPL-MCNC: 9.3 MG/DL (ref 8.6–10)
CANCER AG125 SERPL-ACNC: 43 U/ML
CHLORIDE SERPL-SCNC: 99 MMOL/L (ref 98–107)
CREAT SERPL-MCNC: 1.19 MG/DL (ref 0.51–0.95)
DEPRECATED HCO3 PLAS-SCNC: 27 MMOL/L (ref 22–29)
EGFRCR SERPLBLD CKD-EPI 2021: 52 ML/MIN/1.73M2
EOSINOPHIL # BLD AUTO: 0.3 10E3/UL (ref 0–0.7)
EOSINOPHIL NFR BLD AUTO: 6 %
ERYTHROCYTE [DISTWIDTH] IN BLOOD BY AUTOMATED COUNT: 13.9 % (ref 10–15)
GLUCOSE SERPL-MCNC: 81 MG/DL (ref 70–99)
HCT VFR BLD AUTO: 33.3 % (ref 35–47)
HGB BLD-MCNC: 10.9 G/DL (ref 11.7–15.7)
IMM GRANULOCYTES # BLD: 0 10E3/UL
IMM GRANULOCYTES NFR BLD: 0 %
LYMPHOCYTES # BLD AUTO: 1 10E3/UL (ref 0.8–5.3)
LYMPHOCYTES NFR BLD AUTO: 19 %
MCH RBC QN AUTO: 31.6 PG (ref 26.5–33)
MCHC RBC AUTO-ENTMCNC: 32.7 G/DL (ref 31.5–36.5)
MCV RBC AUTO: 97 FL (ref 78–100)
MONOCYTES # BLD AUTO: 0.3 10E3/UL (ref 0–1.3)
MONOCYTES NFR BLD AUTO: 7 %
NEUTROPHILS # BLD AUTO: 3.3 10E3/UL (ref 1.6–8.3)
NEUTROPHILS NFR BLD AUTO: 67 %
NRBC # BLD AUTO: 0 10E3/UL
NRBC BLD AUTO-RTO: 0 /100
PLATELET # BLD AUTO: 137 10E3/UL (ref 150–450)
POTASSIUM SERPL-SCNC: 4.1 MMOL/L (ref 3.4–5.3)
PROT SERPL-MCNC: 6.5 G/DL (ref 6.4–8.3)
RBC # BLD AUTO: 3.45 10E6/UL (ref 3.8–5.2)
SODIUM SERPL-SCNC: 134 MMOL/L (ref 135–145)
WBC # BLD AUTO: 4.9 10E3/UL (ref 4–11)

## 2024-06-06 PROCEDURE — 80053 COMPREHEN METABOLIC PANEL: CPT

## 2024-06-06 PROCEDURE — 85025 COMPLETE CBC W/AUTO DIFF WBC: CPT

## 2024-06-06 PROCEDURE — 86304 IMMUNOASSAY TUMOR CA 125: CPT

## 2024-06-06 PROCEDURE — 36415 COLL VENOUS BLD VENIPUNCTURE: CPT

## 2024-06-06 RX ORDER — HEPARIN SODIUM (PORCINE) LOCK FLUSH IV SOLN 100 UNIT/ML 100 UNIT/ML
5 SOLUTION INTRAVENOUS
Status: DISCONTINUED | OUTPATIENT
Start: 2024-06-06 | End: 2024-06-06 | Stop reason: HOSPADM

## 2024-06-06 RX ORDER — HEPARIN SODIUM (PORCINE) LOCK FLUSH IV SOLN 100 UNIT/ML 100 UNIT/ML
5 SOLUTION INTRAVENOUS
OUTPATIENT
Start: 2024-06-06

## 2024-06-10 ENCOUNTER — TELEPHONE (OUTPATIENT)
Dept: ONCOLOGY | Facility: CLINIC | Age: 59
End: 2024-06-10
Payer: COMMERCIAL

## 2024-06-10 DIAGNOSIS — C56.1 OVARIAN CANCER, RIGHT (H): Primary | ICD-10-CM

## 2024-06-10 NOTE — TELEPHONE ENCOUNTER
Oral Chemotherapy Monitoring Program  Lab Follow Up    Reviewed lab results from 6/6.        4/12/2024     3:00 PM 5/14/2024     2:00 PM 5/15/2024     1:00 PM 5/23/2024    11:00 AM 5/30/2024    10:00 AM 6/10/2024    11:00 AM   ORAL CHEMOTHERAPY   Assessment Type Initial Work up Initial Follow up;New Teach Lab Monitoring Lab Monitoring;Initial Follow up Lab Monitoring;Initial Follow up Lab Monitoring;Refill   Diagnosis Code Ovarian Cancer Ovarian Cancer Ovarian Cancer Ovarian Cancer Ovarian Cancer Ovarian Cancer   Providers [facility]   [facility]   Drug Name Lynparza (olaparib) Lynparza (olaparib) Lynparza (olaparib) Lynparza (olaparib) Lynparza (olaparib) Lynparza (olaparib)   Dose 300 mg 300 mg 300 mg 300 mg 300 mg 200 mg   Current Schedule BID BID BID BID BID BID   Cycle Details Continuous Continuous Continuous Continuous Continuous Continuous   Start Date of Last Cycle     5/16/2024    Planned next cycle start date 5/9/2024 5/16/2024 5/16/2024 5/16/2024 6/15/2024    Doses missed in last 2 weeks    0  1   Adherence Assessment    Adherent  Adherent   Adverse Effects    Nausea  Increased Creatinine   Nausea    Grade 1     Pharmacist Intervention(nausea)    No     Increased Creatinine      Grade 2   Pharmacist intervention(Increased creatinine)      Yes   Intervention(s)      Dose decreased (chemo)   Other (See Note for Details)      taste alterations   Pharmacist intervention(other)      No   Any new drug interactions?  No  No  No   Is the dose as ordered appropriate for the patient?  Yes  Yes  Yes   Since the last time we talked, have you been hospitalized or used the emergency room?    No  No       Labs:  _  Result Component Current Result Ref Range   Sodium 134 (L) (6/6/2024) 135 - 145 mmol/L     _  Result Component Current Result Ref Range   Potassium 4.1 (6/6/2024) 3.4 - 5.3 mmol/L     _  Result Component Current Result Ref Range  "  Calcium 9.3 (6/6/2024) 8.6 - 10.0 mg/dL     No results found for Mag within last 30 days.     No results found for Phos within last 30 days.     _  Result Component Current Result Ref Range   Albumin 3.9 (6/6/2024) 3.5 - 5.2 g/dL     _  Result Component Current Result Ref Range   Urea Nitrogen 15.2 (6/6/2024) 8.0 - 23.0 mg/dL     _  Result Component Current Result Ref Range   Creatinine 1.19 (H) (6/6/2024) 0.51 - 0.95 mg/dL     _  Result Component Current Result Ref Range   AST 30 (6/6/2024) 0 - 45 U/L     _  Result Component Current Result Ref Range   ALT 13 (6/6/2024) 0 - 50 U/L     _  Result Component Current Result Ref Range   Bilirubin Total 0.4 (6/6/2024) <=1.2 mg/dL     _  Result Component Current Result Ref Range   WBC Count 4.9 (6/6/2024) 4.0 - 11.0 10e3/uL     _  Result Component Current Result Ref Range   Hemoglobin 10.9 (L) (6/6/2024) 11.7 - 15.7 g/dL     _  Result Component Current Result Ref Range   Platelet Count 137 (L) (6/6/2024) 150 - 450 10e3/uL     No results found for ANC within last 30 days.     _  Result Component Current Result Ref Range   Absolute Neutrophils 3.3 (6/6/2024) 1.6 - 8.3 10e3/uL        Assessment & Plan:  Results are concerning for consistently elevated Scr (CrCl ~46ml/min) requiring Lynparza dose change to 200mg twice daily. Dr. Patino in agreement with dose reduction to 200mg twice daily.      Pt states she struggles with fluid intake as all fluids taste \"disgusting\" on the Lynparza.  She knows to try to drink as much as she can tolerate. Pt missed her morning dose on 6/8 due to an upset stomach, did not double up her next dose.    Questions answered to patient's satisfaction. Pt will decrease Lynparza dose to 200mg twice daily starting as soon as she gets it from the pharmacy.    Follow-Up:  6/13: weekly labs    Grace Myhre, PharmD  Hematology/Oncology Pharmacist  [facility]  [phone]    "

## 2024-06-11 NOTE — TELEPHONE ENCOUNTER
Oral Chemotherapy Monitoring Program     Placed call to patient in follow up of Lynparza oral chemotherapy. Pt is supposed to decrease her Lynparza dose to 200mg BID due to worsening renal function. Pt's insurance is requiring a PA for the new dose and is working slowly.  In the meantime, Dr. Patino agrees that pt can take Lynparza 150mg BID using her supply at home until she can get the new dose delivered to her.    Relayed this information to Michelle, who verbalized understanding.    Grace Myhre, PawanD  Hematology/Oncology Clinical Pharmacist  [facility]  [phone]

## 2024-06-13 ENCOUNTER — TELEPHONE (OUTPATIENT)
Dept: ONCOLOGY | Facility: CLINIC | Age: 59
End: 2024-06-13

## 2024-06-13 ENCOUNTER — LAB (OUTPATIENT)
Dept: INFUSION THERAPY | Facility: HOSPITAL | Age: 59
End: 2024-06-13
Attending: OBSTETRICS & GYNECOLOGY
Payer: COMMERCIAL

## 2024-06-13 DIAGNOSIS — C56.1 OVARIAN CANCER, RIGHT (H): ICD-10-CM

## 2024-06-13 LAB
ALBUMIN SERPL BCG-MCNC: 3.9 G/DL (ref 3.5–5.2)
ALP SERPL-CCNC: 149 U/L (ref 40–150)
ALT SERPL W P-5'-P-CCNC: 16 U/L (ref 0–50)
ANION GAP SERPL CALCULATED.3IONS-SCNC: 9 MMOL/L (ref 7–15)
AST SERPL W P-5'-P-CCNC: 28 U/L (ref 0–45)
BASOPHILS # BLD AUTO: 0 10E3/UL (ref 0–0.2)
BASOPHILS NFR BLD AUTO: 1 %
BILIRUB SERPL-MCNC: 0.4 MG/DL
BUN SERPL-MCNC: 12.4 MG/DL (ref 8–23)
CALCIUM SERPL-MCNC: 9.5 MG/DL (ref 8.6–10)
CANCER AG125 SERPL-ACNC: 42 U/ML
CHLORIDE SERPL-SCNC: 100 MMOL/L (ref 98–107)
CREAT SERPL-MCNC: 1.18 MG/DL (ref 0.51–0.95)
DEPRECATED HCO3 PLAS-SCNC: 28 MMOL/L (ref 22–29)
EGFRCR SERPLBLD CKD-EPI 2021: 53 ML/MIN/1.73M2
EOSINOPHIL # BLD AUTO: 0.2 10E3/UL (ref 0–0.7)
EOSINOPHIL NFR BLD AUTO: 4 %
ERYTHROCYTE [DISTWIDTH] IN BLOOD BY AUTOMATED COUNT: 14.9 % (ref 10–15)
GLUCOSE SERPL-MCNC: 76 MG/DL (ref 70–99)
HCT VFR BLD AUTO: 34.1 % (ref 35–47)
HGB BLD-MCNC: 11.1 G/DL (ref 11.7–15.7)
IMM GRANULOCYTES # BLD: 0 10E3/UL
IMM GRANULOCYTES NFR BLD: 1 %
LYMPHOCYTES # BLD AUTO: 0.9 10E3/UL (ref 0.8–5.3)
LYMPHOCYTES NFR BLD AUTO: 20 %
MCH RBC QN AUTO: 31.8 PG (ref 26.5–33)
MCHC RBC AUTO-ENTMCNC: 32.6 G/DL (ref 31.5–36.5)
MCV RBC AUTO: 98 FL (ref 78–100)
MONOCYTES # BLD AUTO: 0.4 10E3/UL (ref 0–1.3)
MONOCYTES NFR BLD AUTO: 8 %
NEUTROPHILS # BLD AUTO: 3.2 10E3/UL (ref 1.6–8.3)
NEUTROPHILS NFR BLD AUTO: 67 %
NRBC # BLD AUTO: 0 10E3/UL
NRBC BLD AUTO-RTO: 0 /100
PLATELET # BLD AUTO: 137 10E3/UL (ref 150–450)
POTASSIUM SERPL-SCNC: 3.9 MMOL/L (ref 3.4–5.3)
PROT SERPL-MCNC: 6.3 G/DL (ref 6.4–8.3)
RBC # BLD AUTO: 3.49 10E6/UL (ref 3.8–5.2)
SODIUM SERPL-SCNC: 137 MMOL/L (ref 135–145)
WBC # BLD AUTO: 4.8 10E3/UL (ref 4–11)

## 2024-06-13 PROCEDURE — 85025 COMPLETE CBC W/AUTO DIFF WBC: CPT

## 2024-06-13 PROCEDURE — 36591 DRAW BLOOD OFF VENOUS DEVICE: CPT

## 2024-06-13 PROCEDURE — 86304 IMMUNOASSAY TUMOR CA 125: CPT

## 2024-06-13 PROCEDURE — 80053 COMPREHEN METABOLIC PANEL: CPT

## 2024-06-13 NOTE — TELEPHONE ENCOUNTER
Oral Chemotherapy Monitoring Program  Lab Follow Up    Reviewed CBC and CMP lab results from 6/13.        5/14/2024     2:00 PM 5/15/2024     1:00 PM 5/23/2024    11:00 AM 5/30/2024    10:00 AM 6/10/2024    11:00 AM 6/11/2024     9:00 AM 6/13/2024     9:00 AM   ORAL CHEMOTHERAPY   Assessment Type Initial Follow up;New Teach Lab Monitoring Lab Monitoring;Initial Follow up Lab Monitoring;Initial Follow up Lab Monitoring;Refill Other Lab Monitoring   Diagnosis Code Ovarian Cancer Ovarian Cancer Ovarian Cancer Ovarian Cancer Ovarian Cancer Ovarian Cancer Ovarian Cancer   Providers [facility]   [facility]   Is this patient followed by the [facility] OC team?       No   Drug Name Lynparza (olaparib) Lynparza (olaparib) Lynparza (olaparib) Lynparza (olaparib) Lynparza (olaparib) Lynparza (olaparib) Lynparza (olaparib)   Dose 300 mg 300 mg 300 mg 300 mg 200 mg 200 mg 200 mg   Current Schedule BID BID BID BID BID BID BID   Cycle Details Continuous Continuous Continuous Continuous Continuous Continuous Continuous   Start Date of Last Cycle    5/16/2024      Planned next cycle start date 5/16/2024 5/16/2024 5/16/2024 6/15/2024      Doses missed in last 2 weeks   0  1     Adherence Assessment   Adherent  Adherent     Adverse Effects   Nausea  Increased Creatinine  Increased Creatinine   Nausea   Grade 1       Pharmacist Intervention(nausea)   No       Increased Creatinine     Grade 1  Grade 1   Pharmacist intervention(Increased creatinine)     Yes  No   Intervention(s)     Dose decreased (chemo)     Other (See Note for Details)     taste alterations     Pharmacist intervention(other)     No     Any new drug interactions? No  No  No     Is the dose as ordered appropriate for the patient? Yes  Yes  Yes     Since the last time we talked, have you been hospitalized or used the emergency room?   No  No         Labs:  _  Result Component Current  Result Ref Range   Sodium 137 (6/13/2024) 135 - 145 mmol/L     _  Result Component Current Result Ref Range   Potassium 3.9 (6/13/2024) 3.4 - 5.3 mmol/L     _  Result Component Current Result Ref Range   Calcium 9.5 (6/13/2024) 8.6 - 10.0 mg/dL     No results found for Mag within last 30 days.     No results found for Phos within last 30 days.     _  Result Component Current Result Ref Range   Albumin 3.9 (6/13/2024) 3.5 - 5.2 g/dL     _  Result Component Current Result Ref Range   Urea Nitrogen 12.4 (6/13/2024) 8.0 - 23.0 mg/dL     _  Result Component Current Result Ref Range   Creatinine 1.18 (H) (6/13/2024) 0.51 - 0.95 mg/dL     _  Result Component Current Result Ref Range   AST 28 (6/13/2024) 0 - 45 U/L     _  Result Component Current Result Ref Range   ALT 16 (6/13/2024) 0 - 50 U/L     _  Result Component Current Result Ref Range   Bilirubin Total 0.4 (6/13/2024) <=1.2 mg/dL     _  Result Component Current Result Ref Range   WBC Count 4.8 (6/13/2024) 4.0 - 11.0 10e3/uL     _  Result Component Current Result Ref Range   Hemoglobin 11.1 (L) (6/13/2024) 11.7 - 15.7 g/dL     _  Result Component Current Result Ref Range   Platelet Count 137 (L) (6/13/2024) 150 - 450 10e3/uL     No results found for ANC within last 30 days.     _  Result Component Current Result Ref Range   Absolute Neutrophils 3.2 (6/13/2024) 1.6 - 8.3 10e3/uL        Assessment & Plan:  Results are concerning for grade 1 creatinine elevation. CrCl= 56. Called and spoke with Michelle. Michelle was hospitalized on 6/10 for a small bowel obstruction. She didn't start her reduced does Lynparza until 6/12. Pt is now home, but fluid and food intake has been decreased over the last few days due to the obstruction. Pt figured her creatinine would still be high. We'll have scheduling reach out to set up weekly labs for the next 3 weeks. I reached out to [employer] liaison to get updated status on Lynparza 200mg BID PA, she's still awaiting for the PA approval. Pt in  the meantime is taking Lynparza 150mg BID as directed by Dr. Patino.    Questions answered to patient's satisfaction.    Follow-Up:  6/21: appt/labs with Echo  6/27: due for weekly labs    Aviva Cortez PharmD  Hematology/Oncology Clinical Pharmacist  [facility]  [phone]    **The oral chemotherapy pharmacists are now working on provider-specific teams. Your pharmacist team can be reached at [facility] ORAL CHEMO [facility] or [phone].**

## 2024-06-13 NOTE — TELEPHONE ENCOUNTER
Prior Authorization Approval    Medication: LYNPARZA 150 MG PO TABS  Authorization Effective Date:    Authorization Expiration Date:    Approved Dose/Quantity:   Reference #:     Insurance Company: [employer] - Phone [phone] Fax [phone]  Expected CoPay: $    CoPay Card Available: Yes    Financial Assistance Needed:   Which Pharmacy is filling the prescription: [facility] - [address]  Pharmacy Notified:   Patient Notified:        [facility]  [address] [place] [id number]  Office: [phone]  Fax: [phone]  [email or web address]

## 2024-06-14 DIAGNOSIS — C56.1 OVARIAN CANCER, RIGHT (H): Primary | ICD-10-CM

## 2024-06-20 NOTE — PROGRESS NOTES
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  She presents for follow up.      Oncology History:  22: Presented to an ED in [place] for evaluation of abdominal bloating and discomfort.  -Abdomen, pelvic CT: Radiographic Stage CAL ovarian cancer.   22: [id number].   22: Pelvic ultrasound: c/w metastatic cancer.    22: Pelvic exam under anesthesia, diagnostic laparoscopy, biopsies, evacuation of ascites.   -Findings: On pelvic exam under anesthesia, there was a 6 cm firm, fixed mass adherent to the vaginal cuff. Vagina otherwise smooth. On laparoscopic examination, there was ascites filling the pelvis/abdomen, with >1 liter of fluid evacuated. The palpated mass was arising from the right ovary. Left ovary relatively normal in appearance. There was diffuse carcinomatosis covering the entire peritoneal surface falciform ligament, liver capsule, and mesentery. The omentum was replaced with tumor. Findings were not amenable to optimal tumor debulking so biopsies were taken for histologic examination.   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      22, 22, 22: Cycles 1-3 of neoadjuvant chemotherapy with IV carboplatin + paclitaxel 175 mg/m2 every 21 days.   -Cycles 1,2: Carboplatin AUC 6.   -Cycle 3: Carboplatin AUC 5 with dose-reduction due to thrombcytopenia.   -22: Chest, abdomen, pelvic CT: Partial response.    22: Pelvic exam under anesthesia, diagnostic laparoscopy, conversion to laparotomy for optimal interval tumor debulking to no gross residual disease including peritoneal and diaphragm biopsies, bilateral  salpingo-oophorectomy, infragastric omentectomy, bilateral hemidiaphragm stripping, peritoneal and mesenteric argon beam ablation, appendectomy.   -Pathology: High-grade serous carcinoma involving all resected specimens.     9/28/22, 10/19/22, 11/17/22: Cycles 4-6 of primary chemotherapy with IV carboplatin AUC 5 + paclitaxel 175 mg/m2.  10/5/22: [facility], reflexed to Common Hereditary Cancer Panel.   -No identifiable germline variants identified in ABRAXAS1, AKT1, APC, DEION, AXIN2, BARD1, BMPR1A, BRCA1, BRCA2, BRIP1, CDC73, CDH1, CDK4, CDKN2A, CHEK2, CTNNA1, DICER1, EPCAM, FANCC, FANCM, GREM1, HOXB13, KIT, MEN1, MLH1, MRE11, MSH2, MSH6, MUTYH, NBN, NF1, NTHL1, PALB2, PDGFRA, PIK3CA, PMS2, POLD1, POLE, PTEN, RAD50, RAD51C, RAD51D, RECQL, RINT1, SDHA, SDHB, SDHC, SDHD, SMAD4, SMARCA4, STK11, TP53, TSC1, TSC2, VHL, XRCC2.   -Variant of uncertain significance in MSH3 c.16C>T (p.Dlg1Cnl)  10/9/22 (tumor 8/29/22): SeeControl Testing Results.  -Genomic ROXI low (6%)   -TMB low (1mut/Mb)  -Microsatellite stable/Mismatch Repair proficient  -PD-L1- (CPS 0%)  -NTRK 1/2/3 fusion not detected.   -ER-, MS+  -Genomic alterations in:  --TP53  -No genomic alterations in BRCA1,2.  Jamila-ovary clinical trial screening: Negative for the immunoreactive subtype.   -12/2/22: Chest, abdomen, pelvic CT: No definitive evidence of disease.   -12/7/22: CA-125=23.   -2/16/23: Chest, abdomen, pelvic CT to evaluate bloating: Stable findings, no definitive evidence of disease.   -5/25/23: Admitted  to [facility] for management of malignant ascites s/p therapeutic paracentesis, and partial SBO resolved with conservative management.   5/25/23: Abdomen, pelvic CT: Progressive disease.   LOWER CHEST: OMAIRA.   BOWEL: Development of moderate malignant ascites with small peritoneal and serosal implants present. There is mild distention of a few loops of small bowel within the right side of the abdomen with regions of narrowing present  compatible with an early or partial small bowel obstruction. Wall thickening in a few of the involved segments with fecalization of the internal contents of the small bowel compatible with stasis.   PELVIC ORGANS: Lobulated 4.5 cm soft tissue mass in the dependent pelvis in the expected location of the uterus.  -6/13/23: CA-125=78.     6/13/23, 7/12/23, 8/14/23, 9/13/23, 10/11/23, 11/8/23, 12/6/23, 1/3/24, 2/1/24, 2/29/24, 3/28/24: Cycles 1-11 of second-line chemotherapy with IV carboplatin AUC 5 + pegylated liposomal doxorubicin (PLD) 30 mg/m2 every 28 days.   6/19/23 (tumor 8/29/22): Caris Test results.   -FOLR1+ (2+, 75%).   -9/27/23: Chest, abdomen, pelvic CT: Partial response. Catheter-associated thrombus.   -1/23/24: Chest, abdomen, pelvic CT: Stable disease.    CHEST: OMAIRA.    Few sub-6 mm pulmonary nodules, unchanged from prior.  Chest wall port catheter. Interval resolution of previous adjacent thrombus within the right brachiocephalic vein.   HEPATOBILIARY: Liver surface nodularity, suggesting underlying cirrhosis. Small ovoid low-attenuation within the falciform ligament, new from 09/27/2023 and likely reflecting extension of peritoneal carcinomatosis. Tiny   low-attenuation lesions of the right hepatic lobe, unchanged.  BOWEL: Pelvic nodule within the central pelvis, tethers adjacent bowel loops and the urinary bladder, measuring 11 x 17 mm (previously 14 x 19 mm).   Small volume loculated ascites, improved from prior.     Plan: Second- PARPi therapy with olaparib 300 mg BID.   -Plan to initiate therapy in ~4 weeks (6 weeks after last chemotherapy).   -RTC 1 month after initiating PARPi therapy to review side-effects.       4/23/24: ED SBO at OSH. Discharge home after IV pain medications and IV antiemetics.    CT AP  IMPRESSION:  1.  Distal small bowel obstruction.  2.  Interval increase in multiloculated ascites.     5/15/24:  38.  5/16/24: Start olaparib 300 mg BID.  6/10/24:  Decrease olaparib 200 mg BID due to altered taste and nausea.    6/10-6/11/24: ED to hospital admission for SBO at OSH.  Abd XR  IMPRESSION: Multiple dilated loops of bowel redemonstrated, suspicious for obstruction. No discrete evidence of free intraperitoneal gas on this lateral decubitus radiograph, however if there is clinical suspicion for this, CT could be considered for further evaluation.     6/13/24:  42.  6/21/24:  pending.              Today she comes to clinic with her  and daughter feeling well overall.  She has been taking the olaparib as prescribed with the exception of missing 2 doses due to being in the hospital.  She has developed a significant altered taste affecting mostly fluids making it difficult for her to maintain adequate p.o. intake.  She is getting IV fluids at her local hospital.  She has been admitted twice small bowel obstruction with severe pain, nausea, and vomiting since her last visit here.  She had a CT scan with her first admission that was about 2 weeks after her CT scan here without mention of concern for carcinomatosis or cancer causing her bowel obstruction.  Most recent admission 11 days ago they were not able to obtain a CT scan because their CT scanner was down so she had an abdominal x-ray which showed bowel obstruction.  Both admissions she was able to discharge the next day with bowel rest, IV fluids, IV pain medication, IV antiemetics.  She did not require an NG tube.  She reports that she is on a low fiber diet currently and has met with a dietitian multiple times to reinforce her diet and answer her questions.  She is making herself n.p.o. and trying to manage her symptoms with p.o. pain medication and antiemetics but this has been unsuccessful.              Past Medical History:    Past Medical History:   Diagnosis Date    Female stress incontinence     Ovarian cancer, right (H) 06/16/2022    Stage IIIC high-grade serous carcinoma    Recurrent  cold sores          Past Surgical History:    Past Surgical History:   Procedure Laterality Date    APPENDECTOMY  08/29/2022    Part of ovarian cancer tumor debulking    BLADDER SUSPENSION  08/13/2009    HYSTERECTOMY  08/13/2009    For prolapse    IR PARACENTESIS  6/6/2022    IR PARACENTESIS  6/14/2022    IR PARACENTESIS  5/26/2023    LAPAROSCOPY DIAGNOSTIC (GYN)  06/16/2022    SALPINGO-OOPHORECTOMY, COMBINED Bilateral 08/29/2022    Pelvic exam under anesthesia, diagnostic laparoscopy, conversion to laparotomy for optimal interval tumor debulking to no gross residual disease including peritoneal and diaphragm biopsies, bilateral salpingo-oophorectomy, infragastric omentectomy, bilateral hemidiaphragm stripping, peritoneal and mesenteric argon beam ablation, appendectomy.    TONSILLECTOMY  1973    TUBAL LIGATION Bilateral 09/01/1998         Health Maintenance Due   Topic Date Due    YEARLY PREVENTIVE VISIT  Never done    ADVANCE CARE PLANNING  Never done    Pneumococcal Vaccine: Pediatrics (0 to 5 Years) and At-Risk Patients (6 to 64 Years) (1 of 2 - PCV) Never done    COLORECTAL CANCER SCREENING  Never done    HIV SCREENING  Never done    HEPATITIS C SCREENING  Never done    ZOSTER IMMUNIZATION (1 of 2) Never done    HEPATITIS B IMMUNIZATION (1 of 3 - 19+ 3-dose series) Never done    PAP  Never done    LIPID  Never done    DTAP/TDAP/TD IMMUNIZATION (2 - Td or Tdap) 12/15/2021    COVID-19 Vaccine (5 - 2023-24 season) 09/01/2023    PHQ-2 (once per calendar year)  Never done       Current Medications:     Current Outpatient Medications   Medication Sig Dispense Refill    escitalopram (LEXAPRO) 10 MG tablet Take 1 Tablet (10 mg) by mouth daily.*      lidocaine-prilocaine (EMLA) 2.5-2.5 % external cream Apply topically as needed for moderate pain 30 g 1    cyclobenzaprine (FLEXERIL) 5 MG tablet Take 1-2 Tablets (5-10 mg) by mouth three times a day.      diphenhydrAMINE-acetaminophen (TYLENOL PM)  MG tablet Take 1  tablet by mouth as needed      docusate sodium (DSS) 100 MG capsule Take 100 mg by mouth as needed      HYDROcodone-acetaminophen (NORCO) 5-325 MG tablet Take 1 tablet by mouth every 4 hours as needed      hydrOXYzine (ATARAX) 10 MG tablet Take 1-2 tablets (10-20 mg) by mouth every 6 hours as needed for itching 120 tablet 3    Multiple Vitamin (MULTI-VITAMIN DAILY PO) Take 1 tablet by mouth daily      olaparib (LYNPARZA) 100 MG tablet Take 2 tablets (200 mg) by mouth 2 times daily 120 tablet 0    omeprazole (PRILOSEC) 20 MG DR capsule Take 20 mg by mouth      ondansetron (ZOFRAN) 8 MG tablet Take 1 tablet (8 mg) by mouth every 8 hours as needed for nausea 30 tablet 2    sennosides (SENOKOT) 8.6 MG tablet       valACYclovir (VALTREX) 1000 mg tablet Take 1,000 mg by mouth as needed           Allergies:      No Known Allergies     Social History:     Social History     Tobacco Use    Smoking status: Former     Current packs/day: 0.00     Average packs/day: 1 pack/day for 42.0 years (42.0 ttl pk-yrs)     Types: Cigarettes     Start date: 1980     Quit date: 2022     Years since quittin.0    Smokeless tobacco: Never   Substance Use Topics    Alcohol use: Yes     Comment: Beer ~Q3 months.       History   Drug Use Unknown         Family History:     The patient's family history is notable for:    Family History   Problem Relation Age of Onset    Prostate Cancer Father     Breast Cancer Sister 48    Melanoma Sister     Skin Cancer Sister     Colon Cancer Maternal Grandmother          Physical Exam:     /70 (BP Location: Right arm, Patient Position: Sitting, Cuff Size: Adult Regular)   Pulse 65   Temp 98.3  F (36.8  C) (Oral)   Resp 16   Wt 69 kg (152 lb 1.6 oz)   SpO2 99%   BMI 27.81 kg/m    Body mass index is 27.81 kg/m .    General Appearance: alert, no distress     HEENT: no palpable nodules or masses        Cardiovascular: regular rate and rhythm, no gallops, rubs or  murmurs     Respiratory: lungs clear, no rales, rhonchi or wheezes    Musculoskeletal: extremities non tender and without edema    Skin: no lesions or rashes     Neurological: normal gait, no gross defects     Psychiatric: appropriate mood and affect                               Hematological: normal cervical and supraclavicular lymph nodes     Gastrointestinal:       abdomen soft, non-tender, non-distended    Genitourinary: Deferred.       Recent Results (from the past 24 hour(s))   CMP - Comprehensive Metabolic Panel    Collection Time: 06/21/24  8:31 AM   Result Value Ref Range    Sodium 138 135 - 145 mmol/L    Potassium 3.9 3.4 - 5.3 mmol/L    Carbon Dioxide (CO2) 24 22 - 29 mmol/L    Anion Gap 10 7 - 15 mmol/L    Urea Nitrogen 13.4 8.0 - 23.0 mg/dL    Creatinine 1.13 (H) 0.51 - 0.95 mg/dL    GFR Estimate 56 (L) >60 mL/min/1.73m2    Calcium 9.1 8.6 - 10.0 mg/dL    Chloride 104 98 - 107 mmol/L    Glucose 119 (H) 70 - 99 mg/dL    Alkaline Phosphatase 164 (H) 40 - 150 U/L    AST 32 0 - 45 U/L    ALT 20 0 - 50 U/L    Protein Total 6.5 6.4 - 8.3 g/dL    Albumin 3.9 3.5 - 5.2 g/dL    Bilirubin Total 0.3 <=1.2 mg/dL   CBC with platelets and differential    Collection Time: 06/21/24  8:31 AM   Result Value Ref Range    WBC Count 4.5 4.0 - 11.0 10e3/uL    RBC Count 3.52 (L) 3.80 - 5.20 10e6/uL    Hemoglobin 11.1 (L) 11.7 - 15.7 g/dL    Hematocrit 33.9 (L) 35.0 - 47.0 %    MCV 96 78 - 100 fL    MCH 31.5 26.5 - 33.0 pg    MCHC 32.7 31.5 - 36.5 g/dL    RDW 15.0 10.0 - 15.0 %    Platelet Count 145 (L) 150 - 450 10e3/uL    % Neutrophils 69 %    % Lymphocytes 19 %    % Monocytes 7 %    % Eosinophils 4 %    % Basophils 1 %    % Immature Granulocytes 0 %    NRBCs per 100 WBC 0 <1 /100    Absolute Neutrophils 3.2 1.6 - 8.3 10e3/uL    Absolute Lymphocytes 0.9 0.8 - 5.3 10e3/uL    Absolute Monocytes 0.3 0.0 - 1.3 10e3/uL    Absolute Eosinophils 0.2 0.0 - 0.7 10e3/uL    Absolute Basophils 0.0 0.0 - 0.2 10e3/uL    Absolute Immature  Granulocytes 0.0 <=0.4 10e3/uL    Absolute NRBCs 0.0 10e3/uL          Assessment:    Jacki Smith is a 59 year old woman with 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  She presents for follow up.     40 minutes spent on the date of the encounter doing chart review, history and exam, documentation, and further activities as noted above.      Plan:     1.) Ovarian cancer:  Discussed patient with Dr. Patino who recommends a CT scan and follow up with her due to her recent bowel obstructions.  She will need to continue monthly labs for CBC and CMP, and  monthly.  OK to continue olaparib 200 mg BID.  Order for CT placed and discussed with Sarahi RNCC who will coordinate scheduling.  Reviewed signs and symptoms for when she should contact the clinic or seek additional care.  Patient to contact the clinic with any questions or concerns in the interim.      Genetic risk factors were assessed and she has a VUS No identifiable germline variants identified in ABRAXAS1, AKT1, APC, DEION, AXIN2, BARD1, BMPR1A, BRCA1, BRCA2, BRIP1, CDC73, CDH1, CDK4, CDKN2A, CHEK2, CTNNA1, DICER1, EPCAM, FANCC, FANCM, GREM1, HOXB13, KIT, MEN1, MLH1, MRE11, MSH2, MSH6, MUTYH, NBN, NF1, NTHL1, PALB2, PDGFRA, PIK3CA, PMS2, POLD1, POLE, PTEN, RAD50, RAD51C, RAD51D, RECQL, RINT1, SDHA, SDHB, SDHC, SDHD, SMAD4, SMARCA4, STK11, TP53, TSC1, TSC2, VHL, XRCC2.    Labs and/or tests reviewed include:  CBC. CMP. Mag. .     2.) Health maintenance:  Issues addressed today include following up with PCP for annual health maintenance and non-gynecologic issues.       Echo Sanz, BRIELLE, APRN, FNP-C, AOCNP  Oncology Nurse Practitioner  Division of Gynecologic Oncology  Pager: [phone]     CC  Patient Care Team:  Domenica Christianson MD as PCP - Charis Verdugo MD as MD (Gynecologic Oncology)  Susannah Roman APRN CNP as Assigned Cancer Care Provider  Anish Monroy MD as Assigned Palliative  Care Provider  Pratima Vega, RN as Specialty Care Coordinator (Hematology & Oncology)  Myhre, Grace C, [facility] as Pharmacist  SELF, REFERRED

## 2024-06-21 ENCOUNTER — ONCOLOGY VISIT (OUTPATIENT)
Dept: ONCOLOGY | Facility: CLINIC | Age: 59
End: 2024-06-21
Attending: OBSTETRICS & GYNECOLOGY
Payer: COMMERCIAL

## 2024-06-21 ENCOUNTER — APPOINTMENT (OUTPATIENT)
Dept: LAB | Facility: CLINIC | Age: 59
End: 2024-06-21
Attending: OBSTETRICS & GYNECOLOGY
Payer: COMMERCIAL

## 2024-06-21 VITALS
DIASTOLIC BLOOD PRESSURE: 70 MMHG | OXYGEN SATURATION: 99 % | HEART RATE: 65 BPM | SYSTOLIC BLOOD PRESSURE: 110 MMHG | BODY MASS INDEX: 27.81 KG/M2 | TEMPERATURE: 98.3 F | WEIGHT: 152.1 LBS | RESPIRATION RATE: 16 BRPM

## 2024-06-21 DIAGNOSIS — R52 PAIN: ICD-10-CM

## 2024-06-21 DIAGNOSIS — Z95.828 PORT-A-CATH IN PLACE: Primary | ICD-10-CM

## 2024-06-21 DIAGNOSIS — C56.1 OVARIAN CANCER, RIGHT (H): ICD-10-CM

## 2024-06-21 LAB
ALBUMIN SERPL BCG-MCNC: 3.9 G/DL (ref 3.5–5.2)
ALP SERPL-CCNC: 164 U/L (ref 40–150)
ALT SERPL W P-5'-P-CCNC: 20 U/L (ref 0–50)
ANION GAP SERPL CALCULATED.3IONS-SCNC: 10 MMOL/L (ref 7–15)
AST SERPL W P-5'-P-CCNC: 32 U/L (ref 0–45)
BASOPHILS # BLD AUTO: 0 10E3/UL (ref 0–0.2)
BASOPHILS NFR BLD AUTO: 1 %
BILIRUB SERPL-MCNC: 0.3 MG/DL
BUN SERPL-MCNC: 13.4 MG/DL (ref 8–23)
CALCIUM SERPL-MCNC: 9.1 MG/DL (ref 8.6–10)
CANCER AG125 SERPL-ACNC: 42 U/ML
CHLORIDE SERPL-SCNC: 104 MMOL/L (ref 98–107)
CREAT SERPL-MCNC: 1.13 MG/DL (ref 0.51–0.95)
DEPRECATED HCO3 PLAS-SCNC: 24 MMOL/L (ref 22–29)
EGFRCR SERPLBLD CKD-EPI 2021: 56 ML/MIN/1.73M2
EOSINOPHIL # BLD AUTO: 0.2 10E3/UL (ref 0–0.7)
EOSINOPHIL NFR BLD AUTO: 4 %
ERYTHROCYTE [DISTWIDTH] IN BLOOD BY AUTOMATED COUNT: 15 % (ref 10–15)
GLUCOSE SERPL-MCNC: 119 MG/DL (ref 70–99)
HCT VFR BLD AUTO: 33.9 % (ref 35–47)
HGB BLD-MCNC: 11.1 G/DL (ref 11.7–15.7)
IMM GRANULOCYTES # BLD: 0 10E3/UL
IMM GRANULOCYTES NFR BLD: 0 %
LYMPHOCYTES # BLD AUTO: 0.9 10E3/UL (ref 0.8–5.3)
LYMPHOCYTES NFR BLD AUTO: 19 %
MCH RBC QN AUTO: 31.5 PG (ref 26.5–33)
MCHC RBC AUTO-ENTMCNC: 32.7 G/DL (ref 31.5–36.5)
MCV RBC AUTO: 96 FL (ref 78–100)
MONOCYTES # BLD AUTO: 0.3 10E3/UL (ref 0–1.3)
MONOCYTES NFR BLD AUTO: 7 %
NEUTROPHILS # BLD AUTO: 3.2 10E3/UL (ref 1.6–8.3)
NEUTROPHILS NFR BLD AUTO: 69 %
NRBC # BLD AUTO: 0 10E3/UL
NRBC BLD AUTO-RTO: 0 /100
PLATELET # BLD AUTO: 145 10E3/UL (ref 150–450)
POTASSIUM SERPL-SCNC: 3.9 MMOL/L (ref 3.4–5.3)
PROT SERPL-MCNC: 6.5 G/DL (ref 6.4–8.3)
RBC # BLD AUTO: 3.52 10E6/UL (ref 3.8–5.2)
SODIUM SERPL-SCNC: 138 MMOL/L (ref 135–145)
WBC # BLD AUTO: 4.5 10E3/UL (ref 4–11)

## 2024-06-21 PROCEDURE — 99215 OFFICE O/P EST HI 40 MIN: CPT | Performed by: NURSE PRACTITIONER

## 2024-06-21 PROCEDURE — 82247 BILIRUBIN TOTAL: CPT | Performed by: NURSE PRACTITIONER

## 2024-06-21 PROCEDURE — 86304 IMMUNOASSAY TUMOR CA 125: CPT | Performed by: NURSE PRACTITIONER

## 2024-06-21 PROCEDURE — 85025 COMPLETE CBC W/AUTO DIFF WBC: CPT | Performed by: NURSE PRACTITIONER

## 2024-06-21 PROCEDURE — 250N000011 HC RX IP 250 OP 636: Mod: JZ | Performed by: NURSE PRACTITIONER

## 2024-06-21 PROCEDURE — 99213 OFFICE O/P EST LOW 20 MIN: CPT | Performed by: NURSE PRACTITIONER

## 2024-06-21 PROCEDURE — 36591 DRAW BLOOD OFF VENOUS DEVICE: CPT | Performed by: NURSE PRACTITIONER

## 2024-06-21 RX ORDER — LIDOCAINE/PRILOCAINE 2.5 %-2.5%
CREAM (GRAM) TOPICAL PRN
Qty: 30 G | Refills: 1 | Status: SHIPPED | OUTPATIENT
Start: 2024-06-21

## 2024-06-21 RX ORDER — ESCITALOPRAM OXALATE 10 MG/1
TABLET ORAL
COMMUNITY
Start: 2024-05-05

## 2024-06-21 RX ORDER — HEPARIN SODIUM (PORCINE) LOCK FLUSH IV SOLN 100 UNIT/ML 100 UNIT/ML
5 SOLUTION INTRAVENOUS ONCE
Status: COMPLETED | OUTPATIENT
Start: 2024-06-21 | End: 2024-06-21

## 2024-06-21 RX ADMIN — Medication 5 ML: at 08:36

## 2024-06-21 ASSESSMENT — PAIN SCALES - GENERAL: PAINLEVEL: NO PAIN (0)

## 2024-06-21 NOTE — LETTER
2024      Jacki Smith  [address] [facility] [address]      Dear Colleague,    Thank you for referring your patient, Jacki Smith, to the [facility]. Please see a copy of my visit note below.    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  She presents for follow up.      Oncology History:  22: Presented to an ED in [place] for evaluation of abdominal bloating and discomfort.  -Abdomen, pelvic CT: Radiographic Stage CAL ovarian cancer.   22: [id number].   22: Pelvic ultrasound: c/w metastatic cancer.    22: Pelvic exam under anesthesia, diagnostic laparoscopy, biopsies, evacuation of ascites.   -Findings: On pelvic exam under anesthesia, there was a 6 cm firm, fixed mass adherent to the vaginal cuff. Vagina otherwise smooth. On laparoscopic examination, there was ascites filling the pelvis/abdomen, with >1 liter of fluid evacuated. The palpated mass was arising from the right ovary. Left ovary relatively normal in appearance. There was diffuse carcinomatosis covering the entire peritoneal surface falciform ligament, liver capsule, and mesentery. The omentum was replaced with tumor. Findings were not amenable to optimal tumor debulking so biopsies were taken for histologic examination.   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      22, 22, 22: Cycles 1-3 of neoadjuvant chemotherapy with IV carboplatin + paclitaxel 175 mg/m2 every 21 days.   -Cycles 1,2: Carboplatin AUC 6.   -Cycle 3: Carboplatin AUC 5 with dose-reduction due to thrombcytopenia.   -22: Chest, abdomen, pelvic CT:  Partial response.    8/29/22: Pelvic exam under anesthesia, diagnostic laparoscopy, conversion to laparotomy for optimal interval tumor debulking to no gross residual disease including peritoneal and diaphragm biopsies, bilateral salpingo-oophorectomy, infragastric omentectomy, bilateral hemidiaphragm stripping, peritoneal and mesenteric argon beam ablation, appendectomy.   -Pathology: High-grade serous carcinoma involving all resected specimens.     9/28/22, 10/19/22, 11/17/22: Cycles 4-6 of primary chemotherapy with IV carboplatin AUC 5 + paclitaxel 175 mg/m2.  10/5/22: [facility], reflexed to Common Hereditary Cancer Panel.   -No identifiable germline variants identified in ABRAXAS1, AKT1, APC, DEION, AXIN2, BARD1, BMPR1A, BRCA1, BRCA2, BRIP1, CDC73, CDH1, CDK4, CDKN2A, CHEK2, CTNNA1, DICER1, EPCAM, FANCC, FANCM, GREM1, HOXB13, KIT, MEN1, MLH1, MRE11, MSH2, MSH6, MUTYH, NBN, NF1, NTHL1, PALB2, PDGFRA, PIK3CA, PMS2, POLD1, POLE, PTEN, RAD50, RAD51C, RAD51D, RECQL, RINT1, SDHA, SDHB, SDHC, SDHD, SMAD4, SMARCA4, STK11, TP53, TSC1, TSC2, VHL, XRCC2.   -Variant of uncertain significance in MSH3 c.16C>T (p.Qve4Iee)  10/9/22 (tumor 8/29/22): Caris Testing Results.  -Genomic ROXI low (6%)   -TMB low (1mut/Mb)  -Microsatellite stable/Mismatch Repair proficient  -PD-L1- (CPS 0%)  -NTRK 1/2/3 fusion not detected.   -ER-, CT+  -Genomic alterations in:  --TP53  -No genomic alterations in BRCA1,2.  Jamila-ovary clinical trial screening: Negative for the immunoreactive subtype.   -12/2/22: Chest, abdomen, pelvic CT: No definitive evidence of disease.   -12/7/22: CA-125=23.   -2/16/23: Chest, abdomen, pelvic CT to evaluate bloating: Stable findings, no definitive evidence of disease.   -5/25/23: Admitted  to [facility] for management of malignant ascites s/p therapeutic paracentesis, and partial SBO resolved with conservative management.   5/25/23: Abdomen, pelvic CT: Progressive disease.   LOWER CHEST: OMAIRA.    BOWEL: Development of moderate malignant ascites with small peritoneal and serosal implants present. There is mild distention of a few loops of small bowel within the right side of the abdomen with regions of narrowing present compatible with an early or partial small bowel obstruction. Wall thickening in a few of the involved segments with fecalization of the internal contents of the small bowel compatible with stasis.   PELVIC ORGANS: Lobulated 4.5 cm soft tissue mass in the dependent pelvis in the expected location of the uterus.  -6/13/23: CA-125=78.     6/13/23, 7/12/23, 8/14/23, 9/13/23, 10/11/23, 11/8/23, 12/6/23, 1/3/24, 2/1/24, 2/29/24, 3/28/24: Cycles 1-11 of second-line chemotherapy with IV carboplatin AUC 5 + pegylated liposomal doxorubicin (PLD) 30 mg/m2 every 28 days.   6/19/23 (tumor 8/29/22): Caris Test results.   -FOLR1+ (2+, 75%).   -9/27/23: Chest, abdomen, pelvic CT: Partial response. Catheter-associated thrombus.   -1/23/24: Chest, abdomen, pelvic CT: Stable disease.    CHEST: OMAIRA.    Few sub-6 mm pulmonary nodules, unchanged from prior.  Chest wall port catheter. Interval resolution of previous adjacent thrombus within the right brachiocephalic vein.   HEPATOBILIARY: Liver surface nodularity, suggesting underlying cirrhosis. Small ovoid low-attenuation within the falciform ligament, new from 09/27/2023 and likely reflecting extension of peritoneal carcinomatosis. Tiny   low-attenuation lesions of the right hepatic lobe, unchanged.  BOWEL: Pelvic nodule within the central pelvis, tethers adjacent bowel loops and the urinary bladder, measuring 11 x 17 mm (previously 14 x 19 mm).   Small volume loculated ascites, improved from prior.     Plan: Second- PARPi therapy with olaparib 300 mg BID.   -Plan to initiate therapy in ~4 weeks (6 weeks after last chemotherapy).   -RTC 1 month after initiating PARPi therapy to review side-effects.       4/23/24: ED SBO at OSH. Discharge home  after IV pain medications and IV antiemetics.    CT AP  IMPRESSION:  1.  Distal small bowel obstruction.  2.  Interval increase in multiloculated ascites.     5/15/24:  38.  5/16/24: Start olaparib 300 mg BID.  6/10/24: Decrease olaparib 200 mg BID due to altered taste and nausea.    6/10-6/11/24: ED to hospital admission for SBO at OSH.  Abd XR  IMPRESSION: Multiple dilated loops of bowel redemonstrated, suspicious for obstruction. No discrete evidence of free intraperitoneal gas on this lateral decubitus radiograph, however if there is clinical suspicion for this, CT could be considered for further evaluation.     6/13/24:  42.  6/21/24:  pending.              Today she comes to clinic with her  and daughter feeling well overall.  She has been taking the olaparib as prescribed with the exception of missing 2 doses due to being in the hospital.  She has developed a significant altered taste affecting mostly fluids making it difficult for her to maintain adequate p.o. intake.  She is getting IV fluids at her local hospital.  She has been admitted twice small bowel obstruction with severe pain, nausea, and vomiting since her last visit here.  She had a CT scan with her first admission that was about 2 weeks after her CT scan here without mention of concern for carcinomatosis or cancer causing her bowel obstruction.  Most recent admission 11 days ago they were not able to obtain a CT scan because their CT scanner was down so she had an abdominal x-ray which showed bowel obstruction.  Both admissions she was able to discharge the next day with bowel rest, IV fluids, IV pain medication, IV antiemetics.  She did not require an NG tube.  She reports that she is on a low fiber diet currently and has met with a dietitian multiple times to reinforce her diet and answer her questions.  She is making herself n.p.o. and trying to manage her symptoms with p.o. pain medication and antiemetics but this has  been unsuccessful.              Past Medical History:    Past Medical History:   Diagnosis Date     Female stress incontinence      Ovarian cancer, right (H) 06/16/2022    Stage IIIC high-grade serous carcinoma     Recurrent cold sores          Past Surgical History:    Past Surgical History:   Procedure Laterality Date     APPENDECTOMY  08/29/2022    Part of ovarian cancer tumor debulking     BLADDER SUSPENSION  08/13/2009     HYSTERECTOMY  08/13/2009    For prolapse     IR PARACENTESIS  6/6/2022     IR PARACENTESIS  6/14/2022     IR PARACENTESIS  5/26/2023     LAPAROSCOPY DIAGNOSTIC (GYN)  06/16/2022     SALPINGO-OOPHORECTOMY, COMBINED Bilateral 08/29/2022    Pelvic exam under anesthesia, diagnostic laparoscopy, conversion to laparotomy for optimal interval tumor debulking to no gross residual disease including peritoneal and diaphragm biopsies, bilateral salpingo-oophorectomy, infragastric omentectomy, bilateral hemidiaphragm stripping, peritoneal and mesenteric argon beam ablation, appendectomy.     TONSILLECTOMY  1973     TUBAL LIGATION Bilateral 09/01/1998         Health Maintenance Due   Topic Date Due     YEARLY PREVENTIVE VISIT  Never done     ADVANCE CARE PLANNING  Never done     Pneumococcal Vaccine: Pediatrics (0 to 5 Years) and At-Risk Patients (6 to 64 Years) (1 of 2 - PCV) Never done     COLORECTAL CANCER SCREENING  Never done     HIV SCREENING  Never done     HEPATITIS C SCREENING  Never done     ZOSTER IMMUNIZATION (1 of 2) Never done     HEPATITIS B IMMUNIZATION (1 of 3 - 19+ 3-dose series) Never done     PAP  Never done     LIPID  Never done     DTAP/TDAP/TD IMMUNIZATION (2 - Td or Tdap) 12/15/2021     COVID-19 Vaccine (5 - 2023-24 season) 09/01/2023     PHQ-2 (once per calendar year)  Never done       Current Medications:     Current Outpatient Medications   Medication Sig Dispense Refill     escitalopram (LEXAPRO) 10 MG tablet Take 1 Tablet (10 mg) by mouth daily.*       lidocaine-prilocaine  (EMLA) 2.5-2.5 % external cream Apply topically as needed for moderate pain 30 g 1     cyclobenzaprine (FLEXERIL) 5 MG tablet Take 1-2 Tablets (5-10 mg) by mouth three times a day.       diphenhydrAMINE-acetaminophen (TYLENOL PM)  MG tablet Take 1 tablet by mouth as needed       docusate sodium (DSS) 100 MG capsule Take 100 mg by mouth as needed       HYDROcodone-acetaminophen (NORCO) 5-325 MG tablet Take 1 tablet by mouth every 4 hours as needed       hydrOXYzine (ATARAX) 10 MG tablet Take 1-2 tablets (10-20 mg) by mouth every 6 hours as needed for itching 120 tablet 3     Multiple Vitamin (MULTI-VITAMIN DAILY PO) Take 1 tablet by mouth daily       olaparib (LYNPARZA) 100 MG tablet Take 2 tablets (200 mg) by mouth 2 times daily 120 tablet 0     omeprazole (PRILOSEC) 20 MG DR capsule Take 20 mg by mouth       ondansetron (ZOFRAN) 8 MG tablet Take 1 tablet (8 mg) by mouth every 8 hours as needed for nausea 30 tablet 2     sennosides (SENOKOT) 8.6 MG tablet        valACYclovir (VALTREX) 1000 mg tablet Take 1,000 mg by mouth as needed           Allergies:      No Known Allergies     Social History:     Social History     Tobacco Use     Smoking status: Former     Current packs/day: 0.00     Average packs/day: 1 pack/day for 42.0 years (42.0 ttl pk-yrs)     Types: Cigarettes     Start date: 1980     Quit date: 2022     Years since quittin.0     Smokeless tobacco: Never   Substance Use Topics     Alcohol use: Yes     Comment: Beer ~Q3 months.       History   Drug Use Unknown         Family History:     The patient's family history is notable for:    Family History   Problem Relation Age of Onset     Prostate Cancer Father      Breast Cancer Sister 48     Melanoma Sister      Skin Cancer Sister      Colon Cancer Maternal Grandmother          Physical Exam:     /70 (BP Location: Right arm, Patient Position: Sitting, Cuff Size: Adult Regular)   Pulse 65   Temp 98.3  F (36.8  C) (Oral)   Resp 16    Wt 69 kg (152 lb 1.6 oz)   SpO2 99%   BMI 27.81 kg/m    Body mass index is 27.81 kg/m .    General Appearance: alert, no distress     HEENT: no palpable nodules or masses        Cardiovascular: regular rate and rhythm, no gallops, rubs or murmurs     Respiratory: lungs clear, no rales, rhonchi or wheezes    Musculoskeletal: extremities non tender and without edema    Skin: no lesions or rashes     Neurological: normal gait, no gross defects     Psychiatric: appropriate mood and affect                               Hematological: normal cervical and supraclavicular lymph nodes     Gastrointestinal:       abdomen soft, non-tender, non-distended    Genitourinary: Deferred.       Recent Results (from the past 24 hour(s))   CMP - Comprehensive Metabolic Panel    Collection Time: 06/21/24  8:31 AM   Result Value Ref Range    Sodium 138 135 - 145 mmol/L    Potassium 3.9 3.4 - 5.3 mmol/L    Carbon Dioxide (CO2) 24 22 - 29 mmol/L    Anion Gap 10 7 - 15 mmol/L    Urea Nitrogen 13.4 8.0 - 23.0 mg/dL    Creatinine 1.13 (H) 0.51 - 0.95 mg/dL    GFR Estimate 56 (L) >60 mL/min/1.73m2    Calcium 9.1 8.6 - 10.0 mg/dL    Chloride 104 98 - 107 mmol/L    Glucose 119 (H) 70 - 99 mg/dL    Alkaline Phosphatase 164 (H) 40 - 150 U/L    AST 32 0 - 45 U/L    ALT 20 0 - 50 U/L    Protein Total 6.5 6.4 - 8.3 g/dL    Albumin 3.9 3.5 - 5.2 g/dL    Bilirubin Total 0.3 <=1.2 mg/dL   CBC with platelets and differential    Collection Time: 06/21/24  8:31 AM   Result Value Ref Range    WBC Count 4.5 4.0 - 11.0 10e3/uL    RBC Count 3.52 (L) 3.80 - 5.20 10e6/uL    Hemoglobin 11.1 (L) 11.7 - 15.7 g/dL    Hematocrit 33.9 (L) 35.0 - 47.0 %    MCV 96 78 - 100 fL    MCH 31.5 26.5 - 33.0 pg    MCHC 32.7 31.5 - 36.5 g/dL    RDW 15.0 10.0 - 15.0 %    Platelet Count 145 (L) 150 - 450 10e3/uL    % Neutrophils 69 %    % Lymphocytes 19 %    % Monocytes 7 %    % Eosinophils 4 %    % Basophils 1 %    % Immature Granulocytes 0 %    NRBCs per 100 WBC 0 <1 /100     Absolute Neutrophils 3.2 1.6 - 8.3 10e3/uL    Absolute Lymphocytes 0.9 0.8 - 5.3 10e3/uL    Absolute Monocytes 0.3 0.0 - 1.3 10e3/uL    Absolute Eosinophils 0.2 0.0 - 0.7 10e3/uL    Absolute Basophils 0.0 0.0 - 0.2 10e3/uL    Absolute Immature Granulocytes 0.0 <=0.4 10e3/uL    Absolute NRBCs 0.0 10e3/uL          Assessment:    Jacki Smith is a 59 year old woman with 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  She presents for follow up.     40 minutes spent on the date of the encounter doing chart review, history and exam, documentation, and further activities as noted above.      Plan:     1.) Ovarian cancer:  Discussed patient with Dr. Patino who recommends a CT scan and follow up with her due to her recent bowel obstructions.  She will need to continue monthly labs for CBC and CMP, and  monthly.  OK to continue olaparib 200 mg BID.  Order for CT placed and discussed with Sarahi MCCANN who will coordinate scheduling.  Reviewed signs and symptoms for when she should contact the clinic or seek additional care.  Patient to contact the clinic with any questions or concerns in the interim.      Genetic risk factors were assessed and she has a VUS No identifiable germline variants identified in ABRAXAS1, AKT1, APC, DEION, AXIN2, BARD1, BMPR1A, BRCA1, BRCA2, BRIP1, CDC73, CDH1, CDK4, CDKN2A, CHEK2, CTNNA1, DICER1, EPCAM, FANCC, FANCM, GREM1, HOXB13, KIT, MEN1, MLH1, MRE11, MSH2, MSH6, MUTYH, NBN, NF1, NTHL1, PALB2, PDGFRA, PIK3CA, PMS2, POLD1, POLE, PTEN, RAD50, RAD51C, RAD51D, RECQL, RINT1, SDHA, SDHB, SDHC, SDHD, SMAD4, SMARCA4, STK11, TP53, TSC1, TSC2, VHL, XRCC2.    Labs and/or tests reviewed include:  CBC. CMP. Mag. .     2.) Health maintenance:  Issues addressed today include following up with PCP for annual health maintenance and non-gynecologic issues.       Echo Sanz, DNP, APRN, FNP-C, AOCNP  Oncology Nurse Practitioner  Division of Gynecologic Oncology  Pager:  [phone]       Patient Care Team:  Domenica Christianson MD as PCP - General  Teoh, Charis Meadows MD as MD (Gynecologic Oncology)  Susannah Roman APRN CNP as Assigned Cancer Care Provider  Anish Monroy MD as Assigned Palliative Care Provider  Pratima Vega RN as Specialty Care Coordinator (Hematology & Oncology)  Myhre, Grace C, [facility] as Pharmacist  SELF, REFERRED      Again, thank you for allowing me to participate in the care of your patient.        Sincerely,        JALEN Barrera CNP

## 2024-06-21 NOTE — NURSING NOTE
"Oncology Rooming Note    June 21, 2024 8:37 AM   Jacki Smith is a 59 year old female who presents for:    Chief Complaint   Patient presents with    Port Draw     Labs drawn via port by RN. VS taken.    Oncology Clinic Visit     Ovarian cancer, right     Initial Vitals: /70 (BP Location: Right arm, Patient Position: Sitting, Cuff Size: Adult Regular)   Pulse 65   Temp 98.3  F (36.8  C) (Oral)   Resp 16   Wt 69 kg (152 lb 1.6 oz)   SpO2 99%   BMI 27.81 kg/m   Estimated body mass index is 27.81 kg/m  as calculated from the following:    Height as of 4/12/24: 1.575 m (5' 2.01\").    Weight as of this encounter: 69 kg (152 lb 1.6 oz). Body surface area is 1.74 meters squared.  No Pain (0) Comment: Data Unavailable   No LMP recorded. Patient has had a hysterectomy.  Allergies reviewed: Yes  Medications reviewed: Yes    Medications: MEDICATION REFILLS NEEDED TODAY. Provider was notified.  Pharmacy name entered into [employer]:    [id number] - [place], [place] - [address] [facility] - [place], [place] - [address] [place] [address] [facility] - [place], [place] - [address]    Frailty Screening:   Is the patient here for a new oncology consult visit in cancer care? 1. no    Clinical concerns: needs refill for lidocaine cream meant for port. Does not appear on the med list though.       Marco Hollis"

## 2024-06-21 NOTE — NURSING NOTE
"Chief Complaint   Patient presents with    Port Draw     Labs drawn via port by RN. VS taken.     Port accessed with 20 gauge, 3/4\" power needle by RN, labs collected, line flushed with saline and heparin, then de-accessed.  Vitals taken. Pt checked in for appointment(s).     Ania Donahue, RN    "

## 2024-06-27 ENCOUNTER — DOCUMENTATION ONLY (OUTPATIENT)
Dept: ONCOLOGY | Facility: CLINIC | Age: 59
End: 2024-06-27

## 2024-06-27 ENCOUNTER — LAB (OUTPATIENT)
Dept: INFUSION THERAPY | Facility: HOSPITAL | Age: 59
End: 2024-06-27
Attending: OBSTETRICS & GYNECOLOGY
Payer: COMMERCIAL

## 2024-06-27 DIAGNOSIS — C56.1 OVARIAN CANCER, RIGHT (H): ICD-10-CM

## 2024-06-27 LAB
ALBUMIN SERPL BCG-MCNC: 4.3 G/DL (ref 3.5–5.2)
ALP SERPL-CCNC: 179 U/L (ref 40–150)
ALT SERPL W P-5'-P-CCNC: 21 U/L (ref 0–50)
ANION GAP SERPL CALCULATED.3IONS-SCNC: 8 MMOL/L (ref 7–15)
AST SERPL W P-5'-P-CCNC: 35 U/L (ref 0–45)
BASOPHILS # BLD AUTO: 0.1 10E3/UL (ref 0–0.2)
BASOPHILS NFR BLD AUTO: 1 %
BILIRUB SERPL-MCNC: 0.5 MG/DL
BUN SERPL-MCNC: 12.9 MG/DL (ref 8–23)
CALCIUM SERPL-MCNC: 9.9 MG/DL (ref 8.6–10)
CANCER AG125 SERPL-ACNC: 53 U/ML
CHLORIDE SERPL-SCNC: 101 MMOL/L (ref 98–107)
CREAT SERPL-MCNC: 1.21 MG/DL (ref 0.51–0.95)
DEPRECATED HCO3 PLAS-SCNC: 27 MMOL/L (ref 22–29)
EGFRCR SERPLBLD CKD-EPI 2021: 51 ML/MIN/1.73M2
EOSINOPHIL # BLD AUTO: 0.2 10E3/UL (ref 0–0.7)
EOSINOPHIL NFR BLD AUTO: 4 %
ERYTHROCYTE [DISTWIDTH] IN BLOOD BY AUTOMATED COUNT: 15.4 % (ref 10–15)
GLUCOSE SERPL-MCNC: 82 MG/DL (ref 70–99)
HCT VFR BLD AUTO: 35.9 % (ref 35–47)
HGB BLD-MCNC: 11.9 G/DL (ref 11.7–15.7)
IMM GRANULOCYTES # BLD: 0 10E3/UL
IMM GRANULOCYTES NFR BLD: 1 %
LYMPHOCYTES # BLD AUTO: 0.9 10E3/UL (ref 0.8–5.3)
LYMPHOCYTES NFR BLD AUTO: 16 %
MCH RBC QN AUTO: 32.1 PG (ref 26.5–33)
MCHC RBC AUTO-ENTMCNC: 33.1 G/DL (ref 31.5–36.5)
MCV RBC AUTO: 97 FL (ref 78–100)
MONOCYTES # BLD AUTO: 0.4 10E3/UL (ref 0–1.3)
MONOCYTES NFR BLD AUTO: 7 %
NEUTROPHILS # BLD AUTO: 3.8 10E3/UL (ref 1.6–8.3)
NEUTROPHILS NFR BLD AUTO: 71 %
NRBC # BLD AUTO: 0 10E3/UL
NRBC BLD AUTO-RTO: 0 /100
PLATELET # BLD AUTO: 159 10E3/UL (ref 150–450)
POTASSIUM SERPL-SCNC: 4 MMOL/L (ref 3.4–5.3)
PROT SERPL-MCNC: 7.1 G/DL (ref 6.4–8.3)
RBC # BLD AUTO: 3.71 10E6/UL (ref 3.8–5.2)
SODIUM SERPL-SCNC: 136 MMOL/L (ref 135–145)
WBC # BLD AUTO: 5.4 10E3/UL (ref 4–11)

## 2024-06-27 PROCEDURE — 86304 IMMUNOASSAY TUMOR CA 125: CPT

## 2024-06-27 PROCEDURE — 85004 AUTOMATED DIFF WBC COUNT: CPT

## 2024-06-27 PROCEDURE — 36415 COLL VENOUS BLD VENIPUNCTURE: CPT

## 2024-06-27 PROCEDURE — 82247 BILIRUBIN TOTAL: CPT

## 2024-06-27 NOTE — PROGRESS NOTES
Oral Chemotherapy Monitoring Program  Lab Follow Up    Reviewed lab results from 6/27/24.        5/15/2024     1:00 PM 5/23/2024    11:00 AM 5/30/2024    10:00 AM 6/10/2024    11:00 AM 6/11/2024     9:00 AM 6/13/2024     9:00 AM 6/27/2024     1:00 PM   ORAL CHEMOTHERAPY   Assessment Type Lab Monitoring Lab Monitoring;Initial Follow up Lab Monitoring;Initial Follow up Lab Monitoring;Refill Other Lab Monitoring Lab Monitoring   Diagnosis Code Ovarian Cancer Ovarian Cancer Ovarian Cancer Ovarian Cancer Ovarian Cancer Ovarian Cancer Ovarian Cancer   Providers Teoh Teoh Teoh Teoh Teoh Teoh Teoh   [facility]   Is this patient followed by the [facility] OC team?      No No   Drug Name Lynparza (olaparib) Lynparza (olaparib) Lynparza (olaparib) Lynparza (olaparib) Lynparza (olaparib) Lynparza (olaparib) Lynparza (olaparib)   Dose 300 mg 300 mg 300 mg 200 mg 200 mg 200 mg 200 mg   Current Schedule BID BID BID BID BID BID BID   Cycle Details Continuous Continuous Continuous Continuous Continuous Continuous Continuous   Start Date of Last Cycle   5/16/2024       Planned next cycle start date 5/16/2024 5/16/2024 6/15/2024       Doses missed in last 2 weeks  0  1      Adherence Assessment  Adherent  Adherent      Adverse Effects  Nausea  Increased Creatinine  Increased Creatinine Increased Creatinine   Nausea  Grade 1        Pharmacist Intervention(nausea)  No        Increased Creatinine    Grade 1  Grade 1 Grade 1   Pharmacist intervention(Increased creatinine)    Yes  No No   Intervention(s)    Dose decreased (chemo)      Other (See Note for Details)    taste alterations      Pharmacist intervention(other)    No      Any new drug interactions?  No  No      Is the dose as ordered appropriate for the patient?  Yes  Yes      Since the last time we talked, have you been hospitalized or used the emergency room?  No  No          Labs:  _  Result Component Current Result Ref  Range   Sodium 136 (6/27/2024) 135 - 145 mmol/L     _  Result Component Current Result Ref Range   Potassium 4.0 (6/27/2024) 3.4 - 5.3 mmol/L     _  Result Component Current Result Ref Range   Calcium 9.9 (6/27/2024) 8.6 - 10.0 mg/dL     No results found for Mag within last 30 days.     No results found for Phos within last 30 days.     _  Result Component Current Result Ref Range   Albumin 4.3 (6/27/2024) 3.5 - 5.2 g/dL     _  Result Component Current Result Ref Range   Urea Nitrogen 12.9 (6/27/2024) 8.0 - 23.0 mg/dL     _  Result Component Current Result Ref Range   Creatinine 1.21 (H) (6/27/2024) 0.51 - 0.95 mg/dL     _  Result Component Current Result Ref Range   AST 35 (6/27/2024) 0 - 45 U/L     _  Result Component Current Result Ref Range   ALT 21 (6/27/2024) 0 - 50 U/L     _  Result Component Current Result Ref Range   Bilirubin Total 0.5 (6/27/2024) <=1.2 mg/dL     _  Result Component Current Result Ref Range   WBC Count 5.4 (6/27/2024) 4.0 - 11.0 10e3/uL     _  Result Component Current Result Ref Range   Hemoglobin 11.9 (6/27/2024) 11.7 - 15.7 g/dL     _  Result Component Current Result Ref Range   Platelet Count 159 (6/27/2024) 150 - 450 10e3/uL     No results found for ANC within last 30 days.     _  Result Component Current Result Ref Range   Absolute Neutrophils 3.8 (6/27/2024) 1.6 - 8.3 10e3/uL        Assessment & Plan:    No new concerning abnormalities, results consistent with previus numbers.  Patient was contacted via [employer] message.    Follow-Up:  Lab draw 7/5.    Nikki Damon  Pharmacy Intern  Oral Chemotherapy Monitoring Program  [facility]  [phone]

## 2024-07-03 DIAGNOSIS — C56.1 OVARIAN CANCER, RIGHT (H): Primary | ICD-10-CM

## 2024-07-05 ENCOUNTER — LAB (OUTPATIENT)
Dept: INFUSION THERAPY | Facility: HOSPITAL | Age: 59
End: 2024-07-05
Attending: OBSTETRICS & GYNECOLOGY
Payer: COMMERCIAL

## 2024-07-05 ENCOUNTER — HOSPITAL ENCOUNTER (OUTPATIENT)
Dept: CT IMAGING | Facility: HOSPITAL | Age: 59
Discharge: HOME | End: 2024-07-05
Attending: NURSE PRACTITIONER
Payer: COMMERCIAL

## 2024-07-05 ENCOUNTER — MYC MEDICAL ADVICE (OUTPATIENT)
Dept: ONCOLOGY | Facility: CLINIC | Age: 59
End: 2024-07-05

## 2024-07-05 DIAGNOSIS — C56.1 OVARIAN CANCER, RIGHT (H): ICD-10-CM

## 2024-07-05 LAB
ALBUMIN SERPL BCG-MCNC: 4 G/DL (ref 3.5–5.2)
ALP SERPL-CCNC: 152 U/L (ref 40–150)
ALT SERPL W P-5'-P-CCNC: 15 U/L (ref 0–50)
ANION GAP SERPL CALCULATED.3IONS-SCNC: 7 MMOL/L (ref 7–15)
AST SERPL W P-5'-P-CCNC: 35 U/L (ref 0–45)
BASOPHILS # BLD AUTO: 0 10E3/UL (ref 0–0.2)
BASOPHILS NFR BLD AUTO: 1 %
BILIRUB SERPL-MCNC: 0.4 MG/DL
BUN SERPL-MCNC: 11.6 MG/DL (ref 8–23)
CALCIUM SERPL-MCNC: 9.6 MG/DL (ref 8.6–10)
CANCER AG125 SERPL-ACNC: 62 U/ML
CHLORIDE SERPL-SCNC: 102 MMOL/L (ref 98–107)
CREAT SERPL-MCNC: 1.19 MG/DL (ref 0.51–0.95)
DEPRECATED HCO3 PLAS-SCNC: 27 MMOL/L (ref 22–29)
EGFRCR SERPLBLD CKD-EPI 2021: 52 ML/MIN/1.73M2
EOSINOPHIL # BLD AUTO: 0.2 10E3/UL (ref 0–0.7)
EOSINOPHIL NFR BLD AUTO: 5 %
ERYTHROCYTE [DISTWIDTH] IN BLOOD BY AUTOMATED COUNT: 15.7 % (ref 10–15)
GLUCOSE SERPL-MCNC: 93 MG/DL (ref 70–99)
HCT VFR BLD AUTO: 36.4 % (ref 35–47)
HGB BLD-MCNC: 11.9 G/DL (ref 11.7–15.7)
IMM GRANULOCYTES # BLD: 0 10E3/UL
IMM GRANULOCYTES NFR BLD: 0 %
LYMPHOCYTES # BLD AUTO: 1 10E3/UL (ref 0.8–5.3)
LYMPHOCYTES NFR BLD AUTO: 22 %
MCH RBC QN AUTO: 32.2 PG (ref 26.5–33)
MCHC RBC AUTO-ENTMCNC: 32.7 G/DL (ref 31.5–36.5)
MCV RBC AUTO: 99 FL (ref 78–100)
MONOCYTES # BLD AUTO: 0.3 10E3/UL (ref 0–1.3)
MONOCYTES NFR BLD AUTO: 7 %
NEUTROPHILS # BLD AUTO: 2.9 10E3/UL (ref 1.6–8.3)
NEUTROPHILS NFR BLD AUTO: 65 %
NRBC # BLD AUTO: 0 10E3/UL
NRBC BLD AUTO-RTO: 0 /100
PLATELET # BLD AUTO: 143 10E3/UL (ref 150–450)
POTASSIUM SERPL-SCNC: 4.4 MMOL/L (ref 3.4–5.3)
PROT SERPL-MCNC: 6.7 G/DL (ref 6.4–8.3)
RBC # BLD AUTO: 3.69 10E6/UL (ref 3.8–5.2)
SODIUM SERPL-SCNC: 136 MMOL/L (ref 135–145)
WBC # BLD AUTO: 4.5 10E3/UL (ref 4–11)

## 2024-07-05 PROCEDURE — 71260 CT THORAX DX C+: CPT

## 2024-07-05 PROCEDURE — 82040 ASSAY OF SERUM ALBUMIN: CPT

## 2024-07-05 PROCEDURE — 250N000011 HC RX IP 250 OP 636: Performed by: NURSE PRACTITIONER

## 2024-07-05 PROCEDURE — 36591 DRAW BLOOD OFF VENOUS DEVICE: CPT

## 2024-07-05 PROCEDURE — 85025 COMPLETE CBC W/AUTO DIFF WBC: CPT

## 2024-07-05 PROCEDURE — 86304 IMMUNOASSAY TUMOR CA 125: CPT

## 2024-07-05 RX ORDER — HEPARIN SODIUM,PORCINE 10 UNIT/ML
5-10 VIAL (ML) INTRAVENOUS
Status: DISCONTINUED | OUTPATIENT
Start: 2024-07-05 | End: 2024-07-06 | Stop reason: HOSPADM

## 2024-07-05 RX ORDER — HEPARIN SODIUM (PORCINE) LOCK FLUSH IV SOLN 100 UNIT/ML 100 UNIT/ML
5-10 SOLUTION INTRAVENOUS
Status: DISCONTINUED | OUTPATIENT
Start: 2024-07-05 | End: 2024-07-06 | Stop reason: HOSPADM

## 2024-07-05 RX ORDER — IOPAMIDOL 755 MG/ML
75 INJECTION, SOLUTION INTRAVASCULAR ONCE
Status: COMPLETED | OUTPATIENT
Start: 2024-07-05 | End: 2024-07-05

## 2024-07-05 RX ORDER — HEPARIN SODIUM,PORCINE 10 UNIT/ML
5-10 VIAL (ML) INTRAVENOUS EVERY 24 HOURS
Status: DISCONTINUED | OUTPATIENT
Start: 2024-07-05 | End: 2024-07-06 | Stop reason: HOSPADM

## 2024-07-05 RX ORDER — IOPAMIDOL 755 MG/ML
20 INJECTION, SOLUTION INTRAVASCULAR ONCE
Status: COMPLETED | OUTPATIENT
Start: 2024-07-05 | End: 2024-07-05

## 2024-07-05 RX ADMIN — IOPAMIDOL 20 ML: 755 INJECTION, SOLUTION INTRAVENOUS at 11:10

## 2024-07-05 RX ADMIN — Medication 5 ML: at 11:05

## 2024-07-05 RX ADMIN — IOPAMIDOL 75 ML: 755 INJECTION, SOLUTION INTRAVENOUS at 11:10

## 2024-07-05 NOTE — ORAL ONC MGMT
Oral Chemotherapy Monitoring Program  Lab Follow Up    Reviewed CBC and CMP lab results from 7/5/24.        5/30/2024    10:00 AM 6/10/2024    11:00 AM 6/11/2024     9:00 AM 6/13/2024     9:00 AM 6/27/2024     1:00 PM 7/3/2024     9:00 AM 7/5/2024    11:00 AM   ORAL CHEMOTHERAPY   Assessment Type Lab Monitoring;Initial Follow up Lab Monitoring;Refill Other Lab Monitoring Lab Monitoring Refill Lab Monitoring   Diagnosis Code Ovarian Cancer Ovarian Cancer Ovarian Cancer Ovarian Cancer Ovarian Cancer Ovarian Cancer Ovarian Cancer   Providers Teoh Teoh Teoh Teoh Teoh Teoh Teoh   [facility]   Is this patient followed by the [facility] OC team?    No No No No   Drug Name Lynparza (olaparib) Lynparza (olaparib) Lynparza (olaparib) Lynparza (olaparib) Lynparza (olaparib) Lynparza (olaparib) Lynparza (olaparib)   Dose 300 mg 200 mg 200 mg 200 mg 200 mg 200 mg 200 mg   Current Schedule BID BID BID BID BID BID BID   Cycle Details Continuous Continuous Continuous Continuous Continuous Continuous Continuous   Start Date of Last Cycle 5/16/2024         Planned next cycle start date 6/15/2024         Doses missed in last 2 weeks  1        Adherence Assessment  Adherent        Adverse Effects  Increased Creatinine  Increased Creatinine Increased Creatinine  Increased Creatinine   Increased Creatinine  Grade 1  Grade 1 Grade 1  Grade 1   Pharmacist intervention(Increased creatinine)  Yes  No No  No   Intervention(s)  Dose decreased (chemo)        Other (See Note for Details)  taste alterations        Pharmacist intervention(other)  No        Any new drug interactions?  No        Is the dose as ordered appropriate for the patient?  Yes        Since the last time we talked, have you been hospitalized or used the emergency room?  No            Labs:  _  Result Component Current Result Ref Range   Sodium 136 (7/5/2024) 135 - 145 mmol/L     _  Result Component Current Result Ref  Range   Potassium 4.4 (7/5/2024) 3.4 - 5.3 mmol/L     _  Result Component Current Result Ref Range   Calcium 9.6 (7/5/2024) 8.6 - 10.0 mg/dL     No results found for Mag within last 30 days.     No results found for Phos within last 30 days.     _  Result Component Current Result Ref Range   Albumin 4.0 (7/5/2024) 3.5 - 5.2 g/dL     _  Result Component Current Result Ref Range   Urea Nitrogen 11.6 (7/5/2024) 8.0 - 23.0 mg/dL     _  Result Component Current Result Ref Range   Creatinine 1.19 (H) (7/5/2024) 0.51 - 0.95 mg/dL     _  Result Component Current Result Ref Range   AST 35 (7/5/2024) 0 - 45 U/L     _  Result Component Current Result Ref Range   ALT 15 (7/5/2024) 0 - 50 U/L     _  Result Component Current Result Ref Range   Bilirubin Total 0.4 (7/5/2024) <=1.2 mg/dL     _  Result Component Current Result Ref Range   WBC Count 4.5 (7/5/2024) 4.0 - 11.0 10e3/uL     _  Result Component Current Result Ref Range   Hemoglobin 11.9 (7/5/2024) 11.7 - 15.7 g/dL     _  Result Component Current Result Ref Range   Platelet Count 143 (L) (7/5/2024) 150 - 450 10e3/uL     No results found for ANC within last 30 days.     _  Result Component Current Result Ref Range   Absolute Neutrophils 2.9 (7/5/2024) 1.6 - 8.3 10e3/uL        Assessment & Plan:  Results are stable.  Grade 1 creatinine increase (1.19) but stable from the last few previous values.  No changes indicated at this time.      Cinelan message sent to Michelle.     Follow-Up:  7/10: Monthly assessment  7/16: Dr. Patino visit    Jaimee Khan, PharmD  Hematology/Oncology Clinical Pharmacist  [facility]  [phone]

## 2024-07-08 ENCOUNTER — MYC MEDICAL ADVICE (OUTPATIENT)
Dept: ONCOLOGY | Facility: CLINIC | Age: 59
End: 2024-07-08
Payer: COMMERCIAL

## 2024-07-10 ENCOUNTER — TELEPHONE (OUTPATIENT)
Dept: ONCOLOGY | Facility: CLINIC | Age: 59
End: 2024-07-10
Payer: COMMERCIAL

## 2024-07-10 NOTE — TELEPHONE ENCOUNTER
Oral Chemotherapy Monitoring Program    Subjective/Objective:  Jacki Smith is a 59 year old female contacted by phone for a follow-up visit for oral chemotherapy. Michelle confirmed taking two tablets (200mg) by mouth twice daily and said they have not missed a dose in the last two weeks, so is adherent to this medication. The patient said they have not started on any new over-the-counter or prescription medications in the last two weeks.    The patient reported a side effect of fatigue while on this medication and said they are pushing off tasks due to this side effect, but also said this could be from lack of sleep at night. Daily exercise was recommended to the patient to help with their low energy levels. The other side effect the patient reported was joint pain in their hands, which is dependent on how often they are using their hands. The patient does not experience any other side effects such as nausea/vomiting, or diarrhea/loose stools. Overall, Michelle seems to be tolerating this medication well.        6/10/2024    11:00 AM 6/11/2024     9:00 AM 6/13/2024     9:00 AM 6/27/2024     1:00 PM 7/3/2024     9:00 AM 7/5/2024    11:00 AM 7/10/2024    11:00 AM   ORAL CHEMOTHERAPY   Assessment Type Lab Monitoring;Refill Other Lab Monitoring Lab Monitoring Refill Lab Monitoring Monthly Follow up   Diagnosis Code Ovarian Cancer Ovarian Cancer Ovarian Cancer Ovarian Cancer Ovarian Cancer Ovarian Cancer Ovarian Cancer   Providers Teoh Teoh Teoh Teoh Teoh Teoh Teoh   [facility]   Is this patient followed by the [facility] OC team?   No No No No No   Drug Name Lynparza (olaparib) Lynparza (olaparib) Lynparza (olaparib) Lynparza (olaparib) Lynparza (olaparib) Lynparza (olaparib) Lynparza (olaparib)   Dose 200 mg 200 mg 200 mg 200 mg 200 mg 200 mg 200 mg   Current Schedule BID BID BID BID BID BID BID   Cycle Details Continuous Continuous Continuous Continuous  "Continuous Continuous Continuous   Doses missed in last 2 weeks 1      0   Adherence Assessment Adherent      Adherent   Adverse Effects Increased Creatinine  Increased Creatinine Increased Creatinine  Increased Creatinine    Fatigue       Grade 2   Increased Creatinine Grade 1  Grade 1 Grade 1  Grade 1    Pharmacist intervention(Increased creatinine) Yes  No No  No    Intervention(s) Dose decreased (chemo)         Other (See Note for Details) taste alterations         Pharmacist intervention(other) No         Any new drug interactions? No         Is the dose as ordered appropriate for the patient? Yes         Since the last time we talked, have you been hospitalized or used the emergency room? No             Last PHQ-2 Score on record:        No data to display                Vitals:  BP:   BP Readings from Last 1 Encounters:   06/21/24 110/70     Wt Readings from Last 1 Encounters:   06/21/24 69 kg (152 lb 1.6 oz)     Estimated body surface area is 1.74 meters squared as calculated from the following:    Height as of 4/12/24: 1.575 m (5' 2.01\").    Weight as of 6/21/24: 69 kg (152 lb 1.6 oz).    Labs:  _  Result Component Current Result Ref Range   Sodium 136 (7/5/2024) 135 - 145 mmol/L     _  Result Component Current Result Ref Range   Potassium 4.4 (7/5/2024) 3.4 - 5.3 mmol/L     _  Result Component Current Result Ref Range   Calcium 9.6 (7/5/2024) 8.6 - 10.0 mg/dL     No results found for Mag within last 30 days.     No results found for Phos within last 30 days.     _  Result Component Current Result Ref Range   Albumin 4.0 (7/5/2024) 3.5 - 5.2 g/dL     _  Result Component Current Result Ref Range   Urea Nitrogen 11.6 (7/5/2024) 8.0 - 23.0 mg/dL     _  Result Component Current Result Ref Range   Creatinine 1.19 (H) (7/5/2024) 0.51 - 0.95 mg/dL     _  Result Component Current Result Ref Range   AST 35 (7/5/2024) 0 - 45 U/L     _  Result Component Current Result Ref Range   ALT 15 (7/5/2024) 0 - 50 U/L "     _  Result Component Current Result Ref Range   Bilirubin Total 0.4 (7/5/2024) <=1.2 mg/dL     _  Result Component Current Result Ref Range   WBC Count 4.5 (7/5/2024) 4.0 - 11.0 10e3/uL     _  Result Component Current Result Ref Range   Hemoglobin 11.9 (7/5/2024) 11.7 - 15.7 g/dL     _  Result Component Current Result Ref Range   Platelet Count 143 (L) (7/5/2024) 150 - 450 10e3/uL     No results found for ANC within last 30 days.     _  Result Component Current Result Ref Range   Absolute Neutrophils 2.9 (7/5/2024) 1.6 - 8.3 10e3/uL      Assessment/Plan:  Continue Lynparza therapy as planned. No concerns and the patient did not have any questions for me.    Follow-Up:  7/16 with Dr. Patino    Refill Due:  ~8/9. Patient is getting another refill tomorrow then 8/9 is the next refill date.    Inocencia Acuña  Pharmacy Intern  Oral Chemotherapy Monitoring Program  [facility]  [phone]

## 2024-07-15 NOTE — PATIENT INSTRUCTIONS
SCHEDULING:  -RTC in 3 months (MD, in-person). --order(s) placed   -CA-125 in 3 months. --order(s) placed   -Chest, abdomen, pelvic CT In 3 months. 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   Treatment History:  -6/22/22-8/4/22: Neoadjuvant chemotherapy with IV carboplatin + paclitaxel x3 cycles. Partial response.   -8/29/22: Pelvic exam under anesthesia, diagnostic laparoscopy (look inside the pelvis/abdomen with a camera), conversion to laparotomy (large incision) for optimal interval tumor debulking to no gross residual disease including peritoneal and diaphragm biopsies, bilateral salpingo-oophorectomy (removal of bilateral ovaries & fallopian tubes), infragastric omentectomy (removal of the fat curtain hanging off the stomach/colon), bilateral hemidiaphragm stripping, peritoneal and mesenteric argon beam ablation (destruction of tumor), appendectomy.   -9/28/22-11/17/22: First-line chemotherapy with carboplatin + paclitaxel x3 cycles (total 6 cycles).   -6/13/23-3/28/24: Second-line chemotherapy with IV carboplatin + pegylated liposomal doxorubicin (PLD/doxil) x11 cycles. Partial response.   -5/10/24-Present: Second- PARP-inhibitor therapy with olaparib.         PLAN:  1) Ovarian cancer: Continue second-line PARP-inhibitor maintenance therapy.   DRUG: Olaparib   SCHEDULE: 2 tablets twice per day.   PLAN: Until disease progression.     RTC with repeat chest, abdomen, pelvic CT in 3 months.       2) Genetic testing: negative for any identifiable germline variants. No additional testing recommended for you or your family.        Please call with any questions or concerns. [phone]     Charis Patino MD, MS, FACOG, FACS  7/17/2024  12:42 PM      
<-- Click to add NO significant Past Surgical History

## 2024-07-15 NOTE — PROGRESS NOTES
"Follow-up Note on Referred Patient  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 Follow-up visit.       History of Present Illness:  Jacki \"Michelle\" IVY Smith is a 59 year old seen at the [facility] for management of progressive stage IIIC high-grade serous ovarian carcinoma. History as follows:    Ovarian Cancer History:  22: Presented to an ED in [place] for evaluation of abdominal bloating and discomfort.  -Abdomen, pelvic CT: Radiographic Stage CAL ovarian cancer. I have personally reviewed the CT images.   22: [id number].   22: Pelvic ultrasound: c/w metastatic cancer.  22: Pelvic exam under anesthesia, diagnostic laparoscopy, biopsies, evacuation of ascites.   -Findings: On pelvic exam under anesthesia, there was a 6 cm firm, fixed mass adherent to the vaginal cuff. Vagina otherwise smooth. On laparoscopic examination, there was ascites filling the pelvis/abdomen, with >1 liter of fluid evacuated. The palpated mass was arising from the right ovary. Left ovary relatively normal in appearance. There was diffuse carcinomatosis covering the entire peritoneal surface falciform ligament, liver capsule, and mesentery. The omentum was replaced with tumor. Findings were not amenable to optimal tumor debulking so biopsies were taken for histologic examination.   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      22, 22, 22: Cycles 1-3 of neoadjuvant chemotherapy with IV carboplatin + paclitaxel 175 mg/m2 every 21 days.   -Cycles 1,2: " Carboplatin AUC 6.   -Cycle 3: Carboplatin AUC 5 with dose-reduction due to thrombcytopenia.   -8/19/22: Chest, abdomen, pelvic CT: Partial response. I have personally reviewed the CT images.     8/29/22: Pelvic exam under anesthesia, diagnostic laparoscopy, conversion to laparotomy for optimal interval tumor debulking to no gross residual disease including peritoneal and diaphragm biopsies, bilateral salpingo-oophorectomy, infragastric omentectomy, bilateral hemidiaphragm stripping, peritoneal and mesenteric argon beam ablation, appendectomy.   -Pathology: High-grade serous carcinoma involving all resected specimens.     9/28/22, 10/19/22, 11/17/22: Cycles 4-6 of primary chemotherapy with IV carboplatin AUC 5 + paclitaxel 175 mg/m2.  -12/2/22: Chest, abdomen, pelvic CT: No definitive evidence of disease.   -12/7/22: CA-125=23.   -2/16/23: Chest, abdomen, pelvic CT to evaluate bloating: Stable findings, no definitive evidence of disease.    -5/25/23: Admitted  to [facility] for management of malignant ascites s/p therapeutic paracentesis, and partial SBO resolved with conservative management.   5/25/23: Abdomen, pelvic CT: Progressive disease.   -6/13/23: CA-125=78.    6/13/23, 7/12/23, 8/14/23, 9/13/23, 10/11/23, 11/8/23, 12/6/23, 1/3/24, 2/1/24, 2/29/24, 3/28/24: Cycles 1-11 of second-line chemotherapy with IV carboplatin AUC 5 + pegylated liposomal doxorubicin (PLD) 30 mg/m2 every 28 days.   -9/27/23: Chest, abdomen, pelvic CT: Partial response. Catheter-associated thrombus.   -1/23/24: Chest, abdomen, pelvic CT: Stable disease.   -4/10/24: Chest, abdomen, pelvic CT: Stable disease. I have personally reviewed and interpreted the CT images.    CHEST: OMAIRA.   HEPATOBILIARY: Stable small low-density region near the falciform ligament as well as low density along the subcapsular right hepatic lobe posteriorly.   BOWEL:  Slight superior tethering again demonstrated near in a focal region of scarring or  "  possible peritoneal disease measuring 1.1 x 1.5 cm, not significantly changed.  -CA-125 78>>44.   5/10/24-Present: Second- PARPi therapy with olaparib.   -Cycles 1-2: Olaparib 300 mg BID.   -Cycles 3+: Olaparib 200 mg BID with dose-reduction due to decreased creatinine clearance.       Genetic/Genomic/Molecular Testing History:  10/5/22: [facility], reflexed to Common Hereditary Cancer Panel.   -No identifiable germline variants identified in ABRAXAS1, AKT1, APC, DEION, AXIN2, BARD1, BMPR1A, BRCA1, BRCA2, BRIP1, CDC73, CDH1, CDK4, CDKN2A, CHEK2, CTNNA1, DICER1, EPCAM, FANCC, FANCM, GREM1, HOXB13, KIT, MEN1, MLH1, MRE11, MSH2, MSH6, MUTYH, NBN, NF1, NTHL1, PALB2, PDGFRA, PIK3CA, PMS2, POLD1, POLE, PTEN, RAD50, RAD51C, RAD51D, RECQL, RINT1, SDHA, SDHB, SDHC, SDHD, SMAD4, SMARCA4, STK11, TP53, TSC1, TSC2, VHL, XRCC2.   -Variant of uncertain significance in MSH3 c.16C>T (p.Mtt1Chb)    10/9/22 (tumor 8/29/22): [facility] Testing Results.  -Genomic ROXI low (6%)   -TMB low (1mut/Mb)  -Microsatellite stable/Mismatch Repair proficient  -PD-L1- (CPS 0%)  -NTRK 1/2/3 fusion not detected.   -ER-, PA+  -Genomic alterations in:  --TP53  -No genomic alterations in BRCA1,2.    Jamila-ovary clinical trial screening: Negative for the immunoreactive subtype.     6/19/23 (tumor 8/29/22): [facility] Test results.   -FOLR1+ (2+, 75%).       Subjective:  Michelle returns to the gyn onc clinic today for review of CT results and discussion of ongoing management, accompanied by her  and daughter.   Michelle has a lot of anxiety regarding the CT report, including use of language such as \"cirrhotic appearance\" of the liver.   She overall is feeling well. She reports bloating in the upper abdomen after eating, but this improves spontaneously. No nausea/vomiting.  She does struggle with change in taste of especially liquids since starting the olaparib, and has managed this by dosing at 9:30a and 9:30p for minimal impact on her " oral intake. Normal bowel habits. She is tired, but overall feels like she is tolerating the olaparib therapy well.       Past Medical History:  Past Medical History:   Diagnosis Date    Female stress incontinence     Ovarian cancer, right (H) 2022    Stage IIIC high-grade serous carcinoma    Recurrent cold sores        Past Surgical History:  Past Surgical History:   Procedure Laterality Date    APPENDECTOMY  2022    Part of ovarian cancer tumor debulking    BLADDER SUSPENSION  2009    HYSTERECTOMY  2009    For prolapse    IR PARACENTESIS  2022    IR PARACENTESIS  2022    IR PARACENTESIS  2023    LAPAROSCOPY DIAGNOSTIC (GYN)  2022    SALPINGO-OOPHORECTOMY, COMBINED Bilateral 2022    Pelvic exam under anesthesia, diagnostic laparoscopy, conversion to laparotomy for optimal interval tumor debulking to no gross residual disease including peritoneal and diaphragm biopsies, bilateral salpingo-oophorectomy, infragastric omentectomy, bilateral hemidiaphragm stripping, peritoneal and mesenteric argon beam ablation, appendectomy.    TONSILLECTOMY  1973    TUBAL LIGATION Bilateral 1998       Gynecologic History:  Surgical menopause. Hysterectomy in  for prolapse.  Was on HRT with estrogen for less than 2 years  No history of abnormal cervical cancer screening.  No history of STIs.  Sexually active, no dyspareunia, no postcoital bleeding.      Obstetric History:  OB History    Para Term  AB Living   3 3 3 0 0 3   SAB IAB Ectopic Multiple Live Births   0 0 0 0 3      # Outcome Date GA Lbr Sukumar/2nd Weight Sex Type Anes PTL Lv   3 Term      Vag-Spont      2 Term      Vag-Spont      1 Term      Vag-Spont           Current Medications:   Current Outpatient Medications   Medication Sig Dispense Refill    cyclobenzaprine (FLEXERIL) 5 MG tablet Take 1-2 Tablets (5-10 mg) by mouth three times a day.      diphenhydrAMINE-acetaminophen (TYLENOL PM)  MG tablet  Take 1 tablet by mouth as needed      docusate sodium (DSS) 100 MG capsule Take 100 mg by mouth as needed      escitalopram (LEXAPRO) 10 MG tablet Take 1 Tablet (10 mg) by mouth daily.*      HYDROcodone-acetaminophen (NORCO) 5-325 MG tablet Take 1 tablet by mouth every 4 hours as needed      hydrOXYzine (ATARAX) 10 MG tablet Take 1-2 tablets (10-20 mg) by mouth every 6 hours as needed for itching 120 tablet 3    lidocaine-prilocaine (EMLA) 2.5-2.5 % external cream Apply topically as needed for moderate pain 30 g 1    Multiple Vitamin (MULTI-VITAMIN DAILY PO) Take 1 tablet by mouth daily      olaparib (LYNPARZA) 100 MG tablet Take 2 tablets (200 mg) by mouth 2 times daily 120 tablet 0    omeprazole (PRILOSEC) 20 MG DR capsule Take 20 mg by mouth      ondansetron (ZOFRAN) 8 MG tablet Take 1 tablet (8 mg) by mouth every 8 hours as needed for nausea 30 tablet 2    sennosides (SENOKOT) 8.6 MG tablet       valACYclovir (VALTREX) 1000 mg tablet Take 1,000 mg by mouth as needed       No current facility-administered medications for this visit.        Allergies:   No Known Allergies        Social History:  Patient lives with Eligio and two daughters. Works as a . She does not have Advanced Directives, but has identified Eligio Smith as her desired Healthcare Power of .  Social History     Tobacco Use    Smoking status: Former     Current packs/day: 0.00     Average packs/day: 1 pack/day for 42.0 years (42.0 ttl pk-yrs)     Types: Cigarettes     Start date: 1980     Quit date: 2022     Years since quittin.1    Smokeless tobacco: Never   Substance Use Topics    Alcohol use: Yes     Comment: Beer ~Q3 months.       History   Drug Use Unknown       Family History:   The patient's family history is notable for a first-degree paternal relative with prostate cancer, and a first-degree relative with breast cancer and melanoma, and a second-degree maternal relative with colon cancer. No known  family history of ovarian, uterine, urothelial/renal, or pancreatic cancers.   Family History   Problem Relation Age of Onset    Prostate Cancer Father     Breast Cancer Sister 48    Melanoma Sister     Skin Cancer Sister     Colon Cancer Maternal Grandmother        Physical Exam:   /67 (BP Location: Right arm, Patient Position: Sitting, Cuff Size: Adult Regular)   Pulse 62   Temp 97.8  F (36.6  C) (Oral)   Resp 16   Wt 67.5 kg (148 lb 12.8 oz)   SpO2 100%   BMI 27.21 kg/m    Body mass index is 27.21 kg/m .    General Appearance: healthy and alert, no distress     Musculoskeletal: extremities non tender and without edema    Skin: no lesions or rashes     Neurological: normal gait, no gross defects     Psychiatric: appropriate mood and affect                               Hematological: normal cervical, supraclavicular and inguinal lymph nodes     Gastrointestinal:       abdomen soft, non-tender, non-distended, no organomegaly or masses. Specifically, no current fullness in the upper abdomen.       Laboratory Examination:  CA-125 trend (since second- therapy):  5/15/24  38  7/5/24  62  I have independently reviewed & interpreted the CA-125 trend.       Radiographic Examination:  7/5/24: Chest, abdomen, pelvic CT: Stable disaese.  I have personally reviewed and interpreted the CT images.    CHEST: OMAIRA.   BOWEL: Complex ascites with peritoneal thickening has mildly increased.       Performance Status:   ECOG Grade 0.       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     History of malignant partial small bowel obstruction, resolved with bowel rest.       Plan:     1.)    Ovarian cancer: I reviewed the CT results with Michelle and provided her with a copy of the CT report; she was also previously  provided with a copy of the CT report via [email or web address]. Small increase in the peritoneal fluid and peritoneal thickening, but no significant increase in disease. Recommend continuing current second- therapy.     Plan as follows:  -Continue second- PARPi therapy with olaparib 200 mg BID (dose-reduction due to decreased creatinine clearance).   -RTC in 3 months with repeat chest, abdomen, pelvic CT at that time.     Side-effects:   -Grade 1 fatigue: No intervention indicated, will monitor.   -Grade 1 dysgeusia: Continue timing of olaparib to minimize impact on meals/drinking.   --Myelosuppression risk: Monitor CBC weekly x1 month (completed), then monthly x1 year.   --CMP, CA-125 every 3 months.     2.)  Genetic risk factors were assessed: s/p germline testing which was negative for identifiable germline pathogenic variants (see HPI).     3.) Labs and/or tests ordered include:  None.     4. ) Health maintenance issues addressed today include continuing routine healthcare maintenance with her primary care provider.     5.) Code status:  Full-code.    6.) Prescriptions: None.      A total of 30 minutes was spent with the patient, 28 minutes of which were spent in counseling the patient and/or treatment planning; an additional 5 minutes was spent in chart review/documentation.  The longitudinal plan of care for the diagnosis(es)/condition(s) as documented were addressed during this visit. Due to the added complexity in care, I will continue to support Michelle in the subsequent management and with ongoing continuity of care.        Charis Patino MD, MS, FACOG, FACS  7/17/2024  12:40 PM                CC  Patient Care Team:  Domenica Christianson MD as PCP - Charis Verdugo MD as MD (Gynecologic Oncology)  Susannah Roman APRN CNP as Assigned Cancer Care Provider  Anish Monroy MD as Assigned Palliative Care Provider  Pratima Vega RN as Specialty Care Coordinator  (Hematology & Oncology)  Myhre, Grace C, [facility] as Pharmacist  SELF, REFERRED

## 2024-07-16 ENCOUNTER — ONCOLOGY VISIT (OUTPATIENT)
Dept: ONCOLOGY | Facility: CLINIC | Age: 59
End: 2024-07-16
Attending: OBSTETRICS & GYNECOLOGY
Payer: COMMERCIAL

## 2024-07-16 VITALS
SYSTOLIC BLOOD PRESSURE: 104 MMHG | WEIGHT: 148.8 LBS | OXYGEN SATURATION: 100 % | BODY MASS INDEX: 27.21 KG/M2 | RESPIRATION RATE: 16 BRPM | DIASTOLIC BLOOD PRESSURE: 67 MMHG | TEMPERATURE: 97.8 F | HEART RATE: 62 BPM

## 2024-07-16 DIAGNOSIS — R53.0 NEOPLASTIC MALIGNANT RELATED FATIGUE: ICD-10-CM

## 2024-07-16 DIAGNOSIS — C56.1 OVARIAN CANCER, RIGHT (H): Primary | ICD-10-CM

## 2024-07-16 DIAGNOSIS — R43.2 DYSGEUSIA: ICD-10-CM

## 2024-07-16 PROCEDURE — 99214 OFFICE O/P EST MOD 30 MIN: CPT | Performed by: OBSTETRICS & GYNECOLOGY

## 2024-07-16 PROCEDURE — 99213 OFFICE O/P EST LOW 20 MIN: CPT | Performed by: OBSTETRICS & GYNECOLOGY

## 2024-07-16 PROCEDURE — G2211 COMPLEX E/M VISIT ADD ON: HCPCS | Performed by: OBSTETRICS & GYNECOLOGY

## 2024-07-16 ASSESSMENT — PAIN SCALES - GENERAL: PAINLEVEL: NO PAIN (0)

## 2024-07-16 NOTE — NURSING NOTE
"Oncology Rooming Note    July 16, 2024 9:47 AM   Jacki Smith is a 59 year old female who presents for:    Chief Complaint   Patient presents with    Oncology Clinic Visit     Ovarian cancer     Initial Vitals: /67 (BP Location: Right arm, Patient Position: Sitting, Cuff Size: Adult Regular)   Pulse 62   Temp 97.8  F (36.6  C) (Oral)   Resp 16   Wt 67.5 kg (148 lb 12.8 oz)   SpO2 100%   BMI 27.21 kg/m   Estimated body mass index is 27.21 kg/m  as calculated from the following:    Height as of 4/12/24: 1.575 m (5' 2.01\").    Weight as of this encounter: 67.5 kg (148 lb 12.8 oz). Body surface area is 1.72 meters squared.  No Pain (0) Comment: Data Unavailable   No LMP recorded. Patient has had a hysterectomy.  Allergies reviewed: Yes  Medications reviewed: Yes    Medications: Medication refills not needed today.  Pharmacy name entered into [employer]:    [id number] - [place], [place] - [address] [facility] - [place], [place] - [address] [place] [address] [facility] - [place], [place] - [address]    Frailty Screening:   Is the patient here for a new oncology consult visit in cancer care? 2. No      Clinical concerns: Pt reports no new concerns.       Candie Lu, EMT   "

## 2024-07-16 NOTE — LETTER
"2024      Jacki Smith  [address] [facility] [address]      Dear Colleague,    Thank you for referring your patient, Jacki Smith, to the [facility]. Please see a copy of my visit note below.    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 Follow-up visit.       History of Present Illness:  Jacki \"Michelle\" IVY Smith is a 59 year old seen at the [facility] for management of progressive stage IIIC high-grade serous ovarian carcinoma. History as follows:    Ovarian Cancer History:  22: Presented to an ED in [place] for evaluation of abdominal bloating and discomfort.  -Abdomen, pelvic CT: Radiographic Stage CAL ovarian cancer. I have personally reviewed the CT images.   22: [id number].   22: Pelvic ultrasound: c/w metastatic cancer.  22: Pelvic exam under anesthesia, diagnostic laparoscopy, biopsies, evacuation of ascites.   -Findings: On pelvic exam under anesthesia, there was a 6 cm firm, fixed mass adherent to the vaginal cuff. Vagina otherwise smooth. On laparoscopic examination, there was ascites filling the pelvis/abdomen, with >1 liter of fluid evacuated. The palpated mass was arising from the right ovary. Left ovary relatively normal in appearance. There was diffuse carcinomatosis covering the entire peritoneal surface falciform ligament, liver capsule, and mesentery. The omentum was replaced with tumor. Findings were not amenable to optimal tumor debulking so biopsies were taken for histologic examination.   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      6/22/22, 7/13/22, 8/4/22: Cycles 1-3 of neoadjuvant chemotherapy with IV carboplatin + paclitaxel 175 mg/m2 every 21 days.   -Cycles 1,2: Carboplatin AUC 6.   -Cycle 3: Carboplatin AUC 5 with dose-reduction due to thrombcytopenia.   -8/19/22: Chest, abdomen, pelvic CT: Partial response. I have personally reviewed the CT images.     8/29/22: Pelvic exam under anesthesia, diagnostic laparoscopy, conversion to laparotomy for optimal interval tumor debulking to no gross residual disease including peritoneal and diaphragm biopsies, bilateral salpingo-oophorectomy, infragastric omentectomy, bilateral hemidiaphragm stripping, peritoneal and mesenteric argon beam ablation, appendectomy.   -Pathology: High-grade serous carcinoma involving all resected specimens.     9/28/22, 10/19/22, 11/17/22: Cycles 4-6 of primary chemotherapy with IV carboplatin AUC 5 + paclitaxel 175 mg/m2.  -12/2/22: Chest, abdomen, pelvic CT: No definitive evidence of disease.   -12/7/22: CA-125=23.   -2/16/23: Chest, abdomen, pelvic CT to evaluate bloating: Stable findings, no definitive evidence of disease.    -5/25/23: Admitted  to [facility] for management of malignant ascites s/p therapeutic paracentesis, and partial SBO resolved with conservative management.   5/25/23: Abdomen, pelvic CT: Progressive disease.   -6/13/23: CA-125=78.    6/13/23, 7/12/23, 8/14/23, 9/13/23, 10/11/23, 11/8/23, 12/6/23, 1/3/24, 2/1/24, 2/29/24, 3/28/24: Cycles 1-11 of second-line chemotherapy with IV carboplatin AUC 5 + pegylated liposomal doxorubicin (PLD) 30 mg/m2 every 28 days.   -9/27/23: Chest, abdomen, pelvic CT: Partial response. Catheter-associated thrombus.   -1/23/24: Chest, abdomen, pelvic CT: Stable disease.   -4/10/24: Chest, abdomen, pelvic CT: Stable disease. I have personally reviewed and interpreted the CT images.    CHEST: OMAIRA.  "  HEPATOBILIARY: Stable small low-density region near the falciform ligament as well as low density along the subcapsular right hepatic lobe posteriorly.   BOWEL:  Slight superior tethering again demonstrated near in a focal region of scarring or   possible peritoneal disease measuring 1.1 x 1.5 cm, not significantly changed.  -CA-125 78>>44.   5/10/24-Present: Second- PARPi therapy with olaparib.   -Cycles 1-2: Olaparib 300 mg BID.   -Cycles 3+: Olaparib 200 mg BID with dose-reduction due to decreased creatinine clearance.       Genetic/Genomic/Molecular Testing History:  10/5/22: [facility], reflexed to Common Hereditary Cancer Panel.   -No identifiable germline variants identified in ABRAXAS1, AKT1, APC, DEION, AXIN2, BARD1, BMPR1A, BRCA1, BRCA2, BRIP1, CDC73, CDH1, CDK4, CDKN2A, CHEK2, CTNNA1, DICER1, EPCAM, FANCC, FANCM, GREM1, HOXB13, KIT, MEN1, MLH1, MRE11, MSH2, MSH6, MUTYH, NBN, NF1, NTHL1, PALB2, PDGFRA, PIK3CA, PMS2, POLD1, POLE, PTEN, RAD50, RAD51C, RAD51D, RECQL, RINT1, SDHA, SDHB, SDHC, SDHD, SMAD4, SMARCA4, STK11, TP53, TSC1, TSC2, VHL, XRCC2.   -Variant of uncertain significance in MSH3 c.16C>T (p.Ksa4Hgn)    10/9/22 (tumor 8/29/22): [facility] Testing Results.  -Genomic ROXI low (6%)   -TMB low (1mut/Mb)  -Microsatellite stable/Mismatch Repair proficient  -PD-L1- (CPS 0%)  -NTRK 1/2/3 fusion not detected.   -ER-, NE+  -Genomic alterations in:  --TP53  -No genomic alterations in BRCA1,2.    Jamila-ovary clinical trial screening: Negative for the immunoreactive subtype.     6/19/23 (tumor 8/29/22): [facility] Test results.   -FOLR1+ (2+, 75%).       Subjective:  Michelle returns to the gyn onc clinic today for review of CT results and discussion of ongoing management, accompanied by her  and daughter.   Michelle has a lot of anxiety regarding the CT report, including use of language such as \"cirrhotic appearance\" of the liver.   She overall is feeling well. She reports bloating in the " upper abdomen after eating, but this improves spontaneously. No nausea/vomiting.  She does struggle with change in taste of especially liquids since starting the olaparib, and has managed this by dosing at 9:30a and 9:30p for minimal impact on her oral intake. Normal bowel habits. She is tired, but overall feels like she is tolerating the olaparib therapy well.       Past Medical History:  Past Medical History:   Diagnosis Date     Female stress incontinence      Ovarian cancer, right (H) 2022    Stage IIIC high-grade serous carcinoma     Recurrent cold sores        Past Surgical History:  Past Surgical History:   Procedure Laterality Date     APPENDECTOMY  2022    Part of ovarian cancer tumor debulking     BLADDER SUSPENSION  2009     HYSTERECTOMY  2009    For prolapse     IR PARACENTESIS  2022     IR PARACENTESIS  2022     IR PARACENTESIS  2023     LAPAROSCOPY DIAGNOSTIC (GYN)  2022     SALPINGO-OOPHORECTOMY, COMBINED Bilateral 2022    Pelvic exam under anesthesia, diagnostic laparoscopy, conversion to laparotomy for optimal interval tumor debulking to no gross residual disease including peritoneal and diaphragm biopsies, bilateral salpingo-oophorectomy, infragastric omentectomy, bilateral hemidiaphragm stripping, peritoneal and mesenteric argon beam ablation, appendectomy.     TONSILLECTOMY  1973     TUBAL LIGATION Bilateral 1998       Gynecologic History:  Surgical menopause. Hysterectomy in  for prolapse.  Was on HRT with estrogen for less than 2 years  No history of abnormal cervical cancer screening.  No history of STIs.  Sexually active, no dyspareunia, no postcoital bleeding.      Obstetric History:  OB History    Para Term  AB Living   3 3 3 0 0 3   SAB IAB Ectopic Multiple Live Births   0 0 0 0 3      # Outcome Date GA Lbr Sukumar/2nd Weight Sex Type Anes PTL Lv   3 Term      Vag-Spont      2 Term      Vag-Spont      1 Term       Vag-Spont           Current Medications:   Current Outpatient Medications   Medication Sig Dispense Refill     cyclobenzaprine (FLEXERIL) 5 MG tablet Take 1-2 Tablets (5-10 mg) by mouth three times a day.       diphenhydrAMINE-acetaminophen (TYLENOL PM)  MG tablet Take 1 tablet by mouth as needed       docusate sodium (DSS) 100 MG capsule Take 100 mg by mouth as needed       escitalopram (LEXAPRO) 10 MG tablet Take 1 Tablet (10 mg) by mouth daily.*       HYDROcodone-acetaminophen (NORCO) 5-325 MG tablet Take 1 tablet by mouth every 4 hours as needed       hydrOXYzine (ATARAX) 10 MG tablet Take 1-2 tablets (10-20 mg) by mouth every 6 hours as needed for itching 120 tablet 3     lidocaine-prilocaine (EMLA) 2.5-2.5 % external cream Apply topically as needed for moderate pain 30 g 1     Multiple Vitamin (MULTI-VITAMIN DAILY PO) Take 1 tablet by mouth daily       olaparib (LYNPARZA) 100 MG tablet Take 2 tablets (200 mg) by mouth 2 times daily 120 tablet 0     omeprazole (PRILOSEC) 20 MG DR capsule Take 20 mg by mouth       ondansetron (ZOFRAN) 8 MG tablet Take 1 tablet (8 mg) by mouth every 8 hours as needed for nausea 30 tablet 2     sennosides (SENOKOT) 8.6 MG tablet        valACYclovir (VALTREX) 1000 mg tablet Take 1,000 mg by mouth as needed       No current facility-administered medications for this visit.        Allergies:   No Known Allergies        Social History:  Patient lives with Eligio and two daughters. Works as a . She does not have Advanced Directives, but has identified Eligio Smith as her desired Healthcare Power of .  Social History     Tobacco Use     Smoking status: Former     Current packs/day: 0.00     Average packs/day: 1 pack/day for 42.0 years (42.0 ttl pk-yrs)     Types: Cigarettes     Start date: 1980     Quit date: 2022     Years since quittin.1     Smokeless tobacco: Never   Substance Use Topics     Alcohol use: Yes     Comment: Beer ~Q3 months.        History   Drug Use Unknown       Family History:   The patient's family history is notable for a first-degree paternal relative with prostate cancer, and a first-degree relative with breast cancer and melanoma, and a second-degree maternal relative with colon cancer. No known family history of ovarian, uterine, urothelial/renal, or pancreatic cancers.   Family History   Problem Relation Age of Onset     Prostate Cancer Father      Breast Cancer Sister 48     Melanoma Sister      Skin Cancer Sister      Colon Cancer Maternal Grandmother        Physical Exam:   /67 (BP Location: Right arm, Patient Position: Sitting, Cuff Size: Adult Regular)   Pulse 62   Temp 97.8  F (36.6  C) (Oral)   Resp 16   Wt 67.5 kg (148 lb 12.8 oz)   SpO2 100%   BMI 27.21 kg/m    Body mass index is 27.21 kg/m .    General Appearance: healthy and alert, no distress     Musculoskeletal: extremities non tender and without edema    Skin: no lesions or rashes     Neurological: normal gait, no gross defects     Psychiatric: appropriate mood and affect                               Hematological: normal cervical, supraclavicular and inguinal lymph nodes     Gastrointestinal:       abdomen soft, non-tender, non-distended, no organomegaly or masses. Specifically, no current fullness in the upper abdomen.       Laboratory Examination:  CA-125 trend (since second- therapy):  5/15/24  38  7/5/24  62  I have independently reviewed & interpreted the CA-125 trend.       Radiographic Examination:  7/5/24: Chest, abdomen, pelvic CT: Stable disaese.  I have personally reviewed and interpreted the CT images.    CHEST: OMAIRA.   BOWEL: Complex ascites with peritoneal thickening has mildly increased.       Performance Status:   ECOG Grade 0.       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     History of malignant partial small bowel obstruction, resolved with bowel rest.       Plan:     1.)    Ovarian cancer: I reviewed the CT results with Michelle and provided her with a copy of the CT report; she was also previously provided with a copy of the CT report via [employer]. Small increase in the peritoneal fluid and peritoneal thickening, but no significant increase in disease. Recommend continuing current second- therapy.     Plan as follows:  -Continue second- PARPi therapy with olaparib 200 mg BID (dose-reduction due to decreased creatinine clearance).   -RTC in 3 months with repeat chest, abdomen, pelvic CT at that time.     Side-effects:   -Grade 1 fatigue: No intervention indicated, will monitor.   -Grade 1 dysgeusia: Continue timing of olaparib to minimize impact on meals/drinking.   --Myelosuppression risk: Monitor CBC weekly x1 month (completed), then monthly x1 year.   --CMP, CA-125 every 3 months.     2.)  Genetic risk factors were assessed: s/p germline testing which was negative for identifiable germline pathogenic variants (see HPI).     3.) Labs and/or tests ordered include:  None.     4. ) Health maintenance issues addressed today include continuing routine healthcare maintenance with her primary care provider.     5.) Code status:  Full-code.    6.) Prescriptions: None.      A total of 30 minutes was spent with the patient, 28 minutes of which were spent in counseling the patient and/or treatment planning; an additional 5 minutes was spent in chart review/documentation.  The longitudinal plan of care for the diagnosis(es)/condition(s) as documented were addressed during this visit. Due to the added complexity in care, I will continue to support Michelle in the subsequent management and with ongoing continuity of care.        Charis Patino MD, MS, FACOG, FACS  7/17/2024  12:40  PM                CC  Patient Care Team:  Domenica Christianson MD as PCP - General  Teoh, Charis Meadows MD as MD (Gynecologic Oncology)  Susannah Roman APRN CNP as Assigned Cancer Care Provider  Anish Monroy MD as Assigned Palliative Care Provider  Pratima Vega RN as Specialty Care Coordinator (Hematology & Oncology)  Myhre, Grace C, [facility] as Pharmacist  SELF, REFERRED    Again, thank you for allowing me to participate in the care of your patient.        Sincerely,        Charis Patino MD

## 2024-07-17 ENCOUNTER — DOCUMENTATION ONLY (OUTPATIENT)
Dept: ONCOLOGY | Facility: CLINIC | Age: 59
End: 2024-07-17
Payer: COMMERCIAL

## 2024-07-17 NOTE — NURSING NOTE
Return appt in  3 months  Continue olaparib  Ct and ca125 ordered, to be done prior to md appt  Check out note sent to scheduling 7/16/24

## 2024-07-21 ENCOUNTER — HEALTH MAINTENANCE LETTER (OUTPATIENT)
Age: 59
End: 2024-07-21

## 2024-08-01 ENCOUNTER — MYC MEDICAL ADVICE (OUTPATIENT)
Dept: ONCOLOGY | Facility: CLINIC | Age: 59
End: 2024-08-01

## 2024-08-01 ENCOUNTER — LAB (OUTPATIENT)
Dept: INFUSION THERAPY | Facility: HOSPITAL | Age: 59
End: 2024-08-01
Attending: NURSE PRACTITIONER
Payer: COMMERCIAL

## 2024-08-01 DIAGNOSIS — C56.1 OVARIAN CANCER, RIGHT (H): ICD-10-CM

## 2024-08-01 DIAGNOSIS — C56.1 OVARIAN CANCER, RIGHT (H): Primary | ICD-10-CM

## 2024-08-01 LAB
ALBUMIN SERPL BCG-MCNC: 4.1 G/DL (ref 3.5–5.2)
ALP SERPL-CCNC: 157 U/L (ref 40–150)
ALT SERPL W P-5'-P-CCNC: 25 U/L (ref 0–50)
ANION GAP SERPL CALCULATED.3IONS-SCNC: 8 MMOL/L (ref 7–15)
AST SERPL W P-5'-P-CCNC: 38 U/L (ref 0–45)
BASOPHILS # BLD AUTO: 0 10E3/UL (ref 0–0.2)
BASOPHILS NFR BLD AUTO: 1 %
BILIRUB SERPL-MCNC: 0.5 MG/DL
BUN SERPL-MCNC: 11.8 MG/DL (ref 8–23)
CALCIUM SERPL-MCNC: 9.8 MG/DL (ref 8.8–10.4)
CHLORIDE SERPL-SCNC: 101 MMOL/L (ref 98–107)
CREAT SERPL-MCNC: 1.2 MG/DL (ref 0.51–0.95)
EGFRCR SERPLBLD CKD-EPI 2021: 52 ML/MIN/1.73M2
EOSINOPHIL # BLD AUTO: 0.2 10E3/UL (ref 0–0.7)
EOSINOPHIL NFR BLD AUTO: 4 %
ERYTHROCYTE [DISTWIDTH] IN BLOOD BY AUTOMATED COUNT: 16.7 % (ref 10–15)
GLUCOSE SERPL-MCNC: 94 MG/DL (ref 70–99)
HCO3 SERPL-SCNC: 27 MMOL/L (ref 22–29)
HCT VFR BLD AUTO: 35.7 % (ref 35–47)
HGB BLD-MCNC: 11.7 G/DL (ref 11.7–15.7)
IMM GRANULOCYTES # BLD: 0 10E3/UL
IMM GRANULOCYTES NFR BLD: 1 %
LYMPHOCYTES # BLD AUTO: 0.9 10E3/UL (ref 0.8–5.3)
LYMPHOCYTES NFR BLD AUTO: 22 %
MCH RBC QN AUTO: 31.7 PG (ref 26.5–33)
MCHC RBC AUTO-ENTMCNC: 32.8 G/DL (ref 31.5–36.5)
MCV RBC AUTO: 97 FL (ref 78–100)
MONOCYTES # BLD AUTO: 0.4 10E3/UL (ref 0–1.3)
MONOCYTES NFR BLD AUTO: 9 %
NEUTROPHILS # BLD AUTO: 2.7 10E3/UL (ref 1.6–8.3)
NEUTROPHILS NFR BLD AUTO: 64 %
NRBC # BLD AUTO: 0 10E3/UL
NRBC BLD AUTO-RTO: 0 /100
PLATELET # BLD AUTO: 135 10E3/UL (ref 150–450)
POTASSIUM SERPL-SCNC: 4.5 MMOL/L (ref 3.4–5.3)
PROT SERPL-MCNC: 6.8 G/DL (ref 6.4–8.3)
RBC # BLD AUTO: 3.69 10E6/UL (ref 3.8–5.2)
SODIUM SERPL-SCNC: 136 MMOL/L (ref 135–145)
WBC # BLD AUTO: 4.2 10E3/UL (ref 4–11)

## 2024-08-01 PROCEDURE — 80053 COMPREHEN METABOLIC PANEL: CPT

## 2024-08-01 PROCEDURE — 36415 COLL VENOUS BLD VENIPUNCTURE: CPT

## 2024-08-01 PROCEDURE — 85025 COMPLETE CBC W/AUTO DIFF WBC: CPT

## 2024-08-01 NOTE — TELEPHONE ENCOUNTER
Oral Chemotherapy Monitoring Program  Lab Follow Up    Reviewed CBC and CMP lab results from 8/1.        6/13/2024     9:00 AM 6/27/2024     1:00 PM 7/3/2024     9:00 AM 7/5/2024    11:00 AM 7/10/2024    11:00 AM 7/17/2024     5:00 PM 8/1/2024     2:00 PM   ORAL CHEMOTHERAPY   Assessment Type Lab Monitoring Lab Monitoring Refill Lab Monitoring Monthly Follow up Chart Review Lab Monitoring;Refill   Diagnosis Code Ovarian Cancer Ovarian Cancer Ovarian Cancer Ovarian Cancer Ovarian Cancer Ovarian Cancer Ovarian Cancer   Providers Teoh Teoh Teoh Teoh Teoh Teoh Teoh   [facility]   Is this patient followed by the [facility] OC team? No No No No No No No   Drug Name Lynparza (olaparib) Lynparza (olaparib) Lynparza (olaparib) Lynparza (olaparib) Lynparza (olaparib) Lynparza (olaparib) Lynparza (olaparib)   Dose 200 mg 200 mg 200 mg 200 mg 200 mg 200 mg 200 mg   Current Schedule BID BID BID BID BID BID BID   Cycle Details Continuous Continuous Continuous Continuous Continuous Continuous Continuous   Doses missed in last 2 weeks     0     Adherence Assessment     Adherent     Adverse Effects Increased Creatinine Increased Creatinine  Increased Creatinine   Increased Creatinine   Fatigue     Grade 2     Increased Creatinine Grade 1 Grade 1  Grade 1   Grade 1   Pharmacist intervention(Increased creatinine) No No  No   No       Labs:  _  Result Component Current Result Ref Range   Sodium 136 (8/1/2024) 135 - 145 mmol/L     _  Result Component Current Result Ref Range   Potassium 4.5 (8/1/2024) 3.4 - 5.3 mmol/L     _  Result Component Current Result Ref Range   Calcium 9.8 (8/1/2024) 8.8 - 10.4 mg/dL     No results found for Mag within last 30 days.     No results found for Phos within last 30 days.     _  Result Component Current Result Ref Range   Albumin 4.1 (8/1/2024) 3.5 - 5.2 g/dL     _  Result Component Current Result Ref Range   Urea Nitrogen 11.8 (8/1/2024) 8.0  - 23.0 mg/dL     _  Result Component Current Result Ref Range   Creatinine 1.20 (H) (8/1/2024) 0.51 - 0.95 mg/dL     _  Result Component Current Result Ref Range   AST 38 (8/1/2024) 0 - 45 U/L     _  Result Component Current Result Ref Range   ALT 25 (8/1/2024) 0 - 50 U/L     _  Result Component Current Result Ref Range   Bilirubin Total 0.5 (8/1/2024) <=1.2 mg/dL     _  Result Component Current Result Ref Range   WBC Count 4.2 (8/1/2024) 4.0 - 11.0 10e3/uL     _  Result Component Current Result Ref Range   Hemoglobin 11.7 (8/1/2024) 11.7 - 15.7 g/dL     _  Result Component Current Result Ref Range   Platelet Count 135 (L) (8/1/2024) 150 - 450 10e3/uL     No results found for ANC within last 30 days.     _  Result Component Current Result Ref Range   Absolute Neutrophils 2.7 (8/1/2024) 1.6 - 8.3 10e3/uL        Assessment & Plan:  Results are concerning for grade 1 creatinine elevation (Cr= 1.2) However, this is stable from pt's previous lab draws. Dose is appropriate for pt's creatinine level. Continue therapy as planned.     Sent MyChart to pt with results.    Follow-Up:  10/16: labs  10/18: appt with Dr. Carey Cortez PharmD  Hematology/Oncology Clinical Pharmacist  [facility]  [phone]    **The oral chemotherapy pharmacists are now working on provider-specific teams. Your pharmacist team can be reached at [facility] ORAL CHEMO [facility]-ONC2 or [phone].**

## 2024-08-07 ENCOUNTER — TELEPHONE (OUTPATIENT)
Dept: ONCOLOGY | Facility: CLINIC | Age: 59
End: 2024-08-07
Payer: COMMERCIAL

## 2024-08-07 NOTE — TELEPHONE ENCOUNTER
Oral Chemotherapy Monitoring Program     Placed call to patient in follow up of Lynparza therapy.     Left message to please call back in follow up of therapy. No patient or drug names were mentioned.    Inocencia Acuña  Pharmacy Intern  Oral Chemotherapy Monitoring Program  [facility]   [phone]

## 2024-08-08 DIAGNOSIS — C56.1 OVARIAN CANCER, RIGHT (H): ICD-10-CM

## 2024-08-08 DIAGNOSIS — F41.9 ANXIETY: ICD-10-CM

## 2024-08-08 RX ORDER — HYDROXYZINE HYDROCHLORIDE 10 MG/1
10-20 TABLET, FILM COATED ORAL EVERY 6 HOURS PRN
Qty: 120 TABLET | Refills: 3 | Status: SHIPPED | OUTPATIENT
Start: 2024-08-08

## 2024-08-08 NOTE — TELEPHONE ENCOUNTER
Received fax from pharmacy requesting refill of hydroxyzine.     Last office visit: 2/21/24  Scheduled for follow up pending, msg sent to scheduling.     Will route request to NP for review.

## 2024-08-26 DIAGNOSIS — C56.1 OVARIAN CANCER, RIGHT (H): Primary | ICD-10-CM

## 2024-09-03 ENCOUNTER — VIRTUAL VISIT (OUTPATIENT)
Dept: RADIATION ONCOLOGY | Facility: HOSPITAL | Age: 59
End: 2024-09-03
Attending: FAMILY MEDICINE
Payer: COMMERCIAL

## 2024-09-03 ENCOUNTER — LAB (OUTPATIENT)
Dept: INFUSION THERAPY | Facility: HOSPITAL | Age: 59
End: 2024-09-03
Attending: OBSTETRICS & GYNECOLOGY
Payer: COMMERCIAL

## 2024-09-03 VITALS — WEIGHT: 148 LBS | BODY MASS INDEX: 27.23 KG/M2 | HEIGHT: 62 IN

## 2024-09-03 DIAGNOSIS — F43.22 ADJUSTMENT DISORDER WITH ANXIOUS MOOD: ICD-10-CM

## 2024-09-03 DIAGNOSIS — C56.1 OVARIAN CANCER, RIGHT (H): Primary | ICD-10-CM

## 2024-09-03 DIAGNOSIS — Z51.5 PALLIATIVE CARE PATIENT: Primary | ICD-10-CM

## 2024-09-03 DIAGNOSIS — C56.1 OVARIAN CANCER, RIGHT (H): ICD-10-CM

## 2024-09-03 LAB
ALBUMIN SERPL BCG-MCNC: 4.2 G/DL (ref 3.5–5.2)
ALP SERPL-CCNC: 153 U/L (ref 40–150)
ALT SERPL W P-5'-P-CCNC: 20 U/L (ref 0–50)
ANION GAP SERPL CALCULATED.3IONS-SCNC: 14 MMOL/L (ref 7–15)
AST SERPL W P-5'-P-CCNC: 31 U/L (ref 0–45)
BASOPHILS # BLD AUTO: 0 10E3/UL (ref 0–0.2)
BASOPHILS NFR BLD AUTO: 1 %
BILIRUB SERPL-MCNC: 0.5 MG/DL
BUN SERPL-MCNC: 14.8 MG/DL (ref 8–23)
CALCIUM SERPL-MCNC: 9.2 MG/DL (ref 8.8–10.4)
CHLORIDE SERPL-SCNC: 100 MMOL/L (ref 98–107)
CREAT SERPL-MCNC: 1.16 MG/DL (ref 0.51–0.95)
EGFRCR SERPLBLD CKD-EPI 2021: 54 ML/MIN/1.73M2
EOSINOPHIL # BLD AUTO: 0.2 10E3/UL (ref 0–0.7)
EOSINOPHIL NFR BLD AUTO: 4 %
ERYTHROCYTE [DISTWIDTH] IN BLOOD BY AUTOMATED COUNT: 16.8 % (ref 10–15)
GLUCOSE SERPL-MCNC: 92 MG/DL (ref 70–99)
HCO3 SERPL-SCNC: 26 MMOL/L (ref 22–29)
HCT VFR BLD AUTO: 36.5 % (ref 35–47)
HGB BLD-MCNC: 12 G/DL (ref 11.7–15.7)
IMM GRANULOCYTES # BLD: 0 10E3/UL
IMM GRANULOCYTES NFR BLD: 1 %
LYMPHOCYTES # BLD AUTO: 0.9 10E3/UL (ref 0.8–5.3)
LYMPHOCYTES NFR BLD AUTO: 21 %
MCH RBC QN AUTO: 32.3 PG (ref 26.5–33)
MCHC RBC AUTO-ENTMCNC: 32.9 G/DL (ref 31.5–36.5)
MCV RBC AUTO: 98 FL (ref 78–100)
MONOCYTES # BLD AUTO: 0.4 10E3/UL (ref 0–1.3)
MONOCYTES NFR BLD AUTO: 9 %
NEUTROPHILS # BLD AUTO: 2.9 10E3/UL (ref 1.6–8.3)
NEUTROPHILS NFR BLD AUTO: 65 %
NRBC # BLD AUTO: 0 10E3/UL
NRBC BLD AUTO-RTO: 0 /100
PLATELET # BLD AUTO: 146 10E3/UL (ref 150–450)
POTASSIUM SERPL-SCNC: 4.3 MMOL/L (ref 3.4–5.3)
PROT SERPL-MCNC: 6.6 G/DL (ref 6.4–8.3)
RBC # BLD AUTO: 3.71 10E6/UL (ref 3.8–5.2)
SODIUM SERPL-SCNC: 140 MMOL/L (ref 135–145)
WBC # BLD AUTO: 4.4 10E3/UL (ref 4–11)

## 2024-09-03 PROCEDURE — 36415 COLL VENOUS BLD VENIPUNCTURE: CPT

## 2024-09-03 PROCEDURE — 85004 AUTOMATED DIFF WBC COUNT: CPT

## 2024-09-03 PROCEDURE — 99214 OFFICE O/P EST MOD 30 MIN: CPT | Mod: 95 | Performed by: FAMILY MEDICINE

## 2024-09-03 PROCEDURE — 80053 COMPREHEN METABOLIC PANEL: CPT

## 2024-09-03 ASSESSMENT — PAIN SCALES - GENERAL: PAINLEVEL: NO PAIN (0)

## 2024-09-03 NOTE — PATIENT INSTRUCTIONS
It was good to see you today, Michelle.  You look really good.    Here are the things we talked about:    Check in with your primary doctor to see if increasing the lexapro to 20 mg a day would be helpful with your anxiety.  If they are reluctant to do that let me know and we can consider adding mirtazapine at bedtime.    Look up body scans on the internet and practice doing some as a mindfulness activity.    [facility] Area Resource List   SEEK SOMEONE WHO CAN HELP WITH CBT SKILLS.  Name  Contact Info Areas of focus  Notes    [facility]  [place]  [phone]  [email or web address] Treatment of children, adolescents and young adults     [facility]  [address] [place], [place]  [phone]  Serious illness, grief and loss, ambiguous grief, trauma     Smiley Borges, [facility] [place]   [phone]  [email or web address] illness and related grief     DEONTE Collins, [facility]  [phone]  Telehealth only    DEONTE Garay LIC Mj Williamson, [facility]  [facility]  Serious illness, trauma, has palliative care experience  In person and telehealth    [facility]  [phone]  [email or web address]     Dr Ceci Freed [id number]  [place], [place]  [email or web address]  [phone] Anxiety / depression, trauma for pt's with chronic / terminal illness; grief/loss; relationship repair, mindfulness/mind body work    Adriana Alston [phone] Couples, eol/serious illness; Rastafarian focus     Araceli Cardenas  [phone] Couples / eol/serious illness; spiritual direction     Ceci [email or web address]  anxiety, depression, serious illness, Rastafarian focus   experience with palliative care and children Telehealth only   [facility] [email or web address] Grief and loss, caregiving, dementia coaching, support and education     [facility]  [facility] [place], [place]  Therapist Ligia Umana is highly recommended    Araceli [place]  [phone]     Cristin Ya [place]  [phone]          Someone from the team will reach out to schedule a follow up appointment in 3 months and we can always see you sooner, if needed.     How to get a hold of us:  For non-urgent matters, MyChart works best.    For more urgent matters, or if you prefer not to use MyChart, call our clinic nurse coordinator Astrid Noble RN at [phone]    We have an on-call number for evenings and weekends. Please call this only if you are having uncontrolled symptoms or serious side effects from your medicines: [phone].     For refills, please give us a week (5 working days) notice. We don't always have providers available everyday to do refills. If you call the day you run out of your medicine, we may not be able to refill it in time, so call 5 days in advance!    Anish Monroy MD MS FAAFP CAQHPM  [facility]  Office [phone]  Fax [phone]

## 2024-09-03 NOTE — NURSING NOTE
Current patient location:  [address] [place] [address] [place], [place]    Is the patient currently in the state of [place]? YES    Visit mode:VIDEO    If the visit is dropped, the patient can be reconnected by: VIDEO VISIT: Text to cell phone:   Telephone Information:   Mobile [phone]       Will anyone else be joining the visit? NO  (If patient encounters technical issues they should call [phone])    How would you like to obtain your AVS? MyChart    Are changes needed to the allergy or medication list? No, Pt stated no changes to allergies, and Pt stated no med changes    Are refills needed on medications prescribed by this physician? NO    Rooming Documentation:  Questionnaire(s) completed    Pt would like to connect with a Psychologist.     Reason for visit: RECHECK (Return Palliative)    Sharlene BOOKER

## 2024-09-03 NOTE — PROGRESS NOTES
Virtual Visit Details    Type of service:  Video Visit     Originating Location (pt. 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  2023 Michelle wants ongoing care without limits.     Recommendations & Counseling:  Continue oncology care with Dr. Patino and the GYN Oncology team    Continue Hydroxyzine 5-20 mg po q 6 hours prn anxiety      Check in with PCP about increasing Celexa to 20 mg q day for anxiety    List of counseling resources provided and I suggested she ask if therapists can help her learn some CBT skills.    We also discussed looking online for body scan resources.    OK to use ativan for severe anxiety but try hydroxyzine first    Michelle was directed to the [employer] program for ACP documents and she is encouraged to complete them by there start of her doxil holiday in a few months.     Contact information provided for Alix richards palliative SW in the community via AVS.     Follow up in 2 months, sooner, prn     Counseling: All of the above was explained to the patient in lay language. The patient has verbalized a clear understanding of the discussion, asked appropriate questions, which have been answered to patient's apparent satisfaction. The patient is in agreement with the above plan.        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      Last Palliative care appointment: 2024 with me    KAELA  Reviewed:  Yes:   reviewed - controlled substances reflected in medication list.    Interim History:  Jacki Smith is a 59 year old female who is seen today for follow up with Palliative Care via billable video visit.      She is overall doing well and would like some counseling so she has someone to talk to about her concerns and anxiety.    Pain:  none    Appetite/Nausea: OK though he oral chemo affects her taste for a few  hours afterwards.  She has adjusted when she takes it.     Bowels: no concerns     Sleep: overall OK cruz with hydroxyzine     Mood: feels anxious     Coping:  overall good and is seeking a therapist    Family History- Reviewed in Epic.    No Known Allergies    Social History:  Pertinent changes to social history/social situation since last visit: none--had a nice trip to [place] this summer;   Key support resources: family  Advance Directive Status:  no ACP documents    Social History     Tobacco Use    Smoking status: Former     Current packs/day: 0.00     Average packs/day: 1 pack/day for 42.0 years (42.0 ttl pk-yrs)     Types: Cigarettes     Start date: 1980     Quit date: 2022     Years since quittin.2    Smokeless tobacco: Never   Substance Use Topics    Alcohol use: Yes     Comment: Beer ~Q3 months.    Drug use: Never         No Known Allergies  Current Outpatient Medications   Medication Sig Dispense Refill    cyclobenzaprine (FLEXERIL) 5 MG tablet Take 1-2 Tablets (5-10 mg) by mouth three times a day.      diphenhydrAMINE-acetaminophen (TYLENOL PM)  MG tablet Take 1 tablet by mouth as needed      docusate sodium (DSS) 100 MG capsule Take 100 mg by mouth as needed      escitalopram (LEXAPRO) 10 MG tablet Take 1 Tablet (10 mg) by mouth daily.*      HYDROcodone-acetaminophen (NORCO) 5-325 MG tablet Take 1 tablet by mouth every 4 hours as needed      hydrOXYzine HCl (ATARAX) 10 MG tablet Take 1-2 tablets (10-20 mg) by mouth every 6 hours as needed for itching 120  tablet 3    lidocaine-prilocaine (EMLA) 2.5-2.5 % external cream Apply topically as needed for moderate pain 30 g 1    Multiple Vitamin (MULTI-VITAMIN DAILY PO) Take 1 tablet by mouth daily      [START ON 9/9/2024] olaparib (LYNPARZA) 100 MG tablet Take 2 tablets (200 mg) by mouth 2 times daily 120 tablet 0    omeprazole (PRILOSEC) 20 MG DR capsule Take 20 mg by mouth      ondansetron (ZOFRAN) 8 MG tablet Take 1 tablet (8 mg) by mouth every 8 hours as needed for nausea 30 tablet 2    sennosides (SENOKOT) 8.6 MG tablet       valACYclovir (VALTREX) 1000 mg tablet Take 1,000 mg by mouth as needed       Past Medical History:   Diagnosis Date    Female stress incontinence     Ovarian cancer, right (H) 06/16/2022    Stage IIIC high-grade serous carcinoma    Recurrent cold sores      Past Surgical History:   Procedure Laterality Date    APPENDECTOMY  08/29/2022    Part of ovarian cancer tumor debulking    BLADDER SUSPENSION  08/13/2009    HYSTERECTOMY  08/13/2009    For prolapse    IR PARACENTESIS  6/6/2022    IR PARACENTESIS  6/14/2022    IR PARACENTESIS  5/26/2023    LAPAROSCOPY DIAGNOSTIC (GYN)  06/16/2022    SALPINGO-OOPHORECTOMY, COMBINED Bilateral 08/29/2022    Pelvic exam under anesthesia, diagnostic laparoscopy, conversion to laparotomy for optimal interval tumor debulking to no gross residual disease including peritoneal and diaphragm biopsies, bilateral salpingo-oophorectomy, infragastric omentectomy, bilateral hemidiaphragm stripping, peritoneal and mesenteric argon beam ablation, appendectomy.    TONSILLECTOMY  1973    TUBAL LIGATION Bilateral 09/01/1998       Physical Exam:   GENERAL APPEARANCE: vital appearing, alert and no distress; neatly groomed  EYES: Eyes grossly normal to inspection, PERRLA, conjunctivae and sclerae without injection or discharge, EOM intact   RESP:  no increased work of breathing; speaks in complete sentences;   MS: No musculoskeletal defects are noted  SKIN: No suspicious lesions or  rashes, hydration status appears adequate with normal skin turgor   PSYCH: Alert and oriented x3; speech- coherent , normal rate and volume; able to articulate logical thoughts, able to abstract reason, no tangential thoughts, no hallucinations or delusions, mentation appears normal, Mood is euthymic. Affect is appropriate for this mood state and bright. Thought content is free of suicidal ideation, hallucinations, and delusions.  Eye contact is good during conversation.       Key Data Reviewed:  LABS: 2024- Cr 1.16, Albumin 4.2,  Hgb 12,  alk phos 153;   62 in early 2024 (peak was 78 in 2023)    IMAGIN2024 CT CAP W?CONTRAST  IMPRESSION:  1.  Complex ascites with peritoneal thickening has increased.     2.  Cirrhosis.     3.  Emphysema.     4.  Right-sided portacatheter.    35 minutes spent on the date of the encounter doing chart review, history and exam, patient education & counseling, documentation and other activities as noted above.    Anish Monroy MD MS FAAFP CAQHPM  [facility]  Office [phone]  Fax [phone]

## 2024-09-04 ENCOUNTER — MYC MEDICAL ADVICE (OUTPATIENT)
Dept: ONCOLOGY | Facility: CLINIC | Age: 59
End: 2024-09-04
Payer: COMMERCIAL

## 2024-09-04 NOTE — TELEPHONE ENCOUNTER
Oral Chemotherapy Monitoring Program  Lab Follow Up    Reviewed CBC and CMP lab results from 9/3.        7/10/2024    11:00 AM 7/17/2024     5:00 PM 8/1/2024     2:00 PM 8/7/2024     3:00 PM 8/7/2024     3:24 PM 9/3/2024     9:00 AM 9/4/2024     9:00 AM   ORAL CHEMOTHERAPY   Assessment Type Monthly Follow up Chart Review Lab Monitoring;Refill Left Voicemail Monthly Follow up Refill Lab Monitoring   Diagnosis Code Ovarian Cancer Ovarian Cancer Ovarian Cancer Ovarian Cancer Ovarian Cancer Ovarian Cancer Ovarian Cancer   Providers Teoh Te[facility] Te[facility] Te[facility] Te[facility] Teoh Teoh   [facility]   Is this patient followed by the [facility] OC team? No No No No No No No   Drug Name Lynparza (olaparib) Lynparza (olaparib) Lynparza (olaparib) Lynparza (olaparib) Lynparza (olaparib) Lynparza (olaparib) Lynparza (olaparib)   Dose 200 mg 200 mg 200 mg 200 mg 200 mg 200 mg 200 mg   Current Schedule BID BID BID BID BID BID BID   Cycle Details Continuous Continuous Continuous Continuous Continuous Continuous Continuous   Doses missed in last 2 weeks 0    1     Adherence Assessment Adherent    Adherent     Adverse Effects   Increased Creatinine  No AE identified during assessment     Fatigue Grade 2         Increased Creatinine   Grade 1       Pharmacist intervention(Increased creatinine)   No       Any new drug interactions?     No     Is the dose as ordered appropriate for the patient?     Yes     Has the patient missed any days of school, work, or other routine activity?     No     Since the last time we talked, have you been hospitalized or used the emergency room?     No         Labs:  _  Result Component Current Result Ref Range   Sodium 140 (9/3/2024) 135 - 145 mmol/L     _  Result Component Current Result Ref Range   Potassium 4.3 (9/3/2024) 3.4 - 5.3 mmol/L     _  Result Component Current Result Ref Range   Calcium 9.2 (9/3/2024) 8.8 - 10.4 mg/dL     No results found for Mag  within last 30 days.     No results found for Phos within last 30 days.     _  Result Component Current Result Ref Range   Albumin 4.2 (9/3/2024) 3.5 - 5.2 g/dL     _  Result Component Current Result Ref Range   Urea Nitrogen 14.8 (9/3/2024) 8.0 - 23.0 mg/dL     _  Result Component Current Result Ref Range   Creatinine 1.16 (H) (9/3/2024) 0.51 - 0.95 mg/dL     _  Result Component Current Result Ref Range   AST 31 (9/3/2024) 0 - 45 U/L     _  Result Component Current Result Ref Range   ALT 20 (9/3/2024) 0 - 50 U/L     _  Result Component Current Result Ref Range   Bilirubin Total 0.5 (9/3/2024) <=1.2 mg/dL     _  Result Component Current Result Ref Range   WBC Count 4.4 (9/3/2024) 4.0 - 11.0 10e3/uL     _  Result Component Current Result Ref Range   Hemoglobin 12.0 (9/3/2024) 11.7 - 15.7 g/dL     _  Result Component Current Result Ref Range   Platelet Count 146 (L) (9/3/2024) 150 - 450 10e3/uL     No results found for ANC within last 30 days.     _  Result Component Current Result Ref Range   Absolute Neutrophils 2.9 (9/3/2024) 1.6 - 8.3 10e3/uL        Assessment & Plan:  No concerning abnormalities. Scr stable, Lynparza dose adjusted. Alk phos slightly elevated but stable, continue to monitor. Continue therapy as planned.    [email or web address] sent to patient with results.    Follow-Up:  10/1: monthly labs    Grace Myhre, PawanD  Hematology/Oncology Pharmacist  [facility]  [phone]

## 2024-09-09 ENCOUNTER — MYC MEDICAL ADVICE (OUTPATIENT)
Dept: ONCOLOGY | Facility: CLINIC | Age: 59
End: 2024-09-09
Payer: COMMERCIAL

## 2024-09-10 ENCOUNTER — PATIENT OUTREACH (OUTPATIENT)
Dept: ONCOLOGY | Facility: HOSPITAL | Age: 59
End: 2024-09-10
Payer: COMMERCIAL

## 2024-09-10 NOTE — PROGRESS NOTES
Phoned pt per request: Phoned pt offering a therapy appt. Pt reports she is interested in an appt. Will ask the scheduling team to call pt.     Oncology Distress Screening      Row Name 09/03/24 1012       How concerned do you feel regarding the following common issues people with cancer face. Please answer with a number from 0-10 where 0=not concerned and 10=very concerned. PT response of >=8  will result in outreach from Support Team.   1. How concerned are you about your ability to eat? 0       2. How concerned are you about unintended weight loss or your current weight? 5  current weight       3. How concerned are you about feeling depressed or very sad? 5       4. How concerned are you about feeling anxious or very scared? 3       5. Do you struggle with the loss of meaning and irma in your life? Somewhat       6. How concerned are you about work and home life issues that may be affected by your cancer? 2       7. How concerned are you about knowing what resources are available to help you? 0       8. Do you currently have what you would describe as Tenriism or spiritual struggles? Not at all       You can also ask to be contacted by one of our Oncology Supportive Care professionals.   9. If you want to be contacted by one of our professionals, I can send a message to them right now. Oncology Psychologist

## 2024-09-30 DIAGNOSIS — C56.1 OVARIAN CANCER, RIGHT (H): Primary | ICD-10-CM

## 2024-10-01 ENCOUNTER — LAB (OUTPATIENT)
Dept: INFUSION THERAPY | Facility: HOSPITAL | Age: 59
End: 2024-10-01
Attending: OBSTETRICS & GYNECOLOGY
Payer: COMMERCIAL

## 2024-10-01 DIAGNOSIS — C56.1 OVARIAN CANCER, RIGHT (H): ICD-10-CM

## 2024-10-01 LAB
ALBUMIN SERPL BCG-MCNC: 4 G/DL (ref 3.5–5.2)
ALP SERPL-CCNC: 133 U/L (ref 40–150)
ALT SERPL W P-5'-P-CCNC: 16 U/L (ref 0–50)
ANION GAP SERPL CALCULATED.3IONS-SCNC: 12 MMOL/L (ref 7–15)
AST SERPL W P-5'-P-CCNC: 28 U/L (ref 0–45)
BASOPHILS # BLD AUTO: 0 10E3/UL (ref 0–0.2)
BASOPHILS NFR BLD AUTO: 1 %
BILIRUB SERPL-MCNC: 0.5 MG/DL
BUN SERPL-MCNC: 16 MG/DL (ref 8–23)
CALCIUM SERPL-MCNC: 9.3 MG/DL (ref 8.8–10.4)
CANCER AG125 SERPL-ACNC: 83 U/ML
CHLORIDE SERPL-SCNC: 99 MMOL/L (ref 98–107)
CREAT SERPL-MCNC: 1.11 MG/DL (ref 0.51–0.95)
EGFRCR SERPLBLD CKD-EPI 2021: 57 ML/MIN/1.73M2
EOSINOPHIL # BLD AUTO: 0.1 10E3/UL (ref 0–0.7)
EOSINOPHIL NFR BLD AUTO: 3 %
ERYTHROCYTE [DISTWIDTH] IN BLOOD BY AUTOMATED COUNT: 15.6 % (ref 10–15)
GLUCOSE SERPL-MCNC: 93 MG/DL (ref 70–99)
HCO3 SERPL-SCNC: 22 MMOL/L (ref 22–29)
HCT VFR BLD AUTO: 35.1 % (ref 35–47)
HGB BLD-MCNC: 11.7 G/DL (ref 11.7–15.7)
IMM GRANULOCYTES # BLD: 0 10E3/UL
IMM GRANULOCYTES NFR BLD: 1 %
LYMPHOCYTES # BLD AUTO: 0.8 10E3/UL (ref 0.8–5.3)
LYMPHOCYTES NFR BLD AUTO: 18 %
MCH RBC QN AUTO: 32.7 PG (ref 26.5–33)
MCHC RBC AUTO-ENTMCNC: 33.3 G/DL (ref 31.5–36.5)
MCV RBC AUTO: 98 FL (ref 78–100)
MONOCYTES # BLD AUTO: 0.3 10E3/UL (ref 0–1.3)
MONOCYTES NFR BLD AUTO: 7 %
NEUTROPHILS # BLD AUTO: 3.2 10E3/UL (ref 1.6–8.3)
NEUTROPHILS NFR BLD AUTO: 70 %
NRBC # BLD AUTO: 0 10E3/UL
NRBC BLD AUTO-RTO: 0 /100
PLATELET # BLD AUTO: 141 10E3/UL (ref 150–450)
POTASSIUM SERPL-SCNC: 4.1 MMOL/L (ref 3.4–5.3)
PROT SERPL-MCNC: 6.4 G/DL (ref 6.4–8.3)
RBC # BLD AUTO: 3.58 10E6/UL (ref 3.8–5.2)
SODIUM SERPL-SCNC: 133 MMOL/L (ref 135–145)
WBC # BLD AUTO: 4.6 10E3/UL (ref 4–11)

## 2024-10-01 PROCEDURE — 250N000011 HC RX IP 250 OP 636: Performed by: OBSTETRICS & GYNECOLOGY

## 2024-10-01 PROCEDURE — 82040 ASSAY OF SERUM ALBUMIN: CPT

## 2024-10-01 PROCEDURE — 36591 DRAW BLOOD OFF VENOUS DEVICE: CPT

## 2024-10-01 PROCEDURE — 86304 IMMUNOASSAY TUMOR CA 125: CPT | Performed by: OBSTETRICS & GYNECOLOGY

## 2024-10-01 PROCEDURE — 85004 AUTOMATED DIFF WBC COUNT: CPT

## 2024-10-01 RX ORDER — HEPARIN SODIUM (PORCINE) LOCK FLUSH IV SOLN 100 UNIT/ML 100 UNIT/ML
SOLUTION INTRAVENOUS
Status: COMPLETED
Start: 2024-10-01 | End: 2024-10-01

## 2024-10-01 RX ADMIN — HEPARIN: 100 SYRINGE at 08:00

## 2024-10-02 ENCOUNTER — MYC MEDICAL ADVICE (OUTPATIENT)
Dept: PHARMACY | Facility: CLINIC | Age: 59
End: 2024-10-02
Payer: COMMERCIAL

## 2024-10-02 DIAGNOSIS — C56.1 OVARIAN CANCER, RIGHT (H): Primary | ICD-10-CM

## 2024-10-02 NOTE — ORAL ONC MGMT
Oral Chemotherapy Monitoring Program  Lab Follow Up    Reviewed lab results from 10/01        8/1/2024     2:00 PM 8/7/2024     3:00 PM 8/7/2024     3:24 PM 9/3/2024     9:00 AM 9/4/2024     9:00 AM 10/2/2024     8:00 AM 10/2/2024    11:00 AM   ORAL CHEMOTHERAPY   Assessment Type Lab Monitoring;Refill Left Voicemail Monthly Follow up Refill Lab Monitoring Refill Lab Monitoring   Diagnosis Code Ovarian Cancer Ovarian Cancer Ovarian Cancer Ovarian Cancer Ovarian Cancer Ovarian Cancer Ovarian Cancer   Providers Teoh Teoh Teoh Teoh Teoh Teoh Teoh   [facility]   Is this patient followed by the [facility] OC team? No No No No No No No   Drug Name Lynparza (olaparib) Lynparza (olaparib) Lynparza (olaparib) Lynparza (olaparib) Lynparza (olaparib) Lynparza (olaparib) Lynparza (olaparib)   Dose 200 mg 200 mg 200 mg 200 mg 200 mg 200 mg 200 mg   Current Schedule BID BID BID BID BID BID BID   Cycle Details Continuous Continuous Continuous Continuous Continuous Continuous Continuous   Doses missed in last 2 weeks   1       Adherence Assessment   Adherent       Adverse Effects Increased Creatinine  No AE identified during assessment       Increased Creatinine Grade 1         Pharmacist intervention(Increased creatinine) No         Any new drug interactions?   No       Is the dose as ordered appropriate for the patient?   Yes       Has the patient missed any days of school, work, or other routine activity?   No       Since the last time we talked, have you been hospitalized or used the emergency room?   No           Labs:  _  Result Component Current Result Ref Range   Sodium 133 (L) (10/1/2024) 135 - 145 mmol/L     _  Result Component Current Result Ref Range   Potassium 4.1 (10/1/2024) 3.4 - 5.3 mmol/L     _  Result Component Current Result Ref Range   Calcium 9.3 (10/1/2024) 8.8 - 10.4 mg/dL     No results found for Mag within last 30 days.     No results found for  Phos within last 30 days.     _  Result Component Current Result Ref Range   Albumin 4.0 (10/1/2024) 3.5 - 5.2 g/dL     _  Result Component Current Result Ref Range   Urea Nitrogen 16.0 (10/1/2024) 8.0 - 23.0 mg/dL     _  Result Component Current Result Ref Range   Creatinine 1.11 (H) (10/1/2024) 0.51 - 0.95 mg/dL     _  Result Component Current Result Ref Range   AST 28 (10/1/2024) 0 - 45 U/L     _  Result Component Current Result Ref Range   ALT 16 (10/1/2024) 0 - 50 U/L     _  Result Component Current Result Ref Range   Bilirubin Total 0.5 (10/1/2024) <=1.2 mg/dL     _  Result Component Current Result Ref Range   WBC Count 4.6 (10/1/2024) 4.0 - 11.0 10e3/uL     _  Result Component Current Result Ref Range   Hemoglobin 11.7 (10/1/2024) 11.7 - 15.7 g/dL     _  Result Component Current Result Ref Range   Platelet Count 141 (L) (10/1/2024) 150 - 450 10e3/uL     No results found for ANC within last 30 days.     _  Result Component Current Result Ref Range   Absolute Neutrophils 3.2 (10/1/2024) 1.6 - 8.3 10e3/uL        Assessment & Plan:  No concerning abnormalities aside from very mildly reduced sodium. Dr Patino recommends to increase sodium intake. Message sent to Michelle relaying this information.    Questions answered to patient's satisfaction.    Follow-Up:  10/18      Evelia Taylor [facility]  Oral Chemotherapy Monitoring Program  [facility]   [phone]

## 2024-10-15 ENCOUNTER — HOSPITAL ENCOUNTER (OUTPATIENT)
Dept: CT IMAGING | Facility: CLINIC | Age: 59
Discharge: HOME | End: 2024-10-15
Attending: OBSTETRICS & GYNECOLOGY | Admitting: OBSTETRICS & GYNECOLOGY
Payer: COMMERCIAL

## 2024-10-15 DIAGNOSIS — C56.1 OVARIAN CANCER, RIGHT (H): ICD-10-CM

## 2024-10-15 PROCEDURE — 74177 CT ABD & PELVIS W/CONTRAST: CPT

## 2024-10-15 PROCEDURE — 250N000011 HC RX IP 250 OP 636: Performed by: OBSTETRICS & GYNECOLOGY

## 2024-10-15 RX ORDER — HEPARIN SODIUM (PORCINE) LOCK FLUSH IV SOLN 100 UNIT/ML 100 UNIT/ML
5-10 SOLUTION INTRAVENOUS
Status: DISCONTINUED | OUTPATIENT
Start: 2024-10-15 | End: 2024-10-16 | Stop reason: HOSPADM

## 2024-10-15 RX ORDER — IOPAMIDOL 755 MG/ML
75 INJECTION, SOLUTION INTRAVASCULAR ONCE
Status: COMPLETED | OUTPATIENT
Start: 2024-10-15 | End: 2024-10-15

## 2024-10-15 RX ADMIN — IOPAMIDOL 75 ML: 755 INJECTION, SOLUTION INTRAVENOUS at 15:41

## 2024-10-15 RX ADMIN — HEPARIN SODIUM (PORCINE) LOCK FLUSH IV SOLN 100 UNIT/ML 5 ML: 100 SOLUTION at 15:45

## 2024-10-16 NOTE — PROGRESS NOTES
"Follow-up Note on Referred Patient  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 Follow-up visit.       History of Present Illness:  Jacki \"Michelle\" IVY Smith is a 59 year old seen at the [facility] for management of progressive stage IIIC high-grade serous ovarian carcinoma. History as follows:    Ovarian Cancer History:  22: Presented to an ED in [place] for evaluation of abdominal bloating and discomfort.  -Abdomen, pelvic CT: Radiographic Stage CAL ovarian cancer. I have personally reviewed the CT images.   22: [id number].   22: Pelvic ultrasound: c/w metastatic cancer.  22: Pelvic exam under anesthesia, diagnostic laparoscopy, biopsies, evacuation of ascites.   -Findings: On pelvic exam under anesthesia, there was a 6 cm firm, fixed mass adherent to the vaginal cuff. Vagina otherwise smooth. On laparoscopic examination, there was ascites filling the pelvis/abdomen, with >1 liter of fluid evacuated. The palpated mass was arising from the right ovary. Left ovary relatively normal in appearance. There was diffuse carcinomatosis covering the entire peritoneal surface falciform ligament, liver capsule, and mesentery. The omentum was replaced with tumor. Findings were not amenable to optimal tumor debulking so biopsies were taken for histologic examination.   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      22, 22, 22: Cycles 1-3 of neoadjuvant chemotherapy with IV carboplatin + paclitaxel 175 mg/m2 every 21 days.   -Cycles 1,2: " Carboplatin AUC 6.   -Cycle 3: Carboplatin AUC 5 with dose-reduction due to thrombcytopenia.   -8/19/22: Chest, abdomen, pelvic CT: Partial response. I have personally reviewed the CT images.   8/29/22: Pelvic exam under anesthesia, diagnostic laparoscopy, conversion to laparotomy for optimal interval tumor debulking to no gross residual disease including peritoneal and diaphragm biopsies, bilateral salpingo-oophorectomy, infragastric omentectomy, bilateral hemidiaphragm stripping, peritoneal and mesenteric argon beam ablation, appendectomy.   -Pathology: High-grade serous carcinoma involving all resected specimens.   9/28/22, 10/19/22, 11/17/22: Cycles 4-6 of first-line chemotherapy with IV carboplatin AUC 5 + paclitaxel 175 mg/m2.  -12/2/22: Chest, abdomen, pelvic CT: No definitive evidence of disease.   -12/7/22: CA-125=23.   -2/16/23: Chest, abdomen, pelvic CT to evaluate bloating: Stable findings, no definitive evidence of disease.    -5/25/23: Admitted  to [facility] for management of malignant ascites s/p therapeutic paracentesis, and partial SBO resolved with conservative management.   5/25/23: Abdomen, pelvic CT: Progressive disease.   -6/13/23: CA-125=78.    6/13/23, 7/12/23, 8/14/23, 9/13/23, 10/11/23, 11/8/23, 12/6/23, 1/3/24, 2/1/24, 2/29/24, 3/28/24: Cycles 1-11 of second-line chemotherapy with IV carboplatin AUC 5 + pegylated liposomal doxorubicin (PLD) 30 mg/m2 every 28 days.   -9/27/23: Chest, abdomen, pelvic CT: Partial response. Catheter-associated thrombus.   -1/23/24: Chest, abdomen, pelvic CT: Stable disease.   -4/10/24: Chest, abdomen, pelvic CT: Stable disease.   -CA-125 78>>44.   5/10/24-Present: Second- PARPi therapy with olaparib.   -Cycles 1-2: Olaparib 300 mg BID.   -Cycles 3+: Olaparib 200 mg BID with dose-reduction due to decreased creatinine clearance.   -7/5/24: Chest, abdomen, pelvic CT: Stable disease.       Genetic/Genomic/Molecular Testing  History:  10/5/22: [facility], reflexed to Common Hereditary Cancer Panel.   -No identifiable germline variants identified in ABRAXAS1, AKT1, APC, DEION, AXIN2, BARD1, BMPR1A, BRCA1, BRCA2, BRIP1, CDC73, CDH1, CDK4, CDKN2A, CHEK2, CTNNA1, DICER1, EPCAM, FANCC, FANCM, GREM1, HOXB13, KIT, MEN1, MLH1, MRE11, MSH2, MSH6, MUTYH, NBN, NF1, NTHL1, PALB2, PDGFRA, PIK3CA, PMS2, POLD1, POLE, PTEN, RAD50, RAD51C, RAD51D, RECQL, RINT1, SDHA, SDHB, SDHC, SDHD, SMAD4, SMARCA4, STK11, TP53, TSC1, TSC2, VHL, XRCC2.   -Variant of uncertain significance in MSH3 c.16C>T (p.Oat5Iwq)    10/9/22 (tumor 8/29/22): [facility] Testing Results.  -Genomic ROXI low (6%)   -TMB low (1mut/Mb)  -Microsatellite stable/Mismatch Repair proficient  -PD-L1- (CPS 0%)  -NTRK 1/2/3 fusion not detected.   -ER-, DC+  -Genomic alterations in:  --TP53  -No genomic alterations in BRCA1,2.    Jamila-ovary clinical trial screening: Negative for the immunoreactive subtype.     6/19/23 (tumor 8/29/22): [facility] Test results.   -FOLR1+ (2+, 75%).       Subjective:  Michelle returns to the gyn onc clinic today for review of CT results and discussion of ongoing management, accompanied by her  and daughter.   Michelle reports tolerating the olaparib well.   Michelle reports she has episodes of feeling hot and then itchy. No rash.   No bloating. Normal appetite. She reports that liquids taste funny on the olaparib, but she has overcome this by taking the evening dose at 9:30p. Normal bowel/bladder habits.       Past Medical History:  Past Medical History:   Diagnosis Date    Female stress incontinence     Ovarian cancer, right (H) 06/16/2022    Stage IIIC high-grade serous carcinoma    Recurrent cold sores        Past Surgical History:  Past Surgical History:   Procedure Laterality Date    APPENDECTOMY  08/29/2022    Part of ovarian cancer tumor debulking    BLADDER SUSPENSION  08/13/2009    HYSTERECTOMY  08/13/2009    For prolapse    IR PARACENTESIS  6/6/2022    IR  PARACENTESIS  2022    IR PARACENTESIS  2023    LAPAROSCOPY DIAGNOSTIC (GYN)  2022    SALPINGO-OOPHORECTOMY, COMBINED Bilateral 2022    Pelvic exam under anesthesia, diagnostic laparoscopy, conversion to laparotomy for optimal interval tumor debulking to no gross residual disease including peritoneal and diaphragm biopsies, bilateral salpingo-oophorectomy, infragastric omentectomy, bilateral hemidiaphragm stripping, peritoneal and mesenteric argon beam ablation, appendectomy.    TONSILLECTOMY  1973    TUBAL LIGATION Bilateral 1998       Gynecologic History:  Surgical menopause. Hysterectomy in  for prolapse.  Was on HRT with estrogen for less than 2 years  No history of abnormal cervical cancer screening.  No history of STIs.  Sexually active, no dyspareunia, no postcoital bleeding.      Obstetric History:  OB History    Para Term  AB Living   3 3 3 0 0 3   SAB IAB Ectopic Multiple Live Births   0 0 0 0 3      # Outcome Date GA Lbr Sukumar/2nd Weight Sex Type Anes PTL Lv   3 Term      Vag-Spont      2 Term      Vag-Spont      1 Term      Vag-Spont           Current Medications:   Current Outpatient Medications   Medication Sig Dispense Refill    cyclobenzaprine (FLEXERIL) 5 MG tablet Take 1-2 Tablets (5-10 mg) by mouth three times a day.      diphenhydrAMINE-acetaminophen (TYLENOL PM)  MG tablet Take 1 tablet by mouth as needed      docusate sodium (DSS) 100 MG capsule Take 100 mg by mouth as needed      escitalopram (LEXAPRO) 10 MG tablet Take 1 Tablet (10 mg) by mouth daily.*      HYDROcodone-acetaminophen (NORCO) 5-325 MG tablet Take 1 tablet by mouth every 4 hours as needed      hydrOXYzine HCl (ATARAX) 10 MG tablet Take 1-2 tablets (10-20 mg) by mouth every 6 hours as needed for itching 120 tablet 3    lidocaine-prilocaine (EMLA) 2.5-2.5 % external cream Apply topically as needed for moderate pain 30 g 1    Multiple Vitamin (MULTI-VITAMIN DAILY PO) Take 1 tablet by  mouth daily      olaparib (LYNPARZA) 100 MG tablet Take 2 tablets (200 mg) by mouth 2 times daily 120 tablet 0    omeprazole (PRILOSEC) 20 MG DR capsule Take 20 mg by mouth      ondansetron (ZOFRAN) 8 MG tablet Take 1 tablet (8 mg) by mouth every 8 hours as needed for nausea 30 tablet 2    sennosides (SENOKOT) 8.6 MG tablet       valACYclovir (VALTREX) 1000 mg tablet Take 1,000 mg by mouth as needed       No current facility-administered medications for this visit.        Allergies:   No Known Allergies        Social History:  Patient lives with Eligio and two daughters. Works as a . She does not have Advanced Directives, but has identified Eligio Smith as her desired Healthcare Power of .  Social History     Tobacco Use    Smoking status: Former     Current packs/day: 0.00     Average packs/day: 1 pack/day for 42.0 years (42.0 ttl pk-yrs)     Types: Cigarettes     Start date: 1980     Quit date: 2022     Years since quittin.4    Smokeless tobacco: Never   Substance Use Topics    Alcohol use: Yes     Comment: Beer ~Q3 months.       History   Drug Use Unknown       Family History:   The patient's family history is notable for a first-degree paternal relative with prostate cancer, and a first-degree relative with breast cancer and melanoma, and a second-degree maternal relative with colon cancer. No known family history of ovarian, uterine, urothelial/renal, or pancreatic cancers.   Family History   Problem Relation Age of Onset    Prostate Cancer Father     Breast Cancer Sister 48    Melanoma Sister     Skin Cancer Sister     Colon Cancer Maternal Grandmother        Physical Exam:   /80 (BP Location: Right arm, Patient Position: Sitting, Cuff Size: Adult Regular)   Pulse 58   Temp 97.8  F (36.6  C) (Oral)   Resp 16   Wt 68.5 kg (151 lb)   SpO2 99%   BMI 27.62 kg/m    Body mass index is 27.62 kg/m .    General Appearance: healthy and alert, no distress     HEENT:  no  thyromegaly, no palpable nodules or masses        Cardiovascular: regular rate and rhythm, no gallops, rubs or murmurs     Respiratory: lungs clear, no rales, rhonchi or wheezes, normal diaphragmatic excursion    Musculoskeletal: extremities non tender and without edema    Skin: no lesions or rashes     Neurological: no gross defects     Psychiatric: appropriate mood and affect                               Hematological: normal cervical, supraclavicular and inguinal lymph nodes     Gastrointestinal:       abdomen soft, non-tender, non-distended, no organomegaly or masses      Laboratory Examination:  WBC 4.6 with ANC 3.2.  Hemoglobin 11.7.  Platelets 141.  Creatinine 1.11 (stable).  Potassium 4.1. Sodium 133. Remainder of electrolytes within normal limits.  AST 28, ALT 16, alkaline phosphatase 133, total bilirubin 0.5.  Albumin 4.0.  CA-125 trend (since second- therapy):  5/15/24  38  7/5/24  62  10/1/24  83  I have independently reviewed & interpreted the CA-125 trend.       Radiographic Examination:  10/15/24: Chest, abdomen, pelvic CT: Stable disease. I have personally reviewed and interpreted the CT images.    CHEST: OMAIRA.   HEPATOBILIARY: Small cyst in the right hepatic lobe is unchanged. Peritoneal tumor deposit posterior to the right hepatic lobe measuring 1.1 cm maximum diameter, similar to previous. No new liver lesions.   BOWEL: Loculated ascites with peritoneal thickening is similar to previous.       Performance Status:   ECOG Grade 0.       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    History of malignant partial small bowel obstruction, resolved with bowel rest.       Plan:     1.)    Ovarian cancer: I reviewed the CT results with Michelle and provided her with a copy of the CT report;  she was also previously provided with a copy of the CT report via [employer]. CA-125 is slowly increasing, but CT shows stable disease. Recommend continuing current therapy until there is clear evidence of disease progression.     After discussion of risks/benefits, Michelle would like to continue the current maintenance therapy.   Plan as follows:  -Continue second- PARPi therapy with olaparib 200 mg BID (dose-reduction due to decreased creatinine clearance).   -RTC in 3 months with repeat chest, abdomen, pelvic CT at that time.   -Would consider mirvetuximab for the next line of therapy if covered by insurance based on the recently reported results from the phase II MARISSA trial of mirvetuximab in FOLR1+ platinum-sensitive ovarian cancer.     Side-effects:   -Grade 1 hyponatremia: Mild, asymptomatic. Counseled re: increasing sodium in her diet. Will monitor.   -Grade 1 dysgeusia: Continue timing of olaparib to minimize impact on meals/drinking.   --Myelosuppression risk: Monitor CBC weekly x1 month (completed), then monthly x1 year.   --CMP, CA-125 every 3 months.     2.)  Genetic risk factors were assessed: s/p germline testing which was negative for identifiable germline pathogenic variants (see HPI).     3.) Labs and/or tests ordered include:  None.     4. ) Health maintenance issues addressed today include continuing routine healthcare maintenance with her primary care provider.     5.) Code status:  Full-code.    6.) Prescriptions: None.      A total of 20 minutes was spent with the patient, 18 minutes of which were spent in counseling the patient and/or treatment planning; an additional 5 minutes was spent in chart review/documentation.  The longitudinal plan of care for the diagnosis(es)/condition(s) as documented were addressed during this visit. Due to the added complexity in care, I will continue to support Michelle in the subsequent management and with ongoing continuity of care.        Charis  ANJELICA Patino MD, MS, FACOG, FACS  10/18/2024  7:51 AM                  CC  Patient Care Team:  Domenica Christianson MD as PCP - General  Carey, Charis Meadows MD as MD (Gynecologic Oncology)  Susannah Roman, JALEN CNP as Assigned Cancer Care Provider  Anish Monroy MD as Assigned Palliative Care Provider  Sk, Pratima HAYNES RN as Specialty Care Coordinator (Hematology & Oncology)  Myhre, Grace C, [facility] as Pharmacist  SELF, REFERRED

## 2024-10-16 NOTE — PATIENT INSTRUCTIONS
SCHEDULING:  -RTC in 3 months (MD, in-person). --order(s) placed   -CA-125 in 3 months. --order(s) placed   -Chest, abdomen, pelvic CT In 3 months. 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   Treatment History:  -6/22/22-8/4/22: Neoadjuvant chemotherapy with IV carboplatin + paclitaxel x3 cycles. Partial response.   -8/29/22: Pelvic exam under anesthesia, diagnostic laparoscopy (look inside the pelvis/abdomen with a camera), conversion to laparotomy (large incision) for optimal interval tumor debulking to no gross residual disease including peritoneal and diaphragm biopsies, bilateral salpingo-oophorectomy (removal of bilateral ovaries & fallopian tubes), infragastric omentectomy (removal of the fat curtain hanging off the stomach/colon), bilateral hemidiaphragm stripping, peritoneal and mesenteric argon beam ablation (destruction of tumor), appendectomy.   -9/28/22-11/17/22: First-line chemotherapy with carboplatin + paclitaxel x3 cycles (total 6 cycles).   -6/13/23-3/28/24: Second-line chemotherapy with IV carboplatin + pegylated liposomal doxorubicin (PLD/doxil) x11 cycles. Partial response.   -5/10/24-Present: Second- PARP-inhibitor therapy with olaparib.         PLAN:   1) Ovarian cancer: Continue second-line PARP-inhibitor maintenance therapy.   DRUG: Olaparib   SCHEDULE: 2 tablets twice per day.   PLAN: Until disease progression.     RTC with repeat chest, abdomen, pelvic CT in 3 months.       2) Genetic testing: negative for any identifiable germline variants. No additional testing recommended for you or your family.        Please call with any questions or concerns. [phone]       Charis Patino MD, MS, FACOG, FACS  10/18/2024  7:27 AM

## 2024-10-18 ENCOUNTER — ONCOLOGY VISIT (OUTPATIENT)
Dept: ONCOLOGY | Facility: CLINIC | Age: 59
End: 2024-10-18
Attending: OBSTETRICS & GYNECOLOGY
Payer: COMMERCIAL

## 2024-10-18 VITALS
HEART RATE: 58 BPM | BODY MASS INDEX: 27.62 KG/M2 | RESPIRATION RATE: 16 BRPM | WEIGHT: 151 LBS | SYSTOLIC BLOOD PRESSURE: 127 MMHG | OXYGEN SATURATION: 99 % | TEMPERATURE: 97.8 F | DIASTOLIC BLOOD PRESSURE: 80 MMHG

## 2024-10-18 DIAGNOSIS — C56.1 OVARIAN CANCER, RIGHT (H): Primary | ICD-10-CM

## 2024-10-18 DIAGNOSIS — R43.2 DYSGEUSIA: ICD-10-CM

## 2024-10-18 DIAGNOSIS — E87.1 HYPONATREMIA: ICD-10-CM

## 2024-10-18 PROCEDURE — G2211 COMPLEX E/M VISIT ADD ON: HCPCS | Performed by: OBSTETRICS & GYNECOLOGY

## 2024-10-18 PROCEDURE — 99214 OFFICE O/P EST MOD 30 MIN: CPT | Performed by: OBSTETRICS & GYNECOLOGY

## 2024-10-18 PROCEDURE — 99213 OFFICE O/P EST LOW 20 MIN: CPT | Performed by: OBSTETRICS & GYNECOLOGY

## 2024-10-18 ASSESSMENT — PAIN SCALES - GENERAL: PAINLEVEL: NO PAIN (0)

## 2024-10-18 NOTE — LETTER
"10/18/2024      Jacki Smith  [address] [facility] [address]      Dear Colleague,    Thank you for referring your patient, Jacki Smith, to the [facility]. Please see a copy of my visit note below.    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 Follow-up visit.       History of Present Illness:  Jacki \"Michelle\" IVY Smith is a 59 year old seen at the [facility] for management of progressive stage IIIC high-grade serous ovarian carcinoma. History as follows:    Ovarian Cancer History:  22: Presented to an ED in [place] for evaluation of abdominal bloating and discomfort.  -Abdomen, pelvic CT: Radiographic Stage CAL ovarian cancer. I have personally reviewed the CT images.   22: CA-654=160.   22: Pelvic ultrasound: c/w metastatic cancer.  22: Pelvic exam under anesthesia, diagnostic laparoscopy, biopsies, evacuation of ascites.   -Findings: On pelvic exam under anesthesia, there was a 6 cm firm, fixed mass adherent to the vaginal cuff. Vagina otherwise smooth. On laparoscopic examination, there was ascites filling the pelvis/abdomen, with >1 liter of fluid evacuated. The palpated mass was arising from the right ovary. Left ovary relatively normal in appearance. There was diffuse carcinomatosis covering the entire peritoneal surface falciform ligament, liver capsule, and mesentery. The omentum was replaced with tumor. Findings were not amenable to optimal tumor debulking so biopsies were taken for histologic examination.   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      6/22/22, 7/13/22, 8/4/22: Cycles 1-3 of neoadjuvant chemotherapy with IV carboplatin + paclitaxel 175 mg/m2 every 21 days.   -Cycles 1,2: Carboplatin AUC 6.   -Cycle 3: Carboplatin AUC 5 with dose-reduction due to thrombcytopenia.   -8/19/22: Chest, abdomen, pelvic CT: Partial response. I have personally reviewed the CT images.   8/29/22: Pelvic exam under anesthesia, diagnostic laparoscopy, conversion to laparotomy for optimal interval tumor debulking to no gross residual disease including peritoneal and diaphragm biopsies, bilateral salpingo-oophorectomy, infragastric omentectomy, bilateral hemidiaphragm stripping, peritoneal and mesenteric argon beam ablation, appendectomy.   -Pathology: High-grade serous carcinoma involving all resected specimens.   9/28/22, 10/19/22, 11/17/22: Cycles 4-6 of first-line chemotherapy with IV carboplatin AUC 5 + paclitaxel 175 mg/m2.  -12/2/22: Chest, abdomen, pelvic CT: No definitive evidence of disease.   -12/7/22: CA-125=23.   -2/16/23: Chest, abdomen, pelvic CT to evaluate bloating: Stable findings, no definitive evidence of disease.    -5/25/23: Admitted  to [facility] for management of malignant ascites s/p therapeutic paracentesis, and partial SBO resolved with conservative management.   5/25/23: Abdomen, pelvic CT: Progressive disease.   -6/13/23: CA-125=78.    6/13/23, 7/12/23, 8/14/23, 9/13/23, 10/11/23, 11/8/23, 12/6/23, 1/3/24, 2/1/24, 2/29/24, 3/28/24: Cycles 1-11 of second-line chemotherapy with IV carboplatin AUC 5 + pegylated liposomal doxorubicin (PLD) 30 mg/m2 every 28 days.   -9/27/23: Chest, abdomen, pelvic CT: Partial response. Catheter-associated thrombus.   -1/23/24: Chest, abdomen, pelvic CT: Stable disease.   -4/10/24: Chest, abdomen, pelvic CT: Stable disease.   -CA-125 78>>44.   5/10/24-Present: Second- PARPi therapy with  olaparib.   -Cycles 1-2: Olaparib 300 mg BID.   -Cycles 3+: Olaparib 200 mg BID with dose-reduction due to decreased creatinine clearance.   -7/5/24: Chest, abdomen, pelvic CT: Stable disease.       Genetic/Genomic/Molecular Testing History:  10/5/22: [facility], reflexed to Common Hereditary Cancer Panel.   -No identifiable germline variants identified in ABRAXAS1, AKT1, APC, DEION, AXIN2, BARD1, BMPR1A, BRCA1, BRCA2, BRIP1, CDC73, CDH1, CDK4, CDKN2A, CHEK2, CTNNA1, DICER1, EPCAM, FANCC, FANCM, GREM1, HOXB13, KIT, MEN1, MLH1, MRE11, MSH2, MSH6, MUTYH, NBN, NF1, NTHL1, PALB2, PDGFRA, PIK3CA, PMS2, POLD1, POLE, PTEN, RAD50, RAD51C, RAD51D, RECQL, RINT1, SDHA, SDHB, SDHC, SDHD, SMAD4, SMARCA4, STK11, TP53, TSC1, TSC2, VHL, XRCC2.   -Variant of uncertain significance in MSH3 c.16C>T (p.Oqe0Vvw)    10/9/22 (tumor 8/29/22): [facility] Testing Results.  -Genomic ROXI low (6%)   -TMB low (1mut/Mb)  -Microsatellite stable/Mismatch Repair proficient  -PD-L1- (CPS 0%)  -NTRK 1/2/3 fusion not detected.   -ER-, OR+  -Genomic alterations in:  --TP53  -No genomic alterations in BRCA1,2.    Jamila-ovary clinical trial screening: Negative for the immunoreactive subtype.     6/19/23 (tumor 8/29/22): [facility] Test results.   -FOLR1+ (2+, 75%).       Subjective:  Michelle returns to the gyn onc clinic today for review of CT results and discussion of ongoing management, accompanied by her  and daughter.   Michelle reports tolerating the olaparib well.   Michelle reports she has episodes of feeling hot and then itchy. No rash.   No bloating. Normal appetite. She reports that liquids taste funny on the olaparib, but she has overcome this by taking the evening dose at 9:30p. Normal bowel/bladder habits.       Past Medical History:  Past Medical History:   Diagnosis Date     Female stress incontinence      Ovarian cancer, right (H) 06/16/2022    Stage IIIC high-grade serous carcinoma     Recurrent cold sores        Past Surgical  History:  Past Surgical History:   Procedure Laterality Date     APPENDECTOMY  2022    Part of ovarian cancer tumor debulking     BLADDER SUSPENSION  2009     HYSTERECTOMY  2009    For prolapse     IR PARACENTESIS  2022     IR PARACENTESIS  2022     IR PARACENTESIS  2023     LAPAROSCOPY DIAGNOSTIC (GYN)  2022     SALPINGO-OOPHORECTOMY, COMBINED Bilateral 2022    Pelvic exam under anesthesia, diagnostic laparoscopy, conversion to laparotomy for optimal interval tumor debulking to no gross residual disease including peritoneal and diaphragm biopsies, bilateral salpingo-oophorectomy, infragastric omentectomy, bilateral hemidiaphragm stripping, peritoneal and mesenteric argon beam ablation, appendectomy.     TONSILLECTOMY  1973     TUBAL LIGATION Bilateral 1998       Gynecologic History:  Surgical menopause. Hysterectomy in  for prolapse.  Was on HRT with estrogen for less than 2 years  No history of abnormal cervical cancer screening.  No history of STIs.  Sexually active, no dyspareunia, no postcoital bleeding.      Obstetric History:  OB History    Para Term  AB Living   3 3 3 0 0 3   SAB IAB Ectopic Multiple Live Births   0 0 0 0 3      # Outcome Date GA Lbr Sukumar/2nd Weight Sex Type Anes PTL Lv   3 Term      Vag-Spont      2 Term      Vag-Spont      1 Term      Vag-Spont           Current Medications:   Current Outpatient Medications   Medication Sig Dispense Refill     cyclobenzaprine (FLEXERIL) 5 MG tablet Take 1-2 Tablets (5-10 mg) by mouth three times a day.       diphenhydrAMINE-acetaminophen (TYLENOL PM)  MG tablet Take 1 tablet by mouth as needed       docusate sodium (DSS) 100 MG capsule Take 100 mg by mouth as needed       escitalopram (LEXAPRO) 10 MG tablet Take 1 Tablet (10 mg) by mouth daily.*       HYDROcodone-acetaminophen (NORCO) 5-325 MG tablet Take 1 tablet by mouth every 4 hours as needed       hydrOXYzine HCl (ATARAX) 10 MG  tablet Take 1-2 tablets (10-20 mg) by mouth every 6 hours as needed for itching 120 tablet 3     lidocaine-prilocaine (EMLA) 2.5-2.5 % external cream Apply topically as needed for moderate pain 30 g 1     Multiple Vitamin (MULTI-VITAMIN DAILY PO) Take 1 tablet by mouth daily       olaparib (LYNPARZA) 100 MG tablet Take 2 tablets (200 mg) by mouth 2 times daily 120 tablet 0     omeprazole (PRILOSEC) 20 MG DR capsule Take 20 mg by mouth       ondansetron (ZOFRAN) 8 MG tablet Take 1 tablet (8 mg) by mouth every 8 hours as needed for nausea 30 tablet 2     sennosides (SENOKOT) 8.6 MG tablet        valACYclovir (VALTREX) 1000 mg tablet Take 1,000 mg by mouth as needed       No current facility-administered medications for this visit.        Allergies:   No Known Allergies        Social History:  Patient lives with Eligio and two daughters. Works as a . She does not have Advanced Directives, but has identified Eligio Smith as her desired Healthcare Power of .  Social History     Tobacco Use     Smoking status: Former     Current packs/day: 0.00     Average packs/day: 1 pack/day for 42.0 years (42.0 ttl pk-yrs)     Types: Cigarettes     Start date: 1980     Quit date: 2022     Years since quittin.4     Smokeless tobacco: Never   Substance Use Topics     Alcohol use: Yes     Comment: Beer ~Q3 months.       History   Drug Use Unknown       Family History:   The patient's family history is notable for a first-degree paternal relative with prostate cancer, and a first-degree relative with breast cancer and melanoma, and a second-degree maternal relative with colon cancer. No known family history of ovarian, uterine, urothelial/renal, or pancreatic cancers.   Family History   Problem Relation Age of Onset     Prostate Cancer Father      Breast Cancer Sister 48     Melanoma Sister      Skin Cancer Sister      Colon Cancer Maternal Grandmother        Physical Exam:   /80 (BP Location:  Right arm, Patient Position: Sitting, Cuff Size: Adult Regular)   Pulse 58   Temp 97.8  F (36.6  C) (Oral)   Resp 16   Wt 68.5 kg (151 lb)   SpO2 99%   BMI 27.62 kg/m    Body mass index is 27.62 kg/m .    General Appearance: healthy and alert, no distress     HEENT:  no thyromegaly, no palpable nodules or masses        Cardiovascular: regular rate and rhythm, no gallops, rubs or murmurs     Respiratory: lungs clear, no rales, rhonchi or wheezes, normal diaphragmatic excursion    Musculoskeletal: extremities non tender and without edema    Skin: no lesions or rashes     Neurological: no gross defects     Psychiatric: appropriate mood and affect                               Hematological: normal cervical, supraclavicular and inguinal lymph nodes     Gastrointestinal:       abdomen soft, non-tender, non-distended, no organomegaly or masses      Laboratory Examination:  WBC 4.6 with ANC 3.2.  Hemoglobin 11.7.  Platelets 141.  Creatinine 1.11 (stable).  Potassium 4.1. Sodium 133. Remainder of electrolytes within normal limits.  AST 28, ALT 16, alkaline phosphatase 133, total bilirubin 0.5.  Albumin 4.0.  CA-125 trend (since second- therapy):  5/15/24  38  7/5/24  62  10/1/24  83  I have independently reviewed & interpreted the CA-125 trend.       Radiographic Examination:  10/15/24: Chest, abdomen, pelvic CT: Stable disease. I have personally reviewed and interpreted the CT images.    CHEST: OMAIRA.   HEPATOBILIARY: Small cyst in the right hepatic lobe is unchanged. Peritoneal tumor deposit posterior to the right hepatic lobe measuring 1.1 cm maximum diameter, similar to previous. No new liver lesions.   BOWEL: Loculated ascites with peritoneal thickening is similar to previous.       Performance Status:   ECOG Grade 0.       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    History of malignant partial small bowel obstruction, resolved with bowel rest.       Plan:     1.)    Ovarian cancer: I reviewed the CT results with Michelle and provided her with a copy of the CT report; she was also previously provided with a copy of the CT report via [email or web address]. CA-125 is slowly increasing, but CT shows stable disease. Recommend continuing current therapy until there is clear evidence of disease progression.     After discussion of risks/benefits, Michelle would like to continue the current maintenance therapy.   Plan as follows:  -Continue second- PARPi therapy with olaparib 200 mg BID (dose-reduction due to decreased creatinine clearance).   -RTC in 3 months with repeat chest, abdomen, pelvic CT at that time.   -Would consider mirvetuximab for the next line of therapy if covered by insurance based on the recently reported results from the phase II MARISSA trial of mirvetuximab in FOLR1+ platinum-sensitive ovarian cancer.     Side-effects:   -Grade 1 hyponatremia: Mild, asymptomatic. Counseled re: increasing sodium in her diet. Will monitor.   -Grade 1 dysgeusia: Continue timing of olaparib to minimize impact on meals/drinking.   --Myelosuppression risk: Monitor CBC weekly x1 month (completed), then monthly x1 year.   --CMP, CA-125 every 3 months.     2.)  Genetic risk factors were assessed: s/p germline testing which was negative for identifiable germline pathogenic variants (see HPI).     3.) Labs and/or tests ordered include:  None.     4. ) Health maintenance issues addressed today include continuing routine healthcare maintenance with her primary care provider.     5.) Code status:  Full-code.    6.) Prescriptions: None.      A total of 20 minutes was spent with the patient, 18 minutes of which were spent in counseling the patient and/or treatment planning; an additional 5 minutes was spent in chart  review/documentation.  The longitudinal plan of care for the diagnosis(es)/condition(s) as documented were addressed during this visit. Due to the added complexity in care, I will continue to support Michelle in the subsequent management and with ongoing continuity of care.        Charis Patino MD, MS, FACOG, FACS  10/18/2024  7:51 AM                  CC  Patient Care Team:  Domenica Christianson MD as PCP - General  Carey, Charis Meadows MD as MD (Gynecologic Oncology)  Susannah Roman APRN CNP as Assigned Cancer Care Provider  Anish Monroy MD as Assigned Palliative Care Provider  Sk, Pratima HAYNES, RN as Specialty Care Coordinator (Hematology & Oncology)  Myhre, Grace C, [facility] as Pharmacist  SELF, REFERRED    Again, thank you for allowing me to participate in the care of your patient.        Sincerely,        Charis Patino MD

## 2024-10-18 NOTE — NURSING NOTE
Return appt in  3 months    Ct scan orders entered to be done prior to md appt    Check out notesent to scheduling 10/18/24

## 2024-10-18 NOTE — NURSING NOTE
"Oncology Rooming Note    October 18, 2024 7:02 AM   Jacki Smith is a 59 year old female who presents for:    Chief Complaint   Patient presents with    Oncology Clinic Visit     Ovarian CA     Initial Vitals: /80 (BP Location: Right arm, Patient Position: Sitting, Cuff Size: Adult Regular)   Pulse 58   Temp 97.8  F (36.6  C) (Oral)   Resp 16   Wt 68.5 kg (151 lb)   SpO2 99%   BMI 27.62 kg/m   Estimated body mass index is 27.62 kg/m  as calculated from the following:    Height as of 9/3/24: 1.575 m (5' 2\").    Weight as of this encounter: 68.5 kg (151 lb). Body surface area is 1.73 meters squared.  No Pain (0) Comment: Data Unavailable   No LMP recorded. Patient has had a hysterectomy.  Allergies reviewed: Yes  Medications reviewed: Yes    Medications: Medication refills not needed today.  Pharmacy name entered into [employer]:    [id number] [place], [place] - [address] [facility] [place] [place], [place] - [address] [place] [address] [facility] - [place], [place] - [address]    Frailty Screening:   Is the patient here for a new oncology consult visit in cancer care? 2. No      Clinical concerns:        Jennifer Patel              "

## 2024-11-04 DIAGNOSIS — C56.1 OVARIAN CANCER, RIGHT (H): Primary | ICD-10-CM

## 2024-11-05 ENCOUNTER — LAB (OUTPATIENT)
Dept: INFUSION THERAPY | Facility: HOSPITAL | Age: 59
End: 2024-11-05
Attending: OBSTETRICS & GYNECOLOGY
Payer: COMMERCIAL

## 2024-11-05 DIAGNOSIS — C56.1 OVARIAN CANCER, RIGHT (H): ICD-10-CM

## 2024-11-05 DIAGNOSIS — C56.1 OVARIAN CANCER, RIGHT (H): Primary | ICD-10-CM

## 2024-11-05 LAB
ALBUMIN SERPL BCG-MCNC: 4 G/DL (ref 3.5–5.2)
ALP SERPL-CCNC: 123 U/L (ref 40–150)
ALT SERPL W P-5'-P-CCNC: 26 U/L (ref 0–50)
ANION GAP SERPL CALCULATED.3IONS-SCNC: 11 MMOL/L (ref 7–15)
AST SERPL W P-5'-P-CCNC: 30 U/L (ref 0–45)
BASOPHILS # BLD AUTO: 0 10E3/UL (ref 0–0.2)
BASOPHILS NFR BLD AUTO: 1 %
BILIRUB SERPL-MCNC: 0.3 MG/DL
BUN SERPL-MCNC: 15.3 MG/DL (ref 8–23)
CALCIUM SERPL-MCNC: 9.5 MG/DL (ref 8.8–10.4)
CHLORIDE SERPL-SCNC: 98 MMOL/L (ref 98–107)
CREAT SERPL-MCNC: 1.05 MG/DL (ref 0.51–0.95)
EGFRCR SERPLBLD CKD-EPI 2021: 61 ML/MIN/1.73M2
EOSINOPHIL # BLD AUTO: 0.1 10E3/UL (ref 0–0.7)
EOSINOPHIL NFR BLD AUTO: 1 %
ERYTHROCYTE [DISTWIDTH] IN BLOOD BY AUTOMATED COUNT: 15.5 % (ref 10–15)
GLUCOSE SERPL-MCNC: 103 MG/DL (ref 70–99)
HCO3 SERPL-SCNC: 25 MMOL/L (ref 22–29)
HCT VFR BLD AUTO: 38 % (ref 35–47)
HGB BLD-MCNC: 12.5 G/DL (ref 11.7–15.7)
IMM GRANULOCYTES # BLD: 0.1 10E3/UL
IMM GRANULOCYTES NFR BLD: 1 %
LYMPHOCYTES # BLD AUTO: 1.4 10E3/UL (ref 0.8–5.3)
LYMPHOCYTES NFR BLD AUTO: 22 %
MCH RBC QN AUTO: 32.6 PG (ref 26.5–33)
MCHC RBC AUTO-ENTMCNC: 32.9 G/DL (ref 31.5–36.5)
MCV RBC AUTO: 99 FL (ref 78–100)
MONOCYTES # BLD AUTO: 0.5 10E3/UL (ref 0–1.3)
MONOCYTES NFR BLD AUTO: 7 %
NEUTROPHILS # BLD AUTO: 4.5 10E3/UL (ref 1.6–8.3)
NEUTROPHILS NFR BLD AUTO: 68 %
NRBC # BLD AUTO: 0 10E3/UL
NRBC BLD AUTO-RTO: 0 /100
PLATELET # BLD AUTO: 145 10E3/UL (ref 150–450)
POTASSIUM SERPL-SCNC: 4.5 MMOL/L (ref 3.4–5.3)
PROT SERPL-MCNC: 6.5 G/DL (ref 6.4–8.3)
RBC # BLD AUTO: 3.83 10E6/UL (ref 3.8–5.2)
SODIUM SERPL-SCNC: 134 MMOL/L (ref 135–145)
WBC # BLD AUTO: 6.7 10E3/UL (ref 4–11)

## 2024-11-05 PROCEDURE — 80053 COMPREHEN METABOLIC PANEL: CPT

## 2024-11-05 PROCEDURE — 85004 AUTOMATED DIFF WBC COUNT: CPT

## 2024-11-05 PROCEDURE — 36415 COLL VENOUS BLD VENIPUNCTURE: CPT

## 2024-11-05 NOTE — ORAL ONC MGMT
Oral Chemotherapy Monitoring Program  Lab Follow Up    Reviewed lab results from 11/5/24.        8/7/2024     3:00 PM 8/7/2024     3:24 PM 9/3/2024     9:00 AM 9/4/2024     9:00 AM 10/2/2024     8:00 AM 10/2/2024    11:00 AM 11/5/2024    10:00 AM   ORAL CHEMOTHERAPY   Assessment Type Left Voicemail Monthly Follow up Refill Lab Monitoring Refill Lab Monitoring Refill;Lab Monitoring   Diagnosis Code Ovarian Cancer Ovarian Cancer Ovarian Cancer Ovarian Cancer Ovarian Cancer Ovarian Cancer Ovarian Cancer   Providers Carey Patino   [facility]   Is this patient followed by the [facility] OC team? No No No No No No    Drug Name Lynparza (olaparib) Lynparza (olaparib) Lynparza (olaparib) Lynparza (olaparib) Lynparza (olaparib) Lynparza (olaparib) Lynparza (olaparib)   Dose 200 mg 200 mg 200 mg 200 mg 200 mg 200 mg 200 mg   Current Schedule BID BID BID BID BID BID BID   Cycle Details Continuous Continuous Continuous Continuous Continuous Continuous Continuous   Doses missed in last 2 weeks  1        Adherence Assessment  Adherent        Adverse Effects  No AE identified during assessment        Any new drug interactions?  No        Is the dose as ordered appropriate for the patient?  Yes        Has the patient missed any days of school, work, or other routine activity?  No        Since the last time we talked, have you been hospitalized or used the emergency room?  No            Labs:  _  Result Component Current Result Ref Range   Sodium 134 (L) (11/5/2024) 135 - 145 mmol/L     _  Result Component Current Result Ref Range   Potassium 4.5 (11/5/2024) 3.4 - 5.3 mmol/L     _  Result Component Current Result Ref Range   Calcium 9.5 (11/5/2024) 8.8 - 10.4 mg/dL     No results found for Mag within last 30 days.     No results found for Phos within last 30 days.     _  Result Component Current Result Ref Range   Albumin 4.0 (11/5/2024) 3.5 - 5.2  g/dL     _  Result Component Current Result Ref Range   Urea Nitrogen 15.3 (11/5/2024) 8.0 - 23.0 mg/dL     _  Result Component Current Result Ref Range   Creatinine 1.05 (H) (11/5/2024) 0.51 - 0.95 mg/dL     _  Result Component Current Result Ref Range   AST 30 (11/5/2024) 0 - 45 U/L     _  Result Component Current Result Ref Range   ALT 26 (11/5/2024) 0 - 50 U/L     _  Result Component Current Result Ref Range   Bilirubin Total 0.3 (11/5/2024) <=1.2 mg/dL     _  Result Component Current Result Ref Range   WBC Count 6.7 (11/5/2024) 4.0 - 11.0 10e3/uL     _  Result Component Current Result Ref Range   Hemoglobin 12.5 (11/5/2024) 11.7 - 15.7 g/dL     _  Result Component Current Result Ref Range   Platelet Count 145 (L) (11/5/2024) 150 - 450 10e3/uL     No results found for ANC within last 30 days.     _  Result Component Current Result Ref Range   Absolute Neutrophils 4.5 (11/5/2024) 1.6 - 8.3 10e3/uL        Assessment & Plan:  No concerning abnormalities. Continue Lynparza as directed. Will send Michelle a [employer] message.    Follow-Up:  12/3 labs    Lit Alves, PharmD, BCPS, BCOP  Hematology/Oncology Clinical Pharmacist  Oral Chemotherapy Monitoring Program  [facility]  [phone]

## 2024-12-02 DIAGNOSIS — C56.1 OVARIAN CANCER, RIGHT (H): Primary | ICD-10-CM

## 2024-12-03 ENCOUNTER — LAB (OUTPATIENT)
Dept: INFUSION THERAPY | Facility: HOSPITAL | Age: 59
End: 2024-12-03
Attending: OBSTETRICS & GYNECOLOGY
Payer: COMMERCIAL

## 2024-12-03 DIAGNOSIS — C56.1 OVARIAN CANCER, RIGHT (H): ICD-10-CM

## 2024-12-03 LAB
ALBUMIN SERPL BCG-MCNC: 4 G/DL (ref 3.5–5.2)
ALP SERPL-CCNC: 110 U/L (ref 40–150)
ALT SERPL W P-5'-P-CCNC: 13 U/L (ref 0–50)
ANION GAP SERPL CALCULATED.3IONS-SCNC: 8 MMOL/L (ref 7–15)
AST SERPL W P-5'-P-CCNC: 25 U/L (ref 0–45)
BASOPHILS # BLD AUTO: 0 10E3/UL (ref 0–0.2)
BASOPHILS NFR BLD AUTO: 1 %
BILIRUB SERPL-MCNC: 0.4 MG/DL
BUN SERPL-MCNC: 15.8 MG/DL (ref 8–23)
CALCIUM SERPL-MCNC: 9.1 MG/DL (ref 8.8–10.4)
CHLORIDE SERPL-SCNC: 100 MMOL/L (ref 98–107)
CREAT SERPL-MCNC: 1.24 MG/DL (ref 0.51–0.95)
EGFRCR SERPLBLD CKD-EPI 2021: 50 ML/MIN/1.73M2
EOSINOPHIL # BLD AUTO: 0.2 10E3/UL (ref 0–0.7)
EOSINOPHIL NFR BLD AUTO: 3 %
ERYTHROCYTE [DISTWIDTH] IN BLOOD BY AUTOMATED COUNT: 15.6 % (ref 10–15)
GLUCOSE SERPL-MCNC: 93 MG/DL (ref 70–99)
HCO3 SERPL-SCNC: 27 MMOL/L (ref 22–29)
HCT VFR BLD AUTO: 35.7 % (ref 35–47)
HGB BLD-MCNC: 11.8 G/DL (ref 11.7–15.7)
IMM GRANULOCYTES # BLD: 0 10E3/UL
IMM GRANULOCYTES NFR BLD: 1 %
LYMPHOCYTES # BLD AUTO: 0.8 10E3/UL (ref 0.8–5.3)
LYMPHOCYTES NFR BLD AUTO: 14 %
MCH RBC QN AUTO: 32.7 PG (ref 26.5–33)
MCHC RBC AUTO-ENTMCNC: 33.1 G/DL (ref 31.5–36.5)
MCV RBC AUTO: 99 FL (ref 78–100)
MONOCYTES # BLD AUTO: 0.4 10E3/UL (ref 0–1.3)
MONOCYTES NFR BLD AUTO: 6 %
NEUTROPHILS # BLD AUTO: 4.3 10E3/UL (ref 1.6–8.3)
NEUTROPHILS NFR BLD AUTO: 76 %
NRBC # BLD AUTO: 0 10E3/UL
NRBC BLD AUTO-RTO: 0 /100
PLATELET # BLD AUTO: 161 10E3/UL (ref 150–450)
POTASSIUM SERPL-SCNC: 4.1 MMOL/L (ref 3.4–5.3)
PROT SERPL-MCNC: 6.3 G/DL (ref 6.4–8.3)
RBC # BLD AUTO: 3.61 10E6/UL (ref 3.8–5.2)
SODIUM SERPL-SCNC: 135 MMOL/L (ref 135–145)
WBC # BLD AUTO: 5.7 10E3/UL (ref 4–11)

## 2024-12-03 PROCEDURE — 85004 AUTOMATED DIFF WBC COUNT: CPT

## 2024-12-03 PROCEDURE — 36415 COLL VENOUS BLD VENIPUNCTURE: CPT

## 2024-12-03 PROCEDURE — 80048 BASIC METABOLIC PNL TOTAL CA: CPT

## 2024-12-03 PROCEDURE — 82040 ASSAY OF SERUM ALBUMIN: CPT

## 2024-12-26 DIAGNOSIS — C56.1 OVARIAN CANCER, RIGHT (H): Primary | ICD-10-CM

## 2024-12-30 ENCOUNTER — PATIENT OUTREACH (OUTPATIENT)
Dept: ONCOLOGY | Facility: CLINIC | Age: 59
End: 2024-12-30

## 2024-12-30 ENCOUNTER — INFUSION THERAPY VISIT (OUTPATIENT)
Dept: INFUSION THERAPY | Facility: HOSPITAL | Age: 59
End: 2024-12-30
Attending: OBSTETRICS & GYNECOLOGY
Payer: COMMERCIAL

## 2024-12-30 ENCOUNTER — MYC MEDICAL ADVICE (OUTPATIENT)
Dept: ONCOLOGY | Facility: CLINIC | Age: 59
End: 2024-12-30

## 2024-12-30 DIAGNOSIS — R18.8 ASCITIC FLUID: ICD-10-CM

## 2024-12-30 DIAGNOSIS — C56.1 OVARIAN CANCER, RIGHT (H): Primary | ICD-10-CM

## 2024-12-30 LAB
ALBUMIN SERPL BCG-MCNC: 3.7 G/DL (ref 3.5–5.2)
ALP SERPL-CCNC: 96 U/L (ref 40–150)
ALT SERPL W P-5'-P-CCNC: 11 U/L (ref 0–50)
ANION GAP SERPL CALCULATED.3IONS-SCNC: 10 MMOL/L (ref 7–15)
AST SERPL W P-5'-P-CCNC: 23 U/L (ref 0–45)
BASOPHILS # BLD AUTO: 0 10E3/UL (ref 0–0.2)
BASOPHILS NFR BLD AUTO: 1 %
BILIRUB SERPL-MCNC: 0.5 MG/DL
BUN SERPL-MCNC: 18.3 MG/DL (ref 8–23)
CALCIUM SERPL-MCNC: 8.8 MG/DL (ref 8.8–10.4)
CANCER AG125 SERPL-ACNC: 110 U/ML
CHLORIDE SERPL-SCNC: 100 MMOL/L (ref 98–107)
CREAT SERPL-MCNC: 1.19 MG/DL (ref 0.51–0.95)
EGFRCR SERPLBLD CKD-EPI 2021: 52 ML/MIN/1.73M2
EOSINOPHIL # BLD AUTO: 0.2 10E3/UL (ref 0–0.7)
EOSINOPHIL NFR BLD AUTO: 3 %
ERYTHROCYTE [DISTWIDTH] IN BLOOD BY AUTOMATED COUNT: 15.8 % (ref 10–15)
GLUCOSE SERPL-MCNC: 99 MG/DL (ref 70–99)
HCO3 SERPL-SCNC: 24 MMOL/L (ref 22–29)
HCT VFR BLD AUTO: 33.5 % (ref 35–47)
HGB BLD-MCNC: 11.2 G/DL (ref 11.7–15.7)
IMM GRANULOCYTES # BLD: 0 10E3/UL
IMM GRANULOCYTES NFR BLD: 1 %
LYMPHOCYTES # BLD AUTO: 0.8 10E3/UL (ref 0.8–5.3)
LYMPHOCYTES NFR BLD AUTO: 15 %
MCH RBC QN AUTO: 32.9 PG (ref 26.5–33)
MCHC RBC AUTO-ENTMCNC: 33.4 G/DL (ref 31.5–36.5)
MCV RBC AUTO: 99 FL (ref 78–100)
MONOCYTES # BLD AUTO: 0.4 10E3/UL (ref 0–1.3)
MONOCYTES NFR BLD AUTO: 8 %
NEUTROPHILS # BLD AUTO: 3.8 10E3/UL (ref 1.6–8.3)
NEUTROPHILS NFR BLD AUTO: 73 %
NRBC # BLD AUTO: 0 10E3/UL
NRBC BLD AUTO-RTO: 0 /100
PLATELET # BLD AUTO: 147 10E3/UL (ref 150–450)
POTASSIUM SERPL-SCNC: 4.1 MMOL/L (ref 3.4–5.3)
PROT SERPL-MCNC: 6.1 G/DL (ref 6.4–8.3)
RBC # BLD AUTO: 3.4 10E6/UL (ref 3.8–5.2)
SODIUM SERPL-SCNC: 134 MMOL/L (ref 135–145)
WBC # BLD AUTO: 5.2 10E3/UL (ref 4–11)

## 2024-12-30 PROCEDURE — 250N000011 HC RX IP 250 OP 636: Performed by: OBSTETRICS & GYNECOLOGY

## 2024-12-30 PROCEDURE — 85048 AUTOMATED LEUKOCYTE COUNT: CPT

## 2024-12-30 PROCEDURE — 82310 ASSAY OF CALCIUM: CPT

## 2024-12-30 PROCEDURE — 86304 IMMUNOASSAY TUMOR CA 125: CPT

## 2024-12-30 PROCEDURE — 36591 DRAW BLOOD OFF VENOUS DEVICE: CPT

## 2024-12-30 PROCEDURE — 85004 AUTOMATED DIFF WBC COUNT: CPT

## 2024-12-30 PROCEDURE — 82374 ASSAY BLOOD CARBON DIOXIDE: CPT

## 2024-12-30 RX ORDER — HEPARIN SODIUM (PORCINE) LOCK FLUSH IV SOLN 100 UNIT/ML 100 UNIT/ML
5 SOLUTION INTRAVENOUS
Status: DISCONTINUED | OUTPATIENT
Start: 2024-12-30 | End: 2024-12-30 | Stop reason: HOSPADM

## 2024-12-30 RX ADMIN — HEPARIN 5 ML: 100 SYRINGE at 07:47

## 2024-12-30 NOTE — ORAL ONC MGMT
Oral Chemotherapy Monitoring Program  Lab Follow Up    Patient is on olaparib (Lynparza)  Reviewed lab results from 12/30/24.    Assessment & Plan:  CBC and cMP reviewed. Results show no concerning abnormalities.  Plan to continue Lynparza as prescribed. [employer] message sent to patient regarding results and plan.     Follow-Up:  1/21: labs  1/24: Dr Patino visit      Jeannine Chan PharmD  Hematology/Oncology Clinical Pharmacist  Oral Chemotherapy Monitoring Program  [facility]  [phone]  December 30, 2024            10/2/2024     8:00 AM 10/2/2024    11:00 AM 11/5/2024    10:00 AM 12/2/2024    11:00 AM 12/3/2024    12:00 PM 12/26/2024     8:00 AM 12/30/2024    10:00 AM   ORAL CHEMOTHERAPY   Assessment Type Refill Lab Monitoring Refill;Lab Monitoring Refill Lab Monitoring Refill Lab Monitoring   Diagnosis Code Ovarian Cancer Ovarian Cancer Ovarian Cancer Ovarian Cancer Ovarian Cancer Ovarian Cancer Ovarian Cancer   Providers Carey Patino   [facility]   Is this patient followed by the [facility] OC team? No No        Drug Name Lynparza (olaparib) Lynparza (olaparib) Lynparza (olaparib) Lynparza (olaparib) Lynparza (olaparib) Lynparza (olaparib) Lynparza (olaparib)   Dose 200 mg 200 mg 200 mg 200 mg 200 mg 200 mg 200 mg   Current Schedule BID BID BID BID BID BID BID   Cycle Details Continuous Continuous Continuous Continuous Continuous Continuous Continuous   Adverse Effects       No AE identified during assessment   Is the dose as ordered appropriate for the patient?       Yes       Labs:  _  Result Component Current Result Ref Range   Sodium 134 (L) (12/30/2024) 135 - 145 mmol/L     _  Result Component Current Result Ref Range   Potassium 4.1 (12/30/2024) 3.4 - 5.3 mmol/L     _  Result Component Current Result Ref Range   Calcium 8.8 (12/30/2024) 8.8 - 10.4 mg/dL     No results found for Mag within  last 30 days.     No results found for Phos within last 30 days.     _  Result Component Current Result Ref Range   Albumin 3.7 (12/30/2024) 3.5 - 5.2 g/dL     _  Result Component Current Result Ref Range   Urea Nitrogen 18.3 (12/30/2024) 8.0 - 23.0 mg/dL     _  Result Component Current Result Ref Range   Creatinine 1.19 (H) (12/30/2024) 0.51 - 0.95 mg/dL     _  Result Component Current Result Ref Range   AST 23 (12/30/2024) 0 - 45 U/L     _  Result Component Current Result Ref Range   ALT 11 (12/30/2024) 0 - 50 U/L     _  Result Component Current Result Ref Range   Bilirubin Total 0.5 (12/30/2024) <=1.2 mg/dL     _  Result Component Current Result Ref Range   WBC Count 5.2 (12/30/2024) 4.0 - 11.0 10e3/uL     _  Result Component Current Result Ref Range   Hemoglobin 11.2 (L) (12/30/2024) 11.7 - 15.7 g/dL     _  Result Component Current Result Ref Range   Platelet Count 147 (L) (12/30/2024) 150 - 450 10e3/uL     No results found for ANC within last 30 days.

## 2025-01-10 ENCOUNTER — HOSPITAL ENCOUNTER (OUTPATIENT)
Dept: CT IMAGING | Facility: CLINIC | Age: 60
Discharge: HOME | End: 2025-01-10
Attending: OBSTETRICS & GYNECOLOGY | Admitting: OBSTETRICS & GYNECOLOGY
Payer: COMMERCIAL

## 2025-01-10 DIAGNOSIS — C56.1 OVARIAN CANCER, RIGHT (H): ICD-10-CM

## 2025-01-10 PROCEDURE — 71260 CT THORAX DX C+: CPT

## 2025-01-10 PROCEDURE — 74177 CT ABD & PELVIS W/CONTRAST: CPT

## 2025-01-10 PROCEDURE — 250N000011 HC RX IP 250 OP 636: Performed by: OBSTETRICS & GYNECOLOGY

## 2025-01-10 RX ORDER — HEPARIN SODIUM (PORCINE) LOCK FLUSH IV SOLN 100 UNIT/ML 100 UNIT/ML
5 SOLUTION INTRAVENOUS ONCE
Status: COMPLETED | OUTPATIENT
Start: 2025-01-10 | End: 2025-01-10

## 2025-01-10 RX ORDER — IOPAMIDOL 755 MG/ML
75 INJECTION, SOLUTION INTRAVASCULAR ONCE
Status: COMPLETED | OUTPATIENT
Start: 2025-01-10 | End: 2025-01-10

## 2025-01-10 RX ADMIN — IOPAMIDOL 75 ML: 755 INJECTION, SOLUTION INTRAVENOUS at 15:44

## 2025-01-10 RX ADMIN — HEPARIN SODIUM (PORCINE) LOCK FLUSH IV SOLN 100 UNIT/ML 5 ML: 100 SOLUTION at 15:50

## 2025-01-13 ENCOUNTER — MYC MEDICAL ADVICE (OUTPATIENT)
Dept: ONCOLOGY | Facility: CLINIC | Age: 60
End: 2025-01-13
Payer: COMMERCIAL

## 2025-01-13 ENCOUNTER — PATIENT OUTREACH (OUTPATIENT)
Dept: ONCOLOGY | Facility: CLINIC | Age: 60
End: 2025-01-13
Payer: COMMERCIAL

## 2025-01-14 NOTE — PROGRESS NOTES
Domenica LEMOS at [facility] [employer] left message on voice mail  Saw pt yesterday, had recent CT showing disease progression  Pt very upset

## 2025-01-14 NOTE — PROGRESS NOTES
Pt left message on voice mail  Got md message on ct scan   Can't wait for appt in 2 weeks  Scared does not want to die  Wants to start chemo asap  Hear of a lot of people with stage 4 cancer that have lived longer than she has so far-  NEEDS HELP

## 2025-01-14 NOTE — PROGRESS NOTES
Spoke with NP, explained I have spoken to pt and spouse yesterday on these issues/concerns.  relayed pt concerns to md.  Md response was conveyed to pt today.   Discussed paracentesis.  Explained at this point fluid is loculated and not amendable to paracentesis at this point.  Explained pt was given this information.

## 2025-01-14 NOTE — PROGRESS NOTES
"I spoke with pt   Per pt \"not doing good\"  got md message on ct scan and follow-up  Cannot wait for a plan   Just want to make sure \"end of life is not near\"  Concerns that things are growing  Wonders if cancer is in the lymph nodes and liver can chemo still happen  What about surgery    Reassured pt chemo can still happen even if cancer is in lymph nodes and liver.  Typically surgery not option for liver mets if there is cancer other areas as would not want to risk surgery unless all can be removed.    Md will review with you all treatment options and reasons for the suggestions  Explained to pt nothing will change between now and next weeks appt, it will not change length of life outcomes if start this week or next.  Reassured things don't change that fast  Explained I will reach out to md to get some questions answered for a little reassurance     Message sent to md with pt concerns  "

## 2025-01-14 NOTE — TELEPHONE ENCOUNTER
Reviewed note below.  We will discuss treatment options at her follow-up visit.   On the CT there is no visible disease that is life-threatening.    Charis Patino MD, MS, FACOG, FACS  1/14/2025  8:55 AM

## 2025-01-21 ENCOUNTER — LAB (OUTPATIENT)
Dept: INFUSION THERAPY | Facility: HOSPITAL | Age: 60
End: 2025-01-21
Attending: FAMILY MEDICINE
Payer: COMMERCIAL

## 2025-01-21 DIAGNOSIS — C56.1 OVARIAN CANCER, RIGHT (H): ICD-10-CM

## 2025-01-21 PROCEDURE — 86304 IMMUNOASSAY TUMOR CA 125: CPT

## 2025-01-21 PROCEDURE — 36415 COLL VENOUS BLD VENIPUNCTURE: CPT

## 2025-01-21 NOTE — PATIENT INSTRUCTIONS
SCHEDULING:  -Return to the infusion center in 1-2 weeks to initiate therapy with carboplatin + paclitaxel + bevacizumab. 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   Treatment History:  -6/22/22-8/4/22: Neoadjuvant chemotherapy with IV carboplatin + paclitaxel x3 cycles. Partial response.   -8/29/22: Pelvic exam under anesthesia, diagnostic laparoscopy (look inside the pelvis/abdomen with a camera), conversion to laparotomy (large incision) for optimal interval tumor debulking to no gross residual disease including peritoneal and diaphragm biopsies, bilateral salpingo-oophorectomy (removal of bilateral ovaries & fallopian tubes), infragastric omentectomy (removal of the fat curtain hanging off the stomach/colon), bilateral hemidiaphragm stripping, peritoneal and mesenteric argon beam ablation (destruction of tumor), appendectomy.   -9/28/22-11/17/22: First-line chemotherapy with carboplatin + paclitaxel x3 cycles (total 6 cycles).   -6/13/23-3/28/24: Second-line chemotherapy with IV carboplatin + pegylated liposomal doxorubicin (PLD/doxil) x11 cycles. Partial response.   -5/10/24-1/2025: Second- PARP-inhibitor therapy with olaparib. Progressive disease.        PLAN:   1) Ovarian cancer: Plan for third-line therapy with palliative intent.  DRUG: Carboplatin + paclitaxel + bevacizumab  SCHEDULE: 1 day every 3 weeks.   PLAN: 3 cycles followed by repeat chest, abdomen, pelvic CT to assess disease response.       2) Genetic testing: negative for any identifiable germline variants. No additional testing recommended for you or your family.        Please call with any questions or concerns. [phone]       Charis Patino MD, MS, FACOG, FACS  1/24/2025  10:01 AM

## 2025-01-21 NOTE — PROGRESS NOTES
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 Follow-up visit.       History of Present Illness:  Jacki \"Michelle\" IVY Smith is a 59 year old seen at the [facility] for management of progressive stage IIIC high-grade serous ovarian carcinoma. History as follows:    Ovarian Cancer History:  22: Presented to an ED in [place] for evaluation of abdominal bloating and discomfort.  -Abdomen, pelvic CT: Radiographic Stage CAL ovarian cancer. I have personally reviewed the CT images.   22: [id number].   22: Pelvic ultrasound: c/w metastatic cancer.  22: Pelvic exam under anesthesia, diagnostic laparoscopy, biopsies, evacuation of ascites.   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      22, 22, 22: Cycles 1-3 of neoadjuvant chemotherapy with IV carboplatin + paclitaxel 175 mg/m2 every 21 days.   -Cycles 1,2: Carboplatin AUC 6.   -Cycle 3: Carboplatin AUC 5 with dose-reduction due to thrombcytopenia.   -22: Chest, abdomen, pelvic CT: Partial response. I have personally reviewed the CT images.   22: Pelvic exam under anesthesia, diagnostic laparoscopy, conversion to laparotomy for optimal interval tumor debulking to no gross residual disease including peritoneal and diaphragm biopsies, bilateral salpingo-oophorectomy, infragastric omentectomy, bilateral hemidiaphragm stripping, peritoneal and mesenteric argon beam ablation, appendectomy.   -Pathology: High-grade serous carcinoma involving all resected specimens.   22, 10/19/22, 22: Cycles 4-6 of " first-line chemotherapy with IV carboplatin AUC 5 + paclitaxel 175 mg/m2.  -12/2/22: Chest, abdomen, pelvic CT: No definitive evidence of disease.   -CA-125 439>>23.   -2/16/23: Chest, abdomen, pelvic CT to evaluate bloating: Stable findings, no definitive evidence of disease.    -5/25/23: Admitted  to [facility] for management of malignant ascites s/p therapeutic paracentesis, and partial SBO resolved with conservative management.   5/25/23: Abdomen, pelvic CT: Progressive disease.     6/13/23, 7/12/23, 8/14/23, 9/13/23, 10/11/23, 11/8/23, 12/6/23, 1/3/24, 2/1/24, 2/29/24, 3/28/24: Cycles 1-11 of second-line chemotherapy with IV carboplatin AUC 5 + pegylated liposomal doxorubicin (PLD) 30 mg/m2 every 28 days.   -9/27/23: Chest, abdomen, pelvic CT: Partial response. Catheter-associated thrombus.   -1/23/24: Chest, abdomen, pelvic CT: Stable disease.   -4/10/24: Chest, abdomen, pelvic CT: Stable disease.   -CA-125 78>>44.   5/10/24-Present: Second- PARPi therapy with olaparib.   -Cycles 1-2: Olaparib 300 mg BID.   -Cycles 3+: Olaparib 200 mg BID with dose-reduction due to decreased creatinine clearance.   -7/5/24: Chest, abdomen, pelvic CT: Stable disease.   -10/15/24: Chest, abdomen, pelvic CT: Stable disease.   -CA-125 38>>110.      Genetic/Genomic/Molecular Testing History:  10/5/22: [facility], reflexed to Common Hereditary Cancer Panel.   -No identifiable germline variants identified in ABRAXAS1, AKT1, APC, DEION, AXIN2, BARD1, BMPR1A, BRCA1, BRCA2, BRIP1, CDC73, CDH1, CDK4, CDKN2A, CHEK2, CTNNA1, DICER1, EPCAM, FANCC, FANCM, GREM1, HOXB13, KIT, MEN1, MLH1, MRE11, MSH2, MSH6, MUTYH, NBN, NF1, NTHL1, PALB2, PDGFRA, PIK3CA, PMS2, POLD1, POLE, PTEN, RAD50, RAD51C, RAD51D, RECQL, RINT1, SDHA, SDHB, SDHC, SDHD, SMAD4, SMARCA4, STK11, TP53, TSC1, TSC2, VHL, XRCC2.   -Variant of uncertain significance in MSH3 c.16C>T (p.Fby1Bbt)    10/9/22 (tumor 8/29/22): [facility]  "Results.  -Genomic ROXI low (6%)   -TMB low (1mut/Mb)  -Microsatellite stable/Mismatch Repair proficient  -PD-L1- (CPS 0%)  -NTRK 1/2/3 fusion not detected.   -ER-, MS+  -Genomic alterations in:  --TP53  -No genomic alterations in BRCA1,2.    Jamila-ovary clinical trial screening: Negative for the immunoreactive subtype.     6/19/23 (tumor 8/29/22): [facility] Test results.   -FOLR1+ (2+, 75%).       Subjective:  Michelle returns to the gyn onc clinic today for review of CT results and discussion of ongoing management, accompanied by her  and daughter.   Michelle reports feeling full. She had imaging which shows ascites, but not enough for paracentesis.  Michelle has reported a weird taste when drinking; she recently saw the dentist and after that had a \"rotten\" taste. She saw the dentist again, who reported there was no abnormality to cause this.   Normal bowel movements with miralax 2x/week.  Michelle reports an intermittent \"stabbing\" pain in the lower abdomen, a few times/week. This is short-lived, does not require any medications.   Michelle reports that she has been trying to eat well, but her  reports that due to anxiety she has not been eating much and is taking medications on an empty stomach.   Michelle reports decreased stamina. She does continue to work, does housework.       Past Medical History:  Past Medical History:   Diagnosis Date    Female stress incontinence     Ovarian cancer, right (H) 06/16/2022    Stage IIIC high-grade serous carcinoma    Recurrent cold sores        Past Surgical History:  Past Surgical History:   Procedure Laterality Date    APPENDECTOMY  08/29/2022    Part of ovarian cancer tumor debulking    BLADDER SUSPENSION  08/13/2009    HYSTERECTOMY  08/13/2009    For prolapse    IR PARACENTESIS  6/6/2022    IR PARACENTESIS  6/14/2022    IR PARACENTESIS  5/26/2023    LAPAROSCOPY DIAGNOSTIC (GYN)  06/16/2022    SALPINGO-OOPHORECTOMY, COMBINED Bilateral 08/29/2022    Pelvic exam under " anesthesia, diagnostic laparoscopy, conversion to laparotomy for optimal interval tumor debulking to no gross residual disease including peritoneal and diaphragm biopsies, bilateral salpingo-oophorectomy, infragastric omentectomy, bilateral hemidiaphragm stripping, peritoneal and mesenteric argon beam ablation, appendectomy.    TONSILLECTOMY  1973    TUBAL LIGATION Bilateral 1998       Gynecologic History:  Surgical menopause. Hysterectomy in 2009 for prolapse.  Was on HRT with estrogen for less than 2 years  No history of abnormal cervical cancer screening.  No history of STIs.  Sexually active, no dyspareunia, no postcoital bleeding.      Obstetric History:  OB History    Para Term  AB Living   3 3 3 0 0 3   SAB IAB Ectopic Multiple Live Births   0 0 0 0 3      # Outcome Date GA Lbr Sukumar/2nd Weight Sex Type Anes PTL Lv   3 Term      Vag-Spont      2 Term      Vag-Spont      1 Term      Vag-Spont           Current Medications:   Current Outpatient Medications   Medication Sig Dispense Refill    cyclobenzaprine (FLEXERIL) 5 MG tablet Take 1-2 Tablets (5-10 mg) by mouth three times a day.      diphenhydrAMINE-acetaminophen (TYLENOL PM)  MG tablet Take 1 tablet by mouth as needed      docusate sodium (DSS) 100 MG capsule Take 100 mg by mouth as needed      escitalopram (LEXAPRO) 10 MG tablet Take 1 Tablet (10 mg) by mouth daily.*      HYDROcodone-acetaminophen (NORCO) 5-325 MG tablet Take 1 tablet by mouth every 4 hours as needed      hydrOXYzine HCl (ATARAX) 10 MG tablet Take 1-2 tablets (10-20 mg) by mouth every 6 hours as needed for itching 120 tablet 3    lidocaine-prilocaine (EMLA) 2.5-2.5 % external cream Apply topically as needed for moderate pain 30 g 1    Multiple Vitamin (MULTI-VITAMIN DAILY PO) Take 1 tablet by mouth daily      olaparib (LYNPARZA) 100 MG tablet Take 2 tablets (200 mg) by mouth 2 times daily 120 tablet 0    omeprazole (PRILOSEC) 20 MG DR capsule Take 20 mg by mouth       ondansetron (ZOFRAN) 8 MG tablet Take 1 tablet (8 mg) by mouth every 8 hours as needed for nausea 30 tablet 2    sennosides (SENOKOT) 8.6 MG tablet       valACYclovir (VALTREX) 1000 mg tablet Take 1,000 mg by mouth as needed       No current facility-administered medications for this visit.        Allergies:   No Known Allergies        Social History:  Patient lives with Eligio and two daughters. Works as a . She does not have Advanced Directives, but has identified Eligio Smith as her desired Healthcare Power of .  Social History     Tobacco Use    Smoking status: Former     Current packs/day: 0.00     Average packs/day: 1 pack/day for 42.0 years (42.0 ttl pk-yrs)     Types: Cigarettes     Start date: 1980     Quit date: 2022     Years since quittin.6    Smokeless tobacco: Never   Substance Use Topics    Alcohol use: Yes     Comment: Beer ~Q3 months.       History   Drug Use Unknown       Family History:   The patient's family history is notable for a first-degree paternal relative with prostate cancer, and a first-degree relative with breast cancer and melanoma, and a second-degree maternal relative with colon cancer. No known family history of ovarian, uterine, urothelial/renal, or pancreatic cancers.   Family History   Problem Relation Age of Onset    Prostate Cancer Father     Breast Cancer Sister 48    Melanoma Sister     Skin Cancer Sister     Colon Cancer Maternal Grandmother        Physical Exam:   /78 (BP Location: Right arm, Patient Position: Sitting, Cuff Size: Adult Regular)   Pulse 78   Temp 98.4  F (36.9  C) (Oral)   Resp 16   Wt 65.8 kg (145 lb)   SpO2 98%   BMI 26.52 kg/m    Body mass index is 26.52 kg/m .    General Appearance: healthy and alert, no distress     Musculoskeletal: extremities without edema    Skin: no lesions or rashes     Neurological: no gross defects     Psychiatric: Anxious and tearful.                       Gastrointestinal:        abdomen soft, non-tender, minimally distended, no organomegaly or masses      Laboratory Examination:  CA-125 trend (since second- therapy):  5/15/24   38  7/5/24   62  10/1/24   83  12/30/24 110  I have independently reviewed & interpreted the CA-125 trend.       Radiographic Examination:  1/10/25: Chest, abdomen, pelvic CT: Progressive disease. I have personally reviewed and interpreted the CT images.    LUNGS AND PLEURA: Stable mild emphysema. A 2 mm nodule in the medial left lower lobe is stable. 3 mm right middle lobe nodule also stable.   HEPATOBILIARY: Multiple liver surface positive increased in size and number, for example in segment 2 measuring 3.5 x 2.3 cm (previously 1.4 x 1 cm). Multiple new liver lesions have also developed, for example in segment 2 measuring   1 cm.   LYMPH NODES: Enlarged mesenteric and right iliac chain lymph nodes have increased in size, for example measuring 2 x 1.7 cm in the mid abdomen slightly to the right of midline (previously 1.6 x 1.2 cm). Loculated ascites has increased in volume. Complex   fluid collection in the space of Retzius also increased in size measuring 5.3 x 3.4 cm (previously 4 x 2.6 cm).      Performance Status:   ECOG Grade 0.       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    History of malignant partial small bowel obstruction 2023, resolved with bowel rest.       Plan:     1.)    Ovarian cancer: I reviewed the CT results with Michelle and provided her with a copy of the CT report (patient declined to take it); she was also previously provided with a copy of the CT report via [employer]. Given the progressive disease, recommend discontinuing olaparib and proceeding with third-line therapy; at [facility] we currently only have phase I trial  options available, and given that there are still SOC options available, I would not recommend a phase I trial at this time; option for best supportive care without cancer-directed therapy. Discussed the palliative nature of all options.     After discussion of risks/benefits of all options, Michelle would like to proceed with third-line therapy with IV carboplatin AUC 5 + paclitaxel 135 mg/m2 + bevacizumab 15 mg/kg every 3 weeks with carboplatin/paclitaxel dose-reduction due to previous cytotoxic therapy and concern for myelosuppression.    Plan as follows:  -Initiate third-line therapy in 1-2 weeks.  -Plan for 3 cycles followed by repeat chest, abdomen, pelvic CT to assess disease response (NP for pre-infusion visits, virtual okay; MD for imaging review, preferrably in-person).     Side-effects:   -Bloating: Likely due to ascites, but not enough for therapeutic paracentesis. Will monitor, consider therapeutic paracentesis prn.   --Chemotherapy/bevacizumab counseling & written information provided by CHIKA Mcclain.   --Counseled re: risk of carboplatin hypersensitivity reaction with increasing numbers of cycles of carboplatin, and desensitization strategies.     2.)  Genetic risk factors were assessed: s/p germline testing which was negative for identifiable germline pathogenic variants (see HPI).     3.) Labs and/or tests ordered include: Pre-therapy labs.      4. ) Health maintenance issues addressed today include continuing routine healthcare maintenance with her primary care provider.     5.) Code status:  Full-code.    6.) Prescriptions: None; standard prophylactic antiemetics to be prescribed at the time of infusion.       A total of 30 minutes was spent with the patient, 29 minutes of which were spent in counseling the patient and/or treatment planning; an additional 10 minutes was spent in chart review (including independent lab & radiographic review) & documentation.          Charis Patino MD, MS, FACOG,  FACS  1/24/2025  9:59 AM                  CC  Patient Care Team:  Domenica Christianson MD as PCP - General  Teoh, Charis Meadows MD as MD (Gynecologic Oncology)  Susannah Roman APRN CNP as Assigned Cancer Care Provider  Anish Monroy MD as Assigned Palliative Care Provider  Pratima Vega, RN as Specialty Care Coordinator (Hematology & Oncology)  Myhre, Grace C, [facility] as Pharmacist  SELF, REFERRED

## 2025-01-22 ENCOUNTER — PATIENT OUTREACH (OUTPATIENT)
Dept: ONCOLOGY | Facility: CLINIC | Age: 60
End: 2025-01-22
Payer: COMMERCIAL

## 2025-01-22 LAB — CANCER AG125 SERPL-ACNC: 291 U/ML

## 2025-01-24 ENCOUNTER — ONCOLOGY VISIT (OUTPATIENT)
Dept: ONCOLOGY | Facility: CLINIC | Age: 60
End: 2025-01-24
Attending: OBSTETRICS & GYNECOLOGY
Payer: COMMERCIAL

## 2025-01-24 VITALS
SYSTOLIC BLOOD PRESSURE: 135 MMHG | RESPIRATION RATE: 16 BRPM | BODY MASS INDEX: 26.52 KG/M2 | WEIGHT: 145 LBS | OXYGEN SATURATION: 98 % | DIASTOLIC BLOOD PRESSURE: 78 MMHG | HEART RATE: 78 BPM | TEMPERATURE: 98.4 F

## 2025-01-24 DIAGNOSIS — C56.1 OVARIAN CANCER, RIGHT (H): Primary | ICD-10-CM

## 2025-01-24 PROCEDURE — 99213 OFFICE O/P EST LOW 20 MIN: CPT | Performed by: OBSTETRICS & GYNECOLOGY

## 2025-01-24 PROCEDURE — 99215 OFFICE O/P EST HI 40 MIN: CPT | Performed by: OBSTETRICS & GYNECOLOGY

## 2025-01-24 RX ORDER — PALONOSETRON 0.05 MG/ML
0.25 INJECTION, SOLUTION INTRAVENOUS ONCE
OUTPATIENT
Start: 2025-02-06

## 2025-01-24 RX ORDER — DIPHENHYDRAMINE HYDROCHLORIDE 50 MG/ML
50 INJECTION INTRAMUSCULAR; INTRAVENOUS
Start: 2025-02-06

## 2025-01-24 RX ORDER — ALBUTEROL SULFATE 0.83 MG/ML
2.5 SOLUTION RESPIRATORY (INHALATION)
OUTPATIENT
Start: 2025-02-06

## 2025-01-24 RX ORDER — ALBUTEROL SULFATE 90 UG/1
1-2 INHALANT RESPIRATORY (INHALATION)
Start: 2025-02-06

## 2025-01-24 RX ORDER — HEPARIN SODIUM,PORCINE 10 UNIT/ML
5-20 VIAL (ML) INTRAVENOUS DAILY PRN
OUTPATIENT
Start: 2025-02-06

## 2025-01-24 RX ORDER — EPINEPHRINE 1 MG/ML
0.3 INJECTION, SOLUTION INTRAMUSCULAR; SUBCUTANEOUS EVERY 5 MIN PRN
OUTPATIENT
Start: 2025-02-06

## 2025-01-24 RX ORDER — DIPHENHYDRAMINE HYDROCHLORIDE 50 MG/ML
25 INJECTION INTRAMUSCULAR; INTRAVENOUS
Start: 2025-02-06

## 2025-01-24 RX ORDER — METHYLPREDNISOLONE SODIUM SUCCINATE 40 MG/ML
40 INJECTION INTRAMUSCULAR; INTRAVENOUS
Start: 2025-02-06

## 2025-01-24 RX ORDER — MEPERIDINE HYDROCHLORIDE 25 MG/ML
25 INJECTION INTRAMUSCULAR; INTRAVENOUS; SUBCUTANEOUS
OUTPATIENT
Start: 2025-02-06

## 2025-01-24 RX ORDER — HEPARIN SODIUM (PORCINE) LOCK FLUSH IV SOLN 100 UNIT/ML 100 UNIT/ML
5 SOLUTION INTRAVENOUS
OUTPATIENT
Start: 2025-02-06

## 2025-01-24 RX ORDER — LORAZEPAM 2 MG/ML
0.5 INJECTION INTRAMUSCULAR EVERY 4 HOURS PRN
OUTPATIENT
Start: 2025-02-06

## 2025-01-24 RX ORDER — DIPHENHYDRAMINE HCL 25 MG
50 CAPSULE ORAL ONCE
Start: 2025-02-06

## 2025-01-24 ASSESSMENT — PAIN SCALES - GENERAL: PAINLEVEL_OUTOF10: MODERATE PAIN (5)

## 2025-01-24 NOTE — NURSING NOTE
Chemotherapy education was completed today with patient in person/via phone.    Chemotherapy handouts given and packet were given to patient/ mailed to home address.    Chemotherapy education visit with NP will be arranged.    Chemotherapy Regimen, schedule and follow up appointment plan:    Test and Procedures needed prior to chemotherapy beginning:    General Chemotherapy information:  Names of Medications to be given  Possible side effects and how to manage side effects  Reinforced  the importance of infection prevention  Reviewed signs and symptoms that should be reported to your doctor/clinic/on call doctor            Temperature greater that 100.4, shortness of breath, chest pain,unusual bruising or bleeding,               extreme fatigue, diarrhea > 4-6 in 24 hours uncontrolled nausea/vomiting, dehydration, and             allergic reactions    Reviewed port-a-cath implantation scheduling and care    Provided with phone numbers to contact care coordinator, clinic, triage, and on call doctors    No barriers to learning identified.  Patient verbalized understanding of all written and verbal information.  All questions answered.  Patient in agreement with plan.      Patient will be receiving a phone call from scheduling to make all future chemotherapy appointments.    Asked patient to call with any questions or concerns.    Sarahi Vega RN, BSN  Gyn Oncology   Care Coordinator  [facility]  [phone]

## 2025-01-24 NOTE — LETTER
"2025      Jacki Smith  [address] [facility] [address]      Dear Colleague,    Thank you for referring your patient, Jacki Smith, to the [facility]. Please see a copy of my visit note below.    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 Follow-up visit.       History of Present Illness:  Jacki \"Michelle\" IVY Smith is a 59 year old seen at the [facility] for management of progressive stage IIIC high-grade serous ovarian carcinoma. History as follows:    Ovarian Cancer History:  22: Presented to an ED in [place] for evaluation of abdominal bloating and discomfort.  -Abdomen, pelvic CT: Radiographic Stage CAL ovarian cancer. I have personally reviewed the CT images.   22: [id number].   22: Pelvic ultrasound: c/w metastatic cancer.  22: Pelvic exam under anesthesia, diagnostic laparoscopy, biopsies, evacuation of ascites.   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      22, 22, 22: Cycles 1-3 of neoadjuvant chemotherapy with IV carboplatin + paclitaxel 175 mg/m2 every 21 days.   -Cycles 1,2: Carboplatin AUC 6.   -Cycle 3: Carboplatin AUC 5 with dose-reduction due to thrombcytopenia.   -22: Chest, abdomen, pelvic CT: Partial response. I have personally reviewed the CT images.   22: Pelvic exam under anesthesia, diagnostic laparoscopy, conversion to laparotomy for optimal interval tumor debulking to no gross residual disease including peritoneal and diaphragm biopsies, bilateral salpingo-oophorectomy, " infragastric omentectomy, bilateral hemidiaphragm stripping, peritoneal and mesenteric argon beam ablation, appendectomy.   -Pathology: High-grade serous carcinoma involving all resected specimens.   9/28/22, 10/19/22, 11/17/22: Cycles 4-6 of first-line chemotherapy with IV carboplatin AUC 5 + paclitaxel 175 mg/m2.  -12/2/22: Chest, abdomen, pelvic CT: No definitive evidence of disease.   -CA-125 439>>23.   -2/16/23: Chest, abdomen, pelvic CT to evaluate bloating: Stable findings, no definitive evidence of disease.    -5/25/23: Admitted  to [facility] for management of malignant ascites s/p therapeutic paracentesis, and partial SBO resolved with conservative management.   5/25/23: Abdomen, pelvic CT: Progressive disease.     6/13/23, 7/12/23, 8/14/23, 9/13/23, 10/11/23, 11/8/23, 12/6/23, 1/3/24, 2/1/24, 2/29/24, 3/28/24: Cycles 1-11 of second-line chemotherapy with IV carboplatin AUC 5 + pegylated liposomal doxorubicin (PLD) 30 mg/m2 every 28 days.   -9/27/23: Chest, abdomen, pelvic CT: Partial response. Catheter-associated thrombus.   -1/23/24: Chest, abdomen, pelvic CT: Stable disease.   -4/10/24: Chest, abdomen, pelvic CT: Stable disease.   -CA-125 78>>44.   5/10/24-Present: Second- PARPi therapy with olaparib.   -Cycles 1-2: Olaparib 300 mg BID.   -Cycles 3+: Olaparib 200 mg BID with dose-reduction due to decreased creatinine clearance.   -7/5/24: Chest, abdomen, pelvic CT: Stable disease.   -10/15/24: Chest, abdomen, pelvic CT: Stable disease.   -CA-125 38>>110.      Genetic/Genomic/Molecular Testing History:  10/5/22: [facility], reflexed to Common Hereditary Cancer Panel.   -No identifiable germline variants identified in ABRAXAS1, AKT1, APC, DEION, AXIN2, BARD1, BMPR1A, BRCA1, BRCA2, BRIP1, CDC73, CDH1, CDK4, CDKN2A, CHEK2, CTNNA1, DICER1, EPCAM, FANCC, FANCM, GREM1, HOXB13, KIT, MEN1, MLH1, MRE11, MSH2, MSH6, MUTYH, NBN, NF1, NTHL1, PALB2, PDGFRA, PIK3CA, PMS2, POLD1, POLE,  "PTEN, RAD50, RAD51C, RAD51D, RECQL, RINT1, SDHA, SDHB, SDHC, SDHD, SMAD4, SMARCA4, STK11, TP53, TSC1, TSC2, VHL, XRCC2.   -Variant of uncertain significance in MSH3 c.16C>T ([id number])    10/9/22 (tumor 8/29/22): [facility] Testing Results.  -Genomic ROXI low (6%)   -TMB low (1mut/Mb)  -Microsatellite stable/Mismatch Repair proficient  -PD-L1- (CPS 0%)  -NTRK 1/2/3 fusion not detected.   -ER-, RI+  -Genomic alterations in:  --TP53  -No genomic alterations in BRCA1,2.    Jamila-ovary clinical trial screening: Negative for the immunoreactive subtype.     6/19/23 (tumor 8/29/22): [facility] Test results.   -FOLR1+ (2+, 75%).       Subjective:  Michelle returns to the gyn onc clinic today for review of CT results and discussion of ongoing management, accompanied by her  and daughter.   Michelle reports feeling full. She had imaging which shows ascites, but not enough for paracentesis.  Michelle has reported a weird taste when drinking; she recently saw the dentist and after that had a \"rotten\" taste. She saw the dentist again, who reported there was no abnormality to cause this.   Normal bowel movements with miralax 2x/week.  Michelle reports an intermittent \"stabbing\" pain in the lower abdomen, a few times/week. This is short-lived, does not require any medications.   Michelle reports that she has been trying to eat well, but her  reports that due to anxiety she has not been eating much and is taking medications on an empty stomach.   Michelle reports decreased stamina. She does continue to work, does housework.       Past Medical History:  Past Medical History:   Diagnosis Date     Female stress incontinence      Ovarian cancer, right (H) 06/16/2022    Stage IIIC high-grade serous carcinoma     Recurrent cold sores        Past Surgical History:  Past Surgical History:   Procedure Laterality Date     APPENDECTOMY  08/29/2022    Part of ovarian cancer tumor debulking     BLADDER SUSPENSION  08/13/2009     HYSTERECTOMY  08/13/2009 "    For prolapse     IR PARACENTESIS  2022     IR PARACENTESIS  2022     IR PARACENTESIS  2023     LAPAROSCOPY DIAGNOSTIC (GYN)  2022     SALPINGO-OOPHORECTOMY, COMBINED Bilateral 2022    Pelvic exam under anesthesia, diagnostic laparoscopy, conversion to laparotomy for optimal interval tumor debulking to no gross residual disease including peritoneal and diaphragm biopsies, bilateral salpingo-oophorectomy, infragastric omentectomy, bilateral hemidiaphragm stripping, peritoneal and mesenteric argon beam ablation, appendectomy.     TONSILLECTOMY  1973     TUBAL LIGATION Bilateral 1998       Gynecologic History:  Surgical menopause. Hysterectomy in  for prolapse.  Was on HRT with estrogen for less than 2 years  No history of abnormal cervical cancer screening.  No history of STIs.  Sexually active, no dyspareunia, no postcoital bleeding.      Obstetric History:  OB History    Para Term  AB Living   3 3 3 0 0 3   SAB IAB Ectopic Multiple Live Births   0 0 0 0 3      # Outcome Date GA Lbr Sukumar/2nd Weight Sex Type Anes PTL Lv   3 Term      Vag-Spont      2 Term      Vag-Spont      1 Term      Vag-Spont           Current Medications:   Current Outpatient Medications   Medication Sig Dispense Refill     cyclobenzaprine (FLEXERIL) 5 MG tablet Take 1-2 Tablets (5-10 mg) by mouth three times a day.       diphenhydrAMINE-acetaminophen (TYLENOL PM)  MG tablet Take 1 tablet by mouth as needed       docusate sodium (DSS) 100 MG capsule Take 100 mg by mouth as needed       escitalopram (LEXAPRO) 10 MG tablet Take 1 Tablet (10 mg) by mouth daily.*       HYDROcodone-acetaminophen (NORCO) 5-325 MG tablet Take 1 tablet by mouth every 4 hours as needed       hydrOXYzine HCl (ATARAX) 10 MG tablet Take 1-2 tablets (10-20 mg) by mouth every 6 hours as needed for itching 120 tablet 3     lidocaine-prilocaine (EMLA) 2.5-2.5 % external cream Apply topically as needed for moderate pain 30  g 1     Multiple Vitamin (MULTI-VITAMIN DAILY PO) Take 1 tablet by mouth daily       olaparib (LYNPARZA) 100 MG tablet Take 2 tablets (200 mg) by mouth 2 times daily 120 tablet 0     omeprazole (PRILOSEC) 20 MG DR capsule Take 20 mg by mouth       ondansetron (ZOFRAN) 8 MG tablet Take 1 tablet (8 mg) by mouth every 8 hours as needed for nausea 30 tablet 2     sennosides (SENOKOT) 8.6 MG tablet        valACYclovir (VALTREX) 1000 mg tablet Take 1,000 mg by mouth as needed       No current facility-administered medications for this visit.        Allergies:   No Known Allergies        Social History:  Patient lives with Eligio and two daughters. Works as a . She does not have Advanced Directives, but has identified Eligio Smith as her desired Healthcare Power of .  Social History     Tobacco Use     Smoking status: Former     Current packs/day: 0.00     Average packs/day: 1 pack/day for 42.0 years (42.0 ttl pk-yrs)     Types: Cigarettes     Start date: 1980     Quit date: 2022     Years since quittin.6     Smokeless tobacco: Never   Substance Use Topics     Alcohol use: Yes     Comment: Beer ~Q3 months.       History   Drug Use Unknown       Family History:   The patient's family history is notable for a first-degree paternal relative with prostate cancer, and a first-degree relative with breast cancer and melanoma, and a second-degree maternal relative with colon cancer. No known family history of ovarian, uterine, urothelial/renal, or pancreatic cancers.   Family History   Problem Relation Age of Onset     Prostate Cancer Father      Breast Cancer Sister 48     Melanoma Sister      Skin Cancer Sister      Colon Cancer Maternal Grandmother        Physical Exam:   /78 (BP Location: Right arm, Patient Position: Sitting, Cuff Size: Adult Regular)   Pulse 78   Temp 98.4  F (36.9  C) (Oral)   Resp 16   Wt 65.8 kg (145 lb)   SpO2 98%   BMI 26.52 kg/m    Body mass index is  26.52 kg/m .    General Appearance: healthy and alert, no distress     Musculoskeletal: extremities without edema    Skin: no lesions or rashes     Neurological: no gross defects     Psychiatric: Anxious and tearful.                       Gastrointestinal:       abdomen soft, non-tender, minimally distended, no organomegaly or masses      Laboratory Examination:  CA-125 trend (since second- therapy):  5/15/24   38  7/5/24   62  10/1/24   83  12/30/24 110  I have independently reviewed & interpreted the CA-125 trend.       Radiographic Examination:  1/10/25: Chest, abdomen, pelvic CT: Progressive disease. I have personally reviewed and interpreted the CT images.    LUNGS AND PLEURA: Stable mild emphysema. A 2 mm nodule in the medial left lower lobe is stable. 3 mm right middle lobe nodule also stable.   HEPATOBILIARY: Multiple liver surface positive increased in size and number, for example in segment 2 measuring 3.5 x 2.3 cm (previously 1.4 x 1 cm). Multiple new liver lesions have also developed, for example in segment 2 measuring   1 cm.   LYMPH NODES: Enlarged mesenteric and right iliac chain lymph nodes have increased in size, for example measuring 2 x 1.7 cm in the mid abdomen slightly to the right of midline (previously 1.6 x 1.2 cm). Loculated ascites has increased in volume. Complex   fluid collection in the space of Retzius also increased in size measuring 5.3 x 3.4 cm (previously 4 x 2.6 cm).      Performance Status:   ECOG Grade 0.       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    History of malignant partial small bowel obstruction 2023, resolved with bowel rest.       Plan:     1.)    Ovarian cancer: I reviewed the CT results with Michelle and provided her with a copy of the CT report  (patient declined to take it); she was also previously provided with a copy of the CT report via [employer]. Given the progressive disease, recommend discontinuing olaparib and proceeding with third-line therapy; at [facility] we currently only have phase I trial options available, and given that there are still SOC options available, I would not recommend a phase I trial at this time; option for best supportive care without cancer-directed therapy. Discussed the palliative nature of all options.     After discussion of risks/benefits of all options, Michelle would like to proceed with third-line therapy with IV carboplatin AUC 5 + paclitaxel 135 mg/m2 + bevacizumab 15 mg/kg every 3 weeks with carboplatin/paclitaxel dose-reduction due to previous cytotoxic therapy and concern for myelosuppression.    Plan as follows:  -Initiate third-line therapy in 1-2 weeks.  -Plan for 3 cycles followed by repeat chest, abdomen, pelvic CT to assess disease response (NP for pre-infusion visits, virtual okay; MD for imaging review, preferrably in-person).     Side-effects:   -Bloating: Likely due to ascites, but not enough for therapeutic paracentesis. Will monitor, consider therapeutic paracentesis prn.   --Chemotherapy/bevacizumab counseling & written information provided by CHIKA Mcclain.   --Counseled re: risk of carboplatin hypersensitivity reaction with increasing numbers of cycles of carboplatin, and desensitization strategies.     2.)  Genetic risk factors were assessed: s/p germline testing which was negative for identifiable germline pathogenic variants (see HPI).     3.) Labs and/or tests ordered include: Pre-therapy labs.      4. ) Health maintenance issues addressed today include continuing routine healthcare maintenance with her primary care provider.     5.) Code status:  Full-code.    6.) Prescriptions: None; standard prophylactic antiemetics to be prescribed at the time of infusion.       A total of 30 minutes was spent with the  patient, 29 minutes of which were spent in counseling the patient and/or treatment planning; an additional 10 minutes was spent in chart review (including independent lab & radiographic review) & documentation.          Charis Patino MD, MS, FACOG, FACS  1/24/2025  9:59 AM                  CC  Patient Care Team:  Domenica Christianson MD as PCP - General  Carey, Charis Meadows MD as MD (Gynecologic Oncology)  Susannah Roman APRN CNP as Assigned Cancer Care Provider  Anish Monroy MD as Assigned Palliative Care Provider  [facility] as Specialty Care Coordinator (Hematology & Oncology)  Myhre, Grace C, [facility] as Pharmacist  SELF, REFERRED    Again, thank you for allowing me to participate in the care of your patient.        Sincerely,        Charis Patino MD    Electronically signed

## 2025-01-24 NOTE — NURSING NOTE
"Oncology Rooming Note    January 24, 2025 8:49 AM   Jacki Smith is a 59 year old female who presents for:    Chief Complaint   Patient presents with    Oncology Clinic Visit     Ovarian cancer     Initial Vitals: /78 (BP Location: Right arm, Patient Position: Sitting, Cuff Size: Adult Regular)   Pulse 78   Temp 98.4  F (36.9  C) (Oral)   Resp 16   Wt 65.8 kg (145 lb)   SpO2 98%   BMI 26.52 kg/m   Estimated body mass index is 26.52 kg/m  as calculated from the following:    Height as of 9/3/24: 1.575 m (5' 2\").    Weight as of this encounter: 65.8 kg (145 lb). Body surface area is 1.7 meters squared.  Moderate Pain (5) Comment: periodic stabbing pain   No LMP recorded. Patient has had a hysterectomy.  Allergies reviewed: Yes  Medications reviewed: Yes    Medications: MEDICATION REFILLS NEEDED TODAY. Provider was notified.  Pharmacy name entered into [facility]:    [id number] - [place], [place] - [address] [facility] [place] [place] - [address] [place] [address] [facility] [place], [place] - [address]    Frailty Screening:   Is the patient here for a new oncology consult visit in cancer care? 2. No      Clinical concerns: Hydroxyzine refills       Eliana Dasilva              "

## 2025-01-24 NOTE — NURSING NOTE
Check out note sent to scheduling  1/24/25  Chemo plans  carbo/taxol/avastin   Q 3  weeks 3 cycles  3 cycles then Thelma and MD appt  Ct scan ordered to be done prior to cycle 4

## 2025-01-28 NOTE — PROGRESS NOTES
Pt left message on voice mail  Got ca125 results and md note  Level is up 180 pointms  Wants to start process  of chemo asap  Wants to be proactive

## 2025-01-31 NOTE — PROGRESS NOTES
Virtual Visit Details    Type of service:  Video Visit     Originating Location (pt. 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  She presents today for follow up and disease management by video.     Oncology History:  22: Presented to an ED in [place] for evaluation of abdominal bloating and discomfort.  -Abdomen, pelvic CT: Radiographic Stage CAL ovarian cancer.   22: [id number].   22: Pelvic ultrasound: c/w metastatic cancer.  22: Pelvic exam under anesthesia, diagnostic laparoscopy, biopsies, evacuation of ascites.   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      22, 22, 22: Cycles 1-3 of neoadjuvant chemotherapy with IV carboplatin + paclitaxel 175 mg/m2 every 21 days.   -Cycles 1,2: Carboplatin AUC 6.   -Cycle 3: Carboplatin AUC 5 with dose-reduction due to thrombcytopenia.   -22: Chest, abdomen, pelvic CT: Partial response.   22: Pelvic exam under anesthesia, diagnostic laparoscopy, conversion to laparotomy for optimal interval tumor debulking to no gross residual disease including peritoneal and diaphragm biopsies, bilateral salpingo-oophorectomy, infragastric omentectomy, bilateral hemidiaphragm stripping, peritoneal and mesenteric argon beam ablation, appendectomy.   -Pathology: High-grade serous carcinoma involving all resected specimens.   [facility]is Testing Results.  -Genomic ROXI low (6%)   -TMB low (1mut/Mb)  -Microsatellite stable/Mismatch Repair proficient  -PD-L1- (CPS 0%)  -NTRK 1/2/3 fusion not  detected.   -ER-, WA+  -Genomic alterations in:  --TP53  -No genomic alterations in BRCA1,2.  -FOLR1+ (2+, 75%).     Jamila-ovary clinical trial screening: Negative for the immunoreactive subtype.     9/28/22, 10/19/22, 11/17/22: Cycles 4-6 of first-line chemotherapy with IV carboplatin AUC 5 + paclitaxel 175 mg/m2.  -12/2/22: Chest, abdomen, pelvic CT: No definitive evidence of disease.   -CA-125 439>>23.   -2/16/23: Chest, abdomen, pelvic CT to evaluate bloating: Stable findings, no definitive evidence of disease.    -5/25/23: Admitted  to [facility] for management of malignant ascites s/p therapeutic paracentesis, and partial SBO resolved with conservative management.   5/25/23: Abdomen, pelvic CT: Progressive disease.     10/5/22: [facility], reflexed to Common Hereditary Cancer Panel.   -No identifiable germline variants identified in ABRAXAS1, AKT1, APC, DEION, AXIN2, BARD1, BMPR1A, BRCA1, BRCA2, BRIP1, CDC73, CDH1, CDK4, CDKN2A, CHEK2, CTNNA1, DICER1, EPCAM, FANCC, FANCM, GREM1, HOXB13, KIT, MEN1, MLH1, MRE11, MSH2, MSH6, MUTYH, NBN, NF1, NTHL1, PALB2, PDGFRA, PIK3CA, PMS2, POLD1, POLE, PTEN, RAD50, RAD51C, RAD51D, RECQL, RINT1, SDHA, SDHB, SDHC, SDHD, SMAD4, SMARCA4, STK11, TP53, TSC1, TSC2, VHL, XRCC2.   -Variant of uncertain significance in MSH3 c.16C>T (p.Oar6Zal)     6/13/23, 7/12/23, 8/14/23, 9/13/23, 10/11/23, 11/8/23, 12/6/23, 1/3/24, 2/1/24, 2/29/24, 3/28/24: Cycles 1-11 of second-line chemotherapy with IV carboplatin AUC 5 + pegylated liposomal doxorubicin (PLD) 30 mg/m2 every 28 days.   -9/27/23: Chest, abdomen, pelvic CT: Partial response. Catheter-associated thrombus.   -1/23/24: Chest, abdomen, pelvic CT: Stable disease.   -4/10/24: Chest, abdomen, pelvic CT: Stable disease.   -CA-125 78>>44.   5/10/24-1/24/25: Second- PARPi therapy with olaparib.   -Cycles 1-2: Olaparib 300 mg BID.   -Cycles 3+: Olaparib 200 mg BID with dose-reduction due to decreased creatinine  "clearance.   -7/5/24: Chest, abdomen, pelvic CT: Stable disease.   -10/15/24: Chest, abdomen, pelvic CT: Stable disease.   -CA-125 38>>110.     Plan: Third-line therapy with IV carboplatin AUC 5 + paclitaxel 135 mg/m2 + bevacizumab 15 mg/kg every 3 weeks x 3 cycles followed by CT CAP and follow up with Dr. Patino. Carboplatin/paclitaxel dose-reduction due to previous cytotoxic therapy and concern for myelosuppression.    2/5/25: Cycle 1 carboplatin, paclitaxel, and bevacizumab.   pending.            Today she reports feeling ready to start chemo. She \"wishes it was today\".    She has been on carboplatin and paclitaxel previously and does not have questions on these drugs.  She thinks she has some zofran/compazine at home but would like a new prescription.  She has not used decadron with her other treatments due to insomnia.    She is currently have abdominal bloating and fullness.  Fullness makes it difficult to eat.  She is eating and drinking in small amounts.    She saw her PCP for bad taste in her mouth and was diagnosed with silent acid reflux, started on omeprazole.  Symptoms have started to improve her she has been able to tolerate more PO.    Some constipation, taking stool softener daily and Miralax as needed.  She drinks water but \"not enough\" due to fullness.      She has no leg swelling.  No fevers/chills.  No chest pain.  No SOB.  No history of DM.  No history of HTN.              Past Medical History:    Past Medical History:   Diagnosis Date    Female stress incontinence     Ovarian cancer, right (H) 06/16/2022    Stage IIIC high-grade serous carcinoma    Recurrent cold sores          Past Surgical History:    Past Surgical History:   Procedure Laterality Date    APPENDECTOMY  08/29/2022    Part of ovarian cancer tumor debulking    BLADDER SUSPENSION  08/13/2009    HYSTERECTOMY  08/13/2009    For prolapse    IR PARACENTESIS  6/6/2022    IR PARACENTESIS  6/14/2022    IR PARACENTESIS  5/26/2023    " LAPAROSCOPY DIAGNOSTIC (GYN)  06/16/2022    SALPINGO-OOPHORECTOMY, COMBINED Bilateral 08/29/2022    Pelvic exam under anesthesia, diagnostic laparoscopy, conversion to laparotomy for optimal interval tumor debulking to no gross residual disease including peritoneal and diaphragm biopsies, bilateral salpingo-oophorectomy, infragastric omentectomy, bilateral hemidiaphragm stripping, peritoneal and mesenteric argon beam ablation, appendectomy.    TONSILLECTOMY  1973    TUBAL LIGATION Bilateral 09/01/1998         Health Maintenance Due   Topic Date Due    ADVANCE CARE PLANNING  Never done    YEARLY PREVENTIVE VISIT  Never done    COLORECTAL CANCER SCREENING  Never done    HIV SCREENING  Never done    HEPATITIS C SCREENING  Never done    Pneumococcal Vaccine: 50+ Years (1 of 2 - PCV) Never done    ZOSTER IMMUNIZATION (1 of 2) Never done    HEPATITIS B IMMUNIZATION (1 of 3 - 19+ 3-dose series) Never done    PAP  Never done    LIPID  Never done    INFLUENZA VACCINE (1) 09/01/2024    COVID-19 Vaccine (5 - 2024-25 season) 09/01/2024    PHQ-2 (once per calendar year)  Never done       Current Medications:     Current Outpatient Medications   Medication Sig Dispense Refill    cyclobenzaprine (FLEXERIL) 5 MG tablet Take 1-2 Tablets (5-10 mg) by mouth three times a day.      diphenhydrAMINE-acetaminophen (TYLENOL PM)  MG tablet Take 1 tablet by mouth as needed      docusate sodium (DSS) 100 MG capsule Take 100 mg by mouth as needed      escitalopram (LEXAPRO) 10 MG tablet Take 1 Tablet (10 mg) by mouth daily.*      HYDROcodone-acetaminophen (NORCO) 5-325 MG tablet Take 1 tablet by mouth every 4 hours as needed      hydrOXYzine HCl (ATARAX) 10 MG tablet Take 1-2 tablets (10-20 mg) by mouth every 6 hours as needed for itching 120 tablet 3    lidocaine-prilocaine (EMLA) 2.5-2.5 % external cream Apply topically as needed for moderate pain 30 g 1    Multiple Vitamin (MULTI-VITAMIN DAILY PO) Take 1 tablet by mouth daily       "omeprazole (PRILOSEC) 20 MG DR capsule Take 20 mg by mouth      sennosides (SENOKOT) 8.6 MG tablet       valACYclovir (VALTREX) 1000 mg tablet Take 1,000 mg by mouth as needed           Allergies:      No Known Allergies     Social History:     Social History     Tobacco Use    Smoking status: Former     Current packs/day: 0.00     Average packs/day: 1 pack/day for 42.0 years (42.0 ttl pk-yrs)     Types: Cigarettes     Start date: 1980     Quit date: 2022     Years since quittin.7    Smokeless tobacco: Never   Substance Use Topics    Alcohol use: Yes     Comment: Beer ~Q3 months.       History   Drug Use Unknown         Family History:     The patient's family history is notable for:    Family History   Problem Relation Age of Onset    Prostate Cancer Father     Breast Cancer Sister 48    Melanoma Sister     Skin Cancer Sister     Colon Cancer Maternal Grandmother          Physical Exam:     Ht 1.575 m (5' 2\")   Wt 66.7 kg (147 lb)   BMI 26.89 kg/m    Body mass index is 26.89 kg/m .    General Appearance: alert, no distress    Eyes:  Eyes grossly normal.  No obvious discharge, erythema, or scleral/conjunctival abnormalities.    Respiratory: No noted audible wheeze, cough, or visible cyanosis.  No visible retractions or increased work of breathing.     Skin: no lesions or rashes on visible skin     Psychiatric: appropriate mood and affect. Mentation appears normal, affect normal/bright, judgement and insight intact, normal speech and appearance well-groomed                            The rest of a comprehensive physical examination is deferred due to video visit restrictions.       No results found for this or any previous visit (from the past 24 hours).       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  She presents today for follow up and disease management by video.     30 minutes spent on the date " of the encounter doing chart review, history and exam, documentation, and further activities as noted above.      Plan:     1.) Ovarian cancer:  Reviewed patient s diagnosis and treatment plan (including carboplatin, paclitaxel, and bevacizumab) as recommended by Dr. Patino. The goal of treatment is palliative intent.  OK to proceed with cycle 1 of treatment Wed if labs are WDL.  RTC in 3 weeks for labs, provider visit, and next cycle.  Plan for a CT and follow up with Dr. Patino after cycle 3.  Currently scheduled appointments available via My Chart.  Decadron removed from treatment plan.  Reviewed signs and symptoms for when she should contact the clinic or seek additional care; and contact information of the Gynecologic Oncology Clinic.  Patient to contact the clinic with any questions or concerns in the interim.        Discussed that the administration of chemotherapy and immunotherapy can carry certain risks both common and uncommon, including failure to obtain the desired result, discomforts, injury, and the need for additional therapy. Reviewed possible side effects of these drugs including: Allergic reaction; Pancytopenia including Anemia causing weakness/fatigue, Low WBC causing infection, Low platelets causing bruising/bleeding; Mouth sores; Fatigue; Brief periods of forgetfulness; Nausea and vomiting; Neuropathy; Diarrhea/Constipation; Hair loss; Skin and nail changes; Liver effects; Heart effects; Kidney/Bladder effects; Lung effects; Loss of appetite; Weight gain or loss; Visual/Auditory changes; Sexual effects; Mood effects; Menopausal symptoms; Reproductive/Fertility Effects; Thyroid Effects; GI Effects; Other. Discussed that the patient may have side effects from chemotherapy that have not been discussed today because each patient may respond differently to chemotherapy and could have side effects that have not been previously reported by others. The patient was given the opportunity to ask questions,  and their questions were answered. The patient has consented to the treatment ordered by Dr. Patino.    Discussed ways to manage certain side effects at home and when to call the clinic or go to the emergency department.     Patient was provided by [facility] with a copy of [facility] drug information regarding (carboplatin, paclitaxel, bevacizumab drugs).     Discussed nutrition and the importance of eating small frequent meal, high protein, and drinking 8-10 glasses of noncaffeinated and nonalcoholic beverages.         Genetic risk factors were assessed and she has a VUS No identifiable germline variants identified in ABRAXAS1, AKT1, APC, DEION, AXIN2, BARD1, BMPR1A, BRCA1, BRCA2, BRIP1, CDC73, CDH1, CDK4, CDKN2A, CHEK2, CTNNA1, DICER1, EPCAM, FANCC, FANCM, GREM1, HOXB13, KIT, MEN1, MLH1, MRE11, MSH2, MSH6, MUTYH, NBN, NF1, NTHL1, PALB2, PDGFRA, PIK3CA, PMS2, POLD1, POLE, PTEN, RAD50, RAD51C, RAD51D, RECQL, RINT1, SDHA, SDHB, SDHC, SDHD, SMAD4, SMARCA4, STK11, TP53, TSC1, TSC2, VHL, XRCC2.    Labs and/or tests reviewed include:  CBC. CMP. Mag. .     2.) Health maintenance:  Issues addressed today include following up with PCP for annual health maintenance and non-gynecologic issues.     3.) Malignant ascites: Bloating and fullness likely due to ascites; loculated per CT results.  Not enough for drainage per recent abd US results.  Per patient symptoms are persisting but not worsening.  Encouraged her to let the clinic know if symptoms are worsening for consideration of another US to assess for drainable ascites.    Echo Sanz, DNP, APRN, FNP-C, AOCNP  Oncology Nurse Practitioner  Division of Gynecologic Oncology  Pager: [phone]     CC  Patient Care Team:  Domenica Christianson MD as PCP - General  VinnieohCharis MD as MD (Gynecologic Oncology)  Susannah Roman, JALEN CNP as Assigned Cancer Care Provider  Anish Monroy MD as Assigned Palliative Care Provider  Pratima Vega RN as Specialty  Care Coordinator (Hematology & Oncology)  Myhre, Grace C, [facility] as Pharmacist  SELF, REFERRED

## 2025-02-03 ENCOUNTER — VIRTUAL VISIT (OUTPATIENT)
Dept: ONCOLOGY | Facility: CLINIC | Age: 60
End: 2025-02-03
Attending: NURSE PRACTITIONER
Payer: COMMERCIAL

## 2025-02-03 VITALS — WEIGHT: 147 LBS | HEIGHT: 62 IN | BODY MASS INDEX: 27.05 KG/M2

## 2025-02-03 DIAGNOSIS — C56.1 OVARIAN CANCER, RIGHT (H): Primary | ICD-10-CM

## 2025-02-03 DIAGNOSIS — Z51.11 ENCOUNTER FOR ANTINEOPLASTIC CHEMOTHERAPY: ICD-10-CM

## 2025-02-03 PROCEDURE — 98006 SYNCH AUDIO-VIDEO EST MOD 30: CPT | Performed by: NURSE PRACTITIONER

## 2025-02-03 ASSESSMENT — PAIN SCALES - GENERAL: PAINLEVEL_OUTOF10: NO PAIN (0)

## 2025-02-03 NOTE — LETTER
2/3/2025      Jacki Smith  [address] [facility] [address]      Dear Colleague,    Thank you for referring your patient, Jacki Smith, to the [facility]. Please see a copy of my visit note below.    Virtual Visit Details    Type of service:  Video Visit     Originating Location (pt. 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  She presents today for follow up and disease management by video.     Oncology History:  22: Presented to an ED in [place] for evaluation of abdominal bloating and discomfort.  -Abdomen, pelvic CT: Radiographic Stage CAL ovarian cancer.   22: [id number].   22: Pelvic ultrasound: c/w metastatic cancer.  22: Pelvic exam under anesthesia, diagnostic laparoscopy, biopsies, evacuation of ascites.   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      22, 22, 22: Cycles 1-3 of neoadjuvant chemotherapy with IV carboplatin + paclitaxel 175 mg/m2 every 21 days.   -Cycles 1,2: Carboplatin AUC 6.   -Cycle 3: Carboplatin AUC 5 with dose-reduction due to thrombcytopenia.   -22: Chest, abdomen, pelvic CT: Partial response.   22: Pelvic exam under anesthesia, diagnostic laparoscopy, conversion to laparotomy for optimal interval tumor debulking to no gross residual disease including peritoneal and diaphragm biopsies, bilateral salpingo-oophorectomy, infragastric omentectomy, bilateral hemidiaphragm stripping, peritoneal and mesenteric argon beam ablation,  appendectomy.   -Pathology: High-grade serous carcinoma involving all resected specimens.   [facility] Testing Results.  -Genomic ROXI low (6%)   -TMB low (1mut/Mb)  -Microsatellite stable/Mismatch Repair proficient  -PD-L1- (CPS 0%)  -NTRK 1/2/3 fusion not detected.   -ER-, ND+  -Genomic alterations in:  --TP53  -No genomic alterations in BRCA1,2.  -FOLR1+ (2+, 75%).     Jamila-ovary clinical trial screening: Negative for the immunoreactive subtype.     9/28/22, 10/19/22, 11/17/22: Cycles 4-6 of first-line chemotherapy with IV carboplatin AUC 5 + paclitaxel 175 mg/m2.  -12/2/22: Chest, abdomen, pelvic CT: No definitive evidence of disease.   -CA-125 439>>23.   -2/16/23: Chest, abdomen, pelvic CT to evaluate bloating: Stable findings, no definitive evidence of disease.    -5/25/23: Admitted  to [facility] for management of malignant ascites s/p therapeutic paracentesis, and partial SBO resolved with conservative management.   5/25/23: Abdomen, pelvic CT: Progressive disease.     10/5/22: [facility], reflexed to Common Hereditary Cancer Panel.   -No identifiable germline variants identified in ABRAXAS1, AKT1, APC, DEION, AXIN2, BARD1, BMPR1A, BRCA1, BRCA2, BRIP1, CDC73, CDH1, CDK4, CDKN2A, CHEK2, CTNNA1, DICER1, EPCAM, FANCC, FANCM, GREM1, HOXB13, KIT, MEN1, MLH1, MRE11, MSH2, MSH6, MUTYH, NBN, NF1, NTHL1, PALB2, PDGFRA, PIK3CA, PMS2, POLD1, POLE, PTEN, RAD50, RAD51C, RAD51D, RECQL, RINT1, SDHA, SDHB, SDHC, SDHD, SMAD4, SMARCA4, STK11, TP53, TSC1, TSC2, VHL, XRCC2.   -Variant of uncertain significance in MSH3 c.16C>T (p.Ydg9Qsv)     6/13/23, 7/12/23, 8/14/23, 9/13/23, 10/11/23, 11/8/23, 12/6/23, 1/3/24, 2/1/24, 2/29/24, 3/28/24: Cycles 1-11 of second-line chemotherapy with IV carboplatin AUC 5 + pegylated liposomal doxorubicin (PLD) 30 mg/m2 every 28 days.   -9/27/23: Chest, abdomen, pelvic CT: Partial response. Catheter-associated thrombus.   -1/23/24: Chest, abdomen, pelvic CT: Stable disease.   -4/10/24:  "Chest, abdomen, pelvic CT: Stable disease.   -CA-125 78>>44.   5/10/24-1/24/25: Second- PARPi therapy with olaparib.   -Cycles 1-2: Olaparib 300 mg BID.   -Cycles 3+: Olaparib 200 mg BID with dose-reduction due to decreased creatinine clearance.   -7/5/24: Chest, abdomen, pelvic CT: Stable disease.   -10/15/24: Chest, abdomen, pelvic CT: Stable disease.   -CA-125 38>>110.     Plan: Third-line therapy with IV carboplatin AUC 5 + paclitaxel 135 mg/m2 + bevacizumab 15 mg/kg every 3 weeks x 3 cycles followed by CT CAP and follow up with Dr. Patino. Carboplatin/paclitaxel dose-reduction due to previous cytotoxic therapy and concern for myelosuppression.    2/5/25: Cycle 1 carboplatin, paclitaxel, and bevacizumab.   pending.            Today she reports feeling ready to start chemo. She \"wishes it was today\".    She has been on carboplatin and paclitaxel previously and does not have questions on these drugs.  She thinks she has some zofran/compazine at home but would like a new prescription.  She has not used decadron with her other treatments due to insomnia.    She is currently have abdominal bloating and fullness.  Fullness makes it difficult to eat.  She is eating and drinking in small amounts.    She saw her PCP for bad taste in her mouth and was diagnosed with silent acid reflux, started on omeprazole.  Symptoms have started to improve her she has been able to tolerate more PO.    Some constipation, taking stool softener daily and Miralax as needed.  She drinks water but \"not enough\" due to fullness.      She has no leg swelling.  No fevers/chills.  No chest pain.  No SOB.  No history of DM.  No history of HTN.              Past Medical History:    Past Medical History:   Diagnosis Date     Female stress incontinence      Ovarian cancer, right (H) 06/16/2022    Stage IIIC high-grade serous carcinoma     Recurrent cold sores          Past Surgical History:    Past Surgical History:   Procedure " Laterality Date     APPENDECTOMY  08/29/2022    Part of ovarian cancer tumor debulking     BLADDER SUSPENSION  08/13/2009     HYSTERECTOMY  08/13/2009    For prolapse     IR PARACENTESIS  6/6/2022     IR PARACENTESIS  6/14/2022     IR PARACENTESIS  5/26/2023     LAPAROSCOPY DIAGNOSTIC (GYN)  06/16/2022     SALPINGO-OOPHORECTOMY, COMBINED Bilateral 08/29/2022    Pelvic exam under anesthesia, diagnostic laparoscopy, conversion to laparotomy for optimal interval tumor debulking to no gross residual disease including peritoneal and diaphragm biopsies, bilateral salpingo-oophorectomy, infragastric omentectomy, bilateral hemidiaphragm stripping, peritoneal and mesenteric argon beam ablation, appendectomy.     TONSILLECTOMY  1973     TUBAL LIGATION Bilateral 09/01/1998         Health Maintenance Due   Topic Date Due     ADVANCE CARE PLANNING  Never done     YEARLY PREVENTIVE VISIT  Never done     COLORECTAL CANCER SCREENING  Never done     HIV SCREENING  Never done     HEPATITIS C SCREENING  Never done     Pneumococcal Vaccine: 50+ Years (1 of 2 - PCV) Never done     ZOSTER IMMUNIZATION (1 of 2) Never done     HEPATITIS B IMMUNIZATION (1 of 3 - 19+ 3-dose series) Never done     PAP  Never done     LIPID  Never done     INFLUENZA VACCINE (1) 09/01/2024     COVID-19 Vaccine (5 - 2024-25 season) 09/01/2024     PHQ-2 (once per calendar year)  Never done       Current Medications:     Current Outpatient Medications   Medication Sig Dispense Refill     cyclobenzaprine (FLEXERIL) 5 MG tablet Take 1-2 Tablets (5-10 mg) by mouth three times a day.       diphenhydrAMINE-acetaminophen (TYLENOL PM)  MG tablet Take 1 tablet by mouth as needed       docusate sodium (DSS) 100 MG capsule Take 100 mg by mouth as needed       escitalopram (LEXAPRO) 10 MG tablet Take 1 Tablet (10 mg) by mouth daily.*       HYDROcodone-acetaminophen (NORCO) 5-325 MG tablet Take 1 tablet by mouth every 4 hours as needed       hydrOXYzine HCl (ATARAX) 10  "MG tablet Take 1-2 tablets (10-20 mg) by mouth every 6 hours as needed for itching 120 tablet 3     lidocaine-prilocaine (EMLA) 2.5-2.5 % external cream Apply topically as needed for moderate pain 30 g 1     Multiple Vitamin (MULTI-VITAMIN DAILY PO) Take 1 tablet by mouth daily       omeprazole (PRILOSEC) 20 MG DR capsule Take 20 mg by mouth       sennosides (SENOKOT) 8.6 MG tablet        valACYclovir (VALTREX) 1000 mg tablet Take 1,000 mg by mouth as needed           Allergies:      No Known Allergies     Social History:     Social History     Tobacco Use     Smoking status: Former     Current packs/day: 0.00     Average packs/day: 1 pack/day for 42.0 years (42.0 ttl pk-yrs)     Types: Cigarettes     Start date: 1980     Quit date: 2022     Years since quittin.7     Smokeless tobacco: Never   Substance Use Topics     Alcohol use: Yes     Comment: Beer ~Q3 months.       History   Drug Use Unknown         Family History:     The patient's family history is notable for:    Family History   Problem Relation Age of Onset     Prostate Cancer Father      Breast Cancer Sister 48     Melanoma Sister      Skin Cancer Sister      Colon Cancer Maternal Grandmother          Physical Exam:     Ht 1.575 m (5' 2\")   Wt 66.7 kg (147 lb)   BMI 26.89 kg/m    Body mass index is 26.89 kg/m .    General Appearance: alert, no distress    Eyes:  Eyes grossly normal.  No obvious discharge, erythema, or scleral/conjunctival abnormalities.    Respiratory: No noted audible wheeze, cough, or visible cyanosis.  No visible retractions or increased work of breathing.     Skin: no lesions or rashes on visible skin     Psychiatric: appropriate mood and affect. Mentation appears normal, affect normal/bright, judgement and insight intact, normal speech and appearance well-groomed                            The rest of a comprehensive physical examination is deferred due to video visit restrictions.       No results found for this or " any previous visit (from the past 24 hours).       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  She presents today for follow up and disease management by video.     30 minutes spent on the date of the encounter doing chart review, history and exam, documentation, and further activities as noted above.      Plan:     1.) Ovarian cancer:  Reviewed patient s diagnosis and treatment plan (including carboplatin, paclitaxel, and bevacizumab) as recommended by Dr. Patino. The goal of treatment is palliative intent.  OK to proceed with cycle 1 of treatment Wed if labs are WDL.  RTC in 3 weeks for labs, provider visit, and next cycle.  Plan for a CT and follow up with Dr. Patino after cycle 3.  Currently scheduled appointments available via My Chart.  Decadron removed from treatment plan.  Reviewed signs and symptoms for when she should contact the clinic or seek additional care; and contact information of the Gynecologic Oncology Clinic.  Patient to contact the clinic with any questions or concerns in the interim.        Discussed that the administration of chemotherapy and immunotherapy can carry certain risks both common and uncommon, including failure to obtain the desired result, discomforts, injury, and the need for additional therapy. Reviewed possible side effects of these drugs including: Allergic reaction; Pancytopenia including Anemia causing weakness/fatigue, Low WBC causing infection, Low platelets causing bruising/bleeding; Mouth sores; Fatigue; Brief periods of forgetfulness; Nausea and vomiting; Neuropathy; Diarrhea/Constipation; Hair loss; Skin and nail changes; Liver effects; Heart effects; Kidney/Bladder effects; Lung effects; Loss of appetite; Weight gain or loss; Visual/Auditory changes; Sexual effects; Mood effects; Menopausal symptoms; Reproductive/Fertility Effects; Thyroid Effects; GI Effects; Other.  Discussed that the patient may have side effects from chemotherapy that have not been discussed today because each patient may respond differently to chemotherapy and could have side effects that have not been previously reported by others. The patient was given the opportunity to ask questions, and their questions were answered. The patient has consented to the treatment ordered by Dr. Patino.    Discussed ways to manage certain side effects at home and when to call the clinic or go to the emergency department.     Patient was provided by RNCC with a copy of [facility] drug information regarding (carboplatin, paclitaxel, bevacizumab drugs).     Discussed nutrition and the importance of eating small frequent meal, high protein, and drinking 8-10 glasses of noncaffeinated and nonalcoholic beverages.         Genetic risk factors were assessed and she has a VUS No identifiable germline variants identified in ABRAXAS1, AKT1, APC, DEION, AXIN2, BARD1, BMPR1A, BRCA1, BRCA2, BRIP1, CDC73, CDH1, CDK4, CDKN2A, CHEK2, CTNNA1, DICER1, EPCAM, FANCC, FANCM, GREM1, HOXB13, KIT, MEN1, MLH1, MRE11, MSH2, MSH6, MUTYH, NBN, NF1, NTHL1, PALB2, PDGFRA, PIK3CA, PMS2, POLD1, POLE, PTEN, RAD50, RAD51C, RAD51D, RECQL, RINT1, SDHA, SDHB, SDHC, SDHD, SMAD4, SMARCA4, STK11, TP53, TSC1, TSC2, VHL, XRCC2.    Labs and/or tests reviewed include:  CBC. CMP. Mag. .     2.) Health maintenance:  Issues addressed today include following up with PCP for annual health maintenance and non-gynecologic issues.     3.) Malignant ascites: Bloating and fullness likely due to ascites; loculated per CT results.  Not enough for drainage per recent abd US results.  Per patient symptoms are persisting but not worsening.  Encouraged her to let the clinic know if symptoms are worsening for consideration of another US to assess for drainable ascites.    Echo Sanz, DNP, APRN, FNP-C, AOCNP  Oncology Nurse Practitioner  Division of Gynecologic  Oncology  Pager: [phone]     CC  Patient Care Team:  Domenica Christianson MD as PCP - General  Teoh, Charis Meadows MD as MD (Gynecologic Oncology)  Susannah Roman APRN CNP as Assigned Cancer Care Provider  Anish Monroy MD as Assigned Palliative Care Provider  Pratima Vega RN as Specialty Care Coordinator (Hematology & Oncology)  Myhre, Grace C, [facility] as Pharmacist  SELF, REFERRED      Again, thank you for allowing me to participate in the care of your patient.        Sincerely,        JALEN Barrera CNP    Electronically signed

## 2025-02-03 NOTE — PATIENT INSTRUCTIONS
Your visit today was with JALEN Barrera CNP for chemotherapy education.    Plan:  Your proposed regimen:  carboplatin, paclitaxel, bevacizumab  Schedule:  every 3 weeks  You will have a lab appointment and a provider appointment prior to your infusions- please note these times and arrive at the scheduled appointment times for both lab and your provider appointments 10-15 minutes early if possible.    Your team:  Gynecologic oncologist: Charis Patino MD  RN care coordinator: Sarahi Vega RNCC; [phone]  Nurse practitioner:Echo Sanz CNP  Triage RN line: [place] triage: [phone]    Your take home medications:  -Prochlorperazine (aka Compazine)- this is an anti-nausea medication that you can take as needed. The dose is one tablet every six hours as needed.  -Ondansetron (aka Zofran)- this is an anti-nausea medication that you can take as needed. The dose is one tablet every eight hours as needed. Wait until 72 hours after chemotherapy to take this medication as you were given a long acting medication in this same drug class.    When to call for help:  A fever of 100.4 F or greater  Shaking chills  Shortness of breath or difficulty breathing  Repeated signs of bleeding, such as nose bleeds that do not stop with pressure, vaginal bleeding that saturates a pad an hour for two hours in a row, easy bruising or black tarry stools  Mouth sores that stop you from eating or cause pain when you swallow  Nausea and vomiting that do not get better with your medications  Diarrhea that does not get better (particularly three or more loose watery stools in one day)  Extreme weakness  Feeling confused or extremely sleepy  Constipation for more than 48 hours that does not respond to laxatives  New rashes  New swelling in the legs, arms, or face- particularly if one leg is more swollen than the other, hot, red, or painful  Any new symptoms that you did not have before your treatments    WHO to call for  help:  Call your RN care coordinator first- if you have difficulty reaching her, then call the triage line. If you have difficulty reaching triage during off hours, you can call [phone] and ask to speak to the gynecologic oncology resident on call.    For urgent questions or concerns, please call rather than send a medical message.

## 2025-02-03 NOTE — NURSING NOTE
Is the patient currently in the state of [place]? YES    Visit mode: VIDEO    If the visit is dropped, the patient can be reconnected by:VIDEO VISIT: Send to e-mail at: [email or web address]    Will anyone else be joining the visit? NO  (If patient encounters technical issues they should call [phone]    Are changes needed to the allergy or medication list? No    Are refills needed on medications prescribed by this physician? NO    Rooming Documentation:  Questionnaire(s) completed    Reason for visit: Video Visit (Follow Up )    Abby BOOKER

## 2025-02-05 ENCOUNTER — LAB (OUTPATIENT)
Dept: INFUSION THERAPY | Facility: HOSPITAL | Age: 60
End: 2025-02-05
Attending: OBSTETRICS & GYNECOLOGY
Payer: COMMERCIAL

## 2025-02-05 DIAGNOSIS — C56.1 OVARIAN CANCER, RIGHT (H): ICD-10-CM

## 2025-02-05 LAB
ALBUMIN SERPL BCG-MCNC: 4 G/DL (ref 3.5–5.2)
ALP SERPL-CCNC: 122 U/L (ref 40–150)
ALT SERPL W P-5'-P-CCNC: 13 U/L (ref 0–50)
ANION GAP SERPL CALCULATED.3IONS-SCNC: 11 MMOL/L (ref 7–15)
AST SERPL W P-5'-P-CCNC: 28 U/L (ref 0–45)
BASOPHILS # BLD AUTO: 0 10E3/UL (ref 0–0.2)
BASOPHILS NFR BLD AUTO: 1 %
BILIRUB SERPL-MCNC: 0.5 MG/DL
BUN SERPL-MCNC: 16.8 MG/DL (ref 8–23)
CALCIUM SERPL-MCNC: 9.4 MG/DL (ref 8.8–10.4)
CANCER AG125 SERPL-ACNC: 340 U/ML
CHLORIDE SERPL-SCNC: 100 MMOL/L (ref 98–107)
CREAT SERPL-MCNC: 1.01 MG/DL (ref 0.51–0.95)
EGFRCR SERPLBLD CKD-EPI 2021: 64 ML/MIN/1.73M2
EOSINOPHIL # BLD AUTO: 0.2 10E3/UL (ref 0–0.7)
EOSINOPHIL NFR BLD AUTO: 3 %
ERYTHROCYTE [DISTWIDTH] IN BLOOD BY AUTOMATED COUNT: 14.8 % (ref 10–15)
GLUCOSE SERPL-MCNC: 80 MG/DL (ref 70–99)
HCO3 SERPL-SCNC: 26 MMOL/L (ref 22–29)
HCT VFR BLD AUTO: 37 % (ref 35–47)
HGB BLD-MCNC: 12.4 G/DL (ref 11.7–15.7)
IMM GRANULOCYTES # BLD: 0 10E3/UL
IMM GRANULOCYTES NFR BLD: 0 %
LYMPHOCYTES # BLD AUTO: 0.7 10E3/UL (ref 0.8–5.3)
LYMPHOCYTES NFR BLD AUTO: 15 %
MCH RBC QN AUTO: 32.7 PG (ref 26.5–33)
MCHC RBC AUTO-ENTMCNC: 33.5 G/DL (ref 31.5–36.5)
MCV RBC AUTO: 98 FL (ref 78–100)
MONOCYTES # BLD AUTO: 0.4 10E3/UL (ref 0–1.3)
MONOCYTES NFR BLD AUTO: 8 %
NEUTROPHILS # BLD AUTO: 3.5 10E3/UL (ref 1.6–8.3)
NEUTROPHILS NFR BLD AUTO: 73 %
NRBC # BLD AUTO: 0 10E3/UL
NRBC BLD AUTO-RTO: 0 /100
PLATELET # BLD AUTO: 175 10E3/UL (ref 150–450)
POTASSIUM SERPL-SCNC: 3.9 MMOL/L (ref 3.4–5.3)
PROT SERPL-MCNC: 6.6 G/DL (ref 6.4–8.3)
RBC # BLD AUTO: 3.79 10E6/UL (ref 3.8–5.2)
SODIUM SERPL-SCNC: 137 MMOL/L (ref 135–145)
WBC # BLD AUTO: 4.8 10E3/UL (ref 4–11)

## 2025-02-05 PROCEDURE — 85025 COMPLETE CBC W/AUTO DIFF WBC: CPT

## 2025-02-05 PROCEDURE — 86304 IMMUNOASSAY TUMOR CA 125: CPT

## 2025-02-05 PROCEDURE — 36415 COLL VENOUS BLD VENIPUNCTURE: CPT

## 2025-02-05 PROCEDURE — 80053 COMPREHEN METABOLIC PANEL: CPT

## 2025-02-06 ENCOUNTER — INFUSION THERAPY VISIT (OUTPATIENT)
Dept: INFUSION THERAPY | Facility: HOSPITAL | Age: 60
End: 2025-02-06
Attending: OBSTETRICS & GYNECOLOGY
Payer: COMMERCIAL

## 2025-02-06 VITALS
DIASTOLIC BLOOD PRESSURE: 64 MMHG | RESPIRATION RATE: 16 BRPM | OXYGEN SATURATION: 99 % | BODY MASS INDEX: 25.94 KG/M2 | SYSTOLIC BLOOD PRESSURE: 141 MMHG | WEIGHT: 141.8 LBS | HEART RATE: 64 BPM | TEMPERATURE: 97.7 F

## 2025-02-06 DIAGNOSIS — C56.1 OVARIAN CANCER, RIGHT (H): Primary | ICD-10-CM

## 2025-02-06 LAB
ALBUMIN UR-MCNC: NEGATIVE MG/DL
MAGNESIUM SERPL-MCNC: 1.8 MG/DL (ref 1.7–2.3)

## 2025-02-06 PROCEDURE — 250N000011 HC RX IP 250 OP 636: Performed by: OBSTETRICS & GYNECOLOGY

## 2025-02-06 PROCEDURE — 81003 URINALYSIS AUTO W/O SCOPE: CPT | Performed by: OBSTETRICS & GYNECOLOGY

## 2025-02-06 PROCEDURE — 36591 DRAW BLOOD OFF VENOUS DEVICE: CPT | Performed by: OBSTETRICS & GYNECOLOGY

## 2025-02-06 PROCEDURE — 83735 ASSAY OF MAGNESIUM: CPT | Performed by: OBSTETRICS & GYNECOLOGY

## 2025-02-06 PROCEDURE — 258N000003 HC RX IP 258 OP 636: Performed by: OBSTETRICS & GYNECOLOGY

## 2025-02-06 RX ORDER — MEPERIDINE HYDROCHLORIDE 25 MG/ML
25 INJECTION INTRAMUSCULAR; INTRAVENOUS; SUBCUTANEOUS
Status: DISCONTINUED | OUTPATIENT
Start: 2025-02-06 | End: 2025-02-06 | Stop reason: HOSPADM

## 2025-02-06 RX ORDER — DIPHENHYDRAMINE HYDROCHLORIDE 50 MG/ML
25 INJECTION INTRAMUSCULAR; INTRAVENOUS
Status: DISCONTINUED | OUTPATIENT
Start: 2025-02-06 | End: 2025-02-06 | Stop reason: HOSPADM

## 2025-02-06 RX ORDER — PROCHLORPERAZINE MALEATE 10 MG
10 TABLET ORAL EVERY 6 HOURS PRN
Qty: 30 TABLET | Refills: 2 | Status: SHIPPED | OUTPATIENT
Start: 2025-02-06

## 2025-02-06 RX ORDER — DIPHENHYDRAMINE HYDROCHLORIDE 50 MG/ML
50 INJECTION INTRAMUSCULAR; INTRAVENOUS
Status: DISCONTINUED | OUTPATIENT
Start: 2025-02-06 | End: 2025-02-06 | Stop reason: HOSPADM

## 2025-02-06 RX ORDER — PALONOSETRON 0.05 MG/ML
0.25 INJECTION, SOLUTION INTRAVENOUS ONCE
Status: COMPLETED | OUTPATIENT
Start: 2025-02-06 | End: 2025-02-06

## 2025-02-06 RX ORDER — ALBUTEROL SULFATE 0.83 MG/ML
2.5 SOLUTION RESPIRATORY (INHALATION)
Status: DISCONTINUED | OUTPATIENT
Start: 2025-02-06 | End: 2025-02-06 | Stop reason: HOSPADM

## 2025-02-06 RX ORDER — ONDANSETRON 8 MG/1
8 TABLET, FILM COATED ORAL EVERY 8 HOURS PRN
Qty: 30 TABLET | Refills: 2 | Status: SHIPPED | OUTPATIENT
Start: 2025-02-06

## 2025-02-06 RX ORDER — HEPARIN SODIUM (PORCINE) LOCK FLUSH IV SOLN 100 UNIT/ML 100 UNIT/ML
5 SOLUTION INTRAVENOUS
Status: DISCONTINUED | OUTPATIENT
Start: 2025-02-06 | End: 2025-02-06 | Stop reason: HOSPADM

## 2025-02-06 RX ORDER — ALBUTEROL SULFATE 90 UG/1
1-2 INHALANT RESPIRATORY (INHALATION)
Status: DISCONTINUED | OUTPATIENT
Start: 2025-02-06 | End: 2025-02-06 | Stop reason: HOSPADM

## 2025-02-06 RX ORDER — METHYLPREDNISOLONE SODIUM SUCCINATE 40 MG/ML
40 INJECTION INTRAMUSCULAR; INTRAVENOUS
Status: DISCONTINUED | OUTPATIENT
Start: 2025-02-06 | End: 2025-02-06 | Stop reason: HOSPADM

## 2025-02-06 RX ORDER — EPINEPHRINE 1 MG/ML
0.3 INJECTION, SOLUTION INTRAMUSCULAR; SUBCUTANEOUS EVERY 5 MIN PRN
Status: DISCONTINUED | OUTPATIENT
Start: 2025-02-06 | End: 2025-02-06 | Stop reason: HOSPADM

## 2025-02-06 RX ADMIN — SODIUM CHLORIDE 1000 MG: 9 INJECTION, SOLUTION INTRAVENOUS at 15:27

## 2025-02-06 RX ADMIN — SODIUM CHLORIDE 250 ML: 9 INJECTION, SOLUTION INTRAVENOUS at 10:30

## 2025-02-06 RX ADMIN — DIPHENHYDRAMINE HYDROCHLORIDE 50 MG: 50 INJECTION INTRAMUSCULAR; INTRAVENOUS at 10:39

## 2025-02-06 RX ADMIN — DEXAMETHASONE SODIUM PHOSPHATE: 10 INJECTION, SOLUTION INTRAMUSCULAR; INTRAVENOUS at 10:55

## 2025-02-06 RX ADMIN — PALONOSETRON 0.25 MG: 0.25 INJECTION, SOLUTION INTRAVENOUS at 10:31

## 2025-02-06 RX ADMIN — PACLITAXEL 230 MG: 6 INJECTION, SOLUTION INTRAVENOUS at 11:37

## 2025-02-06 RX ADMIN — CARBOPLATIN 390 MG: 10 INJECTION, SOLUTION INTRAVENOUS at 14:37

## 2025-02-06 RX ADMIN — FAMOTIDINE 20 MG: 10 INJECTION, SOLUTION INTRAVENOUS at 10:35

## 2025-02-06 RX ADMIN — HEPARIN 5 ML: 100 SYRINGE at 15:58

## 2025-02-06 NOTE — PROGRESS NOTES
Infusion Nursing Note:  Jacki Smith presents today for Day 1, Cycle 1 of her chemotherapy plan.    Patient seen by provider today: No   present during visit today: Not Applicable.    Note: Michelle arrived ambulatory accompanied by her spouse, Simon, for treatment. Plan of care reviewed and all questions answered at this time. Patient was informed zofran and compazine were e-prescribed today. Writer reviewed dose, purpose, frequency and possible side effects with patient and spouse. They verbalized understanding.  CBC, CMP and CA-125 were drawn yesterday. Writer herminio magnesium and collected urine protein today.  Pre medications Aloxi, emend/dexamethasone, pepcid and IVPB Benadryl were administered 30 minutes prior to treatment.    Intravenous Access:  Implanted Port accessed and labs drawn without difficulty.    Treatment Conditions:  Lab Results   Component Value Date    HGB 12.4 02/05/2025    WBC 4.8 02/05/2025    ANEUTAUTO 3.5 02/05/2025     02/05/2025        Lab Results   Component Value Date     02/05/2025    POTASSIUM 3.9 02/05/2025    MAG 1.8 02/06/2025    CR 1.01 (H) 02/05/2025    KINDRA 9.4 02/05/2025    BILITOTAL 0.5 02/05/2025    ALBUMIN 4.0 02/05/2025    ALT 13 02/05/2025    AST 28 02/05/2025       Urine protein negative.  Blood pressure <160/100.  Results reviewed, labs MET treatment parameters, ok to proceed with treatment.  Magnesium 1.8 today. No repletion indicated per electrolyte replacement protocol.    Post Infusion Assessment:  Patient tolerated infusion without incident.  Blood return noted pre and post infusion.  Site patent and intact, free from redness, edema or discomfort.  Access discontinued per protocol.     Discharge Plan:   Patient will return Feb 26th for labs, Feb 27th for treatment.   Patient discharged in stable condition accompanied by: self.  Departure Mode: Ambulatory.      Rylie Bullard RN

## 2025-02-11 ENCOUNTER — PATIENT OUTREACH (OUTPATIENT)
Dept: CARE COORDINATION | Facility: CLINIC | Age: 60
End: 2025-02-11
Payer: COMMERCIAL

## 2025-02-11 NOTE — PROGRESS NOTES
Social Work - Distress Screen Intervention  [facility]    Identified Concern and Score from Distress Screenin. How concerned are you about your ability to eat? 5     2. How concerned are you about unintended weight loss or your current weight? 5     3. How concerned are you about feeling depressed or very sad?  (!) 8     4. How concerned are you about feeling anxious or very scared?  (!) 8     5. Do you struggle with the loss of meaning and irma in your life?  Quite a bit     6. How concerned are you about work and home life issues that may be affected by your cancer?  0     7. How concerned are you about knowing what resources are available to help you?  0     8. Do you currently have what you would describe as Episcopalian or spiritual struggles? No data recorded   9. If you want to be contacted by one of our professionals, I can send a message to them right now.  No data recorded     Date of Distress Screen: 2/3/25  Data: At time of last visit, patient scored positive on distress screening.  outreached to patient today to follow up on elevated distress and introduce psychosocial services and support.  Intervention/Education provided:  contacted patient by [employer] message to discuss distress screening results.  Offerer resource/support.  Follow-up Required:  will remain available to patient for support as needed    DEONTE Bob, Northern [facility] A.R. Gould HospitalSW   Adult Oncology - [place]  [phone]  Onsite [place] on    *Please note does not work on .   Support Groups at [facility]: Social Work Services for Cancer Patients ([email or web address])

## 2025-02-15 ENCOUNTER — TELEPHONE (OUTPATIENT)
Dept: ONCOLOGY | Facility: CLINIC | Age: 60
End: 2025-02-15
Payer: COMMERCIAL

## 2025-02-17 ENCOUNTER — MYC MEDICAL ADVICE (OUTPATIENT)
Dept: ONCOLOGY | Facility: CLINIC | Age: 60
End: 2025-02-17
Payer: COMMERCIAL

## 2025-02-17 NOTE — TELEPHONE ENCOUNTER
Called Michelle in response to her [employer] message. Talked with Michelle, as well as her  and her daughter Astrid.    I discussed with Michelle that I initiated the transfer. My team has been in contact with her team at [facility] since I learned that she was hospitalized on Friday. At first we were informed that Michelle was improving and that she would be discharging so we did not immediately initiate transfer. However, over the weekend she had several paracenteses with stool; no drain was left in place. I discussed with Michelle that this is a bevacizumab-induced microperforation of the bowel, which is a rare but serous side-effect of bevacizumab in the setting of ovarian cancer. These are different from other perforations, and we also discussed considerations for management in the setting of chemotherapy-induced ANC anel. I discussed with Michelle that while [facility] has been managing her as best they can, they do not have the expertise regarding bevacizumab microperforations that we have.     We discussed that management may include IR drain placement vs surgical exploration. My team at [facility] will evaluate her clinical status and decide on best management. I have been in contact with the attending of the week Dr. Warner as well as our fellow Dr. Anglin, and I trust their evaluation and management.    TPN was recommended by the surgeons at [facility]. I recommend against starting TPN at this time given the nadiring WBC, although this may be re-evaluated based on anticipated timeline to oral intake after WBC/ANC improves.     Michelle and her family appreciate the call, and agree with the plan to transfer to [facility].        Charis Patino MD, MS, FACOG, FACS  2/17/2025  5:25 PM

## 2025-02-18 ENCOUNTER — HOSPITAL ENCOUNTER (INPATIENT)
Facility: CLINIC | Age: 60
End: 2025-02-18
Attending: OBSTETRICS & GYNECOLOGY | Admitting: STUDENT IN AN ORGANIZED HEALTH CARE EDUCATION/TRAINING PROGRAM
Payer: COMMERCIAL

## 2025-02-18 ENCOUNTER — APPOINTMENT (OUTPATIENT)
Dept: INTERVENTIONAL RADIOLOGY/VASCULAR | Facility: CLINIC | Age: 60
End: 2025-02-18
Attending: PHYSICIAN ASSISTANT
Payer: COMMERCIAL

## 2025-02-18 ENCOUNTER — MYC MEDICAL ADVICE (OUTPATIENT)
Dept: ONCOLOGY | Facility: CLINIC | Age: 60
End: 2025-02-18

## 2025-02-18 ENCOUNTER — APPOINTMENT (OUTPATIENT)
Dept: GENERAL RADIOLOGY | Facility: CLINIC | Age: 60
End: 2025-02-18
Payer: COMMERCIAL

## 2025-02-18 DIAGNOSIS — K56.609 SBO (SMALL BOWEL OBSTRUCTION) (H): ICD-10-CM

## 2025-02-18 DIAGNOSIS — C56.1 OVARIAN CANCER, RIGHT (H): ICD-10-CM

## 2025-02-18 DIAGNOSIS — K63.1 BOWEL PERFORATION (H): Primary | ICD-10-CM

## 2025-02-18 LAB
ABO + RH BLD: NORMAL
ALBUMIN SERPL BCG-MCNC: 3.1 G/DL (ref 3.5–5.2)
ALP SERPL-CCNC: 119 U/L (ref 40–150)
ALT SERPL W P-5'-P-CCNC: 8 U/L (ref 0–50)
ANION GAP SERPL CALCULATED.3IONS-SCNC: 16 MMOL/L (ref 7–15)
APTT PPP: 40 SECONDS (ref 22–38)
AST SERPL W P-5'-P-CCNC: 19 U/L (ref 0–45)
BASOPHILS # BLD AUTO: 0 10E3/UL (ref 0–0.2)
BASOPHILS NFR BLD AUTO: 1 %
BILIRUB SERPL-MCNC: 0.4 MG/DL
BLD GP AB SCN SERPL QL: NEGATIVE
BUN SERPL-MCNC: 10.2 MG/DL (ref 8–23)
CALCIUM SERPL-MCNC: 8.8 MG/DL (ref 8.8–10.4)
CHLORIDE SERPL-SCNC: 100 MMOL/L (ref 98–107)
CREAT SERPL-MCNC: 0.7 MG/DL (ref 0.51–0.95)
EGFRCR SERPLBLD CKD-EPI 2021: >90 ML/MIN/1.73M2
EOSINOPHIL # BLD AUTO: 0.2 10E3/UL (ref 0–0.7)
EOSINOPHIL NFR BLD AUTO: 7 %
ERYTHROCYTE [DISTWIDTH] IN BLOOD BY AUTOMATED COUNT: 14.7 % (ref 10–15)
FIBRINOGEN PPP-MCNC: 781 MG/DL (ref 170–510)
GLUCOSE SERPL-MCNC: 75 MG/DL (ref 70–99)
HCO3 SERPL-SCNC: 19 MMOL/L (ref 22–29)
HCT VFR BLD AUTO: 29.7 % (ref 35–47)
HGB BLD-MCNC: 10 G/DL (ref 11.7–15.7)
IMM GRANULOCYTES # BLD: 0 10E3/UL
IMM GRANULOCYTES NFR BLD: 2 %
INR PPP: 1.34 (ref 0.85–1.15)
LYMPHOCYTES # BLD AUTO: 0.6 10E3/UL (ref 0.8–5.3)
LYMPHOCYTES NFR BLD AUTO: 22 %
MCH RBC QN AUTO: 32.6 PG (ref 26.5–33)
MCHC RBC AUTO-ENTMCNC: 33.7 G/DL (ref 31.5–36.5)
MCV RBC AUTO: 97 FL (ref 78–100)
MONOCYTES # BLD AUTO: 0.6 10E3/UL (ref 0–1.3)
MONOCYTES NFR BLD AUTO: 25 %
NEUTROPHILS # BLD AUTO: 1.1 10E3/UL (ref 1.6–8.3)
NEUTROPHILS NFR BLD AUTO: 43 %
NRBC # BLD AUTO: 0 10E3/UL
NRBC BLD AUTO-RTO: 0 /100
PLATELET # BLD AUTO: 177 10E3/UL (ref 150–450)
POTASSIUM SERPL-SCNC: 3.6 MMOL/L (ref 3.4–5.3)
PROT SERPL-MCNC: 6.2 G/DL (ref 6.4–8.3)
RBC # BLD AUTO: 3.07 10E6/UL (ref 3.8–5.2)
SODIUM SERPL-SCNC: 135 MMOL/L (ref 135–145)
SPECIMEN EXP DATE BLD: NORMAL
WBC # BLD AUTO: 2.5 10E3/UL (ref 4–11)

## 2025-02-18 PROCEDURE — 250N000013 HC RX MED GY IP 250 OP 250 PS 637

## 2025-02-18 PROCEDURE — 87075 CULTR BACTERIA EXCEPT BLOOD: CPT | Performed by: STUDENT IN AN ORGANIZED HEALTH CARE EDUCATION/TRAINING PROGRAM

## 2025-02-18 PROCEDURE — 250N000011 HC RX IP 250 OP 636

## 2025-02-18 PROCEDURE — C1769 GUIDE WIRE: HCPCS

## 2025-02-18 PROCEDURE — 250N000009 HC RX 250

## 2025-02-18 PROCEDURE — 80051 ELECTROLYTE PANEL: CPT

## 2025-02-18 PROCEDURE — 74018 RADEX ABDOMEN 1 VIEW: CPT

## 2025-02-18 PROCEDURE — 86900 BLOOD TYPING SEROLOGIC ABO: CPT

## 2025-02-18 PROCEDURE — 258N000003 HC RX IP 258 OP 636

## 2025-02-18 PROCEDURE — 74018 RADEX ABDOMEN 1 VIEW: CPT | Mod: 26 | Performed by: RADIOLOGY

## 2025-02-18 PROCEDURE — 0W9G30Z DRAINAGE OF PERITONEAL CAVITY WITH DRAINAGE DEVICE, PERCUTANEOUS APPROACH: ICD-10-PCS | Performed by: STUDENT IN AN ORGANIZED HEALTH CARE EDUCATION/TRAINING PROGRAM

## 2025-02-18 PROCEDURE — 86850 RBC ANTIBODY SCREEN: CPT

## 2025-02-18 PROCEDURE — 99223 1ST HOSP IP/OBS HIGH 75: CPT | Mod: AI | Performed by: STUDENT IN AN ORGANIZED HEALTH CARE EDUCATION/TRAINING PROGRAM

## 2025-02-18 PROCEDURE — 82040 ASSAY OF SERUM ALBUMIN: CPT

## 2025-02-18 PROCEDURE — 85025 COMPLETE CBC W/AUTO DIFF WBC: CPT

## 2025-02-18 PROCEDURE — 85384 FIBRINOGEN ACTIVITY: CPT

## 2025-02-18 PROCEDURE — 99152 MOD SED SAME PHYS/QHP 5/>YRS: CPT

## 2025-02-18 PROCEDURE — 85730 THROMBOPLASTIN TIME PARTIAL: CPT

## 2025-02-18 PROCEDURE — 49406 IMAGE CATH FLUID PERI/RETRO: CPT | Mod: GC | Performed by: STUDENT IN AN ORGANIZED HEALTH CARE EDUCATION/TRAINING PROGRAM

## 2025-02-18 PROCEDURE — 999N000248 HC STATISTIC IV INSERT WITH US BY RN

## 2025-02-18 PROCEDURE — 206N000001 HC R&B BMT UMMC

## 2025-02-18 PROCEDURE — 99152 MOD SED SAME PHYS/QHP 5/>YRS: CPT | Mod: GC | Performed by: STUDENT IN AN ORGANIZED HEALTH CARE EDUCATION/TRAINING PROGRAM

## 2025-02-18 PROCEDURE — 87205 SMEAR GRAM STAIN: CPT | Performed by: STUDENT IN AN ORGANIZED HEALTH CARE EDUCATION/TRAINING PROGRAM

## 2025-02-18 PROCEDURE — C1729 CATH, DRAINAGE: HCPCS

## 2025-02-18 PROCEDURE — 85610 PROTHROMBIN TIME: CPT

## 2025-02-18 PROCEDURE — 272N000500 HC NEEDLE CR2

## 2025-02-18 RX ORDER — ACETAMINOPHEN 325 MG/1
975 TABLET ORAL EVERY 6 HOURS PRN
Status: DISCONTINUED | OUTPATIENT
Start: 2025-02-18 | End: 2025-03-02 | Stop reason: HOSPADM

## 2025-02-18 RX ORDER — LIDOCAINE 40 MG/G
CREAM TOPICAL
Status: DISCONTINUED | OUTPATIENT
Start: 2025-02-18 | End: 2025-03-02 | Stop reason: HOSPADM

## 2025-02-18 RX ORDER — OXYCODONE HYDROCHLORIDE 5 MG/1
5 TABLET ORAL EVERY 4 HOURS PRN
Status: ON HOLD | COMMUNITY
End: 2025-03-01

## 2025-02-18 RX ORDER — ONDANSETRON 2 MG/ML
4 INJECTION INTRAMUSCULAR; INTRAVENOUS EVERY 6 HOURS PRN
Status: DISCONTINUED | OUTPATIENT
Start: 2025-02-18 | End: 2025-02-20

## 2025-02-18 RX ORDER — NALOXONE HYDROCHLORIDE 0.4 MG/ML
0.4 INJECTION, SOLUTION INTRAMUSCULAR; INTRAVENOUS; SUBCUTANEOUS
Status: DISCONTINUED | OUTPATIENT
Start: 2025-02-18 | End: 2025-02-18

## 2025-02-18 RX ORDER — ACETAMINOPHEN 500 MG
500 TABLET ORAL EVERY 6 HOURS PRN
COMMUNITY

## 2025-02-18 RX ORDER — ESCITALOPRAM OXALATE 20 MG/1
20 TABLET ORAL AT BEDTIME
Status: DISCONTINUED | OUTPATIENT
Start: 2025-02-18 | End: 2025-03-02 | Stop reason: HOSPADM

## 2025-02-18 RX ORDER — HEPARIN SODIUM,PORCINE 10 UNIT/ML
5-10 VIAL (ML) INTRAVENOUS
Status: DISCONTINUED | OUTPATIENT
Start: 2025-02-18 | End: 2025-03-02 | Stop reason: HOSPADM

## 2025-02-18 RX ORDER — HEPARIN SODIUM (PORCINE) LOCK FLUSH IV SOLN 100 UNIT/ML 100 UNIT/ML
5-10 SOLUTION INTRAVENOUS
Status: DISCONTINUED | OUTPATIENT
Start: 2025-02-18 | End: 2025-03-02 | Stop reason: HOSPADM

## 2025-02-18 RX ORDER — HEPARIN SODIUM,PORCINE 10 UNIT/ML
5-10 VIAL (ML) INTRAVENOUS EVERY 24 HOURS
Status: DISCONTINUED | OUTPATIENT
Start: 2025-02-18 | End: 2025-03-02 | Stop reason: HOSPADM

## 2025-02-18 RX ORDER — PROCHLORPERAZINE MALEATE 5 MG/1
10 TABLET ORAL EVERY 6 HOURS PRN
Status: DISCONTINUED | OUTPATIENT
Start: 2025-02-18 | End: 2025-03-02 | Stop reason: HOSPADM

## 2025-02-18 RX ORDER — SODIUM CHLORIDE, SODIUM LACTATE, POTASSIUM CHLORIDE, CALCIUM CHLORIDE 600; 310; 30; 20 MG/100ML; MG/100ML; MG/100ML; MG/100ML
INJECTION, SOLUTION INTRAVENOUS CONTINUOUS
Status: DISCONTINUED | OUTPATIENT
Start: 2025-02-18 | End: 2025-02-20

## 2025-02-18 RX ORDER — FENTANYL CITRATE 50 UG/ML
25-50 INJECTION, SOLUTION INTRAMUSCULAR; INTRAVENOUS EVERY 5 MIN PRN
Status: DISCONTINUED | OUTPATIENT
Start: 2025-02-18 | End: 2025-02-18

## 2025-02-18 RX ORDER — FAMOTIDINE 20 MG/1
20 TABLET, FILM COATED ORAL 2 TIMES DAILY PRN
COMMUNITY

## 2025-02-18 RX ORDER — CYCLOBENZAPRINE HCL 10 MG
10 TABLET ORAL EVERY 8 HOURS PRN
Status: DISCONTINUED | OUTPATIENT
Start: 2025-02-18 | End: 2025-03-02 | Stop reason: HOSPADM

## 2025-02-18 RX ORDER — NALOXONE HYDROCHLORIDE 0.4 MG/ML
0.2 INJECTION, SOLUTION INTRAMUSCULAR; INTRAVENOUS; SUBCUTANEOUS
Status: DISCONTINUED | OUTPATIENT
Start: 2025-02-18 | End: 2025-02-18

## 2025-02-18 RX ORDER — FLUMAZENIL 0.1 MG/ML
0.2 INJECTION, SOLUTION INTRAVENOUS
Status: ACTIVE | OUTPATIENT
Start: 2025-02-18 | End: 2025-02-18

## 2025-02-18 RX ORDER — NALOXONE HYDROCHLORIDE 0.4 MG/ML
0.2 INJECTION, SOLUTION INTRAMUSCULAR; INTRAVENOUS; SUBCUTANEOUS
Status: DISCONTINUED | OUTPATIENT
Start: 2025-02-18 | End: 2025-03-02 | Stop reason: HOSPADM

## 2025-02-18 RX ORDER — HYDROMORPHONE HCL IN WATER/PF 6 MG/30 ML
.2-.3 PATIENT CONTROLLED ANALGESIA SYRINGE INTRAVENOUS
Status: DISCONTINUED | OUTPATIENT
Start: 2025-02-18 | End: 2025-03-01

## 2025-02-18 RX ORDER — NALOXONE HYDROCHLORIDE 0.4 MG/ML
0.4 INJECTION, SOLUTION INTRAMUSCULAR; INTRAVENOUS; SUBCUTANEOUS
Status: DISCONTINUED | OUTPATIENT
Start: 2025-02-18 | End: 2025-03-02 | Stop reason: HOSPADM

## 2025-02-18 RX ORDER — PIPERACILLIN SODIUM, TAZOBACTAM SODIUM 4; .5 G/20ML; G/20ML
4.5 INJECTION, POWDER, LYOPHILIZED, FOR SOLUTION INTRAVENOUS EVERY 6 HOURS
Status: DISCONTINUED | OUTPATIENT
Start: 2025-02-18 | End: 2025-02-19

## 2025-02-18 RX ORDER — CALCIUM CARBONATE 500 MG/1
1000 TABLET, CHEWABLE ORAL 4 TIMES DAILY PRN
Status: DISCONTINUED | OUTPATIENT
Start: 2025-02-18 | End: 2025-03-02 | Stop reason: HOSPADM

## 2025-02-18 RX ORDER — LORAZEPAM 0.5 MG/1
.5-1 TABLET ORAL EVERY 6 HOURS PRN
COMMUNITY

## 2025-02-18 RX ORDER — ENOXAPARIN SODIUM 100 MG/ML
40 INJECTION SUBCUTANEOUS EVERY 24 HOURS
Status: DISCONTINUED | OUTPATIENT
Start: 2025-02-18 | End: 2025-02-19

## 2025-02-18 RX ORDER — ONDANSETRON 4 MG/1
4 TABLET, ORALLY DISINTEGRATING ORAL EVERY 6 HOURS PRN
Status: DISCONTINUED | OUTPATIENT
Start: 2025-02-18 | End: 2025-02-20

## 2025-02-18 RX ORDER — HYDROXYZINE HYDROCHLORIDE 10 MG/1
10-20 TABLET, FILM COATED ORAL EVERY 6 HOURS PRN
Status: DISCONTINUED | OUTPATIENT
Start: 2025-02-18 | End: 2025-02-20

## 2025-02-18 RX ADMIN — PANTOPRAZOLE SODIUM 40 MG: 40 INJECTION, POWDER, FOR SOLUTION INTRAVENOUS at 07:34

## 2025-02-18 RX ADMIN — PIPERACILLIN AND TAZOBACTAM 4.5 G: 4; .5 INJECTION, POWDER, LYOPHILIZED, FOR SOLUTION INTRAVENOUS at 09:28

## 2025-02-18 RX ADMIN — MIDAZOLAM 0.5 MG: 1 INJECTION INTRAMUSCULAR; INTRAVENOUS at 17:27

## 2025-02-18 RX ADMIN — ONDANSETRON 4 MG: 4 TABLET, ORALLY DISINTEGRATING ORAL at 21:06

## 2025-02-18 RX ADMIN — PIPERACILLIN AND TAZOBACTAM 4.5 G: 4; .5 INJECTION, POWDER, LYOPHILIZED, FOR SOLUTION INTRAVENOUS at 15:06

## 2025-02-18 RX ADMIN — MIDAZOLAM 1 MG: 1 INJECTION INTRAMUSCULAR; INTRAVENOUS at 17:19

## 2025-02-18 RX ADMIN — ACETAMINOPHEN 975 MG: 325 TABLET, FILM COATED ORAL at 21:01

## 2025-02-18 RX ADMIN — SODIUM CHLORIDE, POTASSIUM CHLORIDE, SODIUM LACTATE AND CALCIUM CHLORIDE: 600; 310; 30; 20 INJECTION, SOLUTION INTRAVENOUS at 01:28

## 2025-02-18 RX ADMIN — CYCLOBENZAPRINE 10 MG: 10 TABLET, FILM COATED ORAL at 21:01

## 2025-02-18 RX ADMIN — LIDOCAINE: 40 CREAM TOPICAL at 13:38

## 2025-02-18 RX ADMIN — FENTANYL CITRATE 25 MCG: 50 INJECTION, SOLUTION INTRAMUSCULAR; INTRAVENOUS at 17:27

## 2025-02-18 RX ADMIN — MIDAZOLAM 1 MG: 1 INJECTION INTRAMUSCULAR; INTRAVENOUS at 17:15

## 2025-02-18 RX ADMIN — ESCITALOPRAM OXALATE 20 MG: 20 TABLET ORAL at 21:01

## 2025-02-18 RX ADMIN — SODIUM CHLORIDE, POTASSIUM CHLORIDE, SODIUM LACTATE AND CALCIUM CHLORIDE: 600; 310; 30; 20 INJECTION, SOLUTION INTRAVENOUS at 13:25

## 2025-02-18 RX ADMIN — PIPERACILLIN AND TAZOBACTAM 4.5 G: 4; .5 INJECTION, POWDER, LYOPHILIZED, FOR SOLUTION INTRAVENOUS at 21:00

## 2025-02-18 RX ADMIN — ACETAMINOPHEN 975 MG: 325 TABLET, FILM COATED ORAL at 13:25

## 2025-02-18 RX ADMIN — FENTANYL CITRATE 50 MCG: 50 INJECTION, SOLUTION INTRAMUSCULAR; INTRAVENOUS at 17:20

## 2025-02-18 RX ADMIN — ENOXAPARIN SODIUM 40 MG: 40 INJECTION SUBCUTANEOUS at 21:00

## 2025-02-18 RX ADMIN — PIPERACILLIN AND TAZOBACTAM 4.5 G: 4; .5 INJECTION, POWDER, LYOPHILIZED, FOR SOLUTION INTRAVENOUS at 03:10

## 2025-02-18 RX ADMIN — FENTANYL CITRATE 50 MCG: 50 INJECTION, SOLUTION INTRAMUSCULAR; INTRAVENOUS at 17:15

## 2025-02-18 RX ADMIN — LIDOCAINE HYDROCHLORIDE 30 ML: 10 INJECTION, SOLUTION EPIDURAL; INFILTRATION; INTRACAUDAL; PERINEURAL at 17:30

## 2025-02-18 ASSESSMENT — ACTIVITIES OF DAILY LIVING (ADL)
ADLS_ACUITY_SCORE: 34
ADLS_ACUITY_SCORE: 30
ADLS_ACUITY_SCORE: 30
ADLS_ACUITY_SCORE: 34
ADLS_ACUITY_SCORE: 34
ADLS_ACUITY_SCORE: 25
ADLS_ACUITY_SCORE: 34
ADLS_ACUITY_SCORE: 30
ADLS_ACUITY_SCORE: 39
ADLS_ACUITY_SCORE: 41
ADLS_ACUITY_SCORE: 30
ADLS_ACUITY_SCORE: 34
ADLS_ACUITY_SCORE: 30
ADLS_ACUITY_SCORE: 34
ADLS_ACUITY_SCORE: 30
ADLS_ACUITY_SCORE: 30
ADLS_ACUITY_SCORE: 34
ADLS_ACUITY_SCORE: 34
ADLS_ACUITY_SCORE: 30

## 2025-02-18 ASSESSMENT — COLUMBIA-SUICIDE SEVERITY RATING SCALE - C-SSRS
1. IN THE PAST MONTH, HAVE YOU WISHED YOU WERE DEAD OR WISHED YOU COULD GO TO SLEEP AND NOT WAKE UP?: NO
6. HAVE YOU EVER DONE ANYTHING, STARTED TO DO ANYTHING, OR PREPARED TO DO ANYTHING TO END YOUR LIFE?: NO
2. HAVE YOU ACTUALLY HAD ANY THOUGHTS OF KILLING YOURSELF SINCE LAST CONTACT?: NO
2. HAVE YOU ACTUALLY HAD ANY THOUGHTS OF KILLING YOURSELF IN THE PAST MONTH?: NO
6. HAVE YOU EVER DONE ANYTHING, STARTED TO DO ANYTHING, OR PREPARED TO DO ANYTHING TO END YOUR LIFE?: NO

## 2025-02-18 NOTE — PROGRESS NOTES
Patient Name: Jacki Smith  Medical Record Number: [id number]  Today's Date: 2/18/2025    Procedure: Image guided drain placement  Proceduralist: Dr. Rudi Crooks  Pathology present: No    Procedure Start: 1719  Procedure end: 1733  Sedation medications administered: Versed 2.5 mg and Fentanyl 125 mcg     Report given to: Sree FARR  : SMITHA    Other Notes: Pt arrived to IR room 4 from . Consent reviewed. Pt denies any questions or concerns regarding procedure. Pt positioned supine and monitored per protocol. Pt tolerated procedure without any noted complications. Pt transferred back to .

## 2025-02-18 NOTE — DISCHARGE SUMMARY
Gynecologic Oncology Discharge Summary    Jacki Smith  [id number]    Admit Date: 2/18/2025  Discharge Date: ***  Admitting Provider: Chey Warner MD  Discharge Provider: ***    Admission Dx:   - Bowel perforation  - Pneumoperitoneum  - Neutropenia  - Recurrent HGSOC  - UTI, resolved  - MDD    Discharge Dx:  - Bowel perforation  - Pneumoperitoneum  - Neutropenia  - Recurrent HGSOC  - UTI, resolved  - MDD  -***    Patient Active Problem List   Diagnosis    Ovarian cancer, right (H)    Encounter for antineoplastic chemotherapy    Bowel perforation (H)       Procedures: ***    Prior to Admission Medications:  Medications Prior to Admission   Medication Sig Dispense Refill Last Dose/Taking    cyclobenzaprine (FLEXERIL) 5 MG tablet Take 1-2 Tablets (5-10 mg) by mouth three times a day.       diphenhydrAMINE-acetaminophen (TYLENOL PM)  MG tablet Take 1 tablet by mouth as needed       docusate sodium (DSS) 100 MG capsule Take 100 mg by mouth as needed       escitalopram (LEXAPRO) 10 MG tablet Take 1 Tablet (10 mg) by mouth daily.*       HYDROcodone-acetaminophen (NORCO) 5-325 MG tablet Take 1 tablet by mouth every 4 hours as needed       hydrOXYzine HCl (ATARAX) 10 MG tablet Take 1-2 tablets (10-20 mg) by mouth every 6 hours as needed for itching 120 tablet 3     lidocaine-prilocaine (EMLA) 2.5-2.5 % external cream Apply topically as needed for moderate pain 30 g 1     Multiple Vitamin (MULTI-VITAMIN DAILY PO) Take 1 tablet by mouth daily       omeprazole (PRILOSEC) 20 MG DR capsule Take 20 mg by mouth       ondansetron (ZOFRAN) 8 MG tablet Take 1 tablet (8 mg) by mouth every 8 hours as needed for nausea (vomiting). Do not take for 3 days after chemotherapy. 30 tablet 2     prochlorperazine (COMPAZINE) 10 MG tablet Take 1 tablet (10 mg) by mouth every 6 hours as needed for nausea or vomiting. 30 tablet 2     sennosides (SENOKOT) 8.6 MG tablet        valACYclovir (VALTREX) 1000 mg tablet Take 1,000 mg by  mouth as needed          Discharge Medications:      Consultations:   ***    Brief History of Illness:  This patient is a 59 year old female with recurrent high grade serous ovarian cancer who presents as a transfer from [place] for management of a bowel perforation.     OSH course: Patient was admitted to [facility] on 2/11 for abdominal pain and nausea, found to have pneumoperitoneum likely due to an acute perforation. She was evaluated by general surgery, who recommended admission for IV antibiotics and pain control. She initially improved with conservative management and her diet was advanced. She had a paracentesis on 2/14 where 600 mL of turbid fluid was removed. However, started worsening again on 2/14 with worsening pain and N/V. She was made NPO, NG was placed to [facility], and she was transferred here for further management.     Tonight, patient reports she is feeling tired and very thirsty. Pain is currently controlled. Reports tylenol and flexeril have been most helpful for managing her pain. Not feeling nauseous currently. Not passing any gas today, last thinks she passed gas yesterday. Last BM Thursday morning, has not had a BM since. No fevers/chills, chest pain, SOB. Voiding spontaneously and ambulating without dizziness. No other concerns at this time.    Hospital Course:  Dz:   - Preoperative diagnosis was ***.  Frozen section at the time of the surgery showed ***.  Final pathology is pending at the time of discharge.  She will follow-up postoperatively for a care plan.  FEN:   - ***She was kept NPO on arrival and kept on maintenance IVF. On ***, when her diet was slowly advanced.  By discharge, she was tolerating a regular diet without nausea and vomiting and able to maintain her hydration without IVF supplementation.  Pain:   - ***Her pain was controlled with tylenol, flexeril, and IV dilaudid prn. *** Once tolerating PO pain meds, she was transitioned to a PO pain regimen.  Her pain was well  controlled on this and she was discharged home with these medications.  CV:   - ***She has no history of CV issues.  Her vital signs were stable while in house and she had no acute CV issues.  PULM:   - ***She has no history of pulmonary issues.  She was initially given O2 supplementation in to maintain her O2 sats in the immediate postop period and was transitioned off of this without difficulty.  By discharge, her O2 sats were greater than 94% on RA.  She was encouraged to use her bedside IS while in house.  She had no acute pulmonary issues while in house.  HEME:   - She was mildly neutropenic on admission with ANC 1.1 in the setting of recent chemotherapy administration. *** Her preoperative Hgb was ***.  Her hgb dropped to *** postoperatively and was stable at *** at the time of discharge.  She had no other acute heme issues while in house.  GI:   - ***She was found to have a bowel perforation in the setting of recent bevacizumab administration at the OSH prior to arrival. KUB on arrival confirmed NG was in appropriate position, and she was kept NPO with NG to LIS. She was continued on IV zosyn. IR was consulted for drain placement and ***. She was made NPO prior to the procedure.  On POD#0, her diet was advanced to clear liquids and then advanced slowly as tolerated.  At the time of discharge, she was tolerating a regular diet without nausea and vomiting.  She will be discharged with a bowel regimen to prevent constipation in the postoperative period.  She had no acute GI issues while in house.  :    - *** A vila catheter was placed at the time of the surgery.  Once ambulating unassisted, the vila catheter was removed.  Prior to discharge, the patient was voiding spontaneously without difficulty.  She had no acute  issues while in house.  ID:   - ***The patient was AF during her hospitalization. She was given IV zosyn as above for management of a bowel perforation. *** She received standard preoperative  "antibiotics without incident.    ENDO:   - ***no issues  PSYCH/NEURO:   - ***She has a history of depression. Her PTA lexapro was held while she was NPO and restarted ***.  PPX:    - *** She was given SCDs, IS, PPI and lovenox during her hospital course.  She tolerated these prophylactic interventions without incident.  They were discontinued at the time of her discharge.      Discharge Instructions and Follow up:  Ms. Jacki Smith was discharged from the hospital with follow up for ***.    Discharge Diet: { :7295361::\"Regular\"}  Discharge Activity: { :7709507::\"Activity as tolerated\"}  Discharge Follow up: ***    Discharge Disposition:  { :[id number]::\"Discharged to home\"}    Discharge Staff: ***      ***      " for nausea or vomiting.  Quantity: 30 tablet  Refills: 2     valACYclovir 1000 mg tablet  Commonly known as: VALTREX      Dose: 1,000 mg  Take 1,000 mg by mouth 2 times daily as needed.  Refills: 0               Where to get your medicines        These medications were sent to [facility]  [address] [address]      Phone: [phone]   Emergency Supply Kit (Central)  Infuvite Adult injection  parenteral nutrition - DUALCHAMBER - see order for formula  Port Access Kit  sodium chloride (PF) 0.9% PF flush       These medications were sent to [facility] - [place], [place] - [address]  [address] [address]      Phone: [phone]   cyclobenzaprine 5 MG tablet  hydrOXYzine HCl 25 MG tablet  oxyCODONE 5 MG tablet         Consultations:  - IR  - GI  - Care coordinator  - Dietitician  - Pharmacy    Brief History of Illness:  This patient is a 59 year old female with recurrent high grade serous ovarian cancer who presents as a transfer from [place] for management of a bowel perforation.     OSH course: Patient was admitted to [facility] on 2/11 for abdominal pain and nausea, found to have pneumoperitoneum likely due to an acute perforation. She was evaluated by general surgery, who recommended admission for IV antibiotics and pain control. She initially improved with conservative management and her diet was advanced. She had a paracentesis on 2/14 where 600 mL of turbid fluid was removed. However, started worsening again on 2/14 with worsening pain and N/V. She was made NPO, NG was placed to [facility], and she was transferred here for further management.    Hospital Course:  Dz:   - She has recurrent platinum sensitive high grade serous ovarian cancer. She received 1 cycle of carbo/taxol/deshawn on 2/5. Treatment held in the setting of bowel perforation. She will follow up with Dr. Patino after discharge for further treatment  planning and hospital follow up.  FEN:   - She was kept NPO on arrival with NG tube to LIS for bowel rest. NG tube was accidentally removed on 2/20, but was ultimately replaced on 2/21 after multiple episodes of emesis. She was started on TPN on 2/21 given prolonged NPO status.  On 2/26/25, her diet was slowly advanced.  She had minimal PO intake and had associated nausea that improved with antiemetics and placing NGT to suction. Venting gtube was placed by GI on 2/28/25. By discharge, she was tolerating a clear liquid diet without nausea and vomiting and able to maintain her hydration with TPN. She will discharge with home TPN and will advance her diet as tolerated to a pureed diet with venting g-tube in place.  Pain:   - Her pain was controlled with tylenol, flexeril, and IV dilaudid prn.  Once tolerating PO pain meds, she was transitioned to a PO pain regimen.  Her pain was well controlled on this and she was discharged home with these medications.  CV:   - She has no history of CV issues.  Her vital signs were stable while in house and she had no acute CV issues.  PULM:   - She has no history of pulmonary issues.  Her O2 sats were greater than 94% on RA.  She was encouraged to use her bedside IS while in house.  She had no acute pulmonary issues while in house.  HEME:   - She was mildly neutropenic on admission with ANC 1.1 in the setting of recent chemotherapy administration which resolved spontaneously. Treatment induced anemia. Her Hgb was stable. She had no other acute heme issues while in house.  GI:   - She was found to have a bowel perforation and SBO in the setting of recent bevacizumab administration at the OSH prior to arrival. KUB on arrival confirmed NG was in appropriate position, and she was kept NPO with NG to LIS. She was continued on IV zosyn. IR was consulted for drain placement and placed two drains in the L and R lower quadrants with cloudy, yellow drainage. The fluid was sent for culture  which was negative. She had a repeat CT scan on 2/21 which demonstrated bilateral lower abdominal rim-enhancing abscesses, a slight decrease in the upper abdominal free fluid, and no pneumoperitoneum. These findings were discussed with IR, and they did not recommend any changes to her drains. Zosyn was stopped on 2/22. On 2/25 abd drains were removed. GI was consulted for persistent SBO and underwent placement of venting gtube on 2/28. She was made NPO prior to the procedure.  On POD#0, her diet was advanced to clear liquids. At the time of discharge, she was tolerating a clear liquid diet without nausea and vomiting with venting g tube in place.   :    - The patient was voiding spontaneously without difficulty.  She had no acute  issues while in house.  ID:   - The patient was AF during her hospitalization. She was given IV zosyn as above for management of a bowel perforation which was stopped on 2/22.   ENDO:   - No acute issues  PSYCH/NEURO:   - She has a history of depression. Her PTA lexapro was continued as well as PRN atarax for anxiety and sleep.  PPX:    - She was given SCDs, IS, PPI and lovenox during her hospital course.  She tolerated these prophylactic interventions without incident. They were discontinued at the time of her discharge.      Discharge Instructions and Follow up:  Ms. Jacki Smith was discharged from the hospital with follow up for     Discharge Diet: Advance to pureed diet as tolerated with Venting Gtube, TPN cycled at HS.  Discharge Activity: Activity as tolerated  Discharge Follow up: Follow up with Dr. Patino on 3/4    Discharge Disposition:  Discharged to home    Discharge Staff: MD Brandy Landon MD  Obstetrics and Gynecology, PGY-2  03/02/2025 7:11 AM          Pat Wu MD  Gynecologic Oncology  [facility]

## 2025-02-18 NOTE — PROCEDURES
[facility]    Procedure: IR Procedure Note    Date/Time: 2/18/2025 5:33 PM    Performed by: Tito Grijalva MD  Authorized by: Tito Grijalva MD  IR Fellow Physician: Tito Grijalva  Other(s) attending procedure: Jaxson Arana    Pre Procedure Diagnosis: peritoneal fluid  Post Procedure Diagnosis: same    UNIVERSAL PROTOCOL   Site Marked: No  Prior Images Obtained and Reviewed:  Yes  Required items: Required blood products, implants, devices and special equipment available    Patient identity confirmed:  Arm band, provided demographic data, hospital-assigned identification number and verbally with patient  Patient was reevaluated immediately before administering moderate or deep sedation or anesthesia  Confirmation Checklist:  Correct equipment/implants were available, procedure was appropriate and matched the consent or emergent situation, relevant allergies and patient's identity using two indicators  Time out: Immediately prior to the procedure a time out was called    Universal Protocol: the Joint Commission Universal Protocol was followed    Preparation: Patient was prepped and draped in usual sterile fashion       ANESTHESIA    Anesthesia:  Local infiltration  Local Anesthetic:  Lidocaine 1% without epinephrine      SEDATION  Patient Sedated: Yes    Sedation Type:  Moderate (conscious) sedation  Sedation:  Fentanyl and midazolam  Vital signs: Vital signs monitored during sedation    See dictated procedure note for full details.  Findings: Successful bilateral peritoneal drain placement 10 [place]    Specimens: none    Procedural Complications: None    Condition: Stable    Plan: 1 hour bedrest  Drains to gravity      PROCEDURE  Describe Procedure: Successful bilateral peritoneal drain placement 10 [place]  Patient Tolerance:  Patient tolerated the procedure well with no immediate complications  Length of time physician/provider present for 1:1 monitoring during  sedation:  0-22 min

## 2025-02-18 NOTE — PHARMACY-ADMISSION MEDICATION HISTORY
Pharmacist Admission Medication History    Admission medication history is complete. The information provided in this note is only as accurate as the sources available at the time of the update.    Information Source(s): Pharmacy med history completed at OSH on 2/11. See original note from Pawan BoD for further details. Patient then transferred to [facility] on 2/18    Changes made to PTA medication list:  Added: Oxycodone, lorazepam, famotidine   Deleted: Norco, senna  Changed: Escitalopram updated to 20 mg daily from 10 mg     Prior to Admission medications    Medication Sig Last Dose Taking? Auth Provider Long Term End Date   acetaminophen (TYLENOL) 500 MG tablet Take 500 mg by mouth every 6 hours as needed for mild pain. Past Month Yes Unknown, Entered By History     escitalopram (LEXAPRO) 20 MG tablet Take 20 mg by mouth daily. 2/17/2025 Evening Yes Reported, Patient     famotidine (PEPCID) 20 MG tablet Take 20 mg by mouth 2 times daily as needed. Unknown Yes Unknown, Entered By History     hydrOXYzine HCl (ATARAX) 10 MG tablet Take 1-2 tablets (10-20 mg) by mouth every 6 hours as needed for itching Past Week Yes Ranjith Torres APRN CNP     LORazepam (ATIVAN) 0.5 MG tablet Take 0.5-1 mg by mouth every 6 hours as needed for anxiety. Past Week Yes Unknown, Entered By History No    Multiple Vitamin (MULTI-VITAMIN DAILY PO) Take 1 tablet by mouth daily Past Week Yes Reported, Patient     omeprazole (PRILOSEC) 20 MG DR capsule Take 20 mg by mouth daily. Past Week Yes Reported, Patient     ondansetron (ZOFRAN) 8 MG tablet Take 1 tablet (8 mg) by mouth every 8 hours as needed for nausea (vomiting). Do not take for 3 days after chemotherapy. Past Week Yes Echo Sanz APRN CNP No    oxyCODONE (ROXICODONE) 5 MG tablet Take 5 mg by mouth every 4 hours as needed for severe pain. Past Week Yes Unknown, Entered By History     cyclobenzaprine (FLEXERIL) 5 MG tablet Take 5-10 mg by mouth 3 times daily as needed for  muscle spasms. Unknown  Reported, Patient     diphenhydrAMINE-acetaminophen (TYLENOL PM)  MG tablet Take 1 tablet by mouth nightly as needed for sleep. Unknown  Reported, Patient     docusate sodium (DSS) 100 MG capsule Take 100 mg by mouth 2 times daily as needed for constipation. Unknown  Reported, Patient     lidocaine-prilocaine (EMLA) 2.5-2.5 % external cream Apply topically as needed for moderate pain Unknown  Echo Sanz, APRN CNP Yes    prochlorperazine (COMPAZINE) 10 MG tablet Take 1 tablet (10 mg) by mouth every 6 hours as needed for nausea or vomiting. Unknown  Charis Patino MD No    valACYclovir (VALTREX) 1000 mg tablet Take 1,000 mg by mouth 2 times daily as needed. Unknown  Reported, Patient Yes      Medication History Completed By: Sree Eduardo [facility] 2/18/2025 11:30 AM

## 2025-02-18 NOTE — PROGRESS NOTES
Transfer  Transferred from: OSH  Via: Ambulance  Reason for transfer: Pt appropriate for 5C  Family: Aware of transfer  Belongings: Received with pt  Chart: Received with pt  Medications: Meds received from old unit with pt  Code Status verified on armband: yes  2 RN Skin Assessment Completed By: Needing to be completed - patient refused.  Med rec completed: yes  Suction/Ambu bag/Flowmeter at bedside: yes    Report received from: CHIKA Ding  Pt status: Aox4, VSS, RA. Complaints of abd pain, finishing IV abx. NPO diet. Settled, awaiting for doctor bedside.

## 2025-02-18 NOTE — PRE-PROCEDURE
GENERAL PRE-PROCEDURE:   Procedure:  Image guided abdominal drain placement  Date/Time:  2/18/2025 5:14 PM    Verbal consent obtained?: Yes    Written consent obtained?: Yes    Risks and benefits: Risks, benefits and alternatives were discussed    Consent given by:  Patient  Patient states understanding of procedure being performed: Yes    Patient's understanding of procedure matches consent: Yes    Procedure consent matches procedure scheduled: Yes    Expected level of sedation:  Moderate  Appropriately NPO:  Yes  ASA Class:  2  Mallampati  :  Grade 2- soft palate, base of uvula, tonsillar pillars, and portion of posterior pharyngeal wall visible  Lungs:  Lungs clear with good breath sounds bilaterally  Heart:  Normal heart sounds and rate  History & Physical reviewed:  History and physical reviewed and no updates needed  Statement of review:  I have reviewed the lab findings, diagnostic data, medications, and the plan for sedation

## 2025-02-18 NOTE — H&P
[facility] History and Physical    Jacki Smith MRN# [id number]   Age: 59 year old YOB: 1965     Date of Admission:  02/18/25     Primary care provider: Domenica Christianson             Chief Complaint:   Abdominal pain/nausea, bowel perforation         History of Present Illness:   This patient is a 59 year old female with recurrent high grade serous ovarian cancer who presents as a transfer from [place] for management of a bowel perforation.    OSH course: Patient was admitted to [facility] on 2/11 for abdominal pain and nausea, found to have pneumoperitoneum thought likely to be due to an acute perforation 2/2 to recent Bevacizumab. She was evaluated by general surgery, who recommended admission for IV antibiotics and pain control. She initially improved with conservative management and her diet was advanced. She had a paracentesis on 2/14 where 600 mL of turbid fluid was removed, no cultures obtained. However, started worsening again on 2/14 with worsening pain and N/V. She was made NPO, NG was placed to [facility], and she was transferred here for further management.    Tonight, patient reports she is feeling tired and very thirsty. Pain is currently controlled. Reports tylenol and flexeril have been most helpful for managing her pain. Not feeling nauseous currently. Not passing any gas today, last thinks she passed gas yesterday. Last BM Thursday morning, has not had a BM since. No fevers/chills, chest pain, SOB. Voiding spontaneously and ambulating without dizziness. No other concerns at this time.            Cancer Treatment History:   5/20/22: Presented to an ED in [place] for evaluation of abdominal bloating and discomfort.  -Abdomen, pelvic CT: Radiographic Stage CAL ovarian cancer.   5/23/22: CA-241=419.   5/24/22: Pelvic ultrasound: c/w metastatic cancer.  6/16/22: Pelvic exam under anesthesia, diagnostic laparoscopy, biopsies, evacuation of ascites.   -Pathology: Stage IIIC high-grade serous  carcinoma of the right ovary (pleural fluid not sampled for cytology).      6/22/22, 7/13/22, 8/4/22: Cycles 1-3 of neoadjuvant chemotherapy with IV carboplatin + paclitaxel 175 mg/m2 every 21 days.   -Cycles 1,2: Carboplatin AUC 6.   -Cycle 3: Carboplatin AUC 5 with dose-reduction due to thrombcytopenia.   -8/19/22: Chest, abdomen, pelvic CT: Partial response.   8/29/22: Pelvic exam under anesthesia, diagnostic laparoscopy, conversion to laparotomy for optimal interval tumor debulking to no gross residual disease including peritoneal and diaphragm biopsies, bilateral salpingo-oophorectomy, infragastric omentectomy, bilateral hemidiaphragm stripping, peritoneal and mesenteric argon beam ablation, appendectomy.   -Pathology: High-grade serous carcinoma involving all resected specimens.   [facility] Testing Results.  -Genomic ROXI low (6%)   -TMB low (1mut/Mb)  -Microsatellite stable/Mismatch Repair proficient  -PD-L1- (CPS 0%)  -NTRK 1/2/3 fusion not detected.   -ER-, W[place]+  -Genomic alterations in:  --TP53  -No genomic alterations in BRCA1,2.  -FOLR1+ (2+, 75%).      Jamila-ovary clinical trial screening: Negative for the immunoreactive subtype.     9/28/22, 10/19/22, 11/17/22: Cycles 4-6 of first-line chemotherapy with IV carboplatin AUC 5 + paclitaxel 175 mg/m2.  -12/2/22: Chest, abdomen, pelvic CT: No definitive evidence of disease.   -CA-125 439>>23.   -2/16/23: Chest, abdomen, pelvic CT to evaluate bloating: Stable findings, no definitive evidence of disease.    -5/25/23: Admitted  to [facility] for management of malignant ascites s/p therapeutic paracentesis, and partial SBO resolved with conservative management.   5/25/23: Abdomen, pelvic CT: Progressive disease.      10/5/22: [facility], reflexed to Common Hereditary Cancer Panel.   -No identifiable germline variants identified in ABRAXAS1, AKT1, APC, DEION, AXIN2, BARD1, BMPR1A, BRCA1, BRCA2, BRIP1, CDC73, CDH1, CDK4, CDKN2A, CHEK2, CTNNA1, DICER1,  EPCAM, FANCC, FANCM, GREM1, HOXB13, KIT, MEN1, MLH1, MRE11, MSH2, MSH6, MUTYH, NBN, NF1, NTHL1, PALB2, PDGFRA, PIK3CA, PMS2, POLD1, POLE, PTEN, RAD50, RAD51C, RAD51D, RECQL, RINT1, SDHA, SDHB, SDHC, SDHD, SMAD4, SMARCA4, STK11, TP53, TSC1, TSC2, VHL, XRCC2.   -Variant of uncertain significance in MSH3 c.16C>T (p.Pqb6Jac)     6/13/23, 7/12/23, 8/14/23, 9/13/23, 10/11/23, 11/8/23, 12/6/23, 1/3/24, 2/1/24, 2/29/24, 3/28/24: Cycles 1-11 of second-line chemotherapy with IV carboplatin AUC 5 + pegylated liposomal doxorubicin (PLD) 30 mg/m2 every 28 days.   -9/27/23: Chest, abdomen, pelvic CT: Partial response. Catheter-associated thrombus.   -1/23/24: Chest, abdomen, pelvic CT: Stable disease.   -4/10/24: Chest, abdomen, pelvic CT: Stable disease.   -CA-125 78>>44.   5/10/24-1/24/25: Second- PARPi therapy with olaparib.   -Cycles 1-2: Olaparib 300 mg BID.   -Cycles 3+: Olaparib 200 mg BID with dose-reduction due to decreased creatinine clearance.   -7/5/24: Chest, abdomen, pelvic CT: Stable disease.   -10/15/24: Chest, abdomen, pelvic CT: Stable disease.   -CA-125 38>>110.     2/5/25: Third-line therapy with IV carboplatin AUC 5 + paclitaxel 135 mg/m2 + bevacizumab 15 mg/kg every 3 weeks    - 2/5/25: Cycle 1 carboplatin, paclitaxel, and bevacizumab.   pending.    2/11/25-present: Admission at OSH for bowel perforation, transferred to [facility] for further management         Past Medical History:     Past Medical History:   Diagnosis Date    Female stress incontinence     Ovarian cancer, right (H) 06/16/2022    Stage IIIC high-grade serous carcinoma    Recurrent cold sores             Past Surgical History:      Past Surgical History:   Procedure Laterality Date    APPENDECTOMY  08/29/2022    Part of ovarian cancer tumor debulking    BLADDER SUSPENSION  08/13/2009    HYSTERECTOMY  08/13/2009    For prolapse    IR PARACENTESIS  6/6/2022    IR PARACENTESIS  6/14/2022    IR PARACENTESIS  5/26/2023     LAPAROSCOPY DIAGNOSTIC (GYN)  2022    SALPINGO-OOPHORECTOMY, COMBINED Bilateral 2022    Pelvic exam under anesthesia, diagnostic laparoscopy, conversion to laparotomy for optimal interval tumor debulking to no gross residual disease including peritoneal and diaphragm biopsies, bilateral salpingo-oophorectomy, infragastric omentectomy, bilateral hemidiaphragm stripping, peritoneal and mesenteric argon beam ablation, appendectomy.    TONSILLECTOMY  1973    TUBAL LIGATION Bilateral 1998            Social History:     Social History     Tobacco Use    Smoking status: Former     Current packs/day: 0.00     Average packs/day: 1 pack/day for 42.0 years (42.0 ttl pk-yrs)     Types: Cigarettes     Start date: 1980     Quit date: 2022     Years since quittin.7    Smokeless tobacco: Never   Substance Use Topics    Alcohol use: Yes     Comment: Beer ~Q3 months.            Family History:     Family History   Problem Relation Age of Onset    Prostate Cancer Father     Breast Cancer Sister 48    Melanoma Sister     Skin Cancer Sister     Colon Cancer Maternal Grandmother             Allergies:   No Known Allergies         Medications:     Current Facility-Administered Medications   Medication Dose Route Frequency Provider Last Rate Last Admin    acetaminophen (TYLENOL) tablet 975 mg  975 mg Oral Q6H PRN Bonnie Goodman MD        benzocaine 20% (HURRICAINE/TOPEX) 20 % spray 0.5-1 mL  1-2 spray Mouth/Throat Q3H PRN Bonnie Goodman MD        calcium carbonate (TUMS) chewable tablet 1,000 mg  1,000 mg Oral 4x Daily PRN Bonnie Goodman MD        cyclobenzaprine (FLEXERIL) tablet 10 mg  10 mg Oral Q8H PRN Bonnie Goodman MD        HYDROmorphone (DILAUDID) injection 0.2-0.3 mg  0.2-0.3 mg Intravenous Q2H PRN Bonnie Goodman MD        lactated ringers infusion   Intravenous Continuous Bonnie Goodman  mL/hr at 25 0128 New Bag at 25 0128    lidocaine (LMX4) cream   Topical Q1H PRN  "Bonnie Goodman MD        lidocaine 1 % 0.1-1 mL  0.1-1 mL Other Q1H PRN Bonnie Goodman MD        naloxone (NARCAN) injection 0.2 mg  0.2 mg Intravenous Q2 Min PRN Catrachito Mitchell MD        Or    naloxone (NARCAN) injection 0.4 mg  0.4 mg Intravenous Q2 Min PRN Catrachito Mitchell MD        Or    naloxone (NARCAN) injection 0.2 mg  0.2 mg Intramuscular Q2 Min PRN Catrachito Mitchell MD        Or    naloxone (NARCAN) injection 0.4 mg  0.4 mg Intramuscular Q2 Min PRN Catrachito Mitchell MD        ondansetron (ZOFRAN ODT) ODT tab 4 mg  4 mg Oral Q6H PRN Bonnie Goodman MD        Or    ondansetron (ZOFRAN) injection 4 mg  4 mg Intravenous Q6H PRN Bonnie Goodman MD        piperacillin-tazobactam (ZOSYN) 4.5 g vial to attach to  mL bag  4.5 g Intravenous Q6H Bonnie Goodman MD        prochlorperazine (COMPAZINE) injection 10 mg  10 mg Intravenous Q6H PRN Bonnie Goodman MD        Or    prochlorperazine (COMPAZINE) tablet 10 mg  10 mg Oral Q6H PRN Bonnie Goodman MD        sodium chloride (PF) 0.9% PF flush 3 mL  3 mL Intracatheter Q8H Bonnie Goodman MD   3 mL at 02/18/25 0132    sodium chloride (PF) 0.9% PF flush 3 mL  3 mL Intracatheter q1 min prn Bonnie Goodman MD                Review of Systems:     CONSTITUTIONAL: Negative for fevers, chills,  RESPIRATORY: Negative for cough, shortness of breath, dyspnea  CARDIOVASCULAR: Negative for chest pain, dyspnea, edema  GASTROINTESTINAL: Positive for abdominal pain, constipation Negative for nausea, vomiting, diarrhea         Physical Exam:     Vitals:    02/18/25 0100   BP: (!) 150/82   BP Location: Right arm   Resp: 16   Temp: 98.6  F (37  C)   TempSrc: Oral   SpO2: 98%   Weight: 64.6 kg (142 lb 8 oz)   Height: 1.567 m (5' 1.71\")     Constitutional: lying in bed, no acute distress, appears well  HEENT: Normal appearance.   Cardiovascular: Regular rate and rhythm without murmurs, clicks, gallops or rub  Respiratory: Clear to auscultation bilaterally without " crackles or wheeze  Gastrointestinal: Abdomen firm to palpation particularly on left, pinpoint tenderness to palpation in RLQ but otherwise non-tender. Moderately distended. No rebound or guarding. BS present in all quadrants but hypoactive apart from RUQ.   Pelvic Exam: deferred  Neuro: grossly intact  Extremities: no LE edema  Skin: No suspicious lesions or rashes  Psychiatric: mentation appears normal  Lines: port         Data:     Results for orders placed or performed during the hospital encounter of 02/18/25 (from the past 24 hours)   ABO/Rh type and screen    Narrative    The following orders were created for panel order ABO/Rh type and screen.  Procedure                               Abnormality         Status                     ---------                               -----------         ------                     Adult Type and Screen[[id number]]                            Preliminary result           Please view results for these tests on the individual orders.   CBC with Platelets & Differential    Narrative    The following orders were created for panel order CBC with Platelets & Differential.  Procedure                               Abnormality         Status                     ---------                               -----------         ------                     CBC with platelets and d...[[id number]]  Abnormal            Final result                 Please view results for these tests on the individual orders.   Adult Type and Screen   Result Value Ref Range    SPECIMEN EXPIRATION DATE [id number]    CBC with platelets and differential   Result Value Ref Range    WBC Count 2.5 (L) 4.0 - 11.0 10e3/uL    RBC Count 3.07 (L) 3.80 - 5.20 10e6/uL    Hemoglobin 10.0 (L) 11.7 - 15.7 g/dL    Hematocrit 29.7 (L) 35.0 - 47.0 %    MCV 97 78 - 100 fL    MCH 32.6 26.5 - 33.0 pg    MCHC 33.7 31.5 - 36.5 g/dL    RDW 14.7 10.0 - 15.0 %    Platelet Count 177 150 - 450 10e3/uL    % Neutrophils 43 %    % Lymphocytes  22 %    % Monocytes 25 %    % Eosinophils 7 %    % Basophils 1 %    % Immature Granulocytes 2 %    NRBCs per 100 WBC 0 <1 /100    Absolute Neutrophils 1.1 (L) 1.6 - 8.3 10e3/uL    Absolute Lymphocytes 0.6 (L) 0.8 - 5.3 10e3/uL    Absolute Monocytes 0.6 0.0 - 1.3 10e3/uL    Absolute Eosinophils 0.2 0.0 - 0.7 10e3/uL    Absolute Basophils 0.0 0.0 - 0.2 10e3/uL    Absolute Immature Granulocytes 0.0 <=0.4 10e3/uL    Absolute NRBCs 0.0 10e3/uL   XR Abdomen Upright Only    Impression    RESIDENT PRELIMINARY INTERPRETATION  IMPRESSION: Nonobstructive bowel gas pattern with a small colonic  stool burden. No portal venous gas or pneumatosis. No definite  pneumoperitoneum.             Imaging  CT A/P 2/16:  IMPRESSION:   1.  New marked wall thickening in the proximal jejunum which may be due to edema or inflammation. Associated new gastric and duodenal obstruction.   2.  Increased moderate pneumoperitoneum likely due to ongoing perforated viscus and/or recent paracentesis. The source of perforation is again not visualized.   3.  Stable hepatic metastases, mesenteric and right iliac metastases and peritoneal carcinomatosis. Slight increase in loculated malignant ascites.             Assessment and Plan:   Assessment: 59 year old female with recurrent HGSOC admitted for a bowel perforation in the setting of recent bevacizumab administration. Patient initially underwent conservative management at OSH with IV antibiotics and bowel rest, but ultimately worsened and was transferred to [facility] for further management.     Plan:    #Bowel perforation  #Pneumoperitoneum  - Found to have pneumoperitoneum on admission consistent with bowel microperforation in the setting of recent bevacizumab administration  - CT at OSH on 2/16 w/ thickening in the proximal jejunum w/ new gastric and duodenal obstruction, moderate pneumoperitoneum but source of perforation not visualized  - Currently on IV zosyn, will continue  - Patient has worsened with  conservative management and needs further source control. Will discuss with IR regarding possible drain placement in the AM. If continues to not improve, may ultimately require surgical management  - Will consider further imaging with PO contrast (small bowel follow through vs CT w/ PO contrast) for possible identification of the site of perforation  - KUB on admission confirmed NG in appropriate position. Will keep NPO with NG to LIS and mIVF.  - Monitor for ROBF  - IV antiemetics prn  - Patient reports tylenol and flexeril have been most helpful for pain, ordered prn in addition to IV dilaudid prn.  - PPX: SCDs, IV protonix. Will hold lovenox while awaiting evaluation by IR    #Neutropenia  - ANC 1.1 on admission, improved from 0.9 on 2/16  - Likely secondary to recent chemotherapy administration, but will continue to monitor closely for developing signs of infection. Afebrile.  - Daily CBC with diff    #Recurrent HGSOC  - Currently C1D14 of carbo/taxol/deshawn. Treatment will be held in the setting of current bowel perforation  - Follow up with Dr. Patino after discharge for ongoing treatment discussions.    #UTI, resolved  - Ucx at OSH with >100k E coli  - s/p treatment with Zosyn as above    #MDD  - PTA lexapro held given NPO w/ NG in place    Dispo:  pending clinical course.    Plan of care discussed with Dr. Warner.    Bonnie Goodman MD  OB/GYN Resident, PGY-4       Physician Attestation   I saw this patient with the resident team on 2/18/2025 and agree with the findings and plan of care as documented in the note. I personally reviewed vital signs, medications, and labs. The above note has been edited by me    Comments: Pt with platinum-sensitive ovarian cancer recurrence now 14d from Carbo/taxol/deshawn with concern for microperforation of bowel 2/2 to bevacizumab. SHe was transferred to [facility] for higher level of care. IR consult today to discuss source control. Continue IV antibiotics, NG tube and NPO status. Pt  is neutropenic secondary to recent chemotherapy, will continue to monitor closely.    Chey Warner MD  Gynecology Oncology   February 18, 2025 ,

## 2025-02-18 NOTE — CONSULTS
"      [facility] Consult Service Note  Interventional Radiology  02/18/25   8:29 AM    Consult Requested: \"bowel perforation, drain placement\"    Recommendations/Plan:    Patient is approved for CT guided drain placement for two paracolic drains as long as there is a window. Discussed the patient's case with the team, and there is a high chance that these drains could be chronic.   Timing of procedure is TBD based on IR staffing/schedule and triage.  Please contact the IR charge RN at [phone] for estimated time of procedure.     Labs WNL for procedure.    Orders entered for procedure, NPO status, and pre procedure IV antibiotics (patient currently on zosyn).   Medications to be held include: N/A  Informed consent will be completed prior to procedure.     Case and imaging discussed with IR attending Dr. Jaxson Arana MD   Recommendations were reviewed with China Landry and Dr. Warner.     History of Present Illness:  Jacki Smith is a 59 year old female with recurrent high grade serous ovarian cancer, neutropenia, and MDD who presents as a transfer from [place] for management of a bowel perforation. Briefly, patient was admitted to [facility] on 2/11 for abdominal pain and nausea, found to have pneumoperitoneum likely due to an acute perforation. She was evaluated by general surgery, who recommended admission for IV antibiotics and pain control. She initially improved with conservative management and her diet was advanced. She had a paracentesis on 2/14/25 where 600 mL of turbid fluid was removed (No fluid was sent for analysis at that time). However, pain started worsening again on 2/14/25 with N/V. CT at OSH on 2/16/25 demonstrated thickening in the proximal jejunum w/ new gastric and duodenal obstruction, moderate pneumoperitoneum but source of perforation not visualized. IR is being requested for possible drain placement. WBC 2.5, and patient is afebrile.     Patient has a " history of ascites on prior CT scans. She now has air in her abdomen along with worsening ascites/fluid.     Per primary team, they understand that the fluid isn't contained. Team is hopeful that this will assist with source control in setting of infection. Team does not feel that they could identify the perforation surgically, and that surgical intervention would not be the best option for her. We discussed that this would likely be a chronic drain in the setting of patient's chronic ascites. Per team, she has many treatment options available yet.     Dr. Arana and staff Dr. Warner discussed patients case. Per team, patient is still a candidate to receive platinum sensitive chemo and patient will eventually be a surgical candidate per team and plan is still to take her back to the OR in the future per Dr. Warner.     Diagnostic labs to be entered by: China Landry     Pertinent Imaging Reviewed:   CT 2/16/25:  IMPRESSION:   1.  New marked wall thickening in the proximal jejunum which may be due to edema or inflammation. Associated new gastric and duodenal obstruction.   2.  Increased moderate pneumoperitoneum likely due to ongoing perforated viscus and/or recent paracentesis. The source of perforation is again not visualized.   3.  Stable hepatic metastases, mesenteric and right iliac metastases and peritoneal carcinomatosis. Slight increase in loculated malignant ascites.   Recent Results (from the past 24 hours)   XR Abdomen Upright Only    Narrative    XR ABDOMEN UPRIGHT ONLY 2/18/2025 1:33 AM     HISTORY: Suspected bowel perforation, NG in place     COMPARISON:  1/10/2025 CT CAP    TECHNIQUE: Portable AP semiupright view of the abdomen.     FINDINGS:   Right internal jugular Port-A-Cath tip terminates at the superior  cavoatrial junction. Enteric tube tip and sidehole projected over the  stomach.    Nonobstructive bowel gas pattern. No portal venous gas or pneumatosis.  No definite pneumoperitoneum.  "Small stool burden in the colon. The  visualized lung bases are clear.          Impression    IMPRESSION:   1. No pneumoperitoneum.  2. Enteric tube tip and sidehole project over the stomach.          I have personally reviewed the examination and initial interpretation  and I agree with the findings.    WILLIE DE LA GARZA MD         SYSTEM ID:  [id number]       Expected date of discharge:  02/20/2025    Vitals:   /69 (BP Location: Right arm)   Pulse 59   Temp 99.2  F (37.3  C) (Oral)   Resp 18   Ht 1.567 m (5' 1.71\")   Wt 64.6 kg (142 lb 8 oz)   SpO2 98%   BMI 26.31 kg/m      Pertinent Labs Reviewed:  CBC:  Lab Results   Component Value Date    WBC 2.5 (L) 02/18/2025    WBC 4.8 02/05/2025    WBC 5.2 12/30/2024     Lab Results   Component Value Date    HGB 10.0 02/18/2025    HGB 12.4 02/05/2025    HGB 11.2 12/30/2024     Lab Results   Component Value Date     02/18/2025     02/05/2025     12/30/2024    INR:  Lab Results   Component Value Date    INR 1.34 (H) 02/18/2025    PTT 40 (H) 02/18/2025          COVID Results:  COVID-19 Antibody Results, Testing for Immunity           No data to display              COVID-19 PCR Results           No data to display             Potassium   Date Value Ref Range Status   02/18/2025 3.6 3.4 - 5.3 mmol/L Final   09/12/2023 4.4 3.5 - 5.0 mmol/L Final          Brandee Jerry PA-C  Interventional Radiology  Pager: [phone]    "

## 2025-02-18 NOTE — LETTER
[facility] [address] [address]  [phone]      2025    Jacki Smith  [address] [place] [id number]  [phone] (home)     : 1965      To Whom it may concern:    Jacki Smith was admitted to the hospital from 2025 until 3/1/2025. Please excuse her from work until Monday, 3/10/2025 to allow her appropriate time to recover.    Sincerely,          Bonnie Goodman MD

## 2025-02-18 NOTE — PROGRESS NOTES
"CLINICAL NUTRITION SERVICES - ASSESSMENT NOTE    RECOMMENDATIONS FOR MDs/PROVIDERS TO ORDER:  Recommend diet advancement vs. Initiation of nutrition support within 2-3 days     Malnutrition Status:    Patient does not meet two of the established criteria necessary for diagnosing malnutrition but is at risk for malnutrition    Registered Dietitian Interventions:  None at this time due to NPO diet     Future/Additional Recommendations:  -Monitor ability for diet advancement  -Monitor weight trends     REASON FOR ASSESSMENT  Positive admission nutrition risk screen (2-13# weight loss, eating poorly)    SUBJECTIVE INFORMATION  Assessed patient in room.  PMH significant for ovarian CA, admitted for bowel perforation.     NUTRITION HISTORY  Pt seen by RD at OSH. Met with Michelle, her daughter, and  at bedside. Michelle was intermittently sleeping during visit, family provided some background information. Michelle notes she began experiencing taste changes with fluids starting last summer, notes this progressed to all foods/fluids tasting off following a dentist appointment in early January. Prior to early 2025, pt notes she had a good appetite and ate 3-4 meals daily. Since January, pt reports she has been eating 2 small meals (~50% of baseline portions) + the majority of a protein shake- pt unsure of the brand daily. Pt denies N/V or difficulties chewing/swallowing today. Reviewed ONS for pt to try once diet is advanced.       CURRENT NUTRITION ORDERS  Diet: NPO    CURRENT INTAKE/TOLERANCE  N/a- NPO    LABS  Nutrition-relevant labs: Reviewed    MEDICATIONS  Nutrition-relevant medications: Reviewed  Lexapro  Protonix  LR @ 100 mL/hr  PRN Tums, Zofran, Compazine     ANTHROPOMETRICS  Height: 156.8 cm (5' 1.713\")  Most Recent Weight: 64.6 kg (142 lb 8 oz)  IBW: 47.7 kg  % IBW: 135.4%  BMI (kg/m ): Overweight BMI 25-29.9  Weight History: UBW ~150# per pt, began losing weight in January.   Wt Readings from Last 15 " Encounters:   02/18/25 64.6 kg (142 lb 8 oz)   02/06/25 64.3 kg (141 lb 12.8 oz)   02/03/25 66.7 kg (147 lb)   01/24/25 65.8 kg (145 lb)   10/18/24 68.5 kg (151 lb)   09/03/24 67.1 kg (148 lb)   07/16/24 67.5 kg (148 lb 12.8 oz)   06/21/24 69 kg (152 lb 1.6 oz)   04/12/24 68.9 kg (152 lb)   03/26/24 68.5 kg (151 lb)   02/28/24 68.3 kg (150 lb 9.6 oz)   02/27/24 68.3 kg (150 lb 9.6 oz)   02/21/24 68 kg (150 lb)   02/01/24 68.2 kg (150 lb 6.4 oz)   01/26/24 69.6 kg (153 lb 6.4 oz)     Dosing Weight: 51.9 kg, based on adjusted wt    ASSESSED NUTRITION NEEDS  Estimated Energy Needs: 3631-3801 kcals/day (30 - 35 kcals/kg)  Justification: Increased needs  Estimated Protein Needs: 62-77 grams protein/day (1.2 - 1.5 grams of pro/kg)  Justification: Increased needs  Estimated Fluid Needs: 3440-6043 mL/day (25 - 30 mL/kg)  Justification: Maintenance    SYSTEM FINDINGS  =  Skin/wounds: reviewed, no concerns noted.   GI symptoms: no BM's recorded so far this admission     MALNUTRITION  % Intake: < 75% for >/= 1 month (moderate)  % Weight Loss: Weight loss does not meet criteria   Subcutaneous Fat Loss: visual only- pt sleeping intermittently during visit None observed  Muscle Loss: visual only- pt sleeping intermittently during visit None observed  Fluid Accumulation/Edema: None noted  Malnutrition Diagnosis: Patient does not meet two of the established criteria necessary for diagnosing malnutrition but is at risk for malnutrition  Malnutrition Present on Admission: No    NUTRITION DIAGNOSIS  Inadequate oral intake related to taste changes as evidenced by current NPO diet, pt diet report     INTERVENTIONS  Nutrition education content- reviewed ONS options at [facility] for once diet is advanced, recommended pt include a source of protein with each meal once diet advances to help maintain wt/muscle, reviewed sources of muscle      Goals  Diet adv v nutrition support within 2-3 days.     Monitoring/Evaluation  Progress toward goals  will be monitored and evaluated per policy.    Zeynep Lemos RD (Maggie), LD- 5C Clinical Dietitian   Available on goTenna  No longer available by paging      Complex Repair And Double Advancement Flap Text: The defect edges were debeveled with a #15 scalpel blade.  The primary defect was closed partially with a complex linear closure.  Given the location of the remaining defect, shape of the defect and the proximity to free margins a double advancement flap was deemed most appropriate for complete closure of the defect.  Using a sterile surgical marker, an appropriate advancement flap was drawn incorporating the defect and placing the expected incisions within the relaxed skin tension lines where possible.    The area thus outlined was incised deep to adipose tissue with a #15 scalpel blade.  The skin margins were undermined to an appropriate distance in all directions utilizing iris scissors.

## 2025-02-18 NOTE — PLAN OF CARE
"RN Mtm-po-Apepu care note: [id number]                               *For vital signs and complete assessments, please see documentation flowsheets.*    Contact precaution:R/O C.diff    Neuro:  Alert and oriented x4.   Cardiac: Not on telemetry. Vital signs stable.   Respiratory: Oxygen saturation >95% on room air.  GI: No report of nausea or vomiting. On NPO diet.   : No UO: arrived in early morning. LBM: (PTA).  Activity: Up ad deja/independent up to bathroom.  Pain: At acceptable level on current regimen. No PRN given.   Skin: No new skin deficits noted.   LDAs: R PIV: infusing - Port-A-Cath: infusing LR 100mL/hr. NG to low-int suction.  Tests/Procedures: Patient had x-ray done for NG placement verification.  Sleep: Patient did not sleep well throughout the night.  Additional shift information: Arrived from OSH ~0030  Plan: Please continue with patient care plan & notify the primary team with any changes to patient condition.    Problem: Adult Inpatient Plan of Care  Goal: Plan of Care Review  Description: The Plan of Care Review/Shift note should be completed every shift.  The Outcome Evaluation is a brief statement about your assessment that the patient is improving, declining, or no change.  This information will be displayed automatically on your shift  note.  Outcome: Progressing  Goal: Patient-Specific Goal (Individualized)  Description: You can add care plan individualizations to a care plan. Examples of Individualization might be:  \"Parent requests to be called daily at 9am for status\", \"I have a hard time hearing out of my right ear\", or \"Do not touch me to wake me up as it startles  me\".  Outcome: Progressing  Goal: Absence of Hospital-Acquired Illness or Injury  Outcome: Progressing  Intervention: Identify and Manage Fall Risk  Recent Flowsheet Documentation  Taken 2/18/2025 0059 by Mercedes Luna RN  Safety Promotion/Fall Prevention:   safety round/check completed   room organization consistent   " room near nurse's station   clutter free environment maintained  Intervention: Prevent Skin Injury  Recent Flowsheet Documentation  Taken 2/18/2025 0100 by Mercedes Luna, RN  Body Position: position changed independently  Taken 2/18/2025 0059 by Mercedes Luna, RN  Skin Protection:   adhesive use limited   tubing/devices free from skin contact  Intervention: Prevent Infection  Recent Flowsheet Documentation  Taken 2/18/2025 0059 by Mercedes Luna, RN  Infection Prevention:   cohorting utilized   environmental surveillance performed   equipment surfaces disinfected   hand hygiene promoted   personal protective equipment utilized   rest/sleep promoted   single patient room provided  Goal: Optimal Comfort and Wellbeing  Outcome: Progressing  Goal: Readiness for Transition of Care  Outcome: Progressing  Intervention: Mutually Develop Transition Plan  Recent Flowsheet Documentation  Taken 2/18/2025 0052 by Mercedes uLna, RN  Equipment Currently Used at Home: none  Taken 2/18/2025 0000 by Mercedes Luna, RN  Equipment Currently Used at Home: none

## 2025-02-18 NOTE — PROGRESS NOTES
"On call provider notified via [facility] SmartWeb  Pt (Jacki Smith \"Michelle\") just arrived to  Room 5432.  CHIKA Koo   [phone]  "

## 2025-02-18 NOTE — LETTER
Transition Communication Hand-off for Care Transitions to Next Level of Care Provider    Name: Jacki Smith  : 1965  MRN #: [id number]  Primary Care Provider: Domenica Christianson     Primary Clinic: [address] [address]     Reason for Hospitalization:  Bowel perforation (H) [K63.1]  Admit Date/Time: 2025 12:25 AM  Discharge Date: 3/1/2025  Payor Source: Payor: BCBS / Plan: BCBS OUT OF STATE / Product Type: Indemnity /        Reason for Communication Hand-off Referral: continuity of care    Discharge Plan: home with TPN provided by [facility] and Home care with [facility]    Discharge Needs Assessment:  Needs      Flowsheet Row Most Recent Value   Equipment Currently Used at Home none   # of Referrals Placed by  Home Infusion          Follow-up plan:    Future Appointments   Date Time Provider Department Center   3/1/2025  3:00 PM Goldie Vieyra RN [facility]   3/2/2025  3:00 PM Goldie Vieyra RN [facility]   3/3/2025  3:00 PM Essie Anaya RN [facility]   3/4/2025  1:00 PM Charis Patino MD [facility]   3/17/2025  9:30 AM Echo Sanz APRN CNP [facility]   3/18/2025  7:45 AM [facility]FUSION CLINIC LAB [facility]   3/19/2025  9:00 AM [facility] INFUSION CHAIR [facility]   2025  2:15 PM [facility]USION CLINIC LAB [facility]   2025  3:20 PM [facility]   4/10/2025  7:00 AM Echo Sanz APRN CNP [facility]   4/10/2025 10:00 AM [facility] INFUSION CHAIR [facility]       Any outstanding tests or procedures:        Referrals       Future Labs/Procedures    Home Care Referral     Comments:    Your provider has ordered home health services. If you have not been contacted within 2 days of your discharge please call the selected Home Care agency listed on your Discharge document.  If a Home Care agency is NOT listed, please call [phone].    Home Infusion Referral             Desiree Dutta,  RNCC  Phone: [phone]   Med Surg Vocera  Nurse Coordinator        AVS/Discharge Summary is the source of truth; this is a helpful guide for improved communication of patient story

## 2025-02-19 LAB
ANION GAP SERPL CALCULATED.3IONS-SCNC: 19 MMOL/L (ref 7–15)
BASOPHILS # BLD AUTO: 0 10E3/UL (ref 0–0.2)
BASOPHILS NFR BLD AUTO: 1 %
BUN SERPL-MCNC: 7 MG/DL (ref 8–23)
BURR CELLS BLD QL SMEAR: ABNORMAL
CALCIUM SERPL-MCNC: 8.7 MG/DL (ref 8.8–10.4)
CHLORIDE SERPL-SCNC: 98 MMOL/L (ref 98–107)
CREAT SERPL-MCNC: 0.75 MG/DL (ref 0.51–0.95)
EGFRCR SERPLBLD CKD-EPI 2021: >90 ML/MIN/1.73M2
ELLIPTOCYTES BLD QL SMEAR: ABNORMAL
EOSINOPHIL # BLD AUTO: 0.2 10E3/UL (ref 0–0.7)
EOSINOPHIL NFR BLD AUTO: 7 %
ERYTHROCYTE [DISTWIDTH] IN BLOOD BY AUTOMATED COUNT: 14.8 % (ref 10–15)
GLUCOSE SERPL-MCNC: 71 MG/DL (ref 70–99)
HCO3 SERPL-SCNC: 20 MMOL/L (ref 22–29)
HCT VFR BLD AUTO: 30.7 % (ref 35–47)
HGB BLD-MCNC: 10.3 G/DL (ref 11.7–15.7)
IMM GRANULOCYTES # BLD: 0.1 10E3/UL
IMM GRANULOCYTES NFR BLD: 3 %
LYMPHOCYTES # BLD AUTO: 0.7 10E3/UL (ref 0.8–5.3)
LYMPHOCYTES NFR BLD AUTO: 19 %
MAGNESIUM SERPL-MCNC: 1.5 MG/DL (ref 1.7–2.3)
MAGNESIUM SERPL-MCNC: 2.3 MG/DL (ref 1.7–2.3)
MCH RBC QN AUTO: 32.7 PG (ref 26.5–33)
MCHC RBC AUTO-ENTMCNC: 33.6 G/DL (ref 31.5–36.5)
MCV RBC AUTO: 98 FL (ref 78–100)
MONOCYTES # BLD AUTO: 0.5 10E3/UL (ref 0–1.3)
MONOCYTES NFR BLD AUTO: 13 %
NEUTROPHILS # BLD AUTO: 2 10E3/UL (ref 1.6–8.3)
NEUTROPHILS NFR BLD AUTO: 58 %
NRBC # BLD AUTO: 0 10E3/UL
NRBC BLD AUTO-RTO: 0 /100
PHOSPHATE SERPL-MCNC: 2.6 MG/DL (ref 2.5–4.5)
PLAT MORPH BLD: ABNORMAL
PLATELET # BLD AUTO: 181 10E3/UL (ref 150–450)
POLYCHROMASIA BLD QL SMEAR: SLIGHT
POTASSIUM SERPL-SCNC: 3.3 MMOL/L (ref 3.4–5.3)
POTASSIUM SERPL-SCNC: 3.7 MMOL/L (ref 3.4–5.3)
RBC # BLD AUTO: 3.15 10E6/UL (ref 3.8–5.2)
RBC MORPH BLD: ABNORMAL
SODIUM SERPL-SCNC: 137 MMOL/L (ref 135–145)
TARGETS BLD QL SMEAR: SLIGHT
WBC # BLD AUTO: 3.5 10E3/UL (ref 4–11)

## 2025-02-19 PROCEDURE — 250N000011 HC RX IP 250 OP 636

## 2025-02-19 PROCEDURE — 36591 DRAW BLOOD OFF VENOUS DEVICE: CPT | Performed by: OBSTETRICS & GYNECOLOGY

## 2025-02-19 PROCEDURE — 250N000011 HC RX IP 250 OP 636: Performed by: NURSE PRACTITIONER

## 2025-02-19 PROCEDURE — 84100 ASSAY OF PHOSPHORUS: CPT | Performed by: STUDENT IN AN ORGANIZED HEALTH CARE EDUCATION/TRAINING PROGRAM

## 2025-02-19 PROCEDURE — 120N000003 HC R&B IMCU UMMC

## 2025-02-19 PROCEDURE — 250N000009 HC RX 250

## 2025-02-19 PROCEDURE — 85004 AUTOMATED DIFF WBC COUNT: CPT | Performed by: STUDENT IN AN ORGANIZED HEALTH CARE EDUCATION/TRAINING PROGRAM

## 2025-02-19 PROCEDURE — 250N000011 HC RX IP 250 OP 636: Performed by: OBSTETRICS & GYNECOLOGY

## 2025-02-19 PROCEDURE — 250N000013 HC RX MED GY IP 250 OP 250 PS 637: Performed by: OBSTETRICS & GYNECOLOGY

## 2025-02-19 PROCEDURE — 99232 SBSQ HOSP IP/OBS MODERATE 35: CPT | Mod: GC | Performed by: STUDENT IN AN ORGANIZED HEALTH CARE EDUCATION/TRAINING PROGRAM

## 2025-02-19 PROCEDURE — 83735 ASSAY OF MAGNESIUM: CPT | Performed by: OBSTETRICS & GYNECOLOGY

## 2025-02-19 PROCEDURE — 258N000003 HC RX IP 258 OP 636

## 2025-02-19 PROCEDURE — 83735 ASSAY OF MAGNESIUM: CPT | Performed by: STUDENT IN AN ORGANIZED HEALTH CARE EDUCATION/TRAINING PROGRAM

## 2025-02-19 PROCEDURE — 36415 COLL VENOUS BLD VENIPUNCTURE: CPT | Performed by: STUDENT IN AN ORGANIZED HEALTH CARE EDUCATION/TRAINING PROGRAM

## 2025-02-19 PROCEDURE — 250N000013 HC RX MED GY IP 250 OP 250 PS 637

## 2025-02-19 PROCEDURE — 84132 ASSAY OF SERUM POTASSIUM: CPT | Performed by: OBSTETRICS & GYNECOLOGY

## 2025-02-19 PROCEDURE — 80048 BASIC METABOLIC PNL TOTAL CA: CPT | Performed by: STUDENT IN AN ORGANIZED HEALTH CARE EDUCATION/TRAINING PROGRAM

## 2025-02-19 RX ORDER — MAGNESIUM SULFATE HEPTAHYDRATE 40 MG/ML
4 INJECTION, SOLUTION INTRAVENOUS ONCE
Status: COMPLETED | OUTPATIENT
Start: 2025-02-19 | End: 2025-02-19

## 2025-02-19 RX ORDER — POTASSIUM CHLORIDE 1.5 G/1.58G
40 POWDER, FOR SOLUTION ORAL ONCE
Status: COMPLETED | OUTPATIENT
Start: 2025-02-19 | End: 2025-02-19

## 2025-02-19 RX ORDER — ENOXAPARIN SODIUM 100 MG/ML
40 INJECTION SUBCUTANEOUS EVERY 24 HOURS
Status: DISCONTINUED | OUTPATIENT
Start: 2025-02-19 | End: 2025-02-20

## 2025-02-19 RX ADMIN — HYDROMORPHONE HYDROCHLORIDE 0.2 MG: 0.2 INJECTION, SOLUTION INTRAMUSCULAR; INTRAVENOUS; SUBCUTANEOUS at 00:39

## 2025-02-19 RX ADMIN — MAGNESIUM SULFATE IN WATER 4 G: 40 INJECTION, SOLUTION INTRAVENOUS at 11:34

## 2025-02-19 RX ADMIN — ACETAMINOPHEN 975 MG: 325 TABLET, FILM COATED ORAL at 14:43

## 2025-02-19 RX ADMIN — PANTOPRAZOLE SODIUM 40 MG: 40 INJECTION, POWDER, FOR SOLUTION INTRAVENOUS at 07:50

## 2025-02-19 RX ADMIN — PROCHLORPERAZINE EDISYLATE 10 MG: 5 INJECTION INTRAMUSCULAR; INTRAVENOUS at 00:38

## 2025-02-19 RX ADMIN — ACETAMINOPHEN 975 MG: 325 TABLET, FILM COATED ORAL at 06:54

## 2025-02-19 RX ADMIN — HYDROMORPHONE HYDROCHLORIDE 0.2 MG: 0.2 INJECTION, SOLUTION INTRAMUSCULAR; INTRAVENOUS; SUBCUTANEOUS at 22:17

## 2025-02-19 RX ADMIN — PIPERACILLIN AND TAZOBACTAM 4.5 G: 4; .5 INJECTION, POWDER, LYOPHILIZED, FOR SOLUTION INTRAVENOUS at 03:56

## 2025-02-19 RX ADMIN — ESCITALOPRAM OXALATE 20 MG: 20 TABLET ORAL at 21:06

## 2025-02-19 RX ADMIN — ENOXAPARIN SODIUM 40 MG: 40 INJECTION SUBCUTANEOUS at 19:01

## 2025-02-19 RX ADMIN — PIPERACILLIN SODIUM AND TAZOBACTAM SODIUM 3.38 G: 3; .375 INJECTION, SOLUTION INTRAVENOUS at 16:08

## 2025-02-19 RX ADMIN — PIPERACILLIN AND TAZOBACTAM 4.5 G: 4; .5 INJECTION, POWDER, LYOPHILIZED, FOR SOLUTION INTRAVENOUS at 10:29

## 2025-02-19 RX ADMIN — PIPERACILLIN SODIUM AND TAZOBACTAM SODIUM 3.38 G: 3; .375 INJECTION, SOLUTION INTRAVENOUS at 21:06

## 2025-02-19 RX ADMIN — ACETAMINOPHEN 975 MG: 325 TABLET, FILM COATED ORAL at 20:57

## 2025-02-19 RX ADMIN — SODIUM CHLORIDE, POTASSIUM CHLORIDE, SODIUM LACTATE AND CALCIUM CHLORIDE: 600; 310; 30; 20 INJECTION, SOLUTION INTRAVENOUS at 22:36

## 2025-02-19 RX ADMIN — SODIUM CHLORIDE, POTASSIUM CHLORIDE, SODIUM LACTATE AND CALCIUM CHLORIDE: 600; 310; 30; 20 INJECTION, SOLUTION INTRAVENOUS at 00:45

## 2025-02-19 RX ADMIN — CYCLOBENZAPRINE 10 MG: 10 TABLET, FILM COATED ORAL at 06:54

## 2025-02-19 RX ADMIN — POTASSIUM CHLORIDE 40 MEQ: 1.5 POWDER, FOR SOLUTION ORAL at 11:34

## 2025-02-19 RX ADMIN — CYCLOBENZAPRINE 10 MG: 10 TABLET, FILM COATED ORAL at 20:57

## 2025-02-19 RX ADMIN — SODIUM CHLORIDE, POTASSIUM CHLORIDE, SODIUM LACTATE AND CALCIUM CHLORIDE: 600; 310; 30; 20 INJECTION, SOLUTION INTRAVENOUS at 10:24

## 2025-02-19 ASSESSMENT — ACTIVITIES OF DAILY LIVING (ADL)
ADLS_ACUITY_SCORE: 41

## 2025-02-19 NOTE — PROGRESS NOTES
"Brief Progress Note    Went to check on patient after arriving back to the floor from her drain placement. Reports she is doing fine at this time. Having some soreness from drain placement, but otherwise denies abdominal pain. No nausea, starting to feel a little hungry. Not passing gas. No other concerns at this time.    BP (!) 149/86 (BP Location: Right arm)   Pulse 59   Temp 97.5  F (36.4  C) (Oral)   Resp 18   Ht 1.567 m (5' 1.71\")   Wt 64.6 kg (142 lb 8 oz)   SpO2 100%   BMI 26.31 kg/m    Gen: appears well, NAD  Abd: Moderately distended, non-tender, no rebound or guarding. Bowel sounds hypoactive. SANYA drain present in RLQ and LLQ with cloudy, yellow drainage.    Aspirated peritoneal fluid sent for culture, pending. Continue NPO with NG to LIS, ok to clamp for meds. Continue plan of care.    Bonnie Goodman MD  OB/GYN Resident, PGY-4    "

## 2025-02-19 NOTE — PROGRESS NOTES
"Gynecology Oncology Progress Note    Ms. Jacki Smith is a 59 year old HD#9 for management of a bowel microperforation in the setting of recent bevacizumab administration.    Dz: Recurrent platinum sensitive HGSOC    24 hour events:   - bilateral IR drain placement  - Drain culture sent  - started loxenox ppx    Subjective: Patient reports she just woke up, isn't sure how she's feeling yet. Pain overnight was predominantly at her drain sites but manageable. Not sure if she is nauseous yet this morning, had some nausea when she was moved to a new room overnight. Not sure if she's passing flatus as she just woke up. Voiding spontaneously. No other questions or concerns .    Objective:   /72 (BP Location: Right arm)   Pulse 62   Temp 98.8  F (37.1  C) (Oral)   Resp 18   Ht 1.567 m (5' 1.71\")   Wt 64.6 kg (142 lb 8 oz)   SpO2 98%   BMI 26.31 kg/m      General: lying in bed, in NAD  CV: well perfused  Resp: no increased work of breathing on room air  Abdomen: soft, non-tender, moderately distended. SANYA drains present with cloudy, yellow drainage  Extremities: nontender, trace edema    I/Os  (Yesterday // Since Midnight)  IV: 1473 mL // NR  PO: 0 mL // NR  Urine 1200 mL // 200 mL  N mL // NR  SANYA drains: 500 mL // NR    New labs/imaging-  Results for orders placed or performed during the hospital encounter of 25 (from the past 24 hours)   Ascites Fluid Aerobic Bacterial Culture Routine With Gram Stain    Specimen: Abdomen; Ascites Fluid   Result Value Ref Range    Gram Stain Result No organisms seen     Gram Stain Result 1+ WBC seen     Narrative    Gram Stain quantification of host cells and microbiological organisms was done on a cytocentrifuged preparation.     IR Procedure Note    Narrative    Tito Grijalva MD     2025  5:34 PM  [facility]    Procedure: IR Procedure Note    Date/Time: 2025 5:33 PM    Performed by: Tito Grijalva, " MD  Authorized by: Tito Grijalva MD  IR Fellow Physician: Tito Grijalva  Other(s) attending procedure: Jaxson Arana    Pre Procedure Diagnosis: peritoneal fluid  Post Procedure Diagnosis: same    UNIVERSAL PROTOCOL   Site Marked: No  Prior Images Obtained and Reviewed:  Yes  Required items: Required blood products, implants, devices and special   equipment available    Patient identity confirmed:  Arm band, provided demographic data,   hospital-assigned identification number and verbally with patient  Patient was reevaluated immediately before administering moderate or deep   sedation or anesthesia  Confirmation Checklist:  Correct equipment/implants were available,   procedure was appropriate and matched the consent or emergent situation,   relevant allergies and patient's identity using two indicators  Time out: Immediately prior to the procedure a time out was called    Universal Protocol: the Joint Commission Universal Protocol was followed    Preparation: Patient was prepped and draped in usual sterile fashion       ANESTHESIA    Anesthesia:  Local infiltration  Local Anesthetic:  Lidocaine 1% without epinephrine      SEDATION  Patient Sedated: Yes    Sedation Type:  Moderate (conscious) sedation  Sedation:  Fentanyl and midazolam  Vital signs: Vital signs monitored during sedation    See dictated procedure note for full details.  Findings: Successful bilateral peritoneal drain placement 10 Martiniquais    Specimens: none    Procedural Complications: None    Condition: Stable    Plan: 1 hour bedrest  Drains to gravity      PROCEDURE  Describe Procedure: Successful bilateral peritoneal drain placement 10   Martiniquais  Patient Tolerance:  Patient tolerated the procedure well with no immediate   complications  Length of time physician/provider present for 1:1 monitoring during   sedation:  0-22 min   IR Retroperitoneal Abscess Drainage    Narrative    PROCEDURE: Drainage catheter placement x 2    Procedural  Personnel  Attending physician(s): Jaxson Arana   Fellow physician(s): Tito Grijalva  Resident physician(s): None  Advanced practice provider(s): None    Pre-procedure diagnosis: peritoneal fluid concern for perforation  Post-procedure diagnosis: Same  Indication: suspected infected ascites  Additional clinical history: None    Complications: No immediate complications.      Impression    IMPRESSION:    Percutaneous placement of right lower quadrant and left lower quadrant  10 Pashto drainage catheter into peritoneal fluid, yielding 60 mL of  cloudy fluid.    Plan:     drains to gravity  one hour bedrest    ______________________________________________________________________    PROCEDURE SUMMARY:  - Intraperitoneal drainage catheter placement under ultrasound  guidance  - Additional procedure(s): None    PROCEDURE DETAILS:    Pre-procedure  Consent: Informed consent for the procedure including risks, benefits  and alternatives was obtained and time-out was performed prior to the  procedure.  Preparation: The site was prepared and draped using maximal sterile  barrier technique including cutaneous antisepsis.    Anesthesia/sedation  Monitoring: The patient was placed on continuous monitoring.  Intravenous sedation was administered. Vital signs and sedation  monitored by nursing staff under Interventional Radiologists  supervision. The patient remained stable throughout the procedure.    Medications:   1. 125mcg Fentanyl  2. 2.5 mg Versed    Face to Face sedation time: 14 minutes  Right Drainage catheter placement  The patient was positioned supine. Initial imaging was performed.  Local anesthesia was administered. The fluid collection was accessed  using an access needle followed by wire insertion and serial dilation  and a drainage catheter was placed. Position of the drainage catheter  within the fluid collection was confirmed.  Initial imaging findings: adequate fluid for drainage  Access route:  Percutaneous  Drainage catheter placed: skater  Drain size (Fr): 10  External catheter securement: Non-absorbable suture  Drainage catheter contrast injection: No  Final imaging findings: adequate drain placement    Left Drainage catheter placement  The patient was positioned supine. Initial imaging was performed.  Local anesthesia was administered. The fluid collection was accessed  using an access needle followed by wire insertion and serial dilation  and a drainage catheter was placed. Position of the drainage catheter  within the fluid collection was confirmed.  Initial imaging findings: adequate size of fluid collection for  drainage  Access route: Percutaneous  Drainage catheter placed: skater  Drain size (Fr): 10  External catheter securement: Non-absorbable suture  Drainage catheter contrast injection: No  Final imaging findings: adequate drain placement    Contrast  Contrast agent: None  Contrast volume (mL): 0    Radiation Dose  none    Additional Details  Additional description of procedure: None  Registry event: V/3/f  Device used: None  Equipment details: None  Specimens removed: 60 mL of cloudy fluid. Aspirated fluid was sent for  analysis.  Estimated blood loss (mL): Less than 10  Standardized report: [email or web address]_DrainPlacement_v3.1         Assessment: 59 year old female with recurrent HGSOC admitted for a bowel microperforation in the setting of recent bevacizumab administration. Patient initially underwent conservative management at OSH with IV antibiotics and bowel rest, but ultimately worsened and was transferred to [facility] for further management.     Plan:    #Bowel perforation  #Pneumoperitoneum  - Found to have pneumoperitoneum on admission consistent with bowel perforation in the setting of recent bevacizumab administration  - CT at OSH on 2/16 w/ thickening in the proximal jejunum w/ new gastric and duodenal obstruction, moderate pneumoperitoneum but source of perforation not visualized  - Currently on  IV zosyn, will continue  - s/p IR drain placement 2/18. Will continue to trend output, fluid sent for culture which is pending.  - Will consider further imaging with PO contrast (small bowel follow through vs CT w/ PO contrast) for possible identification of the site of perforation if not improving  - If not improving, may ultimately require surgical management  - Continue bowel rest with NPO, NG to LIS. Monitor for ROBF  - IV antiemetics prn  - Patient reports tylenol and flexeril have been most helpful for pain, ordered prn in addition to IV dilaudid prn.  - PPX: SCDs, IV protonix, lovenox ppx     #Neutropenia  - ANC 1.1 on admission, improved from 0.9 on 2/16  - Likely secondary to recent chemotherapy administration, but will continue to monitor closely for developing signs of infection. Afebrile.  - Daily CBC with diff     #Recurrent HGSOC  - S/p cycle 1 of carbo/taxol/deshawn on 2/5. Treatment will be held in the setting of current bowel perforation  - Follow up with Dr. Patino after discharge for ongoing treatment discussions.     #UTI, resolved  - Ucx at OSH with >100k E coli  - s/p treatment with Zosyn as above     #MDD  - PTA lexapro daily    Disposition: pending clinical course    Plan of care discussed with Dr. Warner.    Bonnie Goodman MD  OB/GYN Resident, PGY-4

## 2025-02-19 NOTE — PLAN OF CARE
"/62   Pulse 68   Temp 97.7  F (36.5  C) (Oral)   Resp 18   Ht 1.567 m (5' 1.71\")   Wt 64.6 kg (142 lb 8 oz)   SpO2 100%   BMI 26.31 kg/m      Jacki Smith had a long day, finally experiencing some rest this evening after reporting fatigue all day prior to the procedure. IVAD intact, dressing changed. Peritoneal drains producing thin, yellow output. Abnormal vital signs: borderline bradycardia (50s-60s), borderline hypertension (138/93). Up to 9/10 pain reported, adequate pain relief with tylenol. Irregular assessment findings include: cold feet, NG, abd drains, back pain. Plan for the evening consists of: nursing staff will continue to monitor abdominal drains and NG tube for adequate drainage and lack of complications. 2 RN skin check completed with Tammie Thompson RN.    Problem: Adult Inpatient Plan of Care  Goal: Plan of Care Review  Description: The Plan of Care Review/Shift note should be completed every shift.  The Outcome Evaluation is a brief statement about your assessment that the patient is improving, declining, or no change.  This information will be displayed automatically on your shift  note.  Outcome: Progressing  Flowsheets (Taken 2/18/2025 1858)  Plan of Care Reviewed With:   patient   spouse   child  Overall Patient Progress: no change  Goal: Patient-Specific Goal (Individualized)  Description: You can add care plan individualizations to a care plan. Examples of Individualization might be:  \"Parent requests to be called daily at 9am for status\", \"I have a hard time hearing out of my right ear\", or \"Do not touch me to wake me up as it startles  me\".  Outcome: Progressing  Flowsheets (Taken 2/18/2025 1858)  Patient/Family-Specific Goals (Include Timeframe): Patient will report adequate pain control today  Goal: Absence of Hospital-Acquired Illness or Injury  Outcome: Progressing  Intervention: Identify and Manage Fall Risk  Recent Flowsheet Documentation  Taken 2/18/2025 4753 by Aggie, " Sree PARK RN  Safety Promotion/Fall Prevention:   clutter free environment maintained   room organization consistent   safety round/check completed  Intervention: Prevent Skin Injury  Recent Flowsheet Documentation  Taken 2/18/2025 0730 by Sree Marcial RN  Body Position: position changed independently  Intervention: Prevent Infection  Recent Flowsheet Documentation  Taken 2/18/2025 0730 by Sree Marcial RN  Infection Prevention:   environmental surveillance performed   hand hygiene promoted   personal protective equipment utilized   rest/sleep promoted   single patient room provided  Goal: Optimal Comfort and Wellbeing  Outcome: Progressing  Goal: Readiness for Transition of Care  Outcome: Progressing     Problem: Pain Acute  Goal: Optimal Pain Control and Function  Outcome: Progressing  Intervention: Prevent or Manage Pain  Recent Flowsheet Documentation  Taken 2/18/2025 0730 by Sree Marcial RN  Bowel Elimination Promotion:   ambulation promoted   privacy promoted     Problem: Fall Injury Risk  Goal: Absence of Fall and Fall-Related Injury  Outcome: Progressing  Intervention: Promote Injury-Free Environment  Recent Flowsheet Documentation  Taken 2/18/2025 0730 by Sree Marcial RN  Safety Promotion/Fall Prevention:   clutter free environment maintained   room organization consistent   safety round/check completed   Goal Outcome Evaluation:      Plan of Care Reviewed With: patient, spouse, child    Overall Patient Progress: no change

## 2025-02-19 NOTE — PLAN OF CARE
"BP (!) 149/86 (BP Location: Right arm)   Pulse 59   Temp 97.5  F (36.4  C) (Oral)   Resp 18   Ht 1.567 m (5' 1.71\")   Wt 64.6 kg (142 lb 8 oz)   SpO2 100%   BMI 26.31 kg/m       Care [id number]     Afebrile, mild hypertensive 149/86, RA. A&Ox4, Ax1 with cares. NPO d/t bowel rest protocol. Voiding adequately. Denies SOB and dizziness. NG to intermittent suction. Bilateral peritoneal drains intact. Pt expressed abdominal pain/tenderness rating 9/10; PRN Tylenol and Flexeril given x1 with some relief. Intermittent nausea managed with PRN Zofran x1. RPIV saline locked. Continuous LR infusing through port at 100mL/hr.     Report given to  RN; pt transferred to  at 0000.     Problem: Adult Inpatient Plan of Care  Goal: Optimal Comfort and Wellbeing  Outcome: Progressing     Problem: Stem Cell/Bone Marrow Transplant  Goal: Improved Activity Tolerance  Outcome: Progressing  Intervention: Promote Improved Energy  Recent Flowsheet Documentation  Taken 2/18/2025 2124 by Joseline Boyce, RN  Fatigue Management: frequent rest breaks encouraged  Sleep/Rest Enhancement:   awakenings minimized   comfort measures   medication   regular sleep/rest pattern promoted   relaxation techniques promoted  Environmental Support:   calm environment promoted   personal routine supported  Taken 2/18/2025 2101 by Joseline Boyce, RN  Activity Management:   activity adjusted per tolerance   ambulated to bathroom   Goal Outcome Evaluation:                        "

## 2025-02-19 NOTE — PLAN OF CARE
Goal Outcome Evaluation:      Plan of Care Reviewed With: patient    Overall Patient Progress: no changeOverall Patient Progress: no change     Temp: 97.8  F (36.6  C) Temp src: Oral BP: 132/70 Pulse: 59   Resp: 19 SpO2: 98 % O2 Device: None (Room air) Oxygen Delivery: 2 LPM    Neuro: Alert and Oriented  x4, let needs known  Cardiac: WNL, denies cardiac chest pain, lowers non edematous  Respiratory: LS clear and dim on room air   GI/: Voiding AUOP, No BM since 2-12; patient not passing gas; BS hyperactive  Diet/Appetite: NPO except ice chips; crushing meds to NG; nausea intermittent; NG to LIS with 200 ml of output; gave IV compazine x1  Skin: See skin note  LDA: R PIV SL; L and R skater drains; do NOT irrigate; R chest CVC infusing LR @ 100  Activity: Up SBA  Pain: gave 0.2 IV dilaudid x1  Plan: bowel rest and abx regiment     Admitted/transferred from:   2 RN full   skin assessment completed by Caitlin Alford RN and Nallely FLORES  Skin assessment finding: L + R skater drains placed 2-18 CDI; R chest CVC accessed 2-18; R PIV; no blanchable redness to any bony prominences; some scattered but closed scabs to the feet and forearms; NG to LIS  Interventions/actions: patient repositioning independently; patient preferred to keep home undergarments on in bed     Bedside Emergency Equipment Present:  Suction Regulator: Yes  Suction Canister: Yes  Tubing between Regulator and Canister: Yes  O2 Regulator with Tree: Yes  Ambu Bag: Yes

## 2025-02-19 NOTE — PROGRESS NOTES
"Assumed care of patient: [id number]    /72 (BP Location: Right arm)   Pulse 62   Temp 98.8  F (37.1  C) (Oral)   Resp 18   Ht 1.567 m (5' 1.71\")   Wt 64.6 kg (142 lb 8 oz)   SpO2 98%   BMI 26.31 kg/m      NEURO: WDL, A&O x4, able to make needs known  RESPIRATORY: WDL, RA, denies SOB  CARDIAC: WDL, denies chest pain  GI/: WDL, voiding spontaneously  DIET: NPO  PAIN/NAUSEA: Pain 0/10 at rest, 9/10 when moving, intermittent nausea   INCISION/DRAINS: Nasogastric tube to LIS, L & R skater drains with small to moderate output  IV ACCESS: R Port-a-cath single lumen infusing LR at 100 mL/hr  ACTIVITY: Independent/SBA  LAB: RN-managed K & Mag, CBC with platelets and differential, BMP, Phos  PLAN: Continue bowel rest, follow-up with MD Patino after discharge, continue to monitor and proceed with POC    Yoli Pop RN on 2/19/2025 at 3:03 PM    "

## 2025-02-19 NOTE — PLAN OF CARE
Goal Outcome Evaluation:      Plan of Care Reviewed With: patient    Overall Patient Progress: no changeOverall Patient Progress: no change       PRIMARY DIAGNOSIS: BOWEL PERFORATION    OUTPATIENT/OBSERVATION GOALS TO BE MET BEFORE DISCHARGE  1. Orthostatic performed: No    2. Tolerating PO fluid and/or antibiotics (if applicable):  NG tube ; bowel rest; NPO    3. Nausea/Vomiting/Diarrhea symptoms improved: Progressing    4. Pain status: Improved-controlled with oral pain medications.    5. Return to near baseline physical activity: No    Discharge Planner Nurse   Safe discharge environment identified: No  Barriers to discharge: Yes       Entered by: Yoli Pop RN 02/19/2025 2:32 PM     Please review provider order for any additional goals.   Nurse to notify provider when observation goals have been met and patient is ready for discharge.

## 2025-02-20 VITALS
WEIGHT: 142.5 LBS | HEIGHT: 62 IN | BODY MASS INDEX: 26.22 KG/M2 | HEART RATE: 69 BPM | TEMPERATURE: 98.3 F | DIASTOLIC BLOOD PRESSURE: 69 MMHG | RESPIRATION RATE: 16 BRPM | SYSTOLIC BLOOD PRESSURE: 131 MMHG | OXYGEN SATURATION: 99 %

## 2025-02-20 LAB
ANION GAP SERPL CALCULATED.3IONS-SCNC: 17 MMOL/L (ref 7–15)
BACTERIA FLD CULT: NORMAL
BACTERIA FLD CULT: NORMAL
BASOPHILS # BLD AUTO: 0 10E3/UL (ref 0–0.2)
BASOPHILS NFR BLD AUTO: 1 %
BUN SERPL-MCNC: 5 MG/DL (ref 8–23)
CALCIUM SERPL-MCNC: 8.6 MG/DL (ref 8.8–10.4)
CHLORIDE SERPL-SCNC: 98 MMOL/L (ref 98–107)
CREAT SERPL-MCNC: 0.65 MG/DL (ref 0.51–0.95)
EGFRCR SERPLBLD CKD-EPI 2021: >90 ML/MIN/1.73M2
EOSINOPHIL # BLD AUTO: 0.2 10E3/UL (ref 0–0.7)
EOSINOPHIL NFR BLD AUTO: 5 %
ERYTHROCYTE [DISTWIDTH] IN BLOOD BY AUTOMATED COUNT: 15.1 % (ref 10–15)
GLUCOSE SERPL-MCNC: 73 MG/DL (ref 70–99)
GRAM STAIN RESULT: NORMAL
GRAM STAIN RESULT: NORMAL
HCO3 SERPL-SCNC: 21 MMOL/L (ref 22–29)
HCT VFR BLD AUTO: 29.6 % (ref 35–47)
HGB BLD-MCNC: 10.1 G/DL (ref 11.7–15.7)
IMM GRANULOCYTES # BLD: 0.2 10E3/UL
IMM GRANULOCYTES NFR BLD: 3 %
LYMPHOCYTES # BLD AUTO: 0.6 10E3/UL (ref 0.8–5.3)
LYMPHOCYTES NFR BLD AUTO: 12 %
MAGNESIUM SERPL-MCNC: 1.8 MG/DL (ref 1.7–2.3)
MCH RBC QN AUTO: 32.5 PG (ref 26.5–33)
MCHC RBC AUTO-ENTMCNC: 34.1 G/DL (ref 31.5–36.5)
MCV RBC AUTO: 95 FL (ref 78–100)
MONOCYTES # BLD AUTO: 0.4 10E3/UL (ref 0–1.3)
MONOCYTES NFR BLD AUTO: 9 %
NEUTROPHILS # BLD AUTO: 3.4 10E3/UL (ref 1.6–8.3)
NEUTROPHILS NFR BLD AUTO: 71 %
NRBC # BLD AUTO: 0 10E3/UL
NRBC BLD AUTO-RTO: 0 /100
PHOSPHATE SERPL-MCNC: 2.4 MG/DL (ref 2.5–4.5)
PLATELET # BLD AUTO: 157 10E3/UL (ref 150–450)
POTASSIUM SERPL-SCNC: 3.3 MMOL/L (ref 3.4–5.3)
POTASSIUM SERPL-SCNC: 3.8 MMOL/L (ref 3.4–5.3)
RBC # BLD AUTO: 3.11 10E6/UL (ref 3.8–5.2)
SODIUM SERPL-SCNC: 136 MMOL/L (ref 135–145)
WBC # BLD AUTO: 4.8 10E3/UL (ref 4–11)

## 2025-02-20 PROCEDURE — 250N000013 HC RX MED GY IP 250 OP 250 PS 637

## 2025-02-20 PROCEDURE — 83735 ASSAY OF MAGNESIUM: CPT | Performed by: STUDENT IN AN ORGANIZED HEALTH CARE EDUCATION/TRAINING PROGRAM

## 2025-02-20 PROCEDURE — 36591 DRAW BLOOD OFF VENOUS DEVICE: CPT | Performed by: STUDENT IN AN ORGANIZED HEALTH CARE EDUCATION/TRAINING PROGRAM

## 2025-02-20 PROCEDURE — 250N000009 HC RX 250

## 2025-02-20 PROCEDURE — 250N000011 HC RX IP 250 OP 636: Performed by: STUDENT IN AN ORGANIZED HEALTH CARE EDUCATION/TRAINING PROGRAM

## 2025-02-20 PROCEDURE — 84100 ASSAY OF PHOSPHORUS: CPT | Performed by: STUDENT IN AN ORGANIZED HEALTH CARE EDUCATION/TRAINING PROGRAM

## 2025-02-20 PROCEDURE — 80048 BASIC METABOLIC PNL TOTAL CA: CPT | Performed by: STUDENT IN AN ORGANIZED HEALTH CARE EDUCATION/TRAINING PROGRAM

## 2025-02-20 PROCEDURE — 250N000011 HC RX IP 250 OP 636: Performed by: OBSTETRICS & GYNECOLOGY

## 2025-02-20 PROCEDURE — 258N000003 HC RX IP 258 OP 636

## 2025-02-20 PROCEDURE — 258N000001 HC RX 258: Performed by: STUDENT IN AN ORGANIZED HEALTH CARE EDUCATION/TRAINING PROGRAM

## 2025-02-20 PROCEDURE — 99232 SBSQ HOSP IP/OBS MODERATE 35: CPT | Mod: GC | Performed by: STUDENT IN AN ORGANIZED HEALTH CARE EDUCATION/TRAINING PROGRAM

## 2025-02-20 PROCEDURE — 120N000003 HC R&B IMCU UMMC

## 2025-02-20 PROCEDURE — 85004 AUTOMATED DIFF WBC COUNT: CPT | Performed by: STUDENT IN AN ORGANIZED HEALTH CARE EDUCATION/TRAINING PROGRAM

## 2025-02-20 PROCEDURE — 250N000011 HC RX IP 250 OP 636

## 2025-02-20 PROCEDURE — 36591 DRAW BLOOD OFF VENOUS DEVICE: CPT

## 2025-02-20 PROCEDURE — 84132 ASSAY OF SERUM POTASSIUM: CPT

## 2025-02-20 PROCEDURE — 250N000013 HC RX MED GY IP 250 OP 250 PS 637: Performed by: STUDENT IN AN ORGANIZED HEALTH CARE EDUCATION/TRAINING PROGRAM

## 2025-02-20 RX ORDER — HYDROXYZINE HYDROCHLORIDE 50 MG/1
50 TABLET, FILM COATED ORAL EVERY 6 HOURS PRN
Status: DISCONTINUED | OUTPATIENT
Start: 2025-02-20 | End: 2025-03-02 | Stop reason: HOSPADM

## 2025-02-20 RX ORDER — ENOXAPARIN SODIUM 100 MG/ML
40 INJECTION SUBCUTANEOUS EVERY 24 HOURS
Status: DISCONTINUED | OUTPATIENT
Start: 2025-02-20 | End: 2025-02-27

## 2025-02-20 RX ORDER — DEXTROSE MONOHYDRATE, SODIUM CHLORIDE, SODIUM LACTATE, POTASSIUM CHLORIDE, CALCIUM CHLORIDE 5; 600; 310; 179; 20 G/100ML; MG/100ML; MG/100ML; MG/100ML; MG/100ML
INJECTION, SOLUTION INTRAVENOUS CONTINUOUS
Status: DISCONTINUED | OUTPATIENT
Start: 2025-02-20 | End: 2025-02-21

## 2025-02-20 RX ORDER — HYDROXYZINE HYDROCHLORIDE 25 MG/1
25 TABLET, FILM COATED ORAL EVERY 6 HOURS PRN
Status: DISCONTINUED | OUTPATIENT
Start: 2025-02-20 | End: 2025-03-02 | Stop reason: HOSPADM

## 2025-02-20 RX ORDER — POTASSIUM CHLORIDE 29.8 MG/ML
20 INJECTION INTRAVENOUS
Status: COMPLETED | OUTPATIENT
Start: 2025-02-20 | End: 2025-02-20

## 2025-02-20 RX ADMIN — ACETAMINOPHEN 975 MG: 325 TABLET, FILM COATED ORAL at 16:43

## 2025-02-20 RX ADMIN — DEXTROSE MONOHYDRATE, SODIUM CHLORIDE, SODIUM LACTATE, POTASSIUM CHLORIDE, CALCIUM CHLORIDE: 5; 600; 310; 179; 20 INJECTION, SOLUTION INTRAVENOUS at 21:53

## 2025-02-20 RX ADMIN — POTASSIUM CHLORIDE 20 MEQ: 29.8 INJECTION INTRAVENOUS at 11:26

## 2025-02-20 RX ADMIN — ACETAMINOPHEN 975 MG: 325 TABLET, FILM COATED ORAL at 08:49

## 2025-02-20 RX ADMIN — ESCITALOPRAM OXALATE 20 MG: 20 TABLET ORAL at 21:48

## 2025-02-20 RX ADMIN — PIPERACILLIN SODIUM AND TAZOBACTAM SODIUM 3.38 G: 3; .375 INJECTION, SOLUTION INTRAVENOUS at 11:24

## 2025-02-20 RX ADMIN — Medication 5 ML: at 18:24

## 2025-02-20 RX ADMIN — PIPERACILLIN SODIUM AND TAZOBACTAM SODIUM 3.38 G: 3; .375 INJECTION, SOLUTION INTRAVENOUS at 16:46

## 2025-02-20 RX ADMIN — CYCLOBENZAPRINE 10 MG: 10 TABLET, FILM COATED ORAL at 20:06

## 2025-02-20 RX ADMIN — ENOXAPARIN SODIUM 40 MG: 40 INJECTION SUBCUTANEOUS at 17:49

## 2025-02-20 RX ADMIN — PIPERACILLIN SODIUM AND TAZOBACTAM SODIUM 3.38 G: 3; .375 INJECTION, SOLUTION INTRAVENOUS at 04:06

## 2025-02-20 RX ADMIN — POTASSIUM PHOSPHATE, MONOBASIC POTASSIUM PHOSPHATE, DIBASIC 9 MMOL: 224; 236 INJECTION, SOLUTION, CONCENTRATE INTRAVENOUS at 13:42

## 2025-02-20 RX ADMIN — DEXTROSE MONOHYDRATE, SODIUM CHLORIDE, SODIUM LACTATE, POTASSIUM CHLORIDE, CALCIUM CHLORIDE: 5; 600; 310; 179; 20 INJECTION, SOLUTION INTRAVENOUS at 11:20

## 2025-02-20 RX ADMIN — TOPICAL ANESTHETIC 0.5 ML: 200 SPRAY DENTAL; PERIODONTAL at 11:23

## 2025-02-20 RX ADMIN — SODIUM CHLORIDE, POTASSIUM CHLORIDE, SODIUM LACTATE AND CALCIUM CHLORIDE: 600; 310; 30; 20 INJECTION, SOLUTION INTRAVENOUS at 08:02

## 2025-02-20 RX ADMIN — POTASSIUM CHLORIDE 20 MEQ: 29.8 INJECTION INTRAVENOUS at 09:27

## 2025-02-20 RX ADMIN — ONDANSETRON 4 MG: 2 INJECTION, SOLUTION INTRAMUSCULAR; INTRAVENOUS at 04:06

## 2025-02-20 RX ADMIN — PIPERACILLIN SODIUM AND TAZOBACTAM SODIUM 3.38 G: 3; .375 INJECTION, SOLUTION INTRAVENOUS at 21:50

## 2025-02-20 RX ADMIN — HYDROXYZINE HYDROCHLORIDE 25 MG: 25 TABLET, FILM COATED ORAL at 20:07

## 2025-02-20 RX ADMIN — PANTOPRAZOLE SODIUM 40 MG: 40 INJECTION, POWDER, FOR SOLUTION INTRAVENOUS at 08:01

## 2025-02-20 ASSESSMENT — ACTIVITIES OF DAILY LIVING (ADL)
ADLS_ACUITY_SCORE: 41
ADLS_ACUITY_SCORE: 43
ADLS_ACUITY_SCORE: 41
ADLS_ACUITY_SCORE: 43
ADLS_ACUITY_SCORE: 41
ADLS_ACUITY_SCORE: 43
ADLS_ACUITY_SCORE: 41
ADLS_ACUITY_SCORE: 43
ADLS_ACUITY_SCORE: 41
ADLS_ACUITY_SCORE: 43
ADLS_ACUITY_SCORE: 41
ADLS_ACUITY_SCORE: 41
ADLS_ACUITY_SCORE: 43

## 2025-02-20 NOTE — PROGRESS NOTES
"Gynecology Oncology Progress Note    Ms. Jacki Smith is a 59 year old HD#10 for management of a bowel microperforation in the setting of recent bevacizumab administration.    Dz: Recurrent platinum sensitive HGSOC    24 hour events:   - Neutropenia resolved.  - Gram stain of fluid culture negative, cultures with no growth to date  - passing gas    Subjective: Patient reports she is feeling fine this morning. Pain overnight at her drain sites and throat. Had some nausea overnight  Had some nausea overnight she attributes to gagging on the tube, now resolved. Reports she passed some gas yesterday, unsure if she has passed more gas overnight. Voiding spontaneously. No other questions or concerns .    Objective:   BP (!) 140/70 (BP Location: Right arm)   Pulse 71   Temp 98.3  F (36.8  C) (Oral)   Resp 18   Ht 1.567 m (5' 1.71\")   Wt 64.6 kg (142 lb 8 oz)   SpO2 100%   BMI 26.31 kg/m      General: lying in bed, in NAD  CV: well perfused  Resp: no increased work of breathing on room air  Abdomen: soft, non-tender, moderately distended. SANYA drains present with cloudy, yellow drainage. + BS  Extremities: nontender, trace edema    I/Os  (Yesterday // Since Midnight)  IV: NR // 10 mL  Urine 1350 mL // NR  N mL // NR  SANYA drains: 245 mL // NR    New labs/imaging-  Results for orders placed or performed during the hospital encounter of 25 (from the past 24 hours)   CBC with Platelets & Differential    Narrative    The following orders were created for panel order CBC with Platelets & Differential.  Procedure                               Abnormality         Status                     ---------                               -----------         ------                     CBC with platelets and d...[[id number]]  Abnormal            Final result               RBC and Platelet Morphology[[id number]]  Abnormal            Final result                 Please view results for these tests on the individual orders. "   Basic metabolic panel   Result Value Ref Range    Sodium 137 135 - 145 mmol/L    Potassium 3.3 (L) 3.4 - 5.3 mmol/L    Chloride 98 98 - 107 mmol/L    Carbon Dioxide (CO2) 20 (L) 22 - 29 mmol/L    Anion Gap 19 (H) 7 - 15 mmol/L    Urea Nitrogen 7.0 (L) 8.0 - 23.0 mg/dL    Creatinine 0.75 0.51 - 0.95 mg/dL    GFR Estimate >90 >60 mL/min/1.73m2    Calcium 8.7 (L) 8.8 - 10.4 mg/dL    Glucose 71 70 - 99 mg/dL   Phosphorus   Result Value Ref Range    Phosphorus 2.6 2.5 - 4.5 mg/dL   Magnesium   Result Value Ref Range    Magnesium 1.5 (L) 1.7 - 2.3 mg/dL   CBC with platelets and differential   Result Value Ref Range    WBC Count 3.5 (L) 4.0 - 11.0 10e3/uL    RBC Count 3.15 (L) 3.80 - 5.20 10e6/uL    Hemoglobin 10.3 (L) 11.7 - 15.7 g/dL    Hematocrit 30.7 (L) 35.0 - 47.0 %    MCV 98 78 - 100 fL    MCH 32.7 26.5 - 33.0 pg    MCHC 33.6 31.5 - 36.5 g/dL    RDW 14.8 10.0 - 15.0 %    Platelet Count 181 150 - 450 10e3/uL    % Neutrophils 58 %    % Lymphocytes 19 %    % Monocytes 13 %    % Eosinophils 7 %    % Basophils 1 %    % Immature Granulocytes 3 %    NRBCs per 100 WBC 0 <1 /100    Absolute Neutrophils 2.0 1.6 - 8.3 10e3/uL    Absolute Lymphocytes 0.7 (L) 0.8 - 5.3 10e3/uL    Absolute Monocytes 0.5 0.0 - 1.3 10e3/uL    Absolute Eosinophils 0.2 0.0 - 0.7 10e3/uL    Absolute Basophils 0.0 0.0 - 0.2 10e3/uL    Absolute Immature Granulocytes 0.1 <=0.4 10e3/uL    Absolute NRBCs 0.0 10e3/uL   RBC and Platelet Morphology   Result Value Ref Range    RBC Morphology Confirmed RBC Indices     Platelet Assessment  Automated Count Confirmed. Platelet morphology is normal.     Automated Count Confirmed. Platelet morphology is normal.    David Cells Moderate (A) None Seen    Elliptocytes Moderate (A) None Seen    Polychromasia Slight (A) None Seen    Target Cells Slight (A) None Seen   Potassium   Result Value Ref Range    Potassium 3.7 3.4 - 5.3 mmol/L   Magnesium   Result Value Ref Range    Magnesium 2.3 1.7 - 2.3 mg/dL   CBC with  Platelets & Differential    Narrative    The following orders were created for panel order CBC with Platelets & Differential.  Procedure                               Abnormality         Status                     ---------                               -----------         ------                     CBC with platelets and d...[[id number]]                                                   Please view results for these tests on the individual orders.       Assessment: 59 year old female with recurrent HGSOC admitted for a bowel microperforation in the setting of recent bevacizumab administration. Patient initially underwent conservative management at OSH with IV antibiotics and bowel rest, but ultimately worsened and was transferred to [facility] for further management.     Plan:    #Bowel perforation  #Pneumoperitoneum  - Found to have pneumoperitoneum on admission consistent with bowel perforation in the setting of recent bevacizumab administration  - CT at OSH on 2/16 w/ thickening in the proximal jejunum w/ new gastric and duodenal obstruction, moderate pneumoperitoneum but source of perforation not visualized  - Currently on IV zosyn, will continue  - s/p IR drain placement 2/18. Will continue to trend output, fluid sent for culture which is pending. Gram stain negative, no growth to date  - Will plan on CT with po contrast tomorrow to re-evaluate abdominal fluid collections  - If not improving, may ultimately require surgical management  - Continue bowel rest with NPO, NG to LIS, starting to pass gas  - If unable to start advancing diet in the next day or two, will consider TPN now that neutropenia has resolved  - IV antiemetics prn  - Patient reports tylenol and flexeril have been most helpful for pain, ordered prn in addition to IV dilaudid prn.  - PPX: SCDs, IV protonix, lovenox ppx     #Neutropenia, resolved  - ANC 1.1 on admission, now resolved  - Likely secondary to recent chemotherapy administration, but will  continue to monitor closely for developing signs of infection. Afebrile.  - Daily CBC with diff     #Recurrent HGSOC  - S/p cycle 1 of carbo/taxol/deshawn on 2/5. Treatment will be held in the setting of current bowel perforation  - Follow up with Dr. Patino after discharge for ongoing treatment discussions.     #UTI, resolved  - Ucx at OSH with >100k E coli  - s/p treatment with Zosyn as above     #MDD  - PTA lexapro daily    Disposition: pending clinical course    Plan of care discussed with Dr. Warner.    Bonnie Goodman MD  OB/GYN Resident, PGY-4

## 2025-02-20 NOTE — PROGRESS NOTES
"Blood pressure 122/83, pulse 68, temperature 98  F (36.7  C), temperature source Oral, resp. rate 20, height 1.567 m (5' 1.71\"), weight 64.6 kg (142 lb 8 oz), SpO2 98%.    Activity: Up ad deja  Neuros:  AOX4, makes needs known  Cardiac: WDL  Respiratory: WDL, RA  GI/: AUOP, +BS and flatus, no BM  Diet: NPO w/ice chips  Skin/Incisions/Drains: NG LIS, Lt/Rt skater drains, Rt PAC  Lines: Rt PIV w/abx, Rt PAC w/LR 100cc/hr  Pain: Tylenol  Plan: Chemo side effect has bowel perforation, rest bowels, no nausea, pain    "

## 2025-02-20 NOTE — PROGRESS NOTES
"Brief Progress Note    BP (!) 162/78 (BP Location: Right arm)   Pulse 68   Temp 98  F (36.7  C) (Oral)   Resp 18   Ht 1.567 m (5' 1.71\")   Wt 64.6 kg (142 lb 8 oz)   SpO2 100%   BMI 26.31 kg/m       In to update patient around lunch. Discussed IV fluid changes to D5+LR+Kcl and planning for CT scan with PO and IV contrast tomorrow morning. She is excited to move forward. She understands now that she has been receiving Lexapro at night, but would like something more to help with anxiety around bedtime. She feels like she's been using the muscle relaxant for this, but would be open to trying other things: discussed plan to add on PRN melatonin and atarax for anxiety and sleep, and she is okay with that. She has tried the hurricaine spray and did not like it, but does not want to try anything else for throat pain from NG tube at this time.     She says she has not passed gas this morning, but is hopeful walking around after getting Tylenol and her tube clamped will help with this.    China Landry MD PGY-1  02/20/25 11:47 AM     "

## 2025-02-20 NOTE — PLAN OF CARE
"BP (!) 140/70 (BP Location: Right arm)   Pulse 71   Temp 98.3  F (36.8  C) (Oral)   Resp 18   Ht 1.567 m (5' 1.71\")   Wt 64.6 kg (142 lb 8 oz)   SpO2 100%   BMI 26.31 kg/m        Neuro: Alert and oriented x 4, lets needs known  Cardiac:Wnl, denies chest pain  Respiratory:WNL, Denies SOB  GI/: AUOP , No BM overnight  Diet/Appetite:NPO, C/O  nausea,Zofran given X 1  Skin:no new skin deficient this shift  LDA: Nasogastric tube to LIS, L & R skater drains with small to moderate output , R Port-a-cath single lumen infusing LR at 100 mL/hr   Labs: Reviewed.   Activity:SBA  Pain:pain contolled with current regiment. No PRN needed  Plan:Cont with POC      Goal Outcome Evaluation:Ongoing                          "

## 2025-02-20 NOTE — PLAN OF CARE
"Goal Outcome Evaluation:    Plan of Care Reviewed With: patient  BP (!) 162/78 (BP Location: Right arm)   Pulse 68   Temp 98  F (36.7  C) (Oral)   Resp 18   Ht 1.567 m (5' 1.71\")   Wt 64.6 kg (142 lb 8 oz)   SpO2 100%   BMI 26.31 kg/m     AVSS.  Alert and oriented x4.  NPO except for ice.  Tylenol x 1 for abdominal discomfort.  Passing gas, no BM. Voiding.   Up to BR independently.  Electrolytes run & replaced - potassium & phos.   Check labs this afternoon.  CT of abdomen with po contrast 2/21.   No new issues. Stable. Continue with current POC.       "

## 2025-02-20 NOTE — PROGRESS NOTES
"CLINICAL NUTRITION SERVICES - BRIEF NOTE     Nutrition Prescription    RECOMMENDATIONS FOR MDs/PROVIDERS TO ORDER:  If/when plan to start TPN or PPN, please send consult Pharmacy/Nutrition to start and manage TPN (and specify line type in consult)    Registered Dietitian Interventions:  None at this time, see future recs below    Future/Additional Recommendations:  -- If plan to start TPN via central line, recommend the following at this time:  Dosing weight:  51.9 kg  Access: Central (if available/able to use)  Initial parameters (per day)  Volume:  1440 mL (or other per provider)  Dextrose: 100 g  AA: 75 g  Lipids: 250 mL 20%, 5 days per week   Dextrose titration: Monitor lytes and if within acceptable parameters (Mg++ > or = 1.5, K+ is > or = 3, and PO4 > or = 1.9), increase dextrose by 45 g/day to goal of 190 g dextrose.  Additives: Infuvite and trace minerals daily; thiamine (100 mg/day x 7 days)    Goal PN provides 190 g dextrose, 75 g AA, and 250 mL 20% lipids 5 days per week for total provision of 1303 Kcals (25 Kcals/kg), 1.4 g/kg protein, GIR 2.5 mg/kg/minute, and 27% fat kcals on average daily.      -- If plan to start PPN, recommend the following at this time: peripheral Clinimix (D5/AA 4.25) @ 83 mL/hr (1992 mL/day) + 250 mL 20% IV lipids 5 days/week to provide 1037 kcal (20 kcal/kg), 100 g dextrose (GIR 1.3), 85 g AA (1.6 g/kg), and 34% kcal from fat per dosing weight 51.9 kg     *Received consult: \"PPN vs TPN, NPO for 10 days. Might be able to advance diet tomorrow, just need assessment for now. \"    See RD note on 2/18 for full nutrition assessment this admission    EVALUATION OF THE PROGRESS TOWARD GOALS   Diet: NPO    Intake: NPO since admit 2/18.  Per chart review, pt was mostly NPO or CLD at OSH prior to admission since 2/11. Per conversation with primary team yesterday, they were considering TPN but were waiting for counts to recover more from chemo today. Per above consult today from primary " team, possible diet advancement soon so team holding off on TPN today but may consider PPN or TPN tomorrow.     NEW FINDINGS   Meds: LR @ 100 mL/hr    Labs:   - K+ 3.3 (L), RN managed replacement protocol ordered  - Mg++ WNL  - Phos 2.4 (L), RN managed replacement protocol ordered  - BUN 5 (L)  - BGs stable  - (2/18): Alk Phos, ALT, AST, and Tbili    Dosing Weight: 51.9 kg, based on adjusted wt     ASSESSED NUTRITION NEEDS  Estimated Energy Needs: [id number] kcals/day (25 - 30 - 35 kcals/kg)  Justification: Increased needs (30-35 kcal/kg) for PO; maintenance (25-30 kcal/kg) if requires PN  Estimated Protein Needs: 62-77 grams protein/day (1.2 - 1.5 grams of pro/kg)  Justification: Increased needs  Estimated Fluid Needs: (1 mL/kcal)  Justification: Maintenance, or other per provider pending fluid status       INTERVENTIONS  See recs above    Monitoring/Evaluation  Progress toward goals will be monitored and evaluated per protocol.      Lulu Bonilla RD, , LD  Weekday Units covered: 5A (non-Heme Onc pts) and 7B ([id number])  Available by 5A or 7B Clinical Dietitifortunato Steward  Weekend Coverage: Weekend Clinical Dietitifortunato Steward    Inpatient Clinical Dietitians no longer available via paging

## 2025-02-20 NOTE — PROGRESS NOTES
"Brief Progress Note    Went by to see patient for PM rounds. Just took tylenol and flexeril for pain. Most bothered by sore throat from the NG tube and soreness at drain sites. No nausea. Passed gas today. No other concerns at this time.    BP (!) 140/70 (BP Location: Right arm)   Pulse 71   Temp 98.3  F (36.8  C) (Oral)   Resp 18   Ht 1.567 m (5' 1.71\")   Wt 64.6 kg (142 lb 8 oz)   SpO2 100%   BMI 26.31 kg/m      Continue NPO with NG to LIS. Will consider CT with po contrast in oncoming days to reassess pelvic fluid collections.    Bonnie Goodman MD  OB/GYN Resident, PGY-4    "

## 2025-02-20 NOTE — PROVIDER NOTIFICATION
Pt called nurse reporting that NG got pulled out accidentally. Primary team updated. Per team keep NG out, keep NPO and update pt that she will do oral contrast for her CT tomorrow.

## 2025-02-20 NOTE — PROGRESS NOTES
"Blood pressure 122/83, pulse 68, temperature 98  F (36.7  C), temperature source Oral, resp. rate 20, height 1.567 m (5' 1.71\"), weight 64.6 kg (142 lb 8 oz), SpO2 98%.    Activity: Up ad deja  Neuros:  AOX4, makes needs known  Cardiac: WDL  Respiratory: WDL, RA  GI/: AUOP, +BS and flatus, no BM  Diet: NPO w/ice chips  Skin/Incisions/Drains: NG LIS, Lt/Rt skater drains, RT PAC  Lines: Rt PIV w/abx, Rt PAC w/LR 100cc/hr  Pain: Tylenol  Plan: Chemo side effect of bowel perforation, rest bowels, no nausea, slight pain    "

## 2025-02-21 ENCOUNTER — APPOINTMENT (OUTPATIENT)
Dept: CT IMAGING | Facility: CLINIC | Age: 60
End: 2025-02-21
Attending: STUDENT IN AN ORGANIZED HEALTH CARE EDUCATION/TRAINING PROGRAM
Payer: COMMERCIAL

## 2025-02-21 ENCOUNTER — APPOINTMENT (OUTPATIENT)
Dept: GENERAL RADIOLOGY | Facility: CLINIC | Age: 60
End: 2025-02-21
Attending: STUDENT IN AN ORGANIZED HEALTH CARE EDUCATION/TRAINING PROGRAM
Payer: COMMERCIAL

## 2025-02-21 ENCOUNTER — APPOINTMENT (OUTPATIENT)
Dept: GENERAL RADIOLOGY | Facility: CLINIC | Age: 60
End: 2025-02-21
Payer: COMMERCIAL

## 2025-02-21 LAB
ALBUMIN SERPL BCG-MCNC: 2.9 G/DL (ref 3.5–5.2)
ALP SERPL-CCNC: 113 U/L (ref 40–150)
ALT SERPL W P-5'-P-CCNC: 7 U/L (ref 0–50)
ANION GAP SERPL CALCULATED.3IONS-SCNC: 14 MMOL/L (ref 7–15)
AST SERPL W P-5'-P-CCNC: 23 U/L (ref 0–45)
BILIRUB DIRECT SERPL-MCNC: 0.19 MG/DL (ref 0–0.3)
BILIRUB SERPL-MCNC: 0.4 MG/DL
BUN SERPL-MCNC: 3.7 MG/DL (ref 8–23)
CALCIUM SERPL-MCNC: 8.8 MG/DL (ref 8.8–10.4)
CHLORIDE SERPL-SCNC: 96 MMOL/L (ref 98–107)
CREAT SERPL-MCNC: 0.69 MG/DL (ref 0.51–0.95)
EGFRCR SERPLBLD CKD-EPI 2021: >90 ML/MIN/1.73M2
ERYTHROCYTE [DISTWIDTH] IN BLOOD BY AUTOMATED COUNT: 14.8 % (ref 10–15)
GLUCOSE BLDC GLUCOMTR-MCNC: 107 MG/DL (ref 70–99)
GLUCOSE BLDC GLUCOMTR-MCNC: 83 MG/DL (ref 70–99)
GLUCOSE SERPL-MCNC: 136 MG/DL (ref 70–99)
HCO3 SERPL-SCNC: 25 MMOL/L (ref 22–29)
HCT VFR BLD AUTO: 29.6 % (ref 35–47)
HGB BLD-MCNC: 10 G/DL (ref 11.7–15.7)
MAGNESIUM SERPL-MCNC: 1.6 MG/DL (ref 1.7–2.3)
MCH RBC QN AUTO: 32.1 PG (ref 26.5–33)
MCHC RBC AUTO-ENTMCNC: 33.8 G/DL (ref 31.5–36.5)
MCV RBC AUTO: 95 FL (ref 78–100)
PHOSPHATE SERPL-MCNC: 2.3 MG/DL (ref 2.5–4.5)
PLATELET # BLD AUTO: 166 10E3/UL (ref 150–450)
POTASSIUM SERPL-SCNC: 3.4 MMOL/L (ref 3.4–5.3)
POTASSIUM SERPL-SCNC: 4.1 MMOL/L (ref 3.4–5.3)
PROT SERPL-MCNC: 5.8 G/DL (ref 6.4–8.3)
RBC # BLD AUTO: 3.12 10E6/UL (ref 3.8–5.2)
SODIUM SERPL-SCNC: 135 MMOL/L (ref 135–145)
TRIGL SERPL-MCNC: 155 MG/DL
WBC # BLD AUTO: 3.7 10E3/UL (ref 4–11)

## 2025-02-21 PROCEDURE — 84460 ALANINE AMINO (ALT) (SGPT): CPT | Performed by: STUDENT IN AN ORGANIZED HEALTH CARE EDUCATION/TRAINING PROGRAM

## 2025-02-21 PROCEDURE — 258N000003 HC RX IP 258 OP 636: Performed by: OBSTETRICS & GYNECOLOGY

## 2025-02-21 PROCEDURE — 250N000009 HC RX 250: Performed by: STUDENT IN AN ORGANIZED HEALTH CARE EDUCATION/TRAINING PROGRAM

## 2025-02-21 PROCEDURE — 999N000065 XR ABDOMEN PORT 1 VIEW

## 2025-02-21 PROCEDURE — 74018 RADEX ABDOMEN 1 VIEW: CPT | Mod: 26 | Performed by: RADIOLOGY

## 2025-02-21 PROCEDURE — 83735 ASSAY OF MAGNESIUM: CPT

## 2025-02-21 PROCEDURE — 80048 BASIC METABOLIC PNL TOTAL CA: CPT | Performed by: STUDENT IN AN ORGANIZED HEALTH CARE EDUCATION/TRAINING PROGRAM

## 2025-02-21 PROCEDURE — 99233 SBSQ HOSP IP/OBS HIGH 50: CPT | Mod: GC | Performed by: STUDENT IN AN ORGANIZED HEALTH CARE EDUCATION/TRAINING PROGRAM

## 2025-02-21 PROCEDURE — 250N000013 HC RX MED GY IP 250 OP 250 PS 637: Performed by: STUDENT IN AN ORGANIZED HEALTH CARE EDUCATION/TRAINING PROGRAM

## 2025-02-21 PROCEDURE — 120N000003 HC R&B IMCU UMMC

## 2025-02-21 PROCEDURE — 84155 ASSAY OF PROTEIN SERUM: CPT | Performed by: STUDENT IN AN ORGANIZED HEALTH CARE EDUCATION/TRAINING PROGRAM

## 2025-02-21 PROCEDURE — 250N000011 HC RX IP 250 OP 636: Performed by: STUDENT IN AN ORGANIZED HEALTH CARE EDUCATION/TRAINING PROGRAM

## 2025-02-21 PROCEDURE — 85018 HEMOGLOBIN: CPT | Performed by: STUDENT IN AN ORGANIZED HEALTH CARE EDUCATION/TRAINING PROGRAM

## 2025-02-21 PROCEDURE — 74150 CT ABDOMEN W/O CONTRAST: CPT

## 2025-02-21 PROCEDURE — 250N000009 HC RX 250

## 2025-02-21 PROCEDURE — 80069 RENAL FUNCTION PANEL: CPT | Performed by: STUDENT IN AN ORGANIZED HEALTH CARE EDUCATION/TRAINING PROGRAM

## 2025-02-21 PROCEDURE — 84478 ASSAY OF TRIGLYCERIDES: CPT | Performed by: STUDENT IN AN ORGANIZED HEALTH CARE EDUCATION/TRAINING PROGRAM

## 2025-02-21 PROCEDURE — 250N000013 HC RX MED GY IP 250 OP 250 PS 637

## 2025-02-21 PROCEDURE — 250N000011 HC RX IP 250 OP 636: Performed by: OBSTETRICS & GYNECOLOGY

## 2025-02-21 PROCEDURE — 250N000009 HC RX 250: Performed by: OBSTETRICS & GYNECOLOGY

## 2025-02-21 PROCEDURE — 36591 DRAW BLOOD OFF VENOUS DEVICE: CPT | Performed by: STUDENT IN AN ORGANIZED HEALTH CARE EDUCATION/TRAINING PROGRAM

## 2025-02-21 PROCEDURE — 3E0436Z INTRODUCTION OF NUTRITIONAL SUBSTANCE INTO CENTRAL VEIN, PERCUTANEOUS APPROACH: ICD-10-PCS | Performed by: OBSTETRICS & GYNECOLOGY

## 2025-02-21 PROCEDURE — 84132 ASSAY OF SERUM POTASSIUM: CPT | Performed by: OBSTETRICS & GYNECOLOGY

## 2025-02-21 PROCEDURE — 36591 DRAW BLOOD OFF VENOUS DEVICE: CPT | Performed by: OBSTETRICS & GYNECOLOGY

## 2025-02-21 PROCEDURE — 250N000011 HC RX IP 250 OP 636: Mod: JW

## 2025-02-21 PROCEDURE — 84100 ASSAY OF PHOSPHORUS: CPT

## 2025-02-21 PROCEDURE — B4185 PARENTERAL SOL 10 GM LIPIDS: HCPCS | Performed by: OBSTETRICS & GYNECOLOGY

## 2025-02-21 PROCEDURE — 250N000011 HC RX IP 250 OP 636

## 2025-02-21 PROCEDURE — 999N000128 HC STATISTIC PERIPHERAL IV START W/O US GUIDANCE

## 2025-02-21 PROCEDURE — 74018 RADEX ABDOMEN 1 VIEW: CPT

## 2025-02-21 PROCEDURE — 74170 CT ABD WO CNTRST FLWD CNTRST: CPT | Mod: 26 | Performed by: RADIOLOGY

## 2025-02-21 PROCEDURE — 74170 CT ABD WO CNTRST FLWD CNTRST: CPT

## 2025-02-21 PROCEDURE — 85041 AUTOMATED RBC COUNT: CPT | Performed by: STUDENT IN AN ORGANIZED HEALTH CARE EDUCATION/TRAINING PROGRAM

## 2025-02-21 RX ORDER — DEXTROSE MONOHYDRATE 100 MG/ML
INJECTION, SOLUTION INTRAVENOUS CONTINUOUS PRN
Status: DISCONTINUED | OUTPATIENT
Start: 2025-02-21 | End: 2025-03-02 | Stop reason: HOSPADM

## 2025-02-21 RX ORDER — IOPAMIDOL 755 MG/ML
86 INJECTION, SOLUTION INTRAVASCULAR ONCE
Status: COMPLETED | OUTPATIENT
Start: 2025-02-21 | End: 2025-02-21

## 2025-02-21 RX ORDER — POTASSIUM CHLORIDE 29.8 MG/ML
20 INJECTION INTRAVENOUS
Status: COMPLETED | OUTPATIENT
Start: 2025-02-21 | End: 2025-02-21

## 2025-02-21 RX ORDER — LACTOBACILLUS RHAMNOSUS GG 10B CELL
1 CAPSULE ORAL 2 TIMES DAILY
Status: DISCONTINUED | OUTPATIENT
Start: 2025-02-21 | End: 2025-03-02 | Stop reason: HOSPADM

## 2025-02-21 RX ORDER — LIDOCAINE HYDROCHLORIDE 20 MG/ML
JELLY TOPICAL ONCE
Status: COMPLETED | OUTPATIENT
Start: 2025-02-21 | End: 2025-02-21

## 2025-02-21 RX ADMIN — PIPERACILLIN SODIUM AND TAZOBACTAM SODIUM 3.38 G: 3; .375 INJECTION, SOLUTION INTRAVENOUS at 04:22

## 2025-02-21 RX ADMIN — IOPAMIDOL 86 ML: 755 INJECTION, SOLUTION INTRAVENOUS at 07:44

## 2025-02-21 RX ADMIN — Medication 1 CAPSULE: at 19:07

## 2025-02-21 RX ADMIN — POTASSIUM PHOSPHATE, MONOBASIC POTASSIUM PHOSPHATE, DIBASIC 9 MMOL: 224; 236 INJECTION, SOLUTION, CONCENTRATE INTRAVENOUS at 13:23

## 2025-02-21 RX ADMIN — OLIVE OIL AND SOYBEAN OIL 250 ML: 16; 4 INJECTION, EMULSION INTRAVENOUS at 19:42

## 2025-02-21 RX ADMIN — PIPERACILLIN SODIUM AND TAZOBACTAM SODIUM 3.38 G: 3; .375 INJECTION, SOLUTION INTRAVENOUS at 21:23

## 2025-02-21 RX ADMIN — LIDOCAINE HYDROCHLORIDE: 20 JELLY TOPICAL at 13:37

## 2025-02-21 RX ADMIN — HYDROMORPHONE HYDROCHLORIDE 0.3 MG: 0.2 INJECTION, SOLUTION INTRAMUSCULAR; INTRAVENOUS; SUBCUTANEOUS at 13:31

## 2025-02-21 RX ADMIN — ENOXAPARIN SODIUM 40 MG: 40 INJECTION SUBCUTANEOUS at 17:55

## 2025-02-21 RX ADMIN — Medication 5 ML: at 08:00

## 2025-02-21 RX ADMIN — PIPERACILLIN SODIUM AND TAZOBACTAM SODIUM 3.38 G: 3; .375 INJECTION, SOLUTION INTRAVENOUS at 10:56

## 2025-02-21 RX ADMIN — PROCHLORPERAZINE EDISYLATE 10 MG: 5 INJECTION INTRAMUSCULAR; INTRAVENOUS at 11:45

## 2025-02-21 RX ADMIN — ACETAMINOPHEN 975 MG: 325 TABLET, FILM COATED ORAL at 08:14

## 2025-02-21 RX ADMIN — PIPERACILLIN SODIUM AND TAZOBACTAM SODIUM 3.38 G: 3; .375 INJECTION, SOLUTION INTRAVENOUS at 15:45

## 2025-02-21 RX ADMIN — MAGNESIUM SULFATE HEPTAHYDRATE: 500 INJECTION, SOLUTION INTRAMUSCULAR; INTRAVENOUS at 19:41

## 2025-02-21 RX ADMIN — POTASSIUM CHLORIDE 20 MEQ: 29.8 INJECTION, SOLUTION INTRAVENOUS at 09:56

## 2025-02-21 RX ADMIN — ACETAMINOPHEN 975 MG: 325 TABLET, FILM COATED ORAL at 18:43

## 2025-02-21 RX ADMIN — PANTOPRAZOLE SODIUM 40 MG: 40 INJECTION, POWDER, FOR SOLUTION INTRAVENOUS at 08:00

## 2025-02-21 RX ADMIN — ESCITALOPRAM OXALATE 20 MG: 20 TABLET ORAL at 21:23

## 2025-02-21 RX ADMIN — POTASSIUM CHLORIDE 20 MEQ: 29.8 INJECTION, SOLUTION INTRAVENOUS at 11:39

## 2025-02-21 ASSESSMENT — ACTIVITIES OF DAILY LIVING (ADL)
ADLS_ACUITY_SCORE: 43
ADLS_ACUITY_SCORE: 43
ADLS_ACUITY_SCORE: 45
ADLS_ACUITY_SCORE: 43
ADLS_ACUITY_SCORE: 45
ADLS_ACUITY_SCORE: 43
ADLS_ACUITY_SCORE: 45
ADLS_ACUITY_SCORE: 43

## 2025-02-21 NOTE — PROGRESS NOTES
"Gynecology Oncology Progress Note    Ms. Jacki Smith is a 59 year old HD#11 for management of a bowel microperforation in the setting of recent bevacizumab administration.    Dz: Recurrent platinum sensitive HGSOC    24 hour events:   - Mg, Phos, K ERP  - Switched to D5 LR + KCl  - nutrition consult  - NG tube accidentally removed, not replaced    Subjective: Patient reports she is feeling fine this morning. Currently drinking contrast for her CT scan this morning. No nausea overnight, unsure if she's been passing gas. Felt a lot of gurgling in her abdomen. Pain well controlled. Voiding spontaneously. No other questions or concerns .    Objective:   /69 (BP Location: Left arm)   Pulse 69   Temp 98.3  F (36.8  C) (Oral)   Resp 16   Ht 1.567 m (5' 1.71\")   Wt 64.6 kg (142 lb 8 oz)   SpO2 99%   BMI 26.31 kg/m      General: lying in bed, in NAD  CV: well perfused  Resp: no increased work of breathing on room air  Abdomen: soft, non-tender, moderately distended. SANYA drains present with minimal clear yellow drainage. + BS  Extremities: nontender, trace edema    I/Os  (Yesterday // Since Midnight)  Urine 1100 mL + 1 unmeasured // 800 mL  N mL // NR  SANYA drains: 175 mL // NR    New labs/imaging-  Results for orders placed or performed during the hospital encounter of 25 (from the past 24 hours)   CBC with Platelets & Differential    Narrative    The following orders were created for panel order CBC with Platelets & Differential.  Procedure                               Abnormality         Status                     ---------                               -----------         ------                     CBC with platelets and d...[[id number]]  Abnormal            Final result                 Please view results for these tests on the individual orders.   Basic metabolic panel   Result Value Ref Range    Sodium 136 135 - 145 mmol/L    Potassium 3.3 (L) 3.4 - 5.3 mmol/L    Chloride 98 98 - 107 mmol/L "    Carbon Dioxide (CO2) 21 (L) 22 - 29 mmol/L    Anion Gap 17 (H) 7 - 15 mmol/L    Urea Nitrogen 5.0 (L) 8.0 - 23.0 mg/dL    Creatinine 0.65 0.51 - 0.95 mg/dL    GFR Estimate >90 >60 mL/min/1.73m2    Calcium 8.6 (L) 8.8 - 10.4 mg/dL    Glucose 73 70 - 99 mg/dL   Magnesium   Result Value Ref Range    Magnesium 1.8 1.7 - 2.3 mg/dL   Phosphorus   Result Value Ref Range    Phosphorus 2.4 (L) 2.5 - 4.5 mg/dL   CBC with platelets and differential   Result Value Ref Range    WBC Count 4.8 4.0 - 11.0 10e3/uL    RBC Count 3.11 (L) 3.80 - 5.20 10e6/uL    Hemoglobin 10.1 (L) 11.7 - 15.7 g/dL    Hematocrit 29.6 (L) 35.0 - 47.0 %    MCV 95 78 - 100 fL    MCH 32.5 26.5 - 33.0 pg    MCHC 34.1 31.5 - 36.5 g/dL    RDW 15.1 (H) 10.0 - 15.0 %    Platelet Count 157 150 - 450 10e3/uL    % Neutrophils 71 %    % Lymphocytes 12 %    % Monocytes 9 %    % Eosinophils 5 %    % Basophils 1 %    % Immature Granulocytes 3 %    NRBCs per 100 WBC 0 <1 /100    Absolute Neutrophils 3.4 1.6 - 8.3 10e3/uL    Absolute Lymphocytes 0.6 (L) 0.8 - 5.3 10e3/uL    Absolute Monocytes 0.4 0.0 - 1.3 10e3/uL    Absolute Eosinophils 0.2 0.0 - 0.7 10e3/uL    Absolute Basophils 0.0 0.0 - 0.2 10e3/uL    Absolute Immature Granulocytes 0.2 <=0.4 10e3/uL    Absolute NRBCs 0.0 10e3/uL   Potassium   Result Value Ref Range    Potassium 3.8 3.4 - 5.3 mmol/L       Assessment: 59 year old female with recurrent HGSOC admitted for a bowel microperforation in the setting of recent bevacizumab administration. Patient initially underwent conservative management at OSH with IV antibiotics and bowel rest, but ultimately worsened and was transferred to [facility] for further management.     Plan:    #Bowel perforation  #Pneumoperitoneum  - Found to have pneumoperitoneum on admission consistent with bowel perforation in the setting of recent bevacizumab administration  - CT at OSH on 2/16 w/ thickening in the proximal jejunum w/ new gastric and duodenal obstruction, moderate  pneumoperitoneum but source of perforation not visualized  - Currently on IV zosyn, will continue  - s/p IR drain placement 2/18. Will continue to trend output, fluid sent for culture which is pending. Gram stain negative, no growth to date  - CT with po contrast this AM to re-evaluate fluid collections  - If not improving, may ultimately require surgical management  - Continue bowel rest with NPO status, will replace NG tube if changes to abdominal exam or new N/V  - S/p nutrition consult, will consider TPN today pending CT findings  - IV antiemetics prn  - Patient reports tylenol and flexeril have been most helpful for pain, ordered prn in addition to IV dilaudid prn.  - PPX: SCDs, IV protonix, lovenox ppx     #Neutropenia, resolved  - ANC 1.1 on admission, now resolved  - Likely secondary to recent chemotherapy administration, but will continue to monitor closely for developing signs of infection. Afebrile.  - Daily CBC with diff     #Recurrent HGSOC  - S/p cycle 1 of carbo/taxol/deshawn on 2/5. Treatment will be held in the setting of current bowel perforation  - Follow up with Dr. Patino after discharge for ongoing treatment discussions.     #UTI, resolved  - Ucx at OSH with >100k E coli  - s/p treatment with Zosyn as above     #MDD  - PTA lexapro daily    Disposition: pending clinical course    Plan of care discussed with Dr. Warner.    Bonnie Goodman MD  OB/GYN Resident, PGY-4    Physician Attestation   I saw this patient with the resident team and agree with the findings and plan of care as documented in the note. I personally reviewed vital signs, medications, and labs. The above note has been edited by me    Comments: CT performed this morning, which shows two discrete abscesses and ongoing free fluid around stomach and upper GI. Will discussed with IR whether they can readjust at least right drain as it appears to be positioned outside of the abscess. Plan to start TPN this morning for nutrition and  continue with bowel rest with NPO and ice chips. Pending IR plan with drains will discontinue Zosyn due to normal WBC, pt afebrile and cultures negative.    Chey Warner MD  Gynecology Oncology   February 21, 2025 , 11:08 AM

## 2025-02-21 NOTE — PROGRESS NOTES
"Brief Progress Note    Went to check on patient for evening rounds. Has felt ok since the NG tube came out, no nausea. Pain is well controlled. Has not passed gas today.    /69 (BP Location: Left arm)   Pulse 69   Temp 98.3  F (36.8  C) (Oral)   Resp 16   Ht 1.567 m (5' 1.71\")   Wt 64.6 kg (142 lb 8 oz)   SpO2 99%   BMI 26.31 kg/m    Gen: appears well, NAD  Abd:  soft, non-tender, moderately distended. SANYA drains present with cloudy, yellow drainage. Unchanged from prior    Continue NPO status, ok to leave NG out at this time. Plan for CT with PO contrast in AM to re-evaluate abdominal fluid collections.    Bonnie Goodman MD  OB/GYN Resident, PGY-4    "

## 2025-02-21 NOTE — PLAN OF CARE
Plan of Care Reviewed With: patient    Overall Patient Progress: no change    Outcome Evaluation: Patient had emesis x 2 this shift. Anti-nausea administered x 1. NG tube placed by physician. Potassium and phos replaced. TPN to start this evening. Continue to monitor.

## 2025-02-21 NOTE — PROGRESS NOTES
"Brief Progress Note    S: Patient just had a small episode of emesis.     O: BP (!) 143/83 (BP Location: Left arm)   Pulse 64   Temp 98.2  F (36.8  C) (Oral)   Resp 16   Ht 1.567 m (5' 1.71\")   Wt 64.6 kg (142 lb 8 oz)   SpO2 99%   BMI 26.31 kg/m       General: NAD    CT abdomen with and without contrast 2/21/25     INDICATION: Bowel perforation     COMPARISON: Chest abdomen pelvis CT 1/10/2025     CONTRAST: 86 mL intravenous Isovue-370. 50 mL oral Omnipaque-140 oral.     IMPRESSION:  1. Bilateral mid to lower abdominal rim-enhancing abscesses, the  right-sided fluid collection is below the tip of the percutaneous  catheter.  2. Slightly decreased upper abdominal moderately extensive free fluid.  3. No visible pneumoperitoneum.  4. Multiple small liver cysts with minimal central intrahepatic  biliary prominence.  5. Left greater than right small to mild pleural effusions.  6. Possible sharp turn of a bowel loop in the left lower abdomen  rather than an intussusception, close attention on follow-up scans  recommended.  7. Mild atherosclerosis.  8. Degenerative changes in the mid to lower lumbar spine.     COLTON MEYER MD     A/P: Jacki Smith is a 59 year old  with recurrent HGSOC admitted for a bowel microperforation in the setting of recent bevacizumab administration. Patient initially underwent conservative management at OSH with IV antibiotics and bowel rest, but ultimately worsened and was transferred to [facility] for further management. Updated patient on plan of care.    #Bowel perforation  - s/p IR drain placement 2/18. Will continue to trend output, fluid sent for culture which is pending. Gram stain negative, no growth to date.  - Bilateral abscesses present on repeat CT this AM, drain tip above right abscess. Paged IR.  - Currently on IV zosyn, will continue and consider discontinue tomorrow  - Continue bowel rest with NPO status  - Will replace NG tube if changes to abdominal exam or any " additional episode of nausea/vomiting  - Start TPN today, appreciate dietary and pharmacy recommendations. D5 halted.  - Will follow up contrast on KUB on 2/21 1600.    #Pneumoperitoneum: Pneumoperitoneum resolved on repeat CT this AM    China Landry MD PGY-1  02/21/25 12:05 PM

## 2025-02-21 NOTE — PLAN OF CARE
Goal Outcome Evaluation:    Neuro: A&Ox4.   Cardiac: SR. VSS.   Respiratory: Sating 98% on RA.  GI/: Adequate urine output. passing gas , no Bm a this shift.   Diet/appetite: NPO , denied nausea or vomitting.   Activity:  independently up to chair and in halls.  Pain: At acceptable level on current regimen.   Skin: No new deficits noted.  LDA's: port , drain tubes patent and functioning.   Plan: Continue with POC. Notify primary team with changes.

## 2025-02-21 NOTE — PLAN OF CARE
Problem: Adult Inpatient Plan of Care  Goal: Optimal Comfort and Wellbeing  Outcome: Progressing   B/P: 131/69, T: 98.3, P: 69, R: 16 No C/O pain. R and L abdominal drains DI, scant output. NPO. Started PO contrast at 5 am for 7am CT scan. Denied nausea. Voiding adequate amounts. Appeared to sleep in between cares. Continue to monitor and notify MD with any significant changes.

## 2025-02-21 NOTE — PROGRESS NOTES
CLINICAL NUTRITION SERVICES - BRIEF NOTE     Nutrition Prescription    RECOMMENDATIONS FOR MDs/PROVIDERS TO ORDER:  TPN will run at 60 mL/hr when it starts tonight.  Adjust IV fluid rate when TPN starts per provider discretion.  If pt requires IV fluids in addition to TPN, please switch to non-dextrose IV fluids when TPN starts  - paged primary team with this recommendation    Recommendations already ordered by Registered Dietitian (RD):  -- Start TPN, sent change order request to pharmacist:  Dosing weight:  51.9 kg  Access: Central (if available/able to use)  Initial parameters (per day)  Volume:  1440 mL  Dextrose: 100 g  AA: 75 g  Lipids: 250 mL 20%, 5 days per week   Dextrose titration: Monitor lytes and if within acceptable parameters (Mg++ > or = 1.5, K+ is > or = 3, and PO4 > or = 1.9), increase dextrose by 45 g/day to goal of 190 g dextrose.  Additives: Infuvite and trace minerals daily; thiamine (100 mg/day x 7 days)    Goal PN provides 190 g dextrose, 75 g AA, and 250 mL 20% lipids 5 days per week for total provision of 1303 Kcals (25 Kcals/kg), 1.4 g/kg protein, GIR 2.5 mg/kg/minute, and 27% fat kcals on average daily.      -- Ordered Hepatic panel and TG add-on       *See RD note on 2/18 and 2/20 for previous RD notes this admission  *Received consult: Pharmacy/Nutrition to start and manage TPN (Line Type: Central valentin in place)    Diet: NPO      NEW FINDINGS   Labs:  - K+ WNL  - Mg++ 1.6 (L), RN managed replacement protocol ordered  - Phos 2.3 (L), RN managed replacement protocol ordered  - BGs stable  - Alk Phos, ALT, AST, and Tbili WNL  -  (borderline high)     Meds:   - D5 @ 100 mL/hr (IV fluids changed from LR @ 100 mL/hr to D5 @ 100 mL/hr yesterday 2/20 at 1000)    INTERVENTIONS  See above    Monitoring/Evaluation  Progress toward goals will be monitored and evaluated per protocol.      Lulu Bonilla, RD, , LD  Weekday Units covered: 5A (non-Heme Onc pts) and 7B ([id number])  Available by   or 7B Clinical Dietitian Nichelle  Weekend Coverage: Weekend Clinical Dietitian Nichelle    Inpatient Clinical Dietitians no longer available via paging

## 2025-02-21 NOTE — PROGRESS NOTES
"Blood pressure 117/55, pulse 64, temperature 97.5  F (36.4  C), temperature source Oral, resp. rate 16, height 1.567 m (5' 1.71\"), weight 64.6 kg (142 lb 8 oz), SpO2 99%.    Activity: Up ad deja  Neuros:  AOX4, makes needs known  Cardiac: WDL  Respiratory: WDL, RA  GI/: AUOP, +BS, no BM  Diet: NPO except meds/ice chips, starting TPN 60cc/hr continuous  Skin/Incisions/Drains: NG LIS high output, Rt/Lt skater drains, Rt PAC  Lines: Rt PAC TPN and electrolytes, Rt PIV w/abx  Pain: Tylenol  Plan: Continue with POC    "

## 2025-02-21 NOTE — PROGRESS NOTES
NG Tube Placement    After receiving PO contrast this AM, patient had two episodes of emesis. Her NG tube had been accidentally removed yesterday and replacement was deferred, but recommended again at this time. The NG tube insertion procedure was explained, and the patient consented.     A 14 Citizen of [place], [place] NG tube was inserted via the right naris at 1:45 PM, 41 at the nostril. Secured to nostril, KUB ordered prior to placing to low intermittent suction. Will follow up STAT KUB.    China Landry MD PGY-1  02/21/25 1:54 PM

## 2025-02-21 NOTE — CONSULTS
"    Interventional Radiology Consult Service Note    IR consulted for possible \"IR drain tip above fluid collection\".    This is a 59 year old with recurrent HGSOC admitted for a bowel microperforation in the setting of recent bevacizumab administration. Patient initially underwent conservative management at OSH with IV antibiotics and bowel rest, but ultimately worsened and was transferred to [facility] for further management. IR placed a right and left lower abdominal 10 Fr drains on 2/18. IR consulted today following CT from today reporting \"Bilateral mid to lower abdominal rim-enhancing abscesses, the right-sided fluid collection is below the tip of the percutaneous catheter\".    Case and imaging was reviewed with Dr. Jose Crook from IR. Upon review of CT imaging from today, fluid collection appears to be communicating and given that both drains are continuing to drain IR doesn't not offer nor recommend drain adjustment at this time.     Recommendations were reviewed with Dr. Landry from requesting team.    Vitals:   BP (!) 143/83 (BP Location: Left arm)   Pulse 64   Temp 98.2  F (36.8  C) (Oral)   Resp 16   Ht 1.567 m (5' 1.71\")   Wt 64.6 kg (142 lb 8 oz)   SpO2 99%   BMI 26.31 kg/m      Pertinent Labs:     Lab Results   Component Value Date    WBC 3.7 (L) 02/21/2025    WBC 4.8 02/20/2025    WBC 3.5 (L) 02/19/2025       Lab Results   Component Value Date    HGB 10.0 02/21/2025    HGB 10.1 02/20/2025    HGB 10.3 02/19/2025       Lab Results   Component Value Date     02/21/2025     02/20/2025     02/19/2025       Lab Results   Component Value Date    INR 1.34 (H) 02/18/2025    PTT 40 (H) 02/18/2025       Lab Results   Component Value Date    POTASSIUM 3.4 02/21/2025    POTASSIUM 4.4 09/12/2023        Dahiana Stone PA-C  Interventional Radiology   "

## 2025-02-22 ENCOUNTER — APPOINTMENT (OUTPATIENT)
Dept: GENERAL RADIOLOGY | Facility: CLINIC | Age: 60
End: 2025-02-22
Payer: COMMERCIAL

## 2025-02-22 LAB
ALBUMIN SERPL BCG-MCNC: 3 G/DL (ref 3.5–5.2)
ALP SERPL-CCNC: 115 U/L (ref 40–150)
ALT SERPL W P-5'-P-CCNC: 7 U/L (ref 0–50)
ANION GAP SERPL CALCULATED.3IONS-SCNC: 13 MMOL/L (ref 7–15)
AST SERPL W P-5'-P-CCNC: 21 U/L (ref 0–45)
BILIRUB DIRECT SERPL-MCNC: 0.17 MG/DL (ref 0–0.3)
BILIRUB SERPL-MCNC: 0.3 MG/DL
BUN SERPL-MCNC: 8.2 MG/DL (ref 8–23)
CALCIUM SERPL-MCNC: 8.8 MG/DL (ref 8.8–10.4)
CHLORIDE SERPL-SCNC: 96 MMOL/L (ref 98–107)
CREAT SERPL-MCNC: 0.67 MG/DL (ref 0.51–0.95)
EGFRCR SERPLBLD CKD-EPI 2021: >90 ML/MIN/1.73M2
ERYTHROCYTE [DISTWIDTH] IN BLOOD BY AUTOMATED COUNT: 14.9 % (ref 10–15)
GLUCOSE BLDC GLUCOMTR-MCNC: 91 MG/DL (ref 70–99)
GLUCOSE SERPL-MCNC: 139 MG/DL (ref 70–99)
HCO3 SERPL-SCNC: 25 MMOL/L (ref 22–29)
HCT VFR BLD AUTO: 30.7 % (ref 35–47)
HGB BLD-MCNC: 10.3 G/DL (ref 11.7–15.7)
INR PPP: 1.34 (ref 0.85–1.15)
MAGNESIUM SERPL-MCNC: 2 MG/DL (ref 1.7–2.3)
MCH RBC QN AUTO: 32.3 PG (ref 26.5–33)
MCHC RBC AUTO-ENTMCNC: 33.6 G/DL (ref 31.5–36.5)
MCV RBC AUTO: 96 FL (ref 78–100)
PHOSPHATE SERPL-MCNC: 2.9 MG/DL (ref 2.5–4.5)
PLATELET # BLD AUTO: 157 10E3/UL (ref 150–450)
POTASSIUM SERPL-SCNC: 4 MMOL/L (ref 3.4–5.3)
PREALB SERPL-MCNC: 5.6 MG/DL (ref 20–40)
PROT SERPL-MCNC: 6.1 G/DL (ref 6.4–8.3)
RBC # BLD AUTO: 3.19 10E6/UL (ref 3.8–5.2)
SODIUM SERPL-SCNC: 134 MMOL/L (ref 135–145)
WBC # BLD AUTO: 3.9 10E3/UL (ref 4–11)

## 2025-02-22 PROCEDURE — 80053 COMPREHEN METABOLIC PANEL: CPT | Performed by: OBSTETRICS & GYNECOLOGY

## 2025-02-22 PROCEDURE — 250N000013 HC RX MED GY IP 250 OP 250 PS 637

## 2025-02-22 PROCEDURE — 36591 DRAW BLOOD OFF VENOUS DEVICE: CPT | Performed by: OBSTETRICS & GYNECOLOGY

## 2025-02-22 PROCEDURE — B4185 PARENTERAL SOL 10 GM LIPIDS: HCPCS | Performed by: OBSTETRICS & GYNECOLOGY

## 2025-02-22 PROCEDURE — 250N000011 HC RX IP 250 OP 636: Performed by: OBSTETRICS & GYNECOLOGY

## 2025-02-22 PROCEDURE — 250N000009 HC RX 250: Performed by: OBSTETRICS & GYNECOLOGY

## 2025-02-22 PROCEDURE — 250N000013 HC RX MED GY IP 250 OP 250 PS 637: Performed by: STUDENT IN AN ORGANIZED HEALTH CARE EDUCATION/TRAINING PROGRAM

## 2025-02-22 PROCEDURE — 74018 RADEX ABDOMEN 1 VIEW: CPT | Mod: 26 | Performed by: RADIOLOGY

## 2025-02-22 PROCEDURE — 82248 BILIRUBIN DIRECT: CPT | Performed by: OBSTETRICS & GYNECOLOGY

## 2025-02-22 PROCEDURE — 250N000009 HC RX 250

## 2025-02-22 PROCEDURE — 74018 RADEX ABDOMEN 1 VIEW: CPT

## 2025-02-22 PROCEDURE — 84100 ASSAY OF PHOSPHORUS: CPT | Performed by: OBSTETRICS & GYNECOLOGY

## 2025-02-22 PROCEDURE — 84134 ASSAY OF PREALBUMIN: CPT | Performed by: OBSTETRICS & GYNECOLOGY

## 2025-02-22 PROCEDURE — 85610 PROTHROMBIN TIME: CPT | Performed by: OBSTETRICS & GYNECOLOGY

## 2025-02-22 PROCEDURE — 120N000003 HC R&B IMCU UMMC

## 2025-02-22 PROCEDURE — 85014 HEMATOCRIT: CPT | Performed by: STUDENT IN AN ORGANIZED HEALTH CARE EDUCATION/TRAINING PROGRAM

## 2025-02-22 PROCEDURE — 83735 ASSAY OF MAGNESIUM: CPT | Performed by: OBSTETRICS & GYNECOLOGY

## 2025-02-22 PROCEDURE — 99232 SBSQ HOSP IP/OBS MODERATE 35: CPT | Mod: GC | Performed by: STUDENT IN AN ORGANIZED HEALTH CARE EDUCATION/TRAINING PROGRAM

## 2025-02-22 PROCEDURE — 250N000011 HC RX IP 250 OP 636: Performed by: STUDENT IN AN ORGANIZED HEALTH CARE EDUCATION/TRAINING PROGRAM

## 2025-02-22 PROCEDURE — 85041 AUTOMATED RBC COUNT: CPT | Performed by: STUDENT IN AN ORGANIZED HEALTH CARE EDUCATION/TRAINING PROGRAM

## 2025-02-22 RX ORDER — LIDOCAINE 4 G/G
1 PATCH TOPICAL
Status: DISCONTINUED | OUTPATIENT
Start: 2025-02-22 | End: 2025-03-02 | Stop reason: HOSPADM

## 2025-02-22 RX ADMIN — PANTOPRAZOLE SODIUM 40 MG: 40 INJECTION, POWDER, FOR SOLUTION INTRAVENOUS at 08:16

## 2025-02-22 RX ADMIN — MAGNESIUM SULFATE HEPTAHYDRATE: 500 INJECTION, SOLUTION INTRAMUSCULAR; INTRAVENOUS at 19:11

## 2025-02-22 RX ADMIN — CYCLOBENZAPRINE 10 MG: 10 TABLET, FILM COATED ORAL at 21:15

## 2025-02-22 RX ADMIN — OLIVE OIL AND SOYBEAN OIL 250 ML: 16; 4 INJECTION, EMULSION INTRAVENOUS at 19:12

## 2025-02-22 RX ADMIN — ACETAMINOPHEN 975 MG: 325 TABLET, FILM COATED ORAL at 14:27

## 2025-02-22 RX ADMIN — Medication 1 CAPSULE: at 19:11

## 2025-02-22 RX ADMIN — ACETAMINOPHEN 975 MG: 325 TABLET, FILM COATED ORAL at 21:15

## 2025-02-22 RX ADMIN — Medication 1 CAPSULE: at 08:16

## 2025-02-22 RX ADMIN — ENOXAPARIN SODIUM 40 MG: 40 INJECTION SUBCUTANEOUS at 17:44

## 2025-02-22 RX ADMIN — ESCITALOPRAM OXALATE 20 MG: 20 TABLET ORAL at 21:15

## 2025-02-22 RX ADMIN — ACETAMINOPHEN 975 MG: 325 TABLET, FILM COATED ORAL at 08:16

## 2025-02-22 RX ADMIN — PIPERACILLIN SODIUM AND TAZOBACTAM SODIUM 3.38 G: 3; .375 INJECTION, SOLUTION INTRAVENOUS at 04:30

## 2025-02-22 ASSESSMENT — ACTIVITIES OF DAILY LIVING (ADL)
ADLS_ACUITY_SCORE: 44
ADLS_ACUITY_SCORE: 45
ADLS_ACUITY_SCORE: 45
ADLS_ACUITY_SCORE: 44
ADLS_ACUITY_SCORE: 45
ADLS_ACUITY_SCORE: 44
ADLS_ACUITY_SCORE: 45
ADLS_ACUITY_SCORE: 45
ADLS_ACUITY_SCORE: 44
ADLS_ACUITY_SCORE: 44
ADLS_ACUITY_SCORE: 45
ADLS_ACUITY_SCORE: 44
ADLS_ACUITY_SCORE: 45
ADLS_ACUITY_SCORE: 44
ADLS_ACUITY_SCORE: 44
ADLS_ACUITY_SCORE: 45
ADLS_ACUITY_SCORE: 45

## 2025-02-22 NOTE — PLAN OF CARE
"BP (!) 144/83 (BP Location: Left arm)   Pulse 62   Temp 98.7  F (37.1  C) (Oral)   Resp 18   Ht 1.567 m (5' 1.71\")   Wt 64.6 kg (142 lb 8 oz)   SpO2 96%   BMI 26.31 kg/m      Status: Bowel perforation  Activity: SBA  Neuros: A&Ox4, no deficits noted.  Cardiac: WDL, denies chest pain.  Respiratory: WDL on RA, denies SOB.  GI/: +BS, +ga, voids spont with AUOP.  Diet: NPO + cont TPN/lipids.  Skin/Incisions: WDL ex abd drains.  Lines/Drains: Abd drain x2 CDI to gravity drainage with minimal output. NG to LIS with moderate bile output. R chest port running TPN/lipids. PIV TKO + abx.  Pain/Nausea: Denies pain/nausea this shift.  New Changes: No acute changes this shift, patient slept comfortably between cares.  "

## 2025-02-22 NOTE — PROGRESS NOTES
Brief progress note    Presented to bedside for afternoon rounds, patient was up and ambulating around the hallway. Will check back during evening rounds. No nursing concerns, VSS, appropriate UOp and NG output. Patient has had 2 bowel movements today. Will continue to monitor.    Tahir Preston MD  Gynecologic-Oncology, PGY-2   02/22/2025 3:38 PM    Gyn-Onc pager: [phone]

## 2025-02-22 NOTE — PLAN OF CARE
Plan of Care Reviewed With: patient    Overall Patient Progress: improving    Outcome Evaluation: Patient had formed bowel movement today. PRN Tylenol administered for pain around drain sites. Ambulated in hallway. Medications down NG tube. Continue to monitor.

## 2025-02-22 NOTE — PROGRESS NOTES
"Gynecology Oncology Progress Note  2025    Ms. Jacki Smith is a 59 year old HD#13 for management of a bowel microperforation in the setting of recent bevacizumab administration.    Dz: Recurrent platinum sensitive HGSOC    24 hour events:   - fluid cx NGTD x3d  - BM x2  - KUB w/o contrast in colon  - Hypertensive    Subjective: Patient reports she has been up and down all night with the NG tube leaking, needing to get up to urinate, etc., so it wasn't very restful. Denies nausea / vomiting overnight. 2 bowel movements yesterday, and continues to have flatus. Pain well controlled. Voiding spontaneously. Feeling more at peace with the plan after discussing it yesterday. Ambulating 3-4x/day in halls. Requesting lovenox be discontinued, was ok with continuing it after its importance was explained. Wondering if a clamp trial can be done today.    Objective:   /79 (BP Location: Right arm)   Pulse 70   Temp 98.7  F (37.1  C) (Oral)   Resp 17   Ht 1.567 m (5' 1.71\")   Wt 60.5 kg (133 lb 6.4 oz)   SpO2 97%   BMI 24.63 kg/m      General: lying in bed, in NAD  CV: well perfused  Resp: no increased work of breathing on room air  Abdomen: soft, non-tender, moderately distended. SANYA drains present with clear yellow drainage in the left drain, nothing present in right drain.  Extremities: nontender, trace edema    I/Os  (Yesterday // Since Midnight)  Urine 1100 mL + 1 unmeasured // NR  N // 250 in canister  SANYA drains: 35/0 mL//NR/NR    New labs/imaging-  Results for orders placed or performed during the hospital encounter of 25 (from the past 24 hours)   Magnesium   Result Value Ref Range    Magnesium 2.0 1.7 - 2.3 mg/dL   Phosphorus   Result Value Ref Range    Phosphorus 2.9 2.5 - 4.5 mg/dL   CBC with platelets   Result Value Ref Range    WBC Count 3.9 (L) 4.0 - 11.0 10e3/uL    RBC Count 3.19 (L) 3.80 - 5.20 10e6/uL    Hemoglobin 10.3 (L) 11.7 - 15.7 g/dL    Hematocrit 30.7 (L) 35.0 - 47.0 % "    MCV 96 78 - 100 fL    MCH 32.3 26.5 - 33.0 pg    MCHC 33.6 31.5 - 36.5 g/dL    RDW 14.9 10.0 - 15.0 %    Platelet Count 157 150 - 450 10e3/uL   Comprehensive metabolic panel   Result Value Ref Range    Sodium 134 (L) 135 - 145 mmol/L    Potassium 4.0 3.4 - 5.3 mmol/L    Carbon Dioxide (CO2) 25 22 - 29 mmol/L    Anion Gap 13 7 - 15 mmol/L    Urea Nitrogen 8.2 8.0 - 23.0 mg/dL    Creatinine 0.67 0.51 - 0.95 mg/dL    GFR Estimate >90 >60 mL/min/1.73m2    Calcium 8.8 8.8 - 10.4 mg/dL    Chloride 96 (L) 98 - 107 mmol/L    Glucose 139 (H) 70 - 99 mg/dL    Alkaline Phosphatase 115 40 - 150 U/L    AST 21 0 - 45 U/L    ALT 7 0 - 50 U/L    Protein Total 6.1 (L) 6.4 - 8.3 g/dL    Albumin 3.0 (L) 3.5 - 5.2 g/dL    Bilirubin Total 0.3 <=1.2 mg/dL   INR   Result Value Ref Range    INR 1.34 (H) 0.85 - 1.15   Prealbumin   Result Value Ref Range    Prealbumin 5.6 (L) 20.0 - 40.0 mg/dL   Bilirubin direct   Result Value Ref Range    Bilirubin Direct 0.17 0.00 - 0.30 mg/dL   XR Abdomen Port 1 View    Narrative    Portable abdomen 1 view    INDICATION: Assessing for Gastrografin passage in the colon    COMPARISON: Yesterday    FINDINGS: NG/OG tube tip and side hole both projecting in the stomach.  Catheters in the pelvis bilaterally. Degenerative changes in the spine  with levocurvature of the mid lumbar spine. No evidence of distended  bowel gas. Poor visualization of any contrast.      Impression    IMPRESSION: No definitive contrast visualization.    COLTON MEYER MD         SYSTEM ID:  [id number]   Glucose by meter   Result Value Ref Range    GLUCOSE BY METER POCT 91 70 - 99 mg/dL   Glucose by meter   Result Value Ref Range    GLUCOSE BY METER POCT 123 (H) 70 - 99 mg/dL   Basic metabolic panel   Result Value Ref Range    Sodium 136 135 - 145 mmol/L    Potassium 3.9 3.4 - 5.3 mmol/L    Chloride 101 98 - 107 mmol/L    Carbon Dioxide (CO2) 25 22 - 29 mmol/L    Anion Gap 10 7 - 15 mmol/L    Urea Nitrogen 13.3 8.0 - 23.0 mg/dL     Creatinine 0.58 0.51 - 0.95 mg/dL    GFR Estimate >90 >60 mL/min/1.73m2    Calcium 8.9 8.8 - 10.4 mg/dL    Glucose 121 (H) 70 - 99 mg/dL   CBC with platelets   Result Value Ref Range    WBC Count 4.2 4.0 - 11.0 10e3/uL    RBC Count 3.29 (L) 3.80 - 5.20 10e6/uL    Hemoglobin 10.4 (L) 11.7 - 15.7 g/dL    Hematocrit 31.4 (L) 35.0 - 47.0 %    MCV 95 78 - 100 fL    MCH 31.6 26.5 - 33.0 pg    MCHC 33.1 31.5 - 36.5 g/dL    RDW 15.0 10.0 - 15.0 %    Platelet Count 170 150 - 450 10e3/uL   Magnesium   Result Value Ref Range    Magnesium 2.3 1.7 - 2.3 mg/dL   Phosphorus   Result Value Ref Range    Phosphorus 3.3 2.5 - 4.5 mg/dL       Assessment: 59 year old female with recurrent HGSOC HD#13 for a bowel microperforation in the setting of recent bevacizumab administration. Patient initially underwent conservative management at OSH with IV antibiotics and bowel rest, but ultimately worsened and was transferred to [facility] for further management.     Plan:    #Bowel perforation  #Pneumoperitoneum  - Found to have pneumoperitoneum on admission consistent with bowel perforation in the setting of recent bevacizumab administration  - s/p 10d IV zosy,  afebrile, normal WBC, and NGTD on fluid cultures   - s/p IR drain placement 2/18. Will continue to trend output, fluid sent for culture, final is pending. Gram stain negative, no growth to date  - CT with po/IV contrast 2/21 to re-evaluate fluid collections- Bilateral mid to lower abdominal rim-enhancing abscesses, R drain above the abscess. Decreased free fluid. No pneumoperitoneum.  s/p discussion with IR about repositioning drains, no plan for repositioning at this time as they believe all fluid collections are communicating  - KUB on 2/22 s/p CT with PO/IV contrast shows no contrast in the colon. Possible 2/2 NGT suctioning PO contrast  - Continue bowel rest with NPO status, NGT replaced on 2/21 due to multiple episodes of emesis  - S/p nutrition consult,  TPN started 2/21 in evening,  appreciate nutrition recs   - IV antiemetics prn  - Patient reports tylenol and flexeril have been most helpful for pain, ordered prn in addition to IV dilaudid prn.  - PPX: SCDs, IV protonix, lovenox ppx     #Neutropenia, resolved  - Daily CBC with diff     #Recurrent HGSOC  - S/p cycle 1 of carbo/taxol/deshawn on 2/5. Treatment will be held in the setting of current bowel perforation  - Follow up with Dr. Patino after discharge for ongoing treatment discussions.     #UTI, resolved  - Ucx at OSH with >100k E coli  - s/p treatment with Zosyn as above     #MDD  - PTA lexapro daily    Disposition: pending clinical course    Tahir Preston MD  Gynecologic-Oncology, PGY-2   02/23/2025 6:26 AM    Gyn-Onc pager: [phone]     Physician Attestation   I saw this patient with the resident and agree with the findings and plan of care as documented in the note.  I personally reviewed vital signs, medications, and labs. The above note has been edited by me.    Comments: Michelle is doing well, without additional n/v, and had an additional BM last night. Output is still quiet high. R drain was adjusted this morning due to being kinked on the outside, will watch for output. VSS, labs normal. Continue TPN and NPO status. Likely clamp trial early next week.     Patient discussed with Dr. Warner (gyn-oncology attending)     Mary Verdugo MD  Gynecology Oncology Fellow  February 23, 2025, 9:41 AM

## 2025-02-22 NOTE — PROGRESS NOTES
"Brief Progress Note    Went to check on patient for evening rounds. Daughter at bedside. Doing okay with NG tube in place, a little sore but overall tolerating it well. No nausea.  Pain is well controlled. Passed small amount of flatus today when she was up and moving around.     BP (!) 144/83 (BP Location: Left arm)   Pulse 62   Temp 98.7  F (37.1  C) (Oral)   Resp 18   Ht 1.567 m (5' 1.71\")   Wt 64.6 kg (142 lb 8 oz)   SpO2 96%   BMI 26.31 kg/m    Gen: appears well, NAD, NGT in place   Abd:  soft, non-tender, mildly distended. SANYA drains present with cloudy, yellow drainage. Unchanged from prior    Continue NPO status, NGT replaced. TPN plan to start tonight.  Reviewed findings with patient and daughter of imaging, and inability to interpret movement of PO contrast. Discussed rationale for NGT replacement. Discussed needing to potentially repeat Gastrogaffin challenge in AM to more accurately assess contrast movement through bowels, but final decision to be made in AM with weekend rounding team.     Brandy Dawkins MD  Obstetrics and Gynecology, PGY-2  02/21/2025 11:11 PM        "

## 2025-02-22 NOTE — PROGRESS NOTES
"Gynecology Oncology Progress Note    Ms. Jacki Smith is a 59 year old HD#12 for management of a bowel microperforation in the setting of recent bevacizumab administration.    Dz: Recurrent platinum sensitive HGSOC    24 hour events:   - NGT replaced  - TPN started  - CT re demonstrating abscesses, possible sharp turn of bowel loop     Subjective: Patient reports she is feeling pretty good this morning. Denies nausea / vomiting overnight. No flatus, but does feel urge to have a bowel movement.  Pain well controlled. Voiding spontaneously. Frustration about not knowing exactly what is going on or how it can be fixed so she can go home     Objective:   BP (!) 151/92 (BP Location: Right arm)   Pulse 66   Temp 98.8  F (37.1  C) (Oral)   Resp 18   Ht 1.567 m (5' 1.71\")   Wt 64.6 kg (142 lb 8 oz)   SpO2 96%   BMI 26.31 kg/m      General: lying in bed, in NAD  CV: well perfused  Resp: no increased work of breathing on room air  Abdomen: soft, non-tender, moderately distended. SANYA drains present with minimal clear yellow drainage. + BS  Extremities: nontender, trace edema    I/Os  (Yesterday // Since Midnight)  Urine 1100 mL + 1 unmeasured // 800 mL  N mL // NR  SANYA drains: 175 mL // NR    New labs/imaging-  Results for orders placed or performed during the hospital encounter of 25 (from the past 24 hours)   XR Abdomen Port 1 View    Narrative    Portable abdomen 1 view    INDICATION: Confirm NG placement    COMPARISON: Same day CT    FINDINGS: NG tube tip in the stomach. Sidehole however is above the  diaphragmatic hiatus. Consider advancement of the tube by  approximately 6 cm. Pigtail catheter in the left hemipelvis as seen on  the recent CT the patient is somewhat obliqued on this examination.  Costal cartilaginous calcifications noted along with renal collecting  system contrast. Degenerative changes in the lower thoracic spine with  disc space narrowing.      Impression    IMPRESSION: NG tube " side hole above the diaphragmatic hiatus, consider  advancement by proximally 6 cm. Nonobstructive bowel gas pattern.    COLTON MEYER MD         SYSTEM ID:  [id number]   XR Abdomen Port 1 View    Narrative    EXAMINATION:  XR ABDOMEN PORT 1 VIEW 2/21/2025     COMPARISON: Same day radiograph of the abdomen.    HISTORY: confirm NG placement, view colon for remaining contrast.    FINDINGS: Supine frontal view of the abdomen. Interval retraction of  enteric tube with tip projecting over the distal esophagus and  sidehole not in the field of view as compared to same day radiograph.  Pigtail catheters in the left hemipelvis and right lower quadrant.  Postsurgical changes of the pelvis. Residual contrast from same day  contrast-enhanced CT seen in the renal collecting system bilaterally.   No abnormally dilated loops of bowel or pneumatosis in the visualized  abdomen. No portal venous gas.  The lung bases are unremarkable.  Multilevel degenerative changes of the visualized lumbar spine. No  definitive residual contrast in the large bowel.      Impression    IMPRESSION:     1. Enteric tube with tip in the distal esophagus. Recommend  advancement.  2. Non-obstructed bowel gas pattern.    I have personally reviewed the examination and initial interpretation  and I agree with the findings.    COLTON MEYER MD         SYSTEM ID:  [id number]   Potassium   Result Value Ref Range    Potassium 4.1 3.4 - 5.3 mmol/L   XR Abdomen Port 1 View    Narrative    EXAMINATION:  XR ABDOMEN PORT 1 VIEW 2/21/2025     COMPARISON: Radiograph dated 2/21/2025.    HISTORY: confirm proper NGT placement.    TECHNIQUE: Frontal supine view of the abdomen.    FINDINGS: Enteric tube tip and sidehole terminate in the stomach.  Stable positioning of bilateral surgical drains with possible kinking  in the right drain. No abnormally dilated loops of bowel or  pneumatosis in the visualized abdomen. No portal venous gas.  The lung  bases are  unremarkable.       Impression    IMPRESSION: Enteric tube tip and sidehole terminate in the stomach.     BOB CASE MD         SYSTEM ID:  [id number]   Glucose by meter   Result Value Ref Range    GLUCOSE BY METER POCT 83 70 - 99 mg/dL   Glucose by meter   Result Value Ref Range    GLUCOSE BY METER POCT 107 (H) 70 - 99 mg/dL   Magnesium   Result Value Ref Range    Magnesium 2.0 1.7 - 2.3 mg/dL   Phosphorus   Result Value Ref Range    Phosphorus 2.9 2.5 - 4.5 mg/dL   CBC with platelets   Result Value Ref Range    WBC Count 3.9 (L) 4.0 - 11.0 10e3/uL    RBC Count 3.19 (L) 3.80 - 5.20 10e6/uL    Hemoglobin 10.3 (L) 11.7 - 15.7 g/dL    Hematocrit 30.7 (L) 35.0 - 47.0 %    MCV 96 78 - 100 fL    MCH 32.3 26.5 - 33.0 pg    MCHC 33.6 31.5 - 36.5 g/dL    RDW 14.9 10.0 - 15.0 %    Platelet Count 157 150 - 450 10e3/uL   Comprehensive metabolic panel   Result Value Ref Range    Sodium 134 (L) 135 - 145 mmol/L    Potassium 4.0 3.4 - 5.3 mmol/L    Carbon Dioxide (CO2) 25 22 - 29 mmol/L    Anion Gap 13 7 - 15 mmol/L    Urea Nitrogen 8.2 8.0 - 23.0 mg/dL    Creatinine 0.67 0.51 - 0.95 mg/dL    GFR Estimate >90 >60 mL/min/1.73m2    Calcium 8.8 8.8 - 10.4 mg/dL    Chloride 96 (L) 98 - 107 mmol/L    Glucose 139 (H) 70 - 99 mg/dL    Alkaline Phosphatase 115 40 - 150 U/L    AST 21 0 - 45 U/L    ALT 7 0 - 50 U/L    Protein Total 6.1 (L) 6.4 - 8.3 g/dL    Albumin 3.0 (L) 3.5 - 5.2 g/dL    Bilirubin Total 0.3 <=1.2 mg/dL   INR   Result Value Ref Range    INR 1.34 (H) 0.85 - 1.15   Prealbumin   Result Value Ref Range    Prealbumin 5.6 (L) 20.0 - 40.0 mg/dL   Bilirubin direct   Result Value Ref Range    Bilirubin Direct 0.17 0.00 - 0.30 mg/dL       Assessment: 59 year old female with recurrent HGSOC admitted for a bowel microperforation in the setting of recent bevacizumab administration. Patient initially underwent conservative management at OSH with IV antibiotics and bowel rest, but ultimately worsened and was transferred to [facility] for  further management.     Plan:    #Bowel perforation  #Pneumoperitoneum  - Found to have pneumoperitoneum on admission consistent with bowel perforation in the setting of recent bevacizumab administration  - s/p 10d IV zosy, will discontinue today, afebrile, normal WBC, and NGTD on fluid cultures   - s/p IR drain placement 2/18. Will continue to trend output, fluid sent for culture, final is pending. Gram stain negative, no growth to date  - CT with po/IV contrast 2/21 to re-evaluate fluid collections- Bilateral mid to lower abdominal rim-enhancing abscesses, R drain above the abscess. Decreased free fluid. No pneumoperitoneum.  s/p discussion with IR about repositioning drains, no plan for repositioning at this time as they believe all fluid collections are communicating  - Continue bowel rest with NPO status, NGT replaced on 2/21 due to multiple episodes of emesis  - S/p nutrition consult,  TPN started 2/21 in evening, appreciate nutrition recs   - IV antiemetics prn  - Patient reports tylenol and flexeril have been most helpful for pain, ordered prn in addition to IV dilaudid prn.  - PPX: SCDs, IV protonix, lovenox ppx     #Neutropenia, resolved  - Daily CBC with diff     #Recurrent HGSOC  - S/p cycle 1 of carbo/taxol/deshawn on 2/5. Treatment will be held in the setting of current bowel perforation  - Follow up with Dr. Patino after discharge for ongoing treatment discussions.     #UTI, resolved  - Ucx at OSH with >100k E coli  - s/p treatment with Zosyn as above     #MDD  - PTA lexapro daily    Disposition: pending clinical course    Brandy Dawkins MD  Obstetrics and Gynecology, PGY-2  02/22/2025 7:55 AM

## 2025-02-23 LAB
ANION GAP SERPL CALCULATED.3IONS-SCNC: 10 MMOL/L (ref 7–15)
BACTERIA FLD CULT: NO GROWTH
BUN SERPL-MCNC: 13.3 MG/DL (ref 8–23)
CALCIUM SERPL-MCNC: 8.9 MG/DL (ref 8.8–10.4)
CHLORIDE SERPL-SCNC: 101 MMOL/L (ref 98–107)
CREAT SERPL-MCNC: 0.58 MG/DL (ref 0.51–0.95)
EGFRCR SERPLBLD CKD-EPI 2021: >90 ML/MIN/1.73M2
ERYTHROCYTE [DISTWIDTH] IN BLOOD BY AUTOMATED COUNT: 15 % (ref 10–15)
GLUCOSE BLDC GLUCOMTR-MCNC: 104 MG/DL (ref 70–99)
GLUCOSE BLDC GLUCOMTR-MCNC: 123 MG/DL (ref 70–99)
GLUCOSE BLDC GLUCOMTR-MCNC: 87 MG/DL (ref 70–99)
GLUCOSE SERPL-MCNC: 121 MG/DL (ref 70–99)
GRAM STAIN RESULT: NORMAL
GRAM STAIN RESULT: NORMAL
HCO3 SERPL-SCNC: 25 MMOL/L (ref 22–29)
HCT VFR BLD AUTO: 31.4 % (ref 35–47)
HGB BLD-MCNC: 10.4 G/DL (ref 11.7–15.7)
MAGNESIUM SERPL-MCNC: 2.3 MG/DL (ref 1.7–2.3)
MCH RBC QN AUTO: 31.6 PG (ref 26.5–33)
MCHC RBC AUTO-ENTMCNC: 33.1 G/DL (ref 31.5–36.5)
MCV RBC AUTO: 95 FL (ref 78–100)
PHOSPHATE SERPL-MCNC: 3.3 MG/DL (ref 2.5–4.5)
PLATELET # BLD AUTO: 170 10E3/UL (ref 150–450)
POTASSIUM SERPL-SCNC: 3.9 MMOL/L (ref 3.4–5.3)
RBC # BLD AUTO: 3.29 10E6/UL (ref 3.8–5.2)
SODIUM SERPL-SCNC: 136 MMOL/L (ref 135–145)
WBC # BLD AUTO: 4.2 10E3/UL (ref 4–11)

## 2025-02-23 PROCEDURE — 250N000013 HC RX MED GY IP 250 OP 250 PS 637: Performed by: STUDENT IN AN ORGANIZED HEALTH CARE EDUCATION/TRAINING PROGRAM

## 2025-02-23 PROCEDURE — 250N000009 HC RX 250

## 2025-02-23 PROCEDURE — 250N000013 HC RX MED GY IP 250 OP 250 PS 637

## 2025-02-23 PROCEDURE — 250N000011 HC RX IP 250 OP 636: Performed by: STUDENT IN AN ORGANIZED HEALTH CARE EDUCATION/TRAINING PROGRAM

## 2025-02-23 PROCEDURE — 80048 BASIC METABOLIC PNL TOTAL CA: CPT | Performed by: STUDENT IN AN ORGANIZED HEALTH CARE EDUCATION/TRAINING PROGRAM

## 2025-02-23 PROCEDURE — 84100 ASSAY OF PHOSPHORUS: CPT | Performed by: STUDENT IN AN ORGANIZED HEALTH CARE EDUCATION/TRAINING PROGRAM

## 2025-02-23 PROCEDURE — 120N000003 HC R&B IMCU UMMC

## 2025-02-23 PROCEDURE — 36591 DRAW BLOOD OFF VENOUS DEVICE: CPT | Performed by: STUDENT IN AN ORGANIZED HEALTH CARE EDUCATION/TRAINING PROGRAM

## 2025-02-23 PROCEDURE — 85027 COMPLETE CBC AUTOMATED: CPT | Performed by: STUDENT IN AN ORGANIZED HEALTH CARE EDUCATION/TRAINING PROGRAM

## 2025-02-23 PROCEDURE — 250N000011 HC RX IP 250 OP 636: Mod: JZ

## 2025-02-23 PROCEDURE — 250N000009 HC RX 250: Performed by: OBSTETRICS & GYNECOLOGY

## 2025-02-23 PROCEDURE — 83735 ASSAY OF MAGNESIUM: CPT | Performed by: STUDENT IN AN ORGANIZED HEALTH CARE EDUCATION/TRAINING PROGRAM

## 2025-02-23 PROCEDURE — 82310 ASSAY OF CALCIUM: CPT | Performed by: STUDENT IN AN ORGANIZED HEALTH CARE EDUCATION/TRAINING PROGRAM

## 2025-02-23 RX ADMIN — ACETAMINOPHEN 975 MG: 325 TABLET, FILM COATED ORAL at 06:19

## 2025-02-23 RX ADMIN — PANTOPRAZOLE SODIUM 40 MG: 40 INJECTION, POWDER, FOR SOLUTION INTRAVENOUS at 09:36

## 2025-02-23 RX ADMIN — CYCLOBENZAPRINE 10 MG: 10 TABLET, FILM COATED ORAL at 21:05

## 2025-02-23 RX ADMIN — LIDOCAINE 4% 1 PATCH: 40 PATCH TOPICAL at 21:58

## 2025-02-23 RX ADMIN — ESCITALOPRAM OXALATE 20 MG: 20 TABLET ORAL at 21:05

## 2025-02-23 RX ADMIN — ENOXAPARIN SODIUM 40 MG: 40 INJECTION SUBCUTANEOUS at 17:59

## 2025-02-23 RX ADMIN — MAGNESIUM SULFATE HEPTAHYDRATE: 500 INJECTION, SOLUTION INTRAMUSCULAR; INTRAVENOUS at 20:15

## 2025-02-23 RX ADMIN — ACETAMINOPHEN 975 MG: 325 TABLET, FILM COATED ORAL at 12:45

## 2025-02-23 RX ADMIN — CYCLOBENZAPRINE 10 MG: 10 TABLET, FILM COATED ORAL at 06:19

## 2025-02-23 RX ADMIN — Medication 1 CAPSULE: at 09:36

## 2025-02-23 RX ADMIN — Medication 1 CAPSULE: at 19:13

## 2025-02-23 RX ADMIN — HYDROMORPHONE HYDROCHLORIDE 0.2 MG: 0.2 INJECTION, SOLUTION INTRAMUSCULAR; INTRAVENOUS; SUBCUTANEOUS at 19:15

## 2025-02-23 RX ADMIN — ACETAMINOPHEN 975 MG: 325 TABLET, FILM COATED ORAL at 21:05

## 2025-02-23 ASSESSMENT — ACTIVITIES OF DAILY LIVING (ADL)
ADLS_ACUITY_SCORE: 44
ADLS_ACUITY_SCORE: 42
ADLS_ACUITY_SCORE: 45
ADLS_ACUITY_SCORE: 45
ADLS_ACUITY_SCORE: 44
ADLS_ACUITY_SCORE: 45
ADLS_ACUITY_SCORE: 45
ADLS_ACUITY_SCORE: 44
ADLS_ACUITY_SCORE: 45
ADLS_ACUITY_SCORE: 45
ADLS_ACUITY_SCORE: 42
ADLS_ACUITY_SCORE: 45
ADLS_ACUITY_SCORE: 44
ADLS_ACUITY_SCORE: 45
ADLS_ACUITY_SCORE: 44
ADLS_ACUITY_SCORE: 42
ADLS_ACUITY_SCORE: 42
ADLS_ACUITY_SCORE: 44

## 2025-02-23 NOTE — PROGRESS NOTES
"Blood pressure (!) 160/86, pulse 75, temperature 98  F (36.7  C), temperature source Oral, resp. rate 16, height 1.567 m (5' 1.71\"), weight 60.5 kg (133 lb 6.4 oz), SpO2 96%.    Activity: Up ad deja  Neuros:  AOX4 makes needs known  Cardiac: HTN, denies chest pain  Respiratory: WDL, RA  GI/: AUOP, BM on days  Diet: NPO except ice, TPN 60cc/hr continuous  Skin/Incisions/Drains: Lt/Rt skater drains, NG LIS, Rt PAC, PIV  Lines: Rt PAC TPN continuous, Rt PIV SL  Pain: Tylenol, Flexeril  Plan: Continue with POC    "

## 2025-02-23 NOTE — PROGRESS NOTES
Brief progress note    S: Patient feeling well. No nausea/vomiting. The skin around the drains is starting to become painful. The patient also reports her back is beginning to hurt which she thinks is from the mattress. She reports feeling frustrated that she's not able to do anything but acknowledges that letting her bowels rest is important. Her NG tube is starting to become sore, so she's using the ice chips which help her throat    O:  Patient Vitals for the past 24 hrs:   BP Temp Temp src Pulse Resp SpO2 Weight   02/22/25 2150 (!) 145/86 97.9  F (36.6  C) Oral 74 16 98 % --   02/22/25 1650 -- -- -- -- -- -- 60.5 kg (133 lb 6.4 oz)   02/22/25 1417 (!) 160/86 98  F (36.7  C) Oral 75 16 96 % --   02/22/25 0607 (!) 151/92 98.8  F (37.1  C) Oral 66 18 96 % --   Gen: NAD, in bed NG tube in place    A/P:  Will continue with current cares and bowel rest. Added lidocaine patch for superficial pain around drain sites.    Tahir Preston MD  Gynecologic-Oncology, PGY-2   02/22/2025 10:31 PM    Gyn-Onc pager: [phone]

## 2025-02-23 NOTE — PROGRESS NOTES
Brief Progress Note    In to check on patient. Daughters visiting at bedside, doing well today, anxious to get out of the hospital, but trying to be patient. No nausea, has been ambulating, took a shower. Drains flushed today. L drain with 40 ml of green, thin liquid out since midnight, 15 ml out of right drain. NGT with 350 ml charted so far, another 350 in cannister. Acknowledged patient's frustration about the slow course, reassured her there is nothing she needs to be doing differently. Will CTM NGT output and continue with current care plan.     Brandy Dawkins MD  Obstetrics and Gynecology, PGY-2  02/23/2025 3:50 PM

## 2025-02-23 NOTE — PLAN OF CARE
Goal Outcome Evaluation:      Plan of Care Reviewed With: patient    Overall Patient Progress: improving    Outcome Evaluation: Patient up independenlty.  NG to LIS. Showered today. Tylenol as needed for back pain. Drains flushed today. Continue to monitor.

## 2025-02-23 NOTE — PLAN OF CARE
"Goal Outcome Evaluation:      Plan of Care Reviewed With: patient    Overall Patient Progress: no changeOverall Patient Progress: no change    [id number]    /79 (BP Location: Right arm)   Pulse 70   Temp 98.7  F (37.1  C) (Oral)   Resp 17   Ht 1.567 m (5' 1.71\")   Wt 60.5 kg (133 lb 6.4 oz)   SpO2 97%   BMI 24.63 kg/m      Activity: up ad deja   Neuros: alert and orientated x 4, calm and cooperative   Cardiac: WNL   Respiratory: O2 sats mid 90's on RA, unlabored    GI/: abdomen soft/tender.  Last BM 2/22.  Voiding spont (adequate amounts)   Diet: NPO   Lines: PAC, PIV  Incisions/Drains: NG to LIS with 300cc green output, right and left abdominal skater drains with minimal output    Labs: reviewed   Pain/nausea: tylenol and flexeril for back pain with \"partial\" relief.  No nausea    New changes this shift: none   Plan: continue POC               "

## 2025-02-24 ENCOUNTER — HOME INFUSION (OUTPATIENT)
Dept: HOME HEALTH SERVICES | Facility: HOME HEALTH | Age: 60
End: 2025-02-24
Payer: COMMERCIAL

## 2025-02-24 ENCOUNTER — ENROLLMENT (OUTPATIENT)
Dept: HOME HEALTH SERVICES | Facility: HOME HEALTH | Age: 60
End: 2025-02-24
Payer: COMMERCIAL

## 2025-02-24 DIAGNOSIS — C56.1 OVARIAN CANCER, RIGHT (H): Primary | ICD-10-CM

## 2025-02-24 LAB
ALBUMIN SERPL BCG-MCNC: 3.3 G/DL (ref 3.5–5.2)
ALP SERPL-CCNC: 167 U/L (ref 40–150)
ALT SERPL W P-5'-P-CCNC: 8 U/L (ref 0–50)
ANION GAP SERPL CALCULATED.3IONS-SCNC: 12 MMOL/L (ref 7–15)
AST SERPL W P-5'-P-CCNC: 20 U/L (ref 0–45)
BILIRUB SERPL-MCNC: 0.2 MG/DL
BUN SERPL-MCNC: 20.1 MG/DL (ref 8–23)
CALCIUM SERPL-MCNC: 9.1 MG/DL (ref 8.8–10.4)
CHLORIDE SERPL-SCNC: 103 MMOL/L (ref 98–107)
CREAT SERPL-MCNC: 0.55 MG/DL (ref 0.51–0.95)
EGFRCR SERPLBLD CKD-EPI 2021: >90 ML/MIN/1.73M2
ERYTHROCYTE [DISTWIDTH] IN BLOOD BY AUTOMATED COUNT: 15.1 % (ref 10–15)
GLUCOSE BLDC GLUCOMTR-MCNC: 108 MG/DL (ref 70–99)
GLUCOSE BLDC GLUCOMTR-MCNC: 109 MG/DL (ref 70–99)
GLUCOSE BLDC GLUCOMTR-MCNC: 117 MG/DL (ref 70–99)
GLUCOSE BLDC GLUCOMTR-MCNC: 117 MG/DL (ref 70–99)
GLUCOSE BLDC GLUCOMTR-MCNC: 127 MG/DL (ref 70–99)
GLUCOSE BLDC GLUCOMTR-MCNC: 85 MG/DL (ref 70–99)
GLUCOSE SERPL-MCNC: 125 MG/DL (ref 70–99)
HCO3 SERPL-SCNC: 22 MMOL/L (ref 22–29)
HCT VFR BLD AUTO: 34 % (ref 35–47)
HGB BLD-MCNC: 10.8 G/DL (ref 11.7–15.7)
INR PPP: 1.05 (ref 0.85–1.15)
MAGNESIUM SERPL-MCNC: 2.3 MG/DL (ref 1.7–2.3)
MCH RBC QN AUTO: 31.7 PG (ref 26.5–33)
MCHC RBC AUTO-ENTMCNC: 31.8 G/DL (ref 31.5–36.5)
MCV RBC AUTO: 100 FL (ref 78–100)
PHOSPHATE SERPL-MCNC: 3.6 MG/DL (ref 2.5–4.5)
PLATELET # BLD AUTO: 197 10E3/UL (ref 150–450)
POTASSIUM SERPL-SCNC: 4.5 MMOL/L (ref 3.4–5.3)
PREALB SERPL-MCNC: 10.3 MG/DL (ref 20–40)
PROT SERPL-MCNC: 6.5 G/DL (ref 6.4–8.3)
RBC # BLD AUTO: 3.41 10E6/UL (ref 3.8–5.2)
SODIUM SERPL-SCNC: 137 MMOL/L (ref 135–145)
TRIGL SERPL-MCNC: 219 MG/DL
WBC # BLD AUTO: 4.7 10E3/UL (ref 4–11)

## 2025-02-24 PROCEDURE — 84100 ASSAY OF PHOSPHORUS: CPT | Performed by: STUDENT IN AN ORGANIZED HEALTH CARE EDUCATION/TRAINING PROGRAM

## 2025-02-24 PROCEDURE — 36592 COLLECT BLOOD FROM PICC: CPT | Performed by: OBSTETRICS & GYNECOLOGY

## 2025-02-24 PROCEDURE — 85048 AUTOMATED LEUKOCYTE COUNT: CPT

## 2025-02-24 PROCEDURE — 84478 ASSAY OF TRIGLYCERIDES: CPT | Performed by: OBSTETRICS & GYNECOLOGY

## 2025-02-24 PROCEDURE — 82040 ASSAY OF SERUM ALBUMIN: CPT | Performed by: OBSTETRICS & GYNECOLOGY

## 2025-02-24 PROCEDURE — 82247 BILIRUBIN TOTAL: CPT | Performed by: OBSTETRICS & GYNECOLOGY

## 2025-02-24 PROCEDURE — 84134 ASSAY OF PREALBUMIN: CPT | Performed by: OBSTETRICS & GYNECOLOGY

## 2025-02-24 PROCEDURE — 85018 HEMOGLOBIN: CPT

## 2025-02-24 PROCEDURE — 250N000013 HC RX MED GY IP 250 OP 250 PS 637: Performed by: STUDENT IN AN ORGANIZED HEALTH CARE EDUCATION/TRAINING PROGRAM

## 2025-02-24 PROCEDURE — 250N000009 HC RX 250

## 2025-02-24 PROCEDURE — 120N000003 HC R&B IMCU UMMC

## 2025-02-24 PROCEDURE — 83735 ASSAY OF MAGNESIUM: CPT | Performed by: STUDENT IN AN ORGANIZED HEALTH CARE EDUCATION/TRAINING PROGRAM

## 2025-02-24 PROCEDURE — 250N000013 HC RX MED GY IP 250 OP 250 PS 637

## 2025-02-24 PROCEDURE — 99233 SBSQ HOSP IP/OBS HIGH 50: CPT | Mod: GC | Performed by: OBSTETRICS & GYNECOLOGY

## 2025-02-24 PROCEDURE — 250N000011 HC RX IP 250 OP 636: Performed by: STUDENT IN AN ORGANIZED HEALTH CARE EDUCATION/TRAINING PROGRAM

## 2025-02-24 PROCEDURE — 99255 IP/OBS CONSLTJ NEW/EST HI 80: CPT | Performed by: DIETITIAN, REGISTERED

## 2025-02-24 PROCEDURE — B4185 PARENTERAL SOL 10 GM LIPIDS: HCPCS | Performed by: OBSTETRICS & GYNECOLOGY

## 2025-02-24 PROCEDURE — 85610 PROTHROMBIN TIME: CPT | Performed by: OBSTETRICS & GYNECOLOGY

## 2025-02-24 PROCEDURE — 250N000009 HC RX 250: Performed by: OBSTETRICS & GYNECOLOGY

## 2025-02-24 RX ADMIN — ACETAMINOPHEN 975 MG: 325 TABLET, FILM COATED ORAL at 16:18

## 2025-02-24 RX ADMIN — ESCITALOPRAM OXALATE 20 MG: 20 TABLET ORAL at 21:15

## 2025-02-24 RX ADMIN — LIDOCAINE 4% 1 PATCH: 40 PATCH TOPICAL at 21:26

## 2025-02-24 RX ADMIN — ACETAMINOPHEN 975 MG: 325 TABLET, FILM COATED ORAL at 08:16

## 2025-02-24 RX ADMIN — MAGNESIUM SULFATE HEPTAHYDRATE: 500 INJECTION, SOLUTION INTRAMUSCULAR; INTRAVENOUS at 19:56

## 2025-02-24 RX ADMIN — Medication 1 CAPSULE: at 08:16

## 2025-02-24 RX ADMIN — OLIVE OIL AND SOYBEAN OIL 250 ML: 16; 4 INJECTION, EMULSION INTRAVENOUS at 19:58

## 2025-02-24 RX ADMIN — ENOXAPARIN SODIUM 40 MG: 40 INJECTION SUBCUTANEOUS at 18:19

## 2025-02-24 RX ADMIN — HYDROXYZINE HYDROCHLORIDE 25 MG: 25 TABLET, FILM COATED ORAL at 21:17

## 2025-02-24 RX ADMIN — CYCLOBENZAPRINE 10 MG: 10 TABLET, FILM COATED ORAL at 21:15

## 2025-02-24 RX ADMIN — PANTOPRAZOLE SODIUM 40 MG: 40 INJECTION, POWDER, FOR SOLUTION INTRAVENOUS at 08:17

## 2025-02-24 RX ADMIN — CYCLOBENZAPRINE 10 MG: 10 TABLET, FILM COATED ORAL at 08:16

## 2025-02-24 RX ADMIN — Medication 1 CAPSULE: at 20:02

## 2025-02-24 ASSESSMENT — ACTIVITIES OF DAILY LIVING (ADL)
ADLS_ACUITY_SCORE: 42
DEPENDENT_IADLS:: INDEPENDENT
ADLS_ACUITY_SCORE: 42

## 2025-02-24 NOTE — PLAN OF CARE
Goal Outcome Evaluation:      Plan of Care Reviewed With: patient    Overall Patient Progress: no changeOverall Patient Progress: no change    Outcome Evaluation: Anticipate discharge to home with family    RAMY Harris  Phone: [phone] 7b Med Surg Vocera  Nurse Coordinator

## 2025-02-24 NOTE — PLAN OF CARE
Problem: Adult Inpatient Plan of Care  Goal: Optimal Comfort and Wellbeing  Outcome: Progressing   B/P: 129/69, T: 97.8, P: 68, R: 16 Denied pain. NPO with NGT in place to LIS. Denies nausea. R drain with scant output. L drain with 40ml out. Voiding adequate amounts, up to bathroom independently. Appeared to slee in between cares. Continue to monitor and notify MD with any significant changes.

## 2025-02-24 NOTE — PROGRESS NOTES
"Gynecology Oncology Progress Note  2025    Ms. Jacki Smith is a 59 year old HD#14 for management of a bowel microperforation in the setting of recent bevacizumab administration.    Dz: Recurrent platinum sensitive HGSOC    24 hour events:   - SANTY    Subjective: Patient reports she had a good night. Got some dilaudid last night for back pain and was able to sleep after that. Denies nausea / vomiting overnight. Did not pass any gas yesterday. Pain well controlled. Voiding spontaneously. Ambulating 3-4x/day in halls. No other concerns at this time.    Objective:   /69   Pulse 68   Temp 97.8  F (36.6  C) (Oral)   Resp 16   Ht 1.567 m (5' 1.71\")   Wt 60.5 kg (133 lb 6.4 oz)   SpO2 100%   BMI 24.63 kg/m      General: lying in bed, in NAD  CV: well perfused  Resp: no increased work of breathing on room air  Abdomen: soft, non-tender, moderately distended. SANYA drains present with clear yellow drainage in the left drain, nothing present in right drain.  Extremities: nontender, trace edema    I/Os  (Yesterday // Since Midnight)  Urine 2100 mL // 350 mL  N mL // 250 in canister  SANYA drains: 40 mL/15 mL//40 mL/0 mL    New labs/imaging-  Results for orders placed or performed during the hospital encounter of 25 (from the past 24 hours)   Glucose by meter   Result Value Ref Range    GLUCOSE BY METER POCT 104 (H) 70 - 99 mg/dL   Glucose by meter   Result Value Ref Range    GLUCOSE BY METER POCT 87 70 - 99 mg/dL   Glucose by meter   Result Value Ref Range    GLUCOSE BY METER POCT 117 (H) 70 - 99 mg/dL   Glucose by meter   Result Value Ref Range    GLUCOSE BY METER POCT 109 (H) 70 - 99 mg/dL   Magnesium   Result Value Ref Range    Magnesium 2.3 1.7 - 2.3 mg/dL   Phosphorus   Result Value Ref Range    Phosphorus 3.6 2.5 - 4.5 mg/dL   CBC with platelets   Result Value Ref Range    WBC Count 4.7 4.0 - 11.0 10e3/uL    RBC Count 3.41 (L) 3.80 - 5.20 10e6/uL    Hemoglobin 10.8 (L) 11.7 - 15.7 g/dL    " Hematocrit 34.0 (L) 35.0 - 47.0 %     78 - 100 fL    MCH 31.7 26.5 - 33.0 pg    MCHC 31.8 31.5 - 36.5 g/dL    RDW 15.1 (H) 10.0 - 15.0 %    Platelet Count 197 150 - 450 10e3/uL   Comprehensive metabolic panel   Result Value Ref Range    Sodium 137 135 - 145 mmol/L    Potassium 4.5 3.4 - 5.3 mmol/L    Carbon Dioxide (CO2) 22 22 - 29 mmol/L    Anion Gap 12 7 - 15 mmol/L    Urea Nitrogen 20.1 8.0 - 23.0 mg/dL    Creatinine 0.55 0.51 - 0.95 mg/dL    GFR Estimate >90 >60 mL/min/1.73m2    Calcium 9.1 8.8 - 10.4 mg/dL    Chloride 103 98 - 107 mmol/L    Glucose 125 (H) 70 - 99 mg/dL    Alkaline Phosphatase 167 (H) 40 - 150 U/L    AST 20 0 - 45 U/L    ALT 8 0 - 50 U/L    Protein Total 6.5 6.4 - 8.3 g/dL    Albumin 3.3 (L) 3.5 - 5.2 g/dL    Bilirubin Total 0.2 <=1.2 mg/dL   INR   Result Value Ref Range    INR 1.05 0.85 - 1.15   Prealbumin   Result Value Ref Range    Prealbumin 10.3 (L) 20.0 - 40.0 mg/dL   Triglycerides   Result Value Ref Range    Triglycerides 219 (H) <150 mg/dL       Assessment: 59 year old female with recurrent HGSOC HD#14 for a small bowel microperforation in the setting of recent bevacizumab administration.    Plan:    #Bowel perforation  #SBO  -Perforation unable to be fully appreciated on imaging and she continues to have large fluid in the abdomen which is draining through drains, although not significant amounts  -No concern for infection at this point and thus no further antibiotic therapy indicated  -Will need long term bowel rest with TPN.    -SBO does not appear to be resolving and may need to consider venting G-Tube for discharge       #Recurrent HGSOC  - S/p cycle 1 of carbo/taxol/deshawn on 2/5. Treatment will be held in the setting of current bowel perforation  - Follow up with Dr. Patino after discharge for ongoing treatment discussions.       #MDD  - PTA lexapro daily    Disposition: pending clinical course    Bonnie Goodman MD  OB/GYN Resident, PGY-4  02/24/2025 6:49 AM    Tye SCHAFFER  Pat Wu MD personally examined and evaluated this patient on 02/24/25.  I discussed the patient with the resident and care team, and agree with the assessment and plan of care as documented in the residents note above.    I personally reviewed vital signs, laboratory values and imaging results.      Pat Wu MD  Gynecologic Oncology  [facility]

## 2025-02-24 NOTE — PROGRESS NOTES
"Blood pressure (!) 149/96, pulse 68, temperature 97.7  F (36.5  C), temperature source Oral, resp. rate 18, height 1.567 m (5' 1.71\"), weight 60.5 kg (133 lb 6.4 oz), SpO2 98%.    Activity: Up ad deja  Neuros:  AOX4, makes needs known  Cardiac: HTN, denies chest pain  Respiratory: WDL, RA  GI/: AUOP, +BS, no BM  Diet: NPO except ice, NG LIS  Skin/Incisions/Drains: NG, bilateral skater drains, Rt PAC, Rt PIV  Lines: Rt PIV SL, Rt PAC infusing TPN 60cc/hr continuous  Pain: Dilaudid, Flexeril, Tylenol  Plan: Continue with POC    "

## 2025-02-24 NOTE — PLAN OF CARE
"Goal Outcome Evaluation:      Plan of Care Reviewed With: patient    Overall Patient Progress: improvingOverall Patient Progress: improving    A&O x4. Afebrile. VSS on RA. Pain managed with PRN tylenol and flexeril. Denies nausea. NPO. Ambulating in halls independently. NGT to LIS, bile output. Bilateral skater drains with minimal output--sites CDI, irrigated x1 per orders. R chest port infusing TPN at 60hr/ml. Voiding without difficulty. LBM 2/22 but passing flatus. No electrolyte replacements, recheck tomorrow AM. Plan for NGT clamp trial and IR sinogram/drain removal and possible new drain placement tomorrow 2/25. Continue with POC.     Vitals: /68 (BP Location: Right arm)   Pulse 78   Temp 98  F (36.7  C) (Oral)   Resp 16   Ht 1.567 m (5' 1.71\")   Wt 58.2 kg (128 lb 6.4 oz)   SpO2 100%   BMI 23.70 kg/m      "

## 2025-02-24 NOTE — PROGRESS NOTES
Brief Progress Note    Went to check on patient for PM rounds. Doing ok today, appreciated learning more about plan of care and goals for advancing diet. No nausea today. Continues to ambulate well. Has had 850 mL of NG output today. Drains flushed by RN earlier, L>R drain output.     Continue NPO w/ NG to LIS, TPN. Awaiting ROBF and decreasing NG output prior to clamp trial.    Bonnie Goodman MD  OB/GYN Resident, PGY-4

## 2025-02-24 NOTE — CONSULTS
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 S/p placement of NGT and IR placement of x2 perc drains (R and L) into collections (all on 2/18).    IR consulted for consideration of venting Gtube but deferred to GI or surgery given felt no safe window and concern for loculated ascites.    AE GI team now consulted 2/24 for consideration of venting PEG.    # SBO - c/b suspected microperforation of small bowel (currently managed by perc drains)  # Ascites (unclear etiology - malignant versus bowel perforation)  # Severe protein-calorie malnutrition  Presented to OSH initially with sx abd pain, N/V and has only been allowed CL/NPO since 2/11/25.  Started on TPN 2/20 this adm with persistent sx of SBO.  Per review of weight records, now down to 128# (noted to be 145# on 1/24/25, reported # which last weighed this on ~10/2024) with unintentional loss of 22# x 4 months (loss 15%) which, combined with sx of fat/muscle wasting (see exam section), is all c/w severe malnutrition.  Noted to have persistent ascites previously (requiring intermittent paracentesis in 6/2022 and 5/2023) but then no repeat required until most recent at OSH on 2/14 removing 600 mL but no studies sent on fluid and unclear if related to malignancy vs perforation.    Now  with NG in place since 2/18 this adm for persistent sx of SBO (1.2 L/day more recently 2/22-2/23).  Repeat CT AP with IV/PO contrast (2/21) noted ongoing  RLQ/LLQ collections with question of perc drain of RLQ in collection (with observed decreased outputs from this drain per I/Os), no further evidence of perforation with resolved pneumoperitoneum.   Per discussion with Gyn Onc team 2/24, given location and timing of SBO/perforation after initiation Bevacizumab, lower suspicion for metastatic malignancy as etiology of SBO and feel more c/w observed microperforation related to this monoclonal treatment.  Gyn Onc with plans to keep NPO with TPN and ideally gastric decompression for possibly up to 6 weeks before anticipate closer of microperf/significant improvement in SBO.  Now with reported improving passage of flatus today and desire to attempt clamp trial of NGT starting 2/25.  However, if unable to tolerate this and ongoing need for gastric decompression, then requesting consider proceed with venting PEG later this week.    RECOMMENDATIONS  -Discussed case/location of R sided perc drain with IR (Eve Rothman).  Requested further evaluation of the location of this drain/adequacy of drainage of abscess prior to considering proceed with venting PEG.  -Re-weigh patient to evaluate accuracy of 2/24?25 weight (124# compared to 141# on 2/6/25)  -Continue NPO status and TPN.  -Continue NGT to LIS for decompression.  Clamp trials per Gyn Onc.  -Consider CAPs/US vs IR consult for repeat paracentesis prior to possible venting PEG.  -Obtain the following studies on ascites fluid to evaluate etiology: cell count with diff, gram stain, cultures (aerobic and anaerobic), amylase, TG, Albumin, T.protein, bilirubin, Glu, LDH, and cytology   -Tentatively plan for venting PEG on Thursday 2/27 or Friday (pending all above).  -NPO status at midnight on 2/27   -Hold lovenox after receives 22:00 PM dose on 2/25 (need hold 24 hrs prior  to PEG).  -Education complete with patient (2/24) on venting Gtube with patient (mininum duration required to keep, expected pain, ability to take PO pleasure and brief cares/clamping trials).    Discussed all above with patient (agreeable to if unable to tolerate NGT clamp trials), primary Gyn Onc team (Dr. Anglin and ALAN Power), bedside RN and Care coordinator (both at bedside during interview).    Gastroenterology follow up recommendations:   TBD pending clinical course    Thank you for involving us in this patient's care. Please do not hesitate to contact the GI service with any questions or concerns.     Pt seen and care plan discussed with Dr. Avila, GI staff physician.    Overall time spent on the date of this encounter preparing to see the patient (including chart review of available notes, clinical status events, imaging and labs); obtaining and/or reviewing separately obtained history; ordering medications, tests or procedures; communicating with other health care professionals; and documenting the above clinical information in the electronic medical record was 90 minutes.      Allie Farfan PA-C, RD, [facility]  Inpatient Gastroenterology Service  [facility]  Pager: *[phone]  Text Page     -------------------------------------------------------------------------------------------------------------------       Reason for Consultation:       possible venting gtube placement later this week. Currently has a ngt in place will attempt clamp trial tomorrow.            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 S/p IR placement of x2 perc drains (R and L) into collections.   IR consulted for consideration of venting Gtube but deferred to GI or surgery given felt no safe window and concern for loculated ascites.    Patient seen and examined at 15:30. History is obtained from patient who reports initially presented to OSH on 2/11 with acute onset of abd pain/significant distention, N/V.  Essentially has only been allowed CL/NPO since 2/11/25.  Reported # (per weight records last weighed this on ~10/2024) and believes has been losing weight/muscle mass with the course of this adm. Would like to ideally avoid venting Gtube but if unable to get NGT out willing to consider.  Reports small amount of flatus passes today, no BM.  No N/V.  No fevers/chills.  Abd distention much improved since initial adm.    Previous Procedures:  2/18/2025: IR PROCEDURE: Drainage catheter placement x2                                                         IMPRESSION:  Percutaneous placement of right lower quadrant and left lower quadrant  10 [place] drainage catheter into peritoneal fluid, yielding 60 mL of  cloudy fluid.     Plan:   drains to gravity  one hour bedrest            Past Medical History:   Reviewed and edited as appropriate  Past Medical History:   Diagnosis Date    Female stress incontinence     Ovarian cancer, right (H) 06/16/2022    Stage IIIC high-grade serous carcinoma    Recurrent cold sores             Past Surgical History:   Reviewed and edited as appropriate   Past Surgical History:   Procedure Laterality Date    APPENDECTOMY  08/29/2022    Part of ovarian cancer tumor debulking    BLADDER SUSPENSION  08/13/2009    HYSTERECTOMY  08/13/2009    For prolapse    IR PARACENTESIS  6/6/2022    IR PARACENTESIS  6/14/2022    IR PARACENTESIS  5/26/2023    IR RETROPERITONEAL ABSCESS DRAINAGE  2/18/2025     LAPAROSCOPY DIAGNOSTIC (GYN)  06/16/2022    SALPINGO-OOPHORECTOMY, COMBINED Bilateral 08/29/2022    Pelvic exam under anesthesia, diagnostic laparoscopy, conversion to laparotomy for optimal interval tumor debulking to no gross residual disease including peritoneal and diaphragm biopsies, bilateral salpingo-oophorectomy, infragastric omentectomy, bilateral hemidiaphragm stripping, peritoneal and mesenteric argon beam ablation, appendectomy.    TONSILLECTOMY  1973    TUBAL LIGATION Bilateral 09/01/1998              Social History:   .  Lives in [place], [place].  Employer: [email or web address]         Family History:   Patient's family history is reviewed today and is non-contributory    Family History   Problem Relation Age of Onset    Prostate Cancer Father     Breast Cancer Sister 48    Melanoma Sister     Skin Cancer Sister     Colon Cancer Maternal Grandmother              Allergies:   Reviewed and edited as appropriate   No Known Allergies         Medications:     Current Facility-Administered Medications   Medication Dose Route Frequency Provider Last Rate Last Admin    acetaminophen (TYLENOL) tablet 975 mg  975 mg Oral Q6H PRN Bonnie Goodman MD   975 mg at 02/24/25 0816    benzocaine 20% (HURRICAINE/TOPEX) 20 % spray 0.5-1 mL  1-2 spray Mouth/Throat Q3H PRN Bonnie Goodman MD   0.5 mL at 02/20/25 1123    calcium carbonate (TUMS) chewable tablet 1,000 mg  1,000 mg Oral 4x Daily PRN Bonnie Goodman MD        cyclobenzaprine (FLEXERIL) tablet 10 mg  10 mg Oral Q8H PRN Bonnie Goodman MD   10 mg at 02/24/25 0816    dextrose 10% infusion   Intravenous Continuous PRN Catrachito Mitchell MD        enoxaparin ANTICOAGULANT (LOVENOX) injection 40 mg  40 mg Subcutaneous Q24H Chey Warner MD   40 mg at 02/23/25 1759    escitalopram (LEXAPRO) tablet 20 mg  20 mg Oral At Bedtime Bonnie Goodman MD   20 mg at 02/23/25 2105    heparin lock flush 10 unit/mL injection 5-10 mL  5-10 mL Intracatheter Q1H PRN Frantz  MD [place]   5 mL at 02/21/25 0800    heparin lock flush 10 unit/mL injection 5-10 mL  5-10 mL Intracatheter Q24H [place] Landry MD        heparin lock flush 100 unit/mL injection 5-10 mL  5-10 mL Intracatheter Q28 Days China Landry MD        HYDROmorphone (DILAUDID) injection 0.2-0.3 mg  0.2-0.3 mg Intravenous Q2H PRN Bonnie Goodman MD   0.2 mg at 02/23/25 1915    hydrOXYzine HCl (ATARAX) tablet 25 mg  25 mg Oral Q6H PRN Chey Warner MD   25 mg at 02/20/25 2007    Or    hydrOXYzine HCl (ATARAX) tablet 50 mg  50 mg Oral Q6H PRN Chey Warner MD        lactobacillus rhamnosus (GG) (CULTURELL) capsule 1 capsule  1 capsule Oral BID Shine Anglin MD   1 capsule at 02/24/25 0816    Lidocaine (LIDOCARE) 4 % Patch 1 patch  1 patch Transdermal Q24h aThir Preston MD   1 patch at 02/23/25 2158    lidocaine (LMX4) cream   Topical Q1H PRN Bonnie Goodman MD   Given at 02/18/25 1338    lidocaine 1 % 0.1-1 mL  0.1-1 mL Other Q1H PRN Bonnie Goodman MD        lipids plant base (CLINOLIPID) 20 % infusion 250 mL  250 mL Intravenous Once per day on Monday Tuesday Wednesday Friday Saturday Catrachito Mitchell MD 20.8 mL/hr at 02/22/25 1912 250 mL at 02/22/25 1912    melatonin tablet 1 mg  1 mg Oral At Bedtime PRN Chey Warner MD        naloxone (NARCAN) injection 0.2 mg  0.2 mg Intravenous Q2 Min PRN Catrachito Mitchell MD        Or    naloxone (NARCAN) injection 0.4 mg  0.4 mg Intravenous Q2 Min PRN Catrachito Mitchell MD        Or    naloxone (NARCAN) injection 0.2 mg  0.2 mg Intramuscular Q2 Min PRN Catrachito Mitchell MD        Or    naloxone (NARCAN) injection 0.4 mg  0.4 mg Intramuscular Q2 Min PRN Catrachito Mitchell MD        pantoprazole (PROTONIX) IV push injection 40 mg  40 mg Intravenous Q24H Bonnie Goodman MD   40 mg at 02/24/25 0817    parenteral nutrition - ADULT compounded formula CYCLE   CENTRAL LINE IV CYCLE Catrachito Mitchell MD        parenteral nutrition - ADULT  compounded formula   CENTRAL LINE IV TPN CONTINUOUS Catrachito Mitchell MD 60 mL/hr at 02/23/25 2015 New Bag at 02/23/25 2015    phenol (CHLORASEPTIC) 1.4 % spray 1 mL  1 spray Mouth/Throat Q1H PRN Chey Warner MD        prochlorperazine (COMPAZINE) injection 10 mg  10 mg Intravenous Q6H PRN Bonnie Goodman MD   10 mg at 02/21/25 1145    Or    prochlorperazine (COMPAZINE) tablet 10 mg  10 mg Oral Q6H PRN Bonnie Goodman MD        sodium chloride (PF) 0.9% PF flush 10-20 mL  10-20 mL Intracatheter q1 min prn [place] Landry MD   10 mL at 02/21/25 1638    sodium chloride (PF) 0.9% PF flush 10-20 mL  10-20 mL Intracatheter Q1H PRN [place] Landry MD   10 mL at 02/21/25 0958    sodium chloride (PF) 0.9% PF flush 10-20 mL  10-20 mL Intracatheter Q28 Days China Landry MD   20 mL at 02/18/25 1342    sodium chloride (PF) 0.9% PF flush 3 mL  3 mL Intracatheter Q8H Bonnie Goodman MD   3 mL at 02/21/25 1546    sodium chloride (PF) 0.9% PF flush 3 mL  3 mL Intracatheter q1 min prn Bonnie Goodman MD                 Review of Systems:     A complete review of systems was performed and is negative except as noted in the HPI           Physical Exam:   Temp: 97.8  F (36.6  C) Temp src: Oral BP: 129/69 Pulse: 68   Resp: 16 SpO2: 100 % O2 Device: None (Room air)    Wt:   Wt Readings from Last 10 Encounters:   02/24/25 58.2 kg (128 lb 6.4 oz)   02/06/25 64.3 kg (141 lb 12.8 oz)   02/03/25 66.7 kg (147 lb)   01/24/25 65.8 kg (145 lb)   10/18/24 68.5 kg (151 lb)   09/03/24 67.1 kg (148 lb)   07/16/24 67.5 kg (148 lb 12.8 oz)   06/21/24 69 kg (152 lb 1.6 oz)   04/12/24 68.9 kg (152 lb)   03/26/24 68.5 kg (151 lb)      General: Pleasant, thin-appearing female in NAD.  Answers appropriately.    HEENT: Head is AT/NC. Sclera anicteric. No conjunctival injection.  Oropharynx is clear, moist and w/o exudate or lesions. NGT secured by tape in nare.  Lungs: Non-labored breathing on RA.   Heart: Regular rate and rhythm.  No  murmurs, gallops or rubs.  Normal S1 and S2.  Abdomen: Soft, non-tender, non-distended.  RLQ perc drain with minimal yellow/cloudy to serous fluid and site/dressing clean/dry. LLQ perc drain with small amount of yellow/serous fluid in bag and dressing clean/dry.  Extremities: WWP, no pedal edema.  MSK: fat wasting (depression between ribs, sunken orbits), b/l UE and LE muscle wasting.  Skin: No jaundice or rash  Neurologic: Grossly non-focal.  CN 2-12 grossly intact.   Psych: mood appropriate to situation           Data:   Labs and imaging below were independently reviewed and interpreted    LAB WORK:    BMP  Recent Labs   Lab 02/24/25  1157 02/24/25  0619 02/24/25  0614 02/23/25  2358 02/23/25  1016 02/23/25  0429 02/22/25  0813 02/22/25  0657 02/21/25  1710 02/21/25  1644 02/21/25  0648   NA  --  137  --   --   --  136  --  134*  --   --  135   POTASSIUM  --  4.5  --   --   --  3.9  --  4.0  --  4.1 3.4   CHLORIDE  --  103  --   --   --  101  --  96*  --   --  96*   KINDRA  --  9.1  --   --   --  8.9  --  8.8  --   --  8.8   CO2  --  22  --   --   --  25  --  25  --   --  25   BUN  --  20.1  --   --   --  13.3  --  8.2  --   --  3.7*   CR  --  0.55  --   --   --  0.58  --  0.67  --   --  0.69   * 125* 109* 117*   < > 121*   < > 139*   < >  --  136*    < > = values in this interval not displayed.     CBC  Recent Labs   Lab 02/24/25  0619 02/23/25  0429 02/22/25  0657 02/21/25  0648   WBC 4.7 4.2 3.9* 3.7*   RBC 3.41* 3.29* 3.19* 3.12*   HGB 10.8* 10.4* 10.3* 10.0*   HCT 34.0* 31.4* 30.7* 29.6*    95 96 95   MCH 31.7 31.6 32.3 32.1   MCHC 31.8 33.1 33.6 33.8   RDW 15.1* 15.0 14.9 14.8    170 157 166     INR  Recent Labs   Lab 02/24/25  0619 02/22/25  0657 02/18/25  0126   INR 1.05 1.34* 1.34*     LFTs  Recent Labs   Lab 02/24/25  0619 02/22/25  0657 02/21/25  0648 02/18/25  0126   ALKPHOS 167* 115 113 119   AST 20 21 23 19   ALT 8 7 7 8   BILITOTAL 0.2 0.3 0.4 0.4   PROTTOTAL 6.5 6.1* 5.8* 6.2*    ALBUMIN 3.3* 3.0* 2.9* 3.1*      PANCNo lab results found in last 7 days.    IMAGING:  (Personally reviewed)    2/22/2025: Portable abdomen 1 view  INDICATION: Assessing for Gastrografin passage in the colon  COMPARISON: Yesterday  FINDINGS: NG/OG tube tip and side hole both projecting in the stomach.  Catheters in the pelvis bilaterally. Degenerative changes in the spine  with levocurvature of the mid lumbar spine. No evidence of distended  bowel gas. Poor visualization of any contrast.                                                                    IMPRESSION: No definitive contrast visualization.    2/21/2025: CT abdomen with and without contrast (IV + oral)     INDICATION: Bowel perforation     COMPARISON: Chest abdomen pelvis CT 1/10/2025     CONTRAST: 86 mL intravenous Isovue-370. 50 mL oral Omnipaque-140 oral.     FINDINGS: Initial noncontrast imaging shows atherosclerotic  calcification in the aorta and proximal common iliac arteries  bilaterally. There is a small collecting system stone measuring 2 mm  in the right kidney without hydronephrosis. Oral contrast clearly  visualized in the stomach, duodenum and other proximal small bowel  loops just at the ligament of Treitz but also some progressing into  the more distal small bowel. No small bowel distention.  Percutaneous drainage tubing right lower abdomen without visible  hypodense fluid at its tip. Somewhat amorphous densities adjacent to  bowel loops noted in the left abdomen, some of which contain punctate  air.  Post contrast imaging was obtained in the phase somewhat between  arterial and standard portal venous.  Left greater than right pleural effusions. Multiple small rounded  fluid density areas scattered about the liver consistent with small  cysts/benign biliary hamartomas. Minimal central intrahepatic biliary  prominence. Gallbladder grossly normal. There is some soft tissue  induration slightly better visualized on the post intravenous  contrast  images at the tip of the catheter without actual fluid density again  present. Another drainage catheter from a left anterior lateral  percutaneous approach progresses into a rim-enhancing fluid collection  which was amorphous on the noncontrast imaging but is clearly  consistent with abscess given its slight rim enhancement and punctate  air foci.   Compared with previous, the amount of upper abdominal free of fluid  overall has minimally decreased in the abdomen. There is a  well-circumscribed rim-enhancing fluid density in the right lower  abdomen below the tip of the catheter (3/44, 4/35).  Spleen normal. Symmetric nephrograms with extrarenal pelvis formation  in each kidney, left larger than right, likely anatomical variant.  Bilateral adrenals normal. Pancreas normal with upper normal caliber  of the pancreatic duct. There may be some partial divisum of the  proximal pancreatic duct in the region of the head of the pancreas.  Another postcontrast phase of imaging was obtained which is more  standard portal venous as well, confirming the above findings of the  postintravenous contrast images.   There is a loop of bowel in the left side of the abdomen adjacent to  the prominent fluid collection which has an almost swirling type  appearance (3/40) and (4/33), although this is most like a sharp turn  of the bowel loop, close attention should be paid to exclude  developing intussusception. All air densities in the abdomen other  than those in the fluid cavities appear associated with loops of  bowel.  Bone detail shows prominent levocurvature centered at L4-5 with  leftward translocation of L4 with respect to L5. There are advanced  degenerative changes with sclerotic a density of the endplates and  prominent concave/right-sided osteophytes. Vacuum disc also prominent  at L4-5. Disc space narrowing with vacuum disc also at L3-4. Disc  space narrowing L5-S1.  Vascular assessment shows evidence chronic  atherosclerotic  calcification origin of the SMA an some noncircumferential  calcification in the aortoiliac arteries.                                                                       IMPRESSION:  1. Bilateral mid to lower abdominal rim-enhancing abscesses, the  right-sided fluid collection is below the tip of the percutaneous  catheter.  2. Slightly decreased upper abdominal moderately extensive free fluid.  3. No visible pneumoperitoneum.  4. Multiple small liver cysts with minimal central intrahepatic  biliary prominence.  5. Left greater than right small to mild pleural effusions.  6. Possible sharp turn of a bowel loop in the left lower abdomen  rather than an intussusception, close attention on follow-up scans  recommended.  7. Mild atherosclerosis.  8. Degenerative changes in the mid to lower lumbar spine.       EXAM: CT ABD PELVIS W IV CONT   LOCATION: [facility]   DATE: 2/11/2025     INDICATION: Llq abd pain, concern for obstruction, infection   COMPARISON: 4/23/2024   TECHNIQUE: Helical enhanced CT scan of the abdomen and pelvis was performed following injection of IV contrast. Multiplanar reformats were obtained. Dose reduction techniques were used.   CONTRAST: IOHEXOL 350 MG/ML IV SOLN 100 mL     FINDINGS:   LOWER CHEST: Trace dependent atelectasis.     HEPATOBILIARY: Multiple new low-density hepatic masses are consistent with metastatic disease with the largest measuring 2.5 x 2.4 cm in segment 2, image 24:3.     PANCREAS: Normal.     SPLEEN: Normal.     ADRENAL GLANDS: Normal.     KIDNEYS/BLADDER: Normal.     BOWEL: Small volume pneumoperitoneum, greatest in the anterior upper abdomen. The source is not definitively identified. No obstruction.     LYMPH NODES: Mesenteric and right iliac lymphadenopathy with the largest being a right mesenteric node measuring 1.5 cm in short axis, image 66:3.     VASCULATURE: No abdominal aortic aneurysm.     PELVIC ORGANS: Increasing moderate volume complex  ascites with multiple septations and heterogeneous peritoneal thickening is consistent with peritoneal carcinomatosis. Hysterectomy.     MUSCULOSKELETAL: Degenerative changes. Scoliosis.     IMPRESSION:   1.  Small volume pneumoperitoneum. While the source is not definitively identified, this is consistent with perforated bowel.   2.  Peritoneal carcinomatosis with moderate volume complex ascites.   3.  Liver metastases.   4.  Mesenteric and right iliac lymphadenopathy.     ======================================================================

## 2025-02-24 NOTE — CONSULTS
"    Interventional Radiology  [facility] Consult Service Note  02/24/25   2:32 PM    Consult Requested: \"sinogram - eval drain placement\"    Recommendations/Plan:    Patient is on IR schedule 2/25 for an Image guided sinogram/drain removal and possible new drain placement.   Labs WNL for procedure.  Orders entered for procedure, NPO status, and pre procedure IV antibiotics.   Medications to be held include: subcutaneous heparin  Consent will be done prior to procedure.     Please contact the IR charge RN at [phone] for estimated time of procedure.     Case and imaging discussed with IR attending, Dr. Arana.  Recommendations were reviewed with JALEN Morin    History of Present Illness:  Jacki Smith is a 59 year old female with recurrent HGSOC admitted to OSH for a bowel microperforation in the setting of recent bevacizumab administration. Patient initially underwent conservative management with IV antibiotics and bowel rest, but ultimately worsened and was transferred to [facility] 2/18 for further management. IR placed 2 drains on 2/18 in the right and left lower quadrants. Drain outputs have dropped from RLQ drain and CT shows persistent collection (may or may not be communicating with current drain). IR is consulted for sinogram/drain management. Of note, pt is still experiencing symptoms of bowel obstruction.    Pertinent Imaging Reviewed:             Expected date of discharge:  TBD    Vitals:   /68 (BP Location: Right arm)   Pulse 78   Temp 98  F (36.7  C) (Oral)   Resp 16   Ht 1.567 m (5' 1.71\")   Wt 58.2 kg (128 lb 6.4 oz)   SpO2 100%   BMI 23.70 kg/m      Pertinent Labs:   Lab Results   Component Value Date    WBC 4.7 02/24/2025    WBC 4.2 02/23/2025    WBC 3.9 (L) 02/22/2025     Lab Results   Component Value Date    HGB 10.8 02/24/2025    HGB 10.4 02/23/2025    HGB 10.3 02/22/2025     Lab Results   Component Value Date     02/24/2025     02/23/2025    "  02/22/2025     Lab Results   Component Value Date    INR 1.05 02/24/2025    PTT 40 (H) 02/18/2025     Lab Results   Component Value Date    POTASSIUM 4.5 02/24/2025    POTASSIUM 4.4 09/12/2023        COVID-19 Antibody Results, Testing for Immunity           No data to display              COVID-19 PCR Results           No data to display                JALEN Zimmerman CNP  Interventional Radiology  Pager: [phone]

## 2025-02-24 NOTE — CONSULTS
Care Management Initial Consult    General Information  Assessment completed with: Patient,    Type of CM/SW Visit: Initial Assessment    Primary Care Provider verified and updated as needed: Yes   Readmission within the last 30 days: no previous admission in last 30 days      Reason for Consult: discharge planning  Advance Care Planning: Advance Care Planning Reviewed: no concerns identified          Communication Assessment  Patient's communication style: spoken language (English or Bilingual)    Hearing Difficulty or Deaf: no   Wear Glasses or Blind: yes    Cognitive  Cognitive/Neuro/Behavioral: WDL                      Living Environment:   People in home: child(trupti), adult, spouse     Current living Arrangements: house      Able to return to prior arrangements: yes       Family/Social Support:  Care provided by: self  Provides care for:    Marital Status:   Support system: , Children          Description of Support System: Supportive, Involved    Support Assessment: Adequate family and caregiver support, Adequate social supports    Current Resources:   Patient receiving home care services: No        Community Resources: None  Equipment currently used at home: none  Supplies currently used at home: None    Employment/Financial:  Employment Status: employed full-time      Financial Concerns: none           Does the patient's insurance plan have a 3 day qualifying hospital stay waiver?  No    Lifestyle & Psychosocial Needs:  Social Drivers of Health     Food Insecurity: Low Risk  (2/18/2025)    Food Insecurity     Within the past 12 months, did you worry that your food would run out before you got money to buy more?: No     Within the past 12 months, did the food you bought just not last and you didn t have money to get more?: No   Depression: At risk (1/13/2025)    Received from [facility] [facility]    PHQ-2     PHQ-2 Score: 4   Housing Stability: High Risk (2/18/2025)    Housing Stability     Do you have  housing? : No     Are you worried about losing your housing?: No   Tobacco Use: Medium Risk (2/3/2025)    Patient History     Smoking Tobacco Use: Former     Smokeless Tobacco Use: Never     Passive Exposure: Not on file   Financial Resource Strain: Low Risk  (2/18/2025)    Financial Resource Strain     Within the past 12 months, have you or your family members you live with been unable to get utilities (heat, electricity) when it was really needed?: No   Alcohol Use: Not on file   Transportation Needs: Low Risk  (2/18/2025)    Transportation Needs     Within the past 12 months, has lack of transportation kept you from medical appointments, getting your medicines, non-medical meetings or appointments, work, or from getting things that you need?: No   Physical Activity: Not on file   Interpersonal Safety: Low Risk  (2/18/2025)    Interpersonal Safety     Do you feel physically and emotionally safe where you currently live?: Yes     Within the past 12 months, have you been hit, slapped, kicked or otherwise physically hurt by someone?: No     Within the past 12 months, have you been humiliated or emotionally abused in other ways by your partner or ex-partner?: No   Stress: Not on file   Social Connections: Not on file   Health Literacy: Not on file       Functional Status:  Prior to admission patient needed assistance:   Dependent ADLs:: Independent  Dependent IADLs:: Independent  Assesssment of Functional Status: At functional baseline    Mental Health Status:  Mental Health Status: No Current Concerns       Chemical Dependency Status:  Chemical Dependency Status: No Current Concerns             Values/Beliefs:  Spiritual, Cultural Beliefs, Baptism Practices, Values that affect care: no               Discussed  Partnership in Safe Discharge Planning  document with patient/family: No    Additional Information:  Patient is a 59 year old female admitted with a small bowel microperforation in the setting of recent  bevacizumab administration.  Per MD team patient may need to discharge on TPN.  Referral sent to [facility] to check benefits, patient has coverage for home TPN if needed.  Met with patient at bedside to introduce self and discuss discharge planning.  Patient lives with her  and daughter.  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 Patient is open to going home on TPN if needed.  She did not have any questions at this time.  RNCC will continue to follow and assist with discharge planning.       Desiree Dutta, RAMY  Phone: [phone]   Med Surg Vocera  Nurse Coordinator

## 2025-02-24 NOTE — PROGRESS NOTES
CLINICAL NUTRITION SERVICES - BRIEF NOTE     Nutrition Prescription    RECOMMENDATIONS FOR MDs/PROVIDERS TO ORDER:  Please alert RD or pharmacist if pt requires adjustments to TPN fluid volume.  Addition of IV fluids in addition to TPN per provider discretion    Recommendations already ordered by Registered Dietitian (RD):  Sent change order request to pharmacist: cycle TPN 18 hrs tonight    Future/Additional Recommendations:  Monitor labs and appropriateness to further cycle TPN to 12 hrs     *See RD note on 2/18, 2/20, and 2/21 for previous RD notes this admission    EVALUATION OF THE PROGRESS TOWARD GOALS   Diet: NPO    Nutrition Support: TPN, 1440 mL/day (60 mL/hr continuous) with goal 190 g dextrose, 75 g AA, and 250 mL 20% lipids 5 days per week for total provision of 1303 Kcals (25 Kcals/kg), 1.4 g/kg protein, GIR 2.5 mg/kg/minute, and 27% fat kcals on average daily   -- Infuvite and trace minerals daily; additional thiamine (100 mg/day x 7 days started 2/21)    Intake: TPN start 2/21 with initial dex 100 g/day, increased by 45 g dex/day to goal 190 g (goal reached last night 2/23.     NEW FINDINGS   Primary team request to start cycling TPN tonight     Labs:  - K+, Mg++, and Phos WNL  - Alk Phos 167 (H); ALT, AST, and Tbili WNL  -  (H)  - BGs stable    INTERVENTIONS  See above    Monitoring/Evaluation  Progress toward goals will be monitored and evaluated per protocol.      Lulu Bonilla, RD, , LD  Weekday Units covered: 5A (non-Heme Onc pts) and 7B ([id number])  Available by 5A or 7B Clinical Dietitifortunato Steward  Weekend Coverage: Weekend Clinical Dietitifortunato Steward    Inpatient Clinical Dietitians no longer available via paging

## 2025-02-25 ENCOUNTER — APPOINTMENT (OUTPATIENT)
Dept: INTERVENTIONAL RADIOLOGY/VASCULAR | Facility: CLINIC | Age: 60
End: 2025-02-25
Attending: NURSE PRACTITIONER
Payer: COMMERCIAL

## 2025-02-25 LAB
ANION GAP SERPL CALCULATED.3IONS-SCNC: 11 MMOL/L (ref 7–15)
BACTERIA FLD CULT: NORMAL
BUN SERPL-MCNC: 22.6 MG/DL (ref 8–23)
CALCIUM SERPL-MCNC: 8.9 MG/DL (ref 8.8–10.4)
CHLORIDE SERPL-SCNC: 100 MMOL/L (ref 98–107)
CREAT SERPL-MCNC: 0.58 MG/DL (ref 0.51–0.95)
EGFRCR SERPLBLD CKD-EPI 2021: >90 ML/MIN/1.73M2
ERYTHROCYTE [DISTWIDTH] IN BLOOD BY AUTOMATED COUNT: 15.2 % (ref 10–15)
GLUCOSE BLDC GLUCOMTR-MCNC: 102 MG/DL (ref 70–99)
GLUCOSE BLDC GLUCOMTR-MCNC: 168 MG/DL (ref 70–99)
GLUCOSE BLDC GLUCOMTR-MCNC: 55 MG/DL (ref 70–99)
GLUCOSE BLDC GLUCOMTR-MCNC: 68 MG/DL (ref 70–99)
GLUCOSE BLDC GLUCOMTR-MCNC: 87 MG/DL (ref 70–99)
GLUCOSE SERPL-MCNC: 118 MG/DL (ref 70–99)
HCO3 SERPL-SCNC: 21 MMOL/L (ref 22–29)
HCT VFR BLD AUTO: 35 % (ref 35–47)
HGB BLD-MCNC: 10.9 G/DL (ref 11.7–15.7)
MAGNESIUM SERPL-MCNC: 2.3 MG/DL (ref 1.7–2.3)
MCH RBC QN AUTO: 31.3 PG (ref 26.5–33)
MCHC RBC AUTO-ENTMCNC: 31.1 G/DL (ref 31.5–36.5)
MCV RBC AUTO: 101 FL (ref 78–100)
PHOSPHATE SERPL-MCNC: 3.6 MG/DL (ref 2.5–4.5)
PLATELET # BLD AUTO: 237 10E3/UL (ref 150–450)
POTASSIUM SERPL-SCNC: 5.1 MMOL/L (ref 3.4–5.3)
RBC # BLD AUTO: 3.48 10E6/UL (ref 3.8–5.2)
SODIUM SERPL-SCNC: 132 MMOL/L (ref 135–145)
WBC # BLD AUTO: 5.2 10E3/UL (ref 4–11)

## 2025-02-25 PROCEDURE — 250N000011 HC RX IP 250 OP 636

## 2025-02-25 PROCEDURE — 250N000009 HC RX 250

## 2025-02-25 PROCEDURE — 250N000009 HC RX 250: Performed by: OBSTETRICS & GYNECOLOGY

## 2025-02-25 PROCEDURE — 84100 ASSAY OF PHOSPHORUS: CPT | Performed by: OBSTETRICS & GYNECOLOGY

## 2025-02-25 PROCEDURE — 250N000011 HC RX IP 250 OP 636: Performed by: STUDENT IN AN ORGANIZED HEALTH CARE EDUCATION/TRAINING PROGRAM

## 2025-02-25 PROCEDURE — 85014 HEMATOCRIT: CPT | Performed by: OBSTETRICS & GYNECOLOGY

## 2025-02-25 PROCEDURE — 250N000013 HC RX MED GY IP 250 OP 250 PS 637: Performed by: STUDENT IN AN ORGANIZED HEALTH CARE EDUCATION/TRAINING PROGRAM

## 2025-02-25 PROCEDURE — 120N000003 HC R&B IMCU UMMC

## 2025-02-25 PROCEDURE — 82310 ASSAY OF CALCIUM: CPT | Performed by: OBSTETRICS & GYNECOLOGY

## 2025-02-25 PROCEDURE — 258N000001 HC RX 258

## 2025-02-25 PROCEDURE — 250N000013 HC RX MED GY IP 250 OP 250 PS 637

## 2025-02-25 PROCEDURE — 99231 SBSQ HOSP IP/OBS SF/LOW 25: CPT | Performed by: RADIOLOGY

## 2025-02-25 PROCEDURE — 36591 DRAW BLOOD OFF VENOUS DEVICE: CPT | Performed by: OBSTETRICS & GYNECOLOGY

## 2025-02-25 PROCEDURE — 80048 BASIC METABOLIC PNL TOTAL CA: CPT | Performed by: OBSTETRICS & GYNECOLOGY

## 2025-02-25 PROCEDURE — 83735 ASSAY OF MAGNESIUM: CPT | Performed by: OBSTETRICS & GYNECOLOGY

## 2025-02-25 PROCEDURE — G0463 HOSPITAL OUTPT CLINIC VISIT: HCPCS

## 2025-02-25 PROCEDURE — 99232 SBSQ HOSP IP/OBS MODERATE 35: CPT | Mod: GC | Performed by: OBSTETRICS & GYNECOLOGY

## 2025-02-25 PROCEDURE — 85048 AUTOMATED LEUKOCYTE COUNT: CPT | Performed by: OBSTETRICS & GYNECOLOGY

## 2025-02-25 PROCEDURE — B4185 PARENTERAL SOL 10 GM LIPIDS: HCPCS | Performed by: OBSTETRICS & GYNECOLOGY

## 2025-02-25 PROCEDURE — 999N000084 IR FOLLOW UP VISIT INPATIENT

## 2025-02-25 PROCEDURE — 82374 ASSAY BLOOD CARBON DIOXIDE: CPT | Performed by: OBSTETRICS & GYNECOLOGY

## 2025-02-25 RX ORDER — DEXTROSE MONOHYDRATE 25 G/50ML
25-50 INJECTION, SOLUTION INTRAVENOUS
Status: DISCONTINUED | OUTPATIENT
Start: 2025-02-25 | End: 2025-03-02 | Stop reason: HOSPADM

## 2025-02-25 RX ORDER — DEXTROSE MONOHYDRATE 25 G/50ML
INJECTION, SOLUTION INTRAVENOUS
Status: DISCONTINUED
Start: 2025-02-25 | End: 2025-02-26 | Stop reason: HOSPADM

## 2025-02-25 RX ORDER — NICOTINE POLACRILEX 4 MG
15-30 LOZENGE BUCCAL
Status: DISCONTINUED | OUTPATIENT
Start: 2025-02-25 | End: 2025-03-02 | Stop reason: HOSPADM

## 2025-02-25 RX ADMIN — OLIVE OIL AND SOYBEAN OIL 250 ML: 16; 4 INJECTION, EMULSION INTRAVENOUS at 19:44

## 2025-02-25 RX ADMIN — HEPARIN, PORCINE (PF) 10 UNIT/ML INTRAVENOUS SYRINGE 5 ML: at 12:13

## 2025-02-25 RX ADMIN — PANTOPRAZOLE SODIUM 40 MG: 40 INJECTION, POWDER, FOR SOLUTION INTRAVENOUS at 08:17

## 2025-02-25 RX ADMIN — ENOXAPARIN SODIUM 40 MG: 40 INJECTION SUBCUTANEOUS at 17:41

## 2025-02-25 RX ADMIN — CYCLOBENZAPRINE 10 MG: 10 TABLET, FILM COATED ORAL at 14:27

## 2025-02-25 RX ADMIN — ACETAMINOPHEN 975 MG: 325 TABLET, FILM COATED ORAL at 20:03

## 2025-02-25 RX ADMIN — ESCITALOPRAM OXALATE 20 MG: 20 TABLET ORAL at 19:50

## 2025-02-25 RX ADMIN — PROCHLORPERAZINE EDISYLATE 10 MG: 5 INJECTION INTRAMUSCULAR; INTRAVENOUS at 19:57

## 2025-02-25 RX ADMIN — MAGNESIUM SULFATE HEPTAHYDRATE: 500 INJECTION, SOLUTION INTRAMUSCULAR; INTRAVENOUS at 19:43

## 2025-02-25 RX ADMIN — DEXTROSE MONOHYDRATE 25 ML: 25 INJECTION, SOLUTION INTRAVENOUS at 16:32

## 2025-02-25 RX ADMIN — Medication 1 CAPSULE: at 19:50

## 2025-02-25 RX ADMIN — ACETAMINOPHEN 975 MG: 325 TABLET, FILM COATED ORAL at 14:27

## 2025-02-25 RX ADMIN — HYDROXYZINE HYDROCHLORIDE 25 MG: 25 TABLET, FILM COATED ORAL at 19:50

## 2025-02-25 ASSESSMENT — ACTIVITIES OF DAILY LIVING (ADL)
ADLS_ACUITY_SCORE: 42

## 2025-02-25 NOTE — PROGRESS NOTES
Brief Progress Note    Went to check on patient for PM rounds. Doing ok today. Pain is well controlled, no nausea. Passed some gas earlier today. No other changes in symptoms.    Plan for clamp trial tomorrow starting at 0600, RN aware and clamp trial ordered. Planning IR sinogram tomorrow to re-evaluate drains. Will also discuss possibility of paracentesis at time of sinogram with IR in the AM. Continue NPO and TPN at this time.    Bonnie Goodman MD  OB/GYN Resident, PGY-4

## 2025-02-25 NOTE — PROGRESS NOTES
Interval progress Note    In to provide updates to patient. Patient doing well, denies nausea or vomiting, is feeling hungry. Patient had residual output of 200 ml from NGT following 6 hour clamp trial. Plan to leave NG tube in place, clamp it and start a clear liquid diet (apple sauce, ice cream ok tioo). Discussed increased risk of aspiration with NGT in place, and encouraged patient to sit straight up while eating, and to not lie down immediately following intake. At bedtime, plan to place NGT to LIS to minimize risk of aspiration. Additionally discussed plan for drain removal with IR today. If nauseous / vomiting during PO trial, please notify gyn-onc resident, and would proceed with PEG placement as scheduled on Thursday 2/27, with tentative plan for discharge Friday with TPN. All questions answered. Discussed case with 7B care coordinator, would plan for TPN teaching on Thursday as needed.     Brandy Dawkins MD  Obstetrics and Gynecology, PGY-2  02/25/2025 2:29 PM

## 2025-02-25 NOTE — PROGRESS NOTES
CLINICAL NUTRITION SERVICES - REASSESSMENT NOTE     RECOMMENDATIONS FOR MDs/PROVIDERS TO ORDER:  Please alert RD or pharmacist if pt requires adjustments to TPN fluid volume. Addition of IV fluids in addition to TPN per provider discretion     Malnutrition Status:    Severe malnutrition in the context of chronic illness    Registered Dietitian Interventions:  Discussed with pharmacist, transition to 12 hr cycled TPN tonight    Future/Additional Recommendations:  Monitor TPN tolerance, wt trends, GI status; inpatient RD will continue to follow per protocol     SUBJECTIVE INFORMATION  Patient not available for interview due to pt unavailable    CURRENT NUTRITION ORDERS  Diet: NPO    Nutrition Support:  TPN, 1440 mL/day x 18 hrs with goal 190 g dextrose, 75 g AA, and 250 mL 20% Clinolipid 5 days per week for total provision of 1303 Kcals (25 Kcals/kg), 1.4 g/kg protein, GIR 2.5 mg/kg/minute, and 27% fat kcals on average daily   -- Infuvite and trace minerals daily; additional thiamine (100 mg/day x 7 days started 2/21)     CURRENT INTAKE/TOLERANCE  PO Intake: NPO since 2/18.  NG clamp trial x 6 hrs today.    PN Intake: TPN start 2/21 with initial dex 100 g/day, increased by 45 g dex/day to goal 190 g (goal reached 2/23 evening). Transitioned to 18 hr cycled TPN regimen last night, plan to cycle to 12 hrs tonight. No documentation of any TPN/lipid interruptions per review of progress notes and MAR.      NEW FINDINGS  Weight: down 15 lb in the past 7 days (10.5%), suspect fluid related and/or scale discrepancy wt shifts involved at least in part --> possible that initial wt on 2/18 was higher than actual weight?  Per OSH RD note on 2/13, pt weighed 139 lb on that day.  RD assessment note on 2/18, pt reported starting to lose weight in Jan 2025 and previous UBW was 150 lb. Wt is overall down 23 lb x 1 month (15%)  Date/Time Weight Weight Method   02/25/25 0745 57.8 kg (127 lb 6.4 oz) Standing scale   02/24/25 0840 58.2 kg  (128 lb 6.4 oz) --   02/22/25 1650 60.5 kg (133 lb 6.4 oz) Standing scale   02/18/25 0100 64.6 kg (142 lb 8 oz) Standing scale     Dosing Weight: 58 kg (actual wt on 2/25)     ASSESSED NUTRITION NEEDS  Estimated Energy Needs: 5930-6972 kcals/day (20-30 kcals/kg)  Justification: Maintenance/modest energy with PN  Estimated Protein Needs: 70-87 grams protein/day (1.2 - 1.5 grams of pro/kg)  Justification: Increased needs  Estimated Fluid Needs: 2237-9865 (25-30 mL/kcal)  Justification: Maintenance, or other per provider pending fluid status    Skin/wounds: WOC not consulted    GI symptoms: per provider note today - passing flatus, plan for NG clamp trial today    Nutrition-relevant labs: Reviewed  - Na+ 132 (L)  - K+, Mg++, and Phos WNL  - BGs stable  - (2/24): Alk Phos 167 (H); ALT, AST, and Tbili WNL;  (H)    Nutrition-relevant medications: Reviewed    MALNUTRITION  % Intake: </= 75% for >/= 1 month (severe) overall - improving the past 2 days since reached goal TPN  % Weight Loss: > 5% in 1 month (severe)   Subcutaneous Fat Loss: visual only- pt sleeping intermittently during visit None observed - per RD note on 2/18  Muscle Loss: visual only- pt sleeping intermittently during visit None observed - per RD note on 2/18  Fluid Accumulation/Edema: Does not meet criteria (trace edema per provider note today)  Malnutrition Diagnosis: Severe malnutrition in the context of chronic illness  Malnutrition Present on Admission: No     EVALUATION OF THE PROGRESS TOWARD GOALS   Previous Goals  Diet adv v nutrition support within 2-3 days.   Evaluation: Met    Previous Nutrition Diagnosis  Inadequate oral intake related to taste changes as evidenced by current NPO diet, pt diet report   Evaluation: No change    NUTRITION DIAGNOSIS  Inadequate oral intake related to NPO 2/2 SBO as evidenced by NPO and on TPN/lipids for nutrition support    INTERVENTIONS  Collaboration by nutrition professional with other providers and  Parenteral nutrition/IV fluid management - see above  Chart review, pt with other cares during attempts to visit    Goals  Total avg nutritional intake to meet a minimum of 25 kcal/kg and 1.2 g PRO/kg daily (per dosing wt 51.9 kg).     Monitoring/Evaluation      Progress toward goals will be monitored and evaluated per policy.     Lulu Bonilla, RD, , LD  Weekday Units covered: 5A (non-Heme Onc pts) and 7B ([id number])  Available by 5A or 7B Clinical Dietfide Steward  Weekend Coverage: Weekend Clinical Dietfide Steward    Inpatient Clinical Dietitians no longer available via paging

## 2025-02-25 NOTE — PROGRESS NOTES
NG tube clamp trial 2/25 -- NG clamped at 0630. NG unclamped and put to LIS at 1230 with 250cc green/bile output over 30 minutes. Provider notified.

## 2025-02-25 NOTE — PROGRESS NOTES
"Gynecology Oncology Progress Note  02/25/2025    Ms. Jacki Smith is a 59 year old HD#15 for management of a bowel microperforation in the setting of recent bevacizumab administration.    Dz: Recurrent platinum sensitive HGSOC    24 hour events:   - passing flatus  - IR consulted, planning sinogram to re-evaluate drains  - GI consulted, recommend paracentesis and possible venting PEG later this week pending clamp trial    Subjective: Patient reports she had a good night of sleep, nervous about clamp trial today. No new symptoms, pain well controlled. Denies nausea / vomiting overnight. Passed a some gas yesterday. Voiding spontaneously. Ambulating 3-4x/day in halls. No other concerns at this time.    Objective:   /53 (BP Location: Right arm)   Pulse 80   Temp 98.7  F (37.1  C) (Oral)   Resp 18   Ht 1.567 m (5' 1.71\")   Wt 58.2 kg (128 lb 6.4 oz)   SpO2 100%   BMI 23.70 kg/m      General: lying in bed, in NAD  CV: well perfused  Resp: no increased work of breathing on room air  Abdomen: soft, non-tender, moderately distended. SANYA drains present with clear yellow drainage in the left drain, nothing present in right drain. Hypoactive bowel sounds  Extremities: nontender, trace edema    I/Os  (Yesterday // Since Midnight)  NG input: 120 mL // 30 mL  Urine 950 mL // 600 mL  NG output: 700 mL // 850 mL  SANYA drains: 70 mL/0 mL // NR    New labs/imaging-  Results for orders placed or performed during the hospital encounter of 02/18/25 (from the past 24 hours)   Glucose by meter   Result Value Ref Range    GLUCOSE BY METER POCT 108 (H) 70 - 99 mg/dL   Glucose by meter   Result Value Ref Range    GLUCOSE BY METER POCT 85 70 - 99 mg/dL       Assessment: 59 year old female with recurrent HGSOC HD#15 for a small bowel microperforation in the setting of recent bevacizumab administration.    Plan:    #Bowel perforation  #SBO  - Perforation unable to be fully appreciated on imaging and she continues to have large " fluid in the abdomen which is draining through drains, although not significant amounts. IR consulted, planning sinogram today to re-evaluate placement of drains and adjust as needed as well as possible paracentesis pending findings on sinogram.  - No concern for infection at this point and thus no further antibiotic therapy indicated  - Starting to pass some gas, clamp trial started at 0600. Will leave clamped for 6 hours and will follow up residual  - If does not pass clamp trial today, will plan to pursue venting G-tube for discharge. IR unable to place given loculated ascites. GI can tentatively pace Thursday 2/27  - GI recommending paracentesis prior to attempted venting G-tube placement, will discuss with IR if paracentesis can be performed today during sinogram. If unable, will consult CAPS team for evaluation  - Continue TPN for nutrition, nutrition working on cycling  -Long term plan would be for long term bowel rest with TPN support  -Continue TPN at this time pending IR findings.     #Recurrent HGSOC  - S/p cycle 1 of carbo/taxol/deshawn on 2/5. Treatment will be held in the setting of current bowel perforation  - Follow up with Dr. Patino after discharge for ongoing treatment discussions.     #MDD  - PTA lexapro daily    Disposition: pending clinical course    Bonnie Goodman MD  OB/GYN Resident, PGY-4  02/25/2025 6:45 AM     ITye MD personally examined and evaluated this patient on 02/25/25.  I discussed the patient with the resident and care team, and agree with the assessment and plan of care as documented in the residents note above.    I personally reviewed vital signs, laboratory values and imaging results.        Pat Wu MD  Gynecologic Oncology  [facility]

## 2025-02-25 NOTE — PLAN OF CARE
"Goal Outcome Evaluation:      Plan of Care Reviewed With: patient    Overall Patient Progress: improvingOverall Patient Progress: improving     A&O x4. Afebrile. VSS on RA. Pain managed with PRN tylenol and flexeril. Tolerating CLD w/o nausea. Ambulating in halls independently. NGT clamped, plan for LIS at bedtime. Bilateral skater drains removed by IR this shift. R chest port SL. Voiding without difficulty. BM x1 and passing flatus. No electrolyte replacements needed, recheck tomorrow AM. BS 68 this shift, gave apple juice and rechecks 55 and 48-- D50 administered and BS recheck 87, provider aware. Continue with POC.     Vitals: /53 (BP Location: Right arm)   Pulse 80   Temp 98.7  F (37.1  C) (Oral)   Resp 18   Ht 1.567 m (5' 1.71\")   Wt 57.8 kg (127 lb 6.4 oz)   SpO2 100%   BMI 23.52 kg/m      "

## 2025-02-25 NOTE — PROGRESS NOTES
IR follow-up note.    Pt was planned from possible sinogram/new drain based on imaging showing remaining collection. Case and imaging was reviewed again during IR rounds. Given hx of ascites and cultures negative from fluid as well as de-escalation of antibiotics by primary team, IR has opted to remove the drains. Risk of secondary infection from peritoneal drains is high and would certainly complicate patient's clinical course.     Case was discussed between Dr. Crook and Dr. Wu as well as Dr. Avila. Ok for drain removal. Paracentesis with IR will be coordinated for 2/26 PM given plan for G tube with GI 2/27.    Eve Rothman DNP, APRN  Interventional Radiology   IR on-call pager: [phone]

## 2025-02-25 NOTE — PROCEDURES
[facility]    Procedure: IR Procedure Note    Date/Time: 2/25/2025 3:27 PM    Performed by: Safia Harris DO  Authorized by: Christian Monaco MD  IR Fellow Physician:    Pre Procedure Diagnosis: Ovarian cancer  Post Procedure Diagnosis: Same    UNIVERSAL PROTOCOL   Site Marked: NA  Prior Images Obtained and Reviewed:  Yes  Required items: Required blood products, implants, devices and special equipment available    Patient identity confirmed:  Arm band, provided demographic data, hospital-assigned identification number and verbally with patient  Patient was reevaluated immediately before administering moderate or deep sedation or anesthesia  Confirmation Checklist:  Correct equipment/implants were available, procedure was appropriate and matched the consent or emergent situation, relevant allergies and patient's identity using two indicators  Time out: Immediately prior to the procedure a time out was called    Universal Protocol: the Joint Commission Universal Protocol was followed    Preparation: Patient was prepped and draped in usual sterile fashion    ESBL (mL):  0     ANESTHESIA    Anesthesia:  Local infiltration  Local Anesthetic:  Lidocaine 1% without epinephrine      SEDATION    Patient Sedated: No    See dictated procedure note for full details.  Findings: RLQ and LLQ abdominal drains removed. No immediate complication.     Specimens: none    Procedural Complications: None    Condition: Stable    Plan: -Change dressings as needed.       PROCEDURE    Patient Tolerance:  Patient tolerated the procedure well with no immediate complications  Length of time physician/provider present for 1:1 monitoring during sedation:  0 min

## 2025-02-25 NOTE — PLAN OF CARE
Goal Outcome Evaluation:      Plan of Care Reviewed With: patient    Overall Patient Progress: no changeOverall Patient Progress: no change  Neuro: AOx4, cooperative with cares  Cardiac: WDL  Respiratory: on RA, (R) notstril with NG  GI/: voiding adequately, no BM, passing flatus  Diet/Appetite: NPO with TPN cycled infusing  Skin: no new deficits  LDA: (R) port infusing TPN and Lipids. NG clamped at 0615  Activity: up ad deja  Pain: denies  Plan: continue POC

## 2025-02-26 LAB
ALBUMIN SERPL BCG-MCNC: 3.3 G/DL (ref 3.5–5.2)
ANION GAP SERPL CALCULATED.3IONS-SCNC: 10 MMOL/L (ref 7–15)
BUN SERPL-MCNC: 31 MG/DL (ref 8–23)
CALCIUM SERPL-MCNC: 8.8 MG/DL (ref 8.8–10.4)
CHLORIDE SERPL-SCNC: 102 MMOL/L (ref 98–107)
CREAT SERPL-MCNC: 0.57 MG/DL (ref 0.51–0.95)
EGFRCR SERPLBLD CKD-EPI 2021: >90 ML/MIN/1.73M2
ERYTHROCYTE [DISTWIDTH] IN BLOOD BY AUTOMATED COUNT: 15.3 % (ref 10–15)
GLUCOSE BLDC GLUCOMTR-MCNC: 84 MG/DL (ref 70–99)
GLUCOSE SERPL-MCNC: 107 MG/DL (ref 70–99)
HCO3 SERPL-SCNC: 20 MMOL/L (ref 22–29)
HCT VFR BLD AUTO: 34.4 % (ref 35–47)
HGB BLD-MCNC: 10.8 G/DL (ref 11.7–15.7)
LDH SERPL L TO P-CCNC: 241 U/L (ref 0–250)
MAGNESIUM SERPL-MCNC: 2.4 MG/DL (ref 1.7–2.3)
MCH RBC QN AUTO: 31.5 PG (ref 26.5–33)
MCHC RBC AUTO-ENTMCNC: 31.4 G/DL (ref 31.5–36.5)
MCV RBC AUTO: 100 FL (ref 78–100)
PHOSPHATE SERPL-MCNC: 3.6 MG/DL (ref 2.5–4.5)
PLATELET # BLD AUTO: 248 10E3/UL (ref 150–450)
POTASSIUM SERPL-SCNC: 5.3 MMOL/L (ref 3.4–5.3)
PROT SERPL-MCNC: 6.9 G/DL (ref 6.4–8.3)
RBC # BLD AUTO: 3.43 10E6/UL (ref 3.8–5.2)
SODIUM SERPL-SCNC: 132 MMOL/L (ref 135–145)
WBC # BLD AUTO: 5.5 10E3/UL (ref 4–11)

## 2025-02-26 PROCEDURE — 82374 ASSAY BLOOD CARBON DIOXIDE: CPT | Performed by: OBSTETRICS & GYNECOLOGY

## 2025-02-26 PROCEDURE — 250N000013 HC RX MED GY IP 250 OP 250 PS 637: Performed by: STUDENT IN AN ORGANIZED HEALTH CARE EDUCATION/TRAINING PROGRAM

## 2025-02-26 PROCEDURE — 84155 ASSAY OF PROTEIN SERUM: CPT

## 2025-02-26 PROCEDURE — 36591 DRAW BLOOD OFF VENOUS DEVICE: CPT

## 2025-02-26 PROCEDURE — 250N000013 HC RX MED GY IP 250 OP 250 PS 637

## 2025-02-26 PROCEDURE — 250N000009 HC RX 250: Performed by: OBSTETRICS & GYNECOLOGY

## 2025-02-26 PROCEDURE — B4185 PARENTERAL SOL 10 GM LIPIDS: HCPCS | Performed by: OBSTETRICS & GYNECOLOGY

## 2025-02-26 PROCEDURE — 83615 LACTATE (LD) (LDH) ENZYME: CPT

## 2025-02-26 PROCEDURE — 85014 HEMATOCRIT: CPT

## 2025-02-26 PROCEDURE — 120N000003 HC R&B IMCU UMMC

## 2025-02-26 PROCEDURE — 80048 BASIC METABOLIC PNL TOTAL CA: CPT | Performed by: OBSTETRICS & GYNECOLOGY

## 2025-02-26 PROCEDURE — 83735 ASSAY OF MAGNESIUM: CPT | Performed by: OBSTETRICS & GYNECOLOGY

## 2025-02-26 PROCEDURE — 250N000011 HC RX IP 250 OP 636

## 2025-02-26 PROCEDURE — 99232 SBSQ HOSP IP/OBS MODERATE 35: CPT | Mod: GC | Performed by: OBSTETRICS & GYNECOLOGY

## 2025-02-26 PROCEDURE — 82040 ASSAY OF SERUM ALBUMIN: CPT

## 2025-02-26 PROCEDURE — 80051 ELECTROLYTE PANEL: CPT | Performed by: OBSTETRICS & GYNECOLOGY

## 2025-02-26 RX ADMIN — PROCHLORPERAZINE EDISYLATE 10 MG: 5 INJECTION INTRAMUSCULAR; INTRAVENOUS at 21:11

## 2025-02-26 RX ADMIN — Medication 1 CAPSULE: at 21:11

## 2025-02-26 RX ADMIN — ESCITALOPRAM OXALATE 20 MG: 20 TABLET ORAL at 21:11

## 2025-02-26 RX ADMIN — Medication 5 ML: at 07:52

## 2025-02-26 RX ADMIN — PANTOPRAZOLE SODIUM 40 MG: 40 INJECTION, POWDER, FOR SOLUTION INTRAVENOUS at 07:52

## 2025-02-26 RX ADMIN — Medication 1 CAPSULE: at 07:52

## 2025-02-26 RX ADMIN — ACETAMINOPHEN 975 MG: 325 TABLET, FILM COATED ORAL at 13:03

## 2025-02-26 RX ADMIN — MAGNESIUM SULFATE HEPTAHYDRATE: 500 INJECTION, SOLUTION INTRAMUSCULAR; INTRAVENOUS at 21:10

## 2025-02-26 RX ADMIN — CYCLOBENZAPRINE 10 MG: 10 TABLET, FILM COATED ORAL at 13:03

## 2025-02-26 RX ADMIN — OLIVE OIL AND SOYBEAN OIL 250 ML: 16; 4 INJECTION, EMULSION INTRAVENOUS at 21:09

## 2025-02-26 RX ADMIN — PROCHLORPERAZINE MALEATE 10 MG: 5 TABLET ORAL at 10:18

## 2025-02-26 RX ADMIN — ACETAMINOPHEN 975 MG: 325 TABLET, FILM COATED ORAL at 21:20

## 2025-02-26 RX ADMIN — HYDROXYZINE HYDROCHLORIDE 50 MG: 50 TABLET, FILM COATED ORAL at 21:21

## 2025-02-26 ASSESSMENT — ACTIVITIES OF DAILY LIVING (ADL)
ADLS_ACUITY_SCORE: 44
ADLS_ACUITY_SCORE: 42
ADLS_ACUITY_SCORE: 44
ADLS_ACUITY_SCORE: 42
ADLS_ACUITY_SCORE: 44
ADLS_ACUITY_SCORE: 42
ADLS_ACUITY_SCORE: 44
ADLS_ACUITY_SCORE: 44
ADLS_ACUITY_SCORE: 42
ADLS_ACUITY_SCORE: 44
ADLS_ACUITY_SCORE: 44
ADLS_ACUITY_SCORE: 42
ADLS_ACUITY_SCORE: 42
ADLS_ACUITY_SCORE: 44
ADLS_ACUITY_SCORE: 42
ADLS_ACUITY_SCORE: 44
ADLS_ACUITY_SCORE: 44

## 2025-02-26 NOTE — PROGRESS NOTES
"Gynecology Oncology Progress Note  02/26/2025    Ms. Jacki Smith is a 59 year old HD#16 for management of a bowel microperforation in the setting of recent bevacizumab administration.    Dz: Recurrent platinum sensitive HGSOC    24 hour events:   - passing flatus, small BM  - passed clamp trial, tolerated minimal CLD  - drains removed    Subjective: Patient reports she had a good night, no concerns. No new symptoms, no pain overnight. Denies nausea and vomiting overnight. Passed a some gas and had a small BM yesterday. Voiding spontaneously. Ambulating 3-4x/day in halls. No other concerns at this time.    Objective:   /54 (BP Location: Right arm)   Pulse 70   Temp 97.8  F (36.6  C) (Oral)   Resp 18   Ht 1.567 m (5' 1.71\")   Wt 57.8 kg (127 lb 6.4 oz)   SpO2 98%   BMI 23.52 kg/m      General: lying in bed, in NAD  CV: well perfused  Resp: no increased work of breathing on room air  Abdomen: soft, non-tender, moderately distended. +BS  Extremities: nontender, trace edema      New labs/imaging-  Results for orders placed or performed during the hospital encounter of 02/18/25 (from the past 24 hours)   Glucose by meter   Result Value Ref Range    GLUCOSE BY METER POCT 68 (L) 70 - 99 mg/dL   Glucose by meter   Result Value Ref Range    GLUCOSE BY METER POCT 55 (L) 70 - 99 mg/dL   Glucose by meter   Result Value Ref Range    GLUCOSE BY METER POCT 87 70 - 99 mg/dL   IR Procedure Note    Narrative    Safia Harris DO     2/25/2025  3:28 PM  [facility]    Procedure: IR Procedure Note    Date/Time: 2/25/2025 3:27 PM    Performed by: Safia Harris DO  Authorized by: Christian Monaco MD  IR Fellow Physician:    Pre Procedure Diagnosis: Ovarian cancer  Post Procedure Diagnosis: Same    UNIVERSAL PROTOCOL   Site Marked: NA  Prior Images Obtained and Reviewed:  Yes  Required items: Required blood products, implants, devices and special   equipment available    Patient " identity confirmed:  Arm band, provided demographic data,   hospital-assigned identification number and verbally with patient  Patient was reevaluated immediately before administering moderate or deep   sedation or anesthesia  Confirmation Checklist:  Correct equipment/implants were available,   procedure was appropriate and matched the consent or emergent situation,   relevant allergies and patient's identity using two indicators  Time out: Immediately prior to the procedure a time out was called    Universal Protocol: the Joint Commission Universal Protocol was followed    Preparation: Patient was prepped and draped in usual sterile fashion    ESBL (mL):  0     ANESTHESIA    Anesthesia:  Local infiltration  Local Anesthetic:  Lidocaine 1% without epinephrine      SEDATION    Patient Sedated: No    See dictated procedure note for full details.  Findings: RLQ and LLQ abdominal drains removed. No immediate complication.       Specimens: none    Procedural Complications: None    Condition: Stable    Plan: -Change dressings as needed.       PROCEDURE    Patient Tolerance:  Patient tolerated the procedure well with no immediate   complications  Length of time physician/provider present for 1:1 monitoring during   sedation:  0 min   Glucose by meter   Result Value Ref Range    GLUCOSE BY METER POCT 168 (H) 70 - 99 mg/dL   Basic metabolic panel   Result Value Ref Range    Sodium 132 (L) 135 - 145 mmol/L    Potassium 5.3 3.4 - 5.3 mmol/L    Chloride 102 98 - 107 mmol/L    Carbon Dioxide (CO2) 20 (L) 22 - 29 mmol/L    Anion Gap 10 7 - 15 mmol/L    Urea Nitrogen 31.0 (H) 8.0 - 23.0 mg/dL    Creatinine 0.57 0.51 - 0.95 mg/dL    GFR Estimate >90 >60 mL/min/1.73m2    Calcium 8.8 8.8 - 10.4 mg/dL    Glucose 107 (H) 70 - 99 mg/dL   Magnesium   Result Value Ref Range    Magnesium 2.4 (H) 1.7 - 2.3 mg/dL   Phosphorus   Result Value Ref Range    Phosphorus 3.6 2.5 - 4.5 mg/dL   CBC with platelets   Result Value Ref Range    WBC  Count 5.5 4.0 - 11.0 10e3/uL    RBC Count 3.43 (L) 3.80 - 5.20 10e6/uL    Hemoglobin 10.8 (L) 11.7 - 15.7 g/dL    Hematocrit 34.4 (L) 35.0 - 47.0 %     78 - 100 fL    MCH 31.5 26.5 - 33.0 pg    MCHC 31.4 (L) 31.5 - 36.5 g/dL    RDW 15.3 (H) 10.0 - 15.0 %    Platelet Count 248 150 - 450 10e3/uL   Albumin level   Result Value Ref Range    Albumin 3.3 (L) 3.5 - 5.2 g/dL   Protein total   Result Value Ref Range    Protein Total 6.9 6.4 - 8.3 g/dL   Lactate Dehydrogenase   Result Value Ref Range    Lactate Dehydrogenase 241 0 - 250 U/L       Assessment: 59 year old female with recurrent HGSOC HD#16 for a small bowel microperforation in the setting of recent bevacizumab administration.    Plan:    #Bowel perforation  #SBO  - Perforation unable to be fully appreciated on imaging and she continues to have large fluid in the abdomen. Planning paracentesis today with IR to facilitate G-Tube  - Passed clamp trial yesterday, tolerated CLD without vomiting. NPO overnight with tube to LIS to minimize aspiration risk. Will attempt clamping tube and CLD again today  - Pending tolerance of CLD today, may still pursue venting G-tube for discharge. GI has her scheduled for placement of G-Tube on Thursday 2/27 pending clamp trial  - Continue TPN for nutrition, nutrition working on cycling  - Long term plan would be for long term bowel rest with TPN support if unable to advance diet further     #Recurrent HGSOC  - S/p cycle 1 of carbo/taxol/deshawn on 2/5. Treatment will be held in the setting of current bowel perforation  - Follow up with Dr. Patino after discharge for ongoing treatment discussions.     #MDD  - PTA lexapro daily    Disposition: pending clinical course    Bonnie Goodman MD  OB/GYN Resident, PGY-4  02/26/2025 6:03 AM      ITye MD personally examined and evaluated this patient on 02/26/25.  I discussed the patient with the resident and care team, and agree with the assessment and plan of care as  documented in the residents note above.    I personally reviewed vital signs, laboratory values and imaging results.        Pat Wu MD  Gynecologic Oncology  [facility]

## 2025-02-26 NOTE — PLAN OF CARE
"Blood pressure 112/57, pulse 81, temperature 98.4  F (36.9  C), temperature source Oral, resp. rate 18, height 1.567 m (5' 1.71\"), weight 58.1 kg (128 lb 1.6 oz), SpO2 99%.  Goal Outcome Evaluation:  Plan of Care Reviewed With :patient  Overall patient progress:improving  Pt.is A&Ox4 up independently,walked hallways with her ,c/o back pain tylenol and flexeril given per PRN order.NG was clumped this morning pt. low fiber diet, ate about 25% of her meals, compazine 10 mg given for mild nausea, no emesis ,LS clear,BS+,passing gas had medium size soft BM,voided adequately.Bilateral lower abdominal quadrant old drain site dressing changed,Port heparin locked. at bedside supportive with cares.Plan for paracentesis today in IR.                      "

## 2025-02-26 NOTE — PROGRESS NOTES
Brief Progress Note    S: Patient reports she had some breakfast, including Azeri toast. She did have some nausea with this but very mild and had some compazine to assist. Had a bowel movement. No pain. Walked around this morning. Appreciated the check in.    O:   Vitals:    02/25/25 2130 02/25/25 2326 02/26/25 0625 02/26/25 0932   BP: 109/56 110/54 112/57    BP Location: Right arm Right arm Right arm    Patient Position: Semi-Tripathi's      Cuff Size: Adult Regular      Pulse:  70 81    Resp: 20 18 18    Temp: 98.1  F (36.7  C) 97.8  F (36.6  C) 98.4  F (36.9  C)    TempSrc: Oral Oral Oral    SpO2: 98%  99%    Weight:    58.1 kg (128 lb 1.6 oz)   Height:            Assessment: 59 year old female with recurrent HGSOC HD#16 for a small bowel microperforation in the setting of recent bevacizumab administration.     Plan:    #Bowel perforation  #SBO  - Perforation unable to be fully appreciated on imaging and she continues to have large fluid in the abdomen. Planning paracentesis today with IR to facilitate G-Tube if needed Thursday/Friday with GI.  - Diet advanced to low fiber, with NG still in place. Will closely monitor and reconnect NG to LIS with any emesis.  - Continue TPN for nutrition, nutrition working on cycling    China Landry MD PGY-1  02/26/25 10:54 AM

## 2025-02-26 NOTE — PROGRESS NOTES
Brief Progress Note    Went to check on patient for PM rounds. Reports she had an ok day today. Passed clamp trial this AM, then has been unhooked from suction all day. Has tried some lemon ice, apple juice, and beef broth. Started feeling nauseous with the beef broth so stopped eating and took a dose of compazine. Not feeling nauseous anymore, no vomiting. Had abdominal pain during drain removal today which is now improved. Continuing to pass small amounts of flatus and had a small BM today. No other concerns at this time.    Will plan to reconnect to LIS when patient goes to bed tonight, RN aware and will document NG output after reconnected. Will plan NPO with NG to LIS overnight while awaiting paracentesis with IR tomorrow. Pending symptoms and paracentesis, will plan to re-clamp NG and return to CLD tomorrow. Tentative venting G tube with GI on Thursday.    Bonnie Goodman MD  OB/GYN Resident, PGY-4

## 2025-02-26 NOTE — PROGRESS NOTES
Brief Progress Note    S: Patient is having some burps and a heavy feeling but no nausea or abdominal pain. She just asked her nurse for antiemetics. Said she just ate a little bit of her lunch today.    O:   Vitals:    02/25/25 2326 02/26/25 0625 02/26/25 0932 02/26/25 1417   BP: 110/54 112/57  (!) 142/63   BP Location: Right arm Right arm  Right arm   Patient Position:       Cuff Size:       Pulse: 70 81  96   Resp: 18 18  18   Temp: 97.8  F (36.6  C) 98.4  F (36.9  C)  98.4  F (36.9  C)   TempSrc: Oral Oral  Oral   SpO2:  99%  100%   Weight:   58.1 kg (128 lb 1.6 oz)    Height:          General: Sitting in chair, NAD  Abdomen: Soft, nontender to palpation. Bandages in place in RLQ and LLQ, no shadowing.    Assessment: 59 year old female with recurrent HGSOC HD#16 for a small bowel microperforation in the setting of recent bevacizumab administration.     Plan:    #Bowel perforation  #SBO  - Paracentesis not completed due to lack of safe window. No signficiant perigastric ascites  - Continue low fiber, with NG still in place. Will closely monitor and reconnect NG to LIS with any emesis.  - Continue cycled TPN for nutrition.    China Landry MD PGY-1  02/26/25 4:10 PM

## 2025-02-26 NOTE — PROGRESS NOTES
Patient came to IR for paracentesis. Small amount of perihepatic ascites, no safe window for paracentesis due to location deep to liver. No significant perigastric ascites.    Tito Grijalva MD      Agree with note and assessment. I also scanned the abdomen - refer to the report.

## 2025-02-26 NOTE — PROGRESS NOTES
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 S/p placement of NGT and IR placement of x2 perc drains (R and L) into collections (all on 2/18).     IR consulted for consideration of venting Gtube but deferred to GI or surgery given felt no safe window with concern for loculated ascites.  [facility] GI team alternatively consulted 2/24 for consideration of venting PEG.     # SBO - c/b suspected microperforation of small bowel (previous managed by perc drains, removed 2/25)  # Ascites (unclear etiology - malignant versus bowel perforation)  # Severe protein-calorie malnutrition  Presented to OSH initially with sx abd pain, N/V and has only been allowed CL/NPO since 2/11/25.  Started on TPN 2/20 this adm with persistent sx of SBO.  Per review of weight records, now down to 128# (noted to be 145# on 1/24/25, reported # which last weighed this on ~10/2024) with unintentional loss of 22# x 4 months (loss 15%) which, combined with sx of fat/muscle wasting (see exam section), is all c/w severe malnutrition.  Noted to have persistent ascites previously (requiring intermittent paracentesis in 6/2022 and 5/2023) but then no repeat required until most recent at OSH on 2/14 removing 600 mL but no studies sent on fluid and unclear if related to malignancy vs perforation.     Now with NG in place since 2/18 this adm for persistent sx of SBO (1.2 L/day more recently  2/22-2/23).  Repeat CT AP with IV/PO contrast (2/21) noted ongoing  RLQ/LLQ collections with question of perc drain of RLQ in collection (with observed decreased outputs from this drain per I/Os), no further evidence of perforation with resolved pneumoperitoneum.   Peer discussion with Gyn Onc team 2/24, given location and timing of SBO/perforation after initiation Bevacizumab, lower suspicion for metastatic malignancy as etiology of SBO and feel more c/w observed microperforation related to this monoclonal treatment.  If unable to tolerate NGT clamp trials, Gyn Onc with plans to keep NPO with TPN and ideally gastric decompression (via venting Gtube) for possibly up to 6 weeks before anticipate closer of microperf/significant improvement in SBO.     IR consulted for sinogram to re-evaluate collections/perc drains, need to reposition vs remove drains.  No sinogram performed and given hx of ascites, cultures negative from fluid as well as improving clinical course with de-escalation of antibiotics, IR opted to remove both drains on 2/25 given high risk of secondary infection from peritoneal drains. Now pending IR consult for paracentesis 2/26.     RECOMMENDATIONS  -Plan for venting PEG on Thursday 2/27 vs 2/28 (GI available to proceed pending OR availability)  -NPO status at midnight on 2/27   -Hold lovenox (last received 2/25 at ~18:00)  -Continue NPO status and TPN.  Diet per Gyn Onc  -Continue NGT to LIS for decompression.  Clamp trials per Gyn Onc.  -Proceed with IR consult for repeat paracentesis prior to possible venting PEG.  -Obtain the following studies on ascites fluid to evaluate etiology: cell count with diff, gram stain, cultures (aerobic and anaerobic), amylase, TG, Albumin, T.protein, bilirubin, Glu, LDH, and cytology   -Education complete with patient (2/24) on venting Gtube with patient (mininum duration required to keep, expected pain, ability to take PO pleasure and brief cares/clamping trials).    "  Discussed all above with patient (on 2/24) and primary Gyn Onc team (Dr. Anglin via [facility] on 2/26).     Gastroenterology follow up recommendations:   TBD pending clinical course     Thank you for involving us in this patient's care. Please do not hesitate to contact the GI service with any questions or concerns.      Pt seen and care plan discussed with Dr. Aldridge and Dr. Saunders, GI staff physician.     No charge, this note is a product of chart review.    Allie Farfan PA-C, RD, [facility]  Inpatient Gastroenterology Service  [facility]  Pager: *[phone]  Text Page     _______________________________________________________________      Subjective: Nursing notes and 24hr events reviewed. NAEO.      Per report of Gyn Onc team, initially seemed to tolerate NGT clamp trials but did have nausea with trial of CL diet. Per I/Os, still had 1450 ml documented out yesterday, 300 ml so far this AM.  Reports patient wants to try again 2/26.      ROS:   4 pt ROS negative unless noted in subjective.     Objective:  Blood pressure 112/57, pulse 81, temperature 98.4  F (36.9  C), temperature source Oral, resp. rate 18, height 1.567 m (5' 1.71\"), weight 58.1 kg (128 lb 1.6 oz), SpO2 99%.    Did not examine today.    I/O last 3 completed shifts:  In: 680 [P.O.:680]  Out: 1150 [Urine:300; Emesis/NG output:450; Other:400]     PROCEDURES:  No pertinent this adm.    LABS:  BMP  Recent Labs   Lab 02/26/25  0554 02/25/25  2126 02/25/25  1459 02/25/25  1449 02/25/25  0745 02/25/25  0651 02/24/25  1157 02/24/25  0619 02/23/25  1016 02/23/25  0429   *  --   --   --   --  132*  --  137  --  136   POTASSIUM 5.3  --   --   --   --  5.1  --  4.5  --  3.9   CHLORIDE 102  --   --   --   --  100  --  103  --  101   KINDRA 8.8  --   --   --   --  8.9  --  9.1  --  8.9   CO2 20*  --   --   --   --  21*  --  22  --  25   BUN 31.0*  --   --   --   --  22.6  --  20.1  --  13.3   CR 0.57  --   --   --   --  0.58  --  0.55  " --  0.58   * 168* 87 55*   < > 118*   < > 125*   < > 121*    < > = values in this interval not displayed.     CBC  Recent Labs   Lab 02/26/25  0554 02/25/25  0651 02/24/25  0619 02/23/25  0429   WBC 5.5 5.2 4.7 4.2   RBC 3.43* 3.48* 3.41* 3.29*   HGB 10.8* 10.9* 10.8* 10.4*   HCT 34.4* 35.0 34.0* 31.4*    101* 100 95   MCH 31.5 31.3 31.7 31.6   MCHC 31.4* 31.1* 31.8 33.1   RDW 15.3* 15.2* 15.1* 15.0    237 197 170     INR  Recent Labs   Lab 02/24/25  0619 02/22/25  0657   INR 1.05 1.34*     LFTs  Recent Labs   Lab 02/26/25  0554 02/24/25  0619 02/22/25  0657 02/21/25  0648   ALKPHOS  --  167* 115 113   AST  --  20 21 23   ALT  --  8 7 7   BILITOTAL  --  0.2 0.3 0.4   PROTTOTAL 6.9 6.5 6.1* 5.8*   ALBUMIN 3.3* 3.3* 3.0* 2.9*      PANCNo lab results found in last 7 days.      IMAGING:  (Personally reviewed)    2/22/2025: Portable abdomen 1 view  INDICATION: Assessing for Gastrografin passage in the colon  COMPARISON: Yesterday  FINDINGS: NG/OG tube tip and side hole both projecting in the stomach.  Catheters in the pelvis bilaterally. Degenerative changes in the spine  with levocurvature of the mid lumbar spine. No evidence of distended  bowel gas. Poor visualization of any contrast.                                                                    IMPRESSION: No definitive contrast visualization.     2/21/2025: CT abdomen with and without contrast (IV + oral)     INDICATION: Bowel perforation     COMPARISON: Chest abdomen pelvis CT 1/10/2025     CONTRAST: 86 mL intravenous Isovue-370. 50 mL oral Omnipaque-140 oral.     FINDINGS: Initial noncontrast imaging shows atherosclerotic  calcification in the aorta and proximal common iliac arteries  bilaterally. There is a small collecting system stone measuring 2 mm  in the right kidney without hydronephrosis. Oral contrast clearly  visualized in the stomach, duodenum and other proximal small bowel  loops just at the ligament of Treitz but also some  progressing into  the more distal small bowel. No small bowel distention.  Percutaneous drainage tubing right lower abdomen without visible  hypodense fluid at its tip. Somewhat amorphous densities adjacent to  bowel loops noted in the left abdomen, some of which contain punctate  air.  Post contrast imaging was obtained in the phase somewhat between  arterial and standard portal venous.  Left greater than right pleural effusions. Multiple small rounded  fluid density areas scattered about the liver consistent with small  cysts/benign biliary hamartomas. Minimal central intrahepatic biliary  prominence. Gallbladder grossly normal. There is some soft tissue  induration slightly better visualized on the post intravenous contrast  images at the tip of the catheter without actual fluid density again  present. Another drainage catheter from a left anterior lateral  percutaneous approach progresses into a rim-enhancing fluid collection  which was amorphous on the noncontrast imaging but is clearly  consistent with abscess given its slight rim enhancement and punctate  air foci.   Compared with previous, the amount of upper abdominal free of fluid  overall has minimally decreased in the abdomen. There is a  well-circumscribed rim-enhancing fluid density in the right lower  abdomen below the tip of the catheter (3/44, 4/35).  Spleen normal. Symmetric nephrograms with extrarenal pelvis formation  in each kidney, left larger than right, likely anatomical variant.  Bilateral adrenals normal. Pancreas normal with upper normal caliber  of the pancreatic duct. There may be some partial divisum of the  proximal pancreatic duct in the region of the head of the pancreas.  Another postcontrast phase of imaging was obtained which is more  standard portal venous as well, confirming the above findings of the  postintravenous contrast images.   There is a loop of bowel in the left side of the abdomen adjacent to  the prominent fluid  collection which has an almost swirling type  appearance (3/40) and (4/33), although this is most like a sharp turn  of the bowel loop, close attention should be paid to exclude  developing intussusception. All air densities in the abdomen other  than those in the fluid cavities appear associated with loops of  bowel.  Bone detail shows prominent levocurvature centered at L4-5 with  leftward translocation of L4 with respect to L5. There are advanced  degenerative changes with sclerotic a density of the endplates and  prominent concave/right-sided osteophytes. Vacuum disc also prominent  at L4-5. Disc space narrowing with vacuum disc also at L3-4. Disc  space narrowing L5-S1.  Vascular assessment shows evidence chronic atherosclerotic  calcification origin of the SMA an some noncircumferential  calcification in the aortoiliac arteries.                                                                       IMPRESSION:  1. Bilateral mid to lower abdominal rim-enhancing abscesses, the  right-sided fluid collection is below the tip of the percutaneous  catheter.  2. Slightly decreased upper abdominal moderately extensive free fluid.  3. No visible pneumoperitoneum.  4. Multiple small liver cysts with minimal central intrahepatic  biliary prominence.  5. Left greater than right small to mild pleural effusions.  6. Possible sharp turn of a bowel loop in the left lower abdomen  rather than an intussusception, close attention on follow-up scans  recommended.  7. Mild atherosclerosis.  8. Degenerative changes in the mid to lower lumbar spine.        EXAM: CT ABD PELVIS W IV CONT   LOCATION: [facility]   DATE: 2/11/2025     INDICATION: Llq abd pain, concern for obstruction, infection   COMPARISON: 4/23/2024   TECHNIQUE: Helical enhanced CT scan of the abdomen and pelvis was performed following injection of IV contrast. Multiplanar reformats were obtained. Dose reduction techniques were used.   CONTRAST: IOHEXOL 350 MG/ML  IV SOLN 100 mL     FINDINGS:   LOWER CHEST: Trace dependent atelectasis.     HEPATOBILIARY: Multiple new low-density hepatic masses are consistent with metastatic disease with the largest measuring 2.5 x 2.4 cm in segment 2, image 24:3.     PANCREAS: Normal.     SPLEEN: Normal.     ADRENAL GLANDS: Normal.     KIDNEYS/BLADDER: Normal.     BOWEL: Small volume pneumoperitoneum, greatest in the anterior upper abdomen. The source is not definitively identified. No obstruction.     LYMPH NODES: Mesenteric and right iliac lymphadenopathy with the largest being a right mesenteric node measuring 1.5 cm in short axis, image 66:3.     VASCULATURE: No abdominal aortic aneurysm.     PELVIC ORGANS: Increasing moderate volume complex ascites with multiple septations and heterogeneous peritoneal thickening is consistent with peritoneal carcinomatosis. Hysterectomy.     MUSCULOSKELETAL: Degenerative changes. Scoliosis.     IMPRESSION:   1.  Small volume pneumoperitoneum. While the source is not definitively identified, this is consistent with perforated bowel.   2.  Peritoneal carcinomatosis with moderate volume complex ascites.   3.  Liver metastases.   4.  Mesenteric and right iliac lymphadenopathy.      ======================================================================

## 2025-02-27 ENCOUNTER — HOME INFUSION (OUTPATIENT)
Dept: HOME HEALTH SERVICES | Facility: HOME HEALTH | Age: 60
End: 2025-02-27
Payer: COMMERCIAL

## 2025-02-27 VITALS
RESPIRATION RATE: 15 BRPM | HEIGHT: 62 IN | HEART RATE: 85 BPM | OXYGEN SATURATION: 99 % | SYSTOLIC BLOOD PRESSURE: 128 MMHG | DIASTOLIC BLOOD PRESSURE: 55 MMHG | TEMPERATURE: 98.7 F | BODY MASS INDEX: 23.13 KG/M2 | WEIGHT: 125.7 LBS

## 2025-02-27 LAB
ALBUMIN SERPL BCG-MCNC: 3.4 G/DL (ref 3.5–5.2)
ALP SERPL-CCNC: 278 U/L (ref 40–150)
ALT SERPL W P-5'-P-CCNC: 12 U/L (ref 0–50)
ANION GAP SERPL CALCULATED.3IONS-SCNC: 11 MMOL/L (ref 7–15)
AST SERPL W P-5'-P-CCNC: 27 U/L (ref 0–45)
BILIRUB SERPL-MCNC: 0.3 MG/DL
BUN SERPL-MCNC: 31.6 MG/DL (ref 8–23)
CALCIUM SERPL-MCNC: 9 MG/DL (ref 8.8–10.4)
CHLORIDE SERPL-SCNC: 99 MMOL/L (ref 98–107)
CREAT SERPL-MCNC: 0.59 MG/DL (ref 0.51–0.95)
EGFRCR SERPLBLD CKD-EPI 2021: >90 ML/MIN/1.73M2
ERYTHROCYTE [DISTWIDTH] IN BLOOD BY AUTOMATED COUNT: 15.2 % (ref 10–15)
GLUCOSE BLDC GLUCOMTR-MCNC: 152 MG/DL (ref 70–99)
GLUCOSE BLDC GLUCOMTR-MCNC: 83 MG/DL (ref 70–99)
GLUCOSE SERPL-MCNC: 108 MG/DL (ref 70–99)
HCO3 SERPL-SCNC: 21 MMOL/L (ref 22–29)
HCT VFR BLD AUTO: 32.5 % (ref 35–47)
HGB BLD-MCNC: 10.7 G/DL (ref 11.7–15.7)
INR PPP: 1.1 (ref 0.85–1.15)
MAGNESIUM SERPL-MCNC: 2.3 MG/DL (ref 1.7–2.3)
MCH RBC QN AUTO: 31.6 PG (ref 26.5–33)
MCHC RBC AUTO-ENTMCNC: 32.9 G/DL (ref 31.5–36.5)
MCV RBC AUTO: 96 FL (ref 78–100)
PHOSPHATE SERPL-MCNC: 3.6 MG/DL (ref 2.5–4.5)
PLATELET # BLD AUTO: 239 10E3/UL (ref 150–450)
POTASSIUM SERPL-SCNC: 5.1 MMOL/L (ref 3.4–5.3)
PROT SERPL-MCNC: 6.9 G/DL (ref 6.4–8.3)
RBC # BLD AUTO: 3.39 10E6/UL (ref 3.8–5.2)
SODIUM SERPL-SCNC: 131 MMOL/L (ref 135–145)
WBC # BLD AUTO: 5.7 10E3/UL (ref 4–11)

## 2025-02-27 PROCEDURE — 250N000013 HC RX MED GY IP 250 OP 250 PS 637: Performed by: STUDENT IN AN ORGANIZED HEALTH CARE EDUCATION/TRAINING PROGRAM

## 2025-02-27 PROCEDURE — 80053 COMPREHEN METABOLIC PANEL: CPT

## 2025-02-27 PROCEDURE — 250N000011 HC RX IP 250 OP 636

## 2025-02-27 PROCEDURE — 250N000013 HC RX MED GY IP 250 OP 250 PS 637

## 2025-02-27 PROCEDURE — 36591 DRAW BLOOD OFF VENOUS DEVICE: CPT

## 2025-02-27 PROCEDURE — 99232 SBSQ HOSP IP/OBS MODERATE 35: CPT | Mod: GC | Performed by: OBSTETRICS & GYNECOLOGY

## 2025-02-27 PROCEDURE — 85027 COMPLETE CBC AUTOMATED: CPT

## 2025-02-27 PROCEDURE — 83735 ASSAY OF MAGNESIUM: CPT | Performed by: OBSTETRICS & GYNECOLOGY

## 2025-02-27 PROCEDURE — 84100 ASSAY OF PHOSPHORUS: CPT | Performed by: OBSTETRICS & GYNECOLOGY

## 2025-02-27 PROCEDURE — 250N000009 HC RX 250: Performed by: OBSTETRICS & GYNECOLOGY

## 2025-02-27 PROCEDURE — 120N000003 HC R&B IMCU UMMC

## 2025-02-27 PROCEDURE — 85610 PROTHROMBIN TIME: CPT

## 2025-02-27 PROCEDURE — 99232 SBSQ HOSP IP/OBS MODERATE 35: CPT | Performed by: PHYSICIAN ASSISTANT

## 2025-02-27 PROCEDURE — 84155 ASSAY OF PROTEIN SERUM: CPT

## 2025-02-27 RX ORDER — HEPARIN SODIUM 5000 [USP'U]/.5ML
5000 INJECTION, SOLUTION INTRAVENOUS; SUBCUTANEOUS ONCE
Status: COMPLETED | OUTPATIENT
Start: 2025-02-27 | End: 2025-02-27

## 2025-02-27 RX ADMIN — MAGNESIUM SULFATE HEPTAHYDRATE: 500 INJECTION, SOLUTION INTRAMUSCULAR; INTRAVENOUS at 20:20

## 2025-02-27 RX ADMIN — CYCLOBENZAPRINE 10 MG: 10 TABLET, FILM COATED ORAL at 18:24

## 2025-02-27 RX ADMIN — HYDROXYZINE HYDROCHLORIDE 25 MG: 25 TABLET, FILM COATED ORAL at 20:19

## 2025-02-27 RX ADMIN — ACETAMINOPHEN 975 MG: 325 TABLET, FILM COATED ORAL at 20:20

## 2025-02-27 RX ADMIN — ESCITALOPRAM OXALATE 20 MG: 20 TABLET ORAL at 20:20

## 2025-02-27 RX ADMIN — HEPARIN, PORCINE (PF) 10 UNIT/ML INTRAVENOUS SYRINGE 5 ML: at 14:08

## 2025-02-27 RX ADMIN — ACETAMINOPHEN 975 MG: 325 TABLET, FILM COATED ORAL at 09:14

## 2025-02-27 RX ADMIN — HEPARIN SODIUM 5000 UNITS: 5000 INJECTION, SOLUTION INTRAVENOUS; SUBCUTANEOUS at 10:16

## 2025-02-27 RX ADMIN — Medication 1 CAPSULE: at 09:03

## 2025-02-27 RX ADMIN — Medication 1 CAPSULE: at 20:20

## 2025-02-27 RX ADMIN — CYCLOBENZAPRINE 10 MG: 10 TABLET, FILM COATED ORAL at 09:14

## 2025-02-27 RX ADMIN — PANTOPRAZOLE SODIUM 40 MG: 40 INJECTION, POWDER, FOR SOLUTION INTRAVENOUS at 09:04

## 2025-02-27 ASSESSMENT — ACTIVITIES OF DAILY LIVING (ADL)
ADLS_ACUITY_SCORE: 44
ADLS_ACUITY_SCORE: 44
ADLS_ACUITY_SCORE: 45
ADLS_ACUITY_SCORE: 45
ADLS_ACUITY_SCORE: 44
ADLS_ACUITY_SCORE: 45
ADLS_ACUITY_SCORE: 45
ADLS_ACUITY_SCORE: 44
ADLS_ACUITY_SCORE: 45
ADLS_ACUITY_SCORE: 44
ADLS_ACUITY_SCORE: 45
ADLS_ACUITY_SCORE: 44
ADLS_ACUITY_SCORE: 45

## 2025-02-27 NOTE — PLAN OF CARE
"/69 (BP Location: Right arm)   Pulse 59   Temp 98.4  F (36.9  C) (Oral)   Resp 16   Ht 1.567 m (5' 1.71\")   Wt 58.1 kg (128 lb 1.6 oz)   SpO2 99%   BMI 23.65 kg/m        Outcome Evaluation: A&O x 4. NG to LIS overnight. Per provider leave patient on LIS, not clamped.  Abdominal sites x 2. Port A Cath infusing cycled TPN and lipids. . SBA. Denies pain. Slept well through the night.      Goal Outcome Evaluation: ongoing.           Plan of Care Reviewed With: patient    Overall Patient Progress: no change          Problem: Adult Inpatient Plan of Care  Goal: Absence of Hospital-Acquired Illness or Injury  Intervention: Identify and Manage Fall Risk  Recent Flowsheet Documentation  Taken 2/27/2025 0100 by Priscilla Berry RN  Safety Promotion/Fall Prevention:   activity supervised   assistive device/personal items within reach   clutter free environment maintained   lighting adjusted   room organization consistent   safety round/check completed     Problem: Adult Inpatient Plan of Care  Goal: Absence of Hospital-Acquired Illness or Injury  Intervention: Prevent and Manage VTE (Venous Thromboembolism) Risk  Recent Flowsheet Documentation  Taken 2/27/2025 0100 by Priscilla Berry RN  VTE Prevention/Management: SCDs on (sequential compression devices)     Problem: Adult Inpatient Plan of Care  Goal: Absence of Hospital-Acquired Illness or Injury  Intervention: Prevent Infection  Recent Flowsheet Documentation  Taken 2/27/2025 0100 by Priscilla Berry, RN  Infection Prevention:   cohorting utilized   rest/sleep promoted   single patient room provided     Problem: Adult Inpatient Plan of Care  Goal: Optimal Comfort and Wellbeing  Intervention: Monitor Pain and Promote Comfort  Recent Flowsheet Documentation  Taken 2/27/2025 0051 by Priscilla Berry, RN  Pain Management Interventions: rest     Problem: Stem Cell/Bone Marrow Transplant  Goal: Optimal Coping with Transplant  Intervention: Optimize Patient/Family " Adjustment to Transplant  Recent Flowsheet Documentation  Taken 2/27/2025 0100 by Priscilla Berry RN  Supportive Measures: verbalization of feelings encouraged     Problem: Stem Cell/Bone Marrow Transplant  Goal: Absence of Infection  Intervention: Prevent and Manage Infection  Recent Flowsheet Documentation  Taken 2/27/2025 0100 by Priscilla Berry, RN  Infection Prevention:   cohorting utilized   rest/sleep promoted   single patient room provided  Infection Management: aseptic technique maintained     Problem: Fall Injury Risk  Goal: Absence of Fall and Fall-Related Injury  Intervention: Identify and Manage Contributors  Recent Flowsheet Documentation  Taken 2/27/2025 0100 by Priscilla Berry, RN  Medication Review/Management: medications reviewed

## 2025-02-27 NOTE — PROGRESS NOTES
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 S/p placement of NGT and IR placement of x2 perc drains (R and L) into collections (all on 2/18).     IR consulted for consideration of venting Gtube but deferred to GI or surgery given felt no safe window with concern for loculated ascites.  AE GI team alternatively consulted 2/24 for consideration of venting PEG.    # SBO - c/b suspected microperforation of small bowel (previous managed by perc drains, removed 2/25)  # Ascites (unclear etiology - malignant versus bowel perforation)  # Severe protein-calorie malnutrition  See detailed consult note from Allie Farfan PA-C on 2/26. Patient unable to advance diet and primary team/patient agree to pursue venting PEG placement. IR attempted paracentesis but there was only small fluid collections so no fluid aspirated. Discussed plan with gyn onc team and agree with moving forward with venting PEG placement in OR tmrw.    Please note that tubes placed for venting are generally positional (position of person and fluid, way stomach is percutaneously accessed, and shape of stomach). Tube may not drain well to gravity and may still require occasional manual aspiration.      RECOMMENDATIONS:  - Plan for venting PEG tomorrow in [facility], Friday 2/28   - NPO status at midnight on 2/28  - Hold VTE prophylaxis   - Continue NPO status and TPN  - Continue NGT to LIS for decompression   "    Discussed with primary gyn/onc team    The patient was discussed and plan agreed upon with GI staff, Dr Aldridge/Dr. Saunders.      Overall time spent on the date of this encounter preparing to see the patient (including chart review of available notes, clinical status events, imaging and labs); obtaining history; examining the patient, coordinating and/or ordering medications, tests and/or procedures; communicating with other health care professionals; and documenting the above clinical information in the electronic medical record was  40  minutes.      Kiana eBll PA-C, [facility]  Advanced Endoscopy/Pancreaticobiliary GI Service  [facility]  Vocera   ______________________________________________________      Interval events since last evaluated: Nursing notes and 24hr events reviewed. NAEON, vitals stable. Patient seen and examined at 1115. Patient reports that she is agreeable to the PEG tube. Tolerating clear liquids.  at bedside, questions answered.    ROS:   4 pt ROS negative unless noted in subjective.     Objective:  Blood pressure 122/55, pulse 81, temperature 99  F (37.2  C), temperature source Oral, resp. rate 16, height 1.567 m (5' 1.71\"), weight 57 kg (125 lb 11.2 oz), SpO2 98%.  Gen: Sitting in bed. Appears comfortable  HEENT: NGT in place with bilious output  Resp: normal work of breathing on RA, speaking in full sentences  Msk: no gross deformity  Skin:  no jaundice  Ext: warm, well perfused   Neuro: grossly normal  Mental status/Psych: A&O. Asks/answers questions appropriately       LABS:  BMP  Recent Labs   Lab 02/27/25  0721 02/27/25  0055 02/26/25  1918 02/26/25  0554 02/25/25  0745 02/25/25  0651 02/24/25  1157 02/24/25  0619   *  --   --  132*  --  132*  --  137   POTASSIUM 5.1  --   --  5.3  --  5.1  --  4.5   CHLORIDE 99  --   --  102  --  100  --  103   KINDRA 9.0  --   --  8.8  --  8.9  --  9.1   CO2 21*  --   --  20*  --  21*  --  22   BUN 31.6* "  --   --  31.0*  --  22.6  --  20.1   CR 0.59  --   --  0.57  --  0.58  --  0.55   * 152* 84 107*   < > 118*   < > 125*    < > = values in this interval not displayed.     CBC  Recent Labs   Lab 02/27/25  0721 02/26/25  0554 02/25/25  0651 02/24/25  0619   WBC 5.7 5.5 5.2 4.7   RBC 3.39* 3.43* 3.48* 3.41*   HGB 10.7* 10.8* 10.9* 10.8*   HCT 32.5* 34.4* 35.0 34.0*   MCV 96 100 101* 100   MCH 31.6 31.5 31.3 31.7   MCHC 32.9 31.4* 31.1* 31.8   RDW 15.2* 15.3* 15.2* 15.1*    248 237 197     INR  Recent Labs   Lab 02/27/25  0721 02/24/25  0619 02/22/25  0657   INR 1.10 1.05 1.34*     LFTs  Recent Labs   Lab 02/27/25  0721 02/26/25  0554 02/24/25  0619 02/22/25  0657 02/21/25  0648   ALKPHOS 278*  --  167* 115 113   AST 27  --  20 21 23   ALT 12  --  8 7 7   BILITOTAL 0.3  --  0.2 0.3 0.4   PROTTOTAL 6.9 6.9 6.5 6.1* 5.8*   ALBUMIN 3.4* 3.3* 3.3* 3.0* 2.9*      PANCNo lab results found in last 7 days.

## 2025-02-27 NOTE — PROGRESS NOTES
"Gynecology Oncology Progress Note  02/27/2025    Ms. Jacki Smith is a 59 year old HD#17 for management of a bowel microperforation in the setting of recent bevacizumab administration.    Dz: Recurrent platinum sensitive HGSOC    24 hour events:   - advanced to low fiber diet  - Evaluated for paracentesis, no safe window and minimal ascites so not performed  - NPO at MN with NG reconnected to LIS    Subjective: Patient reports she had an uneventful night. No new symptoms. No pain overnight, reports abdominal pain improved after reconnecting her to suction last night. Denies nausea and vomiting overnight. Passed a some gas and had a small BM yesterday. Voiding spontaneously. Ambulating 3-4x/day in halls. No other concerns at this time.    Objective:   /55 (BP Location: Right arm, Patient Position: Semi-Tripathi's, Cuff Size: Adult Regular)   Pulse 81   Temp 99  F (37.2  C) (Oral)   Resp 16   Ht 1.567 m (5' 1.71\")   Wt 58.1 kg (128 lb 1.6 oz)   SpO2 98%   BMI 23.65 kg/m      General: lying in bed, in NAD  CV: well perfused  Resp: no increased work of breathing on room air  Abdomen: soft, non-tender, moderately distended.   Extremities: nontender, trace edema      New labs/imaging-  Results for orders placed or performed during the hospital encounter of 02/18/25 (from the past 24 hours)   IR Paracentesis    Narrative    PROCEDURES 2/26/2025 3:18 PM:  1. Limited abdominal ultrasound.  2. Paracentesis not performed.    CLINICAL HISTORY: therapeutic paracentesis 2/26 PM, removal fluid  present around the stomach prior to GI G tube placement 2/27.     COMPARISONS: CT abdomen pelvis 2/21/2025     REFERRING PROVIDER: BHUMIKA ALSTON    ATTENDING RADIOLOGIST: HEENA Zapata MD    FELLOW/RESIDENT: JUANITA Grijalva MD    I, ROBERTA ZAPATA MD, attest that I was present for all critical portions  of the procedure and was immediately available to provide guidance and  assistance during the remainder of the " procedure.    PROCEDURE: Limited abdominal ultrasound performed by Dr. Grijalva. I  also personally performed a limited abdominal ultrasound.    Small fluid collections were visualized but they were surrounded by  liver and enteric structures. No safe window for paracentesis.    Representative images saved in the patient's record. No immediate  complication.      Impression    IMPRESSION: Small fluid collections. No safe window for paracentesis.  Paracentesis not performed.    I have personally reviewed the examination and initial interpretation  and I agree with the findings.    ROBERTA ZAPATA MD         SYSTEM ID:  [id number]   Glucose by meter   Result Value Ref Range    GLUCOSE BY METER POCT 84 70 - 99 mg/dL   Glucose by meter   Result Value Ref Range    GLUCOSE BY METER POCT 152 (H) 70 - 99 mg/dL   Magnesium   Result Value Ref Range    Magnesium 2.3 1.7 - 2.3 mg/dL   Phosphorus   Result Value Ref Range    Phosphorus 3.6 2.5 - 4.5 mg/dL   CBC with platelets   Result Value Ref Range    WBC Count 5.7 4.0 - 11.0 10e3/uL    RBC Count 3.39 (L) 3.80 - 5.20 10e6/uL    Hemoglobin 10.7 (L) 11.7 - 15.7 g/dL    Hematocrit 32.5 (L) 35.0 - 47.0 %    MCV 96 78 - 100 fL    MCH 31.6 26.5 - 33.0 pg    MCHC 32.9 31.5 - 36.5 g/dL    RDW 15.2 (H) 10.0 - 15.0 %    Platelet Count 239 150 - 450 10e3/uL   INR   Result Value Ref Range    INR 1.10 0.85 - 1.15   Comprehensive metabolic panel   Result Value Ref Range    Sodium 131 (L) 135 - 145 mmol/L    Potassium 5.1 3.4 - 5.3 mmol/L    Carbon Dioxide (CO2) 21 (L) 22 - 29 mmol/L    Anion Gap 11 7 - 15 mmol/L    Urea Nitrogen 31.6 (H) 8.0 - 23.0 mg/dL    Creatinine 0.59 0.51 - 0.95 mg/dL    GFR Estimate >90 >60 mL/min/1.73m2    Calcium 9.0 8.8 - 10.4 mg/dL    Chloride 99 98 - 107 mmol/L    Glucose 108 (H) 70 - 99 mg/dL    Alkaline Phosphatase 278 (H) 40 - 150 U/L    AST 27 0 - 45 U/L    ALT 12 0 - 50 U/L    Protein Total 6.9 6.4 - 8.3 g/dL    Albumin 3.4 (L) 3.5 - 5.2 g/dL    Bilirubin Total 0.3  <=1.2 mg/dL       Assessment: 59 year old female with recurrent HGSOC HD#17 for a small bowel microperforation in the setting of recent bevacizumab administration.    Plan:    #Bowel perforation  #SBO  - Perforation unable to be fully appreciated on imaging. Fluid in abdomen improving, evaluated for paracentesis yesterday with small amount of ascites and no safe window  - Tolerated minimal low fiber diet without vomiting, but did have some nausea and abdominal fullness which improved after connecting to LIS. NPO overnight with tube to LIS to minimize aspiration risk.  - Given poor po intake yesterday with nausea, improvement with reconnecting NG to LIS, and ongoing bilious NG output, will plan venting G-tube prior to discharge. Per GI, they have her scheduled tomorrow  - Continue TPN for nutrition, nutrition working on cycling  - Long term plan will be for long term bowel rest with TPN support     #Recurrent HGSOC  - S/p cycle 1 of carbo/taxol/deshawn on 2/5. Treatment will be held in the setting of current bowel perforation  - Follow up with Dr. Patino after discharge for ongoing treatment discussions.     #MDD  - PTA lexapro daily    Disposition: pending clinical course    Bonnie Goodman MD  OB/GYN Resident, PGY-4  02/27/2025 7:04 AM    ITye MD personally examined and evaluated this patient on 02/27/25.  I discussed the patient with the resident and care team, and agree with the assessment and plan of care as documented in the residents note above.    I personally reviewed vital signs, laboratory values and imaging results.      Pat Wu MD  Gynecologic Oncology  [facility]

## 2025-02-27 NOTE — PROGRESS NOTES
Home Infusion  Received referral from Desiree Dutta RNCC for IV TPN.  Benefits verified.  [place] and is [place] 100%.  Called and spoke with Michelle to review home infusion services, review benefits and offer choice of providers.  Patient would like to remain in the [facility] system and will use [facility] for home infusion.  Confirmed discharge address, phone, and emergency contact information. Patient denies recent illness (not related to pre-existing conditions) or travel in the house hold. Confirmed allergies. No home health agency has been seeing the patient.     Desirae is willing to learn and manage home IV therapy.  Questions answered.  Plan for [facility] to provide home care services after discharge.    I will continue to follow until discharge and update pt once final orders are determined.    Thank you for the referral    Sukumar Palomares LPN, Coordinator   [facility]   [email or web address]  Office: [phone]

## 2025-02-27 NOTE — PROGRESS NOTES
CLINICAL NUTRITION SERVICES - BRIEF NOTE     Nutrition Prescription    RECOMMENDATIONS FOR MDs/PROVIDERS TO ORDER:  Please alert RD or pharmacist if pt requires additional adjustments to PN fluid volume    Recommendations already ordered by Registered Dietitian (RD):  Sent change order request to pharmacist: increase TPN volume tonight to 1680 mL/day.  Change dosing weight to 58 kg.    Ordered hepatic panel for tomorrow AM to check LFTs for ongoing TPN monitoring    Future/Additional Recommendations:  Monitor ongoing TPN tolerance; consider switch to SMOFlipid if LFTs continue to trend up     *See RD note on 2/25 for last nutrition reassessment note    Diet: NPO    Nutrition Support: TPN, 1440 mL/day x 12 hrs with goal 190 g dextrose, 75 g AA, and 250 mL 20% Clinolipid 5 days per week for total provision of 1303 Kcals (25 Kcals/kg), 1.4 g/kg protein, GIR 5.5 mg/kg/minute @ peak infusion, and 27% fat kcals on average daily   -- Infuvite and trace minerals daily; additional thiamine (100 mg/day x 7 days started 2/21)        NEW FINDINGS   Labs:  - Na+ 131 (L)  - K+, Mg++, and Phos WNL  - BUN 31.6 (H), trending up since 2/21  - Alk Phos 278 (H) --> transitioned to 12 hr cycled TPN 2/25 evening    Per chart review, pt was on IV fluids @ 100 mL/hr 2/18-2/21, then IV fluids discontinued when TPN started. TPN total volume has been 1440 mL/day since then, and BUN has been trending up since then (question if related to dehydration/fluid losses with NG to LIS?).  Suggested to primary team to increase TPN fluid volume to 1680 mL/day tonight with new bag, team agreeable.     INTERVENTIONS  See above    Monitoring/Evaluation  Progress toward goals will be monitored and evaluated per protocol.      Lulu Bonilla RD, , LD  Weekday Units covered: 5A (non-Heme Onc pts) and 7B ([id number])  Available by 5A or 7B Clinical Dietitifortunato Steward  Weekend Coverage: Weekend Clinical Dietitifortunato Steward    Inpatient Clinical Dietitians no longer  available via paging

## 2025-02-27 NOTE — PLAN OF CARE
"Goal Outcome Evaluation:      Plan of Care Reviewed With: patient    Overall Patient Progress: no changeOverall Patient Progress: no change     A&O x4. Afebrile. VSS on RA. Pain managed with PRN tylenol and flexeril. Tolerating FLD w/o nausea. Ambulating in halls independently. NGT to LIS, clamped when ambulating. Bilateral abdominal sites from previous skater drains w/ primapore CDI. R chest port HL. Voiding without difficulty. LBM 2/26 and passing flatus. No electrolyte replacements needed, recheck tomorrow AM. Plan for NPO at midnight for PEG placement 2/28. Continue with POC.     Vitals: /51 (BP Location: Right arm)   Pulse 87   Temp 97.6  F (36.4  C) (Oral)   Resp 16   Ht 1.567 m (5' 1.71\")   Wt 57 kg (125 lb 11.2 oz)   SpO2 100%   BMI 23.21 kg/m          "

## 2025-02-27 NOTE — PROGRESS NOTES
Brief Progress Note    S: Patient had a little lunch, spent time with her . Has been connecting herself back up to LIS when in bed. No pain, nausea, vomiting. Excited to have a plan and potentially go home Saturday if all goes well. Daughter visiting later. Expresses gratitude for the care she's received.    O:   Vitals:    02/26/25 2215 02/27/25 0600 02/27/25 0821 02/27/25 1500   BP: 113/69 122/55  123/51   BP Location: Right arm Right arm  Right arm   Patient Position:  Semi-Tripathi's     Cuff Size:  Adult Regular     Pulse: 59 81  87   Resp: 16 16  16   Temp: 98.4  F (36.9  C) 99  F (37.2  C)  97.6  F (36.4  C)   TempSrc: Oral Oral  Oral   SpO2: 99% 98%  100%   Weight:   57 kg (125 lb 11.2 oz)    Height:         A/P: 59 year old female with recurrent HGSOC HD#17 for a small bowel microperforation in the setting of recent bevacizumab administration. Plan for venting G-tube tomorrow AM. NPO at midnight. Holding anticoagulation. Plan for discharge to home Saturday AM with home TPN and home health starting that day.    China Landry MD PGY-1  02/27/25 4:08 PM

## 2025-02-27 NOTE — PROGRESS NOTES
Care Management Follow Up    Length of Stay (days): 9    Expected Discharge Date: 03/01/2025     Concerns to be Addressed: discharge planning     Patient plan of care discussed at interdisciplinary rounds: Yes    Anticipated Discharge Disposition: Home with services  Anticipated Discharge Services: Home Infusion, [facility]    [facility](TPN)  Phone: [phone]  Fax: [phone]    [facility](RN)  Phone: [phone]     Anticipated Discharge DME: None    Patient/family educated on Medicare website which has current facility and service quality ratings:  NA  Education Provided on the Discharge Plan:  yes  Patient/Family in Agreement with the Plan: yes    Referrals Placed by CM/SW: Home Infusion  Private pay costs discussed: Not applicable    Discussed  Partnership in Safe Discharge Planning  document with patient/family: No     Handoff Completed: No, handoff not indicated or clinically appropriate    Additional Information:  Received update from Patient is anticipated to be medically ready for discharge Saturday.  Patient will discharge with a venting G tube and TPN.  Updated [facility].  hospitals nurse will plan to see the patient in the home on Saturday at 3pm to complete TPN administration education.  hospitals liaison will meet with the patient today or tomorrow to review their services and discuss plans.  Will need to fax TPN prescription to hospitals Saturday morning and orders will need to be signed by 10am, provider aware.  RNCC will continue to follow and assist with discharge planning as needed.             Next Steps:   [] Fax TPN recipe to hospitals on day of discharge  [] Coordinate discharge with hospitals  [] External hand off- Domenica Christianson RNCC  Phone: [phone]   Med Surg Vocera  Nurse Coordinator

## 2025-02-27 NOTE — PROGRESS NOTES
Brief Progress Note    Went to check on patient for PM rounds. Had an ok day today. No vomiting today but did have some nausea that required her to take compazine x2. In total today, ate 1/2 yogurt, 1/4 Indonesian toast, tea, 1/4 english muffin, a few bites of chicken noodle soup, and a few crackers. Has been drinking fluids. Thinks po intake is both limited by nausea/upper abdominal fullness with eating as well as all food tasting bad (this is ongoing for the last 4-6 weeks since she had some dental work). Continues to pass a small amount of gas and had a BM today. No other change in symptoms today.    Discussed plan to reconnect NG to LIS at bedtime. Will make NPO at midnight ahead of possible G tube tomorrow, but plan to clamp NG when awake in the AM. Plan communicated to RN at bedside. No other concerns at this time.    Bonnie Goodman MD  OB/GYN Resident, PGY-4

## 2025-02-27 NOTE — PLAN OF CARE
"Goal Outcome Evaluation:      Plan of Care Reviewed With: patient     BP (!) 142/63 (BP Location: Right arm)   Pulse 96   Temp 98.4  F (36.9  C) (Oral)   Resp 18   Ht 1.567 m (5' 1.71\")   Wt 58.1 kg (128 lb 1.6 oz)   SpO2 100%   BMI 23.65 kg/m         Neuro: Alert & oriented x 4, calls appropriately  Cardiac: WDL, denies cardiac chest pain  Respiratory: on RA, (R) nostril with NG  GI/: voiding adequately, no BM this shift, passing flatus  Diet/Appetite:  Low fiber diet, TPN cycled   Skin: no new deficits  LDA: (R) port infusing TPN and Lipids. NG to LIS  Activity: up ad deja, ambulated in halls with family  Pain: back pain managed with atarax and  flexeril   Change: IR for paracentesis, not done  Plan: Continue with current POC      "

## 2025-02-28 ENCOUNTER — MEDICAL CORRESPONDENCE (OUTPATIENT)
Dept: HOME HEALTH SERVICES | Facility: HOME HEALTH | Age: 60
End: 2025-02-28
Payer: COMMERCIAL

## 2025-02-28 ENCOUNTER — APPOINTMENT (OUTPATIENT)
Dept: GENERAL RADIOLOGY | Facility: CLINIC | Age: 60
End: 2025-02-28
Attending: INTERNAL MEDICINE
Payer: COMMERCIAL

## 2025-02-28 LAB
ALBUMIN SERPL BCG-MCNC: 3.4 G/DL (ref 3.5–5.2)
ALP SERPL-CCNC: 306 U/L (ref 40–150)
ALT SERPL W P-5'-P-CCNC: 21 U/L (ref 0–50)
ANION GAP SERPL CALCULATED.3IONS-SCNC: 10 MMOL/L (ref 7–15)
AST SERPL W P-5'-P-CCNC: 38 U/L (ref 0–45)
BILIRUB DIRECT SERPL-MCNC: 0.24 MG/DL (ref 0–0.3)
BILIRUB SERPL-MCNC: 0.4 MG/DL
BUN SERPL-MCNC: 33.3 MG/DL (ref 8–23)
CALCIUM SERPL-MCNC: 9 MG/DL (ref 8.8–10.4)
CHLORIDE SERPL-SCNC: 100 MMOL/L (ref 98–107)
CREAT SERPL-MCNC: 0.59 MG/DL (ref 0.51–0.95)
EGFRCR SERPLBLD CKD-EPI 2021: >90 ML/MIN/1.73M2
GLUCOSE BLDC GLUCOMTR-MCNC: 123 MG/DL (ref 70–99)
GLUCOSE SERPL-MCNC: 120 MG/DL (ref 70–99)
HCO3 SERPL-SCNC: 23 MMOL/L (ref 22–29)
HOLD SPECIMEN: NORMAL
MAGNESIUM SERPL-MCNC: 2.3 MG/DL (ref 1.7–2.3)
PHOSPHATE SERPL-MCNC: 3.6 MG/DL (ref 2.5–4.5)
POTASSIUM SERPL-SCNC: 5.1 MMOL/L (ref 3.4–5.3)
PROT SERPL-MCNC: 7 G/DL (ref 6.4–8.3)
PROVATION GI EXAM: NORMAL
SODIUM SERPL-SCNC: 133 MMOL/L (ref 135–145)

## 2025-02-28 PROCEDURE — 120N000002 HC R&B MED SURG/OB UMMC

## 2025-02-28 PROCEDURE — 250N000013 HC RX MED GY IP 250 OP 250 PS 637: Performed by: STUDENT IN AN ORGANIZED HEALTH CARE EDUCATION/TRAINING PROGRAM

## 2025-02-28 PROCEDURE — B4185 PARENTERAL SOL 10 GM LIPIDS: HCPCS | Performed by: INTERNAL MEDICINE

## 2025-02-28 PROCEDURE — 360N000082 HC SURGERY LEVEL 2 W/ FLUORO, PER MIN: Performed by: INTERNAL MEDICINE

## 2025-02-28 PROCEDURE — 250N000013 HC RX MED GY IP 250 OP 250 PS 637: Performed by: INTERNAL MEDICINE

## 2025-02-28 PROCEDURE — 250N000013 HC RX MED GY IP 250 OP 250 PS 637

## 2025-02-28 PROCEDURE — 84155 ASSAY OF PROTEIN SERUM: CPT | Performed by: STUDENT IN AN ORGANIZED HEALTH CARE EDUCATION/TRAINING PROGRAM

## 2025-02-28 PROCEDURE — 80069 RENAL FUNCTION PANEL: CPT | Performed by: OBSTETRICS & GYNECOLOGY

## 2025-02-28 PROCEDURE — 250N000009 HC RX 250: Performed by: INTERNAL MEDICINE

## 2025-02-28 PROCEDURE — 83735 ASSAY OF MAGNESIUM: CPT | Performed by: OBSTETRICS & GYNECOLOGY

## 2025-02-28 PROCEDURE — 999N000141 HC STATISTIC PRE-PROCEDURE NURSING ASSESSMENT: Performed by: INTERNAL MEDICINE

## 2025-02-28 PROCEDURE — 272N000001 HC OR GENERAL SUPPLY STERILE: Performed by: INTERNAL MEDICINE

## 2025-02-28 PROCEDURE — 84460 ALANINE AMINO (ALT) (SGPT): CPT | Performed by: STUDENT IN AN ORGANIZED HEALTH CARE EDUCATION/TRAINING PROGRAM

## 2025-02-28 PROCEDURE — 36591 DRAW BLOOD OFF VENOUS DEVICE: CPT | Performed by: OBSTETRICS & GYNECOLOGY

## 2025-02-28 PROCEDURE — 999N000179 XR SURGERY CARM FLUORO LESS THAN 5 MIN W STILLS

## 2025-02-28 PROCEDURE — 80048 BASIC METABOLIC PNL TOTAL CA: CPT | Performed by: OBSTETRICS & GYNECOLOGY

## 2025-02-28 PROCEDURE — 250N000011 HC RX IP 250 OP 636: Mod: JZ | Performed by: ANESTHESIOLOGY

## 2025-02-28 PROCEDURE — 710N000010 HC RECOVERY PHASE 1, LEVEL 2, PER MIN: Performed by: INTERNAL MEDICINE

## 2025-02-28 PROCEDURE — 99207 PR SC NO CHARGE VISIT/PATIENT NOT SEEN: CPT | Performed by: PHYSICIAN ASSISTANT

## 2025-02-28 PROCEDURE — 250N000025 HC SEVOFLURANE, PER MIN: Performed by: INTERNAL MEDICINE

## 2025-02-28 PROCEDURE — 84100 ASSAY OF PHOSPHORUS: CPT | Performed by: OBSTETRICS & GYNECOLOGY

## 2025-02-28 PROCEDURE — 99232 SBSQ HOSP IP/OBS MODERATE 35: CPT | Mod: GC | Performed by: OBSTETRICS & GYNECOLOGY

## 2025-02-28 PROCEDURE — 0DH63UZ INSERTION OF FEEDING DEVICE INTO STOMACH, PERCUTANEOUS APPROACH: ICD-10-PCS | Performed by: INTERNAL MEDICINE

## 2025-02-28 PROCEDURE — 250N000009 HC RX 250: Performed by: OBSTETRICS & GYNECOLOGY

## 2025-02-28 PROCEDURE — 370N000017 HC ANESTHESIA TECHNICAL FEE, PER MIN: Performed by: INTERNAL MEDICINE

## 2025-02-28 PROCEDURE — 250N000011 HC RX IP 250 OP 636

## 2025-02-28 PROCEDURE — 80053 COMPREHEN METABOLIC PANEL: CPT | Performed by: OBSTETRICS & GYNECOLOGY

## 2025-02-28 PROCEDURE — 250N000011 HC RX IP 250 OP 636: Mod: JW | Performed by: INTERNAL MEDICINE

## 2025-02-28 PROCEDURE — 258N000003 HC RX IP 258 OP 636: Performed by: INTERNAL MEDICINE

## 2025-02-28 RX ORDER — OXYCODONE HYDROCHLORIDE 5 MG/1
5 TABLET ORAL EVERY 4 HOURS PRN
Status: DISCONTINUED | OUTPATIENT
Start: 2025-02-28 | End: 2025-03-01

## 2025-02-28 RX ORDER — FENTANYL CITRATE 50 UG/ML
25 INJECTION, SOLUTION INTRAMUSCULAR; INTRAVENOUS EVERY 5 MIN PRN
Status: DISCONTINUED | OUTPATIENT
Start: 2025-02-28 | End: 2025-02-28 | Stop reason: HOSPADM

## 2025-02-28 RX ORDER — ONDANSETRON 4 MG/1
4 TABLET, ORALLY DISINTEGRATING ORAL EVERY 6 HOURS PRN
Status: DISCONTINUED | OUTPATIENT
Start: 2025-02-28 | End: 2025-03-02 | Stop reason: HOSPADM

## 2025-02-28 RX ORDER — SODIUM CHLORIDE, SODIUM LACTATE, POTASSIUM CHLORIDE, CALCIUM CHLORIDE 600; 310; 30; 20 MG/100ML; MG/100ML; MG/100ML; MG/100ML
INJECTION, SOLUTION INTRAVENOUS CONTINUOUS
Status: DISCONTINUED | OUTPATIENT
Start: 2025-02-28 | End: 2025-02-28

## 2025-02-28 RX ORDER — ONDANSETRON 2 MG/ML
4 INJECTION INTRAMUSCULAR; INTRAVENOUS EVERY 6 HOURS PRN
Status: DISCONTINUED | OUTPATIENT
Start: 2025-02-28 | End: 2025-03-02 | Stop reason: HOSPADM

## 2025-02-28 RX ORDER — NALOXONE HYDROCHLORIDE 0.4 MG/ML
0.1 INJECTION, SOLUTION INTRAMUSCULAR; INTRAVENOUS; SUBCUTANEOUS
Status: DISCONTINUED | OUTPATIENT
Start: 2025-02-28 | End: 2025-02-28 | Stop reason: HOSPADM

## 2025-02-28 RX ORDER — SODIUM CHLORIDE, SODIUM LACTATE, POTASSIUM CHLORIDE, CALCIUM CHLORIDE 600; 310; 30; 20 MG/100ML; MG/100ML; MG/100ML; MG/100ML
INJECTION, SOLUTION INTRAVENOUS CONTINUOUS
Status: DISCONTINUED | OUTPATIENT
Start: 2025-02-28 | End: 2025-02-28 | Stop reason: HOSPADM

## 2025-02-28 RX ORDER — LABETALOL HYDROCHLORIDE 5 MG/ML
10 INJECTION, SOLUTION INTRAVENOUS
Status: DISCONTINUED | OUTPATIENT
Start: 2025-02-28 | End: 2025-02-28 | Stop reason: HOSPADM

## 2025-02-28 RX ORDER — HYDROMORPHONE HCL IN WATER/PF 6 MG/30 ML
0.2 PATIENT CONTROLLED ANALGESIA SYRINGE INTRAVENOUS EVERY 5 MIN PRN
Status: DISCONTINUED | OUTPATIENT
Start: 2025-02-28 | End: 2025-02-28 | Stop reason: HOSPADM

## 2025-02-28 RX ORDER — LIDOCAINE 40 MG/G
CREAM TOPICAL
Status: DISCONTINUED | OUTPATIENT
Start: 2025-02-28 | End: 2025-02-28

## 2025-02-28 RX ORDER — PROCHLORPERAZINE MALEATE 5 MG/1
10 TABLET ORAL EVERY 6 HOURS PRN
Status: DISCONTINUED | OUTPATIENT
Start: 2025-02-28 | End: 2025-02-28

## 2025-02-28 RX ORDER — FLUMAZENIL 0.1 MG/ML
0.2 INJECTION, SOLUTION INTRAVENOUS
Status: ACTIVE | OUTPATIENT
Start: 2025-02-28 | End: 2025-03-01

## 2025-02-28 RX ORDER — MEPERIDINE HYDROCHLORIDE 25 MG/ML
12.5 INJECTION INTRAMUSCULAR; INTRAVENOUS; SUBCUTANEOUS EVERY 5 MIN PRN
Status: DISCONTINUED | OUTPATIENT
Start: 2025-02-28 | End: 2025-02-28 | Stop reason: HOSPADM

## 2025-02-28 RX ORDER — ASCORBIC ACID, VITAMIN A PALMITATE, CHOLECALCIFEROL, THIAMINE HYDROCHLORIDE, RIBOFLAVIN-5 PHOSPHATE SODIUM, PYRIDOXINE HYDROCHLORIDE, NIACINAMIDE, DEXPANTHENOL, ALPHA-TOCOPHEROL ACETATE, VITAMIN K1, FOLIC ACID, BIOTIN, CYANOCOBALAMIN 200; 3300; 200; 6; 3.6; 6; 40; 15; 10; 150; 600; 60; 5 MG/10ML; [IU]/10ML; [IU]/10ML; MG/10ML; MG/10ML; MG/10ML; MG/10ML; MG/10ML; [IU]/10ML; UG/10ML; UG/10ML; UG/10ML; UG/10ML
10 INJECTION, SOLUTION INTRAVENOUS DAILY
Qty: 3600 ML | Refills: 0 | Status: DISPENSED | OUTPATIENT
Start: 2025-02-28 | End: 2026-02-28

## 2025-02-28 RX ORDER — HYDRALAZINE HYDROCHLORIDE 20 MG/ML
2.5-5 INJECTION INTRAMUSCULAR; INTRAVENOUS EVERY 10 MIN PRN
Status: DISCONTINUED | OUTPATIENT
Start: 2025-02-28 | End: 2025-02-28 | Stop reason: HOSPADM

## 2025-02-28 RX ORDER — OXYCODONE HYDROCHLORIDE 5 MG/1
5 TABLET ORAL EVERY 6 HOURS PRN
Qty: 6 TABLET | Refills: 0 | Status: SHIPPED | OUTPATIENT
Start: 2025-02-28 | End: 2025-03-01

## 2025-02-28 RX ORDER — FENTANYL CITRATE 50 UG/ML
50 INJECTION, SOLUTION INTRAMUSCULAR; INTRAVENOUS EVERY 5 MIN PRN
Status: DISCONTINUED | OUTPATIENT
Start: 2025-02-28 | End: 2025-02-28 | Stop reason: HOSPADM

## 2025-02-28 RX ORDER — ONDANSETRON 4 MG/1
4 TABLET, ORALLY DISINTEGRATING ORAL EVERY 30 MIN PRN
Status: DISCONTINUED | OUTPATIENT
Start: 2025-02-28 | End: 2025-02-28 | Stop reason: HOSPADM

## 2025-02-28 RX ORDER — OXYCODONE HYDROCHLORIDE 5 MG/1
5 TABLET ORAL ONCE
Status: COMPLETED | OUTPATIENT
Start: 2025-02-28 | End: 2025-02-28

## 2025-02-28 RX ORDER — HYDROMORPHONE HCL IN WATER/PF 6 MG/30 ML
0.4 PATIENT CONTROLLED ANALGESIA SYRINGE INTRAVENOUS EVERY 5 MIN PRN
Status: DISCONTINUED | OUTPATIENT
Start: 2025-02-28 | End: 2025-02-28 | Stop reason: HOSPADM

## 2025-02-28 RX ORDER — DEXAMETHASONE SODIUM PHOSPHATE 4 MG/ML
4 INJECTION, SOLUTION INTRA-ARTICULAR; INTRALESIONAL; INTRAMUSCULAR; INTRAVENOUS; SOFT TISSUE
Status: DISCONTINUED | OUTPATIENT
Start: 2025-02-28 | End: 2025-02-28 | Stop reason: HOSPADM

## 2025-02-28 RX ORDER — ONDANSETRON 2 MG/ML
4 INJECTION INTRAMUSCULAR; INTRAVENOUS EVERY 30 MIN PRN
Status: DISCONTINUED | OUTPATIENT
Start: 2025-02-28 | End: 2025-02-28 | Stop reason: HOSPADM

## 2025-02-28 RX ADMIN — SODIUM CHLORIDE, POTASSIUM CHLORIDE, SODIUM LACTATE AND CALCIUM CHLORIDE: 600; 310; 30; 20 INJECTION, SOLUTION INTRAVENOUS at 15:15

## 2025-02-28 RX ADMIN — OXYCODONE HYDROCHLORIDE 5 MG: 5 TABLET ORAL at 13:59

## 2025-02-28 RX ADMIN — ESCITALOPRAM OXALATE 20 MG: 20 TABLET ORAL at 20:19

## 2025-02-28 RX ADMIN — LIDOCAINE 4% 1 PATCH: 40 PATCH TOPICAL at 21:35

## 2025-02-28 RX ADMIN — MAGNESIUM SULFATE HEPTAHYDRATE: 500 INJECTION, SOLUTION INTRAMUSCULAR; INTRAVENOUS at 20:22

## 2025-02-28 RX ADMIN — CYCLOBENZAPRINE 10 MG: 10 TABLET, FILM COATED ORAL at 20:19

## 2025-02-28 RX ADMIN — OXYCODONE HYDROCHLORIDE 5 MG: 5 TABLET ORAL at 18:03

## 2025-02-28 RX ADMIN — HYDROMORPHONE HYDROCHLORIDE 0.3 MG: 0.2 INJECTION, SOLUTION INTRAMUSCULAR; INTRAVENOUS; SUBCUTANEOUS at 17:10

## 2025-02-28 RX ADMIN — Medication 1 CAPSULE: at 20:20

## 2025-02-28 RX ADMIN — HYDROMORPHONE HYDROCHLORIDE 0.4 MG: 0.2 INJECTION, SOLUTION INTRAMUSCULAR; INTRAVENOUS; SUBCUTANEOUS at 13:42

## 2025-02-28 RX ADMIN — OXYCODONE HYDROCHLORIDE 5 MG: 5 TABLET ORAL at 21:35

## 2025-02-28 RX ADMIN — FENTANYL CITRATE 50 MCG: 50 INJECTION, SOLUTION INTRAMUSCULAR; INTRAVENOUS at 12:59

## 2025-02-28 RX ADMIN — FENTANYL CITRATE 25 MCG: 50 INJECTION, SOLUTION INTRAMUSCULAR; INTRAVENOUS at 12:52

## 2025-02-28 RX ADMIN — HYDROXYZINE HYDROCHLORIDE 50 MG: 50 TABLET, FILM COATED ORAL at 21:35

## 2025-02-28 RX ADMIN — FENTANYL CITRATE 25 MCG: 50 INJECTION, SOLUTION INTRAMUSCULAR; INTRAVENOUS at 13:11

## 2025-02-28 RX ADMIN — HYDROMORPHONE HYDROCHLORIDE 0.4 MG: 0.2 INJECTION, SOLUTION INTRAMUSCULAR; INTRAVENOUS; SUBCUTANEOUS at 13:24

## 2025-02-28 RX ADMIN — PROCHLORPERAZINE EDISYLATE 10 MG: 5 INJECTION INTRAMUSCULAR; INTRAVENOUS at 01:53

## 2025-02-28 RX ADMIN — HYDROMORPHONE HYDROCHLORIDE 0.2 MG: 0.2 INJECTION, SOLUTION INTRAMUSCULAR; INTRAVENOUS; SUBCUTANEOUS at 20:19

## 2025-02-28 RX ADMIN — HYDROXYZINE HYDROCHLORIDE 25 MG: 25 TABLET, FILM COATED ORAL at 15:42

## 2025-02-28 RX ADMIN — HYDROMORPHONE HYDROCHLORIDE 0.2 MG: 0.2 INJECTION, SOLUTION INTRAMUSCULAR; INTRAVENOUS; SUBCUTANEOUS at 13:53

## 2025-02-28 RX ADMIN — HYDROMORPHONE HYDROCHLORIDE 0.4 MG: 0.2 INJECTION, SOLUTION INTRAMUSCULAR; INTRAVENOUS; SUBCUTANEOUS at 13:33

## 2025-02-28 RX ADMIN — OLIVE OIL AND SOYBEAN OIL 250 ML: 16; 4 INJECTION, EMULSION INTRAVENOUS at 20:24

## 2025-02-28 ASSESSMENT — ACTIVITIES OF DAILY LIVING (ADL)
ADLS_ACUITY_SCORE: 44
ADLS_ACUITY_SCORE: 45
ADLS_ACUITY_SCORE: 49
ADLS_ACUITY_SCORE: 45
ADLS_ACUITY_SCORE: 49
ADLS_ACUITY_SCORE: 49
ADLS_ACUITY_SCORE: 45
ADLS_ACUITY_SCORE: 44
ADLS_ACUITY_SCORE: 44
ADLS_ACUITY_SCORE: 48
ADLS_ACUITY_SCORE: 45
ADLS_ACUITY_SCORE: 48
ADLS_ACUITY_SCORE: 44
ADLS_ACUITY_SCORE: 44
ADLS_ACUITY_SCORE: 48
ADLS_ACUITY_SCORE: 44
ADLS_ACUITY_SCORE: 44
ADLS_ACUITY_SCORE: 45
ADLS_ACUITY_SCORE: 49
ADLS_ACUITY_SCORE: 44
ADLS_ACUITY_SCORE: 48
ADLS_ACUITY_SCORE: 44
ADLS_ACUITY_SCORE: 49

## 2025-02-28 NOTE — BRIEF OP NOTE
[facility]    Brief Operative Note    Pre-operative diagnosis: Bowel perforation (H) [K63.1]  SBO (small bowel obstruction) (H) [K56.609]  Post-operative diagnosis Same as pre-operative diagnosis    Procedure: INSERTION, PEG TUBE (venting), N/A - Abdomen    Surgeon: Surgeons and Role:     * Porfirio Saunders MD - Primary     * Guicho Fernandes MD - Fellow - Assisting     * Dusty Quiles MD - Fellow - Assisting  Anesthesia: General   Estimated Blood Loss: None    Drains: None  Specimens: * No specimens in log *    Findings:     - EGD with successful placement of venting PEG and gastropexy under fluoroscopic guidance     Recommendations:   - Observe patient in PACU prior transferring back to oncology floor   - Keep PEG tube connected to gravity drain   - Venting through G-tube as  needed   - No anticoagulation or NSAIDs for 72 hours post operatively.  - Analgesia/antiemetics per primary team (expect pain to peak POD2-3)  - PEG site care per bedside RN.   - T-tags (white buttons) should be removed 10-14 days following procedure by lifting the button and cutting the underlying blue suture. Often times the buttons fall off prior to the 2 week ruth.  - The findings and recommendations were discussed with the patient.      Complications: None.  Implants: * No implants in log *

## 2025-02-28 NOTE — PROGRESS NOTES
CLINICAL NUTRITION SERVICES - BRIEF NOTE     Nutrition Prescription       Recommendations already ordered by Registered Dietitian (RD):  Reviewed discharge nutrition plan with patient's       Future/Additional Recommendations:  Monitor ongoing TPN tolerance; consider switch to SMOFlipid if LFTs continue to trend up    For last full RD assessment, see note dated 2/25/25    NEW FINDINGS   Patient in OR at time of RD visit, spoke with pt's  - reviewed plan to meet nutrition needs w/ TPN, ability to take PO diet for comfort w/ newly placed G-tube open for decompression.     INTERVENTIONS  Implementation  Nutrition counseling strategies       Monitoring/Evaluation  Will continue to monitor and evaluate per protocol.    Shakila Kraft, MPH, RDN, LD  5A (non-Heme Onc pts) + 7B ([id number]) YESICA Steward [5A or 7B Clinical Dietitian]    Weekend/Holiday: Vocera - Weekend Clinical Dietitian

## 2025-02-28 NOTE — DISCHARGE INSTRUCTIONS
G Tube Care:   - You can vent through G-tube as needed, but do not clamp for 3 days post-op.  - Don't take any NSAIDs (ibuprofen, motrin, aleve, etc) for 72 hours post operatively.  - Pain will be worse on post op day 2-3.  - T-tags (white buttons) should be removed 10-14 days (3/10/25) following procedure by lifting the button and cutting the underlying blue suture. Often times the buttons fall off prior to the 2 week ruth.

## 2025-02-28 NOTE — CONSULTS
Care Management Discharge Note    Discharge Date: 03/01/2025       Discharge Disposition: Home with services    Discharge Services: Home Care, Home Infusion    [facility](TP[facility])  Phone: [phone]  Fax: [phone]     [facility](RN)  Phone: [phone]      Discharge DME: None    Discharge Transportation: family or friend will provide    Private pay costs discussed: Not applicable    Does the patient's insurance plan have a 3 day qualifying hospital stay waiver?  No    PAS Confirmation Code: NA   Patient/family educated on Medicare website which has current facility and service quality ratings:  yes    Education Provided on the Discharge Plan: yes   Persons Notified of Discharge Plans: patient  Patient/Family in Agreement with the Plan: yes    Handoff Referral Completed: Yes, non-[facility] PCP: External handoff communication completed    Additional Information:  Patient is anticipated to be medically ready for discharge tomorrow with TPN and venting G tube.  I aware and will plan to see the patient in the home tomorrow at 3pm for TPN administration education.  I liaison obtained pt's TPN formula today from the inpatient pharmacist.  Providers asked to complete discharge orders by 10am tomorrow, this will give I time to get TPN and supplies delivered to the patient's home prior to this visit.  Pt's spouse will provide transportation to home at discharge.  RNCC will remain available if further needs arise.      RAMY Harris  Phone: [phone]   Med Surg Vocera  Nurse Coordinator

## 2025-02-28 NOTE — PROGRESS NOTES
Brief Progress Note    S: Patient reports continued pain post-operatively, oxycodone is helping. Having ice chips without issue. Dietitian saw patient's  to discuss nutrition needs and ability to take diet. Discharge set up for tomorrow. Discussed discharge recommendations from GI and close follow up with Dr. Patino. Patient voiced a worry that this was placed for end of life care. Discussed rationale for PEG placement to allow bowel rest over a longer period of time, and that our hope is that this longer rest would allow her to heal so that she would be able to continue cancer treatment. Also noted that if that she does not recover as we hope over the next few weeks this could limit treatment options and she will need to discuss appropriate next steps with Dr. Patino, who knows her cancer course best.    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 Now POD#0 from venting PEG placement. Working on pain control post op. Discussed need for follow up with Dr. Patino for next steps.    China Landry MD PGY-1  02/28/25 3:57 PM

## 2025-02-28 NOTE — PROGRESS NOTES
"Gynecology Oncology Progress Note  02/28/2025    Ms. Jacki Smith is a 59 year old HD#18 for management of a bowel microperforation in the setting of recent bevacizumab administration.    Dz: Recurrent platinum sensitive HGSOC    24 hour events:   - Home infusion arranged for TPN    Subjective: Patient reports she had some nausea overnight, improved after antiemetics. Nausea now improved. No pain overnight. Did not pass any gas or have a BM yesterday. Voiding spontaneously. Ambulating 3-4x/day in halls. No other concerns at this time.    Objective:   /51 (BP Location: Right arm)   Pulse 78   Temp 99  F (37.2  C) (Oral)   Resp 18   Ht 1.567 m (5' 1.71\")   Wt 57 kg (125 lb 11.2 oz)   SpO2 98%   BMI 23.21 kg/m      General: lying in bed, in NAD  CV: well perfused  Resp: no increased work of breathing on room air  Abdomen: soft, non-tender, moderately distended.   Extremities: nontender, trace edema      New labs/imaging-  Results for orders placed or performed during the hospital encounter of 02/18/25 (from the past 24 hours)   Glucose by meter   Result Value Ref Range    GLUCOSE BY METER POCT 83 70 - 99 mg/dL   Glucose by meter   Result Value Ref Range    GLUCOSE BY METER POCT 123 (H) 70 - 99 mg/dL   Basic metabolic panel   Result Value Ref Range    Sodium 133 (L) 135 - 145 mmol/L    Potassium 5.1 3.4 - 5.3 mmol/L    Chloride 100 98 - 107 mmol/L    Carbon Dioxide (CO2) 23 22 - 29 mmol/L    Anion Gap 10 7 - 15 mmol/L    Urea Nitrogen 33.3 (H) 8.0 - 23.0 mg/dL    Creatinine 0.59 0.51 - 0.95 mg/dL    GFR Estimate >90 >60 mL/min/1.73m2    Calcium 9.0 8.8 - 10.4 mg/dL    Glucose 120 (H) 70 - 99 mg/dL   Magnesium   Result Value Ref Range    Magnesium 2.3 1.7 - 2.3 mg/dL   Phosphorus   Result Value Ref Range    Phosphorus 3.6 2.5 - 4.5 mg/dL   Hepatic panel   Result Value Ref Range    Protein Total 7.0 6.4 - 8.3 g/dL    Albumin 3.4 (L) 3.5 - 5.2 g/dL    Bilirubin Total 0.4 <=1.2 mg/dL    Alkaline " Phosphatase 306 (H) 40 - 150 U/L    AST 38 0 - 45 U/L    ALT 21 0 - 50 U/L    Bilirubin Direct 0.24 0.00 - 0.30 mg/dL   Extra Tube    Narrative    The following orders were created for panel order Extra Tube.  Procedure                               Abnormality         Status                     ---------                               -----------         ------                     Extra Purple Top Tube[[id number]]                            Final result                 Please view results for these tests on the individual orders.   Extra Purple Top Tube   Result Value Ref Range    Hold Specimen JI        Assessment: 59 year old female with recurrent HGSOC HD#18 for a small bowel microperforation in the setting of recent bevacizumab administration.    Plan:    #Bowel perforation  #SBO  - Perforation unable to be fully appreciated on imaging. Fluid in abdomen improving.  - Plan venting G-tube. Scheduled for 0930 today.  - Continue TPN for nutrition  - Long term plan will be for long term bowel rest with TPN support. Home infusion arranged for TPN, scheduled to start Saturday 3/1.     #Recurrent HGSOC  - S/p cycle 1 of carbo/taxol/deshawn on 2/5. Treatment will be held in the setting of current bowel perforation  - Follow up with Dr. Patino after discharge for ongoing treatment discussions.     #MDD  - PTA lexapro daily    Disposition: anticipate discharge home tomorrow    Bonnie Goodman MD  OB/GYN Resident, PGY-4  02/28/2025 6:07 AM      ITye MD personally examined and evaluated this patient on 02/28/25.  I discussed the patient with the resident and care team, and agree with the assessment and plan of care as documented in the residents note above.    I personally reviewed vital signs, laboratory values and imaging results.      Pat Wu MD  Gynecologic Oncology  [facility]

## 2025-02-28 NOTE — PLAN OF CARE
"/51 (BP Location: Right arm)   Pulse 78   Temp 99  F (37.2  C) (Oral)   Resp 18   Ht 1.567 m (5' 1.71\")   Wt 57 kg (125 lb 11.2 oz)   SpO2 98%   BMI 23.21 kg/m        Outcome Evaluation: A&O x 4. NPO since midnight. NG to LIS- Green bile OP. Abdominal sites x 2. Port A Cath infusing cycled TPN and lipids. . Independent. Pain managed with PRN tylenol. Slept well through the night. Plan for J-tube placement for venting today. Procedure scheduled for 0930 per pre-op RN this AM.        Goal Outcome Evaluation: ongoing.           Problem: Adult Inpatient Plan of Care  Goal: Absence of Hospital-Acquired Illness or Injury  Intervention: Identify and Manage Fall Risk  Recent Flowsheet Documentation  Taken 2/27/2025 2020 by Priscilla Berry RN  Safety Promotion/Fall Prevention:   activity supervised   nonskid shoes/slippers when out of bed   safety round/check completed   room organization consistent   room near nurse's station   lighting adjusted     Problem: Adult Inpatient Plan of Care  Goal: Absence of Hospital-Acquired Illness or Injury  Intervention: Prevent Skin Injury  Recent Flowsheet Documentation  Taken 2/27/2025 2020 by Priscilla Berry RN  Body Position: position changed independently     Problem: Adult Inpatient Plan of Care  Goal: Absence of Hospital-Acquired Illness or Injury  Intervention: Prevent and Manage VTE (Venous Thromboembolism) Risk  Recent Flowsheet Documentation  Taken 2/27/2025 2020 by Priscilla Berry RN  VTE Prevention/Management: SCDs off (sequential compression devices)     Problem: Adult Inpatient Plan of Care  Goal: Absence of Hospital-Acquired Illness or Injury  Intervention: Prevent Infection  Recent Flowsheet Documentation  Taken 2/27/2025 2020 by Priscilla Berry RN  Infection Prevention:   cohorting utilized   single patient room provided   rest/sleep promoted     Problem: Adult Inpatient Plan of Care  Goal: Optimal Comfort and Wellbeing  Intervention: Monitor Pain and " Promote Comfort  Recent Flowsheet Documentation  Taken 2/27/2025 2020 by Priscilla Berry, RN  Pain Management Interventions: medication (see MAR)     Problem: Stem Cell/Bone Marrow Transplant  Goal: Improved Activity Tolerance  Intervention: Promote Improved Energy  Recent Flowsheet Documentation  Taken 2/27/2025 2020 by Priscilla Berry, RN  Sleep/Rest Enhancement:   room darkened   regular sleep/rest pattern promoted  Activity Management: activity adjusted per tolerance  Environmental Support: calm environment promoted     Problem: Stem Cell/Bone Marrow Transplant  Goal: Absence of Infection  Intervention: Prevent and Manage Infection  Recent Flowsheet Documentation  Taken 2/27/2025 2020 by Priscilla Berry, RN  Infection Prevention:   cohorting utilized   single patient room provided   rest/sleep promoted  Infection Management: aseptic technique maintained

## 2025-02-28 NOTE — PROGRESS NOTES
Home Infusion  Michelle is discharging 03/01/2025 and going home on cycled TPN (12 hrs).    She has never done home IV therapy with [facility] before.  Met with patiens  (Sukumar) at bedside and provided him with information about [facility] services.  Explained about administration method with cadd pump and TPN in backpack for mobility.  Informed him that home nurses will teach Michelle and him to prep and administer the TPN over the course of the first few days after discharge until they are comfortable with it.  Informed him about supplies and delivery of supplies, storage of TPN/MVI, cycle schedule, plan for SNV and 24/7 availability of I staff while on IV therapy.   The plan is Michelle will discharge tomorrow 03/01/2025. A I nurse will see her at her home for her first teach at 3pm. TPN and supplies will be delivered to her house prior to that. Discharge orders will need to be signed by 10am at the latest. I nursing will see through the weekend and [facility] will take over after that.   Sukumar verbalized understanding of all information given.   Michelle and Sukumar are willing and able to learn and manage home IV therapy once teaching is complete.  Questions answered.  Suze Cooley RN, BSN, B.S., [facility]  [facility], [address] [address]  [email or web address]

## 2025-02-28 NOTE — PROGRESS NOTES
2/28/2025 Gastroenterology Brief Note      Jacki Smith is a 59 year old female who GI AE is following for venting PEG placement which is scheduled this morning.     Chart reviewed since last evaluated. NAEON. Vitals stable - afebrile, normotensive, not hypoxic or tachycardic. Last received subQ heparin 2/27 at 1016.    BMP  Recent Labs   Lab 02/27/25  0721 02/26/25  0554 02/25/25  0651 02/24/25  0619   * 132* 132* 137   POTASSIUM 5.1 5.3 5.1 4.5   CHLORIDE 99 102 100 103   KINDRA 9.0 8.8 8.9 9.1   CO2 21* 20* 21* 22   BUN 31.6* 31.0* 22.6 20.1   CR 0.59 0.57 0.58 0.55     HEME  Recent Labs   Lab 02/27/25  0721 02/26/25  0554 02/25/25  0651 02/24/25  0619 02/23/25  0429 02/22/25  0657   WBC 5.7 5.5 5.2 4.7   < > 3.9*   HGB 10.7* 10.8* 10.9* 10.8*   < > 10.3*    248 237 197   < > 157   INR 1.10  --   --  1.05  --  1.34*    < > = values in this interval not displayed.     LIVER  Recent Labs   Lab 02/27/25  0721 02/24/25  0619 02/22/25  0657   ALKPHOS 278* 167* 115   AST 27 20 21   ALT 12 8 7   BILITOTAL 0.3 0.2 0.3      PANCNo lab results found in last 7 days.      Recommendations:  -- Plan for venting PEG placement today in [facility] OR   -- Timing TBD  -- NPO, NGT to LIS      The patient was not seen today. This note is a product of chart review and discussion with primary team.       Kiana Bell PA-C, [facility]  Advanced Endoscopy/Pancreaticobiliary GI Service  [facility]  [place]

## 2025-02-28 NOTE — PROGRESS NOTES
Brief Progress Note    Went to check on patient for PM rounds. Had uneventful day. A little nervous about G tube placement tomorrow, will be happy when it's done. Happy about the prospect of getting the NG tube out and having a plan for discharge. No other concerns at this time.    NG to LIS overnight and NPO ahead of G tube placement tomorrow.    Bonnie Goodman MD  OB/GYN Resident, PGY-4

## 2025-03-01 ENCOUNTER — HOME INFUSION BILLING (OUTPATIENT)
Dept: HOME HEALTH SERVICES | Facility: HOME HEALTH | Age: 60
End: 2025-03-01
Payer: COMMERCIAL

## 2025-03-01 ENCOUNTER — HOME CARE VISIT (OUTPATIENT)
Dept: HOME HEALTH SERVICES | Facility: HOME HEALTH | Age: 60
End: 2025-03-01
Payer: COMMERCIAL

## 2025-03-01 LAB
GLUCOSE BLDC GLUCOMTR-MCNC: 117 MG/DL (ref 70–99)
GLUCOSE BLDC GLUCOMTR-MCNC: 135 MG/DL (ref 70–99)
GLUCOSE BLDC GLUCOMTR-MCNC: 147 MG/DL (ref 70–99)
GLUCOSE BLDC GLUCOMTR-MCNC: 61 MG/DL (ref 70–99)
GLUCOSE BLDC GLUCOMTR-MCNC: 65 MG/DL (ref 70–99)
GLUCOSE BLDC GLUCOMTR-MCNC: 69 MG/DL (ref 70–99)
GLUCOSE BLDC GLUCOMTR-MCNC: 79 MG/DL (ref 70–99)
GLUCOSE BLDC GLUCOMTR-MCNC: 79 MG/DL (ref 70–99)
GLUCOSE BLDC GLUCOMTR-MCNC: 80 MG/DL (ref 70–99)
GLUCOSE BLDC GLUCOMTR-MCNC: 87 MG/DL (ref 70–99)
GLUCOSE SERPL-MCNC: 123 MG/DL (ref 70–99)
HOLD SPECIMEN: NORMAL
MAGNESIUM SERPL-MCNC: 2.1 MG/DL (ref 1.7–2.3)
PHOSPHATE SERPL-MCNC: 3.4 MG/DL (ref 2.5–4.5)
POTASSIUM SERPL-SCNC: 4.6 MMOL/L (ref 3.4–5.3)

## 2025-03-01 PROCEDURE — 250N000011 HC RX IP 250 OP 636: Performed by: INTERNAL MEDICINE

## 2025-03-01 PROCEDURE — 99232 SBSQ HOSP IP/OBS MODERATE 35: CPT | Mod: GC | Performed by: OBSTETRICS & GYNECOLOGY

## 2025-03-01 PROCEDURE — 250N000013 HC RX MED GY IP 250 OP 250 PS 637: Performed by: INTERNAL MEDICINE

## 2025-03-01 PROCEDURE — 250N000009 HC RX 250: Performed by: OBSTETRICS & GYNECOLOGY

## 2025-03-01 PROCEDURE — 250N000013 HC RX MED GY IP 250 OP 250 PS 637: Performed by: STUDENT IN AN ORGANIZED HEALTH CARE EDUCATION/TRAINING PROGRAM

## 2025-03-01 PROCEDURE — 84132 ASSAY OF SERUM POTASSIUM: CPT | Performed by: STUDENT IN AN ORGANIZED HEALTH CARE EDUCATION/TRAINING PROGRAM

## 2025-03-01 PROCEDURE — 250N000009 HC RX 250: Performed by: INTERNAL MEDICINE

## 2025-03-01 PROCEDURE — 36591 DRAW BLOOD OFF VENOUS DEVICE: CPT | Performed by: STUDENT IN AN ORGANIZED HEALTH CARE EDUCATION/TRAINING PROGRAM

## 2025-03-01 PROCEDURE — 83735 ASSAY OF MAGNESIUM: CPT | Performed by: STUDENT IN AN ORGANIZED HEALTH CARE EDUCATION/TRAINING PROGRAM

## 2025-03-01 PROCEDURE — 82947 ASSAY GLUCOSE BLOOD QUANT: CPT | Performed by: STUDENT IN AN ORGANIZED HEALTH CARE EDUCATION/TRAINING PROGRAM

## 2025-03-01 PROCEDURE — B4185 PARENTERAL SOL 10 GM LIPIDS: HCPCS | Performed by: INTERNAL MEDICINE

## 2025-03-01 PROCEDURE — 250N000013 HC RX MED GY IP 250 OP 250 PS 637

## 2025-03-01 PROCEDURE — 120N000002 HC R&B MED SURG/OB UMMC

## 2025-03-01 PROCEDURE — 84100 ASSAY OF PHOSPHORUS: CPT | Performed by: STUDENT IN AN ORGANIZED HEALTH CARE EDUCATION/TRAINING PROGRAM

## 2025-03-01 RX ORDER — OXYCODONE HYDROCHLORIDE 5 MG/1
5 TABLET ORAL EVERY 4 HOURS PRN
Qty: 15 TABLET | Refills: 0 | Status: SHIPPED | OUTPATIENT
Start: 2025-03-01 | End: 2025-03-04

## 2025-03-01 RX ORDER — HYDROXYZINE HYDROCHLORIDE 25 MG/1
25-50 TABLET, FILM COATED ORAL EVERY 6 HOURS PRN
Qty: 30 TABLET | Refills: 0 | Status: SHIPPED | OUTPATIENT
Start: 2025-03-01

## 2025-03-01 RX ORDER — OXYCODONE HYDROCHLORIDE 5 MG/1
5-10 TABLET ORAL EVERY 4 HOURS PRN
Status: DISCONTINUED | OUTPATIENT
Start: 2025-03-01 | End: 2025-03-02 | Stop reason: HOSPADM

## 2025-03-01 RX ORDER — CYCLOBENZAPRINE HCL 5 MG
5-10 TABLET ORAL 3 TIMES DAILY PRN
Qty: 40 TABLET | Refills: 0 | Status: SHIPPED | OUTPATIENT
Start: 2025-03-01 | End: 2025-03-17

## 2025-03-01 RX ORDER — LIDOCAINE 4 G/G
1 PATCH TOPICAL
Status: DISCONTINUED | OUTPATIENT
Start: 2025-03-01 | End: 2025-03-02 | Stop reason: HOSPADM

## 2025-03-01 RX ADMIN — Medication 1 CAPSULE: at 20:11

## 2025-03-01 RX ADMIN — CYCLOBENZAPRINE 10 MG: 10 TABLET, FILM COATED ORAL at 06:05

## 2025-03-01 RX ADMIN — ACETAMINOPHEN 975 MG: 325 TABLET, FILM COATED ORAL at 17:02

## 2025-03-01 RX ADMIN — DEXTROSE 15 G: 15 GEL ORAL at 15:06

## 2025-03-01 RX ADMIN — OXYCODONE HYDROCHLORIDE 10 MG: 5 TABLET ORAL at 14:31

## 2025-03-01 RX ADMIN — HYDROXYZINE HYDROCHLORIDE 50 MG: 50 TABLET, FILM COATED ORAL at 20:30

## 2025-03-01 RX ADMIN — CYCLOBENZAPRINE 10 MG: 10 TABLET, FILM COATED ORAL at 17:02

## 2025-03-01 RX ADMIN — Medication 1 CAPSULE: at 07:59

## 2025-03-01 RX ADMIN — HYDROXYZINE HYDROCHLORIDE 25 MG: 25 TABLET, FILM COATED ORAL at 10:54

## 2025-03-01 RX ADMIN — OXYCODONE HYDROCHLORIDE 5 MG: 5 TABLET ORAL at 01:30

## 2025-03-01 RX ADMIN — PANTOPRAZOLE SODIUM 40 MG: 40 INJECTION, POWDER, FOR SOLUTION INTRAVENOUS at 07:59

## 2025-03-01 RX ADMIN — LIDOCAINE 4% 1 PATCH: 40 PATCH TOPICAL at 22:35

## 2025-03-01 RX ADMIN — OLIVE OIL AND SOYBEAN OIL 250 ML: 16; 4 INJECTION, EMULSION INTRAVENOUS at 20:12

## 2025-03-01 RX ADMIN — LIDOCAINE: 40 CREAM TOPICAL at 08:42

## 2025-03-01 RX ADMIN — LIDOCAINE 4% 1 PATCH: 40 PATCH TOPICAL at 22:36

## 2025-03-01 RX ADMIN — ACETAMINOPHEN 975 MG: 325 TABLET, FILM COATED ORAL at 01:30

## 2025-03-01 RX ADMIN — ACETAMINOPHEN 975 MG: 325 TABLET, FILM COATED ORAL at 08:19

## 2025-03-01 RX ADMIN — OXYCODONE HYDROCHLORIDE 10 MG: 5 TABLET ORAL at 05:23

## 2025-03-01 RX ADMIN — OXYCODONE HYDROCHLORIDE 10 MG: 5 TABLET ORAL at 18:30

## 2025-03-01 RX ADMIN — MAGNESIUM SULFATE HEPTAHYDRATE: 500 INJECTION, SOLUTION INTRAMUSCULAR; INTRAVENOUS at 20:12

## 2025-03-01 RX ADMIN — OXYCODONE HYDROCHLORIDE 10 MG: 5 TABLET ORAL at 09:52

## 2025-03-01 RX ADMIN — ESCITALOPRAM OXALATE 20 MG: 20 TABLET ORAL at 20:11

## 2025-03-01 RX ADMIN — Medication 5 ML: at 09:20

## 2025-03-01 ASSESSMENT — ACTIVITIES OF DAILY LIVING (ADL)
ADLS_ACUITY_SCORE: 45

## 2025-03-01 NOTE — PROGRESS NOTES
Care Management Discharge Note    Discharge Date: 03/01/2025       Discharge Disposition: Home    Discharge Services: None    Discharge DME: None    Discharge Transportation: family or friend will provide    Private pay costs discussed: Not applicable    Does the patient's insurance plan have a 3 day qualifying hospital stay waiver?  No    PAS Confirmation Code:    Patient/family educated on Medicare website which has current facility and service quality ratings:      Education Provided on the Discharge Plan:    Persons Notified of Discharge Plans: yes  Patient/Family in Agreement with the Plan: yes    Handoff Referral Completed: Yes, [facility] PCP: Internal handoff referral completed    Additional Information:  -CM verified with primary team that patient medically ready for discharge.   -Per chart review patient to return home with [facility] in place for TPN and they will provide HC RN for line/ lab care.   -CM reached out to [facility] and was informed their compound pharmacy may not be able to make TPN due to temperature control in their facility. [facility] liaison stated if temperature gets correct, TPN is made, they are considering dropping TPN off at hospital, having hospital RN connect patient to home TPN, can discharge patient home, and then a [facility] RN will meet with patient tomorrow.   -CM met with patient and spouse who was at bedside to discuss options for discharge, but since [facility] still pending more follow up needed.     Madie Cota RN BSN  Float RN Care Coordinator   Covering Unit 7A  Phone [phone]      SEARCHABLE in [facility] - search CARE COORDINATOR       [place] & [place] ([phone]) Saturday & Sunday; ([phone]) [facility] Recognized Holidays     Units: 5A Onc 5201 - 5219 RNCC,  5A Onc 5220 thru 5240 RNCC, 5C OFFSERVICE [phone] RNCC & 5C OFF SERVICE [phone] RNCC     Units: 6B Vocera, 6C Card 6401 thru 6420 RNCC, 6C Card 6502 thru 6514 RNCC & 6C Card 6515 thru 6519 RNCC     Units: 7A SOT RNCC Vocera, 7B Med Surg  Vocera, 7C Med Surg 7401 thru 7418 RNCC & 7C Med Surg 7502 thru 7565 RNCC     Units: 6A Vocera & 4A CVICU Vocera, 4C MICU Vocera, and 4E SICU Vocera       Units: 5 Ortho Vocera & 5 Med Surg Vocera      Units: 6 Med Surg Vocera & 8 Med Surg Vocera

## 2025-03-01 NOTE — PLAN OF CARE
Goal Outcome Evaluation:      Plan of Care Reviewed With: patient    Overall Patient Progress: improvingOverall Patient Progress: improving          Time: 4477-0938     Vitals: B/P: 122/51, T: 98.5, P: 70, R: 17   Neuro: A&Ox4, makes needs known, anxious at times - atarax given x1  Cardiac: Denies chest pain, WDL  Resp: Denies SOB, sating >92% on RA  GI/: Voiding spontaneously, no BM this shift  Pain: reports 7-8/10 w/movement; PRN oxy x2, flexeril x1, tylenol x2  N/V: Denies  Skin: 2 abd lap sites  Lines: R Port - hep locked  Drains: PEG - to drainage bag  Labs: Reviewed, monitored BG & treated with hypoglycemia protocol  Diet: Clear liquid, tolerating well  Activity: IND    Plan: Continue with POC.       Yuli Sparks RN

## 2025-03-01 NOTE — PROGRESS NOTES
Care Management Follow Up    Length of Stay (days): 11    Expected Discharge Date: 03/02/2025     Concerns to be Addressed: discharge planning     Patient plan of care discussed at interdisciplinary rounds: Yes    Anticipated Discharge Disposition: Home     Anticipated Discharge Services: TPN with [facility]    [facility](TPN)  Phone: [phone]  Fax: [phone]     [facility](RN)  Phone: [phone]    Patient/family educated on Medicare website which has current facility and service quality ratings:  NA  Education Provided on the Discharge Plan:    Patient/Family in Agreement with the Plan: yes    Referrals Placed by CM/SW: Home Infusion  Private pay costs discussed: Not applicable    Discussed  Partnership in Safe Discharge Planning  document with patient/family: No     Additional Information:  This writer spoke with patient, nurse and spouse about discharge to home today. The original plan was to have [facility] nurse come with TPN to hook up here at the hospital between 4-5pm for a discharge to home. Patient and spouse were not comfortable with going home with a pump. They prefer to stay overnight and be discharged tomorrow and have everything delivered tomorrow to their home.     This was communicated to the [facility] and they are in agreement to this.     The bedside nurse will communicate to the provider and to floor pharmacist to prep TPN cycle for this evening.     Next Steps: Patient will stay overnight and will discharge tomorrow morning. Home health nurse will see patient tomorrow at home to hook up TPN and do further education.         Bonnie Antunez RN   [facility]  Inpatient Casual RN Care Coordinator

## 2025-03-01 NOTE — PLAN OF CARE
Goal Outcome Evaluation:     Time: 4185-4909    Vitals: B/P: 119/70, T: 98.1, P: 81, R: 16   Neuro: A&Ox4, makes needs known, anxious at times - atarax given x1  Cardiac: Denies chest pain, WDL  Resp: Denies SOB, sating >92% on RA  GI/: Voiding spontaneously, no BM this shift  Pain: reports 7-8/10 w/movement; PRN dilaudid x1, oxy x1  N/V: Denies  Skin: 2 abd lap sites  Lines: R Port - infusing LR @ 100 ml  Drains: PEG - to drainage bag, minimal output  Labs: Reviewed  Diet: NPO, except meds & ice chips  Activity: SBA      New changes this shift: PEG placed this am.      Plan: Possible discharge tomorrow. Continue with POC.      Yuli Sparks RN

## 2025-03-01 NOTE — PROGRESS NOTES
Brief Progress Note    Went to check on patient for PM rounds. Having pain at G tube site, about to get some pain meds. No other concerns at this time. Really happy to have the NG tube out. Drinking water which is going well, no nausea. No other abdominal pain. Feeling worried about the future and about bowel obstruction not resolving, tearful. No other concerns at this time.    Discussed that while we don't know for sure how her course will continue, reassured patient that the reason we pursued G tube and TPN was to provide bowel rest with the hope that her bowel obstruction will resolve with more time. Defer further discussions around treatment planning to Dr. Patino who she will see in clinic next week. Patient excited to discharge home tomorrow. RN to start G-tube teaching tonight, and will have RN in AM perform additional teaching with patient's  when he arrives. Plan for patient to discharge by 10 AM so she can meet with RN with infusion services at home at 3 PM. Discussed with patient that while she is medically ready for discharge tomorrow, if she does not feel comfortable with managing G tube by that time, ok to delay discharge for further teaching. Patient expressed understanding, planning discharge tomorrow.    Bonnie Goodman MD  OB/GYN Resident, PGY-4

## 2025-03-01 NOTE — PROGRESS NOTES
"Gynecology Oncology Progress Note  03/01/2025    Ms. Jacki Smith is a 59 year old HD#19 for management of a bowel microperforation in the setting of recent bevacizumab administration.    Dz: Recurrent platinum sensitive HGSOC    24 hour events:   - G tube placed    Subjective: Having a lot of pain at her G tube site, 4/10 when still but 8/10 with movement. Oxycodone, flexeril, and tylenol help. Tolerating po fluids overnight without nausea. Started doing g tube teaching with RN last night which went well, reports she previously helped manage her dad's feeding tube so feels generally comfortable. More nervous about managing TPN at home, but thinks she will be comfortable with RN teaching at home. No other concerns at this time. Feels ready to discharge today.    Objective:   BP (!) 142/67 (BP Location: Right arm, Patient Position: Semi-Tripathi's, Cuff Size: Adult Regular)   Pulse 71   Temp 97.9  F (36.6  C) (Oral)   Resp 16   Ht 1.567 m (5' 1.71\")   Wt 57 kg (125 lb 11.2 oz)   SpO2 97%   BMI 23.21 kg/m      General: lying in bed, in NAD  CV: well perfused  Resp: no increased work of breathing on room air  Abdomen: soft, mildly tender to palpation, moderately distended. G tube in place with bilious drainage   Extremities: nontender, trace edema      New labs/imaging-  Results for orders placed or performed during the hospital encounter of 02/18/25 (from the past 24 hours)   XR Surgery SARAH L/T 5 Min Fluoro w Stills    Narrative    This exam was marked as non-reportable because it will not be read by a   radiologist or a [place] non-radiologist provider.         Glucose by meter   Result Value Ref Range    GLUCOSE BY METER POCT 135 (H) 70 - 99 mg/dL   Glucose by meter   Result Value Ref Range    GLUCOSE BY METER POCT 147 (H) 70 - 99 mg/dL   Phosphorus   Result Value Ref Range    Phosphorus 3.4 2.5 - 4.5 mg/dL   Potassium   Result Value Ref Range    Potassium 4.6 3.4 - 5.3 mmol/L   Magnesium   Result Value " Ref Range    Magnesium 2.1 1.7 - 2.3 mg/dL   Extra Tube    Narrative    The following orders were created for panel order Extra Tube.  Procedure                               Abnormality         Status                     ---------                               -----------         ------                     Extra Purple Top Tube[[id number]]                            Final result                 Please view results for these tests on the individual orders.   Extra Purple Top Tube   Result Value Ref Range    Hold Specimen JI    Glucose   Result Value Ref Range    Glucose 123 (H) 70 - 99 mg/dL       Assessment: 59 year old female with recurrent HGSOC HD#19 for a small bowel microperforation in the setting of recent bevacizumab administration.    Plan:    #Bowel perforation  #SBO  - Venting G tube placed yesterday, tolerating CLD. G tube to gravity drainage, keep vented for first 72 hours. G-tube teaching performed with RN, will perform teaching with  as well this AM  - Tylenol, flexeril, oxycodone prn for pain  - Anticoagulation held for 72 hours post-op per GI  - Long term plan will be for long term bowel rest with TPN support. Home infusion arranged for TPN, scheduled to start at 3 PM today.     #Recurrent HGSOC  - S/p cycle 1 of carbo/taxol/deshawn on 2/5. Treatment will be held in the setting of current bowel perforation  - Follow up with Dr. Patino after discharge for ongoing treatment discussions.     #MDD  - PTA lexapro daily    Disposition: discharge home today    Bonnie Goodman MD  OB/GYN Resident, PGY-4  03/01/2025 6:21 AM      ITye MD personally examined and evaluated this patient on 03/01/25.  I discussed the patient with the resident and care team, and agree with the assessment and plan of care as documented in the residents note above.    I personally reviewed vital signs, laboratory values and imaging results.      Pat Wu MD  Gynecologic Oncology  [facility]  Physicians

## 2025-03-01 NOTE — PLAN OF CARE
"Goal Outcome Evaluation:      Plan of Care Reviewed With: patient    Overall Patient Progress: no changeOverall Patient Progress: no change         Status: Full Code   VS: /64 (BP Location: Right arm)   Pulse 72   Temp 97.7  F (36.5  C) (Oral)   Resp 16   Ht 1.567 m (5' 1.71\")   Wt 57 kg (125 lb 11.2 oz)   SpO2 99%   BMI 23.21 kg/m      Neuros: A&OX4  Cardiac: WNL  Respiratory: WNL pt RA  GI: no bm this shift, bowl sounds active  : voiding spontaneously   Diet/Nausea: pt denies nausea, clear liquid diet    Skin:   PEG - to drainage bag,  2 abd lap sites   LDA: R Port infusing TPN and Lipids (cycled)  Pain: control with Oxy, Flexeril, Atarax  Activity: SBA/ind  New changes this shift: no new change this shift   Plan: Pt informed by Provider that she is medically ready for discharge today, however, if she does not feel comfortable with managing G tube by discharge time, it's ok to delay discharge for further teaching. Continues plan of care        "

## 2025-03-01 NOTE — PROGRESS NOTES
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 S/p placement of NGT and IR placement of x2 perc drains (R and L) into collections (all on 2/18).     IR consulted for consideration of venting Gtube but deferred to GI or surgery given felt no safe window with concern for loculated ascites.  [facility] GI team alternatively consulted 2/24 for consideration of venting PEG.    # SBO - c/b suspected microperforation of small bowel (previous managed by perc drains, removed 2/25)  # Ascites (unclear etiology - malignant versus bowel perforation)  # Severe protein-calorie malnutrition  Patient unable to advance diet and primary team/patient agree to pursue venting PEG placement. IR attempted paracentesis but there was only small fluid collections so no fluid aspirated. Discussed plan with gyn onc team and agree with moving forward with venting PEG placement.     Underwent PEG tube placement 2/28 without problems.  Having expected discomfort related to abdominal wall after tube placement.  Otherwise doing well    RECOMMENDATIONS:  - Keep PEG tube connected to gravity drain   - Venting through G-tube as  needed   - No anticoagulation or NSAIDs for 72 hours post operatively.  - Analgesia/antiemetics per primary team (expect pain to peak POD2-3)  - PEG site care per bedside RN.   - T-tags (white buttons) should be removed 10-14 days following procedure by lifting the button and cutting  "the underlying blue suture. Often times the buttons fall off prior to the 2 week ruth.    GI team will sign off    The patient was discussed and plan agreed upon with GI staff, Dr Oly Saunders MD  GI fellow      ______________________________________________________      Interval events since last evaluated:   Tolerated PEG tube placement well yesterday.  She notes pain around the tube site which is improving and managed with Tylenol this morning.    ROS:   4 pt ROS negative unless noted in subjective.     Objective:  Blood pressure 122/51, pulse 70, temperature 98.5  F (36.9  C), temperature source Oral, resp. rate 17, height 1.567 m (5' 1.71\"), weight 57 kg (125 lb 11.2 oz), SpO2 96%.  Gen: Appears comfortable  Resp: normal work of breathing on RA, speaking in full sentences  Abdomen: Venting G-tube in place with 3T tags.  Mild tenderness around tube.  No peritoneal signs  Msk: no gross deformity  Skin:  no jaundice  Ext: warm, well perfused   Neuro: grossly normal, walking around room  Mental status/Psych: A&O. Asks/answers questions appropriately       LABS:  BMP  Recent Labs   Lab 03/01/25  1630 03/01/25  1603 03/01/25  1535 03/01/25  1501 03/01/25  1325 03/01/25  0646 02/28/25  1853 02/28/25  0743 02/27/25  1553 02/27/25  0721 02/26/25  1918 02/26/25  0554 02/25/25  0745 02/25/25  0651   NA  --   --   --   --   --   --   --  133*  --  131*  --  132*  --  132*   POTASSIUM  --   --   --   --   --  4.6  --  5.1  --  5.1  --  5.3  --  5.1   CHLORIDE  --   --   --   --   --   --   --  100  --  99  --  102  --  100   KINDRA  --   --   --   --   --   --   --  9.0  --  9.0  --  8.8  --  8.9   CO2  --   --   --   --   --   --   --  23  --  21*  --  20*  --  21*   BUN  --   --   --   --   --   --   --  33.3*  --  31.6*  --  31.0*  --  22.6   CR  --   --   --   --   --   --   --  0.59  --  0.59  --  0.57  --  0.58   [facility] [phone]*   < > 123*   < > 120*   < > 108*   < > 107*   < > 118*    < > = values in this " interval not displayed.     CBC  Recent Labs   Lab 02/27/25  0721 02/26/25  0554 02/25/25  0651 02/24/25  0619   WBC 5.7 5.5 5.2 4.7   RBC 3.39* 3.43* 3.48* 3.41*   HGB 10.7* 10.8* 10.9* 10.8*   HCT 32.5* 34.4* 35.0 34.0*   MCV 96 100 101* 100   MCH 31.6 31.5 31.3 31.7   MCHC 32.9 31.4* 31.1* 31.8   RDW 15.2* 15.3* 15.2* 15.1*    248 237 197     INR  Recent Labs   Lab 02/27/25  0721 02/24/25  0619   INR 1.10 1.05     LFTs  Recent Labs   Lab 02/28/25  0743 02/27/25  0721 02/26/25  0554 02/24/25  0619   ALKPHOS 306* 278*  --  167*   AST 38 27  --  20   ALT 21 12  --  8   BILITOTAL 0.4 0.3  --  0.2   PROTTOTAL 7.0 6.9 6.9 6.5   ALBUMIN 3.4* 3.4* 3.3* 3.3*      PANCNo lab results found in last 7 days.

## 2025-03-01 NOTE — PROGRESS NOTES
Patient is planning to stay another night at [facility]. Tomorrow TPN supplies will be sent to her home and nurse will teach patient in her home.

## 2025-03-02 ENCOUNTER — HOME CARE VISIT (OUTPATIENT)
Dept: HOME HEALTH SERVICES | Facility: HOME HEALTH | Age: 60
End: 2025-03-02
Payer: COMMERCIAL

## 2025-03-02 ENCOUNTER — HOME INFUSION (OUTPATIENT)
Dept: HOME HEALTH SERVICES | Facility: HOME HEALTH | Age: 60
End: 2025-03-02
Payer: COMMERCIAL

## 2025-03-02 VITALS
HEART RATE: 76 BPM | TEMPERATURE: 98.4 F | DIASTOLIC BLOOD PRESSURE: 63 MMHG | RESPIRATION RATE: 18 BRPM | OXYGEN SATURATION: 99 % | SYSTOLIC BLOOD PRESSURE: 105 MMHG

## 2025-03-02 VITALS
HEART RATE: 76 BPM | OXYGEN SATURATION: 98 % | WEIGHT: 125.7 LBS | TEMPERATURE: 98.2 F | HEIGHT: 62 IN | DIASTOLIC BLOOD PRESSURE: 66 MMHG | BODY MASS INDEX: 23.13 KG/M2 | RESPIRATION RATE: 18 BRPM | SYSTOLIC BLOOD PRESSURE: 132 MMHG

## 2025-03-02 LAB
GLUCOSE BLDC GLUCOMTR-MCNC: 85 MG/DL (ref 70–99)
HOLD SPECIMEN: NORMAL
MAGNESIUM SERPL-MCNC: 1.9 MG/DL (ref 1.7–2.3)
PHOSPHATE SERPL-MCNC: 3.9 MG/DL (ref 2.5–4.5)
POTASSIUM SERPL-SCNC: 4.8 MMOL/L (ref 3.4–5.3)

## 2025-03-02 PROCEDURE — 83735 ASSAY OF MAGNESIUM: CPT | Performed by: STUDENT IN AN ORGANIZED HEALTH CARE EDUCATION/TRAINING PROGRAM

## 2025-03-02 PROCEDURE — B9004 PARENTERAL INFUS PUMP PORTAB: HCPCS | Mod: RR

## 2025-03-02 PROCEDURE — 84132 ASSAY OF SERUM POTASSIUM: CPT | Performed by: STUDENT IN AN ORGANIZED HEALTH CARE EDUCATION/TRAINING PROGRAM

## 2025-03-02 PROCEDURE — A4452 WATERPROOF TAPE: HCPCS

## 2025-03-02 PROCEDURE — E1399 DURABLE MEDICAL EQUIPMENT MI: HCPCS

## 2025-03-02 PROCEDURE — 250N000013 HC RX MED GY IP 250 OP 250 PS 637: Performed by: INTERNAL MEDICINE

## 2025-03-02 PROCEDURE — 84100 ASSAY OF PHOSPHORUS: CPT | Performed by: STUDENT IN AN ORGANIZED HEALTH CARE EDUCATION/TRAINING PROGRAM

## 2025-03-02 PROCEDURE — 250N000013 HC RX MED GY IP 250 OP 250 PS 637

## 2025-03-02 PROCEDURE — 99601 HOME NFS VISIT <2 HRS: CPT

## 2025-03-02 PROCEDURE — A4245 ALCOHOL WIPES PER BOX: HCPCS

## 2025-03-02 PROCEDURE — A4217 STERILE WATER/SALINE, 500 ML: HCPCS

## 2025-03-02 PROCEDURE — A4215 STERILE NEEDLE: HCPCS

## 2025-03-02 PROCEDURE — B4178 PARENTERAL SOL AMINO ACID >: HCPCS

## 2025-03-02 PROCEDURE — 250N000011 HC RX IP 250 OP 636: Performed by: INTERNAL MEDICINE

## 2025-03-02 PROCEDURE — 36591 DRAW BLOOD OFF VENOUS DEVICE: CPT | Performed by: STUDENT IN AN ORGANIZED HEALTH CARE EDUCATION/TRAINING PROGRAM

## 2025-03-02 PROCEDURE — S9366 HIT TPN 2 LITER DIEM: HCPCS

## 2025-03-02 PROCEDURE — B4185 PARENTERAL SOL 10 GM LIPIDS: HCPCS

## 2025-03-02 RX ORDER — LIDOCAINE 4 G/G
1 PATCH TOPICAL DAILY PRN
Qty: 6 PATCH | Refills: 0 | Status: SHIPPED | OUTPATIENT
Start: 2025-03-02

## 2025-03-02 RX ADMIN — Medication 1 CAPSULE: at 08:27

## 2025-03-02 RX ADMIN — PANTOPRAZOLE SODIUM 40 MG: 40 INJECTION, POWDER, FOR SOLUTION INTRAVENOUS at 07:24

## 2025-03-02 RX ADMIN — CYCLOBENZAPRINE 10 MG: 10 TABLET, FILM COATED ORAL at 01:39

## 2025-03-02 RX ADMIN — ACETAMINOPHEN 975 MG: 325 TABLET, FILM COATED ORAL at 05:35

## 2025-03-02 RX ADMIN — OXYCODONE HYDROCHLORIDE 10 MG: 5 TABLET ORAL at 09:38

## 2025-03-02 RX ADMIN — OXYCODONE HYDROCHLORIDE 10 MG: 5 TABLET ORAL at 05:35

## 2025-03-02 RX ADMIN — OXYCODONE HYDROCHLORIDE 10 MG: 5 TABLET ORAL at 01:39

## 2025-03-02 ASSESSMENT — ACTIVITIES OF DAILY LIVING (ADL)
ADLS_ACUITY_SCORE: 45

## 2025-03-02 NOTE — PROGRESS NOTES
Pt. discharged at 9:45 am to home, was accompanied by  and daughter, and left with personal belongings. Pt. received complete discharge paperwork and medications as filled by discharge pharmacy. Pt. was given times of last dose for all discharge medications in writing on discharge medication sheets. Discharge teaching included medication instructions, pain management, activity restrictions, PEG tube teaching, and signs and symptoms of infection. PEG tube supplies sent home. Pt. to have home infusion nurse visit this afternoon at home. Pt. had no further questions at the time of discharge and no unmet needs were identified.

## 2025-03-02 NOTE — PROGRESS NOTES
Care Management Discharge Note    Discharge Date: 03/02/2025       Discharge Disposition: Home    Discharge Services: [facility] for TPN and [facility] for RN    Discharge DME: None    Discharge Transportation: family or friend will provide    Private pay costs discussed: Not applicable    Does the patient's insurance plan have a 3 day qualifying hospital stay waiver?  No    PAS Confirmation Code:  N/A  Patient/family educated on Medicare website which has current facility and service quality ratings:  N/A    Education Provided on the Discharge Plan:  Yes  Persons Notified of Discharge Plans: Pt & family, Charge RN, Provider, [facility], [facility]  Patient/Family in Agreement with the Plan: yes    Handoff Referral Completed: Yes, non-[facility] PCP: External handoff communication completed    Additional Information:  Pt medically ready for discharge and [facility] ([facility]) compounding pharmacy again operational and able to provide Pt's TPN. Writer updated Pt and Provider. Pt will discharge to home with family. I will deliver TPN to Pt's home by 2:30pm 3/2/25 and have an RN to Pt's home between 3-4pm 3/2/25 for teaching. Writer left message with [facility] regarding Pt's discharge today and faxed discharge orders. Pt and family had no further discharge questions or concerns.     Discharge Resources  [facility](TPN)  Phone: [phone]  Fax: [phone]     [facility](RN)  Phone: [phone]  Fax: [phone]    Jay Jay Martínez RNCC  Covering for 7B  Phone [phone]    RN Care Coordinator     Social Work and Care Management Department      SEARCHABLE in [facility] - search CARE COORDINATOR       [place] ([phone]) Saturday & Sunday; [phone]) [facility] Recognized Holidays     Units: 5A Onc Vocera & 5C Vocera    Units: 6B Vocera & 6C Vocera    Units: 7A SOT RNCC Vocera, 7B Med Surg Vocera, & 7C Med Surg Vocera    Units: 6A Vocera & 4A CVICU Vocera, 4C MICU Vocera, and 4E  SICU Vocera    Units: 5 Ortho Vocera & 5 Med Surg Vocera    Units: 6 Med Surg Vocera & 8 Med Surg Vocera         Detail Level: Zone

## 2025-03-02 NOTE — CARE PLAN
"Vitals: Blood pressure 132/66, pulse 76, temperature 98.2  F (36.8  C), temperature source Oral, resp. rate 18, height 1.567 m (5' 1.71\"), weight 57 kg (125 lb 11.2 oz), SpO2 98%.     Pt is A/O x 4, calls appropriately and makes needs known. Up with assist x1, O2 > 94% on RA. Abdominal pain managed with oxycodone, tylenol and lidocaine patch. Tolerating clear liquid diet, TPN and lipids infusing. No BM this shift, voiding without difficulties, AUOP.  PEG tube to gravity and clamped for meds. Skin is WDL. No significant changes this shift, continue POC.    "

## 2025-03-02 NOTE — PROGRESS NOTES
GYN ONC PROGRESS NOTE  03/02/2025    HD#20 for management of a bowel microperforation in the setting of recent bevacizumab administration   Disease: recurrent platinum sensitive HGSOC     24 hour events:   - home TPN malfunction > stayed inpatient overnight     SUBJECTIVE:   Patient feeling ready to go home. Pain is well controlled. Ambulating. No flatus this morning.  Tolerating clear liquids, no nausea or vomiting.     OBJECTIVE:   Patient Vitals for the past 24 hrs:   BP Temp Temp src Pulse Resp SpO2   03/02/25 0520 132/66 98.2  F (36.8  C) Oral 76 18 98 %   03/01/25 2212 107/58 98.4  F (36.9  C) Oral 71 16 99 %   03/01/25 1401 122/51 98.5  F (36.9  C) Oral 70 17 96 %     Gen- Laying in bed, NAD  CV- warm, well perfused   Pulm- NWOB  Abd- soft, nontender, non-distended   Extr- no edema, nontender  Lines/drains- G tube in place     I/Os  (Yesterday // Since Midnight)  G tube: 1275 out // 50 ml out   Urine 1050cc // 750 cc    New Labs/Imaging-   Results for orders placed or performed during the hospital encounter of 02/18/25 (from the past 24 hours)   Glucose by meter   Result Value Ref Range    GLUCOSE BY METER POCT 69 (L) 70 - 99 mg/dL   Glucose by meter   Result Value Ref Range    GLUCOSE BY METER POCT 61 (L) 70 - 99 mg/dL   Glucose by meter   Result Value Ref Range    GLUCOSE BY METER POCT 65 (L) 70 - 99 mg/dL   Glucose by meter   Result Value Ref Range    GLUCOSE BY METER POCT 79 70 - 99 mg/dL   Glucose by meter   Result Value Ref Range    GLUCOSE BY METER POCT 87 70 - 99 mg/dL   Glucose by meter   Result Value Ref Range    GLUCOSE BY METER POCT 79 70 - 99 mg/dL   Glucose by meter   Result Value Ref Range    GLUCOSE BY METER POCT 80 70 - 99 mg/dL   Glucose by meter   Result Value Ref Range    GLUCOSE BY METER POCT 117 (H) 70 - 99 mg/dL   Potassium   Result Value Ref Range    Potassium 4.8 3.4 - 5.3 mmol/L   Magnesium   Result Value Ref Range    Magnesium 1.9 1.7 - 2.3 mg/dL   Phosphorus   Result Value Ref  Range    Phosphorus 3.9 2.5 - 4.5 mg/dL   Extra Tube    Narrative    The following orders were created for panel order Extra Tube.  Procedure                               Abnormality         Status                     ---------                               -----------         ------                     Extra Purple Top Tube[[id number]]                            In process                   Please view results for these tests on the individual orders.   Glucose by meter   Result Value Ref Range    GLUCOSE BY METER POCT 85 70 - 99 mg/dL     Assessment: 59 year old female with recurrent HGSOC HD#20 for a small bowel microperforation in the setting of recent bevacizumab administration.     Plan:    #Bowel perforation  #SBO  - Venting G tube placed yesterday, tolerating CLD. G tube to gravity drainage, keep vented for first 72 hours. G-tube teaching performed with RN, will reinforce today.   - Tylenol, flexeril, oxycodone prn for pain  - Anticoagulation held for 72 hours post-op per GI  - Long term plan will be for long term bowel rest with TPN support. Home infusion arranged for TPN, scheduled to start at home today.      #Recurrent HGSOC  - S/p cycle 1 of carbo/taxol/deshawn on 2/5. Treatment will be held in the setting of current bowel perforation  - Follow up with Dr. Patino after discharge for ongoing treatment discussions.     #MDD  - PTA lexapro daily     Disposition: discharge home today    Discussed with Dr. Anglin.     Brandy Dawkins MD  Obstetrics and Gynecology, PGY-2  03/02/2025 8:46 AM

## 2025-03-02 NOTE — PROGRESS NOTES
Brief Progress Note    To bedside for evening check-in.  Michelle had an okay day.  She is happy that she is able to stay another night to make sure that she feels good about her TPN plan going home.  Had a little bit of hypoglycemia earlier in the day that was treated with juice and glucose gel.  She is now cycling on her TPN.  She continues to have pretty significant pain around the site of her G-tube but is largely unchanged, she has been using 10 mg of oxycodone every 4 hours to manage this.  We talked about adding a lidocaine patch and she is amenable to this.  Plan for discharge tomorrow pending home infusion services being set up.  Otherwise continue current plan of care.    Edyta Villagomez DO  [facility]  Gynecology Oncology Resident, PGY-1  03/01/2025 9:51 PM    To reach the GYNECOLOGY ONCOLOGY team for this patient, please page [phone]

## 2025-03-02 NOTE — PROGRESS NOTES
[facility]  Start of Care/Resumption of Care Note    \[facility] [place] [facility]\"" SOC    DX: Ovarian cancer, right (H) [C56.1]     Therapy: TPN    Next Dose Due: HOOK UP ON 03/02/25 @1500    Delivery plan: TO PT HOME BY 1430    In-basket sent to \[facility]\"" Scheduling, requesting visit on 03/01/25 DONE BY SARA TOPETE    Per \[facility]\"" care plan, labs are due on MONDAYS    Nursing plan: Agency: [facility] . \[facility]\"" to follow for nursing until agency takes over case on 03/04/25.     Teaching Plan: Liaison Teach Done?: No    Other Info: PT HAS AN APPT AT 1PM ON 03/04/25 IN [place] SOC WILL BE REQUIRED IN AM.      Irina Richter RN 03/02/25

## 2025-03-03 ENCOUNTER — LAB REQUISITION (OUTPATIENT)
Dept: LAB | Facility: HOSPITAL | Age: 60
End: 2025-03-03
Payer: COMMERCIAL

## 2025-03-03 ENCOUNTER — HOME INFUSION BILLING (OUTPATIENT)
Dept: HOME HEALTH SERVICES | Facility: HOME HEALTH | Age: 60
End: 2025-03-03
Payer: COMMERCIAL

## 2025-03-03 ENCOUNTER — PATIENT OUTREACH (OUTPATIENT)
Dept: CARE COORDINATION | Facility: CLINIC | Age: 60
End: 2025-03-03
Payer: COMMERCIAL

## 2025-03-03 ENCOUNTER — HOME CARE VISIT (OUTPATIENT)
Dept: HOME HEALTH SERVICES | Facility: HOME HEALTH | Age: 60
End: 2025-03-03
Payer: COMMERCIAL

## 2025-03-03 VITALS
SYSTOLIC BLOOD PRESSURE: 114 MMHG | RESPIRATION RATE: 18 BRPM | TEMPERATURE: 97.7 F | DIASTOLIC BLOOD PRESSURE: 62 MMHG | OXYGEN SATURATION: 99 % | HEART RATE: 77 BPM

## 2025-03-03 DIAGNOSIS — C56.1 MALIGNANT NEOPLASM OF RIGHT OVARY (H): ICD-10-CM

## 2025-03-03 LAB
ALBUMIN SERPL BCG-MCNC: 3.2 G/DL (ref 3.5–5.2)
ALP SERPL-CCNC: 315 U/L (ref 40–150)
ALT SERPL W P-5'-P-CCNC: 22 U/L (ref 0–50)
ANION GAP SERPL CALCULATED.3IONS-SCNC: 11 MMOL/L (ref 7–15)
AST SERPL W P-5'-P-CCNC: 27 U/L (ref 0–45)
BASOPHILS # BLD AUTO: 0.1 10E3/UL (ref 0–0.2)
BASOPHILS NFR BLD AUTO: 1 %
BILIRUB DIRECT SERPL-MCNC: 0.35 MG/DL (ref 0–0.3)
BILIRUB SERPL-MCNC: 0.6 MG/DL
BUN SERPL-MCNC: 23 MG/DL (ref 8–23)
CALCIUM SERPL-MCNC: 9.6 MG/DL (ref 8.8–10.4)
CHLORIDE SERPL-SCNC: 97 MMOL/L (ref 98–107)
CREAT SERPL-MCNC: 0.63 MG/DL (ref 0.51–0.95)
EGFRCR SERPLBLD CKD-EPI 2021: >90 ML/MIN/1.73M2
EOSINOPHIL # BLD AUTO: 0.3 10E3/UL (ref 0–0.7)
EOSINOPHIL NFR BLD AUTO: 7 %
ERYTHROCYTE [DISTWIDTH] IN BLOOD BY AUTOMATED COUNT: 14.8 % (ref 10–15)
FASTING STATUS PATIENT QL REPORTED: ABNORMAL
FASTING STATUS PATIENT QL REPORTED: NORMAL
GLUCOSE SERPL-MCNC: 91 MG/DL (ref 70–99)
HCO3 SERPL-SCNC: 26 MMOL/L (ref 22–29)
HCT VFR BLD AUTO: 29.9 % (ref 35–47)
HGB BLD-MCNC: 10 G/DL (ref 11.7–15.7)
IMM GRANULOCYTES # BLD: 0 10E3/UL
IMM GRANULOCYTES NFR BLD: 1 %
LYMPHOCYTES # BLD AUTO: 1.1 10E3/UL (ref 0.8–5.3)
LYMPHOCYTES NFR BLD AUTO: 28 %
MAGNESIUM SERPL-MCNC: 2.1 MG/DL (ref 1.7–2.3)
MCH RBC QN AUTO: 31.6 PG (ref 26.5–33)
MCHC RBC AUTO-ENTMCNC: 33.4 G/DL (ref 31.5–36.5)
MCV RBC AUTO: 95 FL (ref 78–100)
MONOCYTES # BLD AUTO: 0.7 10E3/UL (ref 0–1.3)
MONOCYTES NFR BLD AUTO: 19 %
NEUTROPHILS # BLD AUTO: 1.7 10E3/UL (ref 1.6–8.3)
NEUTROPHILS NFR BLD AUTO: 44 %
NRBC # BLD AUTO: 0 10E3/UL
NRBC BLD AUTO-RTO: 0 /100
PHOSPHATE SERPL-MCNC: 3.4 MG/DL (ref 2.5–4.5)
PLATELET # BLD AUTO: 280 10E3/UL (ref 150–450)
POTASSIUM SERPL-SCNC: 4.2 MMOL/L (ref 3.4–5.3)
PROT SERPL-MCNC: 6.9 G/DL (ref 6.4–8.3)
RBC # BLD AUTO: 3.16 10E6/UL (ref 3.8–5.2)
SODIUM SERPL-SCNC: 134 MMOL/L (ref 135–145)
TRIGL SERPL-MCNC: 103 MG/DL
WBC # BLD AUTO: 3.9 10E3/UL (ref 4–11)

## 2025-03-03 PROCEDURE — E1399 DURABLE MEDICAL EQUIPMENT MI: HCPCS

## 2025-03-03 PROCEDURE — B9004 PARENTERAL INFUS PUMP PORTAB: HCPCS | Mod: RR

## 2025-03-03 PROCEDURE — 80053 COMPREHEN METABOLIC PANEL: CPT | Performed by: OBSTETRICS & GYNECOLOGY

## 2025-03-03 PROCEDURE — 85048 AUTOMATED LEUKOCYTE COUNT: CPT | Performed by: OBSTETRICS & GYNECOLOGY

## 2025-03-03 PROCEDURE — 83735 ASSAY OF MAGNESIUM: CPT | Performed by: OBSTETRICS & GYNECOLOGY

## 2025-03-03 PROCEDURE — 85004 AUTOMATED DIFF WBC COUNT: CPT | Performed by: OBSTETRICS & GYNECOLOGY

## 2025-03-03 PROCEDURE — A4215 STERILE NEEDLE: HCPCS

## 2025-03-03 PROCEDURE — 99601 HOME NFS VISIT <2 HRS: CPT

## 2025-03-03 PROCEDURE — 84478 ASSAY OF TRIGLYCERIDES: CPT | Performed by: OBSTETRICS & GYNECOLOGY

## 2025-03-03 PROCEDURE — 84100 ASSAY OF PHOSPHORUS: CPT | Performed by: OBSTETRICS & GYNECOLOGY

## 2025-03-03 PROCEDURE — S9366 HIT TPN 2 LITER DIEM: HCPCS

## 2025-03-03 PROCEDURE — 36591 DRAW BLOOD OFF VENOUS DEVICE: CPT | Performed by: OBSTETRICS & GYNECOLOGY

## 2025-03-03 PROCEDURE — 82248 BILIRUBIN DIRECT: CPT | Performed by: OBSTETRICS & GYNECOLOGY

## 2025-03-03 NOTE — PROGRESS NOTES
Nursing Visit Note:  Nurse visit today for TPN TEACH #1 for Jackitonny Smith.     present during visit today: Not Applicable.    Intravenous Access:  Implanted Port.    Education Provided:     Patient Education focused on TPN.     Note: Patient alert and oriented in good spirits and tired. VSS. patient c/o discomfort at GT site but no other complaints. TPN via CADD taught to her two daughters and . The daughters had hands on and one of them plans to do it following the paper guide with nurse present tomorrow otherwise the  will do it with hands on but not doing it alone with guide til day 3. SOC complete. no other questions or concerns this visit.     Saline administered: 20 (ml)    Supply Check:   Does the patient have all the supplies they need for the next visit?  Yes    Next visit plan: Tomorrow 3/3/25. TPN starts at 1900    Goldie Vieyra RN 3/2/2025

## 2025-03-03 NOTE — PROGRESS NOTES
"Clinic Care Coordination Contact  Transitions of Care Outreach  Chief Complaint   Patient presents with    Clinic Care Coordination - Post Hospital       Most Recent Admission Date: 2/18/2025   Most Recent Admission Diagnosis: Bowel perforation (H) - K63.1     Most Recent Discharge Date: 3/2/2025   Most Recent Discharge Diagnosis: Bowel perforation (H) - K63.1  SBO (small bowel obstruction) (H) - K56.609  Ovarian cancer, right (H) - C56.1     Transitions of Care Assessment    Discharge Assessment  How are you doing now that you are home?: \" Doing alittle better still have pain \"  How are your symptoms? (Red Flag symptoms escalate to triage hotline per guidelines): Improved, Unchanged  Do you know how to contact your clinic care team if you have future questions or changes to your health status? : Yes  Does the patient have their discharge instructions? : Yes  Does the patient have questions regarding their discharge instructions? : No  Were you started on any new medications or were there changes to any of your previous medications? : Yes  Does the patient have all of their medications?: Yes  Do you have questions regarding any of your medications? : No  Do you have all of your needed medical supplies or equipment (DME)?  (i.e. oxygen tank, CPAP, cane, etc.): Yes    Post-op ([facility] CTA Only)  If the patient had a surgery or procedure, do they have any questions for a nurse?: No             Follow up Plan     Discharge Follow-Up  Discharge follow up appointment scheduled in alignment with recommended follow up timeframe or Transitions of Risk Category? (Low = within 30 days; Moderate= within 14 days; High= within 7 days): Yes  Discharge Follow Up Appointment Date: 03/04/25  Discharge Follow Up Appointment Scheduled with?: Specialty Care Provider    Future Appointments   Date Time Provider Department Center   3/3/2025  3:00 PM Goldie Vieyra RN [facility]   3/4/2025  1:00 PM Charis Patino MD [facility] "   3/17/2025  9:30 AM Echo Sanz APRN CNP [facility]   3/18/2025  7:45 AM [facility] LAB [facility]   3/19/2025  9:00 AM [facility] INFUSION CHAIR [facility]   4/9/2025  2:15 PM [facility]   4/9/2025  3:20 PM [facility]   4/10/2025  7:00 AM Echo Sanz APRN CNP [facility]   4/10/2025 10:00 AM [facility] INFUSION CHAIR [facility]       Outpatient Plan as outlined on AVS reviewed with patient.    For any urgent concerns, please contact our 24 hour nurse triage line: [phone] ([phone])       Genesis Garcia MA

## 2025-03-03 NOTE — PROGRESS NOTES
Nursing Visit Note:  Nurse visit today for 2nd TPN teach, lab draw  for Jacki Smith.     present during visit today: Not Applicable.    Intravenous Access:  Labs drawn without difficulty. and Implanted Port.    Education Provided:     Patient Education focused on TPN teach.    and Lab-Only Nursing Note    Labs obtained via PAC    Time Specimen drawn: 1530    Last dose (if applicable): No    Facility sent to: [facility] Tracking number: [id number]    Note: Patient alert and oriented and fatigued. VSS. daughter and  present. 2nd TPN teach completed with daughter able to return demonstration without difficulty. her  helped too but isn t planning to give it to the patient moving forward without the help of their daughters. labs drawn with good flushing and blood return. they feel comfortable with administering TPN moving forward and will call with questions or concerns. Home care [facility] will be doing their initial visit on Wednesday.    Saline administered: 40 (ml)    Supply Check:   Does the patient have all the supplies they need for the next visit?  No, Order placed for port shower covers    Next visit plan: as needed    Goldie Vieyra RN 3/3/2025

## 2025-03-04 PROCEDURE — B4178 PARENTERAL SOL AMINO ACID >: HCPCS

## 2025-03-04 PROCEDURE — S9366 HIT TPN 2 LITER DIEM: HCPCS

## 2025-03-04 PROCEDURE — B9004 PARENTERAL INFUS PUMP PORTAB: HCPCS | Mod: RR

## 2025-03-04 PROCEDURE — A4217 STERILE WATER/SALINE, 500 ML: HCPCS

## 2025-03-04 PROCEDURE — B4185 PARENTERAL SOL 10 GM LIPIDS: HCPCS

## 2025-03-05 ENCOUNTER — HOME INFUSION (OUTPATIENT)
Dept: HOME HEALTH SERVICES | Facility: HOME HEALTH | Age: 60
End: 2025-03-05
Payer: COMMERCIAL

## 2025-03-05 ENCOUNTER — MEDICAL CORRESPONDENCE (OUTPATIENT)
Dept: HOME HEALTH SERVICES | Facility: HOME HEALTH | Age: 60
End: 2025-03-05
Payer: COMMERCIAL

## 2025-03-05 DIAGNOSIS — C56.1 OVARIAN CANCER, RIGHT (H): ICD-10-CM

## 2025-03-05 DIAGNOSIS — C56.1 OVARIAN CANCER, RIGHT (H): Primary | ICD-10-CM

## 2025-03-05 PROCEDURE — 99601 HOME NFS VISIT <2 HRS: CPT

## 2025-03-05 PROCEDURE — S9366 HIT TPN 2 LITER DIEM: HCPCS

## 2025-03-05 PROCEDURE — B9004 PARENTERAL INFUS PUMP PORTAB: HCPCS | Mod: RR

## 2025-03-06 ENCOUNTER — HOME INFUSION BILLING (OUTPATIENT)
Dept: HOME HEALTH SERVICES | Facility: HOME HEALTH | Age: 60
End: 2025-03-06
Payer: COMMERCIAL

## 2025-03-06 PROCEDURE — B9004 PARENTERAL INFUS PUMP PORTAB: HCPCS | Mod: RR

## 2025-03-06 PROCEDURE — E1399 DURABLE MEDICAL EQUIPMENT MI: HCPCS

## 2025-03-06 PROCEDURE — A4215 STERILE NEEDLE: HCPCS

## 2025-03-06 PROCEDURE — S9366 HIT TPN 2 LITER DIEM: HCPCS

## 2025-03-07 PROCEDURE — S9366 HIT TPN 2 LITER DIEM: HCPCS

## 2025-03-07 PROCEDURE — B4178 PARENTERAL SOL AMINO ACID >: HCPCS

## 2025-03-07 PROCEDURE — B4185 PARENTERAL SOL 10 GM LIPIDS: HCPCS

## 2025-03-07 PROCEDURE — A4217 STERILE WATER/SALINE, 500 ML: HCPCS

## 2025-03-07 PROCEDURE — B9004 PARENTERAL INFUS PUMP PORTAB: HCPCS | Mod: RR

## 2025-03-08 PROCEDURE — S9366 HIT TPN 2 LITER DIEM: HCPCS

## 2025-03-08 PROCEDURE — B9004 PARENTERAL INFUS PUMP PORTAB: HCPCS | Mod: RR

## 2025-03-09 PROCEDURE — S9366 HIT TPN 2 LITER DIEM: HCPCS

## 2025-03-09 PROCEDURE — B9004 PARENTERAL INFUS PUMP PORTAB: HCPCS | Mod: RR

## 2025-03-10 PROCEDURE — B9004 PARENTERAL INFUS PUMP PORTAB: HCPCS | Mod: RR

## 2025-03-10 PROCEDURE — S9366 HIT TPN 2 LITER DIEM: HCPCS

## 2025-03-11 ENCOUNTER — LAB REQUISITION (OUTPATIENT)
Dept: LAB | Facility: HOSPITAL | Age: 60
End: 2025-03-11
Payer: COMMERCIAL

## 2025-03-11 ENCOUNTER — MYC MEDICAL ADVICE (OUTPATIENT)
Dept: ONCOLOGY | Facility: CLINIC | Age: 60
End: 2025-03-11

## 2025-03-11 DIAGNOSIS — C56.1 MALIGNANT NEOPLASM OF RIGHT OVARY (H): ICD-10-CM

## 2025-03-11 LAB
ALBUMIN SERPL BCG-MCNC: 3.6 G/DL (ref 3.5–5.2)
ALP SERPL-CCNC: 381 U/L (ref 40–150)
ALT SERPL W P-5'-P-CCNC: 30 U/L (ref 0–50)
ANION GAP SERPL CALCULATED.3IONS-SCNC: 11 MMOL/L (ref 7–15)
AST SERPL W P-5'-P-CCNC: 42 U/L (ref 0–45)
BASOPHILS # BLD AUTO: 0 10E3/UL (ref 0–0.2)
BASOPHILS NFR BLD AUTO: 1 %
BILIRUB DIRECT SERPL-MCNC: 0.18 MG/DL (ref 0–0.3)
BILIRUB SERPL-MCNC: 0.3 MG/DL
BUN SERPL-MCNC: 27.3 MG/DL (ref 8–23)
CALCIUM SERPL-MCNC: 9.8 MG/DL (ref 8.8–10.4)
CHLORIDE SERPL-SCNC: 99 MMOL/L (ref 98–107)
CREAT SERPL-MCNC: 0.59 MG/DL (ref 0.51–0.95)
EGFRCR SERPLBLD CKD-EPI 2021: >90 ML/MIN/1.73M2
EOSINOPHIL # BLD AUTO: 0.3 10E3/UL (ref 0–0.7)
EOSINOPHIL NFR BLD AUTO: 5 %
ERYTHROCYTE [DISTWIDTH] IN BLOOD BY AUTOMATED COUNT: 14.6 % (ref 10–15)
FASTING STATUS PATIENT QL REPORTED: ABNORMAL
FASTING STATUS PATIENT QL REPORTED: NORMAL
GLUCOSE SERPL-MCNC: 95 MG/DL (ref 70–99)
HCO3 SERPL-SCNC: 25 MMOL/L (ref 22–29)
HCT VFR BLD AUTO: 32.7 % (ref 35–47)
HGB BLD-MCNC: 10.3 G/DL (ref 11.7–15.7)
IMM GRANULOCYTES # BLD: 0 10E3/UL
IMM GRANULOCYTES NFR BLD: 1 %
LYMPHOCYTES # BLD AUTO: 1.4 10E3/UL (ref 0.8–5.3)
LYMPHOCYTES NFR BLD AUTO: 25 %
MAGNESIUM SERPL-MCNC: 2 MG/DL (ref 1.7–2.3)
MCH RBC QN AUTO: 30.1 PG (ref 26.5–33)
MCHC RBC AUTO-ENTMCNC: 31.5 G/DL (ref 31.5–36.5)
MCV RBC AUTO: 96 FL (ref 78–100)
MONOCYTES # BLD AUTO: 0.4 10E3/UL (ref 0–1.3)
MONOCYTES NFR BLD AUTO: 8 %
NEUTROPHILS # BLD AUTO: 3.3 10E3/UL (ref 1.6–8.3)
NEUTROPHILS NFR BLD AUTO: 61 %
NRBC # BLD AUTO: 0 10E3/UL
NRBC BLD AUTO-RTO: 0 /100
PHOSPHATE SERPL-MCNC: 3.7 MG/DL (ref 2.5–4.5)
PLATELET # BLD AUTO: 269 10E3/UL (ref 150–450)
POTASSIUM SERPL-SCNC: 4.6 MMOL/L (ref 3.4–5.3)
PROT SERPL-MCNC: 7.2 G/DL (ref 6.4–8.3)
RBC # BLD AUTO: 3.42 10E6/UL (ref 3.8–5.2)
SODIUM SERPL-SCNC: 135 MMOL/L (ref 135–145)
TRIGL SERPL-MCNC: 67 MG/DL
WBC # BLD AUTO: 5.5 10E3/UL (ref 4–11)

## 2025-03-11 PROCEDURE — 85025 COMPLETE CBC W/AUTO DIFF WBC: CPT | Performed by: OBSTETRICS & GYNECOLOGY

## 2025-03-11 PROCEDURE — 84100 ASSAY OF PHOSPHORUS: CPT | Performed by: OBSTETRICS & GYNECOLOGY

## 2025-03-11 PROCEDURE — 99601 HOME NFS VISIT <2 HRS: CPT

## 2025-03-11 PROCEDURE — 83735 ASSAY OF MAGNESIUM: CPT | Performed by: OBSTETRICS & GYNECOLOGY

## 2025-03-11 PROCEDURE — S9366 HIT TPN 2 LITER DIEM: HCPCS

## 2025-03-11 PROCEDURE — 82248 BILIRUBIN DIRECT: CPT | Performed by: OBSTETRICS & GYNECOLOGY

## 2025-03-11 PROCEDURE — B9004 PARENTERAL INFUS PUMP PORTAB: HCPCS | Mod: RR

## 2025-03-11 PROCEDURE — 80053 COMPREHEN METABOLIC PANEL: CPT | Performed by: OBSTETRICS & GYNECOLOGY

## 2025-03-11 PROCEDURE — 84478 ASSAY OF TRIGLYCERIDES: CPT | Performed by: OBSTETRICS & GYNECOLOGY

## 2025-03-12 ENCOUNTER — MEDICAL CORRESPONDENCE (OUTPATIENT)
Dept: HOME HEALTH SERVICES | Facility: HOME HEALTH | Age: 60
End: 2025-03-12
Payer: COMMERCIAL

## 2025-03-12 ENCOUNTER — ENROLLMENT (OUTPATIENT)
Dept: HOME HEALTH SERVICES | Facility: HOME HEALTH | Age: 60
End: 2025-03-12
Payer: COMMERCIAL

## 2025-03-12 PROCEDURE — B9004 PARENTERAL INFUS PUMP PORTAB: HCPCS | Mod: RR

## 2025-03-12 PROCEDURE — S9366 HIT TPN 2 LITER DIEM: HCPCS

## 2025-03-13 ENCOUNTER — HOME INFUSION (OUTPATIENT)
Dept: HOME HEALTH SERVICES | Facility: HOME HEALTH | Age: 60
End: 2025-03-13
Payer: COMMERCIAL

## 2025-03-13 ENCOUNTER — HOME INFUSION BILLING (OUTPATIENT)
Dept: HOME HEALTH SERVICES | Facility: HOME HEALTH | Age: 60
End: 2025-03-13
Payer: COMMERCIAL

## 2025-03-13 PROCEDURE — S9366 HIT TPN 2 LITER DIEM: HCPCS

## 2025-03-13 PROCEDURE — B9004 PARENTERAL INFUS PUMP PORTAB: HCPCS | Mod: RR

## 2025-03-13 PROCEDURE — E1399 DURABLE MEDICAL EQUIPMENT MI: HCPCS

## 2025-03-13 PROCEDURE — A4215 STERILE NEEDLE: HCPCS

## 2025-03-13 NOTE — PROGRESS NOTES
[facility]  Start of Care/Resumption of Care Note    [facility] Change in Therapy    DX:  Ovarian cancer, right (H) [C56.1]     Therapy:sodium chloride 0.9 % 500 mL via elastomeric pump     Next Dose Due: 3/14    Delivery plan: today with refill      Nursing plan: Agency: [facility] . Orders faxed to [facility].    Teaching Plan: Liaison Teach Done?: NA    Other Info: Educated patient on administration and pt does not need visit for teach.     Yoli Elizabeth RN 03/13/25

## 2025-03-14 PROCEDURE — S9374 HIT HYDRA 1 LITER DIEM: HCPCS | Mod: SH

## 2025-03-14 PROCEDURE — S9366 HIT TPN 2 LITER DIEM: HCPCS

## 2025-03-14 PROCEDURE — B9004 PARENTERAL INFUS PUMP PORTAB: HCPCS | Mod: RR

## 2025-03-14 PROCEDURE — B4178 PARENTERAL SOL AMINO ACID >: HCPCS

## 2025-03-14 PROCEDURE — B4185 PARENTERAL SOL 10 GM LIPIDS: HCPCS

## 2025-03-14 PROCEDURE — A4217 STERILE WATER/SALINE, 500 ML: HCPCS

## 2025-03-14 NOTE — PROGRESS NOTES
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  She presents today for follow up and disease management.      Oncology History:  22: Presented to an ED in [place] for evaluation of abdominal bloating and discomfort.  -Abdomen, pelvic CT: Radiographic Stage CAL ovarian cancer.   22: [id number].   22: Pelvic ultrasound: c/w metastatic cancer.  22: Pelvic exam under anesthesia, diagnostic laparoscopy, biopsies, evacuation of ascites.   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      22, 22, 22: Cycles 1-3 of neoadjuvant chemotherapy with IV carboplatin + paclitaxel 175 mg/m2 every 21 days.   -Cycles 1,2: Carboplatin AUC 6.   -Cycle 3: Carboplatin AUC 5 with dose-reduction due to thrombcytopenia.   -22: Chest, abdomen, pelvic CT: Partial response.   22: Pelvic exam under anesthesia, diagnostic laparoscopy, conversion to laparotomy for optimal interval tumor debulking to no gross residual disease including peritoneal and diaphragm biopsies, bilateral salpingo-oophorectomy, infragastric omentectomy, bilateral hemidiaphragm stripping, peritoneal and mesenteric argon beam ablation, appendectomy.   -Pathology: High-grade serous carcinoma involving all resected specimens.   [facility] Testing Results.  -Genomic ROXI low (6%)   -TMB low (1mut/Mb)  -Microsatellite stable/Mismatch Repair proficient  -PD-L1- (CPS 0%)  -NTRK 1/2/3 fusion not detected.   -ER-, MI+  -Genomic alterations in:  --TP53  -No genomic alterations in BRCA1,2.  -FOLR1+ (2+, 75%).      Jamila-ovary clinical trial screening: Negative for the immunoreactive subtype.      9/28/22, 10/19/22, 11/17/22: Cycles 4-6 of first-line chemotherapy with IV carboplatin AUC 5 + paclitaxel 175 mg/m2.  -12/2/22: Chest, abdomen, pelvic CT: No definitive evidence of disease.   -CA-125 439>>23.   -2/16/23: Chest, abdomen, pelvic CT to evaluate bloating: Stable findings, no definitive evidence of disease.    -5/25/23: Admitted  to [facility] for management of malignant ascites s/p therapeutic paracentesis, and partial SBO resolved with conservative management.   5/25/23: Abdomen, pelvic CT: Progressive disease.      10/5/22: [facility], reflexed to Common Hereditary Cancer Panel.   -No identifiable germline variants identified in ABRAXAS1, AKT1, APC, DEION, AXIN2, BARD1, BMPR1A, BRCA1, BRCA2, BRIP1, CDC73, CDH1, CDK4, CDKN2A, CHEK2, CTNNA1, DICER1, EPCAM, FANCC, FANCM, GREM1, HOXB13, KIT, MEN1, MLH1, MRE11, MSH2, MSH6, MUTYH, NBN, NF1, NTHL1, PALB2, PDGFRA, PIK3CA, PMS2, POLD1, POLE, PTEN, RAD50, RAD51C, RAD51D, RECQL, RINT1, SDHA, SDHB, SDHC, SDHD, SMAD4, SMARCA4, STK11, TP53, TSC1, TSC2, VHL, XRCC2.   -Variant of uncertain significance in MSH3 c.16C>T (p.[id number])     6/13/23, 7/12/23, 8/14/23, 9/13/23, 10/11/23, 11/8/23, 12/6/23, 1/3/24, 2/1/24, 2/29/24, 3/28/24: Cycles 1-11 of second-line chemotherapy with IV carboplatin AUC 5 + pegylated liposomal doxorubicin (PLD) 30 mg/m2 every 28 days.   -9/27/23: Chest, abdomen, pelvic CT: Partial response. Catheter-associated thrombus.   -1/23/24: Chest, abdomen, pelvic CT: Stable disease.   -4/10/24: Chest, abdomen, pelvic CT: Stable disease.   -CA-125 78>>44.   5/10/24-1/24/25: Second- PARPi therapy with olaparib.   -Cycles 1-2: Olaparib 300 mg BID.   -Cycles 3+: Olaparib 200 mg BID with dose-reduction due to decreased creatinine clearance.   -7/5/24: Chest, abdomen, pelvic CT: Stable disease.   -10/15/24: Chest, abdomen, pelvic CT: Stable disease.   -CA-125 38>>110.     2/6/25: Third-line therapy with IV carboplatin AUC  5 + paclitaxel 135 mg/m2 + bevacizumab 15 mg/kg.  340.  -2/11/25-3/2/25: Complicated by bevacizumab-induced microperforation of the proximal small bowel. Managed with drain placement. Subsequent SBO attributed to post-perforation inflammation. Palliative venting G-tube placed, TPN initiated.     Plan: Continue third-line therapy with IV carboplatin AUC 5 + paclitaxel 135 mg/m2 +every 3 weeks with permanent discontinuation of bevacizumab.     3/19/25 Cycle 2 carboplatin and paclitaxel planned.   pending.              Today she comes to clinic with her .  She is continuing to feel slowly better but is frustrated by lack of progress in her bowel function.    -Abdominal pain/bloating: She has abdominal pain at her GT site with drainage.  She will have discomfort after drinking and will need to put the GT to gravity.  Using tylenol during the day to manage GT site pain and 1 flexeril at bedtime.  She needs a refill of flexeril.    -Appetite: She is hungry but has not been able to tolerate PO  -Weight loss: She has lost 13 lbs since her first cycle of chemo but weight has been stable since hospital discharge  -Bowel/bladder habits: She is only able to pass small amounts of hard stool every 4-5 days.  She is urinating without difficulty but will leak urine upon standing after voiding.  She was seen at  on Sat with no sign of UTI on UA  -Leg swelling: No  -Fevers: No  -Chest pain: No  -SOB: No SOB, some DANIELLE with stairs  -She is trying to walk as much as possible                Past Medical History:    Past Medical History:   Diagnosis Date    Female stress incontinence     Ovarian cancer, right (H) 06/16/2022    Stage IIIC high-grade serous carcinoma    Recurrent cold sores          Past Surgical History:    Past Surgical History:   Procedure Laterality Date    APPENDECTOMY  08/29/2022    Part of ovarian cancer tumor debulking    BLADDER SUSPENSION  08/13/2009    ENDOSCOPIC INSERTION TUBE GASTROSTOMY  N/A 2/28/2025    Procedure: INSERTION, PEG TUBE (venting);  Surgeon: Porfirio Saunders MD;  Location: [facility] OR    HYSTERECTOMY  08/13/2009    For prolapse    IR PARACENTESIS  6/6/2022    IR PARACENTESIS  6/14/2022    IR PARACENTESIS  5/26/2023    IR PARACENTESIS  2/26/2025    IR RETROPERITONEAL ABSCESS DRAINAGE  2/18/2025    LAPAROSCOPY DIAGNOSTIC (GYN)  06/16/2022    SALPINGO-OOPHORECTOMY, COMBINED Bilateral 08/29/2022    Pelvic exam under anesthesia, diagnostic laparoscopy, conversion to laparotomy for optimal interval tumor debulking to no gross residual disease including peritoneal and diaphragm biopsies, bilateral salpingo-oophorectomy, infragastric omentectomy, bilateral hemidiaphragm stripping, peritoneal and mesenteric argon beam ablation, appendectomy.    TONSILLECTOMY  1973    TUBAL LIGATION Bilateral 09/01/1998         Health Maintenance Due   Topic Date Due    ADVANCE CARE PLANNING  Never done    YEARLY PREVENTIVE VISIT  Never done    COLORECTAL CANCER SCREENING  Never done    HIV SCREENING  Never done    HEPATITIS C SCREENING  Never done    Pneumococcal Vaccine: 50+ Years (1 of 2 - PCV) Never done    ZOSTER IMMUNIZATION (1 of 2) Never done    HEPATITIS B IMMUNIZATION (1 of 3 - 19+ 3-dose series) Never done    PAP  Never done    LIPID  Never done    INFLUENZA VACCINE (1) 09/01/2024    COVID-19 Vaccine (5 - 2024-25 season) 09/01/2024    PHQ-2 (once per calendar year)  Never done       Current Medications:     Current Outpatient Medications   Medication Sig Dispense Refill    acetaminophen (TYLENOL) 500 MG tablet Take 500 mg by mouth every 6 hours as needed for mild pain.      cyclobenzaprine (FLEXERIL) 5 MG tablet Take 1-2 tablets (5-10 mg) by mouth 3 times daily as needed for muscle spasms. 40 tablet 1    diphenhydrAMINE-acetaminophen (TYLENOL PM)  MG tablet Take 1 tablet by mouth nightly as needed for sleep.      docusate sodium (DSS) 100 MG capsule Take 100 mg by mouth 2 times daily as needed  "for constipation.      Emergency Supply Kit, Central, Patient use for emergency only. Contents: 3 sodium chloride 0.9% flushes, 1 dressing kit, 1 microclave ext set 14\", 4 nitrile gloves (med), 6 alcohol prep pads, 1 bacitracin, 1 syringe (10 cc 20 G 1\"). Call [phone] to reorder. [id number] kit 0    escitalopram (LEXAPRO) 20 MG tablet Take 20 mg by mouth daily.      famotidine (PEPCID) 20 MG tablet Take 20 mg by mouth 2 times daily as needed.      hydrOXYzine HCl (ATARAX) 25 MG tablet Take 1-2 tablets (25-50 mg) by mouth every 6 hours as needed for anxiety. 30 tablet 0    Lidocaine (LIDOCARE) 4 % Patch Place 1 patch over 12 hours onto the skin daily as needed for moderate pain. To prevent lidocaine toxicity, patient should be patch free for 12 hrs daily. 6 patch 0    lidocaine-prilocaine (EMLA) 2.5-2.5 % external cream Apply topically as needed for moderate pain 30 g 1    LORazepam (ATIVAN) 0.5 MG tablet Take 0.5-1 mg by mouth every 6 hours as needed for anxiety.      Multiple Vitamin (INFUVITE ADULT) injection Add to infusion 10 mLs daily. Select 2 multivitamin vials, one of each color top. Draw up 5 mL from each vial and add to 1 TPN bag daily immediately prior to infusing.   Discard remainder of vials. 3600 mL 0    Multiple Vitamin (MULTI-VITAMIN DAILY PO) Take 1 tablet by mouth daily      omeprazole (PRILOSEC) 20 MG DR capsule Take 20 mg by mouth daily.      ondansetron (ZOFRAN) 8 MG tablet Take 1 tablet (8 mg) by mouth every 8 hours as needed for nausea (vomiting). Do not take for 3 days after chemotherapy. 30 tablet 2    parenteral nutrition - DUALCHAMBER - see order for formula Infuse 1,880 mLs over 12 hours into the vein (central line) daily. Taper up for 1 Hours. Taper down for 1 Hours.   TPN additives to be added prior to administration:   Infuvite-Adult 10 mL daily.  Plateau rate: 171 mL/hr.  KVO: 5 mL/hr. 969292 mL 0    Port Access Kit For nurse use only.  Do not remove items from bag.  Use for port " access.  Do not place syringe on sterile field. [id number] kit 0    prochlorperazine (COMPAZINE) 10 MG tablet Take 1 tablet (10 mg) by mouth every 6 hours as needed for nausea or vomiting. 30 tablet 2    sodium chloride 0.9 % 500 mL via elastomeric pump Infuse 1,000 mLs into the vein daily as needed (dehydration). Infuse 1- 2 elastomeric pump(s) ( 500-1000 ml) . Each elastomeric to infuse over 2 hours. 080316 mL 0    sodium chloride, PF, 0.9% PF flush Inject 10 mLs into the vein as needed for line flush. Flush IV before and after med administration as directed and/or at least every 24 hours, or prior to deaccessing for no further use and/or at least every 4 weeks when not accessed. 260047 mL 0    valACYclovir (VALTREX) 1000 mg tablet Take 1,000 mg by mouth 2 times daily as needed.           Allergies:      No Known Allergies     Social History:     Social History     Tobacco Use    Smoking status: Former     Current packs/day: 0.00     Average packs/day: 1 pack/day for 42.0 years (42.0 ttl pk-yrs)     Types: Cigarettes     Start date: 1980     Quit date: 2022     Years since quittin.8    Smokeless tobacco: Never   Substance Use Topics    Alcohol use: Yes     Comment: Beer ~Q3 months.       History   Drug Use Unknown         Family History:     The patient's family history is notable for:    Family History   Problem Relation Age of Onset    Prostate Cancer Father     Breast Cancer Sister 48    Melanoma Sister     Skin Cancer Sister     Colon Cancer Maternal Grandmother          Physical Exam:     /65 (BP Location: Right arm, Patient Position: Sitting, Cuff Size: Adult Regular)   Pulse 83   Temp 97.5  F (36.4  C) (Oral)   Resp 18   Wt 58.2 kg (128 lb 6.4 oz)   SpO2 99%   BMI 23.70 kg/m    Body mass index is 23.7 kg/m .    General Appearance: alert, no distress     HEENT: no palpable nodules or masses        Cardiovascular: regular rate and rhythm, no gallops, rubs or  murmurs     Respiratory: lungs clear, no rales, rhonchi or wheezes    Musculoskeletal: extremities non tender and without edema    Skin: no lesions or rashes     Neurological: normal gait, no gross defects     Psychiatric: appropriate mood and affect                               Hematological: normal cervical and supraclavicular lymph nodes     Gastrointestinal:       abdomen soft, non-tender, non-distended    Genitourinary: Deferred.     Recent Results (from the past 24 hours)   CBC with platelets and differential    Collection Time: 03/17/25  9:22 AM   Result Value Ref Range    WBC Count 6.1 4.0 - 11.0 10e3/uL    RBC Count 3.48 (L) 3.80 - 5.20 10e6/uL    Hemoglobin 10.6 (L) 11.7 - 15.7 g/dL    Hematocrit 32.8 (L) 35.0 - 47.0 %    MCV 94 78 - 100 fL    MCH 30.5 26.5 - 33.0 pg    MCHC 32.3 31.5 - 36.5 g/dL    RDW 14.5 10.0 - 15.0 %    Platelet Count 239 150 - 450 10e3/uL    % Neutrophils 67 %    % Lymphocytes 21 %    % Monocytes 9 %    % Eosinophils 3 %    % Basophils 1 %    % Immature Granulocytes 1 %    NRBCs per 100 WBC 0 <1 /100    Absolute Neutrophils 4.0 1.6 - 8.3 10e3/uL    Absolute Lymphocytes 1.3 0.8 - 5.3 10e3/uL    Absolute Monocytes 0.5 0.0 - 1.3 10e3/uL    Absolute Eosinophils 0.2 0.0 - 0.7 10e3/uL    Absolute Basophils 0.0 0.0 - 0.2 10e3/uL    Absolute Immature Granulocytes 0.0 <=0.4 10e3/uL    Absolute NRBCs 0.0 10e3/uL   Protein qualitative urine    Collection Time: 03/17/25  9:26 AM   Result Value Ref Range    Protein Albumin Urine Negative Negative mg/dL          Assessment:    Jacki Smith is a 59 year old woman with a diagnosis of 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  She presents today for follow up and disease management.      30 minutes spent on the date of the encounter doing chart review, history and exam, documentation, and further activities as noted above.      Plan:     1.) Ovarian cancer:  OK to proceed with planned treatment Wed if labs are WDL.   Avastin permanently discontinued.  RTC in 3 weeks for labs, provider visit, and next cycle; this is already scheduled.  Plan for CT CAP and follow up with Dr. Patino after cycle 3.  Refill for flexeril sent to her preferred pharmacy.  Reviewed signs and symptoms for when she should contact the clinic or seek additional care.  Patient to contact the clinic with any questions or concerns in the interim.      Genetic risk factors were assessed and she has a VUS No identifiable germline variants identified in ABRAXAS1, AKT1, APC, DEION, AXIN2, BARD1, BMPR1A, BRCA1, BRCA2, BRIP1, CDC73, CDH1, CDK4, CDKN2A, CHEK2, CTNNA1, DICER1, EPCAM, FANCC, FANCM, GREM1, HOXB13, KIT, MEN1, MLH1, MRE11, MSH2, MSH6, MUTYH, NBN, NF1, NTHL1, PALB2, PDGFRA, PIK3CA, PMS2, POLD1, POLE, PTEN, RAD50, RAD51C, RAD51D, RECQL, RINT1, SDHA, SDHB, SDHC, SDHD, SMAD4, SMARCA4, STK11, TP53, TSC1, TSC2, VHL, XRCC2.    Labs and/or tests reviewed include:  CBC. CMP. Mag. .                2.)        Health maintenance:  Issues addressed today include following up with PCP for annual health maintenance and non-gynecologic issues.      3.)        SBO: She continues to need to clamp her GT due to discomfort and nausea with any PO intake.  She is only having small amounts of hard stool every 4-5 days.  Passing some flatus.  -Continue venting G-tube, with clamp trials as tolerated.   -OK to try glycerine suppository.    -Increase time G-tube is clamped, but unclamp immediately if you develop nausea. Start with clear liquids x1 week, then can advance to full liquids x1 week, then soft diet with foods that can fit through th G-tube.  -Continue TPN until able to tolerate enough oral intake.    Echo Sanz, DNP, APRN, FNP-C, AOCNP  Oncology Nurse Practitioner  Division of Gynecologic Oncology  Pager: [phone]     CC  Patient Care Team:  Domenica Christianson MD as PCP - Charis Verdugo MD as MD (Gynecologic Oncology)  Anish Monroy MD as  Assigned Palliative Care Provider  Pratima Vega, RN as Specialty Care Coordinator (Hematology & Oncology)  Myhre, Grace C, [facility] as Pharmacist  Charis Patino MD as Assigned Cancer Care Provider  Charis Patino MD as Home Infusion Following Provider (Gynecologic Oncology)  Clarisse Wells RD as [facility] Registered Dietitian (Dietitian, Registered)  SELF, REFERRED

## 2025-03-15 PROCEDURE — B9004 PARENTERAL INFUS PUMP PORTAB: HCPCS | Mod: RR

## 2025-03-15 PROCEDURE — S9374 HIT HYDRA 1 LITER DIEM: HCPCS | Mod: SH

## 2025-03-15 PROCEDURE — S9366 HIT TPN 2 LITER DIEM: HCPCS

## 2025-03-16 PROCEDURE — S9374 HIT HYDRA 1 LITER DIEM: HCPCS | Mod: SH

## 2025-03-16 PROCEDURE — S9366 HIT TPN 2 LITER DIEM: HCPCS

## 2025-03-16 PROCEDURE — B9004 PARENTERAL INFUS PUMP PORTAB: HCPCS | Mod: RR

## 2025-03-17 ENCOUNTER — APPOINTMENT (OUTPATIENT)
Dept: LAB | Facility: CLINIC | Age: 60
End: 2025-03-17
Attending: OBSTETRICS & GYNECOLOGY
Payer: COMMERCIAL

## 2025-03-17 ENCOUNTER — ONCOLOGY VISIT (OUTPATIENT)
Dept: ONCOLOGY | Facility: CLINIC | Age: 60
End: 2025-03-17
Attending: NURSE PRACTITIONER
Payer: COMMERCIAL

## 2025-03-17 VITALS
BODY MASS INDEX: 23.7 KG/M2 | HEART RATE: 83 BPM | SYSTOLIC BLOOD PRESSURE: 114 MMHG | DIASTOLIC BLOOD PRESSURE: 65 MMHG | OXYGEN SATURATION: 99 % | WEIGHT: 128.4 LBS | RESPIRATION RATE: 18 BRPM | TEMPERATURE: 97.5 F

## 2025-03-17 DIAGNOSIS — C56.1 OVARIAN CANCER, RIGHT (H): Primary | ICD-10-CM

## 2025-03-17 DIAGNOSIS — Z51.11 ENCOUNTER FOR ANTINEOPLASTIC CHEMOTHERAPY: ICD-10-CM

## 2025-03-17 LAB
ALBUMIN SERPL BCG-MCNC: 3.7 G/DL (ref 3.5–5.2)
ALBUMIN UR-MCNC: NEGATIVE MG/DL
ALP SERPL-CCNC: 327 U/L (ref 40–150)
ALT SERPL W P-5'-P-CCNC: 22 U/L (ref 0–50)
ANION GAP SERPL CALCULATED.3IONS-SCNC: 11 MMOL/L (ref 7–15)
AST SERPL W P-5'-P-CCNC: 30 U/L (ref 0–45)
BASOPHILS # BLD AUTO: 0 10E3/UL (ref 0–0.2)
BASOPHILS NFR BLD AUTO: 1 %
BILIRUB SERPL-MCNC: 0.3 MG/DL
BUN SERPL-MCNC: 32.3 MG/DL (ref 8–23)
CALCIUM SERPL-MCNC: 9.5 MG/DL (ref 8.8–10.4)
CANCER AG125 SERPL-ACNC: 235 U/ML
CHLORIDE SERPL-SCNC: 101 MMOL/L (ref 98–107)
CREAT SERPL-MCNC: 0.61 MG/DL (ref 0.51–0.95)
EGFRCR SERPLBLD CKD-EPI 2021: >90 ML/MIN/1.73M2
EOSINOPHIL # BLD AUTO: 0.2 10E3/UL (ref 0–0.7)
EOSINOPHIL NFR BLD AUTO: 3 %
ERYTHROCYTE [DISTWIDTH] IN BLOOD BY AUTOMATED COUNT: 14.5 % (ref 10–15)
GLUCOSE SERPL-MCNC: 102 MG/DL (ref 70–99)
HCO3 SERPL-SCNC: 24 MMOL/L (ref 22–29)
HCT VFR BLD AUTO: 32.8 % (ref 35–47)
HGB BLD-MCNC: 10.6 G/DL (ref 11.7–15.7)
IMM GRANULOCYTES # BLD: 0 10E3/UL
IMM GRANULOCYTES NFR BLD: 1 %
LYMPHOCYTES # BLD AUTO: 1.3 10E3/UL (ref 0.8–5.3)
LYMPHOCYTES NFR BLD AUTO: 21 %
MAGNESIUM SERPL-MCNC: 2 MG/DL (ref 1.7–2.3)
MCH RBC QN AUTO: 30.5 PG (ref 26.5–33)
MCHC RBC AUTO-ENTMCNC: 32.3 G/DL (ref 31.5–36.5)
MCV RBC AUTO: 94 FL (ref 78–100)
MONOCYTES # BLD AUTO: 0.5 10E3/UL (ref 0–1.3)
MONOCYTES NFR BLD AUTO: 9 %
NEUTROPHILS # BLD AUTO: 4 10E3/UL (ref 1.6–8.3)
NEUTROPHILS NFR BLD AUTO: 67 %
NRBC # BLD AUTO: 0 10E3/UL
NRBC BLD AUTO-RTO: 0 /100
PLATELET # BLD AUTO: 239 10E3/UL (ref 150–450)
POTASSIUM SERPL-SCNC: 4.9 MMOL/L (ref 3.4–5.3)
PROT SERPL-MCNC: 7.3 G/DL (ref 6.4–8.3)
RBC # BLD AUTO: 3.48 10E6/UL (ref 3.8–5.2)
SODIUM SERPL-SCNC: 136 MMOL/L (ref 135–145)
WBC # BLD AUTO: 6.1 10E3/UL (ref 4–11)

## 2025-03-17 PROCEDURE — 81003 URINALYSIS AUTO W/O SCOPE: CPT | Performed by: OBSTETRICS & GYNECOLOGY

## 2025-03-17 PROCEDURE — 250N000011 HC RX IP 250 OP 636: Performed by: NURSE PRACTITIONER

## 2025-03-17 PROCEDURE — 36591 DRAW BLOOD OFF VENOUS DEVICE: CPT | Performed by: OBSTETRICS & GYNECOLOGY

## 2025-03-17 PROCEDURE — 85004 AUTOMATED DIFF WBC COUNT: CPT | Performed by: OBSTETRICS & GYNECOLOGY

## 2025-03-17 PROCEDURE — 84155 ASSAY OF PROTEIN SERUM: CPT | Performed by: OBSTETRICS & GYNECOLOGY

## 2025-03-17 PROCEDURE — B9004 PARENTERAL INFUS PUMP PORTAB: HCPCS | Mod: RR

## 2025-03-17 PROCEDURE — 85048 AUTOMATED LEUKOCYTE COUNT: CPT | Performed by: OBSTETRICS & GYNECOLOGY

## 2025-03-17 PROCEDURE — S9374 HIT HYDRA 1 LITER DIEM: HCPCS | Mod: SH

## 2025-03-17 PROCEDURE — 99213 OFFICE O/P EST LOW 20 MIN: CPT | Performed by: NURSE PRACTITIONER

## 2025-03-17 PROCEDURE — S9366 HIT TPN 2 LITER DIEM: HCPCS

## 2025-03-17 PROCEDURE — 83735 ASSAY OF MAGNESIUM: CPT | Performed by: OBSTETRICS & GYNECOLOGY

## 2025-03-17 PROCEDURE — 82040 ASSAY OF SERUM ALBUMIN: CPT | Performed by: OBSTETRICS & GYNECOLOGY

## 2025-03-17 PROCEDURE — 86304 IMMUNOASSAY TUMOR CA 125: CPT | Performed by: NURSE PRACTITIONER

## 2025-03-17 RX ORDER — HEPARIN SODIUM,PORCINE 10 UNIT/ML
5-20 VIAL (ML) INTRAVENOUS DAILY PRN
Status: CANCELLED | OUTPATIENT
Start: 2025-03-19

## 2025-03-17 RX ORDER — EPINEPHRINE 1 MG/ML
0.3 INJECTION, SOLUTION INTRAMUSCULAR; SUBCUTANEOUS EVERY 5 MIN PRN
Status: CANCELLED | OUTPATIENT
Start: 2025-03-19

## 2025-03-17 RX ORDER — ALBUTEROL SULFATE 90 UG/1
1-2 INHALANT RESPIRATORY (INHALATION)
Status: CANCELLED
Start: 2025-03-19

## 2025-03-17 RX ORDER — METHYLPREDNISOLONE SODIUM SUCCINATE 40 MG/ML
40 INJECTION INTRAMUSCULAR; INTRAVENOUS
Status: CANCELLED
Start: 2025-03-19

## 2025-03-17 RX ORDER — DIPHENHYDRAMINE HYDROCHLORIDE 50 MG/ML
50 INJECTION, SOLUTION INTRAMUSCULAR; INTRAVENOUS
Status: CANCELLED
Start: 2025-03-19

## 2025-03-17 RX ORDER — HEPARIN SODIUM (PORCINE) LOCK FLUSH IV SOLN 100 UNIT/ML 100 UNIT/ML
5 SOLUTION INTRAVENOUS
Status: CANCELLED | OUTPATIENT
Start: 2025-03-19

## 2025-03-17 RX ORDER — CYCLOBENZAPRINE HCL 5 MG
5-10 TABLET ORAL 3 TIMES DAILY PRN
Qty: 40 TABLET | Refills: 1 | Status: SHIPPED | OUTPATIENT
Start: 2025-03-17

## 2025-03-17 RX ORDER — HEPARIN SODIUM (PORCINE) LOCK FLUSH IV SOLN 100 UNIT/ML 100 UNIT/ML
5 SOLUTION INTRAVENOUS ONCE
Status: COMPLETED | OUTPATIENT
Start: 2025-03-17 | End: 2025-03-17

## 2025-03-17 RX ORDER — LORAZEPAM 2 MG/ML
0.5 INJECTION INTRAMUSCULAR EVERY 4 HOURS PRN
Status: CANCELLED | OUTPATIENT
Start: 2025-03-19

## 2025-03-17 RX ORDER — DIPHENHYDRAMINE HYDROCHLORIDE 50 MG/ML
25 INJECTION, SOLUTION INTRAMUSCULAR; INTRAVENOUS
Status: CANCELLED
Start: 2025-03-19

## 2025-03-17 RX ORDER — MEPERIDINE HYDROCHLORIDE 25 MG/ML
25 INJECTION INTRAMUSCULAR; INTRAVENOUS; SUBCUTANEOUS
Status: CANCELLED | OUTPATIENT
Start: 2025-03-19

## 2025-03-17 RX ORDER — PALONOSETRON 0.05 MG/ML
0.25 INJECTION, SOLUTION INTRAVENOUS ONCE
Status: CANCELLED | OUTPATIENT
Start: 2025-03-19

## 2025-03-17 RX ORDER — DIPHENHYDRAMINE HCL 25 MG
50 CAPSULE ORAL ONCE
Status: CANCELLED
Start: 2025-03-19

## 2025-03-17 RX ORDER — ALBUTEROL SULFATE 0.83 MG/ML
2.5 SOLUTION RESPIRATORY (INHALATION)
Status: CANCELLED | OUTPATIENT
Start: 2025-03-19

## 2025-03-17 RX ADMIN — HEPARIN 3 ML: 100 SYRINGE at 09:25

## 2025-03-17 ASSESSMENT — PAIN SCALES - GENERAL: PAINLEVEL_OUTOF10: MILD PAIN (2)

## 2025-03-17 NOTE — NURSING NOTE
"Oncology Rooming Note    March 17, 2025 9:34 AM   Jacki Smith is a 59 year old female who presents for:    Chief Complaint   Patient presents with    Oncology Clinic Visit     Ovarian cancer    Port Draw     Labs drawn via port by RN. VS taken.     Initial Vitals: /65 (BP Location: Right arm, Patient Position: Sitting, Cuff Size: Adult Regular)   Pulse 83   Temp 97.5  F (36.4  C) (Oral)   Resp 18   Wt 58.2 kg (128 lb 6.4 oz)   SpO2 99%   BMI 23.70 kg/m   Estimated body mass index is 23.7 kg/m  as calculated from the following:    Height as of 2/18/25: 1.567 m (5' 1.71\").    Weight as of this encounter: 58.2 kg (128 lb 6.4 oz). Body surface area is 1.59 meters squared.  Mild Pain (2) Comment: Data Unavailable   No LMP recorded. Patient has had a hysterectomy.  Allergies reviewed: Yes  Medications reviewed: Yes    Medications: MEDICATION REFILLS NEEDED TODAY. Provider was notified.  Pharmacy name entered into Simio:    [id number] - [place], [place] - [address] [facility] - [place], [place] - [address] [place] [address] [facility] - [place], [place] - [address]    Frailty Screening:   Is the patient here for a new oncology consult visit in cancer care? 2. No    PHQ9:  Did this patient require a PHQ9?: No      Clinical concerns: none.      Dario Gerber"

## 2025-03-17 NOTE — LETTER
3/17/2025      Jacki Smith  [address] [facility] [address]      Dear Colleague,    Thank you for referring your patient, Jacki Smith, to the [facility]. Please see a copy of my visit note below.    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  She presents today for follow up and disease management.      Oncology History:  22: Presented to an ED in [place] for evaluation of abdominal bloating and discomfort.  -Abdomen, pelvic CT: Radiographic Stage CAL ovarian cancer.   22: [id number].   22: Pelvic ultrasound: c/w metastatic cancer.  22: Pelvic exam under anesthesia, diagnostic laparoscopy, biopsies, evacuation of ascites.   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      22, 22, 22: Cycles 1-3 of neoadjuvant chemotherapy with IV carboplatin + paclitaxel 175 mg/m2 every 21 days.   -Cycles 1,2: Carboplatin AUC 6.   -Cycle 3: Carboplatin AUC 5 with dose-reduction due to thrombcytopenia.   -22: Chest, abdomen, pelvic CT: Partial response.   22: Pelvic exam under anesthesia, diagnostic laparoscopy, conversion to laparotomy for optimal interval tumor debulking to no gross residual disease including peritoneal and diaphragm biopsies, bilateral salpingo-oophorectomy, infragastric omentectomy, bilateral hemidiaphragm stripping, peritoneal and mesenteric argon beam ablation, appendectomy.   -Pathology: High-grade serous carcinoma involving all resected specimens.   [facility] Testing Results.  -Genomic ROXI low (6%)   -TMB low (1mut/Mb)  -Microsatellite stable/Mismatch Repair  proficient  -PD-L1- (CPS 0%)  -NTRK 1/2/3 fusion not detected.   -ER-, NV+  -Genomic alterations in:  --TP53  -No genomic alterations in BRCA1,2.  -FOLR1+ (2+, 75%).      Jamila-ovary clinical trial screening: Negative for the immunoreactive subtype.     9/28/22, 10/19/22, 11/17/22: Cycles 4-6 of first-line chemotherapy with IV carboplatin AUC 5 + paclitaxel 175 mg/m2.  -12/2/22: Chest, abdomen, pelvic CT: No definitive evidence of disease.   -CA-125 439>>23.   -2/16/23: Chest, abdomen, pelvic CT to evaluate bloating: Stable findings, no definitive evidence of disease.    -5/25/23: Admitted  to [facility] for management of malignant ascites s/p therapeutic paracentesis, and partial SBO resolved with conservative management.   5/25/23: Abdomen, pelvic CT: Progressive disease.      10/5/22: [facility], reflexed to Common Hereditary Cancer Panel.   -No identifiable germline variants identified in ABRAXAS1, AKT1, APC, DEION, AXIN2, BARD1, BMPR1A, BRCA1, BRCA2, BRIP1, CDC73, CDH1, CDK4, CDKN2A, CHEK2, CTNNA1, DICER1, EPCAM, FANCC, FANCM, GREM1, HOXB13, KIT, MEN1, MLH1, MRE11, MSH2, MSH6, MUTYH, NBN, NF1, NTHL1, PALB2, PDGFRA, PIK3CA, PMS2, POLD1, POLE, PTEN, RAD50, RAD51C, RAD51D, RECQL, RINT1, SDHA, SDHB, SDHC, SDHD, SMAD4, SMARCA4, STK11, TP53, TSC1, TSC2, VHL, XRCC2.   -Variant of uncertain significance in MSH3 c.16C>T ([id number])     6/13/23, 7/12/23, 8/14/23, 9/13/23, 10/11/23, 11/8/23, 12/6/23, 1/3/24, 2/1/24, 2/29/24, 3/28/24: Cycles 1-11 of second-line chemotherapy with IV carboplatin AUC 5 + pegylated liposomal doxorubicin (PLD) 30 mg/m2 every 28 days.   -9/27/23: Chest, abdomen, pelvic CT: Partial response. Catheter-associated thrombus.   -1/23/24: Chest, abdomen, pelvic CT: Stable disease.   -4/10/24: Chest, abdomen, pelvic CT: Stable disease.   -CA-125 78>>44.   5/10/24-1/24/25: Second- PARPi therapy with olaparib.   -Cycles 1-2: Olaparib 300 mg BID.   -Cycles 3+: Olaparib 200  mg BID with dose-reduction due to decreased creatinine clearance.   -7/5/24: Chest, abdomen, pelvic CT: Stable disease.   -10/15/24: Chest, abdomen, pelvic CT: Stable disease.   -CA-125 38>>110.     2/6/25: Third-line therapy with IV carboplatin AUC 5 + paclitaxel 135 mg/m2 + bevacizumab 15 mg/kg.  340.  -2/11/25-3/2/25: Complicated by bevacizumab-induced microperforation of the proximal small bowel. Managed with drain placement. Subsequent SBO attributed to post-perforation inflammation. Palliative venting G-tube placed, TPN initiated.     Plan: Continue third-line therapy with IV carboplatin AUC 5 + paclitaxel 135 mg/m2 +every 3 weeks with permanent discontinuation of bevacizumab.     3/19/25 Cycle 2 carboplatin and paclitaxel planned.   pending.              Today she comes to clinic with her .  She is continuing to feel slowly better but is frustrated by lack of progress in her bowel function.    -Abdominal pain/bloating: She has abdominal pain at her GT site with drainage.  She will have discomfort after drinking and will need to put the GT to gravity.  Using tylenol during the day to manage GT site pain and 1 flexeril at bedtime.  She needs a refill of flexeril.    -Appetite: She is hungry but has not been able to tolerate PO  -Weight loss: She has lost 13 lbs since her first cycle of chemo but weight has been stable since hospital discharge  -Bowel/bladder habits: She is only able to pass small amounts of hard stool every 4-5 days.  She is urinating without difficulty but will leak urine upon standing after voiding.  She was seen at  on Sat with no sign of UTI on UA  -Leg swelling: No  -Fevers: No  -Chest pain: No  -SOB: No SOB, some DANIELLE with stairs  -She is trying to walk as much as possible                Past Medical History:    Past Medical History:   Diagnosis Date     Female stress incontinence      Ovarian cancer, right (H) 06/16/2022    Stage IIIC high-grade serous carcinoma      Recurrent cold sores          Past Surgical History:    Past Surgical History:   Procedure Laterality Date     APPENDECTOMY  08/29/2022    Part of ovarian cancer tumor debulking     BLADDER SUSPENSION  08/13/2009     ENDOSCOPIC INSERTION TUBE GASTROSTOMY N/A 2/28/2025    Procedure: INSERTION, PEG TUBE (venting);  Surgeon: Porfirio Saunders MD;  Location: [facility] OR     HYSTERECTOMY  08/13/2009    For prolapse     IR PARACENTESIS  6/6/2022     IR PARACENTESIS  6/14/2022     IR PARACENTESIS  5/26/2023     IR PARACENTESIS  2/26/2025     IR RETROPERITONEAL ABSCESS DRAINAGE  2/18/2025     LAPAROSCOPY DIAGNOSTIC (GYN)  06/16/2022     SALPINGO-OOPHORECTOMY, COMBINED Bilateral 08/29/2022    Pelvic exam under anesthesia, diagnostic laparoscopy, conversion to laparotomy for optimal interval tumor debulking to no gross residual disease including peritoneal and diaphragm biopsies, bilateral salpingo-oophorectomy, infragastric omentectomy, bilateral hemidiaphragm stripping, peritoneal and mesenteric argon beam ablation, appendectomy.     TONSILLECTOMY  1973     TUBAL LIGATION Bilateral 09/01/1998         Health Maintenance Due   Topic Date Due     ADVANCE CARE PLANNING  Never done     YEARLY PREVENTIVE VISIT  Never done     COLORECTAL CANCER SCREENING  Never done     HIV SCREENING  Never done     HEPATITIS C SCREENING  Never done     Pneumococcal Vaccine: 50+ Years (1 of 2 - PCV) Never done     ZOSTER IMMUNIZATION (1 of 2) Never done     HEPATITIS B IMMUNIZATION (1 of 3 - 19+ 3-dose series) Never done     PAP  Never done     LIPID  Never done     INFLUENZA VACCINE (1) 09/01/2024     COVID-19 Vaccine (5 - 2024-25 season) 09/01/2024     PHQ-2 (once per calendar year)  Never done       Current Medications:     Current Outpatient Medications   Medication Sig Dispense Refill     acetaminophen (TYLENOL) 500 MG tablet Take 500 mg by mouth every 6 hours as needed for mild pain.       cyclobenzaprine (FLEXERIL) 5 MG tablet Take 1-2  "tablets (5-10 mg) by mouth 3 times daily as needed for muscle spasms. 40 tablet 1     diphenhydrAMINE-acetaminophen (TYLENOL PM)  MG tablet Take 1 tablet by mouth nightly as needed for sleep.       docusate sodium (DSS) 100 MG capsule Take 100 mg by mouth 2 times daily as needed for constipation.       Emergency Supply Kit, Central, Patient use for emergency only. Contents: 3 sodium chloride 0.9% flushes, 1 dressing kit, 1 microclave ext set 14\", 4 nitrile gloves (med), 6 alcohol prep pads, 1 bacitracin, 1 syringe (10 cc 20 G 1\"). Call [phone] to reorder. [id number] kit 0     escitalopram (LEXAPRO) 20 MG tablet Take 20 mg by mouth daily.       famotidine (PEPCID) 20 MG tablet Take 20 mg by mouth 2 times daily as needed.       hydrOXYzine HCl (ATARAX) 25 MG tablet Take 1-2 tablets (25-50 mg) by mouth every 6 hours as needed for anxiety. 30 tablet 0     Lidocaine (LIDOCARE) 4 % Patch Place 1 patch over 12 hours onto the skin daily as needed for moderate pain. To prevent lidocaine toxicity, patient should be patch free for 12 hrs daily. 6 patch 0     lidocaine-prilocaine (EMLA) 2.5-2.5 % external cream Apply topically as needed for moderate pain 30 g 1     LORazepam (ATIVAN) 0.5 MG tablet Take 0.5-1 mg by mouth every 6 hours as needed for anxiety.       Multiple Vitamin (INFUVITE ADULT) injection Add to infusion 10 mLs daily. Select 2 multivitamin vials, one of each color top. Draw up 5 mL from each vial and add to 1 TPN bag daily immediately prior to infusing.   Discard remainder of vials. 3600 mL 0     Multiple Vitamin (MULTI-VITAMIN DAILY PO) Take 1 tablet by mouth daily       omeprazole (PRILOSEC) 20 MG DR capsule Take 20 mg by mouth daily.       ondansetron (ZOFRAN) 8 MG tablet Take 1 tablet (8 mg) by mouth every 8 hours as needed for nausea (vomiting). Do not take for 3 days after chemotherapy. 30 tablet 2     parenteral nutrition - DUALCHAMBER - see order for formula Infuse 1,880 mLs over 12 hours into " the vein (central line) daily. Taper up for 1 Hours. Taper down for 1 Hours.   TPN additives to be added prior to administration:   Infuvite-Adult 10 mL daily.  Plateau rate: 171 mL/hr.  KVO: 5 mL/hr. 381151 mL 0     Port Access Kit For nurse use only.  Do not remove items from bag.  Use for port access.  Do not place syringe on sterile field. [id number] kit 0     prochlorperazine (COMPAZINE) 10 MG tablet Take 1 tablet (10 mg) by mouth every 6 hours as needed for nausea or vomiting. 30 tablet 2     sodium chloride 0.9 % 500 mL via elastomeric pump Infuse 1,000 mLs into the vein daily as needed (dehydration). Infuse 1- 2 elastomeric pump(s) ( 500-1000 ml) . Each elastomeric to infuse over 2 hours. 746624 mL 0     sodium chloride, PF, 0.9% PF flush Inject 10 mLs into the vein as needed for line flush. Flush IV before and after med administration as directed and/or at least every 24 hours, or prior to deaccessing for no further use and/or at least every 4 weeks when not accessed. 670236 mL 0     valACYclovir (VALTREX) 1000 mg tablet Take 1,000 mg by mouth 2 times daily as needed.           Allergies:      No Known Allergies     Social History:     Social History     Tobacco Use     Smoking status: Former     Current packs/day: 0.00     Average packs/day: 1 pack/day for 42.0 years (42.0 ttl pk-yrs)     Types: Cigarettes     Start date: 1980     Quit date: 2022     Years since quittin.8     Smokeless tobacco: Never   Substance Use Topics     Alcohol use: Yes     Comment: Beer ~Q3 months.       History   Drug Use Unknown         Family History:     The patient's family history is notable for:    Family History   Problem Relation Age of Onset     Prostate Cancer Father      Breast Cancer Sister 48     Melanoma Sister      Skin Cancer Sister      Colon Cancer Maternal Grandmother          Physical Exam:     /65 (BP Location: Right arm, Patient Position: Sitting, Cuff Size: Adult Regular)   Pulse 83   Temp  97.5  F (36.4  C) (Oral)   Resp 18   Wt 58.2 kg (128 lb 6.4 oz)   SpO2 99%   BMI 23.70 kg/m    Body mass index is 23.7 kg/m .    General Appearance: alert, no distress     HEENT: no palpable nodules or masses        Cardiovascular: regular rate and rhythm, no gallops, rubs or murmurs     Respiratory: lungs clear, no rales, rhonchi or wheezes    Musculoskeletal: extremities non tender and without edema    Skin: no lesions or rashes     Neurological: normal gait, no gross defects     Psychiatric: appropriate mood and affect                               Hematological: normal cervical and supraclavicular lymph nodes     Gastrointestinal:       abdomen soft, non-tender, non-distended    Genitourinary: Deferred.     Recent Results (from the past 24 hours)   CBC with platelets and differential    Collection Time: 03/17/25  9:22 AM   Result Value Ref Range    WBC Count 6.1 4.0 - 11.0 10e3/uL    RBC Count 3.48 (L) 3.80 - 5.20 10e6/uL    Hemoglobin 10.6 (L) 11.7 - 15.7 g/dL    Hematocrit 32.8 (L) 35.0 - 47.0 %    MCV 94 78 - 100 fL    MCH 30.5 26.5 - 33.0 pg    MCHC 32.3 31.5 - 36.5 g/dL    RDW 14.5 10.0 - 15.0 %    Platelet Count 239 150 - 450 10e3/uL    % Neutrophils 67 %    % Lymphocytes 21 %    % Monocytes 9 %    % Eosinophils 3 %    % Basophils 1 %    % Immature Granulocytes 1 %    NRBCs per 100 WBC 0 <1 /100    Absolute Neutrophils 4.0 1.6 - 8.3 10e3/uL    Absolute Lymphocytes 1.3 0.8 - 5.3 10e3/uL    Absolute Monocytes 0.5 0.0 - 1.3 10e3/uL    Absolute Eosinophils 0.2 0.0 - 0.7 10e3/uL    Absolute Basophils 0.0 0.0 - 0.2 10e3/uL    Absolute Immature Granulocytes 0.0 <=0.4 10e3/uL    Absolute NRBCs 0.0 10e3/uL   Protein qualitative urine    Collection Time: 03/17/25  9:26 AM   Result Value Ref Range    Protein Albumin Urine Negative Negative mg/dL          Assessment:    Jacki Smith is a 59 year old woman with a diagnosis of 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  She presents  today for follow up and disease management.      30 minutes spent on the date of the encounter doing chart review, history and exam, documentation, and further activities as noted above.      Plan:     1.) Ovarian cancer:  OK to proceed with planned treatment Wed if labs are WDL.  Avastin permanently discontinued.  RTC in 3 weeks for labs, provider visit, and next cycle; this is already scheduled.  Plan for CT CAP and follow up with Dr. Patino after cycle 3.  Refill for flexeril sent to her preferred pharmacy.  Reviewed signs and symptoms for when she should contact the clinic or seek additional care.  Patient to contact the clinic with any questions or concerns in the interim.      Genetic risk factors were assessed and she has a VUS No identifiable germline variants identified in ABRAXAS1, AKT1, APC, DEION, AXIN2, BARD1, BMPR1A, BRCA1, BRCA2, BRIP1, CDC73, CDH1, CDK4, CDKN2A, CHEK2, CTNNA1, DICER1, EPCAM, FANCC, FANCM, GREM1, HOXB13, KIT, MEN1, MLH1, MRE11, MSH2, MSH6, MUTYH, NBN, NF1, NTHL1, PALB2, PDGFRA, PIK3CA, PMS2, POLD1, POLE, PTEN, RAD50, RAD51C, RAD51D, RECQL, RINT1, SDHA, SDHB, SDHC, SDHD, SMAD4, SMARCA4, STK11, TP53, TSC1, TSC2, VHL, XRCC2.    Labs and/or tests reviewed include:  CBC. CMP. Mag. .                2.)        Health maintenance:  Issues addressed today include following up with PCP for annual health maintenance and non-gynecologic issues.      3.)        SBO: She continues to need to clamp her GT due to discomfort and nausea with any PO intake.  She is only having small amounts of hard stool every 4-5 days.  Passing some flatus.  -Continue venting G-tube, with clamp trials as tolerated.   -OK to try glycerine suppository.    -Increase time G-tube is clamped, but unclamp immediately if you develop nausea. Start with clear liquids x1 week, then can advance to full liquids x1 week, then soft diet with foods that can fit through th G-tube.  -Continue TPN until able to tolerate enough oral  intake.    Echo Sanz, BRIELLE, APRN, FNP-C, AOCNP  Oncology Nurse Practitioner  Division of Gynecologic Oncology  Pager: [phone]     CC  Patient Care Team:  Domenica Christianson MD as PCP - General  Teoh, Charis Meadows MD as MD (Gynecologic Oncology)  Anish Monroy MD as Assigned Palliative Care Provider  Sk, Pratima HAYNES RN as Specialty Care Coordinator (Hematology & Oncology)  Myhre, Grace C, [facility] as Pharmacist  Teoh, Charis Meadows MD as Assigned Cancer Care Provider  Teoh, Charis Meadows MD as Home Infusion Following Provider (Gynecologic Oncology)  Clarisse Wells RD as [facility] Registered Dietitian (Dietitian, Registered)  SELF, REFERRED      Again, thank you for allowing me to participate in the care of your patient.        Sincerely,        JALEN Barrera CNP    Electronically signed

## 2025-03-17 NOTE — NURSING NOTE
Chief Complaint   Patient presents with    Oncology Clinic Visit     Ovarian cancer    Port Draw     Labs drawn via port by RN. VS taken.     Labs drawn via port by RN, line flushed with saline and heparin locked. Pt reports that [facility] will see her tomorrow for her port. Vitals taken. Pt checked in for appointment(s).     Ania Donahue RN

## 2025-03-18 ENCOUNTER — LAB REQUISITION (OUTPATIENT)
Dept: LAB | Facility: HOSPITAL | Age: 60
End: 2025-03-18
Payer: COMMERCIAL

## 2025-03-18 DIAGNOSIS — C56.1 MALIGNANT NEOPLASM OF RIGHT OVARY (H): ICD-10-CM

## 2025-03-18 LAB
ALBUMIN SERPL BCG-MCNC: 3.6 G/DL (ref 3.5–5.2)
ALP SERPL-CCNC: 318 U/L (ref 40–150)
ALT SERPL W P-5'-P-CCNC: 27 U/L (ref 0–50)
ANION GAP SERPL CALCULATED.3IONS-SCNC: 8 MMOL/L (ref 7–15)
AST SERPL W P-5'-P-CCNC: 39 U/L (ref 0–45)
BASOPHILS # BLD AUTO: 0 10E3/UL (ref 0–0.2)
BASOPHILS NFR BLD AUTO: 1 %
BILIRUB DIRECT SERPL-MCNC: 0.17 MG/DL (ref 0–0.3)
BILIRUB SERPL-MCNC: 0.3 MG/DL
BUN SERPL-MCNC: 29 MG/DL (ref 8–23)
CALCIUM SERPL-MCNC: 9.4 MG/DL (ref 8.8–10.4)
CHLORIDE SERPL-SCNC: 102 MMOL/L (ref 98–107)
CREAT SERPL-MCNC: 0.62 MG/DL (ref 0.51–0.95)
EGFRCR SERPLBLD CKD-EPI 2021: >90 ML/MIN/1.73M2
EOSINOPHIL # BLD AUTO: 0.2 10E3/UL (ref 0–0.7)
EOSINOPHIL NFR BLD AUTO: 3 %
ERYTHROCYTE [DISTWIDTH] IN BLOOD BY AUTOMATED COUNT: 14.2 % (ref 10–15)
FASTING STATUS PATIENT QL REPORTED: ABNORMAL
FASTING STATUS PATIENT QL REPORTED: NORMAL
GLUCOSE SERPL-MCNC: 90 MG/DL (ref 70–99)
HCO3 SERPL-SCNC: 26 MMOL/L (ref 22–29)
HCT VFR BLD AUTO: 30.8 % (ref 35–47)
HGB BLD-MCNC: 10.1 G/DL (ref 11.7–15.7)
IMM GRANULOCYTES # BLD: 0 10E3/UL
IMM GRANULOCYTES NFR BLD: 0 %
LYMPHOCYTES # BLD AUTO: 1.5 10E3/UL (ref 0.8–5.3)
LYMPHOCYTES NFR BLD AUTO: 29 %
MAGNESIUM SERPL-MCNC: 2 MG/DL (ref 1.7–2.3)
MCH RBC QN AUTO: 31 PG (ref 26.5–33)
MCHC RBC AUTO-ENTMCNC: 32.8 G/DL (ref 31.5–36.5)
MCV RBC AUTO: 95 FL (ref 78–100)
MONOCYTES # BLD AUTO: 0.5 10E3/UL (ref 0–1.3)
MONOCYTES NFR BLD AUTO: 10 %
NEUTROPHILS # BLD AUTO: 2.8 10E3/UL (ref 1.6–8.3)
NEUTROPHILS NFR BLD AUTO: 57 %
NRBC # BLD AUTO: 0 10E3/UL
NRBC BLD AUTO-RTO: 0 /100
PHOSPHATE SERPL-MCNC: 3.6 MG/DL (ref 2.5–4.5)
PLATELET # BLD AUTO: 255 10E3/UL (ref 150–450)
POTASSIUM SERPL-SCNC: 4.6 MMOL/L (ref 3.4–5.3)
PROT SERPL-MCNC: 7 G/DL (ref 6.4–8.3)
RBC # BLD AUTO: 3.26 10E6/UL (ref 3.8–5.2)
SODIUM SERPL-SCNC: 136 MMOL/L (ref 135–145)
TRIGL SERPL-MCNC: 61 MG/DL
WBC # BLD AUTO: 5 10E3/UL (ref 4–11)

## 2025-03-18 PROCEDURE — S9366 HIT TPN 2 LITER DIEM: HCPCS

## 2025-03-18 PROCEDURE — 85004 AUTOMATED DIFF WBC COUNT: CPT | Performed by: OBSTETRICS & GYNECOLOGY

## 2025-03-18 PROCEDURE — 83735 ASSAY OF MAGNESIUM: CPT | Performed by: OBSTETRICS & GYNECOLOGY

## 2025-03-18 PROCEDURE — 84460 ALANINE AMINO (ALT) (SGPT): CPT | Performed by: OBSTETRICS & GYNECOLOGY

## 2025-03-18 PROCEDURE — 82374 ASSAY BLOOD CARBON DIOXIDE: CPT | Performed by: OBSTETRICS & GYNECOLOGY

## 2025-03-18 PROCEDURE — B9004 PARENTERAL INFUS PUMP PORTAB: HCPCS | Mod: RR

## 2025-03-18 PROCEDURE — 82248 BILIRUBIN DIRECT: CPT | Performed by: OBSTETRICS & GYNECOLOGY

## 2025-03-18 PROCEDURE — S9374 HIT HYDRA 1 LITER DIEM: HCPCS | Mod: SH

## 2025-03-18 PROCEDURE — 84478 ASSAY OF TRIGLYCERIDES: CPT | Performed by: OBSTETRICS & GYNECOLOGY

## 2025-03-18 PROCEDURE — 99601 HOME NFS VISIT <2 HRS: CPT

## 2025-03-18 PROCEDURE — 84100 ASSAY OF PHOSPHORUS: CPT | Performed by: OBSTETRICS & GYNECOLOGY

## 2025-03-18 PROCEDURE — 84155 ASSAY OF PROTEIN SERUM: CPT | Performed by: OBSTETRICS & GYNECOLOGY

## 2025-03-19 ENCOUNTER — INFUSION THERAPY VISIT (OUTPATIENT)
Dept: INFUSION THERAPY | Facility: HOSPITAL | Age: 60
End: 2025-03-19
Attending: OBSTETRICS & GYNECOLOGY
Payer: COMMERCIAL

## 2025-03-19 VITALS
TEMPERATURE: 97.7 F | RESPIRATION RATE: 18 BRPM | OXYGEN SATURATION: 100 % | HEART RATE: 75 BPM | DIASTOLIC BLOOD PRESSURE: 67 MMHG | SYSTOLIC BLOOD PRESSURE: 125 MMHG

## 2025-03-19 DIAGNOSIS — C56.1 OVARIAN CANCER, RIGHT (H): Primary | ICD-10-CM

## 2025-03-19 PROCEDURE — 96415 CHEMO IV INFUSION ADDL HR: CPT

## 2025-03-19 PROCEDURE — 96413 CHEMO IV INFUSION 1 HR: CPT

## 2025-03-19 PROCEDURE — 96417 CHEMO IV INFUS EACH ADDL SEQ: CPT

## 2025-03-19 PROCEDURE — S9374 HIT HYDRA 1 LITER DIEM: HCPCS | Mod: SH

## 2025-03-19 PROCEDURE — 250N000011 HC RX IP 250 OP 636: Performed by: OBSTETRICS & GYNECOLOGY

## 2025-03-19 PROCEDURE — 258N000003 HC RX IP 258 OP 636: Performed by: NURSE PRACTITIONER

## 2025-03-19 PROCEDURE — 96375 TX/PRO/DX INJ NEW DRUG ADDON: CPT

## 2025-03-19 PROCEDURE — 96367 TX/PROPH/DG ADDL SEQ IV INF: CPT

## 2025-03-19 PROCEDURE — S9366 HIT TPN 2 LITER DIEM: HCPCS

## 2025-03-19 PROCEDURE — 250N000011 HC RX IP 250 OP 636: Performed by: NURSE PRACTITIONER

## 2025-03-19 PROCEDURE — B9004 PARENTERAL INFUS PUMP PORTAB: HCPCS | Mod: RR

## 2025-03-19 RX ORDER — DIPHENHYDRAMINE HYDROCHLORIDE 50 MG/ML
50 INJECTION, SOLUTION INTRAMUSCULAR; INTRAVENOUS ONCE
Status: COMPLETED | OUTPATIENT
Start: 2025-03-19 | End: 2025-03-19

## 2025-03-19 RX ORDER — EPINEPHRINE 1 MG/ML
0.3 INJECTION, SOLUTION INTRAMUSCULAR; SUBCUTANEOUS EVERY 5 MIN PRN
Status: DISCONTINUED | OUTPATIENT
Start: 2025-03-19 | End: 2025-03-19 | Stop reason: HOSPADM

## 2025-03-19 RX ORDER — HEPARIN SODIUM (PORCINE) LOCK FLUSH IV SOLN 100 UNIT/ML 100 UNIT/ML
5 SOLUTION INTRAVENOUS
Status: DISCONTINUED | OUTPATIENT
Start: 2025-03-19 | End: 2025-03-19 | Stop reason: HOSPADM

## 2025-03-19 RX ORDER — MEPERIDINE HYDROCHLORIDE 25 MG/ML
25 INJECTION INTRAMUSCULAR; INTRAVENOUS; SUBCUTANEOUS
Status: DISCONTINUED | OUTPATIENT
Start: 2025-03-19 | End: 2025-03-19 | Stop reason: HOSPADM

## 2025-03-19 RX ORDER — ALBUTEROL SULFATE 0.83 MG/ML
2.5 SOLUTION RESPIRATORY (INHALATION)
Status: DISCONTINUED | OUTPATIENT
Start: 2025-03-19 | End: 2025-03-19 | Stop reason: HOSPADM

## 2025-03-19 RX ORDER — DIPHENHYDRAMINE HYDROCHLORIDE 50 MG/ML
50 INJECTION, SOLUTION INTRAMUSCULAR; INTRAVENOUS
Status: DISCONTINUED | OUTPATIENT
Start: 2025-03-19 | End: 2025-03-19 | Stop reason: HOSPADM

## 2025-03-19 RX ORDER — ALBUTEROL SULFATE 90 UG/1
1-2 INHALANT RESPIRATORY (INHALATION)
Status: DISCONTINUED | OUTPATIENT
Start: 2025-03-19 | End: 2025-03-19 | Stop reason: HOSPADM

## 2025-03-19 RX ORDER — DIPHENHYDRAMINE HYDROCHLORIDE 50 MG/ML
25 INJECTION, SOLUTION INTRAMUSCULAR; INTRAVENOUS
Status: DISCONTINUED | OUTPATIENT
Start: 2025-03-19 | End: 2025-03-19 | Stop reason: HOSPADM

## 2025-03-19 RX ORDER — PALONOSETRON 0.05 MG/ML
0.25 INJECTION, SOLUTION INTRAVENOUS ONCE
Status: COMPLETED | OUTPATIENT
Start: 2025-03-19 | End: 2025-03-19

## 2025-03-19 RX ORDER — METHYLPREDNISOLONE SODIUM SUCCINATE 40 MG/ML
40 INJECTION INTRAMUSCULAR; INTRAVENOUS
Status: DISCONTINUED | OUTPATIENT
Start: 2025-03-19 | End: 2025-03-19 | Stop reason: HOSPADM

## 2025-03-19 RX ADMIN — FAMOTIDINE 20 MG: 10 INJECTION, SOLUTION INTRAVENOUS at 09:42

## 2025-03-19 RX ADMIN — DIPHENHYDRAMINE HYDROCHLORIDE 50 MG: 50 INJECTION, SOLUTION INTRAMUSCULAR; INTRAVENOUS at 09:47

## 2025-03-19 RX ADMIN — DEXAMETHASONE SODIUM PHOSPHATE: 10 INJECTION, SOLUTION INTRAMUSCULAR; INTRAVENOUS at 10:04

## 2025-03-19 RX ADMIN — PALONOSETRON 0.25 MG: 0.05 INJECTION, SOLUTION INTRAVENOUS at 09:37

## 2025-03-19 RX ADMIN — SODIUM CHLORIDE 250 ML: 9 INJECTION, SOLUTION INTRAVENOUS at 09:31

## 2025-03-19 RX ADMIN — CARBOPLATIN 575 MG: 10 INJECTION, SOLUTION INTRAVENOUS at 13:41

## 2025-03-19 RX ADMIN — PACLITAXEL 215 MG: 6 INJECTION, SOLUTION INTRAVENOUS at 10:41

## 2025-03-19 NOTE — PROGRESS NOTES
Infusion Nursing Note:  Jacki Smith presents today for Cycle 2, Day 1 of her chemotherapy plan.    Patient seen by provider today: No   present during visit today: Not Applicable.    Note: Michelle arrived ambulatory accompanied by her spouse for treatment. Plan of care reviewed and all questions answered at this time. Pre medications Aloxi, Pepcid, IVP Benadryl and emend/dexamethasone administered 30 minutes prior to treatment. Writer stayed with patient for first 15 minutes of her Taxol infusion. Taxol and carboplatin infused as ordered.     Intravenous Access:  Implanted Port. Patient arrived with port accessed as she is receiving TPN infusions x 12 hours every night. She receives home infusion services.    Treatment Conditions:  Lab Results   Component Value Date    HGB 10.1 (L) 03/18/2025    WBC 5.0 03/18/2025    ANEUTAUTO 2.8 03/18/2025     03/18/2025        Lab Results   Component Value Date     03/18/2025    POTASSIUM 4.6 03/18/2025    MAG 2.0 03/18/2025    CR 0.62 03/18/2025    KINDRA 9.4 03/18/2025    BILITOTAL 0.3 03/18/2025    ALBUMIN 3.6 03/18/2025    ALT 27 03/18/2025    AST 39 03/18/2025       Results reviewed, labs MET treatment parameters, ok to proceed with treatment.  Magnesium and potassium WNL. No repletion needed per electrolyte replacement protocol.     Post Infusion Assessment:  Patient tolerated infusion without incident.  Blood return noted pre and post infusion.  Site patent and intact, free from redness, edema or discomfort.  Access discontinued per protocol.     Discharge Plan:   Patient will return April 9th, with labs prior, for next appointment.   Patient discharged in stable condition accompanied by: .  Departure Mode: Ambulatory.      Rylie Bulalrd RN

## 2025-03-20 ENCOUNTER — HOME INFUSION BILLING (OUTPATIENT)
Dept: HOME HEALTH SERVICES | Facility: HOME HEALTH | Age: 60
End: 2025-03-20
Payer: COMMERCIAL

## 2025-03-20 ENCOUNTER — MEDICAL CORRESPONDENCE (OUTPATIENT)
Dept: HOME HEALTH SERVICES | Facility: HOME HEALTH | Age: 60
End: 2025-03-20
Payer: COMMERCIAL

## 2025-03-20 PROCEDURE — S9366 HIT TPN 2 LITER DIEM: HCPCS

## 2025-03-20 PROCEDURE — S9374 HIT HYDRA 1 LITER DIEM: HCPCS | Mod: SH

## 2025-03-20 PROCEDURE — B9004 PARENTERAL INFUS PUMP PORTAB: HCPCS | Mod: RR

## 2025-03-20 PROCEDURE — E1399 DURABLE MEDICAL EQUIPMENT MI: HCPCS

## 2025-03-20 PROCEDURE — A4215 STERILE NEEDLE: HCPCS

## 2025-03-21 PROCEDURE — B4185 PARENTERAL SOL 10 GM LIPIDS: HCPCS

## 2025-03-21 PROCEDURE — B4178 PARENTERAL SOL AMINO ACID >: HCPCS

## 2025-03-21 PROCEDURE — B9004 PARENTERAL INFUS PUMP PORTAB: HCPCS | Mod: RR

## 2025-03-21 PROCEDURE — S9374 HIT HYDRA 1 LITER DIEM: HCPCS | Mod: SH

## 2025-03-21 PROCEDURE — A4217 STERILE WATER/SALINE, 500 ML: HCPCS

## 2025-03-21 PROCEDURE — S9366 HIT TPN 2 LITER DIEM: HCPCS

## 2025-03-22 PROCEDURE — B9004 PARENTERAL INFUS PUMP PORTAB: HCPCS | Mod: RR

## 2025-03-22 PROCEDURE — S9374 HIT HYDRA 1 LITER DIEM: HCPCS | Mod: SH

## 2025-03-22 PROCEDURE — S9366 HIT TPN 2 LITER DIEM: HCPCS

## 2025-03-23 PROCEDURE — S9366 HIT TPN 2 LITER DIEM: HCPCS

## 2025-03-23 PROCEDURE — B9004 PARENTERAL INFUS PUMP PORTAB: HCPCS | Mod: RR

## 2025-03-24 PROCEDURE — S9366 HIT TPN 2 LITER DIEM: HCPCS

## 2025-03-24 PROCEDURE — B9004 PARENTERAL INFUS PUMP PORTAB: HCPCS | Mod: RR

## 2025-03-25 ENCOUNTER — LAB REQUISITION (OUTPATIENT)
Dept: LAB | Facility: HOSPITAL | Age: 60
End: 2025-03-25
Payer: COMMERCIAL

## 2025-03-25 DIAGNOSIS — C56.1 MALIGNANT NEOPLASM OF RIGHT OVARY (H): ICD-10-CM

## 2025-03-25 LAB
ALBUMIN SERPL BCG-MCNC: 3.3 G/DL (ref 3.5–5.2)
ALP SERPL-CCNC: 257 U/L (ref 40–150)
ALT SERPL W P-5'-P-CCNC: 31 U/L (ref 0–50)
ANION GAP SERPL CALCULATED.3IONS-SCNC: 8 MMOL/L (ref 7–15)
AST SERPL W P-5'-P-CCNC: 34 U/L (ref 0–45)
BASOPHILS # BLD AUTO: 0 10E3/UL (ref 0–0.2)
BASOPHILS NFR BLD AUTO: 0 %
BILIRUB DIRECT SERPL-MCNC: 0.27 MG/DL (ref 0–0.3)
BILIRUB SERPL-MCNC: 0.6 MG/DL
BUN SERPL-MCNC: 24.4 MG/DL (ref 8–23)
CALCIUM SERPL-MCNC: 9.1 MG/DL (ref 8.8–10.4)
CHLORIDE SERPL-SCNC: 98 MMOL/L (ref 98–107)
CREAT SERPL-MCNC: 0.56 MG/DL (ref 0.51–0.95)
EGFRCR SERPLBLD CKD-EPI 2021: >90 ML/MIN/1.73M2
EOSINOPHIL # BLD AUTO: 0 10E3/UL (ref 0–0.7)
EOSINOPHIL NFR BLD AUTO: 1 %
ERYTHROCYTE [DISTWIDTH] IN BLOOD BY AUTOMATED COUNT: 14.1 % (ref 10–15)
FASTING STATUS PATIENT QL REPORTED: ABNORMAL
FASTING STATUS PATIENT QL REPORTED: NORMAL
GLUCOSE SERPL-MCNC: 102 MG/DL (ref 70–99)
HCO3 SERPL-SCNC: 25 MMOL/L (ref 22–29)
HCT VFR BLD AUTO: 30.1 % (ref 35–47)
HGB BLD-MCNC: 9.7 G/DL (ref 11.7–15.7)
IMM GRANULOCYTES # BLD: 0 10E3/UL
IMM GRANULOCYTES NFR BLD: 0 %
LYMPHOCYTES # BLD AUTO: 0.7 10E3/UL (ref 0.8–5.3)
LYMPHOCYTES NFR BLD AUTO: 27 %
MAGNESIUM SERPL-MCNC: 2 MG/DL (ref 1.7–2.3)
MCH RBC QN AUTO: 30 PG (ref 26.5–33)
MCHC RBC AUTO-ENTMCNC: 32.2 G/DL (ref 31.5–36.5)
MCV RBC AUTO: 93 FL (ref 78–100)
MONOCYTES # BLD AUTO: 0.1 10E3/UL (ref 0–1.3)
MONOCYTES NFR BLD AUTO: 4 %
NEUTROPHILS # BLD AUTO: 1.7 10E3/UL (ref 1.6–8.3)
NEUTROPHILS NFR BLD AUTO: 67 %
NRBC # BLD AUTO: 0 10E3/UL
NRBC BLD AUTO-RTO: 0 /100
PHOSPHATE SERPL-MCNC: 2.7 MG/DL (ref 2.5–4.5)
PLATELET # BLD AUTO: 142 10E3/UL (ref 150–450)
POTASSIUM SERPL-SCNC: 4.5 MMOL/L (ref 3.4–5.3)
PROT SERPL-MCNC: 6.7 G/DL (ref 6.4–8.3)
RBC # BLD AUTO: 3.23 10E6/UL (ref 3.8–5.2)
SODIUM SERPL-SCNC: 131 MMOL/L (ref 135–145)
TRIGL SERPL-MCNC: 51 MG/DL
WBC # BLD AUTO: 2.5 10E3/UL (ref 4–11)

## 2025-03-25 PROCEDURE — 99601 HOME NFS VISIT <2 HRS: CPT

## 2025-03-25 PROCEDURE — 84100 ASSAY OF PHOSPHORUS: CPT | Performed by: OBSTETRICS & GYNECOLOGY

## 2025-03-25 PROCEDURE — 83735 ASSAY OF MAGNESIUM: CPT | Performed by: OBSTETRICS & GYNECOLOGY

## 2025-03-25 PROCEDURE — B9004 PARENTERAL INFUS PUMP PORTAB: HCPCS | Mod: RR

## 2025-03-25 PROCEDURE — 85004 AUTOMATED DIFF WBC COUNT: CPT | Performed by: OBSTETRICS & GYNECOLOGY

## 2025-03-25 PROCEDURE — 85018 HEMOGLOBIN: CPT | Performed by: OBSTETRICS & GYNECOLOGY

## 2025-03-25 PROCEDURE — 82248 BILIRUBIN DIRECT: CPT | Performed by: OBSTETRICS & GYNECOLOGY

## 2025-03-25 PROCEDURE — 84478 ASSAY OF TRIGLYCERIDES: CPT | Performed by: OBSTETRICS & GYNECOLOGY

## 2025-03-25 PROCEDURE — S9366 HIT TPN 2 LITER DIEM: HCPCS

## 2025-03-25 PROCEDURE — 82040 ASSAY OF SERUM ALBUMIN: CPT | Performed by: OBSTETRICS & GYNECOLOGY

## 2025-03-26 ENCOUNTER — HOME INFUSION BILLING (OUTPATIENT)
Dept: HOME HEALTH SERVICES | Facility: HOME HEALTH | Age: 60
End: 2025-03-26
Payer: COMMERCIAL

## 2025-03-26 PROCEDURE — S9366 HIT TPN 2 LITER DIEM: HCPCS

## 2025-03-26 PROCEDURE — B9004 PARENTERAL INFUS PUMP PORTAB: HCPCS | Mod: RR

## 2025-03-26 NOTE — PROGRESS NOTES
Infusion Nursing Note:  Jacki Smith presents today for Cycle 1, Day 1 of her treatment.    Patient seen by provider today: Yes: Virtual visit with Susannah Roman CNP.   present during visit today: Not Applicable.    Note: Michelle arrived ambulatory accompanied by her spouse, Simon, for treatment. She is here to begin treatment for ovarian cancer recurrence. She received her prior treatments at [facility], imaging and port placement at [facility].  Plan of care reviewed and all patient and family questions answered at this time.   Pre medications Aloxi, Emend/dexamethasone administered 30 minutes prior to treatment as ordered.    Intravenous Access:  Implanted Port. Port accessed and labs drawn per CHIKA Guevara.    Treatment Conditions:  Lab Results   Component Value Date    HGB 11.5 (L) 06/13/2023    WBC 4.9 06/13/2023    ANEUTAUTO 3.3 06/13/2023     06/13/2023      Lab Results   Component Value Date     (L) 06/13/2023    POTASSIUM 4.1 06/13/2023    MAG 1.8 06/13/2023    CR 0.90 06/13/2023    KINDRA 9.1 06/13/2023    BILITOTAL 0.4 06/13/2023    ALBUMIN 3.7 06/13/2023    ALT 11 06/13/2023    AST 19 06/13/2023     Results reviewed, labs MET treatment parameters, ok to proceed with treatment.  Magnesium 1.8. No repletion indicated per electrolyte replacement protocol.    Post Infusion Assessment:  Patient tolerated infusion without incident.  Blood return noted pre and post infusion.  Site patent and intact, free from redness, edema or discomfort.  No evidence of extravasations.  Access discontinued per protocol.     Discharge Plan:   Copy of AVS reviewed with patient and/or family.  Patient will stop at the scheduling desk prior to leaving today to schedule next appointment.  Patient discharged in stable condition accompanied by: .  Departure Mode: Ambulatory.      Rylie Bullard RN   Patient Name: Gladys Sandy   : 1958   MRN: [id number]     Date of office visit: 25     Allergies:  Marked and reviewed in Epic.    Medications:  Marked and reviewed in [facility].    Chief Complaint:   Chief Complaint   Patient presents with    Pacemaker     Medtronic       HPI  The patient is here for follow up evaluation of defibrillator and nonischemic cardiomyopathy.  And paroxysmal atrial fibrillation.  She is on long-term therapy with carvedilol for A-fib suppression along with warfarin for stroke prevention .She underwent ICD generator placement in July.bili 4 and reports no hospitalizations post device replacement.  Her echo done in May 2024 that showed ejection fraction improved to 50%.  She is very compliant her medications and goes for walks with her dog every day she denies any hospitalizations, orthopnea, syncope, PND (paroxysmal nocturnal dyspnea), chest pain, palpitations, or falls.  She states she can carry out her ADLs (activities of daily living) without issues.  Reports no symptoms suggestive of CVA (cerebrovascular accident) or TIA (transient ischemic attack).  Denies blood in stools, black stools, cough expectoration or hemoptysis.  Patient has been compliant in medications.  Denies any ICD (implantable cardioverter defibrillator) shocks.  Left pectoral incision well healed    ICD interrogation and programming in office reviewed and confirmed by me personally:   Normal ICD  function  Normally functioning ICD system, battery voltage good for more than 9 years, OptiVol remains to be subthreshold.  No sustained ventricular or atrial arrhythmias noted in data log  Device is programmed VVI given the fact patient has been intolerant to left ventricular pacing      .       Labs:  Labs reviewed and confirmed by me personally.  Potassium (mmol/L)   Date Value   2024 4.3     Creatinine (mg/dL)   Date Value   2024 0.86     TSH (mcUnits/mL)   Date Value   2023 2.618     Albumin  (g/dL)   Date Value   06/21/2024 3.7     Bilirubin, Total (mg/dL)   Date Value   06/21/2024 0.2     Bilirubin, Direct (mg/dL)   Date Value   06/21/2024 <0.1     Alkaline Phosphatase (Units/L)   Date Value   06/21/2024 95     GPT/ALT (Units/L)   Date Value   06/21/2024 19     GOT/AST (Units/L)   Date Value   06/21/2024 21     Protein, Total (g/dL)   Date Value   06/21/2024 7.6     Best Practice Guidelines:    Systolic Heart Failure  LVEF (Left ventricular ejection fraction) documented :Yes  ACE/ARB ordered for LVEF<40%: Yes - ACEI and/or ARB ordered  Beta Blocker ordered: Yes - Bisoprolol, Coreg or Metoprolol XL for LVEF < 40%  Aldosterone blocking agent prescribed for LVEF <40% and HF (heart failure): Yes  EF < 35%: Yes,  CRT-D/CRT-P for EF less than or equal to 35% and QRS less than or equal to 150 irrespective of morphology  Hydralazine and Nitrate ordered for an  patient with LVEF <40% :Yes  Heart Failure Education Received: Yes    History or Current Atrial Fibrillation/Atrial Flutter: history of and/or current atrial fibrillation/atrial flutter anti-thrombotic ordered (not aspirin or Plavix) and [id number] Score: 4             ECHO: LVEF 50% per echo in May 2024    Review of system  A problem-pertinent review of systems performed and, aside from what is mentioned in the HPI, was negative.     Physical Exam    The patient is conscious, oriented to time, place and person, moderately built female  Patient   Visit Vitals  /70   Pulse 82   Ht 5' 3.5\" (1.613 m) Comment: per pt   Wt 78 kg (172 lb) Comment: per pt   BMI 29.99 kg/m²     HEENT:  Normocephalic, atraumatic individual without icterus.  NECK:  Supple.  No jugular venous distention.  No carotid bruits.    CARDIOVASCULAR:  First and second heart sounds normally audible without any pericardial rub, murmur or gallop.   LUNGS:  Symmetrical breath sounds audible without crackles or wheeze.  No adventitious breath sounds audible.  No  accessory muscles of respiration noted to be used.    EXTREMITIES:  All peripheral pulses are pulsatile without any calf or pedal edema.  SKIN:  Warm and dry.  Left pectoral site of the device has healed well.     Impression:  1. Nonischemic dilated cardiomyopathy       2. LV dysfunction, EF 30-35% 2009, EF 35% Echo 12/4/18. EF 50% by Echo 5/17/2024      3. Chronic systolic congestive heart failure, NYHA class 2  (CMD)  On carvedilol, Entresto, spironolactone    4. PAF (paroxysmal atrial fibrillation), CHADSVASC 4 (NYHA Class II CHF, HTN, age, gender)  On carvedilol    5. Medtronic BiV ICD, 2/5/2015, LV off, prog VVI, Gen Change 6/25/2024      6. HTN (hypertension), benign  On isosorbide and hydralazine    7. LBBB (left bundle branch block),  ms      8. Anticoagulation, Warfarin for hx  RA thrombus          PLAN:  Patient is advised to continue current medication which essentially is optimized guideline directed medical management for her congestive heart failure along with carvedilol for A-fib suppression and warfarin for stroke prevention.  I will see her back in office in 1 year time, he will have remote monitoring done 6 months from now    Raghav Prater MD

## 2025-03-27 ENCOUNTER — MEDICAL CORRESPONDENCE (OUTPATIENT)
Dept: HOME HEALTH SERVICES | Facility: HOME HEALTH | Age: 60
End: 2025-03-27
Payer: COMMERCIAL

## 2025-03-27 ENCOUNTER — HOME INFUSION BILLING (OUTPATIENT)
Dept: HOME HEALTH SERVICES | Facility: HOME HEALTH | Age: 60
End: 2025-03-27
Payer: COMMERCIAL

## 2025-03-27 PROCEDURE — E1399 DURABLE MEDICAL EQUIPMENT MI: HCPCS

## 2025-03-27 PROCEDURE — S9366 HIT TPN 2 LITER DIEM: HCPCS

## 2025-03-27 PROCEDURE — B9004 PARENTERAL INFUS PUMP PORTAB: HCPCS | Mod: RR

## 2025-03-27 PROCEDURE — A4215 STERILE NEEDLE: HCPCS

## 2025-03-27 PROCEDURE — A4245 ALCOHOL WIPES PER BOX: HCPCS

## 2025-03-28 PROCEDURE — S9374 HIT HYDRA 1 LITER DIEM: HCPCS

## 2025-03-28 PROCEDURE — A4217 STERILE WATER/SALINE, 500 ML: HCPCS

## 2025-03-28 PROCEDURE — B4178 PARENTERAL SOL AMINO ACID >: HCPCS

## 2025-03-28 PROCEDURE — B9004 PARENTERAL INFUS PUMP PORTAB: HCPCS | Mod: RR

## 2025-03-28 PROCEDURE — B4185 PARENTERAL SOL 10 GM LIPIDS: HCPCS

## 2025-03-29 PROCEDURE — B9004 PARENTERAL INFUS PUMP PORTAB: HCPCS | Mod: RR

## 2025-03-29 PROCEDURE — B4185 PARENTERAL SOL 10 GM LIPIDS: HCPCS

## 2025-03-29 PROCEDURE — S9374 HIT HYDRA 1 LITER DIEM: HCPCS

## 2025-03-29 PROCEDURE — B4178 PARENTERAL SOL AMINO ACID >: HCPCS

## 2025-03-29 PROCEDURE — A4217 STERILE WATER/SALINE, 500 ML: HCPCS

## 2025-03-30 PROCEDURE — S9374 HIT HYDRA 1 LITER DIEM: HCPCS

## 2025-03-30 PROCEDURE — B9004 PARENTERAL INFUS PUMP PORTAB: HCPCS | Mod: RR

## 2025-03-30 PROCEDURE — A4217 STERILE WATER/SALINE, 500 ML: HCPCS

## 2025-03-30 PROCEDURE — B4185 PARENTERAL SOL 10 GM LIPIDS: HCPCS

## 2025-03-30 PROCEDURE — B4178 PARENTERAL SOL AMINO ACID >: HCPCS

## 2025-03-31 ENCOUNTER — INFUSION THERAPY VISIT (OUTPATIENT)
Dept: INFUSION THERAPY | Facility: HOSPITAL | Age: 60
End: 2025-03-31
Attending: OBSTETRICS & GYNECOLOGY
Payer: COMMERCIAL

## 2025-03-31 DIAGNOSIS — C56.1 OVARIAN CANCER, RIGHT (H): Primary | ICD-10-CM

## 2025-03-31 DIAGNOSIS — Z78.9 ON TOTAL PARENTERAL NUTRITION (TPN): ICD-10-CM

## 2025-03-31 LAB
ALBUMIN SERPL BCG-MCNC: 2.8 G/DL (ref 3.5–5.2)
ALP SERPL-CCNC: 269 U/L (ref 40–150)
ALT SERPL W P-5'-P-CCNC: 34 U/L (ref 0–50)
ANION GAP SERPL CALCULATED.3IONS-SCNC: 9 MMOL/L (ref 7–15)
AST SERPL W P-5'-P-CCNC: 33 U/L (ref 0–45)
BASOPHILS # BLD AUTO: 0 10E3/UL (ref 0–0.2)
BASOPHILS NFR BLD AUTO: 0 %
BILIRUB DIRECT SERPL-MCNC: 0.25 MG/DL (ref 0–0.3)
BILIRUB SERPL-MCNC: 0.4 MG/DL
BUN SERPL-MCNC: 23 MG/DL (ref 8–23)
CALCIUM SERPL-MCNC: 8.5 MG/DL (ref 8.8–10.4)
CHLORIDE SERPL-SCNC: 98 MMOL/L (ref 98–107)
CREAT SERPL-MCNC: 0.54 MG/DL (ref 0.51–0.95)
EGFRCR SERPLBLD CKD-EPI 2021: >90 ML/MIN/1.73M2
EOSINOPHIL # BLD AUTO: 0 10E3/UL (ref 0–0.7)
EOSINOPHIL NFR BLD AUTO: 0 %
ERYTHROCYTE [DISTWIDTH] IN BLOOD BY AUTOMATED COUNT: 14.6 % (ref 10–15)
FASTING STATUS PATIENT QL REPORTED: NO
GLUCOSE SERPL-MCNC: 115 MG/DL (ref 70–99)
HCO3 SERPL-SCNC: 23 MMOL/L (ref 22–29)
HCT VFR BLD AUTO: 24.8 % (ref 35–47)
HGB BLD-MCNC: 8.1 G/DL (ref 11.7–15.7)
IMM GRANULOCYTES # BLD: 0.1 10E3/UL
IMM GRANULOCYTES NFR BLD: 1 %
LYMPHOCYTES # BLD AUTO: 1.1 10E3/UL (ref 0.8–5.3)
LYMPHOCYTES NFR BLD AUTO: 15 %
MAGNESIUM SERPL-MCNC: 2 MG/DL (ref 1.7–2.3)
MCH RBC QN AUTO: 30.2 PG (ref 26.5–33)
MCHC RBC AUTO-ENTMCNC: 32.7 G/DL (ref 31.5–36.5)
MCV RBC AUTO: 93 FL (ref 78–100)
MONOCYTES # BLD AUTO: 0.6 10E3/UL (ref 0–1.3)
MONOCYTES NFR BLD AUTO: 9 %
NEUTROPHILS # BLD AUTO: 5.4 10E3/UL (ref 1.6–8.3)
NEUTROPHILS NFR BLD AUTO: 75 %
NRBC # BLD AUTO: 0 10E3/UL
NRBC BLD AUTO-RTO: 0 /100
PHOSPHATE SERPL-MCNC: 3.5 MG/DL (ref 2.5–4.5)
PLAT MORPH BLD: ABNORMAL
PLATELET # BLD AUTO: 103 10E3/UL (ref 150–450)
POLYCHROMASIA BLD QL SMEAR: SLIGHT
POTASSIUM SERPL-SCNC: 4.2 MMOL/L (ref 3.4–5.3)
PROT SERPL-MCNC: 6 G/DL (ref 6.4–8.3)
RBC # BLD AUTO: 2.68 10E6/UL (ref 3.8–5.2)
RBC MORPH BLD: ABNORMAL
SODIUM SERPL-SCNC: 130 MMOL/L (ref 135–145)
TRIGL SERPL-MCNC: 63 MG/DL
WBC # BLD AUTO: 7.2 10E3/UL (ref 4–11)

## 2025-03-31 PROCEDURE — 82040 ASSAY OF SERUM ALBUMIN: CPT

## 2025-03-31 PROCEDURE — S9374 HIT HYDRA 1 LITER DIEM: HCPCS

## 2025-03-31 PROCEDURE — 84478 ASSAY OF TRIGLYCERIDES: CPT

## 2025-03-31 PROCEDURE — B4178 PARENTERAL SOL AMINO ACID >: HCPCS

## 2025-03-31 PROCEDURE — B4185 PARENTERAL SOL 10 GM LIPIDS: HCPCS

## 2025-03-31 PROCEDURE — 84100 ASSAY OF PHOSPHORUS: CPT

## 2025-03-31 PROCEDURE — 85004 AUTOMATED DIFF WBC COUNT: CPT

## 2025-03-31 PROCEDURE — B9004 PARENTERAL INFUS PUMP PORTAB: HCPCS | Mod: RR

## 2025-03-31 PROCEDURE — 82310 ASSAY OF CALCIUM: CPT

## 2025-03-31 PROCEDURE — 83735 ASSAY OF MAGNESIUM: CPT

## 2025-03-31 PROCEDURE — 36591 DRAW BLOOD OFF VENOUS DEVICE: CPT

## 2025-03-31 PROCEDURE — A4217 STERILE WATER/SALINE, 500 ML: HCPCS

## 2025-03-31 PROCEDURE — 82248 BILIRUBIN DIRECT: CPT

## 2025-03-31 PROCEDURE — 84155 ASSAY OF PROTEIN SERUM: CPT

## 2025-03-31 NOTE — PROGRESS NOTES
Port labs drawn for TPN, and needle exchanged. Patient prefers to have labs drawn, port deaccessed and them EMLA applied for 20-30 min prior to re-access..Paola Sanz RN

## 2025-04-01 ENCOUNTER — MEDICAL CORRESPONDENCE (OUTPATIENT)
Dept: HOME HEALTH SERVICES | Facility: HOME HEALTH | Age: 60
End: 2025-04-01
Payer: COMMERCIAL

## 2025-04-01 ENCOUNTER — HOME INFUSION BILLING (OUTPATIENT)
Dept: HOME HEALTH SERVICES | Facility: HOME HEALTH | Age: 60
End: 2025-04-01
Payer: COMMERCIAL

## 2025-04-01 ENCOUNTER — HOME INFUSION (OUTPATIENT)
Dept: HOME HEALTH SERVICES | Facility: HOME HEALTH | Age: 60
End: 2025-04-01
Payer: COMMERCIAL

## 2025-04-01 DIAGNOSIS — C56.1 OVARIAN CANCER, RIGHT (H): Primary | ICD-10-CM

## 2025-04-01 PROCEDURE — B4178 PARENTERAL SOL AMINO ACID >: HCPCS

## 2025-04-01 PROCEDURE — B9004 PARENTERAL INFUS PUMP PORTAB: HCPCS | Mod: RR

## 2025-04-01 PROCEDURE — S9374 HIT HYDRA 1 LITER DIEM: HCPCS

## 2025-04-01 PROCEDURE — A4217 STERILE WATER/SALINE, 500 ML: HCPCS

## 2025-04-01 PROCEDURE — B4185 PARENTERAL SOL 10 GM LIPIDS: HCPCS

## 2025-04-02 PROCEDURE — B4178 PARENTERAL SOL AMINO ACID >: HCPCS

## 2025-04-02 PROCEDURE — A4217 STERILE WATER/SALINE, 500 ML: HCPCS

## 2025-04-02 PROCEDURE — S9367 HIT TPN 3 LITER DIEM: HCPCS

## 2025-04-02 PROCEDURE — B9004 PARENTERAL INFUS PUMP PORTAB: HCPCS | Mod: RR

## 2025-04-02 PROCEDURE — S9374 HIT HYDRA 1 LITER DIEM: HCPCS | Mod: SH

## 2025-04-02 NOTE — PROGRESS NOTES
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  She presents today for an acute visit.      Oncology History:  22: Presented to an ED in [place] for evaluation of abdominal bloating and discomfort.  -Abdomen, pelvic CT: Radiographic Stage CAL ovarian cancer.   22: [id number].   22: Pelvic ultrasound: c/w metastatic cancer.  22: Pelvic exam under anesthesia, diagnostic laparoscopy, biopsies, evacuation of ascites.   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      22, 22, 22: Cycles 1-3 of neoadjuvant chemotherapy with IV carboplatin + paclitaxel 175 mg/m2 every 21 days.   -Cycles 1,2: Carboplatin AUC 6.   -Cycle 3: Carboplatin AUC 5 with dose-reduction due to thrombcytopenia.   -22: Chest, abdomen, pelvic CT: Partial response.   22: Pelvic exam under anesthesia, diagnostic laparoscopy, conversion to laparotomy for optimal interval tumor debulking to no gross residual disease including peritoneal and diaphragm biopsies, bilateral salpingo-oophorectomy, infragastric omentectomy, bilateral hemidiaphragm stripping, peritoneal and mesenteric argon beam ablation, appendectomy.   -Pathology: High-grade serous carcinoma involving all resected specimens.   [facility] Testing Results.  -Genomic ROXI low (6%)   -TMB low (1mut/Mb)  -Microsatellite stable/Mismatch Repair proficient  -PD-L1- (CPS 0%)  -NTRK 1/2/3 fusion not detected.   -ER-, CT+  -Genomic alterations in:  --TP53  -No genomic alterations in BRCA1,2.  -FOLR1+ (2+, 75%).      Jamila-ovary clinical trial screening: Negative for the immunoreactive subtype.     22, 10/19/22,  11/17/22: Cycles 4-6 of first-line chemotherapy with IV carboplatin AUC 5 + paclitaxel 175 mg/m2.  -12/2/22: Chest, abdomen, pelvic CT: No definitive evidence of disease.   -CA-125 439>>23.   -2/16/23: Chest, abdomen, pelvic CT to evaluate bloating: Stable findings, no definitive evidence of disease.    -5/25/23: Admitted  to [facility] for management of malignant ascites s/p therapeutic paracentesis, and partial SBO resolved with conservative management.   5/25/23: Abdomen, pelvic CT: Progressive disease.      10/5/22: [facility], reflexed to Common Hereditary Cancer Panel.   -No identifiable germline variants identified in ABRAXAS1, AKT1, APC, DEION, AXIN2, BARD1, BMPR1A, BRCA1, BRCA2, BRIP1, CDC73, CDH1, CDK4, CDKN2A, CHEK2, CTNNA1, DICER1, EPCAM, FANCC, FANCM, GREM1, HOXB13, KIT, MEN1, MLH1, MRE11, MSH2, MSH6, MUTYH, NBN, NF1, NTHL1, PALB2, PDGFRA, PIK3CA, PMS2, POLD1, POLE, PTEN, RAD50, RAD51C, RAD51D, RECQL, RINT1, SDHA, SDHB, SDHC, SDHD, SMAD4, SMARCA4, STK11, TP53, TSC1, TSC2, VHL, XRCC2.   -Variant of uncertain significance in MSH3 c.16C>T (p.[id number])     6/13/23, 7/12/23, 8/14/23, 9/13/23, 10/11/23, 11/8/23, 12/6/23, 1/3/24, 2/1/24, 2/29/24, 3/28/24: Cycles 1-11 of second-line chemotherapy with IV carboplatin AUC 5 + pegylated liposomal doxorubicin (PLD) 30 mg/m2 every 28 days.   -9/27/23: Chest, abdomen, pelvic CT: Partial response. Catheter-associated thrombus.   -1/23/24: Chest, abdomen, pelvic CT: Stable disease.   -4/10/24: Chest, abdomen, pelvic CT: Stable disease.   -CA-125 78>>44.   5/10/24-1/24/25: Second- PARPi therapy with olaparib.   -Cycles 1-2: Olaparib 300 mg BID.   -Cycles 3+: Olaparib 200 mg BID with dose-reduction due to decreased creatinine clearance.   -7/5/24: Chest, abdomen, pelvic CT: Stable disease.   -10/15/24: Chest, abdomen, pelvic CT: Stable disease.   -CA-125 38>>110.     2/6/25: Third-line therapy with IV carboplatin AUC 5 + paclitaxel 135  mg/m2 + bevacizumab 15 mg/kg.  340.  -2/11/25-3/2/25: Complicated by bevacizumab-induced microperforation of the proximal small bowel. Managed with drain placement. Subsequent SBO attributed to post-perforation inflammation. Palliative venting G-tube placed, TPN initiated.      Plan: Continue third-line therapy with IV carboplatin AUC 5 + paclitaxel 135 mg/m2 +every 3 weeks with permanent discontinuation of bevacizumab.      3/19/25 Cycle 2 carboplatin and paclitaxel.   235.                Today she comes to clinic with her  and daughter with several concerns.    Overall she feels as though her bowels have improved and function.  She is keeping her G-tube closed during the day but does open it at night as she was advised to keep it open at night to prevent aspiration.  She is eating small amounts of bland food but has really no appetite.  She will have a loose BM every couple days and is passing gas.    She is having right mid abdominal pain that worsens throughout the day.    She is having increasing abdominal distention over the last couple weeks.  Also having increased pain at the G-tube site that feels like burning on the inside.  Towards the end of the day she cannot do much activity at all because of the discomfort.    She has also noticed some increased generalized puffiness.  She does have some ankle edema L>R.  Weight has increased by 8 pounds over the last 3 days.    No fever/chills.  No chest pain.  Some DANIELLE with stairs and long walks.  No SOB at rest.              Past Medical History:    Past Medical History:   Diagnosis Date    Female stress incontinence     Ovarian cancer, right (H) 06/16/2022    Stage IIIC high-grade serous carcinoma    Recurrent cold sores          Past Surgical History:    Past Surgical History:   Procedure Laterality Date    APPENDECTOMY  08/29/2022    Part of ovarian cancer tumor debulking    BLADDER SUSPENSION  08/13/2009    ENDOSCOPIC INSERTION TUBE  GASTROSTOMY N/A 2/28/2025    Procedure: INSERTION, PEG TUBE (venting);  Surgeon: Porfirio Saunders MD;  Location: [facility] OR    HYSTERECTOMY  08/13/2009    For prolapse    IR FOLLOW UP VISIT INPATIENT  2/25/2025    IR PARACENTESIS  6/6/2022    IR PARACENTESIS  6/14/2022    IR PARACENTESIS  5/26/2023    IR PARACENTESIS  2/26/2025    IR RETROPERITONEAL ABSCESS DRAINAGE  2/18/2025    LAPAROSCOPY DIAGNOSTIC (GYN)  06/16/2022    SALPINGO-OOPHORECTOMY, COMBINED Bilateral 08/29/2022    Pelvic exam under anesthesia, diagnostic laparoscopy, conversion to laparotomy for optimal interval tumor debulking to no gross residual disease including peritoneal and diaphragm biopsies, bilateral salpingo-oophorectomy, infragastric omentectomy, bilateral hemidiaphragm stripping, peritoneal and mesenteric argon beam ablation, appendectomy.    TONSILLECTOMY  1973    TUBAL LIGATION Bilateral 09/01/1998         Health Maintenance Due   Topic Date Due    ADVANCE CARE PLANNING  Never done    YEARLY PREVENTIVE VISIT  Never done    COLORECTAL CANCER SCREENING  Never done    HIV SCREENING  Never done    HEPATITIS C SCREENING  Never done    Pneumococcal Vaccine: 50+ Years (1 of 2 - PCV) Never done    ZOSTER IMMUNIZATION (1 of 2) Never done    HEPATITIS B IMMUNIZATION (1 of 3 - 19+ 3-dose series) Never done    PAP  Never done    LIPID  Never done    INFLUENZA VACCINE (1) 09/01/2024    COVID-19 Vaccine (5 - 2024-25 season) 09/01/2024       Current Medications:     Current Outpatient Medications   Medication Sig Dispense Refill    acetaminophen (TYLENOL) 500 MG tablet Take 500 mg by mouth every 6 hours as needed for mild pain.      cyclobenzaprine (FLEXERIL) 5 MG tablet Take 1-2 tablets (5-10 mg) by mouth 3 times daily as needed for muscle spasms. 40 tablet 1    diphenhydrAMINE-acetaminophen (TYLENOL PM)  MG tablet Take 1 tablet by mouth nightly as needed for sleep.      docusate sodium (DSS) 100 MG capsule Take 100 mg by mouth 2 times daily as  "needed for constipation.      Emergency Supply Kit, Central, Patient use for emergency only. Contents: 3 sodium chloride 0.9% flushes, 1 dressing kit, 1 microclave ext set 14\", 4 nitrile gloves (med), 6 alcohol prep pads, 1 bacitracin, 1 syringe (10 cc 20 G 1\"). Call [phone] to reorder. [id number] kit 0    escitalopram (LEXAPRO) 20 MG tablet Take 20 mg by mouth daily.      famotidine (PEPCID) 20 MG tablet Take 20 mg by mouth 2 times daily as needed.      hydrOXYzine HCl (ATARAX) 25 MG tablet Take 1-2 tablets (25-50 mg) by mouth every 6 hours as needed for anxiety. 30 tablet 0    Lidocaine (LIDOCARE) 4 % Patch Place 1 patch over 12 hours onto the skin daily as needed for moderate pain. To prevent lidocaine toxicity, patient should be patch free for 12 hrs daily. 6 patch 0    lidocaine-prilocaine (EMLA) 2.5-2.5 % external cream Apply topically as needed for moderate pain 30 g 1    LORazepam (ATIVAN) 0.5 MG tablet Take 0.5-1 mg by mouth every 6 hours as needed for anxiety.      Multiple Vitamin (INFUVITE ADULT) injection Add to infusion 10 mLs daily. Select 2 multivitamin vials, one of each color top. Draw up 5 mL from each vial and add to 1 TPN bag daily immediately prior to infusing.   Discard remainder of vials. 3600 mL 0    Multiple Vitamin (MULTI-VITAMIN DAILY PO) Take 1 tablet by mouth daily      omeprazole (PRILOSEC) 20 MG DR capsule Take 20 mg by mouth daily.      ondansetron (ZOFRAN) 8 MG tablet Take 1 tablet (8 mg) by mouth every 8 hours as needed for nausea (vomiting). Do not take for 3 days after chemotherapy. 30 tablet 2    parenteral nutrition - DUALCHAMBER - see order for formula Infuse 1,920 mLs over 12 hours into the vein (central line) five times a week. Taper up for 1 Hours. Taper down for 1 Hours.   TPN additives to be added prior to administration:   Infuvite-Adult 10 mL daily.  Plateau rate:174.6 mL/hr.  KVO: 5 mL/hr. 858323 mL 0    parenteral nutrition - PLAIN - see order for formula Infuse " 1,920 mLs over 12 hours into the vein (central line) twice a week. Taper up for 1 Hours. Taper down for 1 Hours. TPN additives to be added prior to administration:   Infuvite-  Adult 10 mL daily.  Plateau rate:174.6  mL/hr.  KVO: 5 mL/hr. 876550 mL 0    Port Access Kit For nurse use only.  Do not remove items from bag.  Use for port access.  Do not place syringe on sterile field. [id number] kit 0    prochlorperazine (COMPAZINE) 10 MG tablet Take 1 tablet (10 mg) by mouth every 6 hours as needed for nausea or vomiting. 30 tablet 2    sodium chloride 0.9 % 500 mL via elastomeric pump Infuse 1,000 mLs into the vein daily as needed (dehydration). Infuse 1- 2 elastomeric pump(s) ( 500-1000 ml) . Each elastomeric to infuse over 2 hours. 834696 mL 0    sodium chloride, PF, 0.9% PF flush Inject 10 mLs into the vein as needed for line flush. Flush IV before and after med administration as directed and/or at least every 24 hours, or prior to deaccessing for no further use and/or at least every 4 weeks when not accessed. 348491 mL 0    valACYclovir (VALTREX) 1000 mg tablet Take 1,000 mg by mouth 2 times daily as needed.           Allergies:      No Known Allergies     Social History:     Social History     Tobacco Use    Smoking status: Former     Current packs/day: 0.00     Average packs/day: 1 pack/day for 42.0 years (42.0 ttl pk-yrs)     Types: Cigarettes     Start date: 1980     Quit date: 2022     Years since quittin.8    Smokeless tobacco: Never   Substance Use Topics    Alcohol use: Yes     Comment: Beer ~Q3 months.       History   Drug Use Unknown         Family History:     The patient's family history is notable for:    Family History   Problem Relation Age of Onset    Prostate Cancer Father     Breast Cancer Sister 48    Melanoma Sister     Skin Cancer Sister     Colon Cancer Maternal Grandmother          Physical Exam:     /71 (BP Location: Right arm, Patient Position: Sitting, Cuff Size: Adult  Regular)   Pulse 79   Temp 98.3  F (36.8  C) (Oral)   Resp 16   Wt 61.9 kg (136 lb 6.4 oz)   SpO2 100%   BMI 25.18 kg/m    Body mass index is 25.18 kg/m .    General Appearance: alert, no distress     HEENT: no palpable nodules or masses        Cardiovascular: regular rate and rhythm, no gallops, rubs or murmurs     Respiratory: lungs clear, no rales, rhonchi or wheezes    Musculoskeletal: extremities non tender and right ankle +1 edema, left ankle trace edema    Skin: no lesions or rashes     Neurological: normal gait, no gross defects     Psychiatric: appropriate mood and affect                               Hematological: normal cervical and supraclavicular lymph nodes     Gastrointestinal:       GT site intact, some erythema around insertion site, no induration, some discharge on dressing.  Abdomen is distended primarily in the upper abdomen LUQ under and lateral to her GT site greater the RUQ, LUQ tender to palpation.  Right mid abdomen with isolated areas of firmness.  RUQ hyperactive, RLQ hypoactive, LUQ and LLQ normoactive bowel sounds.    Genitourinary: Deferred.       Recent Results (from the past week)   Comprehensive metabolic panel (BMP + Alb, Alk Phos, ALT, AST, Total. Bili, TP)   Result Value Ref Range    Sodium 130 (L) 135 - 145 mmol/L    Potassium 4.2 3.4 - 5.3 mmol/L    Carbon Dioxide (CO2) 23 22 - 29 mmol/L    Anion Gap 9 7 - 15 mmol/L    Urea Nitrogen 23.0 8.0 - 23.0 mg/dL    Creatinine 0.54 0.51 - 0.95 mg/dL    GFR Estimate >90 >60 mL/min/1.73m2    Calcium 8.5 (L) 8.8 - 10.4 mg/dL    Chloride 98 98 - 107 mmol/L    Glucose 115 (H) 70 - 99 mg/dL    Alkaline Phosphatase 269 (H) 40 - 150 U/L    AST 33 0 - 45 U/L    ALT 34 0 - 50 U/L    Protein Total 6.0 (L) 6.4 - 8.3 g/dL    Albumin 2.8 (L) 3.5 - 5.2 g/dL    Bilirubin Total 0.4 <=1.2 mg/dL   Bilirubin direct   Result Value Ref Range    Bilirubin Direct 0.25 0.00 - 0.30 mg/dL   Magnesium   Result Value Ref Range    Magnesium 2.0 1.7 - 2.3 mg/dL    Phosphorus   Result Value Ref Range    Phosphorus 3.5 2.5 - 4.5 mg/dL   Triglycerides   Result Value Ref Range    Triglycerides 63 <150 mg/dL    Patient Fasting > 8hrs? No    CBC with platelets and differential   Result Value Ref Range    WBC Count 7.2 4.0 - 11.0 10e3/uL    RBC Count 2.68 (L) 3.80 - 5.20 10e6/uL    Hemoglobin 8.1 (L) 11.7 - 15.7 g/dL    Hematocrit 24.8 (L) 35.0 - 47.0 %    MCV 93 78 - 100 fL    MCH 30.2 26.5 - 33.0 pg    MCHC 32.7 31.5 - 36.5 g/dL    RDW 14.6 10.0 - 15.0 %    Platelet Count 103 (L) 150 - 450 10e3/uL    % Neutrophils 75 %    % Lymphocytes 15 %    % Monocytes 9 %    % Eosinophils 0 %    % Basophils 0 %    % Immature Granulocytes 1 %    NRBCs per 100 WBC 0 <1 /100    Absolute Neutrophils 5.4 1.6 - 8.3 10e3/uL    Absolute Lymphocytes 1.1 0.8 - 5.3 10e3/uL    Absolute Monocytes 0.6 0.0 - 1.3 10e3/uL    Absolute Eosinophils 0.0 0.0 - 0.7 10e3/uL    Absolute Basophils 0.0 0.0 - 0.2 10e3/uL    Absolute Immature Granulocytes 0.1 <=0.4 10e3/uL    Absolute NRBCs 0.0 10e3/uL   RBC and Platelet Morphology   Result Value Ref Range    RBC Morphology Confirmed RBC Indices     Platelet Assessment  Automated Count Confirmed. Platelet morphology is normal.     Automated Count Confirmed. Platelet morphology is normal.    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  She presents today for an acute visit.     60 minutes spent on the date of the encounter doing chart review, history and exam, documentation, and further activities as noted above.      Plan:     1.)        Ovarian cancer:  RTC as scheduled for labs, provider visit, and next cycle.  Plan for CT CAP and follow up with Dr. Patino after cycle 3.  Reviewed signs and symptoms for when she should contact the clinic or seek additional care.  Patient to contact the clinic with any questions or concerns in  the interim.      Genetic risk factors were assessed and she has a VUS No identifiable germline variants identified in ABRAXAS1, AKT1, APC, DEION, AXIN2, BARD1, BMPR1A, BRCA1, BRCA2, BRIP1, CDC73, CDH1, CDK4, CDKN2A, CHEK2, CTNNA1, DICER1, EPCAM, FANCC, FANCM, GREM1, HOXB13, KIT, MEN1, MLH1, MRE11, MSH2, MSH6, MUTYH, NBN, NF1, NTHL1, PALB2, PDGFRA, PIK3CA, PMS2, POLD1, POLE, PTEN, RAD50, RAD51C, RAD51D, RECQL, RINT1, SDHA, SDHB, SDHC, SDHD, SMAD4, SMARCA4, STK11, TP53, TSC1, TSC2, VHL, XRCC2.    [facility] Testing Results.  -Genomic ROXI low (6%)   -TMB low (1mut/Mb)  -Microsatellite stable/Mismatch Repair proficient  -PD-L1- (CPS 0%)  -NTRK 1/2/3 fusion not detected.   -ER-, KS+  -Genomic alterations in:  --TP53  -No genomic alterations in BRCA1,2.  -FOLR1+ (2+, 75%).       Labs and/or tests reviewed include:  CBC. CMP. Bili. Mag. Phos. Triglycerides. CT CAP.                2.)        Health maintenance:  Issues addressed today include following up with PCP for annual health maintenance and non-gynecologic issues.      3.)        SBO: Improving SBO symptoms.  Able to tolerate having her GT clamped during the day.  Only leaving open at night as this was the recommendations.  She feels the drainage from the GT at night is primarily the water she is taking in during the night.  She is having loose BMs every couple days and passing flatus.  She is tolerating PO intake in small amounts of bland food but is has no appetite.  -Continue venting G-tube, with clamp trials as tolerated. OK to try clamping overnight.  -Encouraged sitting down to meals as she normally would with family as able and try and eat and slowly increase intake.  -Continue TPN until able to tolerate enough oral intake.    4.) Ankle edema: BLE edema L>R.  Total protein 6.0 down from 6.7 and albumin 2.8 down from 3.3.  Weight up 8lbs.  Lungs clear.  Suspect she is third spacing fluid due to hypoproteinemia.  No pain or erythema to suggest DVT.  Risk for DVT is  high so if symptoms continue would consider BLE US.  Discussed with Rachana Smith PharmD with [facility] who notes they felt she was getting too much fluid so they have decreased her total TPN volume as of 2 days ago and decreased her IVF with her TPN. They feel this will help her albumin, hgb, and NA, additionally her edema should also improve.  The updated TPN formula goes out today with her starting the new formula tomorrow.  She will have a lab recheck next week with their team.    5.) Abdominal distension: Irregular abdominal distension LUQ > RUQ.  Some isolated areas of firmness in the RUQ.  Tenderness to palpation of the LUQ (but not by GT).  Some hyperactivity of bowel sounds in the RUQ, hypoactivity in the RLQ, regular bowel sounds in the left quadrants giving the impression of partial obstruction.  The irregular contour of her abdomen which is new is concerning and could indicate ascites, obstruction, or tumor.  Recommend CT to evaluate.  Orders placed.  She will be contacted with results and to discuss plan.    The longitudinal plan of care for the diagnosis(es)/condition(s) as documented were addressed during this visit. Due to the added complexity in care, I will continue to support Michelle in the subsequent management and with ongoing continuity of care.    This note was created in part by the use of Dragon voice recognition dictation system. Inadvertent grammatical errors and typographical errors may exist.      Echo Sanz, DNP, APRN, FNP-C, AOCNP  Oncology Nurse Practitioner  Division of Gynecologic Oncology  Pager: [phone]     CC  Patient Care Team:  Domenica Christianson MD as PCP - General  Charis Patino MD as MD (Gynecologic Oncology)  Anish Monroy MD as Assigned Palliative Care Provider  Pratima Vega, RN as Specialty Care Coordinator (Hematology & Oncology)  Myhre, Grace C, [facility] as Pharmacist  Charis Patino MD as Assigned Cancer Care Provider  Charis Patino MD  as Home Infusion Following Provider (Gynecologic Oncology)  Clarisse Wells RD as \[facility]\"" Registered Dietitian (Dietitian, Registered)  SELF, REFERRED

## 2025-04-03 ENCOUNTER — HOME INFUSION BILLING (OUTPATIENT)
Dept: HOME HEALTH SERVICES | Facility: HOME HEALTH | Age: 60
End: 2025-04-03
Payer: COMMERCIAL

## 2025-04-03 ENCOUNTER — ONCOLOGY VISIT (OUTPATIENT)
Dept: ONCOLOGY | Facility: CLINIC | Age: 60
End: 2025-04-03
Attending: OBSTETRICS & GYNECOLOGY
Payer: COMMERCIAL

## 2025-04-03 ENCOUNTER — HOSPITAL ENCOUNTER (OUTPATIENT)
Dept: CT IMAGING | Facility: CLINIC | Age: 60
Discharge: HOME | End: 2025-04-03
Attending: NURSE PRACTITIONER
Payer: COMMERCIAL

## 2025-04-03 VITALS
BODY MASS INDEX: 25.18 KG/M2 | TEMPERATURE: 98.3 F | WEIGHT: 136.4 LBS | HEART RATE: 79 BPM | DIASTOLIC BLOOD PRESSURE: 71 MMHG | OXYGEN SATURATION: 100 % | RESPIRATION RATE: 16 BRPM | SYSTOLIC BLOOD PRESSURE: 107 MMHG

## 2025-04-03 DIAGNOSIS — R14.0 ABDOMINAL DISTENSION: ICD-10-CM

## 2025-04-03 DIAGNOSIS — C56.1 OVARIAN CANCER, RIGHT (H): ICD-10-CM

## 2025-04-03 DIAGNOSIS — C56.1 OVARIAN CANCER, RIGHT (H): Primary | ICD-10-CM

## 2025-04-03 PROCEDURE — 250N000011 HC RX IP 250 OP 636: Performed by: NURSE PRACTITIONER

## 2025-04-03 PROCEDURE — 99213 OFFICE O/P EST LOW 20 MIN: CPT | Performed by: NURSE PRACTITIONER

## 2025-04-03 PROCEDURE — 250N000009 HC RX 250: Performed by: NURSE PRACTITIONER

## 2025-04-03 PROCEDURE — A4215 STERILE NEEDLE: HCPCS

## 2025-04-03 PROCEDURE — S9367 HIT TPN 3 LITER DIEM: HCPCS

## 2025-04-03 PROCEDURE — B4178 PARENTERAL SOL AMINO ACID >: HCPCS

## 2025-04-03 PROCEDURE — B9004 PARENTERAL INFUS PUMP PORTAB: HCPCS | Mod: RR

## 2025-04-03 PROCEDURE — 71260 CT THORAX DX C+: CPT

## 2025-04-03 PROCEDURE — A4217 STERILE WATER/SALINE, 500 ML: HCPCS

## 2025-04-03 PROCEDURE — E1399 DURABLE MEDICAL EQUIPMENT MI: HCPCS

## 2025-04-03 PROCEDURE — 74177 CT ABD & PELVIS W/CONTRAST: CPT

## 2025-04-03 PROCEDURE — 999N000130 HC STATISTIC PORT-A-CATH ACCESS/FLUSHING

## 2025-04-03 PROCEDURE — S9374 HIT HYDRA 1 LITER DIEM: HCPCS | Mod: SH

## 2025-04-03 RX ORDER — IOPAMIDOL 755 MG/ML
100 INJECTION, SOLUTION INTRAVASCULAR ONCE
Status: COMPLETED | OUTPATIENT
Start: 2025-04-03 | End: 2025-04-03

## 2025-04-03 RX ADMIN — IOPAMIDOL 67 ML: 755 INJECTION, SOLUTION INTRAVENOUS at 08:19

## 2025-04-03 RX ADMIN — SODIUM CHLORIDE 34 ML: 9 INJECTION, SOLUTION INTRAVENOUS at 08:25

## 2025-04-03 ASSESSMENT — PAIN SCALES - GENERAL: PAINLEVEL_OUTOF10: NO PAIN (0)

## 2025-04-03 NOTE — LETTER
4/3/2025      Jacki Smith  [address] [facility] [address]      Dear Colleague,    Thank you for referring your patient, Jacki Smith, to the [facility]. Please see a copy of my visit note below.    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  She presents today for an acute visit.      Oncology History:  22: Presented to an ED in [place] for evaluation of abdominal bloating and discomfort.  -Abdomen, pelvic CT: Radiographic Stage CAL ovarian cancer.   22: [id number].   22: Pelvic ultrasound: c/w metastatic cancer.  22: Pelvic exam under anesthesia, diagnostic laparoscopy, biopsies, evacuation of ascites.   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      22, 22, 22: Cycles 1-3 of neoadjuvant chemotherapy with IV carboplatin + paclitaxel 175 mg/m2 every 21 days.   -Cycles 1,2: Carboplatin AUC 6.   -Cycle 3: Carboplatin AUC 5 with dose-reduction due to thrombcytopenia.   -22: Chest, abdomen, pelvic CT: Partial response.   22: Pelvic exam under anesthesia, diagnostic laparoscopy, conversion to laparotomy for optimal interval tumor debulking to no gross residual disease including peritoneal and diaphragm biopsies, bilateral salpingo-oophorectomy, infragastric omentectomy, bilateral hemidiaphragm stripping, peritoneal and mesenteric argon beam ablation, appendectomy.   -Pathology: High-grade serous carcinoma involving all resected specimens.   Caris Testing Results.  -Genomic ROXI low (6%)   -TMB low (1mut/Mb)  -Microsatellite stable/Mismatch Repair proficient  -PD-L1- (CPS  0%)  -NTRK 1/2/3 fusion not detected.   -ER-, DE+  -Genomic alterations in:  --TP53  -No genomic alterations in BRCA1,2.  -FOLR1+ (2+, 75%).      Jamila-ovary clinical trial screening: Negative for the immunoreactive subtype.     9/28/22, 10/19/22, 11/17/22: Cycles 4-6 of first-line chemotherapy with IV carboplatin AUC 5 + paclitaxel 175 mg/m2.  -12/2/22: Chest, abdomen, pelvic CT: No definitive evidence of disease.   -CA-125 439>>23.   -2/16/23: Chest, abdomen, pelvic CT to evaluate bloating: Stable findings, no definitive evidence of disease.    -5/25/23: Admitted  to [facility] for management of malignant ascites s/p therapeutic paracentesis, and partial SBO resolved with conservative management.   5/25/23: Abdomen, pelvic CT: Progressive disease.      10/5/22: [facility], reflexed to Common Hereditary Cancer Panel.   -No identifiable germline variants identified in ABRAXAS1, AKT1, APC, DEION, AXIN2, BARD1, BMPR1A, BRCA1, BRCA2, BRIP1, CDC73, CDH1, CDK4, CDKN2A, CHEK2, CTNNA1, DICER1, EPCAM, FANCC, FANCM, GREM1, HOXB13, KIT, MEN1, MLH1, MRE11, MSH2, MSH6, MUTYH, NBN, NF1, NTHL1, PALB2, PDGFRA, PIK3CA, PMS2, POLD1, POLE, PTEN, RAD50, RAD51C, RAD51D, RECQL, RINT1, SDHA, SDHB, SDHC, SDHD, SMAD4, SMARCA4, STK11, TP53, TSC1, TSC2, VHL, XRCC2.   -Variant of uncertain significance in MSH3 c.16C>T (p.Btp9Inx)     6/13/23, 7/12/23, 8/14/23, 9/13/23, 10/11/23, 11/8/23, 12/6/23, 1/3/24, 2/1/24, 2/29/24, 3/28/24: Cycles 1-11 of second-line chemotherapy with IV carboplatin AUC 5 + pegylated liposomal doxorubicin (PLD) 30 mg/m2 every 28 days.   -9/27/23: Chest, abdomen, pelvic CT: Partial response. Catheter-associated thrombus.   -1/23/24: Chest, abdomen, pelvic CT: Stable disease.   -4/10/24: Chest, abdomen, pelvic CT: Stable disease.   -CA-125 78>>44.   5/10/24-1/24/25: Second- PARPi therapy with olaparib.   -Cycles 1-2: Olaparib 300 mg BID.   -Cycles 3+: Olaparib 200 mg BID with dose-reduction  due to decreased creatinine clearance.   -7/5/24: Chest, abdomen, pelvic CT: Stable disease.   -10/15/24: Chest, abdomen, pelvic CT: Stable disease.   -CA-125 38>>110.     2/6/25: Third-line therapy with IV carboplatin AUC 5 + paclitaxel 135 mg/m2 + bevacizumab 15 mg/kg.  340.  -2/11/25-3/2/25: Complicated by bevacizumab-induced microperforation of the proximal small bowel. Managed with drain placement. Subsequent SBO attributed to post-perforation inflammation. Palliative venting G-tube placed, TPN initiated.      Plan: Continue third-line therapy with IV carboplatin AUC 5 + paclitaxel 135 mg/m2 +every 3 weeks with permanent discontinuation of bevacizumab.      3/19/25 Cycle 2 carboplatin and paclitaxel.   235.                Today she comes to clinic with her  and daughter with several concerns.    Overall she feels as though her bowels have improved and function.  She is keeping her G-tube closed during the day but does open it at night as she was advised to keep it open at night to prevent aspiration.  She is eating small amounts of bland food but has really no appetite.  She will have a loose BM every couple days and is passing gas.    She is having right mid abdominal pain that worsens throughout the day.    She is having increasing abdominal distention over the last couple weeks.  Also having increased pain at the G-tube site that feels like burning on the inside.  Towards the end of the day she cannot do much activity at all because of the discomfort.    She has also noticed some increased generalized puffiness.  She does have some ankle edema L>R.  Weight has increased by 8 pounds over the last 3 days.    No fever/chills.  No chest pain.  Some DANIELLE with stairs and long walks.  No SOB at rest.              Past Medical History:    Past Medical History:   Diagnosis Date     Female stress incontinence      Ovarian cancer, right (H) 06/16/2022    Stage IIIC high-grade serous carcinoma      Recurrent cold sores          Past Surgical History:    Past Surgical History:   Procedure Laterality Date     APPENDECTOMY  08/29/2022    Part of ovarian cancer tumor debulking     BLADDER SUSPENSION  08/13/2009     ENDOSCOPIC INSERTION TUBE GASTROSTOMY N/A 2/28/2025    Procedure: INSERTION, PEG TUBE (venting);  Surgeon: Porfirio Saunders MD;  Location: [facility] OR     HYSTERECTOMY  08/13/2009    For prolapse     IR FOLLOW UP VISIT INPATIENT  2/25/2025     IR PARACENTESIS  6/6/2022     IR PARACENTESIS  6/14/2022     IR PARACENTESIS  5/26/2023     IR PARACENTESIS  2/26/2025     IR RETROPERITONEAL ABSCESS DRAINAGE  2/18/2025     LAPAROSCOPY DIAGNOSTIC (GYN)  06/16/2022     SALPINGO-OOPHORECTOMY, COMBINED Bilateral 08/29/2022    Pelvic exam under anesthesia, diagnostic laparoscopy, conversion to laparotomy for optimal interval tumor debulking to no gross residual disease including peritoneal and diaphragm biopsies, bilateral salpingo-oophorectomy, infragastric omentectomy, bilateral hemidiaphragm stripping, peritoneal and mesenteric argon beam ablation, appendectomy.     TONSILLECTOMY  1973     TUBAL LIGATION Bilateral 09/01/1998         Health Maintenance Due   Topic Date Due     ADVANCE CARE PLANNING  Never done     YEARLY PREVENTIVE VISIT  Never done     COLORECTAL CANCER SCREENING  Never done     HIV SCREENING  Never done     HEPATITIS C SCREENING  Never done     Pneumococcal Vaccine: 50+ Years (1 of 2 - PCV) Never done     ZOSTER IMMUNIZATION (1 of 2) Never done     HEPATITIS B IMMUNIZATION (1 of 3 - 19+ 3-dose series) Never done     PAP  Never done     LIPID  Never done     INFLUENZA VACCINE (1) 09/01/2024     COVID-19 Vaccine (5 - 2024-25 season) 09/01/2024       Current Medications:     Current Outpatient Medications   Medication Sig Dispense Refill     acetaminophen (TYLENOL) 500 MG tablet Take 500 mg by mouth every 6 hours as needed for mild pain.       cyclobenzaprine (FLEXERIL) 5 MG tablet Take 1-2 tablets  "(5-10 mg) by mouth 3 times daily as needed for muscle spasms. 40 tablet 1     diphenhydrAMINE-acetaminophen (TYLENOL PM)  MG tablet Take 1 tablet by mouth nightly as needed for sleep.       docusate sodium (DSS) 100 MG capsule Take 100 mg by mouth 2 times daily as needed for constipation.       Emergency Supply Kit, Central, Patient use for emergency only. Contents: 3 sodium chloride 0.9% flushes, 1 dressing kit, 1 microclave ext set 14\", 4 nitrile gloves (med), 6 alcohol prep pads, 1 bacitracin, 1 syringe (10 cc 20 G 1\"). Call [phone] to reorder. [id number] kit 0     escitalopram (LEXAPRO) 20 MG tablet Take 20 mg by mouth daily.       famotidine (PEPCID) 20 MG tablet Take 20 mg by mouth 2 times daily as needed.       hydrOXYzine HCl (ATARAX) 25 MG tablet Take 1-2 tablets (25-50 mg) by mouth every 6 hours as needed for anxiety. 30 tablet 0     Lidocaine (LIDOCARE) 4 % Patch Place 1 patch over 12 hours onto the skin daily as needed for moderate pain. To prevent lidocaine toxicity, patient should be patch free for 12 hrs daily. 6 patch 0     lidocaine-prilocaine (EMLA) 2.5-2.5 % external cream Apply topically as needed for moderate pain 30 g 1     LORazepam (ATIVAN) 0.5 MG tablet Take 0.5-1 mg by mouth every 6 hours as needed for anxiety.       Multiple Vitamin (INFUVITE ADULT) injection Add to infusion 10 mLs daily. Select 2 multivitamin vials, one of each color top. Draw up 5 mL from each vial and add to 1 TPN bag daily immediately prior to infusing.   Discard remainder of vials. 3600 mL 0     Multiple Vitamin (MULTI-VITAMIN DAILY PO) Take 1 tablet by mouth daily       omeprazole (PRILOSEC) 20 MG DR capsule Take 20 mg by mouth daily.       ondansetron (ZOFRAN) 8 MG tablet Take 1 tablet (8 mg) by mouth every 8 hours as needed for nausea (vomiting). Do not take for 3 days after chemotherapy. 30 tablet 2     parenteral nutrition - DUALCHAMBER - see order for formula Infuse 1,920 mLs over 12 hours into the vein " (central line) five times a week. Taper up for 1 Hours. Taper down for 1 Hours.   TPN additives to be added prior to administration:   Infuvite-Adult 10 mL daily.  Plateau rate:174.6 mL/hr.  KVO: 5 mL/hr. 437427 mL 0     parenteral nutrition - PLAIN - see order for formula Infuse 1,920 mLs over 12 hours into the vein (central line) twice a week. Taper up for 1 Hours. Taper down for 1 Hours. TPN additives to be added prior to administration:   Infuvite-  Adult 10 mL daily.  Plateau rate:174.6  mL/hr.  KVO: 5 mL/hr. 374608 mL 0     Port Access Kit For nurse use only.  Do not remove items from bag.  Use for port access.  Do not place syringe on sterile field. [id number] kit 0     prochlorperazine (COMPAZINE) 10 MG tablet Take 1 tablet (10 mg) by mouth every 6 hours as needed for nausea or vomiting. 30 tablet 2     sodium chloride 0.9 % 500 mL via elastomeric pump Infuse 1,000 mLs into the vein daily as needed (dehydration). Infuse 1- 2 elastomeric pump(s) ( 500-1000 ml) . Each elastomeric to infuse over 2 hours. 955392 mL 0     sodium chloride, PF, 0.9% PF flush Inject 10 mLs into the vein as needed for line flush. Flush IV before and after med administration as directed and/or at least every 24 hours, or prior to deaccessing for no further use and/or at least every 4 weeks when not accessed. 358592 mL 0     valACYclovir (VALTREX) 1000 mg tablet Take 1,000 mg by mouth 2 times daily as needed.           Allergies:      No Known Allergies     Social History:     Social History     Tobacco Use     Smoking status: Former     Current packs/day: 0.00     Average packs/day: 1 pack/day for 42.0 years (42.0 ttl pk-yrs)     Types: Cigarettes     Start date: 1980     Quit date: 2022     Years since quittin.8     Smokeless tobacco: Never   Substance Use Topics     Alcohol use: Yes     Comment: Beer ~Q3 months.       History   Drug Use Unknown         Family History:     The patient's family history is notable  for:    Family History   Problem Relation Age of Onset     Prostate Cancer Father      Breast Cancer Sister 48     Melanoma Sister      Skin Cancer Sister      Colon Cancer Maternal Grandmother          Physical Exam:     /71 (BP Location: Right arm, Patient Position: Sitting, Cuff Size: Adult Regular)   Pulse 79   Temp 98.3  F (36.8  C) (Oral)   Resp 16   Wt 61.9 kg (136 lb 6.4 oz)   SpO2 100%   BMI 25.18 kg/m    Body mass index is 25.18 kg/m .    General Appearance: alert, no distress     HEENT: no palpable nodules or masses        Cardiovascular: regular rate and rhythm, no gallops, rubs or murmurs     Respiratory: lungs clear, no rales, rhonchi or wheezes    Musculoskeletal: extremities non tender and right ankle +1 edema, left ankle trace edema    Skin: no lesions or rashes     Neurological: normal gait, no gross defects     Psychiatric: appropriate mood and affect                               Hematological: normal cervical and supraclavicular lymph nodes     Gastrointestinal:       GT site intact, some erythema around insertion site, no induration, some discharge on dressing.  Abdomen is distended primarily in the upper abdomen LUQ under and lateral to her GT site greater the RUQ, LUQ tender to palpation.  Right mid abdomen with isolated areas of firmness.  RUQ hyperactive, RLQ hypoactive, LUQ and LLQ normoactive bowel sounds.    Genitourinary: Deferred.       Recent Results (from the past week)   Comprehensive metabolic panel (BMP + Alb, Alk Phos, ALT, AST, Total. Bili, TP)   Result Value Ref Range    Sodium 130 (L) 135 - 145 mmol/L    Potassium 4.2 3.4 - 5.3 mmol/L    Carbon Dioxide (CO2) 23 22 - 29 mmol/L    Anion Gap 9 7 - 15 mmol/L    Urea Nitrogen 23.0 8.0 - 23.0 mg/dL    Creatinine 0.54 0.51 - 0.95 mg/dL    GFR Estimate >90 >60 mL/min/1.73m2    Calcium 8.5 (L) 8.8 - 10.4 mg/dL    Chloride 98 98 - 107 mmol/L    Glucose 115 (H) 70 - 99 mg/dL    Alkaline Phosphatase 269 (H) 40 - 150 U/L    AST  33 0 - 45 U/L    ALT 34 0 - 50 U/L    Protein Total 6.0 (L) 6.4 - 8.3 g/dL    Albumin 2.8 (L) 3.5 - 5.2 g/dL    Bilirubin Total 0.4 <=1.2 mg/dL   Bilirubin direct   Result Value Ref Range    Bilirubin Direct 0.25 0.00 - 0.30 mg/dL   Magnesium   Result Value Ref Range    Magnesium 2.0 1.7 - 2.3 mg/dL   Phosphorus   Result Value Ref Range    Phosphorus 3.5 2.5 - 4.5 mg/dL   Triglycerides   Result Value Ref Range    Triglycerides 63 <150 mg/dL    Patient Fasting > 8hrs? No    CBC with platelets and differential   Result Value Ref Range    WBC Count 7.2 4.0 - 11.0 10e3/uL    RBC Count 2.68 (L) 3.80 - 5.20 10e6/uL    Hemoglobin 8.1 (L) 11.7 - 15.7 g/dL    Hematocrit 24.8 (L) 35.0 - 47.0 %    MCV 93 78 - 100 fL    MCH 30.2 26.5 - 33.0 pg    MCHC 32.7 31.5 - 36.5 g/dL    RDW 14.6 10.0 - 15.0 %    Platelet Count 103 (L) 150 - 450 10e3/uL    % Neutrophils 75 %    % Lymphocytes 15 %    % Monocytes 9 %    % Eosinophils 0 %    % Basophils 0 %    % Immature Granulocytes 1 %    NRBCs per 100 WBC 0 <1 /100    Absolute Neutrophils 5.4 1.6 - 8.3 10e3/uL    Absolute Lymphocytes 1.1 0.8 - 5.3 10e3/uL    Absolute Monocytes 0.6 0.0 - 1.3 10e3/uL    Absolute Eosinophils 0.0 0.0 - 0.7 10e3/uL    Absolute Basophils 0.0 0.0 - 0.2 10e3/uL    Absolute Immature Granulocytes 0.1 <=0.4 10e3/uL    Absolute NRBCs 0.0 10e3/uL   RBC and Platelet Morphology   Result Value Ref Range    RBC Morphology Confirmed RBC Indices     Platelet Assessment  Automated Count Confirmed. Platelet morphology is normal.     Automated Count Confirmed. Platelet morphology is normal.    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  She presents today for an acute visit.     60 minutes spent on the date of the encounter doing chart review, history and exam, documentation, and further activities as noted above.      Plan:     1.)         Ovarian cancer:  RTC as scheduled for labs, provider visit, and next cycle.  Plan for CT CAP and follow up with Dr. Patino after cycle 3.  Reviewed signs and symptoms for when she should contact the clinic or seek additional care.  Patient to contact the clinic with any questions or concerns in the interim.      Genetic risk factors were assessed and she has a VUS No identifiable germline variants identified in ABRAXAS1, AKT1, APC, DEION, AXIN2, BARD1, BMPR1A, BRCA1, BRCA2, BRIP1, CDC73, CDH1, CDK4, CDKN2A, CHEK2, CTNNA1, DICER1, EPCAM, FANCC, FANCM, GREM1, HOXB13, KIT, MEN1, MLH1, MRE11, MSH2, MSH6, MUTYH, NBN, NF1, NTHL1, PALB2, PDGFRA, PIK3CA, PMS2, POLD1, POLE, PTEN, RAD50, RAD51C, RAD51D, RECQL, RINT1, SDHA, SDHB, SDHC, SDHD, SMAD4, SMARCA4, STK11, TP53, TSC1, TSC2, VHL, XRCC2.    Caris Testing Results.  -Genomic ROXI low (6%)   -TMB low (1mut/Mb)  -Microsatellite stable/Mismatch Repair proficient  -PD-L1- (CPS 0%)  -NTRK 1/2/3 fusion not detected.   -ER-, VT+  -Genomic alterations in:  --TP53  -No genomic alterations in BRCA1,2.  -FOLR1+ (2+, 75%).       Labs and/or tests reviewed include:  CBC. CMP. Bili. Mag. Phos. Triglycerides. CT CAP.                2.)        Health maintenance:  Issues addressed today include following up with PCP for annual health maintenance and non-gynecologic issues.      3.)        SBO: Improving SBO symptoms.  Able to tolerate having her GT clamped during the day.  Only leaving open at night as this was the recommendations.  She feels the drainage from the GT at night is primarily the water she is taking in during the night.  She is having loose BMs every couple days and passing flatus.  She is tolerating PO intake in small amounts of bland food but is has no appetite.  -Continue venting G-tube, with clamp trials as tolerated. OK to try clamping overnight.  -Encouraged sitting down to meals as she normally would with family as able and try and eat and slowly increase  intake.  -Continue TPN until able to tolerate enough oral intake.    4.) Ankle edema: BLE edema L>R.  Total protein 6.0 down from 6.7 and albumin 2.8 down from 3.3.  Weight up 8lbs.  Lungs clear.  Suspect she is third spacing fluid due to hypoproteinemia.  No pain or erythema to suggest DVT.  Risk for DVT is high so if symptoms continue would consider BLE US.  Discussed with [facility] who notes they felt she was getting too much fluid so they have decreased her IVF with her TPN and they feel this will help her albumin, hgb, and NA, additionally her edema should also improve.  The updated TPN formula goes out today with her receiving the new formula tomorrow.      5.) Abdominal distension: Irregular abdominal distension LUQ > RUQ.  Some isolated areas of firmness in the RUQ.  Tenderness to palpation of the LUQ (but not by GT).  Some hyperactivity of bowel sounds in the RUQ, hypoactivity in the RLQ, regular bowel sounds in the left quadrants giving the impression of partial obstruction.  The irregular contour of her abdomen which is new is concerning and could indicate ascites, obstruction, or tumor.  Recommend CT to evaluate.  Orders placed.  She will be contacted with results and to discuss plan.    The longitudinal plan of care for the diagnosis(es)/condition(s) as documented were addressed during this visit. Due to the added complexity in care, I will continue to support Michelle in the subsequent management and with ongoing continuity of care.    This note was created in part by the use of Dragon voice recognition dictation system. Inadvertent grammatical errors and typographical errors may exist.      Echo Sanz, DNP, APRN, FNP-C, AOCNP  Oncology Nurse Practitioner  Division of Gynecologic Oncology  Pager: [phone]     CC  Patient Care Team:  Domenica Christianson MD as PCP - General  [facility] (Gynecologic Oncology)  Anish Monroy MD as Assigned Palliative Care  Provider  Pratima Vega, RN as Specialty Care Coordinator (Hematology & Oncology)  Myhre, Grace C, [facility] as Pharmacist  Charis Patino MD as Assigned Cancer Care Provider  Charis Patino MD as Home Infusion Following Provider (Gynecologic Oncology)  Clarisse Wells, YESICA as \[facility] [place] [facility]\"" Registered Dietitian (Dietitian, Registered)  SELF, REFERRED      Again, thank you for allowing me to participate in the care of your patient.        Sincerely,        JALEN Barrera CNP    Electronically signed

## 2025-04-03 NOTE — NURSING NOTE
"Oncology Rooming Note    April 3, 2025 6:55 AM   Jacki Smith is a 59 year old female who presents for:    Chief Complaint   Patient presents with    Oncology Clinic Visit     Ovarian cancer, right      Initial Vitals: There were no vitals taken for this visit. Estimated body mass index is 23.7 kg/m  as calculated from the following:    Height as of 2/18/25: 1.567 m (5' 1.71\").    Weight as of 3/17/25: 58.2 kg (128 lb 6.4 oz). There is no height or weight on file to calculate BSA.  No Pain (0) Comment: with movement increases to a 4-5   No LMP recorded. Patient has had a hysterectomy.  Allergies reviewed: Yes  Medications reviewed: Yes    Medications: Medication refills not needed today.  Pharmacy name entered into [employer]:    [id number] [place], [place] - [address] [facility] - [place], [place] - [address] [place] [address] [facility] - [place], [place] - [address]    Frailty Screening:   Is the patient here for a new oncology consult visit in cancer care? 2. No    PHQ9:  Did this patient require a PHQ9?: No      Clinical concerns: none       Lilly Nagy            "

## 2025-04-04 PROCEDURE — B4185 PARENTERAL SOL 10 GM LIPIDS: HCPCS

## 2025-04-04 PROCEDURE — B9004 PARENTERAL INFUS PUMP PORTAB: HCPCS | Mod: RR

## 2025-04-04 PROCEDURE — B4178 PARENTERAL SOL AMINO ACID >: HCPCS

## 2025-04-04 PROCEDURE — A4217 STERILE WATER/SALINE, 500 ML: HCPCS

## 2025-04-04 PROCEDURE — S9374 HIT HYDRA 1 LITER DIEM: HCPCS

## 2025-04-05 PROCEDURE — B9004 PARENTERAL INFUS PUMP PORTAB: HCPCS | Mod: RR

## 2025-04-05 PROCEDURE — S9374 HIT HYDRA 1 LITER DIEM: HCPCS

## 2025-04-06 PROCEDURE — S9374 HIT HYDRA 1 LITER DIEM: HCPCS

## 2025-04-06 PROCEDURE — B9004 PARENTERAL INFUS PUMP PORTAB: HCPCS | Mod: RR

## 2025-04-07 ENCOUNTER — INFUSION THERAPY VISIT (OUTPATIENT)
Dept: INFUSION THERAPY | Facility: HOSPITAL | Age: 60
End: 2025-04-07
Attending: OBSTETRICS & GYNECOLOGY
Payer: COMMERCIAL

## 2025-04-07 VITALS — WEIGHT: 139.3 LBS | BODY MASS INDEX: 25.72 KG/M2

## 2025-04-07 DIAGNOSIS — Z78.9 ON TOTAL PARENTERAL NUTRITION (TPN): ICD-10-CM

## 2025-04-07 DIAGNOSIS — C56.1 OVARIAN CANCER, RIGHT (H): Primary | ICD-10-CM

## 2025-04-07 DIAGNOSIS — D63.0 ANEMIA IN NEOPLASTIC DISEASE: ICD-10-CM

## 2025-04-07 LAB
ABO + RH BLD: NORMAL
ALBUMIN SERPL BCG-MCNC: 2.8 G/DL (ref 3.5–5.2)
ALP SERPL-CCNC: 205 U/L (ref 40–150)
ALT SERPL W P-5'-P-CCNC: 21 U/L (ref 0–50)
ANION GAP SERPL CALCULATED.3IONS-SCNC: 12 MMOL/L (ref 7–15)
AST SERPL W P-5'-P-CCNC: 22 U/L (ref 0–45)
BASOPHILS # BLD AUTO: 0 10E3/UL (ref 0–0.2)
BASOPHILS NFR BLD AUTO: 0 %
BILIRUB DIRECT SERPL-MCNC: 0.2 MG/DL (ref 0–0.3)
BILIRUB SERPL-MCNC: 0.4 MG/DL
BLD GP AB SCN SERPL QL: NEGATIVE
BUN SERPL-MCNC: 22.8 MG/DL (ref 8–23)
CALCIUM SERPL-MCNC: 8.7 MG/DL (ref 8.8–10.4)
CANCER AG125 SERPL-ACNC: 103 U/ML
CHLORIDE SERPL-SCNC: 99 MMOL/L (ref 98–107)
CREAT SERPL-MCNC: 0.55 MG/DL (ref 0.51–0.95)
EGFRCR SERPLBLD CKD-EPI 2021: >90 ML/MIN/1.73M2
EOSINOPHIL # BLD AUTO: 0 10E3/UL (ref 0–0.7)
EOSINOPHIL NFR BLD AUTO: 0 %
ERYTHROCYTE [DISTWIDTH] IN BLOOD BY AUTOMATED COUNT: 14.8 % (ref 10–15)
FASTING STATUS PATIENT QL REPORTED: YES
GLUCOSE SERPL-MCNC: 113 MG/DL (ref 70–99)
HCO3 SERPL-SCNC: 22 MMOL/L (ref 22–29)
HCT VFR BLD AUTO: 23.1 % (ref 35–47)
HGB BLD-MCNC: 7.3 G/DL (ref 11.7–15.7)
IMM GRANULOCYTES # BLD: 0 10E3/UL
IMM GRANULOCYTES NFR BLD: 0 %
LYMPHOCYTES # BLD AUTO: 0.9 10E3/UL (ref 0.8–5.3)
LYMPHOCYTES NFR BLD AUTO: 12 %
MAGNESIUM SERPL-MCNC: 2.1 MG/DL (ref 1.7–2.3)
MCH RBC QN AUTO: 29.3 PG (ref 26.5–33)
MCHC RBC AUTO-ENTMCNC: 31.6 G/DL (ref 31.5–36.5)
MCV RBC AUTO: 93 FL (ref 78–100)
MONOCYTES # BLD AUTO: 0.8 10E3/UL (ref 0–1.3)
MONOCYTES NFR BLD AUTO: 11 %
NEUTROPHILS # BLD AUTO: 5.5 10E3/UL (ref 1.6–8.3)
NEUTROPHILS NFR BLD AUTO: 77 %
NRBC # BLD AUTO: 0 10E3/UL
NRBC BLD AUTO-RTO: 0 /100
PHOSPHATE SERPL-MCNC: 3.8 MG/DL (ref 2.5–4.5)
PLAT MORPH BLD: NORMAL
PLATELET # BLD AUTO: 162 10E3/UL (ref 150–450)
POTASSIUM SERPL-SCNC: 4.3 MMOL/L (ref 3.4–5.3)
PROT SERPL-MCNC: 6.1 G/DL (ref 6.4–8.3)
RBC # BLD AUTO: 2.49 10E6/UL (ref 3.8–5.2)
RBC MORPH BLD: NORMAL
SODIUM SERPL-SCNC: 133 MMOL/L (ref 135–145)
SPECIMEN EXP DATE BLD: NORMAL
TRIGL SERPL-MCNC: 63 MG/DL
WBC # BLD AUTO: 7.1 10E3/UL (ref 4–11)

## 2025-04-07 PROCEDURE — 80051 ELECTROLYTE PANEL: CPT | Performed by: NURSE PRACTITIONER

## 2025-04-07 PROCEDURE — 36591 DRAW BLOOD OFF VENOUS DEVICE: CPT

## 2025-04-07 PROCEDURE — B9004 PARENTERAL INFUS PUMP PORTAB: HCPCS | Mod: RR

## 2025-04-07 PROCEDURE — 82248 BILIRUBIN DIRECT: CPT

## 2025-04-07 PROCEDURE — 86900 BLOOD TYPING SEROLOGIC ABO: CPT

## 2025-04-07 PROCEDURE — S9374 HIT HYDRA 1 LITER DIEM: HCPCS

## 2025-04-07 PROCEDURE — 85025 COMPLETE CBC W/AUTO DIFF WBC: CPT | Performed by: NURSE PRACTITIONER

## 2025-04-07 PROCEDURE — 83735 ASSAY OF MAGNESIUM: CPT | Performed by: NURSE PRACTITIONER

## 2025-04-07 PROCEDURE — 84100 ASSAY OF PHOSPHORUS: CPT

## 2025-04-07 PROCEDURE — 86923 COMPATIBILITY TEST ELECTRIC: CPT | Performed by: NURSE PRACTITIONER

## 2025-04-07 PROCEDURE — 86304 IMMUNOASSAY TUMOR CA 125: CPT

## 2025-04-07 PROCEDURE — 84155 ASSAY OF PROTEIN SERUM: CPT | Performed by: NURSE PRACTITIONER

## 2025-04-07 PROCEDURE — 86850 RBC ANTIBODY SCREEN: CPT

## 2025-04-07 PROCEDURE — 84478 ASSAY OF TRIGLYCERIDES: CPT

## 2025-04-07 PROCEDURE — 82565 ASSAY OF CREATININE: CPT | Performed by: NURSE PRACTITIONER

## 2025-04-07 NOTE — PROGRESS NOTES
Virtual Visit Details    Type of service:  Video Visit     Originating Location (pt. 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  She presents today for follow up and disease management by video.      Oncology History:  22: Presented to an ED in [place] for evaluation of abdominal bloating and discomfort.  -Abdomen, pelvic CT: Radiographic Stage CAL ovarian cancer.   22: [id number].   22: Pelvic ultrasound: c/w metastatic cancer.  22: Pelvic exam under anesthesia, diagnostic laparoscopy, biopsies, evacuation of ascites.   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      22, 22, 22: Cycles 1-3 of neoadjuvant chemotherapy with IV carboplatin + paclitaxel 175 mg/m2 every 21 days.   -Cycles 1,2: Carboplatin AUC 6.   -Cycle 3: Carboplatin AUC 5 with dose-reduction due to thrombcytopenia.   -22: Chest, abdomen, pelvic CT: Partial response.   22: Pelvic exam under anesthesia, diagnostic laparoscopy, conversion to laparotomy for optimal interval tumor debulking to no gross residual disease including peritoneal and diaphragm biopsies, bilateral salpingo-oophorectomy, infragastric omentectomy, bilateral hemidiaphragm stripping, peritoneal and mesenteric argon beam ablation, appendectomy.   -Pathology: High-grade serous carcinoma involving all resected specimens.   Caris Testing Results.  -Genomic ROXI low (6%)   -TMB low (1mut/Mb)  -Microsatellite stable/Mismatch Repair proficient  -PD-L1- (CPS 0%)  -NTRK  1/2/3 fusion not detected.   -ER-, [place]+  -Genomic alterations in:  --TP53  -No genomic alterations in BRCA1,2.  -FOLR1+ (2+, 75%).      Jamila-ovary clinical trial screening: Negative for the immunoreactive subtype.     9/28/22, 10/19/22, 11/17/22: Cycles 4-6 of first-line chemotherapy with IV carboplatin AUC 5 + paclitaxel 175 mg/m2.  -12/2/22: Chest, abdomen, pelvic CT: No definitive evidence of disease.   -CA-125 439>>23.   -2/16/23: Chest, abdomen, pelvic CT to evaluate bloating: Stable findings, no definitive evidence of disease.    -5/25/23: Admitted  to [facility] for management of malignant ascites s/p therapeutic paracentesis, and partial SBO resolved with conservative management.   5/25/23: Abdomen, pelvic CT: Progressive disease.      10/5/22: [facility], reflexed to Common Hereditary Cancer Panel.   -No identifiable germline variants identified in ABRAXAS1, AKT1, APC, DEION, AXIN2, BARD1, BMPR1A, BRCA1, BRCA2, BRIP1, CDC73, CDH1, CDK4, CDKN2A, CHEK2, CTNNA1, DICER1, EPCAM, FANCC, FANCM, GREM1, HOXB13, KIT, MEN1, MLH1, MRE11, MSH2, MSH6, MUTYH, NBN, NF1, NTHL1, PALB2, PDGFRA, PIK3CA, PMS2, POLD1, POLE, PTEN, RAD50, RAD51C, RAD51D, RECQL, RINT1, SDHA, SDHB, SDHC, SDHD, SMAD4, SMARCA4, STK11, TP53, TSC1, TSC2, VHL, XRCC2.   -Variant of uncertain significance in MSH3 c.16C>T (p.Mok2Ddj)     6/13/23, 7/12/23, 8/14/23, 9/13/23, 10/11/23, 11/8/23, 12/6/23, 1/3/24, 2/1/24, 2/29/24, 3/28/24: Cycles 1-11 of second-line chemotherapy with IV carboplatin AUC 5 + pegylated liposomal doxorubicin (PLD) 30 mg/m2 every 28 days.   -9/27/23: Chest, abdomen, pelvic CT: Partial response. Catheter-associated thrombus.   -1/23/24: Chest, abdomen, pelvic CT: Stable disease.   -4/10/24: Chest, abdomen, pelvic CT: Stable disease.   -CA-125 78>>44.   5/10/24-1/24/25: Second- PARPi therapy with olaparib.   -Cycles 1-2: Olaparib 300 mg BID.   -Cycles 3+: Olaparib 200 mg BID with dose-reduction due to  "decreased creatinine clearance.   -7/5/24: Chest, abdomen, pelvic CT: Stable disease.   -10/15/24: Chest, abdomen, pelvic CT: Stable disease.   -CA-125 38>>110.     2/6/25: Third-line therapy with IV carboplatin AUC 5 + paclitaxel 135 mg/m2 + bevacizumab 15 mg/kg.  340.  -2/11/25-3/2/25: Complicated by bevacizumab-induced microperforation of the proximal small bowel. Managed with drain placement. Subsequent SBO attributed to post-perforation inflammation. Palliative venting G-tube placed, TPN initiated.      Plan: Continue third-line therapy with IV carboplatin AUC 5 + paclitaxel 135 mg/m2 +every 3 weeks with permanent discontinuation of bevacizumab.      3/19/25 Cycle 2 carboplatin and paclitaxel.   235.  4/9/25: Cycle 3 carboplatin and paclitaxel planned.   103.            Today she reports    -Abdominal pain/bloating: Abdomen hurts at GT, deep pain, burning with tube manipulation.  Lidocaine patches on her abdomen seem to help.  Still having bloating and distension.   -Nausea or vomiting:  Occasionally will still have an \"upset\" stomach and will place GT to gravity.  Started clamping tube overnight intermittently.  -Appetite: Food still \"just doesn't taste good\" or \"sound good\"  -Weight loss: Stable to increased   -Bowel/bladder habits: Some diarrhea that started last night, \"really liquidy\", ended up taking imodium, this morning trying to eat some more solid foods  -Neuropathy symptoms: No  -Leg swelling: Her leg swelling improving  -Fevers: No  -Chest pain: No  -SOB: Some DANIELLE with stairs, no SOB at rest  -She has seen Dr. Monroy in the past with palliative care                  Past Medical History:    Past Medical History:   Diagnosis Date    Female stress incontinence     Ovarian cancer, right (H) 06/16/2022    Stage IIIC high-grade serous carcinoma    Recurrent cold sores          Past Surgical History:    Past Surgical History:   Procedure Laterality Date    APPENDECTOMY  08/29/2022    " Part of ovarian cancer tumor debulking    BLADDER SUSPENSION  08/13/2009    ENDOSCOPIC INSERTION TUBE GASTROSTOMY N/A 2/28/2025    Procedure: INSERTION, PEG TUBE (venting);  Surgeon: Porfirio Saunders MD;  Location: [facility] OR    HYSTERECTOMY  08/13/2009    For prolapse    IR FOLLOW UP VISIT INPATIENT  2/25/2025    IR PARACENTESIS  6/6/2022    IR PARACENTESIS  6/14/2022    IR PARACENTESIS  5/26/2023    IR PARACENTESIS  2/26/2025    IR RETROPERITONEAL ABSCESS DRAINAGE  2/18/2025    LAPAROSCOPY DIAGNOSTIC (GYN)  06/16/2022    SALPINGO-OOPHORECTOMY, COMBINED Bilateral 08/29/2022    Pelvic exam under anesthesia, diagnostic laparoscopy, conversion to laparotomy for optimal interval tumor debulking to no gross residual disease including peritoneal and diaphragm biopsies, bilateral salpingo-oophorectomy, infragastric omentectomy, bilateral hemidiaphragm stripping, peritoneal and mesenteric argon beam ablation, appendectomy.    TONSILLECTOMY  1973    TUBAL LIGATION Bilateral 09/01/1998         Health Maintenance Due   Topic Date Due    ADVANCE CARE PLANNING  Never done    YEARLY PREVENTIVE VISIT  Never done    COLORECTAL CANCER SCREENING  Never done    HIV SCREENING  Never done    HEPATITIS C SCREENING  Never done    Pneumococcal Vaccine: 50+ Years (1 of 2 - PCV) Never done    ZOSTER IMMUNIZATION (1 of 2) Never done    HEPATITIS B IMMUNIZATION (1 of 3 - 19+ 3-dose series) Never done    PAP  Never done    LIPID  Never done    INFLUENZA VACCINE (1) 09/01/2024    COVID-19 Vaccine (5 - 2024-25 season) 09/01/2024       Current Medications:     Current Outpatient Medications   Medication Sig Dispense Refill    acetaminophen (TYLENOL) 500 MG tablet Take 500 mg by mouth every 6 hours as needed for mild pain.      cyclobenzaprine (FLEXERIL) 5 MG tablet Take 1-2 tablets (5-10 mg) by mouth 3 times daily as needed for muscle spasms. 40 tablet 1    diphenhydrAMINE-acetaminophen (TYLENOL PM)  MG tablet Take 1 tablet by mouth  "nightly as needed for sleep.      docusate sodium (DSS) 100 MG capsule Take 100 mg by mouth 2 times daily as needed for constipation.      Emergency Supply Kit, Central, Patient use for emergency only. Contents: 3 sodium chloride 0.9% flushes, 1 dressing kit, 1 microclave ext set 14\", 4 nitrile gloves (med), 6 alcohol prep pads, 1 bacitracin, 1 syringe (10 cc 20 G 1\"). Call [phone] to reorder. [id number] kit 0    escitalopram (LEXAPRO) 20 MG tablet Take 20 mg by mouth daily.      famotidine (PEPCID) 20 MG tablet Take 20 mg by mouth 2 times daily as needed.      hydrOXYzine HCl (ATARAX) 25 MG tablet Take 1-2 tablets (25-50 mg) by mouth every 6 hours as needed for anxiety. 30 tablet 0    Lidocaine (LIDOCARE) 4 % Patch Place 1 patch over 12 hours onto the skin daily as needed for moderate pain. To prevent lidocaine toxicity, patient should be patch free for 12 hrs daily. 6 patch 0    lidocaine-prilocaine (EMLA) 2.5-2.5 % external cream Apply topically as needed for moderate pain 30 g 1    LORazepam (ATIVAN) 0.5 MG tablet Take 0.5-1 mg by mouth every 6 hours as needed for anxiety.      Multiple Vitamin (INFUVITE ADULT) injection Add to infusion 10 mLs daily. Select 2 multivitamin vials, one of each color top. Draw up 5 mL from each vial and add to 1 TPN bag daily immediately prior to infusing.   Discard remainder of vials. 3600 mL 0    Multiple Vitamin (MULTI-VITAMIN DAILY PO) Take 1 tablet by mouth daily      omeprazole (PRILOSEC) 20 MG DR capsule Take 20 mg by mouth daily.      ondansetron (ZOFRAN) 8 MG tablet Take 1 tablet (8 mg) by mouth every 8 hours as needed for nausea (vomiting). Do not take for 3 days after chemotherapy. 30 tablet 2    parenteral nutrition - DUALCHAMBER - see order for formula Infuse 1,920 mLs over 12 hours into the vein (central line) five times a week. Taper up for 1 Hours. Taper down for 1 Hours.   TPN additives to be added prior to administration:   Infuvite-Adult 10 mL daily.  Plateau " rate:174.6 mL/hr.  KVO: 5 mL/hr. 538099 mL 0    parenteral nutrition - PLAIN - see order for formula Infuse 1,920 mLs over 12 hours into the vein (central line) twice a week. Taper up for 1 Hours. Taper down for 1 Hours. TPN additives to be added prior to administration:   Infuvite-  Adult 10 mL daily.  Plateau rate:174.6  mL/hr.  KVO: 5 mL/hr. 004200 mL 0    Port Access Kit For nurse use only.  Do not remove items from bag.  Use for port access.  Do not place syringe on sterile field. [id number] kit 0    prochlorperazine (COMPAZINE) 10 MG tablet Take 1 tablet (10 mg) by mouth every 6 hours as needed for nausea or vomiting. 30 tablet 2    sodium chloride 0.9 % 500 mL via elastomeric pump Infuse 1,000 mLs into the vein daily as needed (dehydration). Infuse 1- 2 elastomeric pump(s) ( 500-1000 ml) . Each elastomeric to infuse over 2 hours. 025125 mL 0    sodium chloride, PF, 0.9% PF flush Inject 10 mLs into the vein as needed for line flush. Flush IV before and after med administration as directed and/or at least every 24 hours, or prior to deaccessing for no further use and/or at least every 4 weeks when not accessed. 650931 mL 0    valACYclovir (VALTREX) 1000 mg tablet Take 1,000 mg by mouth 2 times daily as needed.           Allergies:      No Known Allergies     Social History:     Social History     Tobacco Use    Smoking status: Former     Current packs/day: 0.00     Average packs/day: 1 pack/day for 42.0 years (42.0 ttl pk-yrs)     Types: Cigarettes     Start date: 1980     Quit date: 2022     Years since quittin.8    Smokeless tobacco: Never   Substance Use Topics    Alcohol use: Yes     Comment: Beer ~Q3 months.       History   Drug Use Unknown         Family History:     The patient's family history is notable for:    Family History   Problem Relation Age of Onset    Prostate Cancer Father     Breast Cancer Sister 48    Melanoma Sister     Skin Cancer Sister     Colon Cancer Maternal Grandmother   "        Physical Exam:     Ht 1.575 m (5' 2\")   Wt 63 kg (139 lb)   BMI 25.42 kg/m    Body mass index is 25.42 kg/m .    General Appearance: alert, no distress    Eyes:  Eyes grossly normal.  No obvious discharge, erythema, or scleral/conjunctival abnormalities.    Respiratory: No noted audible wheeze, cough, or visible cyanosis.  No visible retractions or increased work of breathing.     Skin: no lesions or rashes on visible skin     Psychiatric: appropriate mood and affect. Mentation appears normal, affect normal/bright, judgement and insight intact, normal speech and appearance well-groomed                            The rest of a comprehensive physical examination is deferred due to video visit restrictions.       Recent Results (from the past week)   Comprehensive metabolic panel   Result Value Ref Range    Sodium 133 (L) 135 - 145 mmol/L    Potassium 4.3 3.4 - 5.3 mmol/L    Carbon Dioxide (CO2) 22 22 - 29 mmol/L    Anion Gap 12 7 - 15 mmol/L    Urea Nitrogen 22.8 8.0 - 23.0 mg/dL    Creatinine 0.55 0.51 - 0.95 mg/dL    GFR Estimate >90 >60 mL/min/1.73m2    Calcium 8.7 (L) 8.8 - 10.4 mg/dL    Chloride 99 98 - 107 mmol/L    Glucose 113 (H) 70 - 99 mg/dL    Alkaline Phosphatase 205 (H) 40 - 150 U/L    AST 22 0 - 45 U/L    ALT 21 0 - 50 U/L    Protein Total 6.1 (L) 6.4 - 8.3 g/dL    Albumin 2.8 (L) 3.5 - 5.2 g/dL    Bilirubin Total 0.4 <=1.2 mg/dL   Magnesium   Result Value Ref Range    Magnesium 2.1 1.7 - 2.3 mg/dL   Phosphorus   Result Value Ref Range    Phosphorus 3.8 2.5 - 4.5 mg/dL   Triglycerides   Result Value Ref Range    Triglycerides 63 <150 mg/dL    Patient Fasting > 8hrs? Yes    Bilirubin direct   Result Value Ref Range    Bilirubin Direct 0.20 0.00 - 0.30 mg/dL      Result Value Ref Range     103 (H) <=38 U/mL   CBC with platelets and differential   Result Value Ref Range    WBC Count 7.1 4.0 - 11.0 10e3/uL    RBC Count 2.49 (L) 3.80 - 5.20 10e6/uL    Hemoglobin 7.3 (L) 11.7 - 15.7 g/dL "    Hematocrit 23.1 (L) 35.0 - 47.0 %    MCV 93 78 - 100 fL    MCH 29.3 26.5 - 33.0 pg    MCHC 31.6 31.5 - 36.5 g/dL    RDW 14.8 10.0 - 15.0 %    Platelet Count 162 150 - 450 10e3/uL    % Neutrophils 77 %    % Lymphocytes 12 %    % Monocytes 11 %    % Eosinophils 0 %    % Basophils 0 %    % Immature Granulocytes 0 %    NRBCs per 100 WBC 0 <1 /100    Absolute Neutrophils 5.5 1.6 - 8.3 10e3/uL    Absolute Lymphocytes 0.9 0.8 - 5.3 10e3/uL    Absolute Monocytes 0.8 0.0 - 1.3 10e3/uL    Absolute Eosinophils 0.0 0.0 - 0.7 10e3/uL    Absolute Basophils 0.0 0.0 - 0.2 10e3/uL    Absolute Immature Granulocytes 0.0 <=0.4 10e3/uL    Absolute NRBCs 0.0 10e3/uL   RBC and Platelet Morphology   Result Value Ref Range    RBC Morphology Confirmed RBC Indices     Platelet Assessment  Automated Count Confirmed. Platelet morphology is normal.     Automated Count Confirmed. Platelet morphology is normal.              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  She presents today for follow up and disease management by video.     40 minutes spent on the date of the encounter doing chart review, history and exam, documentation, and further activities as noted above.      Plan:     1.)        Ovarian cancer:  OK to proceed with planned treatment tomorrow if labs are WDL.  Avastin permanently discontinued.  RTC in 3 weeks for labs, provider visit, and next cycle; this is already scheduled.  Plan for CT CAP and follow up with Dr. Patino after cycle 4.  Discussed possibility of seeing Dr. Monroy again from Palliative care for symptom management.  Reviewed signs and symptoms for when she should contact the clinic or seek additional care.  Patient to contact the clinic with any questions or concerns in the interim.      Genetic risk factors were assessed and she has a VUS No identifiable germline variants identified in ABRAXAS1, AKT1, APC, DEION,  AXIN2, BARD1, BMPR1A, BRCA1, BRCA2, BRIP1, CDC73, CDH1, CDK4, CDKN2A, CHEK2, CTNNA1, DICER1, EPCAM, FANCC, FANCM, GREM1, HOXB13, KIT, MEN1, MLH1, MRE11, MSH2, MSH6, MUTYH, NBN, NF1, NTHL1, PALB2, PDGFRA, PIK3CA, PMS2, POLD1, POLE, PTEN, RAD50, RAD51C, RAD51D, RECQL, RINT1, SDHA, SDHB, SDHC, SDHD, SMAD4, SMARCA4, STK11, TP53, TSC1, TSC2, VHL, XRCC2.    Caris Testing Results.  -Genomic ROXI low (6%)   -TMB low (1mut/Mb)  -Microsatellite stable/Mismatch Repair proficient  -PD-L1- (CPS 0%)  -NTRK 1/2/3 fusion not detected.   -ER-, [place]+  -Genomic alterations in:  --TP53  -No genomic alterations in BRCA1,2.  -FOLR1+ (2+, 75%).       Labs and/or tests reviewed include:  CBC. CMP. Bili. Mag. Phos. Triglycerides.     2.)        Health maintenance:  Issues addressed today include following up with PCP for annual health maintenance and non-gynecologic issues.      3.)        SBO: Improving SBO symptoms but not resolved.  Able to tolerate having her GT clamped during the day and occasionally at night.  She is having loose BMs every couple days and passing flatus.  Some diarrhea that started last night.  She is tolerating PO intake in small amounts of bland food but continues to have no appetite.  Deep pain around GT and burning with tube manipulation.  Lidocaine patches on her abdomen seem to help.  Still having bloating and distension.  -Continue venting G-tube as needed.  -Encouraged sitting down to meals as she normally would with family as able and try and eat and slowly increase intake.  -Continue TPN until able to tolerate enough oral intake.  -Too soon to consider GT removal.  IR GT evaluation due to pain.    Addendum: Per IR will reach out to patient to schedule GT check in fluoro. If no anatomical cause of pain found during fluoro check, would recommend referral to surgeon who placed tube (Dr. Saunders).     4.)        Ankle edema: BLE edema improving.  Total protein and albumin remain low.  Weight up 8lbs.  Lungs clear  last week.  Suspect she is third spacing fluid due to hypoproteinemia.  No pain or erythema to suggest DVT.  Risk for DVT is high so if symptoms continue would consider BLE US.  Discussed with Rachana Smith PharmD with [facility] 4/3/25 who notes they felt she was getting too much fluid so they have decreased her total TPN volume and her IVF with her TPN. They feel this will help her albumin, hgb, and NA, additionally her edema should also improve.  She will have a lab recheck next week with their team.     5.) Anemia: Hgb this week 7.3 down from 8.1 last week and 9.7 week prior.  Suspect this is contributing to her fatigue and DANIELLE.  No signs of bleeding today or on CT last week.  Possibly dilutional but with changes in TPN formula last week we would have expected improvement.  Most likely chemotherapy and chronic illness contributing with her normal MCV and MCH.  Recommend 1 unit(s) PRBC this week.  She is agreeable.  [employer] infusion will try to accommodate this tomorrow while she is receiving treatment otherwise will work her in Thursday morning.  She will need a blood consent, infusion RN to page me to do consent over the phone.     The longitudinal plan of care for the diagnosis(es)/condition(s) as documented were addressed during this visit. Due to the added complexity in care, I will continue to support Michelle in the subsequent management and with ongoing continuity of care.    Echo Sazn, DNP, APRN, FNP-C, AOCNP  Oncology Nurse Practitioner  Division of Gynecologic Oncology  Pager: [phone]     CC  Patient Care Team:  Domenica Christianson MD as PCP - General  Charis Patino MD as MD (Gynecologic Oncology)  Anish Monroy MD as Assigned Palliative Care Provider  Pratima Vega RN as Specialty Care Coordinator (Hematology & Oncology)  Myhre, Grace C, [facility] as Pharmacist  Charis Patino MD as Assigned Cancer Care Provider  Charis Patino MD as Home Infusion Following Provider  (Gynecologic Oncology)  Clarisse Wells, RD as [facility] Registered Dietitian (Dietitian, Registered)  SELF, REFERRED

## 2025-04-08 ENCOUNTER — PATIENT OUTREACH (OUTPATIENT)
Dept: CARE COORDINATION | Facility: CLINIC | Age: 60
End: 2025-04-08

## 2025-04-08 ENCOUNTER — MEDICAL CORRESPONDENCE (OUTPATIENT)
Dept: HOME HEALTH SERVICES | Facility: HOME HEALTH | Age: 60
End: 2025-04-08
Payer: COMMERCIAL

## 2025-04-08 ENCOUNTER — HOME INFUSION (OUTPATIENT)
Dept: HOME HEALTH SERVICES | Facility: HOME HEALTH | Age: 60
End: 2025-04-08
Payer: COMMERCIAL

## 2025-04-08 ENCOUNTER — VIRTUAL VISIT (OUTPATIENT)
Dept: ONCOLOGY | Facility: HOSPITAL | Age: 60
End: 2025-04-08
Attending: OBSTETRICS & GYNECOLOGY
Payer: COMMERCIAL

## 2025-04-08 VITALS — WEIGHT: 139 LBS | BODY MASS INDEX: 25.58 KG/M2 | HEIGHT: 62 IN

## 2025-04-08 DIAGNOSIS — C56.1 OVARIAN CANCER, RIGHT (H): ICD-10-CM

## 2025-04-08 DIAGNOSIS — Z43.1 ATTENTION TO G-TUBE (H): ICD-10-CM

## 2025-04-08 DIAGNOSIS — C56.1 OVARIAN CANCER, RIGHT (H): Primary | ICD-10-CM

## 2025-04-08 DIAGNOSIS — Z51.11 ENCOUNTER FOR ANTINEOPLASTIC CHEMOTHERAPY: ICD-10-CM

## 2025-04-08 DIAGNOSIS — D63.0 ANEMIA IN NEOPLASTIC DISEASE: ICD-10-CM

## 2025-04-08 LAB
BLD PROD TYP BPU: NORMAL
BLOOD COMPONENT TYPE: NORMAL
CODING SYSTEM: NORMAL
CROSSMATCH: NORMAL
ISSUE DATE AND TIME: NORMAL
UNIT ABO/RH: NORMAL
UNIT NUMBER: NORMAL
UNIT STATUS: NORMAL
UNIT TYPE ISBT: 6200

## 2025-04-08 PROCEDURE — 98007 SYNCH AUDIO-VIDEO EST HI 40: CPT | Performed by: NURSE PRACTITIONER

## 2025-04-08 PROCEDURE — G2211 COMPLEX E/M VISIT ADD ON: HCPCS | Performed by: NURSE PRACTITIONER

## 2025-04-08 PROCEDURE — 1125F AMNT PAIN NOTED PAIN PRSNT: CPT | Performed by: NURSE PRACTITIONER

## 2025-04-08 PROCEDURE — B9004 PARENTERAL INFUS PUMP PORTAB: HCPCS | Mod: RR

## 2025-04-08 PROCEDURE — S9374 HIT HYDRA 1 LITER DIEM: HCPCS

## 2025-04-08 RX ORDER — PALONOSETRON 0.05 MG/ML
0.25 INJECTION, SOLUTION INTRAVENOUS ONCE
Status: CANCELLED | OUTPATIENT
Start: 2025-04-09

## 2025-04-08 RX ORDER — ALBUTEROL SULFATE 0.83 MG/ML
2.5 SOLUTION RESPIRATORY (INHALATION)
Status: CANCELLED | OUTPATIENT
Start: 2025-04-09

## 2025-04-08 RX ORDER — DIPHENHYDRAMINE HYDROCHLORIDE 50 MG/ML
50 INJECTION, SOLUTION INTRAMUSCULAR; INTRAVENOUS
Status: CANCELLED
Start: 2025-04-09

## 2025-04-08 RX ORDER — DIPHENHYDRAMINE HYDROCHLORIDE 50 MG/ML
25 INJECTION, SOLUTION INTRAMUSCULAR; INTRAVENOUS
Status: CANCELLED
Start: 2025-04-09

## 2025-04-08 RX ORDER — HEPARIN SODIUM,PORCINE 10 UNIT/ML
5-20 VIAL (ML) INTRAVENOUS DAILY PRN
Status: CANCELLED | OUTPATIENT
Start: 2025-04-08

## 2025-04-08 RX ORDER — EPINEPHRINE 1 MG/ML
0.3 INJECTION, SOLUTION, CONCENTRATE INTRAVENOUS EVERY 5 MIN PRN
Status: CANCELLED | OUTPATIENT
Start: 2025-04-08

## 2025-04-08 RX ORDER — EPINEPHRINE 1 MG/ML
0.3 INJECTION, SOLUTION INTRAMUSCULAR; SUBCUTANEOUS EVERY 5 MIN PRN
OUTPATIENT
Start: 2025-04-08

## 2025-04-08 RX ORDER — HEPARIN SODIUM (PORCINE) LOCK FLUSH IV SOLN 100 UNIT/ML 100 UNIT/ML
5 SOLUTION INTRAVENOUS
Status: CANCELLED | OUTPATIENT
Start: 2025-04-09

## 2025-04-08 RX ORDER — ALBUTEROL SULFATE 90 UG/1
1-2 INHALANT RESPIRATORY (INHALATION)
Status: CANCELLED
Start: 2025-04-09

## 2025-04-08 RX ORDER — DIPHENHYDRAMINE HYDROCHLORIDE 50 MG/ML
50 INJECTION, SOLUTION INTRAMUSCULAR; INTRAVENOUS ONCE
Status: CANCELLED
Start: 2025-04-09 | End: 2025-04-09

## 2025-04-08 RX ORDER — LORAZEPAM 2 MG/ML
0.5 INJECTION INTRAMUSCULAR EVERY 4 HOURS PRN
Status: CANCELLED | OUTPATIENT
Start: 2025-04-09

## 2025-04-08 RX ORDER — DIPHENHYDRAMINE HCL 50 MG
50 CAPSULE ORAL ONCE
Status: CANCELLED
Start: 2025-04-09

## 2025-04-08 RX ORDER — DIPHENHYDRAMINE HYDROCHLORIDE 50 MG/ML
50 INJECTION, SOLUTION INTRAMUSCULAR; INTRAVENOUS
Status: CANCELLED
Start: 2025-04-08

## 2025-04-08 RX ORDER — HEPARIN SODIUM,PORCINE 10 UNIT/ML
5-20 VIAL (ML) INTRAVENOUS DAILY PRN
Status: CANCELLED | OUTPATIENT
Start: 2025-04-09

## 2025-04-08 RX ORDER — DIPHENHYDRAMINE HYDROCHLORIDE 50 MG/ML
50 INJECTION, SOLUTION INTRAMUSCULAR; INTRAVENOUS
Start: 2025-04-08

## 2025-04-08 RX ORDER — EPINEPHRINE 1 MG/ML
0.3 INJECTION, SOLUTION, CONCENTRATE INTRAVENOUS EVERY 5 MIN PRN
Status: CANCELLED | OUTPATIENT
Start: 2025-04-09

## 2025-04-08 RX ORDER — METHYLPREDNISOLONE SODIUM SUCCINATE 40 MG/ML
40 INJECTION INTRAMUSCULAR; INTRAVENOUS
Status: CANCELLED
Start: 2025-04-09

## 2025-04-08 RX ORDER — HEPARIN SODIUM,PORCINE 10 UNIT/ML
5-20 VIAL (ML) INTRAVENOUS DAILY PRN
OUTPATIENT
Start: 2025-04-08

## 2025-04-08 RX ORDER — HEPARIN SODIUM (PORCINE) LOCK FLUSH IV SOLN 100 UNIT/ML 100 UNIT/ML
5 SOLUTION INTRAVENOUS
Status: CANCELLED | OUTPATIENT
Start: 2025-04-08

## 2025-04-08 RX ORDER — MEPERIDINE HYDROCHLORIDE 25 MG/ML
25 INJECTION INTRAMUSCULAR; INTRAVENOUS; SUBCUTANEOUS
Status: CANCELLED | OUTPATIENT
Start: 2025-04-09

## 2025-04-08 RX ORDER — HEPARIN SODIUM (PORCINE) LOCK FLUSH IV SOLN 100 UNIT/ML 100 UNIT/ML
5 SOLUTION INTRAVENOUS
OUTPATIENT
Start: 2025-04-08

## 2025-04-08 ASSESSMENT — PAIN SCALES - GENERAL: PAINLEVEL_OUTOF10: MODERATE PAIN (4)

## 2025-04-08 NOTE — NURSING NOTE
Current patient location: [place], [place]     Is the patient currently in the state of [place]? YES    Visit mode: VIDEO    If the visit is dropped, the patient can be reconnected by:VIDEO VISIT: Text to cell phone:   Telephone Information:   Mobile [phone]       Will anyone else be joining the visit? NO  (If patient encounters technical issues they should call [phone])    Are changes needed to the allergy or medication list? No, Pt stated no changes to allergies, and Pt stated no med changes    Are refills needed on medications prescribed by this physician? NO    Rooming Documentation:  Questionnaire(s) completed Please review distress screening.     Reason for visit: RECHECK (Return CCSL)    Sharlene BOOKER

## 2025-04-08 NOTE — PROGRESS NOTES
Social Work - Distress Screen Intervention  [facility]    Identified Concern and Score from Distress Screenin. How concerned are you about your ability to eat? (!) 9     2. How concerned are you about unintended weight loss or your current weight? 2     3. How concerned are you about feeling depressed or very sad?  (!) 8     4. How concerned are you about feeling anxious or very scared?  6     5. Do you struggle with the loss of meaning and irma in your life?  Somewhat     6. How concerned are you about work and home life issues that may be affected by your cancer?  4     7. How concerned are you about knowing what resources are available to help you?  2     8. Do you currently have what you would describe as Christian or spiritual struggles? Not at all     9. If you want to be contacted by one of our professionals, I can send a message to them right now.  -- (Pt declined)       Date of Distress Screen: 25  Data: At time of last visit, patient scored positive on distress screening. Pt declined outreach from psychosocial team.  Intervention/Education provided: NA  Follow-up Required:  will remain available to patient for support as needed    DEONTE Menon, [facility]  Adult Oncology Clinics  [place] (M,W), [place] (T) & [place] (Th)  *I am off Friday  Office: [phone]

## 2025-04-09 ENCOUNTER — INFUSION THERAPY VISIT (OUTPATIENT)
Dept: INFUSION THERAPY | Facility: HOSPITAL | Age: 60
End: 2025-04-09
Attending: OBSTETRICS & GYNECOLOGY
Payer: COMMERCIAL

## 2025-04-09 VITALS
RESPIRATION RATE: 16 BRPM | TEMPERATURE: 97.6 F | HEART RATE: 66 BPM | OXYGEN SATURATION: 98 % | DIASTOLIC BLOOD PRESSURE: 80 MMHG | SYSTOLIC BLOOD PRESSURE: 145 MMHG

## 2025-04-09 DIAGNOSIS — C56.1 OVARIAN CANCER, RIGHT (H): ICD-10-CM

## 2025-04-09 DIAGNOSIS — D63.0 ANEMIA IN NEOPLASTIC DISEASE: Primary | ICD-10-CM

## 2025-04-09 PROCEDURE — 96375 TX/PRO/DX INJ NEW DRUG ADDON: CPT

## 2025-04-09 PROCEDURE — 96367 TX/PROPH/DG ADDL SEQ IV INF: CPT

## 2025-04-09 PROCEDURE — B4178 PARENTERAL SOL AMINO ACID >: HCPCS

## 2025-04-09 PROCEDURE — 250N000011 HC RX IP 250 OP 636: Performed by: NURSE PRACTITIONER

## 2025-04-09 PROCEDURE — 36430 TRANSFUSION BLD/BLD COMPNT: CPT

## 2025-04-09 PROCEDURE — A4217 STERILE WATER/SALINE, 500 ML: HCPCS

## 2025-04-09 PROCEDURE — 258N000003 HC RX IP 258 OP 636: Performed by: NURSE PRACTITIONER

## 2025-04-09 PROCEDURE — 96417 CHEMO IV INFUS EACH ADDL SEQ: CPT

## 2025-04-09 PROCEDURE — B9004 PARENTERAL INFUS PUMP PORTAB: HCPCS | Mod: RR

## 2025-04-09 PROCEDURE — S9366 HIT TPN 2 LITER DIEM: HCPCS

## 2025-04-09 PROCEDURE — S9374 HIT HYDRA 1 LITER DIEM: HCPCS | Mod: SH

## 2025-04-09 PROCEDURE — 96415 CHEMO IV INFUSION ADDL HR: CPT

## 2025-04-09 PROCEDURE — P9016 RBC LEUKOCYTES REDUCED: HCPCS | Performed by: NURSE PRACTITIONER

## 2025-04-09 PROCEDURE — 96413 CHEMO IV INFUSION 1 HR: CPT

## 2025-04-09 RX ORDER — DIPHENHYDRAMINE HYDROCHLORIDE 50 MG/ML
50 INJECTION, SOLUTION INTRAMUSCULAR; INTRAVENOUS
Status: DISCONTINUED | OUTPATIENT
Start: 2025-04-09 | End: 2025-04-09 | Stop reason: HOSPADM

## 2025-04-09 RX ORDER — ALBUTEROL SULFATE 0.83 MG/ML
2.5 SOLUTION RESPIRATORY (INHALATION)
Status: DISCONTINUED | OUTPATIENT
Start: 2025-04-09 | End: 2025-04-09 | Stop reason: HOSPADM

## 2025-04-09 RX ORDER — METHYLPREDNISOLONE SODIUM SUCCINATE 40 MG/ML
40 INJECTION INTRAMUSCULAR; INTRAVENOUS
Status: DISCONTINUED | OUTPATIENT
Start: 2025-04-09 | End: 2025-04-09 | Stop reason: HOSPADM

## 2025-04-09 RX ORDER — MEPERIDINE HYDROCHLORIDE 25 MG/ML
25 INJECTION INTRAMUSCULAR; INTRAVENOUS; SUBCUTANEOUS
Status: DISCONTINUED | OUTPATIENT
Start: 2025-04-09 | End: 2025-04-09 | Stop reason: HOSPADM

## 2025-04-09 RX ORDER — ALBUTEROL SULFATE 90 UG/1
1-2 INHALANT RESPIRATORY (INHALATION)
Status: DISCONTINUED | OUTPATIENT
Start: 2025-04-09 | End: 2025-04-09 | Stop reason: HOSPADM

## 2025-04-09 RX ORDER — PALONOSETRON 0.05 MG/ML
0.25 INJECTION, SOLUTION INTRAVENOUS ONCE
Status: COMPLETED | OUTPATIENT
Start: 2025-04-09 | End: 2025-04-09

## 2025-04-09 RX ORDER — DIPHENHYDRAMINE HYDROCHLORIDE 50 MG/ML
25 INJECTION, SOLUTION INTRAMUSCULAR; INTRAVENOUS
Status: DISCONTINUED | OUTPATIENT
Start: 2025-04-09 | End: 2025-04-09 | Stop reason: HOSPADM

## 2025-04-09 RX ORDER — HEPARIN SODIUM (PORCINE) LOCK FLUSH IV SOLN 100 UNIT/ML 100 UNIT/ML
5 SOLUTION INTRAVENOUS
Status: DISCONTINUED | OUTPATIENT
Start: 2025-04-09 | End: 2025-04-09 | Stop reason: HOSPADM

## 2025-04-09 RX ORDER — EPINEPHRINE 1 MG/ML
0.3 INJECTION, SOLUTION INTRAMUSCULAR; SUBCUTANEOUS EVERY 5 MIN PRN
Status: DISCONTINUED | OUTPATIENT
Start: 2025-04-09 | End: 2025-04-09 | Stop reason: HOSPADM

## 2025-04-09 RX ORDER — DIPHENHYDRAMINE HYDROCHLORIDE 50 MG/ML
50 INJECTION, SOLUTION INTRAMUSCULAR; INTRAVENOUS ONCE
Status: COMPLETED | OUTPATIENT
Start: 2025-04-09 | End: 2025-04-09

## 2025-04-09 RX ORDER — DIPHENHYDRAMINE HCL 50 MG
50 CAPSULE ORAL ONCE
Status: DISCONTINUED | OUTPATIENT
Start: 2025-04-09 | End: 2025-04-09

## 2025-04-09 RX ADMIN — SODIUM CHLORIDE 250 ML: 0.9 INJECTION, SOLUTION INTRAVENOUS at 13:35

## 2025-04-09 RX ADMIN — PROCHLORPERAZINE EDISYLATE 10 MG: 5 INJECTION INTRAMUSCULAR; INTRAVENOUS at 15:20

## 2025-04-09 RX ADMIN — FAMOTIDINE 20 MG: 10 INJECTION, SOLUTION INTRAVENOUS at 08:50

## 2025-04-09 RX ADMIN — PACLITAXEL 215 MG: 6 INJECTION, SOLUTION INTRAVENOUS at 09:46

## 2025-04-09 RX ADMIN — DIPHENHYDRAMINE HYDROCHLORIDE 50 MG: 50 INJECTION, SOLUTION INTRAMUSCULAR; INTRAVENOUS at 08:55

## 2025-04-09 RX ADMIN — DEXAMETHASONE SODIUM PHOSPHATE: 100 INJECTION INTRAMUSCULAR; INTRAVENOUS at 09:03

## 2025-04-09 RX ADMIN — CARBOPLATIN 605 MG: 10 INJECTION, SOLUTION INTRAVENOUS at 12:45

## 2025-04-09 RX ADMIN — SODIUM CHLORIDE 250 ML: 0.9 INJECTION, SOLUTION INTRAVENOUS at 08:48

## 2025-04-09 RX ADMIN — PALONOSETRON 0.25 MG: 0.05 INJECTION, SOLUTION INTRAVENOUS at 08:48

## 2025-04-09 NOTE — PROGRESS NOTES
Infusion Nursing Note:  Jacki Smith presents today for D1C3 paclitaxel and carboplatin, blood transfusion.    Patient seen by provider today: No. Patient saw JALEN Mcmanus virtually yesterday    present during visit today: Not Applicable.    Note: Reviewed plan of care including purpose of premedications and the reasons, risks, and potential side effects associated with a blood transfusion. Patient was consented for transfusion by JALEN Mcmanus over the phone.     Patient tolerated chemotherapy well.     About residential through blood transfusion, patient c/o nausea, stating it was likely R/T GT tube and spouse left, taking her bag of supplies.Offered to find supplies or an alternative but patient declined, stating she'd rather wait until she got home to take care of it. Mandarin essential oil helped some, but did not resolve nausea. Contacted [facility] Compazine ordered and administered with good resolution of nausea.    Patient continued to c/o fatigue post transfusion but denied any dizziness or lightheadedness with ambulating to the bathroom.    Premeds Given: aloxi, benadryl IV, dexamethasone IV, emend, and pepcid    Intravenous Access:  Labs drawn without difficulty.  Implanted Port already accessed as patient administers TPN qNOC.    Treatment Conditions:  Lab Results   Component Value Date    HGB 7.3 (L) 04/07/2025    WBC 7.1 04/07/2025    ANEUTAUTO 5.5 04/07/2025     04/07/2025        Lab Results   Component Value Date     (L) 04/07/2025    POTASSIUM 4.3 04/07/2025    MAG 2.1 04/07/2025    CR 0.55 04/07/2025    KINDRA 8.7 (L) 04/07/2025    BILITOTAL 0.4 04/07/2025    ALBUMIN 2.8 (L) 04/07/2025    ALT 21 04/07/2025    AST 22 04/07/2025       Results reviewed, labs MET treatment parameters, ok to proceed with treatment.      Post Infusion Assessment:  Patient tolerated infusion without incident.  Patient observed for first 10 minutes of paclitaxel infusion per  protocol.  Blood return noted pre and post infusion.  Site patent and intact, free from redness, edema or discomfort.  Access was not discontinued per protocol so patient may use for TPN at home. Patient declined to have line heparinized as she will be using it soon. Port saline locked and capped.      Discharge Plan:   Discharge instructions including whom and when to call if issues reviewed with: Patient.  Patient will return 4/14 for weekly labs and port needle exchange.   Patient discharged in stable condition accompanied by: self.  Departure Mode: Ambulatory.      Radha Wilson RN

## 2025-04-10 ENCOUNTER — HOME INFUSION BILLING (OUTPATIENT)
Dept: HOME HEALTH SERVICES | Facility: HOME HEALTH | Age: 60
End: 2025-04-10
Payer: COMMERCIAL

## 2025-04-10 ENCOUNTER — DOCUMENTATION ONLY (OUTPATIENT)
Dept: ONCOLOGY | Facility: CLINIC | Age: 60
End: 2025-04-10
Payer: COMMERCIAL

## 2025-04-10 PROCEDURE — B4178 PARENTERAL SOL AMINO ACID >: HCPCS

## 2025-04-10 PROCEDURE — B9004 PARENTERAL INFUS PUMP PORTAB: HCPCS | Mod: RR

## 2025-04-10 PROCEDURE — A4215 STERILE NEEDLE: HCPCS

## 2025-04-10 PROCEDURE — S9374 HIT HYDRA 1 LITER DIEM: HCPCS | Mod: SH

## 2025-04-10 PROCEDURE — A4217 STERILE WATER/SALINE, 500 ML: HCPCS

## 2025-04-10 PROCEDURE — S9366 HIT TPN 2 LITER DIEM: HCPCS

## 2025-04-10 PROCEDURE — E1399 DURABLE MEDICAL EQUIPMENT MI: HCPCS

## 2025-04-10 NOTE — PROGRESS NOTES
CLINICAL NUTRITION SERVICES     Reason for Contact: Questions from Oncology Distress Screening  1. How concerned are you about your ability to eat? :  9  2. How concerned are you about unintended weight loss or your current weight? : 2  9. If you want to be contacted by one of our professionals, I can send a message to them right now.: - (Pt declined)    Action: None at this time. Michelle declined contact. Michelle is followed by [facility] dietitian for TPN.     Alicia Aguilar RD, LD  [phone]

## 2025-04-11 PROCEDURE — B4185 PARENTERAL SOL 10 GM LIPIDS: HCPCS

## 2025-04-11 PROCEDURE — S9366 HIT TPN 2 LITER DIEM: HCPCS

## 2025-04-11 PROCEDURE — A4217 STERILE WATER/SALINE, 500 ML: HCPCS

## 2025-04-11 PROCEDURE — S9374 HIT HYDRA 1 LITER DIEM: HCPCS | Mod: SH

## 2025-04-11 PROCEDURE — B4178 PARENTERAL SOL AMINO ACID >: HCPCS

## 2025-04-11 PROCEDURE — B9004 PARENTERAL INFUS PUMP PORTAB: HCPCS | Mod: RR

## 2025-04-12 PROCEDURE — S9366 HIT TPN 2 LITER DIEM: HCPCS

## 2025-04-12 PROCEDURE — B9004 PARENTERAL INFUS PUMP PORTAB: HCPCS | Mod: RR

## 2025-04-12 PROCEDURE — S9374 HIT HYDRA 1 LITER DIEM: HCPCS | Mod: SH

## 2025-04-13 PROCEDURE — S9374 HIT HYDRA 1 LITER DIEM: HCPCS | Mod: SH

## 2025-04-13 PROCEDURE — B9004 PARENTERAL INFUS PUMP PORTAB: HCPCS | Mod: RR

## 2025-04-13 PROCEDURE — S9366 HIT TPN 2 LITER DIEM: HCPCS

## 2025-04-14 ENCOUNTER — APPOINTMENT (OUTPATIENT)
Dept: INFUSION THERAPY | Facility: HOSPITAL | Age: 60
End: 2025-04-14
Attending: OBSTETRICS & GYNECOLOGY
Payer: COMMERCIAL

## 2025-04-14 DIAGNOSIS — C56.1 OVARIAN CANCER, RIGHT (H): ICD-10-CM

## 2025-04-14 DIAGNOSIS — Z51.11 ENCOUNTER FOR ANTINEOPLASTIC CHEMOTHERAPY: ICD-10-CM

## 2025-04-14 DIAGNOSIS — Z78.9 ON TOTAL PARENTERAL NUTRITION (TPN): ICD-10-CM

## 2025-04-14 DIAGNOSIS — C56.1 OVARIAN CANCER, RIGHT (H): Primary | ICD-10-CM

## 2025-04-14 LAB
ALBUMIN SERPL BCG-MCNC: 3.2 G/DL (ref 3.5–5.2)
ALP SERPL-CCNC: 175 U/L (ref 40–150)
ALT SERPL W P-5'-P-CCNC: 18 U/L (ref 0–50)
ANION GAP SERPL CALCULATED.3IONS-SCNC: 12 MMOL/L (ref 7–15)
AST SERPL W P-5'-P-CCNC: 33 U/L (ref 0–45)
BASOPHILS # BLD AUTO: 0 10E3/UL (ref 0–0.2)
BASOPHILS NFR BLD AUTO: 1 %
BILIRUB DIRECT SERPL-MCNC: 0.32 MG/DL (ref 0–0.3)
BILIRUB SERPL-MCNC: 0.5 MG/DL
BUN SERPL-MCNC: 25.1 MG/DL (ref 8–23)
CALCIUM SERPL-MCNC: 9 MG/DL (ref 8.8–10.4)
CHLORIDE SERPL-SCNC: 98 MMOL/L (ref 98–107)
CREAT SERPL-MCNC: 0.54 MG/DL (ref 0.51–0.95)
EGFRCR SERPLBLD CKD-EPI 2021: >90 ML/MIN/1.73M2
EOSINOPHIL # BLD AUTO: 0 10E3/UL (ref 0–0.7)
EOSINOPHIL NFR BLD AUTO: 0 %
ERYTHROCYTE [DISTWIDTH] IN BLOOD BY AUTOMATED COUNT: 14.3 % (ref 10–15)
FASTING STATUS PATIENT QL REPORTED: NO
GLUCOSE SERPL-MCNC: 119 MG/DL (ref 70–99)
HCO3 SERPL-SCNC: 24 MMOL/L (ref 22–29)
HCT VFR BLD AUTO: 29 % (ref 35–47)
HGB BLD-MCNC: 9.3 G/DL (ref 11.7–15.7)
IMM GRANULOCYTES # BLD: 0 10E3/UL
IMM GRANULOCYTES NFR BLD: 1 %
LYMPHOCYTES # BLD AUTO: 0.4 10E3/UL (ref 0.8–5.3)
LYMPHOCYTES NFR BLD AUTO: 41 %
MAGNESIUM SERPL-MCNC: 1.8 MG/DL (ref 1.7–2.3)
MCH RBC QN AUTO: 29 PG (ref 26.5–33)
MCHC RBC AUTO-ENTMCNC: 32.1 G/DL (ref 31.5–36.5)
MCV RBC AUTO: 90 FL (ref 78–100)
MONOCYTES # BLD AUTO: 0.1 10E3/UL (ref 0–1.3)
MONOCYTES NFR BLD AUTO: 5 %
NEUTROPHILS # BLD AUTO: 0.6 10E3/UL (ref 1.6–8.3)
NEUTROPHILS NFR BLD AUTO: 52 %
NRBC # BLD AUTO: 0 10E3/UL
NRBC BLD AUTO-RTO: 0 /100
PHOSPHATE SERPL-MCNC: 3.2 MG/DL (ref 2.5–4.5)
PLAT MORPH BLD: NORMAL
PLATELET # BLD AUTO: 153 10E3/UL (ref 150–450)
POTASSIUM SERPL-SCNC: 4.2 MMOL/L (ref 3.4–5.3)
PROT SERPL-MCNC: 6.3 G/DL (ref 6.4–8.3)
RBC # BLD AUTO: 3.21 10E6/UL (ref 3.8–5.2)
RBC MORPH BLD: NORMAL
SODIUM SERPL-SCNC: 134 MMOL/L (ref 135–145)
TRIGL SERPL-MCNC: 67 MG/DL
WBC # BLD AUTO: 1.1 10E3/UL (ref 4–11)

## 2025-04-14 PROCEDURE — 36591 DRAW BLOOD OFF VENOUS DEVICE: CPT

## 2025-04-14 PROCEDURE — 85004 AUTOMATED DIFF WBC COUNT: CPT

## 2025-04-14 PROCEDURE — S9366 HIT TPN 2 LITER DIEM: HCPCS

## 2025-04-14 PROCEDURE — 84155 ASSAY OF PROTEIN SERUM: CPT

## 2025-04-14 PROCEDURE — 85041 AUTOMATED RBC COUNT: CPT

## 2025-04-14 PROCEDURE — B9004 PARENTERAL INFUS PUMP PORTAB: HCPCS | Mod: RR

## 2025-04-14 PROCEDURE — S9374 HIT HYDRA 1 LITER DIEM: HCPCS | Mod: SH

## 2025-04-14 PROCEDURE — 82248 BILIRUBIN DIRECT: CPT

## 2025-04-14 PROCEDURE — 83735 ASSAY OF MAGNESIUM: CPT

## 2025-04-14 PROCEDURE — 84100 ASSAY OF PHOSPHORUS: CPT

## 2025-04-14 PROCEDURE — 84478 ASSAY OF TRIGLYCERIDES: CPT

## 2025-04-15 ENCOUNTER — MEDICAL CORRESPONDENCE (OUTPATIENT)
Dept: HOME HEALTH SERVICES | Facility: HOME HEALTH | Age: 60
End: 2025-04-15
Payer: COMMERCIAL

## 2025-04-15 PROCEDURE — B9004 PARENTERAL INFUS PUMP PORTAB: HCPCS | Mod: RR

## 2025-04-15 PROCEDURE — S9374 HIT HYDRA 1 LITER DIEM: HCPCS | Mod: SH

## 2025-04-15 PROCEDURE — S9366 HIT TPN 2 LITER DIEM: HCPCS

## 2025-04-16 PROCEDURE — S9366 HIT TPN 2 LITER DIEM: HCPCS

## 2025-04-16 PROCEDURE — S9374 HIT HYDRA 1 LITER DIEM: HCPCS | Mod: SH

## 2025-04-16 PROCEDURE — B9004 PARENTERAL INFUS PUMP PORTAB: HCPCS | Mod: RR

## 2025-04-17 ENCOUNTER — HOME INFUSION BILLING (OUTPATIENT)
Dept: HOME HEALTH SERVICES | Facility: HOME HEALTH | Age: 60
End: 2025-04-17
Payer: COMMERCIAL

## 2025-04-17 PROCEDURE — B9004 PARENTERAL INFUS PUMP PORTAB: HCPCS | Mod: RR

## 2025-04-17 PROCEDURE — S9366 HIT TPN 2 LITER DIEM: HCPCS

## 2025-04-17 PROCEDURE — S9374 HIT HYDRA 1 LITER DIEM: HCPCS | Mod: SH

## 2025-04-21 ENCOUNTER — HOME INFUSION (OUTPATIENT)
Dept: HOME HEALTH SERVICES | Facility: HOME HEALTH | Age: 60
End: 2025-04-21
Payer: COMMERCIAL

## 2025-04-21 ENCOUNTER — INFUSION THERAPY VISIT (OUTPATIENT)
Dept: INFUSION THERAPY | Facility: HOSPITAL | Age: 60
End: 2025-04-21
Attending: OBSTETRICS & GYNECOLOGY
Payer: COMMERCIAL

## 2025-04-21 DIAGNOSIS — Z78.9 ON TOTAL PARENTERAL NUTRITION (TPN): ICD-10-CM

## 2025-04-21 DIAGNOSIS — Z78.9 ON TOTAL PARENTERAL NUTRITION (TPN): Primary | ICD-10-CM

## 2025-04-21 DIAGNOSIS — C56.1 OVARIAN CANCER, RIGHT (H): Primary | ICD-10-CM

## 2025-04-21 LAB
ALBUMIN SERPL BCG-MCNC: 3 G/DL (ref 3.5–5.2)
ALP SERPL-CCNC: 162 U/L (ref 40–150)
ALT SERPL W P-5'-P-CCNC: 27 U/L (ref 0–50)
ANION GAP SERPL CALCULATED.3IONS-SCNC: 10 MMOL/L (ref 7–15)
AST SERPL W P-5'-P-CCNC: 31 U/L (ref 0–45)
BASOPHILS # BLD AUTO: 0 10E3/UL (ref 0–0.2)
BASOPHILS NFR BLD AUTO: 1 %
BILIRUB DIRECT SERPL-MCNC: 0.11 MG/DL (ref 0–0.3)
BILIRUB SERPL-MCNC: 0.2 MG/DL
BUN SERPL-MCNC: 19.4 MG/DL (ref 8–23)
CALCIUM SERPL-MCNC: 8.6 MG/DL (ref 8.8–10.4)
CHLORIDE SERPL-SCNC: 100 MMOL/L (ref 98–107)
CREAT SERPL-MCNC: 0.55 MG/DL (ref 0.51–0.95)
EGFRCR SERPLBLD CKD-EPI 2021: >90 ML/MIN/1.73M2
EOSINOPHIL # BLD AUTO: 0 10E3/UL (ref 0–0.7)
EOSINOPHIL NFR BLD AUTO: 1 %
ERYTHROCYTE [DISTWIDTH] IN BLOOD BY AUTOMATED COUNT: 14.4 % (ref 10–15)
FASTING STATUS PATIENT QL REPORTED: YES
GLUCOSE SERPL-MCNC: 109 MG/DL (ref 70–99)
HCO3 SERPL-SCNC: 23 MMOL/L (ref 22–29)
HCT VFR BLD AUTO: 26 % (ref 35–47)
HGB BLD-MCNC: 8.6 G/DL (ref 11.7–15.7)
IMM GRANULOCYTES # BLD: 0.1 10E3/UL
IMM GRANULOCYTES NFR BLD: 3 %
LYMPHOCYTES # BLD AUTO: 1.1 10E3/UL (ref 0.8–5.3)
LYMPHOCYTES NFR BLD AUTO: 20 %
MAGNESIUM SERPL-MCNC: 1.8 MG/DL (ref 1.7–2.3)
MCH RBC QN AUTO: 29.3 PG (ref 26.5–33)
MCHC RBC AUTO-ENTMCNC: 33.1 G/DL (ref 31.5–36.5)
MCV RBC AUTO: 88 FL (ref 78–100)
MONOCYTES # BLD AUTO: 0.8 10E3/UL (ref 0–1.3)
MONOCYTES NFR BLD AUTO: 15 %
NEUTROPHILS # BLD AUTO: 3.2 10E3/UL (ref 1.6–8.3)
NEUTROPHILS NFR BLD AUTO: 61 %
NRBC # BLD AUTO: 0 10E3/UL
NRBC BLD AUTO-RTO: 1 /100
PHOSPHATE SERPL-MCNC: 3.1 MG/DL (ref 2.5–4.5)
PLATELET # BLD AUTO: 133 10E3/UL (ref 150–450)
POTASSIUM SERPL-SCNC: 4.4 MMOL/L (ref 3.4–5.3)
PROT SERPL-MCNC: 6.2 G/DL (ref 6.4–8.3)
RBC # BLD AUTO: 2.94 10E6/UL (ref 3.8–5.2)
SODIUM SERPL-SCNC: 133 MMOL/L (ref 135–145)
TRIGL SERPL-MCNC: 63 MG/DL
WBC # BLD AUTO: 5.2 10E3/UL (ref 4–11)

## 2025-04-21 PROCEDURE — 83735 ASSAY OF MAGNESIUM: CPT

## 2025-04-21 PROCEDURE — 82310 ASSAY OF CALCIUM: CPT

## 2025-04-21 PROCEDURE — 36591 DRAW BLOOD OFF VENOUS DEVICE: CPT

## 2025-04-21 PROCEDURE — 82248 BILIRUBIN DIRECT: CPT

## 2025-04-21 PROCEDURE — 250N000011 HC RX IP 250 OP 636: Mod: JZ | Performed by: OBSTETRICS & GYNECOLOGY

## 2025-04-21 PROCEDURE — 84478 ASSAY OF TRIGLYCERIDES: CPT

## 2025-04-21 PROCEDURE — 36593 DECLOT VASCULAR DEVICE: CPT

## 2025-04-21 PROCEDURE — 85004 AUTOMATED DIFF WBC COUNT: CPT

## 2025-04-21 PROCEDURE — 84100 ASSAY OF PHOSPHORUS: CPT

## 2025-04-21 PROCEDURE — 83690 ASSAY OF LIPASE: CPT | Performed by: PHYSICIAN ASSISTANT

## 2025-04-21 RX ADMIN — ALTEPLASE 2 MG: 2.2 INJECTION, POWDER, LYOPHILIZED, FOR SOLUTION INTRAVENOUS at 08:50

## 2025-04-21 NOTE — PROGRESS NOTES
Infusion Nursing Note:  Jacki Smith presents today for Labs, port needle change.    Patient seen by provider today: No   present during visit today: Not Applicable.    Note: Michelle arrived ambulatory by herself for labs and port needle change. Plan of care reviewed and she has no questions.    Intravenous Access:  Implanted Port. Unable to obtain blood return prior to or after port needle change. TPA was instilled x 30 minutes. TPA was withdrawn and blood return obtained. Weekly labs collected as ordered.    Treatment Conditions:  Not Applicable.    Post Infusion Assessment:  NA.     Discharge Plan:   Patient will return April 28th for next appointment.   Patient discharged in stable condition accompanied by: self.  Departure Mode: Ambulatory.      Rylie Bullard RN

## 2025-04-23 ENCOUNTER — HOSPITAL ENCOUNTER (OUTPATIENT)
Dept: RADIOLOGY | Facility: HOSPITAL | Age: 60
Discharge: HOME | End: 2025-04-23
Attending: NURSE PRACTITIONER
Payer: COMMERCIAL

## 2025-04-23 DIAGNOSIS — C56.1 OVARIAN CANCER, RIGHT (H): ICD-10-CM

## 2025-04-23 DIAGNOSIS — Z43.1 ATTENTION TO G-TUBE (H): ICD-10-CM

## 2025-04-24 ENCOUNTER — HOSPITAL ENCOUNTER (EMERGENCY)
Facility: CLINIC | Age: 60
Discharge: HOME | End: 2025-04-24
Attending: EMERGENCY MEDICINE
Payer: COMMERCIAL

## 2025-04-24 ENCOUNTER — HOME INFUSION BILLING (OUTPATIENT)
Dept: HOME HEALTH SERVICES | Facility: HOME HEALTH | Age: 60
End: 2025-04-24
Payer: COMMERCIAL

## 2025-04-24 ENCOUNTER — APPOINTMENT (OUTPATIENT)
Dept: CT IMAGING | Facility: CLINIC | Age: 60
End: 2025-04-24
Attending: PHYSICIAN ASSISTANT
Payer: COMMERCIAL

## 2025-04-24 ENCOUNTER — PATIENT OUTREACH (OUTPATIENT)
Dept: GASTROENTEROLOGY | Facility: CLINIC | Age: 60
End: 2025-04-24
Payer: COMMERCIAL

## 2025-04-24 VITALS
HEIGHT: 62 IN | SYSTOLIC BLOOD PRESSURE: 141 MMHG | WEIGHT: 133 LBS | OXYGEN SATURATION: 100 % | TEMPERATURE: 97.8 F | BODY MASS INDEX: 24.48 KG/M2 | RESPIRATION RATE: 16 BRPM | DIASTOLIC BLOOD PRESSURE: 73 MMHG | HEART RATE: 72 BPM

## 2025-04-24 DIAGNOSIS — Z43.1 ATTENTION TO G-TUBE (H): ICD-10-CM

## 2025-04-24 DIAGNOSIS — Z85.43 HISTORY OF OVARIAN CANCER: ICD-10-CM

## 2025-04-24 DIAGNOSIS — R10.10 UPPER ABDOMINAL PAIN: ICD-10-CM

## 2025-04-24 LAB
ALBUMIN SERPL BCG-MCNC: 3.2 G/DL (ref 3.5–5.2)
ALBUMIN SERPL BCG-MCNC: 3.2 G/DL (ref 3.5–5.2)
ALBUMIN UR-MCNC: NEGATIVE MG/DL
ALP SERPL-CCNC: 151 U/L (ref 40–150)
ALP SERPL-CCNC: 163 U/L (ref 40–150)
ALT SERPL W P-5'-P-CCNC: 18 U/L (ref 0–50)
ALT SERPL W P-5'-P-CCNC: 29 U/L (ref 0–50)
ANION GAP SERPL CALCULATED.3IONS-SCNC: 11 MMOL/L (ref 7–15)
ANION GAP SERPL CALCULATED.3IONS-SCNC: 15 MMOL/L (ref 7–15)
APPEARANCE UR: CLEAR
AST SERPL W P-5'-P-CCNC: 25 U/L (ref 0–45)
AST SERPL W P-5'-P-CCNC: 34 U/L (ref 0–45)
BASOPHILS # BLD AUTO: 0 10E3/UL (ref 0–0.2)
BASOPHILS NFR BLD AUTO: 1 %
BILIRUB SERPL-MCNC: 0.2 MG/DL
BILIRUB SERPL-MCNC: 0.2 MG/DL
BILIRUB UR QL STRIP: NEGATIVE
BUN SERPL-MCNC: 19 MG/DL (ref 8–23)
BUN SERPL-MCNC: 25.5 MG/DL (ref 8–23)
CALCIUM SERPL-MCNC: 8.6 MG/DL (ref 8.8–10.4)
CALCIUM SERPL-MCNC: 8.9 MG/DL (ref 8.8–10.4)
CHLORIDE SERPL-SCNC: 101 MMOL/L (ref 98–107)
CHLORIDE SERPL-SCNC: 107 MMOL/L (ref 98–107)
COLOR UR AUTO: ABNORMAL
CREAT SERPL-MCNC: 0.59 MG/DL (ref 0.51–0.95)
CREAT SERPL-MCNC: 0.61 MG/DL (ref 0.51–0.95)
EGFRCR SERPLBLD CKD-EPI 2021: >90 ML/MIN/1.73M2
EGFRCR SERPLBLD CKD-EPI 2021: >90 ML/MIN/1.73M2
EOSINOPHIL # BLD AUTO: 0.1 10E3/UL (ref 0–0.7)
EOSINOPHIL NFR BLD AUTO: 1 %
ERYTHROCYTE [DISTWIDTH] IN BLOOD BY AUTOMATED COUNT: 14.9 % (ref 10–15)
FASTING STATUS PATIENT QL REPORTED: YES
GLUCOSE SERPL-MCNC: 100 MG/DL (ref 70–99)
GLUCOSE SERPL-MCNC: 103 MG/DL (ref 70–99)
GLUCOSE UR STRIP-MCNC: NEGATIVE MG/DL
HCO3 SERPL-SCNC: 19 MMOL/L (ref 22–29)
HCO3 SERPL-SCNC: 22 MMOL/L (ref 22–29)
HCT VFR BLD AUTO: 26.8 % (ref 35–47)
HGB BLD-MCNC: 8.3 G/DL (ref 11.7–15.7)
HGB UR QL STRIP: NEGATIVE
IMM GRANULOCYTES # BLD: 0.1 10E3/UL
IMM GRANULOCYTES NFR BLD: 2 %
KETONES UR STRIP-MCNC: NEGATIVE MG/DL
LACTATE SERPL-SCNC: 0.7 MMOL/L (ref 0.7–2)
LEUKOCYTE ESTERASE UR QL STRIP: NEGATIVE
LIPASE SERPL-CCNC: 30 U/L (ref 13–60)
LIPASE SERPL-CCNC: 33 U/L (ref 13–60)
LYMPHOCYTES # BLD AUTO: 1.2 10E3/UL (ref 0.8–5.3)
LYMPHOCYTES NFR BLD AUTO: 18 %
MCH RBC QN AUTO: 28.6 PG (ref 26.5–33)
MCHC RBC AUTO-ENTMCNC: 31 G/DL (ref 31.5–36.5)
MCV RBC AUTO: 92 FL (ref 78–100)
MONOCYTES # BLD AUTO: 0.6 10E3/UL (ref 0–1.3)
MONOCYTES NFR BLD AUTO: 10 %
MUCOUS THREADS #/AREA URNS LPF: PRESENT /LPF
NEUTROPHILS # BLD AUTO: 4.5 10E3/UL (ref 1.6–8.3)
NEUTROPHILS NFR BLD AUTO: 69 %
NITRATE UR QL: NEGATIVE
NRBC # BLD AUTO: 0 10E3/UL
NRBC BLD AUTO-RTO: 0 /100
PH UR STRIP: 5.5 [PH] (ref 5–7)
PLATELET # BLD AUTO: 125 10E3/UL (ref 150–450)
POTASSIUM SERPL-SCNC: 3.9 MMOL/L (ref 3.4–5.3)
POTASSIUM SERPL-SCNC: 4.6 MMOL/L (ref 3.4–5.3)
PROT SERPL-MCNC: 6.3 G/DL (ref 6.4–8.3)
PROT SERPL-MCNC: 6.4 G/DL (ref 6.4–8.3)
RBC # BLD AUTO: 2.9 10E6/UL (ref 3.8–5.2)
RBC URINE: 0 /HPF
SODIUM SERPL-SCNC: 135 MMOL/L (ref 135–145)
SODIUM SERPL-SCNC: 140 MMOL/L (ref 135–145)
SP GR UR STRIP: 1.02 (ref 1–1.03)
SQUAMOUS EPITHELIAL: <1 /HPF
UROBILINOGEN UR STRIP-MCNC: NORMAL MG/DL
WBC # BLD AUTO: 6.5 10E3/UL (ref 4–11)
WBC URINE: 0 /HPF

## 2025-04-24 PROCEDURE — 81003 URINALYSIS AUTO W/O SCOPE: CPT | Performed by: PHYSICIAN ASSISTANT

## 2025-04-24 PROCEDURE — 36415 COLL VENOUS BLD VENIPUNCTURE: CPT | Performed by: PHYSICIAN ASSISTANT

## 2025-04-24 PROCEDURE — 96360 HYDRATION IV INFUSION INIT: CPT | Performed by: STUDENT IN AN ORGANIZED HEALTH CARE EDUCATION/TRAINING PROGRAM

## 2025-04-24 PROCEDURE — 85004 AUTOMATED DIFF WBC COUNT: CPT | Performed by: PHYSICIAN ASSISTANT

## 2025-04-24 PROCEDURE — 250N000013 HC RX MED GY IP 250 OP 250 PS 637: Performed by: PHYSICIAN ASSISTANT

## 2025-04-24 PROCEDURE — 82310 ASSAY OF CALCIUM: CPT | Performed by: PHYSICIAN ASSISTANT

## 2025-04-24 PROCEDURE — A4245 ALCOHOL WIPES PER BOX: HCPCS

## 2025-04-24 PROCEDURE — 258N000003 HC RX IP 258 OP 636: Performed by: PHYSICIAN ASSISTANT

## 2025-04-24 PROCEDURE — 99285 EMERGENCY DEPT VISIT HI MDM: CPT | Mod: 25 | Performed by: EMERGENCY MEDICINE

## 2025-04-24 PROCEDURE — 250N000011 HC RX IP 250 OP 636: Performed by: PHYSICIAN ASSISTANT

## 2025-04-24 PROCEDURE — 99284 EMERGENCY DEPT VISIT MOD MDM: CPT | Mod: FS | Performed by: STUDENT IN AN ORGANIZED HEALTH CARE EDUCATION/TRAINING PROGRAM

## 2025-04-24 PROCEDURE — 96360 HYDRATION IV INFUSION INIT: CPT | Performed by: EMERGENCY MEDICINE

## 2025-04-24 PROCEDURE — E1399 DURABLE MEDICAL EQUIPMENT MI: HCPCS

## 2025-04-24 PROCEDURE — 99285 EMERGENCY DEPT VISIT HI MDM: CPT | Mod: 25 | Performed by: STUDENT IN AN ORGANIZED HEALTH CARE EDUCATION/TRAINING PROGRAM

## 2025-04-24 PROCEDURE — 83690 ASSAY OF LIPASE: CPT | Performed by: PHYSICIAN ASSISTANT

## 2025-04-24 PROCEDURE — 99284 EMERGENCY DEPT VISIT MOD MDM: CPT | Mod: GC | Performed by: OBSTETRICS & GYNECOLOGY

## 2025-04-24 PROCEDURE — 83605 ASSAY OF LACTIC ACID: CPT | Performed by: PHYSICIAN ASSISTANT

## 2025-04-24 PROCEDURE — 74177 CT ABD & PELVIS W/CONTRAST: CPT

## 2025-04-24 PROCEDURE — 74177 CT ABD & PELVIS W/CONTRAST: CPT | Mod: 26 | Performed by: RADIOLOGY

## 2025-04-24 RX ORDER — OXYCODONE HYDROCHLORIDE 5 MG/1
5 TABLET ORAL ONCE
Status: COMPLETED | OUTPATIENT
Start: 2025-04-24 | End: 2025-04-24

## 2025-04-24 RX ORDER — IOPAMIDOL 755 MG/ML
81 INJECTION, SOLUTION INTRAVASCULAR ONCE
Status: COMPLETED | OUTPATIENT
Start: 2025-04-24 | End: 2025-04-24

## 2025-04-24 RX ADMIN — IOPAMIDOL 81 ML: 755 INJECTION, SOLUTION INTRAVENOUS at 17:29

## 2025-04-24 RX ADMIN — OXYCODONE HYDROCHLORIDE 5 MG: 5 TABLET ORAL at 13:59

## 2025-04-24 RX ADMIN — SODIUM CHLORIDE 1000 ML: 0.9 INJECTION, SOLUTION INTRAVENOUS at 12:38

## 2025-04-24 ASSESSMENT — COLUMBIA-SUICIDE SEVERITY RATING SCALE - C-SSRS
2. HAVE YOU ACTUALLY HAD ANY THOUGHTS OF KILLING YOURSELF IN THE PAST MONTH?: NO
1. IN THE PAST MONTH, HAVE YOU WISHED YOU WERE DEAD OR WISHED YOU COULD GO TO SLEEP AND NOT WAKE UP?: NO
6. HAVE YOU EVER DONE ANYTHING, STARTED TO DO ANYTHING, OR PREPARED TO DO ANYTHING TO END YOUR LIFE?: NO

## 2025-04-24 ASSESSMENT — ACTIVITIES OF DAILY LIVING (ADL)
ADLS_ACUITY_SCORE: 59

## 2025-04-24 NOTE — TELEPHONE ENCOUNTER
Called patient related to tube triage.     Per patient, she has not been venting tube, eating small soft foods. Whenever she touches it, he hurts. She's bloated, coming to [facility] ER now because her abdomen is so distended. She's hoping she doesn't have another SBO.    Message sent to inpatient team that patient coming to ER    ML

## 2025-04-24 NOTE — ED PROVIDER NOTES
[place] EMERGENCY DEPARTMENT ([facility])    4/24/25       ED PROVIDER NOTE    History     Chief Complaint   Patient presents with    Abdominal Pain     HPI  Jacki Smith is a 60 year old female with a past medical history of right ovarian cancer with malignant ascites s/p bilateral salpingo-oophorectomy w/ appendectomy & omentectomy (2022), peritoneal carcinomatosis, recent admission on 2/11/25 with pneumoperitoneum from likely perforated bowel s/p PEG tube (venting) who presents to the emergency department for evaluation of abdominal and back pain.      Patient presents with her .  She notes over the last 2 weeks she has had gradually increasing upper abdominal pain, distention, and discomfort around her G-tube.  She notes that she has had some nausea with this, without any vomiting, diarrhea or constipation symptoms.  Last bowel movement was yesterday without bloody or black stools.  She reports urinary frequency which is not new for her.  She notes she did finish antibiotics on Monday for UTI denies current dysuria, urgency or hematuria symptoms.  She denies any associated fevers cough chest pain does report some sensation that she cannot take a big breath due to the abdominal pain without other dyspnea.    Patient is on tube feeds at night, has been gradually increasing her oral intake as well.  She had been venting her G-tube although was told not to over the last few weeks.  She has not noticed redness or pus draining from the G-tube site.  She does note a history of ascites however has not had a paracentesis since January.    Patient declines any pain medications at this time.      Past Medical History  Past Medical History:   Diagnosis Date    Female stress incontinence     Ovarian cancer, right (H) 06/16/2022    Stage IIIC high-grade serous carcinoma    Recurrent cold sores      Past Surgical History:   Procedure Laterality Date    APPENDECTOMY  08/29/2022    Part of ovarian  cancer tumor debulking    BLADDER SUSPENSION  08/13/2009    ENDOSCOPIC INSERTION TUBE GASTROSTOMY N/A 2/28/2025    Procedure: INSERTION, PEG TUBE (venting);  Surgeon: Porfirio Saunders MD;  Location: [facility] OR    HYSTERECTOMY  08/13/2009    For prolapse    IR FOLLOW UP VISIT INPATIENT  2/25/2025    IR PARACENTESIS  6/6/2022    IR PARACENTESIS  6/14/2022    IR PARACENTESIS  5/26/2023    IR PARACENTESIS  2/26/2025    IR RETROPERITONEAL ABSCESS DRAINAGE  2/18/2025    LAPAROSCOPY DIAGNOSTIC (GYN)  06/16/2022    SALPINGO-OOPHORECTOMY, COMBINED Bilateral 08/29/2022    Pelvic exam under anesthesia, diagnostic laparoscopy, conversion to laparotomy for optimal interval tumor debulking to no gross residual disease including peritoneal and diaphragm biopsies, bilateral salpingo-oophorectomy, infragastric omentectomy, bilateral hemidiaphragm stripping, peritoneal and mesenteric argon beam ablation, appendectomy.    TONSILLECTOMY  1973    TUBAL LIGATION Bilateral 09/01/1998     acetaminophen (TYLENOL) 500 MG tablet  cyclobenzaprine (FLEXERIL) 5 MG tablet  diphenhydrAMINE-acetaminophen (TYLENOL PM)  MG tablet  docusate sodium (DSS) 100 MG capsule  Emergency Supply Kit, [facility],  escitalopram (LEXAPRO) 20 MG tablet  famotidine (PEPCID) 20 MG tablet  hydrOXYzine HCl (ATARAX) 25 MG tablet  Lidocaine (LIDOCARE) 4 % Patch  lidocaine-prilocaine (EMLA) 2.5-2.5 % external cream  LORazepam (ATIVAN) 0.5 MG tablet  Multiple Vitamin (INFUVITE ADULT) injection  Multiple Vitamin (MULTI-VITAMIN DAILY PO)  omeprazole (PRILOSEC) 20 MG DR capsule  ondansetron (ZOFRAN) 8 MG tablet  parenteral nutrition - DUALCHAMBER - see order for formula  Port Access Kit  prochlorperazine (COMPAZINE) 10 MG tablet  sodium chloride 0.9 % 500 mL via elastomeric pump  sodium chloride, PF, 0.9% PF flush  valACYclovir (VALTREX) 1000 mg tablet      No Known Allergies  Family History  Family History   Problem Relation Age of Onset    Prostate Cancer Father      "Breast Cancer Sister 48    Melanoma Sister     Skin Cancer Sister     Colon Cancer Maternal Grandmother      Social History   Social History     Tobacco Use    Smoking status: Former     Current packs/day: 0.00     Average packs/day: 1 pack/day for 42.0 years (42.0 ttl pk-yrs)     Types: Cigarettes     Start date: 1980     Quit date: 2022     Years since quittin.9    Smokeless tobacco: Never   Substance Use Topics    Alcohol use: Yes     Comment: Beer ~Q3 months.    Drug use: Never      A medically appropriate review of systems was performed with pertinent positives and negatives noted in the HPI, and all other systems negative.    Physical Exam   BP: 125/85  Pulse: 76  Temp: 97.9  F (36.6  C)  Resp: 16  Height: 157.5 cm (5' 2\")  Weight: 60.3 kg (133 lb)  SpO2: 100 %  Physical Exam      GENERAL APPEARANCE: The patient is well developed, well appearing, and in no acute distress.  HEAD:  Normocephalic.  EENT: Voice normal.  NECK: Trachea is midline.  Uvula midline without exudates  LUNGS: Breath sounds are equal and clear bilaterally. No wheezes, rhonchi, or rales.  HEART: Regular rate and normal rhythm.   ABDOMEN: G-tube present in the left upper quadrant.  Patient has mild tenderness around the G-tube site.  No erythema, fluctuance, induration around the insertion site.  Epigastrium is mildly firm with mild tenderness.  No significant right upper quadrant tenderness, lower abdominal tenderness.  No rebound.  EXTREMITIES: No cyanosis, clubbing, or edema.  NEUROLOGIC: No focal sensory or motor deficits are noted.  PSYCHIATRIC: The patient is awake, alert.  Appropriate mood and affect.  SKIN: Warm, dry, and well perfused.      ED Course, Procedures, & Data     ED Course as of 25 2218   u  Spoke with gynecology oncology.  They note that they are going to discuss disposition with the patient, observation to their service versus discharge          Results for orders placed or " performed during the hospital encounter of 04/24/25   CT Abdomen Pelvis w Contrast     Status: None    Narrative    Examination: CT ABDOMEN PELVIS W CONTRAST, 4/24/2025 5:40 PM    Technique:  Helical CT images from the lung bases through the  symphysis pubis were obtained with contrast.  Coronal reformatted  images were generated at a workstation for further assessment.    Contrast:  81 mL Isovue 370    Comparison: CT 4/3/2025, 2/21/2025, 10/15/2024    History: History of ovarian cancer, G-tube, history of prior bowel  obstruction.  Several weeks of increasing upper abdominal pain and  distention, around G-tube site evaluate G-tube placement, progression  of malignancy, bowel obstruction other etiology    Findings:    Abdomen and pelvis:   Liver: Intrahepatic parenchymal metastases are stable compared to  recent prior but have decreased in size when compared to 1/10/2025 CT  scan, for example lesion in segment 2 measuring 0.8 cm previously  measured 1.1 cm (4/36); lesion in segment 4 measuring 0.5 cm (4/63)  previously measured 0.9 cm.  Peritoneal metastases along the hepatic surface have also decreased in  size for example lesion anterior to the left lobe measuring 1.7 x 1.3  cm (4/37) previously measured 3 x 2.8 cm.  Gallbladder: Distended. No gallstones. No evidence of acute  cholecystitis.  Spleen: Normal size.  Pancreas: Mild fatty atrophy of the pancreatic parenchyma. No  suspicious pancreatic lesions. The pancreatic duct is not dilated.  Adrenal glands: No adrenal nodules.  Urinary system: Punctate nonobstructing stone in the right kidney. No  suspicious renal lesions. Similar mild right hydronephrosis. Mild left  hydronephrosis. No obstructing renal stones. Urinary bladder is  unremarkable    Reproductive organs/pelvis: Hysterectomy. Continued decreased size of  the encapsulated multilobulated fluid collection anterior to the  urinary bladder. Additional encapsulated air-fluid collection that  resides within  the rectouterine space extending up along the posterior  abdomen into the left upper quadrant has slightly decreased in size  compared to prior, though measurements are difficult due to the  configuration of this fluid collection.     Bowel: G-tube balloon is placed within the gastric antrum. Multiple  fluid air filled loops of prominent/mildly enlarged small bowel are  scattered throughout the abdomen and pelvis with areas of decompressed  small bowel intertwined throughout. No transition point identified.  Colonic diverticulosis without evidence of acute diverticulitis.   Lymph nodes: No retroperitoneal, mesenteric, or pelvic  lymphadenopathy.  Fluid: Persistent ascites in the upper abdomen. Slightly decreased  presacral fluid/soft tissue density.   Vessels: No infrarenal aortic aneurysm. The portal, superior  mesenteric, and splenic veins are patent. Mild atherosclerosis of the  abdominal aorta and iliac arteries.    Lung bases: No consolidation or pleural effusion.    Bones and soft tissues: No suspicious osseous lesions. Multilevel  degenerative changes of the visualized spine with endplate  degenerative changes at L4-5 and levocurvature of the lumbar spine.  Anasarca.       Impression    Impression:   1.  Continued decreased size of the large encapsulated air-fluid  collection that extends from the rectouterine space through the left  upper quadrant though measurements are difficult given the  configuration of this fluid collection. Additionally there is  decreased size of the encapsulated multilobulated fluid collection  anterior to the urinary bladder.  2.  Stable hepatic metastases and perihepatic peritoneal implants.   3.  Mild left hydronephrosis, new compared to prior. Persistent mild  right hydronephrosis.  4.  Multiple fluid air filled loops of prominent/mildly enlarged small  bowel are scattered throughout the abdomen and pelvis with areas of  intervening decompressed bowel. No transition point  identified.   5.  Small amount of ascites present in the upper abdomen is unchanged.  6.  Slightly decreased presacral fluid/soft tissue density compared to  4/3/2025.  7.  Punctate nonobstructing stone in the right kidney.    I have personally reviewed the examination and initial interpretation  and I agree with the findings.    HERRERA CARROLL MD         SYSTEM ID:  [id number]   Comprehensive metabolic panel     Status: Abnormal   Result Value Ref Range    Sodium 135 135 - 145 mmol/L    Potassium 4.6 3.4 - 5.3 mmol/L    Carbon Dioxide (CO2) 19 (L) 22 - 29 mmol/L    Anion Gap 15 7 - 15 mmol/L    Urea Nitrogen 19.0 8.0 - 23.0 mg/dL    Creatinine 0.59 0.51 - 0.95 mg/dL    GFR Estimate >90 >60 mL/min/1.73m2    Calcium 8.9 8.8 - 10.4 mg/dL    Chloride 101 98 - 107 mmol/L    Glucose 100 (H) 70 - 99 mg/dL    Alkaline Phosphatase 163 (H) 40 - 150 U/L    AST 34 0 - 45 U/L    ALT 29 0 - 50 U/L    Protein Total 6.4 6.4 - 8.3 g/dL    Albumin 3.2 (L) 3.5 - 5.2 g/dL    Bilirubin Total 0.2 <=1.2 mg/dL    Patient Fasting > 8hrs? Yes    UA with Microscopic reflex to Culture     Status: Abnormal    Specimen: Urine, Rodriguez Catheter   Result Value Ref Range    Color Urine Light Yellow Colorless, Straw, Light Yellow, Yellow    Appearance Urine Clear Clear    Glucose Urine Negative Negative mg/dL    Bilirubin Urine Negative Negative    Ketones Urine Negative Negative mg/dL    Specific Gravity Urine 1.019 1.003 - 1.035    Blood Urine Negative Negative    pH Urine 5.5 5.0 - 7.0    Protein Albumin Urine Negative Negative mg/dL    Urobilinogen Urine Normal Normal mg/dL    Nitrite Urine Negative Negative    Leukocyte Esterase Urine Negative Negative    Mucus Urine Present (A) None Seen /LPF    RBC Urine 0 <=2 /HPF    WBC Urine 0 <=5 /HPF    Squamous Epithelials Urine <1 <=1 /HPF    Narrative    Urine Culture not indicated   Lipase     Status: Normal   Result Value Ref Range    Lipase 30 13 - 60 U/L   Lactic acid whole blood     Status: Normal    Result Value Ref Range    Lactic Acid 0.7 0.7 - 2.0 mmol/L   CBC with platelets and differential     Status: Abnormal   Result Value Ref Range    WBC Count 6.5 4.0 - 11.0 10e3/uL    RBC Count 2.90 (L) 3.80 - 5.20 10e6/uL    Hemoglobin 8.3 (L) 11.7 - 15.7 g/dL    Hematocrit 26.8 (L) 35.0 - 47.0 %    MCV 92 78 - 100 fL    MCH 28.6 26.5 - 33.0 pg    MCHC 31.0 (L) 31.5 - 36.5 g/dL    RDW 14.9 10.0 - 15.0 %    Platelet Count 125 (L) 150 - 450 10e3/uL    % Neutrophils 69 %    % Lymphocytes 18 %    % Monocytes 10 %    % Eosinophils 1 %    % Basophils 1 %    % Immature Granulocytes 2 %    NRBCs per 100 WBC 0 <1 /100    Absolute Neutrophils 4.5 1.6 - 8.3 10e3/uL    Absolute Lymphocytes 1.2 0.8 - 5.3 10e3/uL    Absolute Monocytes 0.6 0.0 - 1.3 10e3/uL    Absolute Eosinophils 0.1 0.0 - 0.7 10e3/uL    Absolute Basophils 0.0 0.0 - 0.2 10e3/uL    Absolute Immature Granulocytes 0.1 <=0.4 10e3/uL    Absolute NRBCs 0.0 10e3/uL   Comprehensive metabolic panel     Status: Abnormal   Result Value Ref Range    Sodium 140 135 - 145 mmol/L    Potassium 3.9 3.4 - 5.3 mmol/L    Carbon Dioxide (CO2) 22 22 - 29 mmol/L    Anion Gap 11 7 - 15 mmol/L    Urea Nitrogen 25.5 (H) 8.0 - 23.0 mg/dL    Creatinine 0.61 0.51 - 0.95 mg/dL    GFR Estimate >90 >60 mL/min/1.73m2    Calcium 8.6 (L) 8.8 - 10.4 mg/dL    Chloride 107 98 - 107 mmol/L    Glucose 103 (H) 70 - 99 mg/dL    Alkaline Phosphatase 151 (H) 40 - 150 U/L    AST 25 0 - 45 U/L    ALT 18 0 - 50 U/L    Protein Total 6.3 (L) 6.4 - 8.3 g/dL    Albumin 3.2 (L) 3.5 - 5.2 g/dL    Bilirubin Total 0.2 <=1.2 mg/dL   Lipase     Status: Normal   Result Value Ref Range    Lipase 33 13 - 60 U/L   CBC with platelets differential     Status: Abnormal    Narrative    The following orders were created for panel order CBC with platelets differential.  Procedure                               Abnormality         Status                     ---------                               -----------         ------                      CBC with platelets and ...[[id number]]  Abnormal            Final result                 Please view results for these tests on the individual orders.     Medications   sodium chloride 0.9% BOLUS 1,000 mL (0 mLs Intravenous Stopped 4/24/25 1358)   oxyCODONE (ROXICODONE) tablet 5 mg (5 mg Oral $Given 4/24/25 1359)   sodium chloride (PF) 0.9% PF flush 70 mL (70 mLs Intravenous $Given 4/24/25 6719)   iopamidol (ISOVUE-370) solution 81 mL (81 mLs Intravenous $Given 4/24/25 1729)     Labs Ordered and Resulted from Time of ED Arrival to Time of ED Departure   COMPREHENSIVE METABOLIC PANEL - Abnormal       Result Value    Sodium 135      Potassium 4.6      Carbon Dioxide (CO2) 19 (*)     Anion Gap 15      Urea Nitrogen 19.0      Creatinine 0.59      GFR Estimate >90      Calcium 8.9      Chloride 101      Glucose 100 (*)     Alkaline Phosphatase 163 (*)     AST 34      ALT 29      Protein Total 6.4      Albumin 3.2 (*)     Bilirubin Total 0.2      Patient Fasting > 8hrs? Yes     ROUTINE UA WITH MICROSCOPIC REFLEX TO CULTURE - Abnormal    Color Urine Light Yellow      Appearance Urine Clear      Glucose Urine Negative      Bilirubin Urine Negative      Ketones Urine Negative      Specific Gravity Urine 1.019      Blood Urine Negative      pH Urine 5.5      Protein Albumin Urine Negative      Urobilinogen Urine Normal      Nitrite Urine Negative      Leukocyte Esterase Urine Negative      Mucus Urine Present (*)     RBC Urine 0      WBC Urine 0      Squamous Epithelials Urine <1     CBC WITH PLATELETS AND DIFFERENTIAL - Abnormal    WBC Count 6.5      RBC Count 2.90 (*)     Hemoglobin 8.3 (*)     Hematocrit 26.8 (*)     MCV 92      MCH 28.6      MCHC 31.0 (*)     RDW 14.9      Platelet Count 125 (*)     % Neutrophils 69      % Lymphocytes 18      % Monocytes 10      % Eosinophils 1      % Basophils 1      % Immature Granulocytes 2      NRBCs per 100 WBC 0      Absolute Neutrophils 4.5      Absolute Lymphocytes  1.2      Absolute Monocytes 0.6      Absolute Eosinophils 0.1      Absolute Basophils 0.0      Absolute Immature Granulocytes 0.1      Absolute NRBCs 0.0     COMPREHENSIVE METABOLIC PANEL - Abnormal    Sodium 140      Potassium 3.9      Carbon Dioxide (CO2) 22      Anion Gap 11      Urea Nitrogen 25.5 (*)     Creatinine 0.61      GFR Estimate >90      Calcium 8.6 (*)     Chloride 107      Glucose 103 (*)     Alkaline Phosphatase 151 (*)     AST 25      ALT 18      Protein Total 6.3 (*)     Albumin 3.2 (*)     Bilirubin Total 0.2     LIPASE - Normal    Lipase 30     LACTIC ACID WHOLE BLOOD - Normal    Lactic Acid 0.7     LIPASE - Normal    Lipase 33       CT Abdomen Pelvis w Contrast   Final Result   Impression:    1.  Continued decreased size of the large encapsulated air-fluid   collection that extends from the rectouterine space through the left   upper quadrant though measurements are difficult given the   configuration of this fluid collection. Additionally there is   decreased size of the encapsulated multilobulated fluid collection   anterior to the urinary bladder.   2.  Stable hepatic metastases and perihepatic peritoneal implants.    3.  Mild left hydronephrosis, new compared to prior. Persistent mild   right hydronephrosis.   4.  Multiple fluid air filled loops of prominent/mildly enlarged small   bowel are scattered throughout the abdomen and pelvis with areas of   intervening decompressed bowel. No transition point identified.    5.  Small amount of ascites present in the upper abdomen is unchanged.   6.  Slightly decreased presacral fluid/soft tissue density compared to   4/3/2025.   7.  Punctate nonobstructing stone in the right kidney.      I have personally reviewed the examination and initial interpretation   and I agree with the findings.      HERRERA CARROLL MD            SYSTEM ID:  [id number]             Critical care was not performed.     Medical Decision Making  The patient's presentation was of  high complexity (a chronic illness severe exacerbation, progression, or side effect of treatment).    The patient's evaluation involved:  review of external note(s) from 3+ sources (see separate area of note for details)  ordering and/or review of 3+ test(s) in this encounter (see separate area of note for details)  discussion of management or test interpretation with another health professional (gynecology oncology)    The patient's management necessitated moderate risk (prescription drug management including medications given in the ED) and moderate risk (IV contrast administration).    Assessment & Plan    This is a medically complex 60-year-old female with known history of ovarian cancer on chemotherapy with indwelling G-tube presented with concerns for several weeks of upper abdominal pain distention in the setting of history of prior bowel obstruction and bowel perforation.  On presentation here to the department she is afebrile, normoxic, without tachycardia.  She is not toxic appearing.  G-tube itself is without erythema significant tenderness to palpation, purulent drainage to suggest obvious infection.  Considered possible bowel obstruction, gas pains, ileus, pancreatitis, UTI, amongst others on the differential.  Plan for routine labs including lactic acid CBC chemistry UA lipase and plan for CT abdomen pelvis with contrast to further evaluate.  She does note that interventional radiology is aware that she is here in the department.  On initial presentation I did briefly place the ultrasound probe on the abdomen do not see any gross ascites to suggest obvious SBP picture.    Labs reviewed.  Lactic acid normal.  CBC without leukocytosis patient Nema K hemoglobin 8.3 similar to prior.  Chemistry without clinically significant electrolyte disturbances.  Creatinine normal.  UA without findings for infection.  CT shows significant air-fluid levels with new left hydro, mild.      With these findings and  patient's extensive oncologic history, case discussed with gynecology oncology did evaluate the patient at bedside.  They did offer admission for observation status ultimately patient would like to leave.  She has oxycodone available to her at home.  Gynecology oncology will expedite close outpatient follow-up.  She was instructed to leave her G-tube open to event, continue her TPN at nighttime.  Discussed with patient strict return precautions.    Patient discussed with attending physician , who agrees with my plan of care.    I have reviewed the nursing notes. I have reviewed the findings, diagnosis, plan and need for follow up with the patient.    New Prescriptions    No medications on file       Final diagnoses:   Upper abdominal pain   History of ovarian cancer   Attention to G-tube (H)       Mercedes Hendricks [facility] EMERGENCY DEPARTMENT  4/24/2025     Mercedes Hendricks PA-C  04/24/25 [id number]    --    ED Attending Physician Attestation    I Zayra Peña MD, cared for this patient with the Advanced Practice Provider ([facility]). I personally provided a substantive portion of the care for this patient, including approving the care plan for the number and complexity of problems addressed and taking responsibility related to the risk of complications and/or morbidity or mortality of patient management. Please see the [facility]KE's documentation for full details.    Zayra Peña MD  Emergency Medicine        Zayra Peña MD  04/24/25 3498

## 2025-04-24 NOTE — ED TRIAGE NOTES
Has been having abdominal and back pain. Has a venting tube in her abdomen, which is currently clamped and is not hooked up to a bag. GI has recommended she keep it clamped for as long as possible and to focus on eating small meals. Did vent it yesterday and just a little but of pop that she ate came up. Pain at 3/10 with sitting, goes up to a 8 if she bumps it or moves so much. Took an oxycodone last night, unsure of doasge and that was helpful. Has nausea on and off, took compazine this AM as well.     Had pizza and is concerned this is causing issues. States that she spoke with someone in IR and they know she is on her way here.      Triage Assessment (Adult)       Row Name 04/24/25 1143          Triage Assessment    Airway WDL WDL        Respiratory WDL    Respiratory WDL WDL        Skin Circulation/Temperature WDL    Skin Circulation/Temperature WDL WDL        Cardiac WDL    Cardiac WDL WDL        Peripheral/Neurovascular WDL    Peripheral Neurovascular WDL WDL        Cognitive/Neuro/Behavioral WDL    Cognitive/Neuro/Behavioral WDL WDL

## 2025-04-25 PROCEDURE — B9004 PARENTERAL INFUS PUMP PORTAB: HCPCS | Mod: RR

## 2025-04-25 PROCEDURE — A4217 STERILE WATER/SALINE, 500 ML: HCPCS

## 2025-04-25 PROCEDURE — B4178 PARENTERAL SOL AMINO ACID >: HCPCS

## 2025-04-25 PROCEDURE — S9366 HIT TPN 2 LITER DIEM: HCPCS

## 2025-04-25 PROCEDURE — S9374 HIT HYDRA 1 LITER DIEM: HCPCS | Mod: SH

## 2025-04-25 PROCEDURE — B4185 PARENTERAL SOL 10 GM LIPIDS: HCPCS

## 2025-04-25 NOTE — DISCHARGE INSTRUCTIONS
Here in the emergency room we do have some fluid levels noted on your CT scan.  You are seen by gynecology oncology who did discuss with you staying in the hospital versus discharge home and you would like to go home.  They will expedite getting you in soon in clinic and should be calling you.  You have oxycodone that you can take for pain.  You should leave your G-tube vented per their recommendation.    If you develop any new or worsening symptoms, is important to return right away to the emergency department for further evaluation and management.

## 2025-04-25 NOTE — CONSULTS
[facility] gyn onc consult    Jacki Smith MRN# [id number]   Age: 60 year old YOB: 1965     Date of Admission:  4/24/2025    Primary care provider: Domenica Christianson             Chief Complaint:   Abdominal pain, distension          History of Present Illness:   This patient is a 60 year old female with a history of HGSOC who presents for evaluation of abdominal pain and abdominal distension.     In February, the patient was started on third line chemotherapy with Carbo/Taxol/Pat. She was subsequently diagnosed with Bevacizumab-induced microperforation of the proximal small on February 11th with subsequent SBO attributed to post-perforation inflammation. During this hospitalization she underwent placement of a palliative venting G-tube and was started on TPN.     Since discharge 3/2, she has been slowing advancing her diet and clamping the G-tube for longer periods of time. She has continued on the TPN which has been weaned down (she cycles at night).     Most recently the patient has been taking in soft diet. She notes that until yesterday, she has had the G-tube clamped for ~1 week without venting. In review of her chart, the patient has experienced upper abdominal pain/intermittent distension since placement of the G-tube (she was even going to get the g-tube looked at with IR earlier this week but was given the wrong referral). The patient states that when she does unclamp her G-tube it drains easily. Yesterday she states that the discomfort got worse. She did vent it briefly yesterday after she drank Mountain Dew, but otherwise has kept it clamped. She states that she feels more distended and feels pain diffusely across her abdomen. She notes that she had been passing flatus every day but today did not. She had been having a bowel movement every 3-4 days (which is her usual rhythm) and last had a BM 3 days ago. She reports mild nausea this morning but no emesis. Has not had an appetite today so  hasn't eaten but has been taking in a lot of water. No fevers or chills.             Cancer Treatment History:   Ovarian Cancer History:  5/20/22: Presented to an ED in [place] for evaluation of abdominal bloating and discomfort.  -Abdomen, pelvic CT: Radiographic Stage CAL ovarian cancer. I have personally reviewed the CT images.   5/23/22: CA-320=[id number].   5/24/22: Pelvic ultrasound: c/w metastatic cancer.  6/16/22: Pelvic exam under anesthesia, diagnostic laparoscopy, biopsies, evacuation of ascites.   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      6/22/22, 7/13/22, 8/4/22: Cycles 1-3 of neoadjuvant chemotherapy with IV carboplatin + paclitaxel 175 mg/m2 every 21 days.   -Cycles 1,2: Carboplatin AUC 6.   -Cycle 3: Carboplatin AUC 5 with dose-reduction due to thrombcytopenia.   -8/19/22: Chest, abdomen, pelvic CT: Partial response. I have personally reviewed the CT images.   8/29/22: Pelvic exam under anesthesia, diagnostic laparoscopy, conversion to laparotomy for optimal interval tumor debulking to no gross residual disease including peritoneal and diaphragm biopsies, bilateral salpingo-oophorectomy, infragastric omentectomy, bilateral hemidiaphragm stripping, peritoneal and mesenteric argon beam ablation, appendectomy.   -Pathology: High-grade serous carcinoma involving all resected specimens.   9/28/22, 10/19/22, 11/17/22: Cycles 4-6 of first-line chemotherapy with IV carboplatin AUC 5 + paclitaxel 175 mg/m2.  -12/2/22: Chest, abdomen, pelvic CT: No definitive evidence of disease.   -CA-125 439>>23.   -2/16/23: Chest, abdomen, pelvic CT to evaluate bloating: Stable findings, no definitive evidence of disease.    -5/25/23: Admitted  to [facility] for management of malignant ascites s/p therapeutic paracentesis, and partial SBO resolved with conservative management.   5/25/23: Abdomen, pelvic CT: Progressive disease.      6/13/23,  7/12/23, 8/14/23, 9/13/23, 10/11/23, 11/8/23, 12/6/23, 1/3/24, 2/1/24, 2/29/24, 3/28/24: Cycles 1-11 of second-line chemotherapy with IV carboplatin AUC 5 + pegylated liposomal doxorubicin (PLD) 30 mg/m2 every 28 days.   -9/27/23: Chest, abdomen, pelvic CT: Partial response. Catheter-associated thrombus.   -1/23/24: Chest, abdomen, pelvic CT: Stable disease.   -4/10/24: Chest, abdomen, pelvic CT: Stable disease.   -CA-125 78>>44.   5/10/24-Present: Second- PARPi therapy with olaparib.   -Cycles 1-2: Olaparib 300 mg BID.   -Cycles 3+: Olaparib 200 mg BID with dose-reduction due to decreased creatinine clearance.   -7/5/24: Chest, abdomen, pelvic CT: Stable disease.   -10/15/24: Chest, abdomen, pelvic CT: Stable disease.   -1/10/25: Chest, abdomen, pelvic CT: Progressive disease.   -CA-125 38>>110.     2/6/25: Third-line therapy with IV carboplatin AUC 5 + paclitaxel 135 mg/m2 + bevacizumab 15 mg/kg.   -2/11/25-3/2/25: Complicated by bevacizumab-induced microperforation of the proximal small bowel. Managed with drain placement. Subsequent SBO attributed to post-perforation inflammation. Palliative venting G-tube placed, TPN initiated.          Past Medical History:     Past Medical History:   Diagnosis Date    Female stress incontinence     Ovarian cancer, right (H) 06/16/2022    Stage IIIC high-grade serous carcinoma    Recurrent cold sores             Past Surgical History:      Past Surgical History:   Procedure Laterality Date    APPENDECTOMY  08/29/2022    Part of ovarian cancer tumor debulking    BLADDER SUSPENSION  08/13/2009    ENDOSCOPIC INSERTION TUBE GASTROSTOMY N/A 2/28/2025    Procedure: INSERTION, PEG TUBE (venting);  Surgeon: Porfirio Saunders MD;  Location: [facility] OR    HYSTERECTOMY  08/13/2009    For prolapse    IR FOLLOW UP VISIT INPATIENT  2/25/2025    IR PARACENTESIS  6/6/2022    IR PARACENTESIS  6/14/2022    IR PARACENTESIS  5/26/2023    IR PARACENTESIS  2/26/2025    IR  RETROPERITONEAL ABSCESS DRAINAGE  2025    LAPAROSCOPY DIAGNOSTIC (GYN)  2022    SALPINGO-OOPHORECTOMY, COMBINED Bilateral 2022    Pelvic exam under anesthesia, diagnostic laparoscopy, conversion to laparotomy for optimal interval tumor debulking to no gross residual disease including peritoneal and diaphragm biopsies, bilateral salpingo-oophorectomy, infragastric omentectomy, bilateral hemidiaphragm stripping, peritoneal and mesenteric argon beam ablation, appendectomy.    TONSILLECTOMY  1973    TUBAL LIGATION Bilateral 1998            Social History:     Social History     Tobacco Use    Smoking status: Former     Current packs/day: 0.00     Average packs/day: 1 pack/day for 42.0 years (42.0 ttl pk-yrs)     Types: Cigarettes     Start date: 1980     Quit date: 2022     Years since quittin.9    Smokeless tobacco: Never   Substance Use Topics    Alcohol use: Yes     Comment: Beer ~Q3 months.            Family History:     Family History   Problem Relation Age of Onset    Prostate Cancer Father     Breast Cancer Sister 48    Melanoma Sister     Skin Cancer Sister     Colon Cancer Maternal Grandmother             Allergies:   No Known Allergies         Medications:     No current facility-administered medications for this encounter.     Current Outpatient Medications   Medication Sig Dispense Refill    acetaminophen (TYLENOL) 500 MG tablet Take 500 mg by mouth every 6 hours as needed for mild pain.      cyclobenzaprine (FLEXERIL) 5 MG tablet Take 1-2 tablets (5-10 mg) by mouth 3 times daily as needed for muscle spasms. 40 tablet 1    diphenhydrAMINE-acetaminophen (TYLENOL PM)  MG tablet Take 1 tablet by mouth nightly as needed for sleep.      docusate sodium (DSS) 100 MG capsule Take 100 mg by mouth 2 times daily as needed for constipation.      Emergency Supply Kit, Central, Patient use for emergency only. Contents: 3 sodium chloride 0.9% flushes, 1 dressing kit, 1 microclave  "ext set 14\", 4 nitrile gloves (med), 6 alcohol prep pads, 1 bacitracin, 1 syringe (10 cc 20 G 1\"). Call [phone] to reorder. [id number] kit 0    escitalopram (LEXAPRO) 20 MG tablet Take 20 mg by mouth daily.      famotidine (PEPCID) 20 MG tablet Take 20 mg by mouth 2 times daily as needed.      hydrOXYzine HCl (ATARAX) 25 MG tablet Take 1-2 tablets (25-50 mg) by mouth every 6 hours as needed for anxiety. 30 tablet 0    Lidocaine (LIDOCARE) 4 % Patch Place 1 patch over 12 hours onto the skin daily as needed for moderate pain. To prevent lidocaine toxicity, patient should be patch free for 12 hrs daily. 6 patch 0    lidocaine-prilocaine (EMLA) 2.5-2.5 % external cream Apply topically as needed for moderate pain 30 g 1    LORazepam (ATIVAN) 0.5 MG tablet Take 0.5-1 mg by mouth every 6 hours as needed for anxiety.      Multiple Vitamin (INFUVITE ADULT) injection Add to infusion 10 mLs daily. Select 2 multivitamin vials, one of each color top. Draw up 5 mL from each vial and add to 1 TPN bag daily immediately prior to infusing.   Discard remainder of vials. 3600 mL 0    Multiple Vitamin (MULTI-VITAMIN DAILY PO) Take 1 tablet by mouth daily      omeprazole (PRILOSEC) 20 MG DR capsule Take 20 mg by mouth daily.      ondansetron (ZOFRAN) 8 MG tablet Take 1 tablet (8 mg) by mouth every 8 hours as needed for nausea (vomiting). Do not take for 3 days after chemotherapy. 30 tablet 2    parenteral nutrition - DUALCHAMBER - see order for formula Infuse 1,600 mLs over 12 hours into the vein (central line) daily. Taper up for 1 Hours. Taper down for 1 Hours.   TPN additives to be added prior to administration:   Infuvite-Adult 10 mL daily.  Plateau rate:145.5 mL/hr.  KVO: 5 mL/hr. 798276 mL 0    Port Access Kit For nurse use only.  Do not remove items from bag.  Use for port access.  Do not place syringe on sterile field. [id number] kit 0    prochlorperazine (COMPAZINE) 10 MG tablet Take 1 tablet (10 mg) by mouth every 6 hours as " "needed for nausea or vomiting. 30 tablet 2    sodium chloride 0.9 % 500 mL via elastomeric pump Infuse 1,000 mLs into the vein daily as needed (dehydration). Infuse 1- 2 elastomeric pump(s) ( 500-1000 ml) . Each elastomeric to infuse over 2 hours. 827758 mL 0    sodium chloride, PF, 0.9% PF flush Inject 10 mLs into the vein as needed for line flush. Flush IV before and after med administration as directed and/or at least every 24 hours, or prior to deaccessing for no further use and/or at least every 4 weeks when not accessed. 435240 mL 0    valACYclovir (VALTREX) 1000 mg tablet Take 1,000 mg by mouth 2 times daily as needed.              Review of Systems:     ROS negative other than what is listed in above HPI.          Physical Exam:     Vitals:    04/24/25 1142 04/24/25 1943   BP: 125/85 (!) 141/73   Pulse: 76    Resp: 16    Temp: 97.9  F (36.6  C) 97.8  F (36.6  C)   TempSrc: Oral Oral   SpO2: 100% 100%   Weight: 60.3 kg (133 lb)    Height: 1.575 m (5' 2\")      Constitutional: No acute distress  HEENT: Normal appearance.    Cardiovascular: Regular rate and rhythm without murmurs, clicks, gallops or rub  Respiratory: Clear to auscultation bilaterally without crackles or wheeze  Gastrointestinal: Abdomen soft, very mild diffuse tenderness. BS present but decreased. G-tube site without surrounding erythema or induration. G-tube hooked to bag and draining bilious liquid  Neuro: Alert and oriented   Extremities: Soft, non-edematous, non-tender   Skin: No suspicious lesions or rashes  Psychiatric: mentation appears normal and affect normal/bright  Lines: Port, PIV, G-tube          Data:     Results for orders placed or performed during the hospital encounter of 04/24/25 (from the past 24 hours)   CBC with platelets differential    Narrative    The following orders were created for panel order CBC with platelets differential.  Procedure                               Abnormality         Status                   "   ---------                               -----------         ------                     CBC with platelets and ...[[id number]]  Abnormal            Final result                 Please view results for these tests on the individual orders.   Lactic acid whole blood   Result Value Ref Range    Lactic Acid 0.7 0.7 - 2.0 mmol/L   CBC with platelets and differential   Result Value Ref Range    WBC Count 6.5 4.0 - 11.0 10e3/uL    RBC Count 2.90 (L) 3.80 - 5.20 10e6/uL    Hemoglobin 8.3 (L) 11.7 - 15.7 g/dL    Hematocrit 26.8 (L) 35.0 - 47.0 %    MCV 92 78 - 100 fL    MCH 28.6 26.5 - 33.0 pg    MCHC 31.0 (L) 31.5 - 36.5 g/dL    RDW 14.9 10.0 - 15.0 %    Platelet Count 125 (L) 150 - 450 10e3/uL    % Neutrophils 69 %    % Lymphocytes 18 %    % Monocytes 10 %    % Eosinophils 1 %    % Basophils 1 %    % Immature Granulocytes 2 %    NRBCs per 100 WBC 0 <1 /100    Absolute Neutrophils 4.5 1.6 - 8.3 10e3/uL    Absolute Lymphocytes 1.2 0.8 - 5.3 10e3/uL    Absolute Monocytes 0.6 0.0 - 1.3 10e3/uL    Absolute Eosinophils 0.1 0.0 - 0.7 10e3/uL    Absolute Basophils 0.0 0.0 - 0.2 10e3/uL    Absolute Immature Granulocytes 0.1 <=0.4 10e3/uL    Absolute NRBCs 0.0 10e3/uL   UA with Microscopic reflex to Culture    Specimen: Urine, Rodriguez Catheter   Result Value Ref Range    Color Urine Light Yellow Colorless, Straw, Light Yellow, Yellow    Appearance Urine Clear Clear    Glucose Urine Negative Negative mg/dL    Bilirubin Urine Negative Negative    Ketones Urine Negative Negative mg/dL    Specific Gravity Urine 1.019 1.003 - 1.035    Blood Urine Negative Negative    pH Urine 5.5 5.0 - 7.0    Protein Albumin Urine Negative Negative mg/dL    Urobilinogen Urine Normal Normal mg/dL    Nitrite Urine Negative Negative    Leukocyte Esterase Urine Negative Negative    Mucus Urine Present (A) None Seen /LPF    RBC Urine 0 <=2 /HPF    WBC Urine 0 <=5 /HPF    Squamous Epithelials Urine <1 <=1 /HPF    Narrative    Urine Culture not indicated    Comprehensive metabolic panel   Result Value Ref Range    Sodium 140 135 - 145 mmol/L    Potassium 3.9 3.4 - 5.3 mmol/L    Carbon Dioxide (CO2) 22 22 - 29 mmol/L    Anion Gap 11 7 - 15 mmol/L    Urea Nitrogen 25.5 (H) 8.0 - 23.0 mg/dL    Creatinine 0.61 0.51 - 0.95 mg/dL    GFR Estimate >90 >60 mL/min/1.73m2    Calcium 8.6 (L) 8.8 - 10.4 mg/dL    Chloride 107 98 - 107 mmol/L    Glucose 103 (H) 70 - 99 mg/dL    Alkaline Phosphatase 151 (H) 40 - 150 U/L    AST 25 0 - 45 U/L    ALT 18 0 - 50 U/L    Protein Total 6.3 (L) 6.4 - 8.3 g/dL    Albumin 3.2 (L) 3.5 - 5.2 g/dL    Bilirubin Total 0.2 <=1.2 mg/dL   Lipase   Result Value Ref Range    Lipase 33 13 - 60 U/L   CT Abdomen Pelvis w Contrast    Narrative    Examination: CT ABDOMEN PELVIS W CONTRAST, 4/24/2025 5:40 PM    Technique:  Helical CT images from the lung bases through the  symphysis pubis were obtained with contrast.  Coronal reformatted  images were generated at a workstation for further assessment.    Contrast:  81 mL Isovue 370    Comparison: CT 4/3/2025, 2/21/2025, 10/15/2024    History: History of ovarian cancer, G-tube, history of prior bowel  obstruction.  Several weeks of increasing upper abdominal pain and  distention, around G-tube site evaluate G-tube placement, progression  of malignancy, bowel obstruction other etiology    Findings:    Abdomen and pelvis:   Liver: Intrahepatic parenchymal metastases are stable compared to  recent prior but have decreased in size when compared to 1/10/2025 CT  scan, for example lesion in segment 2 measuring 0.8 cm previously  measured 1.1 cm (4/36); lesion in segment 4 measuring 0.5 cm (4/63)  previously measured 0.9 cm.  Peritoneal metastases along the hepatic surface have also decreased in  size for example lesion anterior to the left lobe measuring 1.7 x 1.3  cm (4/37) previously measured 3 x 2.8 cm.  Gallbladder: Distended. No gallstones. No evidence of acute  cholecystitis.  Spleen: Normal size.  Pancreas:  Mild fatty atrophy of the pancreatic parenchyma. No  suspicious pancreatic lesions. The pancreatic duct is not dilated.  Adrenal glands: No adrenal nodules.  Urinary system: Punctate nonobstructing stone in the right kidney. No  suspicious renal lesions. Similar mild right hydronephrosis. Mild left  hydronephrosis. No obstructing renal stones. Urinary bladder is  unremarkable    Reproductive organs/pelvis: Hysterectomy. Continued decreased size of  the encapsulated multilobulated fluid collection anterior to the  urinary bladder. Additional encapsulated air-fluid collection that  resides within the rectouterine space extending up along the posterior  abdomen into the left upper quadrant has slightly decreased in size  compared to prior, though measurements are difficult due to the  configuration of this fluid collection.     Bowel: G-tube balloon is placed within the gastric antrum. Multiple  fluid air filled loops of prominent/mildly enlarged small bowel are  scattered throughout the abdomen and pelvis with areas of decompressed  small bowel intertwined throughout. No transition point identified.  Colonic diverticulosis without evidence of acute diverticulitis.   Lymph nodes: No retroperitoneal, mesenteric, or pelvic  lymphadenopathy.  Fluid: Persistent ascites in the upper abdomen. Slightly decreased  presacral fluid/soft tissue density.   Vessels: No infrarenal aortic aneurysm. The portal, superior  mesenteric, and splenic veins are patent. Mild atherosclerosis of the  abdominal aorta and iliac arteries.    Lung bases: No consolidation or pleural effusion.    Bones and soft tissues: No suspicious osseous lesions. Multilevel  degenerative changes of the visualized spine with endplate  degenerative changes at L4-5 and levocurvature of the lumbar spine.  Anasarca.       Impression    Impression:   1.  Continued decreased size of the large encapsulated air-fluid  collection that extends from the rectouterine space  through the left  upper quadrant though measurements are difficult given the  configuration of this fluid collection. Additionally there is  decreased size of the encapsulated multilobulated fluid collection  anterior to the urinary bladder.  2.  Stable hepatic metastases and perihepatic peritoneal implants.   3.  Mild left hydronephrosis, new compared to prior. Persistent mild  right hydronephrosis.  4.  Multiple fluid air filled loops of prominent/mildly enlarged small  bowel are scattered throughout the abdomen and pelvis with areas of  intervening decompressed bowel. No transition point identified.   5.  Small amount of ascites present in the upper abdomen is unchanged.  6.  Slightly decreased presacral fluid/soft tissue density compared to  4/3/2025.  7.  Punctate nonobstructing stone in the right kidney.    I have personally reviewed the examination and initial interpretation  and I agree with the findings.    HERRERA CARROLL MD         SYSTEM ID:  [id number]       Imaging  CT AP:   1.  Continued decreased size of the large encapsulated air-fluid  collection that extends from the rectouterine space through the left  upper quadrant though measurements are difficult given the  configuration of this fluid collection. Additionally there is  decreased size of the encapsulated multilobulated fluid collection  anterior to the urinary bladder.  2.  Stable hepatic metastases and perihepatic peritoneal implants.   3.  Mild left hydronephrosis, new compared to prior. Persistent mild  right hydronephrosis.  4.  Multiple fluid air filled loops of prominent/mildly enlarged small  bowel are scattered throughout the abdomen and pelvis with areas of  intervening decompressed bowel. No transition point identified.   5.  Small amount of ascites present in the upper abdomen is unchanged.  6.  Slightly decreased presacral fluid/soft tissue density compared to  4/3/2025.  7.  Punctate nonobstructing stone in the right kidney.           Assessment and Plan:   Assessment: This patient is a 60 year old female with a history of HGSOC who presents for evaluation of abdominal pain and abdominal distension. Recent history (February) of small bowel microperforation in the setting of Bevacizumab now w/ palliative g-tube in place.     #Abdominal pain   #Distension   #Suspected SBO   Intermittent distension and abdominal pain over the past few weeks, worse today. Has been progressively decreasing the amount of time she vents her g-tube and advancing her diet. Other than briefly yesterday, has had her G-tube clamped for the past week despite distension and pain. Vital signs normal, laboratory workup unremarkable. CT with distended loops of small bowel suspicious for small bowel obstruction. Encapsulated fluid collection also noted but stable or decreased from previous imaging. Exam notable for distension. G-tube unclamped in the ED with mild improvement and G-tube appears to be working appropriately. Recommended admission overnight for observation. Patient declines and strongly desires discharge home. Has oxycodone at home which does help with the pain.   - Recommended NPO with g-tube unclamped until she begins to pass flatus again. Continue home TPN.   - When passing flatus, okay to slowly advance diet but recommend patient continue to keep the G-tube unclamped until she has full return of bowel function.      #HGSOC   Currently receiving treatment with Carbo/Taxol, last 4/9.   - Has appointment on 4/28    Disposition: Patient desires discharge home. Strict return precautions discussed including fevers, chills, persistent nausea/vomiting despite having G-tube unclamped, severe abdominal pain.      Tierra Fry MD, MD, MPH   Gyn Onc, PGY4  Team Pager: [phone]                personally performed the services documented here and the documentation accurately represents those services and the decisions I have made.     Electronically signed by:   Karie Sun MD   Gynecologic Oncology   [facility]

## 2025-04-25 NOTE — PROGRESS NOTES
Virtual Visit Details    Type of service:  Video Visit     Originating Location (pt. 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  She presents today for follow up and disease management by video.      Oncology History:  22: Presented to an ED in [place] for evaluation of abdominal bloating and discomfort.  -Abdomen, pelvic CT: Radiographic Stage CAL ovarian cancer.   22: [id number].   22: Pelvic ultrasound: c/w metastatic cancer.  22: Pelvic exam under anesthesia, diagnostic laparoscopy, biopsies, evacuation of ascites.   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      22, 22, 22: Cycles 1-3 of neoadjuvant chemotherapy with IV carboplatin + paclitaxel 175 mg/m2 every 21 days.   -Cycles 1,2: Carboplatin AUC 6.   -Cycle 3: Carboplatin AUC 5 with dose-reduction due to thrombcytopenia.   -22: Chest, abdomen, pelvic CT: Partial response.   22: Pelvic exam under anesthesia, diagnostic laparoscopy, conversion to laparotomy for optimal interval tumor debulking to no gross residual disease including peritoneal and diaphragm biopsies, bilateral salpingo-oophorectomy, infragastric omentectomy, bilateral hemidiaphragm stripping, peritoneal and mesenteric argon beam ablation, appendectomy.   -Pathology: High-grade serous carcinoma involving all resected specimens.   Caris Testing Results.  -Genomic ROXI low (6%)   -TMB low (1mut/Mb)  -Microsatellite stable/Mismatch Repair proficient  -PD-L1- (CPS 0%)  -NTRK 1/2/3  fusion not detected.   -ER-, SD+  -Genomic alterations in:  --TP53  -No genomic alterations in BRCA1,2.  -FOLR1+ (2+, 75%).      Jamila-ovary clinical trial screening: Negative for the immunoreactive subtype.     9/28/22, 10/19/22, 11/17/22: Cycles 4-6 of first-line chemotherapy with IV carboplatin AUC 5 + paclitaxel 175 mg/m2.  -12/2/22: Chest, abdomen, pelvic CT: No definitive evidence of disease.   -CA-125 439>>23.   -2/16/23: Chest, abdomen, pelvic CT to evaluate bloating: Stable findings, no definitive evidence of disease.    -5/25/23: Admitted  to [facility] for management of malignant ascites s/p therapeutic paracentesis, and partial SBO resolved with conservative management.   5/25/23: Abdomen, pelvic CT: Progressive disease.      10/5/22: [facility], reflexed to Common Hereditary Cancer Panel.   -No identifiable germline variants identified in ABRAXAS1, AKT1, APC, DEION, AXIN2, BARD1, BMPR1A, BRCA1, BRCA2, BRIP1, CDC73, CDH1, CDK4, CDKN2A, CHEK2, CTNNA1, DICER1, EPCAM, FANCC, FANCM, GREM1, HOXB13, KIT, MEN1, MLH1, MRE11, MSH2, MSH6, MUTYH, NBN, NF1, NTHL1, PALB2, PDGFRA, PIK3CA, PMS2, POLD1, POLE, PTEN, RAD50, RAD51C, RAD51D, RECQL, RINT1, SDHA, SDHB, SDHC, SDHD, SMAD4, SMARCA4, STK11, TP53, TSC1, TSC2, VHL, XRCC2.   -Variant of uncertain significance in MSH3 c.16C>T ([id number])     6/13/23, 7/12/23, 8/14/23, 9/13/23, 10/11/23, 11/8/23, 12/6/23, 1/3/24, 2/1/24, 2/29/24, 3/28/24: Cycles 1-11 of second-line chemotherapy with IV carboplatin AUC 5 + pegylated liposomal doxorubicin (PLD) 30 mg/m2 every 28 days.   -9/27/23: Chest, abdomen, pelvic CT: Partial response. Catheter-associated thrombus.   -1/23/24: Chest, abdomen, pelvic CT: Stable disease.   -4/10/24: Chest, abdomen, pelvic CT: Stable disease.   -CA-125 78>>44.   5/10/24-1/24/25: Second- PARPi therapy with olaparib.   -Cycles 1-2: Olaparib 300 mg BID.   -Cycles 3+: Olaparib 200 mg BID with dose-reduction due to decreased  creatinine clearance.   -7/5/24: Chest, abdomen, pelvic CT: Stable disease.   -10/15/24: Chest, abdomen, pelvic CT: Stable disease.   -CA-125 38>>110.     2/6/25: Third-line therapy with IV carboplatin AUC 5 + paclitaxel 135 mg/m2 + bevacizumab 15 mg/kg.  340.  -2/11/25-3/2/25: Complicated by bevacizumab-induced microperforation of the proximal small bowel. Managed with drain placement. Subsequent SBO attributed to post-perforation inflammation. Palliative venting G-tube placed, TPN initiated.      Plan: Continue third-line therapy with IV carboplatin AUC 5 + paclitaxel 135 mg/m2 +every 3 weeks with permanent discontinuation of bevacizumab.      3/19/25 Cycle 2 carboplatin and paclitaxel.   235.    4/3/25: CT CAP  IMPRESSION:  1. Decreased subcapsular fluid the liver with other hepatic  hypodensities unchanged.  2. From February and January, resolution of pleural effusions decrease  in the abdominal fluid with removal of drain catheter but the rim  enhancing fluid now has internal air and is concerning for possible  infection/abscess. This would be as opposed to just the  instrumentation from the catheter is multiple locules of air present  and rim enhancement which was not as apparent on prior CT scans.  3. Unchanged small pulmonary nodules.  4. Increased mild right hydronephrosis without obstructive uropathy  evident.  5. Atherosclerosis.   6. Nondistended bladder with wall thickening. Surgical changes in the  pelvis.  7. New presacral soft tissue/fluid indeterminate density.  8. Lumbar levocurvature with prominent lower lumbar degenerative disc  disease    4/9/25: Cycle 3 carboplatin and paclitaxel.  Hgb 7.3, received 1u PRBC.   103.    4/24/25: ED for abdominal pain, distention, no flatus.  Workup consistent with SBO.  Gyn onc team consulted who recommended admission but she declined.  Discharged with recommendation to keep GT to gravity and decrease diet to NPO.  CT AP  Impression:   1.   "Continued decreased size of the large encapsulated air-fluid  collection that extends from the rectouterine space through the left  upper quadrant though measurements are difficult given the  configuration of this fluid collection. Additionally there is  decreased size of the encapsulated multilobulated fluid collection  anterior to the urinary bladder.  2.  Stable hepatic metastases and perihepatic peritoneal implants.   3.  Mild left hydronephrosis, new compared to prior. Persistent mild  right hydronephrosis.  4.  Multiple fluid air filled loops of prominent/mildly enlarged small  bowel are scattered throughout the abdomen and pelvis with areas of  intervening decompressed bowel. No transition point identified.   5.  Small amount of ascites present in the upper abdomen is unchanged.  6.  Slightly decreased presacral fluid/soft tissue density compared to  4/3/2025.  7.  Punctate nonobstructing stone in the right kidney.    4/30/25: Cycle 4 carboplatin and paclitaxel planned.   97.              Today she reports feeling well.  Much better then last week when she was in the ED. She is feeling of her treatment this week.  She had a small bowel movement yesterday and has been able to clamp her G-tube for short periods today.  Ate some yogurt and multimineral today.  Abdomen still \"feels big\" but much better than when she was in the ED and now her abdomen is soft.  She felt her energy was \"so much better\" after her blood transfusion with her last cycle.  No neuropathy symptoms.  She does have some generalized swelling in the morning after her TPN/fluids but this improves throughout the day.  No fever/chills.  No chest pain.  She does have some DANIELLE with stairs or long walks.  No SOB at rest.                Past Medical History:    Past Medical History:   Diagnosis Date    Female stress incontinence     Ovarian cancer, right (H) 06/16/2022    Stage IIIC high-grade serous carcinoma    Recurrent cold sores  "         Past Surgical History:    Past Surgical History:   Procedure Laterality Date    APPENDECTOMY  08/29/2022    Part of ovarian cancer tumor debulking    BLADDER SUSPENSION  08/13/2009    ENDOSCOPIC INSERTION TUBE GASTROSTOMY N/A 2/28/2025    Procedure: INSERTION, PEG TUBE (venting);  Surgeon: Porfirio Saunders MD;  Location: [facility] OR    HYSTERECTOMY  08/13/2009    For prolapse    IR FOLLOW UP VISIT INPATIENT  2/25/2025    IR PARACENTESIS  6/6/2022    IR PARACENTESIS  6/14/2022    IR PARACENTESIS  5/26/2023    IR PARACENTESIS  2/26/2025    IR RETROPERITONEAL ABSCESS DRAINAGE  2/18/2025    LAPAROSCOPY DIAGNOSTIC (GYN)  06/16/2022    SALPINGO-OOPHORECTOMY, COMBINED Bilateral 08/29/2022    Pelvic exam under anesthesia, diagnostic laparoscopy, conversion to laparotomy for optimal interval tumor debulking to no gross residual disease including peritoneal and diaphragm biopsies, bilateral salpingo-oophorectomy, infragastric omentectomy, bilateral hemidiaphragm stripping, peritoneal and mesenteric argon beam ablation, appendectomy.    TONSILLECTOMY  1973    TUBAL LIGATION Bilateral 09/01/1998         Health Maintenance Due   Topic Date Due    ADVANCE CARE PLANNING  Never done    YEARLY PREVENTIVE VISIT  Never done    COLORECTAL CANCER SCREENING  Never done    HIV SCREENING  Never done    HEPATITIS C SCREENING  Never done    Pneumococcal Vaccine: 50+ Years (1 of 2 - PCV) Never done    ZOSTER IMMUNIZATION (1 of 2) Never done    PAP  Never done    LIPID  Never done    INFLUENZA VACCINE (1) 09/01/2024    COVID-19 Vaccine (5 - 2024-25 season) 09/01/2024    RSV VACCINE (1 - Risk 60-74 years 1-dose series) Never done       Current Medications:     Current Outpatient Medications   Medication Sig Dispense Refill    acetaminophen (TYLENOL) 500 MG tablet Take 500 mg by mouth every 6 hours as needed for mild pain.      cyclobenzaprine (FLEXERIL) 5 MG tablet Take 1-2 tablets (5-10 mg) by mouth 3 times daily as needed for muscle  "spasms. 40 tablet 1    diphenhydrAMINE-acetaminophen (TYLENOL PM)  MG tablet Take 1 tablet by mouth nightly as needed for sleep.      docusate sodium (DSS) 100 MG capsule Take 100 mg by mouth 2 times daily as needed for constipation.      Emergency Supply Kit, Central, Patient use for emergency only. Contents: 3 sodium chloride 0.9% flushes, 1 dressing kit, 1 microclave ext set 14\", 4 nitrile gloves (med), 6 alcohol prep pads, 1 bacitracin, 1 syringe (10 cc 20 G 1\"). Call [phone] to reorder. [id number] kit 0    escitalopram (LEXAPRO) 20 MG tablet Take 20 mg by mouth daily.      famotidine (PEPCID) 20 MG tablet Take 20 mg by mouth 2 times daily as needed.      hydrOXYzine HCl (ATARAX) 25 MG tablet Take 1-2 tablets (25-50 mg) by mouth every 6 hours as needed for anxiety. 30 tablet 0    Lidocaine (LIDOCARE) 4 % Patch Place 1 patch over 12 hours onto the skin daily as needed for moderate pain. To prevent lidocaine toxicity, patient should be patch free for 12 hrs daily. 6 patch 0    lidocaine-prilocaine (EMLA) 2.5-2.5 % external cream Apply topically as needed for moderate pain 30 g 1    LORazepam (ATIVAN) 0.5 MG tablet Take 0.5-1 mg by mouth every 6 hours as needed for anxiety.      Multiple Vitamin (INFUVITE ADULT) injection Add to infusion 10 mLs daily. Select 2 multivitamin vials, one of each color top. Draw up 5 mL from each vial and add to 1 TPN bag daily immediately prior to infusing.   Discard remainder of vials. 3600 mL 0    Multiple Vitamin (MULTI-VITAMIN DAILY PO) Take 1 tablet by mouth daily      omeprazole (PRILOSEC) 20 MG DR capsule Take 20 mg by mouth daily.      ondansetron (ZOFRAN) 8 MG tablet Take 1 tablet (8 mg) by mouth every 8 hours as needed for nausea (vomiting). Do not take for 3 days after chemotherapy. 30 tablet 2    parenteral nutrition - DUALCHAMBER - see order for formula Infuse 1,600 mLs over 12 hours into the vein (central line) daily. Taper up for 1 Hours. Taper down for 1 Hours. "   TPN additives to be added prior to administration:   Infuvite-Adult 10 mL daily.  Plateau rate:145.5 mL/hr.  KVO: 5 mL/hr. 443948 mL 0    Port Access Kit For nurse use only.  Do not remove items from bag.  Use for port access.  Do not place syringe on sterile field. [id number] kit 0    prochlorperazine (COMPAZINE) 10 MG tablet Take 1 tablet (10 mg) by mouth every 6 hours as needed for nausea or vomiting. 30 tablet 2    sodium chloride 0.9 % 500 mL via elastomeric pump Infuse 1,000 mLs into the vein daily as needed (dehydration). Infuse 1- 2 elastomeric pump(s) ( 500-1000 ml) . Each elastomeric to infuse over 2 hours. 725893 mL 0    sodium chloride, PF, 0.9% PF flush Inject 10 mLs into the vein as needed for line flush. Flush IV before and after med administration as directed and/or at least every 24 hours, or prior to deaccessing for no further use and/or at least every 4 weeks when not accessed. 809778 mL 0    valACYclovir (VALTREX) 1000 mg tablet Take 1,000 mg by mouth 2 times daily as needed.           Allergies:      No Known Allergies     Social History:     Social History     Tobacco Use    Smoking status: Former     Current packs/day: 0.00     Average packs/day: 1 pack/day for 42.0 years (42.0 ttl pk-yrs)     Types: Cigarettes     Start date: 1980     Quit date: 2022     Years since quittin.9    Smokeless tobacco: Never   Substance Use Topics    Alcohol use: Yes     Comment: Beer ~Q3 months.       History   Drug Use Unknown         Family History:     The patient's family history is notable for:    Family History   Problem Relation Age of Onset    Prostate Cancer Father     Breast Cancer Sister 48    Melanoma Sister     Skin Cancer Sister     Colon Cancer Maternal Grandmother          Physical Exam:     There were no vitals taken for this visit.  There is no height or weight on file to calculate BMI.    General Appearance: alert, no distress    Eyes:  Eyes grossly normal.  No obvious discharge,  erythema, or scleral/conjunctival abnormalities.    Respiratory: No noted audible wheeze, cough, or visible cyanosis.  No visible retractions or increased work of breathing.     Skin: no lesions or rashes on visible skin     Psychiatric: appropriate mood and affect. Mentation appears normal, affect normal/bright, judgement and insight intact, normal speech and appearance well-groomed                            The rest of a comprehensive physical examination is deferred due to video visit restrictions.       Recent Results (from the past 24 hours)   Protein qualitative urine    Collection Time: 04/28/25  7:40 AM   Result Value Ref Range    Protein Albumin Urine 20 (A) Negative mg/dL   Comprehensive metabolic panel (BMP + Alb, Alk Phos, ALT, AST, Total. Bili, TP)    Collection Time: 04/28/25  7:40 AM   Result Value Ref Range    Sodium 140 135 - 145 mmol/L    Potassium 4.1 3.4 - 5.3 mmol/L    Carbon Dioxide (CO2) 25 22 - 29 mmol/L    Anion Gap 10 7 - 15 mmol/L    Urea Nitrogen 28.2 (H) 8.0 - 23.0 mg/dL    Creatinine 0.64 0.51 - 0.95 mg/dL    GFR Estimate >90 >60 mL/min/1.73m2    Calcium 9.0 8.8 - 10.4 mg/dL    Chloride 105 98 - 107 mmol/L    Glucose 114 (H) 70 - 99 mg/dL    Alkaline Phosphatase 172 (H) 40 - 150 U/L    AST 25 0 - 45 U/L    ALT 15 0 - 50 U/L    Protein Total 6.5 6.4 - 8.3 g/dL    Albumin 3.2 (L) 3.5 - 5.2 g/dL    Bilirubin Total 0.3 <=1.2 mg/dL   Magnesium    Collection Time: 04/28/25  7:40 AM   Result Value Ref Range    Magnesium 2.1 1.7 - 2.3 mg/dL   Phosphorus    Collection Time: 04/28/25  7:40 AM   Result Value Ref Range    Phosphorus 3.9 2.5 - 4.5 mg/dL   Triglycerides    Collection Time: 04/28/25  7:40 AM   Result Value Ref Range    Triglycerides 58 <150 mg/dL    Patient Fasting > 8hrs? No    Bilirubin direct    Collection Time: 04/28/25  7:40 AM   Result Value Ref Range    Bilirubin Direct 0.16 0.00 - 0.30 mg/dL       Collection Time: 04/28/25  7:40 AM   Result Value Ref Range     97  (H) <=38 U/mL   CBC with platelets and differential    Collection Time: 04/28/25  7:40 AM   Result Value Ref Range    WBC Count 9.2 4.0 - 11.0 10e3/uL    RBC Count 2.88 (L) 3.80 - 5.20 10e6/uL    Hemoglobin 8.4 (L) 11.7 - 15.7 g/dL    Hematocrit 26.5 (L) 35.0 - 47.0 %    MCV 92 78 - 100 fL    MCH 29.2 26.5 - 33.0 pg    MCHC 31.7 31.5 - 36.5 g/dL    RDW 15.8 (H) 10.0 - 15.0 %    Platelet Count 154 150 - 450 10e3/uL    % Neutrophils 77 %    % Lymphocytes 14 %    % Monocytes 8 %    % Eosinophils 0 %    % Basophils 0 %    % Immature Granulocytes 1 %    NRBCs per 100 WBC 0 <1 /100    Absolute Neutrophils 7.1 1.6 - 8.3 10e3/uL    Absolute Lymphocytes 1.2 0.8 - 5.3 10e3/uL    Absolute Monocytes 0.8 0.0 - 1.3 10e3/uL    Absolute Eosinophils 0.0 0.0 - 0.7 10e3/uL    Absolute Basophils 0.0 0.0 - 0.2 10e3/uL    Absolute Immature Granulocytes 0.1 <=0.4 10e3/uL    Absolute NRBCs 0.0 10e3/uL          Assessment:    Jacki Smith is a 60 year old woman with a diagnosis of 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  She presents today for follow up and disease management by video.    20 minutes spent on the date of the encounter doing chart review, history and exam, documentation, and further activities as noted above.      Plan:     1.)        Ovarian cancer:  OK to proceed with planned treatment Wed as labs are WDL.  RTC as scheduled for CT, follow up with Dr. Patino, and next cycle.  Reviewed signs and symptoms for when she should contact the clinic or seek additional care.  Patient to contact the clinic with any questions or concerns in the interim.      Genetic risk factors were assessed and she has a VUS No identifiable germline variants identified in ABRAXAS1, AKT1, APC, DEION, AXIN2, BARD1, BMPR1A, BRCA1, BRCA2, BRIP1, CDC73, CDH1, CDK4, CDKN2A, CHEK2, CTNNA1, DICER1, EPCAM, FANCC, FANCM, GREM1, HOXB13, KIT, MEN1, MLH1, MRE11, MSH2, MSH6, MUTYH, NBN, NF1, NTHL1, PALB2, PDGFRA, PIK3CA, PMS2, POLD1,  POLE, PTEN, RAD50, RAD51C, RAD51D, RECQL, RINT1, SDHA, SDHB, SDHC, SDHD, SMAD4, SMARCA4, STK11, TP53, TSC1, TSC2, VHL, XRCC2.     Caris Testing Results.  -Genomic ROXI low (6%)   -TMB low (1mut/Mb)  -Microsatellite stable/Mismatch Repair proficient  -PD-L1- (CPS 0%)  -NTRK 1/2/3 fusion not detected.   -ER-, MA+  -Genomic alterations in:  --TP53  -No genomic alterations in BRCA1,2.  -FOLR1+ (2+, 75%).        Labs and/or tests reviewed include:  CBC. CMP. Bili. Mag. Phos. Triglycerides. .     2.)        Health maintenance:  Issues addressed today include following up with PCP for annual health maintenance and non-gynecologic issues.      3.)        SBO: Recurrent SBO symptoms brought her back to the ED last week.  She was able to do bowel rest at home and leave her GT to gravity for several days with improvement.  Today she was able to clamp her GT for short periods and tolerated small amount of Malt o meal and yogurt. Small BM yesterday.    -Continue venting G-tube as needed, extending for longer periods of clamping as tolerated.  -Continue TPN until able to tolerate enough oral intake.       This note was created in part by the use of Dragon voice recognition dictation system. Inadvertent grammatical errors and typographical errors may exist.      Echo Sanz, DNP, APRN, FNP-C, AOCNP  Oncology Nurse Practitioner  Division of Gynecologic Oncology  Pager: [phone]     CC  Patient Care Team:  Domenica Christianson MD as PCP - General  Charis Patino MD as MD (Gynecologic Oncology)  Anish Monroy MD as Assigned Palliative Care Provider  Pratima Vega RN as Specialty Care Coordinator (Hematology & Oncology)  Myhre, Grace C, [facility] as Pharmacist  Charis Patino MD as Assigned Cancer Care Provider  Charis Patino MD as Home Infusion Following Provider (Gynecologic Oncology)  Clarisse Wells RD as [facility] Registered Dietitian (Dietitian, Registered)  SELF, REFERRED

## 2025-04-26 PROCEDURE — B4178 PARENTERAL SOL AMINO ACID >: HCPCS

## 2025-04-26 PROCEDURE — S9366 HIT TPN 2 LITER DIEM: HCPCS

## 2025-04-26 PROCEDURE — B4185 PARENTERAL SOL 10 GM LIPIDS: HCPCS

## 2025-04-26 PROCEDURE — B9004 PARENTERAL INFUS PUMP PORTAB: HCPCS | Mod: RR

## 2025-04-26 PROCEDURE — S9374 HIT HYDRA 1 LITER DIEM: HCPCS | Mod: SH

## 2025-04-27 PROCEDURE — B4178 PARENTERAL SOL AMINO ACID >: HCPCS

## 2025-04-27 PROCEDURE — S9374 HIT HYDRA 1 LITER DIEM: HCPCS | Mod: SH

## 2025-04-27 PROCEDURE — A4217 STERILE WATER/SALINE, 500 ML: HCPCS

## 2025-04-27 PROCEDURE — S9366 HIT TPN 2 LITER DIEM: HCPCS

## 2025-04-27 PROCEDURE — B9004 PARENTERAL INFUS PUMP PORTAB: HCPCS | Mod: RR

## 2025-04-27 PROCEDURE — B4185 PARENTERAL SOL 10 GM LIPIDS: HCPCS

## 2025-04-28 ENCOUNTER — INFUSION THERAPY VISIT (OUTPATIENT)
Dept: INFUSION THERAPY | Facility: HOSPITAL | Age: 60
End: 2025-04-28
Attending: OBSTETRICS & GYNECOLOGY
Payer: COMMERCIAL

## 2025-04-28 ENCOUNTER — VIRTUAL VISIT (OUTPATIENT)
Dept: ONCOLOGY | Facility: CLINIC | Age: 60
End: 2025-04-28
Attending: OBSTETRICS & GYNECOLOGY
Payer: COMMERCIAL

## 2025-04-28 DIAGNOSIS — Z51.11 ENCOUNTER FOR ANTINEOPLASTIC CHEMOTHERAPY: ICD-10-CM

## 2025-04-28 DIAGNOSIS — Z78.9 ON TOTAL PARENTERAL NUTRITION (TPN): ICD-10-CM

## 2025-04-28 DIAGNOSIS — C56.1 OVARIAN CANCER, RIGHT (H): Primary | ICD-10-CM

## 2025-04-28 LAB
ALBUMIN SERPL BCG-MCNC: 3.2 G/DL (ref 3.5–5.2)
ALBUMIN UR-MCNC: 20 MG/DL
ALP SERPL-CCNC: 172 U/L (ref 40–150)
ALT SERPL W P-5'-P-CCNC: 15 U/L (ref 0–50)
ANION GAP SERPL CALCULATED.3IONS-SCNC: 10 MMOL/L (ref 7–15)
AST SERPL W P-5'-P-CCNC: 25 U/L (ref 0–45)
BASOPHILS # BLD AUTO: 0 10E3/UL (ref 0–0.2)
BASOPHILS NFR BLD AUTO: 0 %
BILIRUB DIRECT SERPL-MCNC: 0.16 MG/DL (ref 0–0.3)
BILIRUB SERPL-MCNC: 0.3 MG/DL
BUN SERPL-MCNC: 28.2 MG/DL (ref 8–23)
CALCIUM SERPL-MCNC: 9 MG/DL (ref 8.8–10.4)
CANCER AG125 SERPL-ACNC: 97 U/ML
CHLORIDE SERPL-SCNC: 105 MMOL/L (ref 98–107)
CREAT SERPL-MCNC: 0.64 MG/DL (ref 0.51–0.95)
EGFRCR SERPLBLD CKD-EPI 2021: >90 ML/MIN/1.73M2
EOSINOPHIL # BLD AUTO: 0 10E3/UL (ref 0–0.7)
EOSINOPHIL NFR BLD AUTO: 0 %
ERYTHROCYTE [DISTWIDTH] IN BLOOD BY AUTOMATED COUNT: 15.8 % (ref 10–15)
FASTING STATUS PATIENT QL REPORTED: NO
GLUCOSE SERPL-MCNC: 114 MG/DL (ref 70–99)
HCO3 SERPL-SCNC: 25 MMOL/L (ref 22–29)
HCT VFR BLD AUTO: 26.5 % (ref 35–47)
HGB BLD-MCNC: 8.4 G/DL (ref 11.7–15.7)
IMM GRANULOCYTES # BLD: 0.1 10E3/UL
IMM GRANULOCYTES NFR BLD: 1 %
LYMPHOCYTES # BLD AUTO: 1.2 10E3/UL (ref 0.8–5.3)
LYMPHOCYTES NFR BLD AUTO: 14 %
MAGNESIUM SERPL-MCNC: 2.1 MG/DL (ref 1.7–2.3)
MCH RBC QN AUTO: 29.2 PG (ref 26.5–33)
MCHC RBC AUTO-ENTMCNC: 31.7 G/DL (ref 31.5–36.5)
MCV RBC AUTO: 92 FL (ref 78–100)
MONOCYTES # BLD AUTO: 0.8 10E3/UL (ref 0–1.3)
MONOCYTES NFR BLD AUTO: 8 %
NEUTROPHILS # BLD AUTO: 7.1 10E3/UL (ref 1.6–8.3)
NEUTROPHILS NFR BLD AUTO: 77 %
NRBC # BLD AUTO: 0 10E3/UL
NRBC BLD AUTO-RTO: 0 /100
PHOSPHATE SERPL-MCNC: 3.9 MG/DL (ref 2.5–4.5)
PLATELET # BLD AUTO: 154 10E3/UL (ref 150–450)
POTASSIUM SERPL-SCNC: 4.1 MMOL/L (ref 3.4–5.3)
PROT SERPL-MCNC: 6.5 G/DL (ref 6.4–8.3)
RBC # BLD AUTO: 2.88 10E6/UL (ref 3.8–5.2)
SODIUM SERPL-SCNC: 140 MMOL/L (ref 135–145)
TRIGL SERPL-MCNC: 58 MG/DL
WBC # BLD AUTO: 9.2 10E3/UL (ref 4–11)

## 2025-04-28 PROCEDURE — 85025 COMPLETE CBC W/AUTO DIFF WBC: CPT

## 2025-04-28 PROCEDURE — B4178 PARENTERAL SOL AMINO ACID >: HCPCS

## 2025-04-28 PROCEDURE — 84100 ASSAY OF PHOSPHORUS: CPT

## 2025-04-28 PROCEDURE — 1126F AMNT PAIN NOTED NONE PRSNT: CPT | Performed by: NURSE PRACTITIONER

## 2025-04-28 PROCEDURE — 84155 ASSAY OF PROTEIN SERUM: CPT

## 2025-04-28 PROCEDURE — 86304 IMMUNOASSAY TUMOR CA 125: CPT

## 2025-04-28 PROCEDURE — 98006 SYNCH AUDIO-VIDEO EST MOD 30: CPT | Performed by: NURSE PRACTITIONER

## 2025-04-28 PROCEDURE — 84478 ASSAY OF TRIGLYCERIDES: CPT

## 2025-04-28 PROCEDURE — B9004 PARENTERAL INFUS PUMP PORTAB: HCPCS | Mod: RR

## 2025-04-28 PROCEDURE — 36591 DRAW BLOOD OFF VENOUS DEVICE: CPT

## 2025-04-28 PROCEDURE — S9374 HIT HYDRA 1 LITER DIEM: HCPCS | Mod: SH

## 2025-04-28 PROCEDURE — S9366 HIT TPN 2 LITER DIEM: HCPCS

## 2025-04-28 PROCEDURE — B4185 PARENTERAL SOL 10 GM LIPIDS: HCPCS

## 2025-04-28 PROCEDURE — 83735 ASSAY OF MAGNESIUM: CPT

## 2025-04-28 PROCEDURE — 82248 BILIRUBIN DIRECT: CPT

## 2025-04-28 PROCEDURE — 81003 URINALYSIS AUTO W/O SCOPE: CPT | Performed by: OBSTETRICS & GYNECOLOGY

## 2025-04-28 RX ORDER — DIPHENHYDRAMINE HCL 25 MG
50 CAPSULE ORAL ONCE
Status: CANCELLED
Start: 2025-04-30

## 2025-04-28 RX ORDER — PALONOSETRON 0.05 MG/ML
0.25 INJECTION, SOLUTION INTRAVENOUS ONCE
Status: CANCELLED | OUTPATIENT
Start: 2025-04-30

## 2025-04-28 RX ORDER — ALBUTEROL SULFATE 0.83 MG/ML
2.5 SOLUTION RESPIRATORY (INHALATION)
Status: CANCELLED | OUTPATIENT
Start: 2025-04-30

## 2025-04-28 RX ORDER — HEPARIN SODIUM (PORCINE) LOCK FLUSH IV SOLN 100 UNIT/ML 100 UNIT/ML
5 SOLUTION INTRAVENOUS
Status: CANCELLED | OUTPATIENT
Start: 2025-04-30

## 2025-04-28 RX ORDER — METHYLPREDNISOLONE SODIUM SUCCINATE 40 MG/ML
40 INJECTION INTRAMUSCULAR; INTRAVENOUS
Status: CANCELLED
Start: 2025-04-30

## 2025-04-28 RX ORDER — LORAZEPAM 2 MG/ML
0.5 INJECTION INTRAMUSCULAR EVERY 4 HOURS PRN
Status: CANCELLED | OUTPATIENT
Start: 2025-04-30

## 2025-04-28 RX ORDER — DIPHENHYDRAMINE HYDROCHLORIDE 50 MG/ML
25 INJECTION, SOLUTION INTRAMUSCULAR; INTRAVENOUS
Status: CANCELLED
Start: 2025-04-30

## 2025-04-28 RX ORDER — HEPARIN SODIUM,PORCINE 10 UNIT/ML
5-20 VIAL (ML) INTRAVENOUS DAILY PRN
Status: CANCELLED | OUTPATIENT
Start: 2025-04-30

## 2025-04-28 RX ORDER — EPINEPHRINE 1 MG/ML
0.3 INJECTION, SOLUTION INTRAMUSCULAR; SUBCUTANEOUS EVERY 5 MIN PRN
Status: CANCELLED | OUTPATIENT
Start: 2025-04-30

## 2025-04-28 RX ORDER — DIPHENHYDRAMINE HYDROCHLORIDE 50 MG/ML
50 INJECTION, SOLUTION INTRAMUSCULAR; INTRAVENOUS ONCE
Status: CANCELLED
Start: 2025-04-30 | End: 2025-04-30

## 2025-04-28 RX ORDER — MEPERIDINE HYDROCHLORIDE 25 MG/ML
25 INJECTION INTRAMUSCULAR; INTRAVENOUS; SUBCUTANEOUS
Status: CANCELLED | OUTPATIENT
Start: 2025-04-30

## 2025-04-28 RX ORDER — DIPHENHYDRAMINE HYDROCHLORIDE 50 MG/ML
50 INJECTION, SOLUTION INTRAMUSCULAR; INTRAVENOUS
Status: CANCELLED
Start: 2025-04-30

## 2025-04-28 RX ORDER — ALBUTEROL SULFATE 90 UG/1
1-2 INHALANT RESPIRATORY (INHALATION)
Status: CANCELLED
Start: 2025-04-30

## 2025-04-28 NOTE — NURSING NOTE
Current patient location: [address] [place] [id number]    Is the patient currently in the state of [place]? NO **Provider licensed to see pts in [place].**    Visit mode: VIDEO    If the visit is dropped, the patient can be reconnected by:VIDEO VISIT: Text to cell phone:   Telephone Information:   Mobile [phone]       Will anyone else be joining the visit? NO  (If patient encounters technical issues they should call [phone])    Are changes needed to the allergy or medication list? Pt stated no changes to allergies and Pt stated no med changes    Are refills needed on medications prescribed by this physician? NO    Rooming Documentation:  Not applicable    Reason for visit: RECHECK    Molly BOOKER

## 2025-04-28 NOTE — LETTER
2025      Jacki Smith  [address] [facility] [address]      Dear Colleague,    Thank you for referring your patient, Jacki Smith, to the [facility]. Please see a copy of my visit note below.    Virtual Visit Details    Type of service:  Video Visit     Originating Location (pt. 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  She presents today for follow up and disease management by video.      Oncology History:  22: Presented to an ED in [place] for evaluation of abdominal bloating and discomfort.  -Abdomen, pelvic CT: Radiographic Stage CAL ovarian cancer.   22: [id number].   22: Pelvic ultrasound: c/w metastatic cancer.  22: Pelvic exam under anesthesia, diagnostic laparoscopy, biopsies, evacuation of ascites.   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      22, 22, 22: Cycles 1-3 of neoadjuvant chemotherapy with IV carboplatin + paclitaxel 175 mg/m2 every 21 days.   -Cycles 1,2: Carboplatin AUC 6.   -Cycle 3: Carboplatin AUC 5 with dose-reduction due to thrombcytopenia.   -22: Chest, abdomen, pelvic CT: Partial response.   22: Pelvic exam under anesthesia, diagnostic laparoscopy, conversion to laparotomy for optimal interval tumor debulking to no gross residual disease including peritoneal and diaphragm biopsies, bilateral salpingo-oophorectomy, infragastric omentectomy, bilateral hemidiaphragm stripping, peritoneal and mesenteric argon beam ablation,  appendectomy.   -Pathology: High-grade serous carcinoma involving all resected specimens.   Caris Testing Results.  -Genomic ROXI low (6%)   -TMB low (1mut/Mb)  -Microsatellite stable/Mismatch Repair proficient  -PD-L1- (CPS 0%)  -NTRK 1/2/3 fusion not detected.   -ER-, DE+  -Genomic alterations in:  --TP53  -No genomic alterations in BRCA1,2.  -FOLR1+ (2+, 75%).      Jamila-ovary clinical trial screening: Negative for the immunoreactive subtype.     9/28/22, 10/19/22, 11/17/22: Cycles 4-6 of first-line chemotherapy with IV carboplatin AUC 5 + paclitaxel 175 mg/m2.  -12/2/22: Chest, abdomen, pelvic CT: No definitive evidence of disease.   -CA-125 439>>23.   -2/16/23: Chest, abdomen, pelvic CT to evaluate bloating: Stable findings, no definitive evidence of disease.    -5/25/23: Admitted  to [facility] for management of malignant ascites s/p therapeutic paracentesis, and partial SBO resolved with conservative management.   5/25/23: Abdomen, pelvic CT: Progressive disease.      10/5/22: [facility], reflexed to Common Hereditary Cancer Panel.   -No identifiable germline variants identified in ABRAXAS1, AKT1, APC, DEION, AXIN2, BARD1, BMPR1A, BRCA1, BRCA2, BRIP1, CDC73, CDH1, CDK4, CDKN2A, CHEK2, CTNNA1, DICER1, EPCAM, FANCC, FANCM, GREM1, HOXB13, KIT, MEN1, MLH1, MRE11, MSH2, MSH6, MUTYH, NBN, NF1, NTHL1, PALB2, PDGFRA, PIK3CA, PMS2, POLD1, POLE, PTEN, RAD50, RAD51C, RAD51D, RECQL, RINT1, SDHA, SDHB, SDHC, SDHD, SMAD4, SMARCA4, STK11, TP53, TSC1, TSC2, VHL, XRCC2.   -Variant of uncertain significance in MSH3 c.16C>T (p.Pag2Qav)     6/13/23, 7/12/23, 8/14/23, 9/13/23, 10/11/23, 11/8/23, 12/6/23, 1/3/24, 2/1/24, 2/29/24, 3/28/24: Cycles 1-11 of second-line chemotherapy with IV carboplatin AUC 5 + pegylated liposomal doxorubicin (PLD) 30 mg/m2 every 28 days.   -9/27/23: Chest, abdomen, pelvic CT: Partial response. Catheter-associated thrombus.   -1/23/24: Chest, abdomen, pelvic CT: Stable disease.    -4/10/24: Chest, abdomen, pelvic CT: Stable disease.   -CA-125 78>>44.   5/10/24-1/24/25: Second- PARPi therapy with olaparib.   -Cycles 1-2: Olaparib 300 mg BID.   -Cycles 3+: Olaparib 200 mg BID with dose-reduction due to decreased creatinine clearance.   -7/5/24: Chest, abdomen, pelvic CT: Stable disease.   -10/15/24: Chest, abdomen, pelvic CT: Stable disease.   -CA-125 38>>110.     2/6/25: Third-line therapy with IV carboplatin AUC 5 + paclitaxel 135 mg/m2 + bevacizumab 15 mg/kg.  340.  -2/11/25-3/2/25: Complicated by bevacizumab-induced microperforation of the proximal small bowel. Managed with drain placement. Subsequent SBO attributed to post-perforation inflammation. Palliative venting G-tube placed, TPN initiated.      Plan: Continue third-line therapy with IV carboplatin AUC 5 + paclitaxel 135 mg/m2 +every 3 weeks with permanent discontinuation of bevacizumab.      3/19/25 Cycle 2 carboplatin and paclitaxel.   235.    4/3/25: CT CAP  IMPRESSION:  1. Decreased subcapsular fluid the liver with other hepatic  hypodensities unchanged.  2. From February and January, resolution of pleural effusions decrease  in the abdominal fluid with removal of drain catheter but the rim  enhancing fluid now has internal air and is concerning for possible  infection/abscess. This would be as opposed to just the  instrumentation from the catheter is multiple locules of air present  and rim enhancement which was not as apparent on prior CT scans.  3. Unchanged small pulmonary nodules.  4. Increased mild right hydronephrosis without obstructive uropathy  evident.  5. Atherosclerosis.   6. Nondistended bladder with wall thickening. Surgical changes in the  pelvis.  7. New presacral soft tissue/fluid indeterminate density.  8. Lumbar levocurvature with prominent lower lumbar degenerative disc  disease    4/9/25: Cycle 3 carboplatin and paclitaxel.  Hgb 7.3, received 1u PRBC.   103.    4/24/25: ED  "for abdominal pain, distention, no flatus.  Workup consistent with SBO.  Gyn onc team consulted who recommended admission but she declined.  Discharged with recommendation to keep GT to gravity and decrease diet to NPO.  CT AP  Impression:   1.  Continued decreased size of the large encapsulated air-fluid  collection that extends from the rectouterine space through the left  upper quadrant though measurements are difficult given the  configuration of this fluid collection. Additionally there is  decreased size of the encapsulated multilobulated fluid collection  anterior to the urinary bladder.  2.  Stable hepatic metastases and perihepatic peritoneal implants.   3.  Mild left hydronephrosis, new compared to prior. Persistent mild  right hydronephrosis.  4.  Multiple fluid air filled loops of prominent/mildly enlarged small  bowel are scattered throughout the abdomen and pelvis with areas of  intervening decompressed bowel. No transition point identified.   5.  Small amount of ascites present in the upper abdomen is unchanged.  6.  Slightly decreased presacral fluid/soft tissue density compared to  4/3/2025.  7.  Punctate nonobstructing stone in the right kidney.    4/30/25: Cycle 4 carboplatin and paclitaxel planned.   97.              Today she reports feeling well.  Much better then last week when she was in the ED. She is feeling of her treatment this week.  She had a small bowel movement yesterday and has been able to clamp her G-tube for short periods today.  Ate some yogurt and multimineral today.  Abdomen still \"feels big\" but much better than when she was in the ED and now her abdomen is soft.  She felt her energy was \"so much better\" after her blood transfusion with her last cycle.  No neuropathy symptoms.  She does have some generalized swelling in the morning after her TPN/fluids but this improves throughout the day.  No fever/chills.  No chest pain.  She does have some DANIELLE with stairs or long " walks.  No SOB at rest.                Past Medical History:    Past Medical History:   Diagnosis Date     Female stress incontinence      Ovarian cancer, right (H) 06/16/2022    Stage IIIC high-grade serous carcinoma     Recurrent cold sores          Past Surgical History:    Past Surgical History:   Procedure Laterality Date     APPENDECTOMY  08/29/2022    Part of ovarian cancer tumor debulking     BLADDER SUSPENSION  08/13/2009     ENDOSCOPIC INSERTION TUBE GASTROSTOMY N/A 2/28/2025    Procedure: INSERTION, PEG TUBE (venting);  Surgeon: Porfirio Saunders MD;  Location: [facility] OR     HYSTERECTOMY  08/13/2009    For prolapse     IR FOLLOW UP VISIT INPATIENT  2/25/2025     IR PARACENTESIS  6/6/2022     IR PARACENTESIS  6/14/2022     IR PARACENTESIS  5/26/2023     IR PARACENTESIS  2/26/2025     IR RETROPERITONEAL ABSCESS DRAINAGE  2/18/2025     LAPAROSCOPY DIAGNOSTIC (GYN)  06/16/2022     SALPINGO-OOPHORECTOMY, COMBINED Bilateral 08/29/2022    Pelvic exam under anesthesia, diagnostic laparoscopy, conversion to laparotomy for optimal interval tumor debulking to no gross residual disease including peritoneal and diaphragm biopsies, bilateral salpingo-oophorectomy, infragastric omentectomy, bilateral hemidiaphragm stripping, peritoneal and mesenteric argon beam ablation, appendectomy.     TONSILLECTOMY  1973     TUBAL LIGATION Bilateral 09/01/1998         Health Maintenance Due   Topic Date Due     ADVANCE CARE PLANNING  Never done     YEARLY PREVENTIVE VISIT  Never done     COLORECTAL CANCER SCREENING  Never done     HIV SCREENING  Never done     HEPATITIS C SCREENING  Never done     Pneumococcal Vaccine: 50+ Years (1 of 2 - PCV) Never done     ZOSTER IMMUNIZATION (1 of 2) Never done     PAP  Never done     LIPID  Never done     INFLUENZA VACCINE (1) 09/01/2024     COVID-19 Vaccine (5 - 2024-25 season) 09/01/2024     RSV VACCINE (1 - Risk 60-74 years 1-dose series) Never done       Current Medications:     Current  "Outpatient Medications   Medication Sig Dispense Refill     acetaminophen (TYLENOL) 500 MG tablet Take 500 mg by mouth every 6 hours as needed for mild pain.       cyclobenzaprine (FLEXERIL) 5 MG tablet Take 1-2 tablets (5-10 mg) by mouth 3 times daily as needed for muscle spasms. 40 tablet 1     diphenhydrAMINE-acetaminophen (TYLENOL PM)  MG tablet Take 1 tablet by mouth nightly as needed for sleep.       docusate sodium (DSS) 100 MG capsule Take 100 mg by mouth 2 times daily as needed for constipation.       Emergency Supply Kit, Central, Patient use for emergency only. Contents: 3 sodium chloride 0.9% flushes, 1 dressing kit, 1 microclave ext set 14\", 4 nitrile gloves (med), 6 alcohol prep pads, 1 bacitracin, 1 syringe (10 cc 20 G 1\"). Call [phone] to reorder. [id number] kit 0     escitalopram (LEXAPRO) 20 MG tablet Take 20 mg by mouth daily.       famotidine (PEPCID) 20 MG tablet Take 20 mg by mouth 2 times daily as needed.       hydrOXYzine HCl (ATARAX) 25 MG tablet Take 1-2 tablets (25-50 mg) by mouth every 6 hours as needed for anxiety. 30 tablet 0     Lidocaine (LIDOCARE) 4 % Patch Place 1 patch over 12 hours onto the skin daily as needed for moderate pain. To prevent lidocaine toxicity, patient should be patch free for 12 hrs daily. 6 patch 0     lidocaine-prilocaine (EMLA) 2.5-2.5 % external cream Apply topically as needed for moderate pain 30 g 1     LORazepam (ATIVAN) 0.5 MG tablet Take 0.5-1 mg by mouth every 6 hours as needed for anxiety.       Multiple Vitamin (INFUVITE ADULT) injection Add to infusion 10 mLs daily. Select 2 multivitamin vials, one of each color top. Draw up 5 mL from each vial and add to 1 TPN bag daily immediately prior to infusing.   Discard remainder of vials. 3600 mL 0     Multiple Vitamin (MULTI-VITAMIN DAILY PO) Take 1 tablet by mouth daily       omeprazole (PRILOSEC) 20 MG DR capsule Take 20 mg by mouth daily.       ondansetron (ZOFRAN) 8 MG tablet Take 1 tablet (8 " mg) by mouth every 8 hours as needed for nausea (vomiting). Do not take for 3 days after chemotherapy. 30 tablet 2     parenteral nutrition - DUALCHAMBER - see order for formula Infuse 1,600 mLs over 12 hours into the vein (central line) daily. Taper up for 1 Hours. Taper down for 1 Hours.   TPN additives to be added prior to administration:   Infuvite-Adult 10 mL daily.  Plateau rate:145.5 mL/hr.  KVO: 5 mL/hr. 462204 mL 0     Port Access Kit For nurse use only.  Do not remove items from bag.  Use for port access.  Do not place syringe on sterile field. [id number] kit 0     prochlorperazine (COMPAZINE) 10 MG tablet Take 1 tablet (10 mg) by mouth every 6 hours as needed for nausea or vomiting. 30 tablet 2     sodium chloride 0.9 % 500 mL via elastomeric pump Infuse 1,000 mLs into the vein daily as needed (dehydration). Infuse 1- 2 elastomeric pump(s) ( 500-1000 ml) . Each elastomeric to infuse over 2 hours. 655631 mL 0     sodium chloride, PF, 0.9% PF flush Inject 10 mLs into the vein as needed for line flush. Flush IV before and after med administration as directed and/or at least every 24 hours, or prior to deaccessing for no further use and/or at least every 4 weeks when not accessed. 216150 mL 0     valACYclovir (VALTREX) 1000 mg tablet Take 1,000 mg by mouth 2 times daily as needed.           Allergies:      No Known Allergies     Social History:     Social History     Tobacco Use     Smoking status: Former     Current packs/day: 0.00     Average packs/day: 1 pack/day for 42.0 years (42.0 ttl pk-yrs)     Types: Cigarettes     Start date: 1980     Quit date: 2022     Years since quittin.9     Smokeless tobacco: Never   Substance Use Topics     Alcohol use: Yes     Comment: Beer ~Q3 months.       History   Drug Use Unknown         Family History:     The patient's family history is notable for:    Family History   Problem Relation Age of Onset     Prostate Cancer Father      Breast Cancer Sister 48      Melanoma Sister      Skin Cancer Sister      Colon Cancer Maternal Grandmother          Physical Exam:     There were no vitals taken for this visit.  There is no height or weight on file to calculate BMI.    General Appearance: alert, no distress    Eyes:  Eyes grossly normal.  No obvious discharge, erythema, or scleral/conjunctival abnormalities.    Respiratory: No noted audible wheeze, cough, or visible cyanosis.  No visible retractions or increased work of breathing.     Skin: no lesions or rashes on visible skin     Psychiatric: appropriate mood and affect. Mentation appears normal, affect normal/bright, judgement and insight intact, normal speech and appearance well-groomed                            The rest of a comprehensive physical examination is deferred due to video visit restrictions.       Recent Results (from the past 24 hours)   Protein qualitative urine    Collection Time: 04/28/25  7:40 AM   Result Value Ref Range    Protein Albumin Urine 20 (A) Negative mg/dL   Comprehensive metabolic panel (BMP + Alb, Alk Phos, ALT, AST, Total. Bili, TP)    Collection Time: 04/28/25  7:40 AM   Result Value Ref Range    Sodium 140 135 - 145 mmol/L    Potassium 4.1 3.4 - 5.3 mmol/L    Carbon Dioxide (CO2) 25 22 - 29 mmol/L    Anion Gap 10 7 - 15 mmol/L    Urea Nitrogen 28.2 (H) 8.0 - 23.0 mg/dL    Creatinine 0.64 0.51 - 0.95 mg/dL    GFR Estimate >90 >60 mL/min/1.73m2    Calcium 9.0 8.8 - 10.4 mg/dL    Chloride 105 98 - 107 mmol/L    Glucose 114 (H) 70 - 99 mg/dL    Alkaline Phosphatase 172 (H) 40 - 150 U/L    AST 25 0 - 45 U/L    ALT 15 0 - 50 U/L    Protein Total 6.5 6.4 - 8.3 g/dL    Albumin 3.2 (L) 3.5 - 5.2 g/dL    Bilirubin Total 0.3 <=1.2 mg/dL   Magnesium    Collection Time: 04/28/25  7:40 AM   Result Value Ref Range    Magnesium 2.1 1.7 - 2.3 mg/dL   Phosphorus    Collection Time: 04/28/25  7:40 AM   Result Value Ref Range    Phosphorus 3.9 2.5 - 4.5 mg/dL   Triglycerides    Collection Time: 04/28/25   7:40 AM   Result Value Ref Range    Triglycerides 58 <150 mg/dL    Patient Fasting > 8hrs? No    Bilirubin direct    Collection Time: 04/28/25  7:40 AM   Result Value Ref Range    Bilirubin Direct 0.16 0.00 - 0.30 mg/dL       Collection Time: 04/28/25  7:40 AM   Result Value Ref Range     97 (H) <=38 U/mL   CBC with platelets and differential    Collection Time: 04/28/25  7:40 AM   Result Value Ref Range    WBC Count 9.2 4.0 - 11.0 10e3/uL    RBC Count 2.88 (L) 3.80 - 5.20 10e6/uL    Hemoglobin 8.4 (L) 11.7 - 15.7 g/dL    Hematocrit 26.5 (L) 35.0 - 47.0 %    MCV 92 78 - 100 fL    MCH 29.2 26.5 - 33.0 pg    MCHC 31.7 31.5 - 36.5 g/dL    RDW 15.8 (H) 10.0 - 15.0 %    Platelet Count 154 150 - 450 10e3/uL    % Neutrophils 77 %    % Lymphocytes 14 %    % Monocytes 8 %    % Eosinophils 0 %    % Basophils 0 %    % Immature Granulocytes 1 %    NRBCs per 100 WBC 0 <1 /100    Absolute Neutrophils 7.1 1.6 - 8.3 10e3/uL    Absolute Lymphocytes 1.2 0.8 - 5.3 10e3/uL    Absolute Monocytes 0.8 0.0 - 1.3 10e3/uL    Absolute Eosinophils 0.0 0.0 - 0.7 10e3/uL    Absolute Basophils 0.0 0.0 - 0.2 10e3/uL    Absolute Immature Granulocytes 0.1 <=0.4 10e3/uL    Absolute NRBCs 0.0 10e3/uL          Assessment:    Jacki Smith is a 60 year old woman with a diagnosis of 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  She presents today for follow up and disease management by video.    20 minutes spent on the date of the encounter doing chart review, history and exam, documentation, and further activities as noted above.      Plan:     1.)        Ovarian cancer:  OK to proceed with planned treatment Wed as labs are WDL.  RTC as scheduled for CT, follow up with Dr. Patino, and next cycle.  Reviewed signs and symptoms for when she should contact the clinic or seek additional care.  Patient to contact the clinic with any questions or concerns in the interim.      Genetic risk factors were assessed and she has  a VUS No identifiable germline variants identified in ABRAXAS1, AKT1, APC, DEION, AXIN2, BARD1, BMPR1A, BRCA1, BRCA2, BRIP1, CDC73, CDH1, CDK4, CDKN2A, CHEK2, CTNNA1, DICER1, EPCAM, FANCC, FANCM, GREM1, HOXB13, KIT, MEN1, MLH1, MRE11, MSH2, MSH6, MUTYH, NBN, NF1, NTHL1, PALB2, PDGFRA, PIK3CA, PMS2, POLD1, POLE, PTEN, RAD50, RAD51C, RAD51D, RECQL, RINT1, SDHA, SDHB, SDHC, SDHD, SMAD4, SMARCA4, STK11, TP53, TSC1, TSC2, VHL, XRCC2.     Caris Testing Results.  -Genomic ROXI low (6%)   -TMB low (1mut/Mb)  -Microsatellite stable/Mismatch Repair proficient  -PD-L1- (CPS 0%)  -NTRK 1/2/3 fusion not detected.   -ER-, NV+  -Genomic alterations in:  --TP53  -No genomic alterations in BRCA1,2.  -FOLR1+ (2+, 75%).        Labs and/or tests reviewed include:  CBC. CMP. Bili. Mag. Phos. Triglycerides. .     2.)        Health maintenance:  Issues addressed today include following up with PCP for annual health maintenance and non-gynecologic issues.      3.)        SBO: Recurrent SBO symptoms brought her back to the ED last week.  She was able to do bowel rest at home and leave her GT to gravity for several days with improvement.  Today she was able to clamp her GT for short periods and tolerated small amount of Malt o meal and yogurt. Small BM yesterday.    -Continue venting G-tube as needed, extending for longer periods of clamping as tolerated.  -Continue TPN until able to tolerate enough oral intake.       This note was created in part by the use of Dragon voice recognition dictation system. Inadvertent grammatical errors and typographical errors may exist.      Echo Sanz, DNP, APRN, FNP-C, AOCNP  Oncology Nurse Practitioner  Division of Gynecologic Oncology  Pager: [phone]     CC  Patient Care Team:  Domenica Christianson MD as PCP - Charis Verdugo MD as MD (Gynecologic Oncology)  Anish Monroy MD as Assigned Palliative Care Provider  Pratima Vega RN as Specialty Care Coordinator (Hematology  & Oncology)  Myhre, Grace C, [facility] as Pharmacist  Carey, Charis Meadows MD as Assigned Cancer Care Provider  Charis Patino MD as Home Infusion Following Provider (Gynecologic Oncology)  Clarisse Wells, YESICA as [facility] Registered Dietitian (Dietitian, Registered)  SELF, REFERRED      Again, thank you for allowing me to participate in the care of your patient.        Sincerely,        JALEN Barrera CNP    Electronically signed

## 2025-04-28 NOTE — PROGRESS NOTES
Port labs completed and port deaccessed. Patient applied EMLA cream for port reaccess prior to leaving clinic. Paola Sanz RN

## 2025-04-29 ENCOUNTER — HOME INFUSION (OUTPATIENT)
Dept: HOME HEALTH SERVICES | Facility: HOME HEALTH | Age: 60
End: 2025-04-29
Payer: COMMERCIAL

## 2025-04-29 PROCEDURE — S9366 HIT TPN 2 LITER DIEM: HCPCS

## 2025-04-29 PROCEDURE — B4178 PARENTERAL SOL AMINO ACID >: HCPCS

## 2025-04-29 PROCEDURE — A4217 STERILE WATER/SALINE, 500 ML: HCPCS

## 2025-04-29 PROCEDURE — S9374 HIT HYDRA 1 LITER DIEM: HCPCS | Mod: SH

## 2025-04-29 PROCEDURE — B9004 PARENTERAL INFUS PUMP PORTAB: HCPCS | Mod: RR

## 2025-04-29 PROCEDURE — B4185 PARENTERAL SOL 10 GM LIPIDS: HCPCS

## 2025-04-30 ENCOUNTER — MYC MEDICAL ADVICE (OUTPATIENT)
Dept: ONCOLOGY | Facility: CLINIC | Age: 60
End: 2025-04-30

## 2025-04-30 ENCOUNTER — INFUSION THERAPY VISIT (OUTPATIENT)
Dept: INFUSION THERAPY | Facility: HOSPITAL | Age: 60
End: 2025-04-30
Attending: OBSTETRICS & GYNECOLOGY
Payer: COMMERCIAL

## 2025-04-30 ENCOUNTER — HOME INFUSION BILLING (OUTPATIENT)
Dept: HOME HEALTH SERVICES | Facility: HOME HEALTH | Age: 60
End: 2025-04-30
Payer: COMMERCIAL

## 2025-04-30 VITALS
RESPIRATION RATE: 16 BRPM | TEMPERATURE: 97.9 F | DIASTOLIC BLOOD PRESSURE: 79 MMHG | SYSTOLIC BLOOD PRESSURE: 130 MMHG | HEART RATE: 76 BPM | OXYGEN SATURATION: 100 %

## 2025-04-30 DIAGNOSIS — R30.0 DYSURIA: Primary | ICD-10-CM

## 2025-04-30 DIAGNOSIS — C56.1 OVARIAN CANCER, RIGHT (H): Primary | ICD-10-CM

## 2025-04-30 PROCEDURE — 96413 CHEMO IV INFUSION 1 HR: CPT

## 2025-04-30 PROCEDURE — B4185 PARENTERAL SOL 10 GM LIPIDS: HCPCS

## 2025-04-30 PROCEDURE — 96415 CHEMO IV INFUSION ADDL HR: CPT

## 2025-04-30 PROCEDURE — 250N000011 HC RX IP 250 OP 636: Performed by: NURSE PRACTITIONER

## 2025-04-30 PROCEDURE — 96367 TX/PROPH/DG ADDL SEQ IV INF: CPT

## 2025-04-30 PROCEDURE — S9374 HIT HYDRA 1 LITER DIEM: HCPCS | Mod: SH

## 2025-04-30 PROCEDURE — 96417 CHEMO IV INFUS EACH ADDL SEQ: CPT

## 2025-04-30 PROCEDURE — S9366 HIT TPN 2 LITER DIEM: HCPCS

## 2025-04-30 PROCEDURE — B9004 PARENTERAL INFUS PUMP PORTAB: HCPCS | Mod: RR

## 2025-04-30 PROCEDURE — 96375 TX/PRO/DX INJ NEW DRUG ADDON: CPT

## 2025-04-30 PROCEDURE — B4178 PARENTERAL SOL AMINO ACID >: HCPCS

## 2025-04-30 PROCEDURE — 258N000003 HC RX IP 258 OP 636: Performed by: NURSE PRACTITIONER

## 2025-04-30 RX ORDER — ALBUTEROL SULFATE 90 UG/1
1-2 INHALANT RESPIRATORY (INHALATION)
Status: DISCONTINUED | OUTPATIENT
Start: 2025-04-30 | End: 2025-04-30 | Stop reason: HOSPADM

## 2025-04-30 RX ORDER — PALONOSETRON 0.05 MG/ML
0.25 INJECTION, SOLUTION INTRAVENOUS ONCE
Status: COMPLETED | OUTPATIENT
Start: 2025-04-30 | End: 2025-04-30

## 2025-04-30 RX ORDER — METHYLPREDNISOLONE SODIUM SUCCINATE 40 MG/ML
40 INJECTION INTRAMUSCULAR; INTRAVENOUS
Status: DISCONTINUED | OUTPATIENT
Start: 2025-04-30 | End: 2025-04-30 | Stop reason: HOSPADM

## 2025-04-30 RX ORDER — LORAZEPAM 2 MG/ML
0.5 INJECTION INTRAMUSCULAR EVERY 4 HOURS PRN
Status: DISCONTINUED | OUTPATIENT
Start: 2025-04-30 | End: 2025-04-30 | Stop reason: HOSPADM

## 2025-04-30 RX ORDER — DIPHENHYDRAMINE HYDROCHLORIDE 50 MG/ML
25 INJECTION, SOLUTION INTRAMUSCULAR; INTRAVENOUS
Status: DISCONTINUED | OUTPATIENT
Start: 2025-04-30 | End: 2025-04-30 | Stop reason: HOSPADM

## 2025-04-30 RX ORDER — HEPARIN SODIUM (PORCINE) LOCK FLUSH IV SOLN 100 UNIT/ML 100 UNIT/ML
5 SOLUTION INTRAVENOUS
Status: DISCONTINUED | OUTPATIENT
Start: 2025-04-30 | End: 2025-04-30 | Stop reason: HOSPADM

## 2025-04-30 RX ORDER — MEPERIDINE HYDROCHLORIDE 25 MG/ML
25 INJECTION INTRAMUSCULAR; INTRAVENOUS; SUBCUTANEOUS
Status: DISCONTINUED | OUTPATIENT
Start: 2025-04-30 | End: 2025-04-30 | Stop reason: HOSPADM

## 2025-04-30 RX ORDER — ALBUTEROL SULFATE 0.83 MG/ML
2.5 SOLUTION RESPIRATORY (INHALATION)
Status: DISCONTINUED | OUTPATIENT
Start: 2025-04-30 | End: 2025-04-30 | Stop reason: HOSPADM

## 2025-04-30 RX ORDER — DIPHENHYDRAMINE HYDROCHLORIDE 50 MG/ML
50 INJECTION, SOLUTION INTRAMUSCULAR; INTRAVENOUS ONCE
Status: COMPLETED | OUTPATIENT
Start: 2025-04-30 | End: 2025-04-30

## 2025-04-30 RX ORDER — EPINEPHRINE 1 MG/ML
0.3 INJECTION, SOLUTION INTRAMUSCULAR; SUBCUTANEOUS EVERY 5 MIN PRN
Status: DISCONTINUED | OUTPATIENT
Start: 2025-04-30 | End: 2025-04-30 | Stop reason: HOSPADM

## 2025-04-30 RX ORDER — DIPHENHYDRAMINE HYDROCHLORIDE 50 MG/ML
50 INJECTION, SOLUTION INTRAMUSCULAR; INTRAVENOUS
Status: DISCONTINUED | OUTPATIENT
Start: 2025-04-30 | End: 2025-04-30 | Stop reason: HOSPADM

## 2025-04-30 RX ADMIN — PACLITAXEL 215 MG: 6 INJECTION, SOLUTION INTRAVENOUS at 09:33

## 2025-04-30 RX ADMIN — SODIUM CHLORIDE 250 ML: 0.9 INJECTION, SOLUTION INTRAVENOUS at 08:15

## 2025-04-30 RX ADMIN — DEXAMETHASONE SODIUM PHOSPHATE: 100 INJECTION INTRAMUSCULAR; INTRAVENOUS at 08:32

## 2025-04-30 RX ADMIN — CARBOPLATIN 535 MG: 10 INJECTION, SOLUTION INTRAVENOUS at 12:34

## 2025-04-30 RX ADMIN — HEPARIN 5 ML: 100 SYRINGE at 13:32

## 2025-04-30 RX ADMIN — PALONOSETRON 0.25 MG: 0.05 INJECTION, SOLUTION INTRAVENOUS at 08:26

## 2025-04-30 RX ADMIN — LORAZEPAM 0.5 MG: 2 INJECTION INTRAMUSCULAR; INTRAVENOUS at 09:24

## 2025-04-30 RX ADMIN — FAMOTIDINE 20 MG: 10 INJECTION, SOLUTION INTRAVENOUS at 08:22

## 2025-04-30 RX ADMIN — DIPHENHYDRAMINE HYDROCHLORIDE 50 MG: 50 INJECTION, SOLUTION INTRAMUSCULAR; INTRAVENOUS at 08:16

## 2025-04-30 NOTE — PROGRESS NOTES
"Infusion Nursing Note:  Jacki Smith presents today for D1C4 paclitaxel and carboplatin.    Patient seen by provider today: No, provider visit yesterday   present during visit today: Not Applicable.    Note: Reviewed plan of care.     Patient recently in ED for SBO. Patient concerned re: recent CT imaging showed \"hydronephrosis\" and \"non-obstructing\" stone present; [employer] information printed. Patient concerned about plan of care for managing stone, does that cause her new issues with urinary retention/incontinence. Message sent to JALEN Mcmanus informing her patient instructed to contact her.    Patient requesting lorazepam secondary to anxious and \"can't get comfortable\" in the bed. Patient able to sleep through 3 hour paclitaxel infusion.    Offered Enrique Yu, onc psychotherapist, as resource for patient and spouse to help with coping skills, manage anxiety.     Premeds Given: aloxi, benadryl IV, dexamethasone IV, emend, and pepcid    Intravenous Access:  Implanted Port accessed for labs yesterday; brisk blood return throughout infusions.    Treatment Conditions:  Lab Results   Component Value Date    HGB 8.4 (L) 04/28/2025    WBC 9.2 04/28/2025    ANEU 7.1 04/28/2025     04/28/2025        Lab Results   Component Value Date     04/28/2025    POTASSIUM 4.1 04/28/2025    MAG 2.1 04/28/2025    CR 0.64 04/28/2025    KINDRA 9.0 04/28/2025    BILITOTAL 0.3 04/28/2025    ALBUMIN 3.2 (L) 04/28/2025    ALT 15 04/28/2025    AST 25 04/28/2025       Results reviewed, labs MET treatment parameters, ok to proceed with treatment.      Post Infusion Assessment:  Patient tolerated infusion without incident.  Patient observed for first 10 minutes paclitaxel infusion per protocol.  Blood return noted pre and post infusion.  Site patent and intact, free from redness, edema or discomfort.  No evidence of extravasations.  Access discontinued per protocol.       Discharge Plan:   Patient will " return 5/7 for next appointment.   Patient discharged in stable condition accompanied by: , Sukumar.  Departure Mode: Ambulatory.      Radha Wilson RN

## 2025-05-01 ENCOUNTER — HOME INFUSION BILLING (OUTPATIENT)
Dept: HOME HEALTH SERVICES | Facility: HOME HEALTH | Age: 60
End: 2025-05-01
Payer: COMMERCIAL

## 2025-05-01 ENCOUNTER — MEDICAL CORRESPONDENCE (OUTPATIENT)
Dept: HOME HEALTH SERVICES | Facility: HOME HEALTH | Age: 60
End: 2025-05-01
Payer: COMMERCIAL

## 2025-05-01 PROCEDURE — S9366 HIT TPN 2 LITER DIEM: HCPCS

## 2025-05-01 PROCEDURE — B9004 PARENTERAL INFUS PUMP PORTAB: HCPCS | Mod: RR

## 2025-05-01 PROCEDURE — B4185 PARENTERAL SOL 10 GM LIPIDS: HCPCS

## 2025-05-01 PROCEDURE — E1399 DURABLE MEDICAL EQUIPMENT MI: HCPCS

## 2025-05-01 PROCEDURE — B4178 PARENTERAL SOL AMINO ACID >: HCPCS

## 2025-05-01 PROCEDURE — S9374 HIT HYDRA 1 LITER DIEM: HCPCS | Mod: SH

## 2025-05-02 PROCEDURE — B4185 PARENTERAL SOL 10 GM LIPIDS: HCPCS

## 2025-05-02 PROCEDURE — A4217 STERILE WATER/SALINE, 500 ML: HCPCS

## 2025-05-02 PROCEDURE — B9004 PARENTERAL INFUS PUMP PORTAB: HCPCS | Mod: RR

## 2025-05-02 PROCEDURE — S9374 HIT HYDRA 1 LITER DIEM: HCPCS | Mod: SH

## 2025-05-02 PROCEDURE — S9366 HIT TPN 2 LITER DIEM: HCPCS

## 2025-05-02 PROCEDURE — B4178 PARENTERAL SOL AMINO ACID >: HCPCS

## 2025-05-03 PROCEDURE — B9004 PARENTERAL INFUS PUMP PORTAB: HCPCS | Mod: RR

## 2025-05-03 PROCEDURE — S9366 HIT TPN 2 LITER DIEM: HCPCS

## 2025-05-03 PROCEDURE — S9374 HIT HYDRA 1 LITER DIEM: HCPCS | Mod: SH

## 2025-05-04 PROCEDURE — B9004 PARENTERAL INFUS PUMP PORTAB: HCPCS | Mod: RR

## 2025-05-04 PROCEDURE — S9366 HIT TPN 2 LITER DIEM: HCPCS

## 2025-05-05 PROCEDURE — B9004 PARENTERAL INFUS PUMP PORTAB: HCPCS | Mod: RR

## 2025-05-05 PROCEDURE — S9366 HIT TPN 2 LITER DIEM: HCPCS

## 2025-05-06 PROCEDURE — B9004 PARENTERAL INFUS PUMP PORTAB: HCPCS | Mod: RR

## 2025-05-06 PROCEDURE — S9366 HIT TPN 2 LITER DIEM: HCPCS

## 2025-05-07 ENCOUNTER — HOME INFUSION BILLING (OUTPATIENT)
Dept: HOME HEALTH SERVICES | Facility: HOME HEALTH | Age: 60
End: 2025-05-07
Payer: COMMERCIAL

## 2025-05-07 ENCOUNTER — INFUSION THERAPY VISIT (OUTPATIENT)
Dept: INFUSION THERAPY | Facility: HOSPITAL | Age: 60
End: 2025-05-07
Attending: OBSTETRICS & GYNECOLOGY
Payer: COMMERCIAL

## 2025-05-07 DIAGNOSIS — C56.1 OVARIAN CANCER, RIGHT (H): Primary | ICD-10-CM

## 2025-05-07 PROCEDURE — B9004 PARENTERAL INFUS PUMP PORTAB: HCPCS | Mod: RR

## 2025-05-07 PROCEDURE — S9366 HIT TPN 2 LITER DIEM: HCPCS

## 2025-05-07 NOTE — PROGRESS NOTES
Infusion Nursing Note:  Jacki Smith presents today for Port needle change.    Patient seen by provider today: No   present during visit today: Not Applicable.    Note: Michelle arrived ambulatory by herself for port needle dressing change. She stated her weight has been stable on TPN and labs are now ordered every other week. Plan of care reviewed and she has no questions.  Port was de accessed and EMLA cream was applied x 20 minutes prior to port re access per patient request.    Intravenous Access:  Implanted Port.    Discharge Plan:   Patient will return May 14th for next appointment.   Patient discharged in stable condition accompanied by: self.  Departure Mode: Ambulatory.      Rylie Bullard RN

## 2025-05-08 ENCOUNTER — HOME INFUSION BILLING (OUTPATIENT)
Dept: HOME HEALTH SERVICES | Facility: HOME HEALTH | Age: 60
End: 2025-05-08
Payer: COMMERCIAL

## 2025-05-08 PROCEDURE — E1399 DURABLE MEDICAL EQUIPMENT MI: HCPCS

## 2025-05-08 PROCEDURE — S9366 HIT TPN 2 LITER DIEM: HCPCS

## 2025-05-08 PROCEDURE — B9004 PARENTERAL INFUS PUMP PORTAB: HCPCS | Mod: RR

## 2025-05-09 PROCEDURE — S9374 HIT HYDRA 1 LITER DIEM: HCPCS | Mod: SH

## 2025-05-09 PROCEDURE — B9004 PARENTERAL INFUS PUMP PORTAB: HCPCS | Mod: RR

## 2025-05-09 PROCEDURE — A4217 STERILE WATER/SALINE, 500 ML: HCPCS

## 2025-05-09 PROCEDURE — B4185 PARENTERAL SOL 10 GM LIPIDS: HCPCS

## 2025-05-09 PROCEDURE — S9366 HIT TPN 2 LITER DIEM: HCPCS

## 2025-05-09 PROCEDURE — B4178 PARENTERAL SOL AMINO ACID >: HCPCS

## 2025-05-10 PROCEDURE — S9366 HIT TPN 2 LITER DIEM: HCPCS

## 2025-05-10 PROCEDURE — S9374 HIT HYDRA 1 LITER DIEM: HCPCS | Mod: SH

## 2025-05-10 PROCEDURE — B9004 PARENTERAL INFUS PUMP PORTAB: HCPCS | Mod: RR

## 2025-05-11 PROCEDURE — B9004 PARENTERAL INFUS PUMP PORTAB: HCPCS | Mod: RR

## 2025-05-11 PROCEDURE — S9366 HIT TPN 2 LITER DIEM: HCPCS

## 2025-05-12 PROCEDURE — S9366 HIT TPN 2 LITER DIEM: HCPCS

## 2025-05-12 PROCEDURE — B9004 PARENTERAL INFUS PUMP PORTAB: HCPCS | Mod: RR

## 2025-05-13 ENCOUNTER — HOME INFUSION (OUTPATIENT)
Dept: HOME HEALTH SERVICES | Facility: HOME HEALTH | Age: 60
End: 2025-05-13
Payer: COMMERCIAL

## 2025-05-13 DIAGNOSIS — C56.1 OVARIAN CANCER, RIGHT (H): ICD-10-CM

## 2025-05-13 LAB
ABO + RH BLD: NORMAL
BLD GP AB SCN SERPL QL: NEGATIVE
SPECIMEN EXP DATE BLD: NORMAL

## 2025-05-13 PROCEDURE — B9004 PARENTERAL INFUS PUMP PORTAB: HCPCS | Mod: RR

## 2025-05-13 PROCEDURE — S9366 HIT TPN 2 LITER DIEM: HCPCS

## 2025-05-14 ENCOUNTER — INFUSION THERAPY VISIT (OUTPATIENT)
Dept: INFUSION THERAPY | Facility: HOSPITAL | Age: 60
End: 2025-05-14
Attending: OBSTETRICS & GYNECOLOGY
Payer: COMMERCIAL

## 2025-05-14 DIAGNOSIS — C56.1 OVARIAN CANCER, RIGHT (H): ICD-10-CM

## 2025-05-14 DIAGNOSIS — Z78.9 ON TOTAL PARENTERAL NUTRITION (TPN): ICD-10-CM

## 2025-05-14 DIAGNOSIS — D63.0 ANEMIA IN NEOPLASTIC DISEASE: Primary | ICD-10-CM

## 2025-05-14 LAB
ALBUMIN SERPL BCG-MCNC: 3.2 G/DL (ref 3.5–5.2)
ALP SERPL-CCNC: 112 U/L (ref 40–150)
ALT SERPL W P-5'-P-CCNC: 12 U/L (ref 0–50)
ANION GAP SERPL CALCULATED.3IONS-SCNC: 11 MMOL/L (ref 7–15)
AST SERPL W P-5'-P-CCNC: 21 U/L (ref 0–45)
BASOPHILS # BLD AUTO: 0 10E3/UL (ref 0–0.2)
BASOPHILS NFR BLD AUTO: 0 %
BILIRUB DIRECT SERPL-MCNC: 0.19 MG/DL (ref 0–0.3)
BILIRUB SERPL-MCNC: 0.4 MG/DL
BLD PROD TYP BPU: NORMAL
BLOOD COMPONENT TYPE: NORMAL
BUN SERPL-MCNC: 23.9 MG/DL (ref 8–23)
CALCIUM SERPL-MCNC: 9 MG/DL (ref 8.8–10.4)
CHLORIDE SERPL-SCNC: 105 MMOL/L (ref 98–107)
CODING SYSTEM: NORMAL
CREAT SERPL-MCNC: 0.64 MG/DL (ref 0.51–0.95)
CROSSMATCH: NORMAL
EGFRCR SERPLBLD CKD-EPI 2021: >90 ML/MIN/1.73M2
EOSINOPHIL # BLD AUTO: 0.1 10E3/UL (ref 0–0.7)
EOSINOPHIL NFR BLD AUTO: 3 %
ERYTHROCYTE [DISTWIDTH] IN BLOOD BY AUTOMATED COUNT: 18 % (ref 10–15)
FASTING STATUS PATIENT QL REPORTED: NO
GLUCOSE SERPL-MCNC: 106 MG/DL (ref 70–99)
HCO3 SERPL-SCNC: 23 MMOL/L (ref 22–29)
HCT VFR BLD AUTO: 23.2 % (ref 35–47)
HGB BLD-MCNC: 7.5 G/DL (ref 11.7–15.7)
IMM GRANULOCYTES # BLD: 0 10E3/UL
IMM GRANULOCYTES NFR BLD: 0 %
ISSUE DATE AND TIME: NORMAL
LYMPHOCYTES # BLD AUTO: 0.7 10E3/UL (ref 0.8–5.3)
LYMPHOCYTES NFR BLD AUTO: 37 %
MAGNESIUM SERPL-MCNC: 1.9 MG/DL (ref 1.7–2.3)
MCH RBC QN AUTO: 29.9 PG (ref 26.5–33)
MCHC RBC AUTO-ENTMCNC: 32.3 G/DL (ref 31.5–36.5)
MCV RBC AUTO: 92 FL (ref 78–100)
MONOCYTES # BLD AUTO: 0.4 10E3/UL (ref 0–1.3)
MONOCYTES NFR BLD AUTO: 21 %
NEUTROPHILS # BLD AUTO: 0.7 10E3/UL (ref 1.6–8.3)
NEUTROPHILS NFR BLD AUTO: 39 %
NRBC # BLD AUTO: 0 10E3/UL
NRBC BLD AUTO-RTO: 0 /100
PHOSPHATE SERPL-MCNC: 3.9 MG/DL (ref 2.5–4.5)
PLATELET # BLD AUTO: 73 10E3/UL (ref 150–450)
POTASSIUM SERPL-SCNC: 4 MMOL/L (ref 3.4–5.3)
PROT SERPL-MCNC: 6 G/DL (ref 6.4–8.3)
RBC # BLD AUTO: 2.51 10E6/UL (ref 3.8–5.2)
SODIUM SERPL-SCNC: 139 MMOL/L (ref 135–145)
TRIGL SERPL-MCNC: 69 MG/DL
UNIT ABO/RH: NORMAL
UNIT NUMBER: NORMAL
UNIT STATUS: NORMAL
UNIT TYPE ISBT: 6200
WBC # BLD AUTO: 1.9 10E3/UL (ref 4–11)

## 2025-05-14 PROCEDURE — B9004 PARENTERAL INFUS PUMP PORTAB: HCPCS | Mod: RR

## 2025-05-14 PROCEDURE — S9366 HIT TPN 2 LITER DIEM: HCPCS

## 2025-05-14 PROCEDURE — 86923 COMPATIBILITY TEST ELECTRIC: CPT | Performed by: NURSE PRACTITIONER

## 2025-05-14 PROCEDURE — 36591 DRAW BLOOD OFF VENOUS DEVICE: CPT

## 2025-05-14 PROCEDURE — 85025 COMPLETE CBC W/AUTO DIFF WBC: CPT

## 2025-05-14 PROCEDURE — 84478 ASSAY OF TRIGLYCERIDES: CPT

## 2025-05-14 PROCEDURE — 82040 ASSAY OF SERUM ALBUMIN: CPT

## 2025-05-14 PROCEDURE — 82248 BILIRUBIN DIRECT: CPT

## 2025-05-14 PROCEDURE — 83735 ASSAY OF MAGNESIUM: CPT

## 2025-05-14 PROCEDURE — 86901 BLOOD TYPING SEROLOGIC RH(D): CPT

## 2025-05-14 PROCEDURE — 84100 ASSAY OF PHOSPHORUS: CPT

## 2025-05-14 RX ORDER — EPINEPHRINE 1 MG/ML
0.3 INJECTION, SOLUTION INTRAMUSCULAR; SUBCUTANEOUS EVERY 5 MIN PRN
Status: CANCELLED | OUTPATIENT
Start: 2025-05-14

## 2025-05-14 RX ORDER — HEPARIN SODIUM (PORCINE) LOCK FLUSH IV SOLN 100 UNIT/ML 100 UNIT/ML
5 SOLUTION INTRAVENOUS
Status: CANCELLED | OUTPATIENT
Start: 2025-05-14

## 2025-05-14 RX ORDER — DIPHENHYDRAMINE HYDROCHLORIDE 50 MG/ML
50 INJECTION, SOLUTION INTRAMUSCULAR; INTRAVENOUS
Status: CANCELLED
Start: 2025-05-14

## 2025-05-14 RX ORDER — HEPARIN SODIUM,PORCINE 10 UNIT/ML
5-20 VIAL (ML) INTRAVENOUS DAILY PRN
Status: CANCELLED | OUTPATIENT
Start: 2025-05-14

## 2025-05-14 NOTE — PROGRESS NOTES
Infusion Nursing Note:  Jacki Smith presents today for Labs, port needle change.    Patient seen by provider today: No   present during visit today: Not Applicable.    Note: Michelle arrived ambulatory by herself for labs and port needle change. Plan of care reviewed and she has no questions.  Port was de accessed and EMLA cream was applied x 20 minutes prior to port re access per patient request.  Hgb 7.5. Per blood product therapy plan, Michelle will get 1 unit PRBCs for hgb <8. She will return tomorrow for transfusion. T&S was ordered and prepare transfusion was released.    Intravenous Access:  Labs drawn without difficulty.  Implanted Port.    Discharge Plan:   Patient will return May 21st for next appointment.   Patient discharged in stable condition accompanied by: self.  Departure Mode: Ambulatory.      Rylie Bullard RN

## 2025-05-15 ENCOUNTER — HOME INFUSION BILLING (OUTPATIENT)
Dept: HOME HEALTH SERVICES | Facility: HOME HEALTH | Age: 60
End: 2025-05-15
Payer: COMMERCIAL

## 2025-05-15 ENCOUNTER — INFUSION THERAPY VISIT (OUTPATIENT)
Dept: INFUSION THERAPY | Facility: HOSPITAL | Age: 60
End: 2025-05-15
Attending: OBSTETRICS & GYNECOLOGY
Payer: COMMERCIAL

## 2025-05-15 VITALS
RESPIRATION RATE: 16 BRPM | TEMPERATURE: 99 F | OXYGEN SATURATION: 100 % | HEART RATE: 67 BPM | DIASTOLIC BLOOD PRESSURE: 83 MMHG | SYSTOLIC BLOOD PRESSURE: 143 MMHG

## 2025-05-15 DIAGNOSIS — C56.1 OVARIAN CANCER, RIGHT (H): ICD-10-CM

## 2025-05-15 DIAGNOSIS — D63.0 ANEMIA IN NEOPLASTIC DISEASE: Primary | ICD-10-CM

## 2025-05-15 PROCEDURE — P9016 RBC LEUKOCYTES REDUCED: HCPCS | Performed by: NURSE PRACTITIONER

## 2025-05-15 PROCEDURE — B9004 PARENTERAL INFUS PUMP PORTAB: HCPCS | Mod: RR

## 2025-05-15 PROCEDURE — 258N000003 HC RX IP 258 OP 636: Performed by: NURSE PRACTITIONER

## 2025-05-15 PROCEDURE — A4245 ALCOHOL WIPES PER BOX: HCPCS

## 2025-05-15 PROCEDURE — E1399 DURABLE MEDICAL EQUIPMENT MI: HCPCS

## 2025-05-15 PROCEDURE — S9366 HIT TPN 2 LITER DIEM: HCPCS

## 2025-05-15 RX ORDER — EPINEPHRINE 1 MG/ML
0.3 INJECTION, SOLUTION INTRAMUSCULAR; SUBCUTANEOUS EVERY 5 MIN PRN
OUTPATIENT
Start: 2025-05-15

## 2025-05-15 RX ORDER — DIPHENHYDRAMINE HYDROCHLORIDE 50 MG/ML
50 INJECTION, SOLUTION INTRAMUSCULAR; INTRAVENOUS
Start: 2025-05-15

## 2025-05-15 RX ORDER — HEPARIN SODIUM,PORCINE 10 UNIT/ML
5-20 VIAL (ML) INTRAVENOUS DAILY PRN
OUTPATIENT
Start: 2025-05-15

## 2025-05-15 RX ORDER — HEPARIN SODIUM (PORCINE) LOCK FLUSH IV SOLN 100 UNIT/ML 100 UNIT/ML
5 SOLUTION INTRAVENOUS
OUTPATIENT
Start: 2025-05-15

## 2025-05-15 RX ADMIN — SODIUM CHLORIDE 250 ML: 9 INJECTION, SOLUTION INTRAVENOUS at 07:55

## 2025-05-15 NOTE — PROGRESS NOTES
Infusion Nursing Note:  Jacki Smith presents today for 1 unit PRBCs.    Patient seen by provider today: No   present during visit today: Not Applicable.    Note: Michelle arrived ambulatory by herself for 1 unit PRBCs. She reports fatigue and dyspnea with climbing stairs or walking distances. She denies dizziness/lightheadedness. Plan of care reviewed and all questions answered at this time.  Temperature prior to transfusion 99.1. Michelle denies feeling feverish or chilled. ANC yesterday 0.7. Neutropenic precautions were reviewed and printed information was provided.     Intravenous Access:  Implanted Port.    Treatment Conditions:  Lab Results   Component Value Date    HGB 7.5 (L) 05/14/2025    WBC 1.9 (L) 05/14/2025    ANEU 0.7 (L) 05/14/2025    PLT 73 (L) 05/14/2025        Results reviewed, labs MET treatment parameters, ok to proceed with treatment.  Blood transfusion consent signed 4/9/25.    Post Infusion Assessment:  Patient tolerated infusion without incident.  Blood return noted pre and post infusion.  Site patent and intact, free from redness, edema or discomfort.  Port was flushed with 20ml NS and curos cap applied. Patient does not heparinize her port after nightly TPN infusions.     Discharge Plan:  Discharge instructions 'After your Blood Transfusion' were reviewed with patient and printed copy was provided.   Patient will return May 21st for next appointment.   Patient discharged in stable condition accompanied by: self.  Departure Mode: Ambulatory.      Rylie Bullard RN

## 2025-05-16 ENCOUNTER — MEDICAL CORRESPONDENCE (OUTPATIENT)
Dept: HOME HEALTH SERVICES | Facility: HOME HEALTH | Age: 60
End: 2025-05-16
Payer: COMMERCIAL

## 2025-05-16 PROCEDURE — B4178 PARENTERAL SOL AMINO ACID >: HCPCS

## 2025-05-16 PROCEDURE — B9004 PARENTERAL INFUS PUMP PORTAB: HCPCS | Mod: RR

## 2025-05-16 PROCEDURE — A4217 STERILE WATER/SALINE, 500 ML: HCPCS

## 2025-05-16 PROCEDURE — B4185 PARENTERAL SOL 10 GM LIPIDS: HCPCS

## 2025-05-16 PROCEDURE — S9366 HIT TPN 2 LITER DIEM: HCPCS

## 2025-05-16 PROCEDURE — S9374 HIT HYDRA 1 LITER DIEM: HCPCS | Mod: SH

## 2025-05-17 PROCEDURE — S9366 HIT TPN 2 LITER DIEM: HCPCS

## 2025-05-17 PROCEDURE — S9374 HIT HYDRA 1 LITER DIEM: HCPCS | Mod: SH

## 2025-05-17 PROCEDURE — B9004 PARENTERAL INFUS PUMP PORTAB: HCPCS | Mod: RR

## 2025-05-18 PROCEDURE — S9366 HIT TPN 2 LITER DIEM: HCPCS

## 2025-05-18 PROCEDURE — S9374 HIT HYDRA 1 LITER DIEM: HCPCS | Mod: SH

## 2025-05-18 PROCEDURE — B9004 PARENTERAL INFUS PUMP PORTAB: HCPCS | Mod: RR

## 2025-05-19 PROCEDURE — S9374 HIT HYDRA 1 LITER DIEM: HCPCS | Mod: SH

## 2025-05-19 PROCEDURE — B9004 PARENTERAL INFUS PUMP PORTAB: HCPCS | Mod: RR

## 2025-05-19 PROCEDURE — S9366 HIT TPN 2 LITER DIEM: HCPCS

## 2025-05-20 PROCEDURE — B9004 PARENTERAL INFUS PUMP PORTAB: HCPCS | Mod: RR

## 2025-05-20 PROCEDURE — S9366 HIT TPN 2 LITER DIEM: HCPCS

## 2025-05-21 ENCOUNTER — INFUSION THERAPY VISIT (OUTPATIENT)
Dept: INFUSION THERAPY | Facility: HOSPITAL | Age: 60
End: 2025-05-21
Attending: OBSTETRICS & GYNECOLOGY
Payer: COMMERCIAL

## 2025-05-21 ENCOUNTER — RESULTS FOLLOW-UP (OUTPATIENT)
Dept: ONCOLOGY | Facility: CLINIC | Age: 60
End: 2025-05-21

## 2025-05-21 DIAGNOSIS — C56.1 OVARIAN CANCER, RIGHT (H): Primary | ICD-10-CM

## 2025-05-21 DIAGNOSIS — D69.6 THROMBOCYTOPENIA: Primary | ICD-10-CM

## 2025-05-21 LAB
ALBUMIN SERPL BCG-MCNC: 3.5 G/DL (ref 3.5–5.2)
ALP SERPL-CCNC: 104 U/L (ref 40–150)
ALT SERPL W P-5'-P-CCNC: 12 U/L (ref 0–50)
ANION GAP SERPL CALCULATED.3IONS-SCNC: 9 MMOL/L (ref 7–15)
AST SERPL W P-5'-P-CCNC: 23 U/L (ref 0–45)
BASOPHILS # BLD AUTO: 0 10E3/UL (ref 0–0.2)
BASOPHILS NFR BLD AUTO: 1 %
BILIRUB SERPL-MCNC: 0.4 MG/DL
BUN SERPL-MCNC: 29.5 MG/DL (ref 8–23)
CALCIUM SERPL-MCNC: 9.2 MG/DL (ref 8.8–10.4)
CANCER AG125 SERPL-ACNC: 79 U/ML
CHLORIDE SERPL-SCNC: 107 MMOL/L (ref 98–107)
CREAT SERPL-MCNC: 0.73 MG/DL (ref 0.51–0.95)
EGFRCR SERPLBLD CKD-EPI 2021: >90 ML/MIN/1.73M2
EOSINOPHIL # BLD AUTO: 0.1 10E3/UL (ref 0–0.7)
EOSINOPHIL NFR BLD AUTO: 1 %
ERYTHROCYTE [DISTWIDTH] IN BLOOD BY AUTOMATED COUNT: 18.1 % (ref 10–15)
GLUCOSE SERPL-MCNC: 105 MG/DL (ref 70–99)
HCO3 SERPL-SCNC: 24 MMOL/L (ref 22–29)
HCT VFR BLD AUTO: 30.4 % (ref 35–47)
HGB BLD-MCNC: 9.4 G/DL (ref 11.7–15.7)
IMM GRANULOCYTES # BLD: 0 10E3/UL
IMM GRANULOCYTES NFR BLD: 0 %
LYMPHOCYTES # BLD AUTO: 0.9 10E3/UL (ref 0.8–5.3)
LYMPHOCYTES NFR BLD AUTO: 22 %
MAGNESIUM SERPL-MCNC: 2 MG/DL (ref 1.7–2.3)
MCH RBC QN AUTO: 29.5 PG (ref 26.5–33)
MCHC RBC AUTO-ENTMCNC: 30.9 G/DL (ref 31.5–36.5)
MCV RBC AUTO: 95 FL (ref 78–100)
MONOCYTES # BLD AUTO: 0.4 10E3/UL (ref 0–1.3)
MONOCYTES NFR BLD AUTO: 10 %
NEUTROPHILS # BLD AUTO: 2.9 10E3/UL (ref 1.6–8.3)
NEUTROPHILS NFR BLD AUTO: 66 %
NRBC # BLD AUTO: 0 10E3/UL
NRBC BLD AUTO-RTO: 0 /100
PLATELET # BLD AUTO: 59 10E3/UL (ref 150–450)
POTASSIUM SERPL-SCNC: 4.3 MMOL/L (ref 3.4–5.3)
PROT SERPL-MCNC: 6.3 G/DL (ref 6.4–8.3)
RBC # BLD AUTO: 3.19 10E6/UL (ref 3.8–5.2)
SODIUM SERPL-SCNC: 140 MMOL/L (ref 135–145)
WBC # BLD AUTO: 4.3 10E3/UL (ref 4–11)

## 2025-05-21 PROCEDURE — 36591 DRAW BLOOD OFF VENOUS DEVICE: CPT | Performed by: OBSTETRICS & GYNECOLOGY

## 2025-05-21 PROCEDURE — B9004 PARENTERAL INFUS PUMP PORTAB: HCPCS | Mod: RR

## 2025-05-21 PROCEDURE — 82310 ASSAY OF CALCIUM: CPT | Performed by: OBSTETRICS & GYNECOLOGY

## 2025-05-21 PROCEDURE — 83735 ASSAY OF MAGNESIUM: CPT | Performed by: OBSTETRICS & GYNECOLOGY

## 2025-05-21 PROCEDURE — 86304 IMMUNOASSAY TUMOR CA 125: CPT

## 2025-05-21 PROCEDURE — 85004 AUTOMATED DIFF WBC COUNT: CPT | Performed by: OBSTETRICS & GYNECOLOGY

## 2025-05-21 PROCEDURE — S9366 HIT TPN 2 LITER DIEM: HCPCS

## 2025-05-21 NOTE — PROGRESS NOTES
Infusion Nursing Note:  Jacki Smith presents today for labs, port needle change.    Patient seen by provider today: No   present during visit today: Not Applicable.    Note: Michelle arrived ambulatory by herself for labs and port needle change. Plan of care reviewed and she has no questions.  Port was de accessed and EMLA cream was applied x 20 minutes prior to port re access per patient request.    Intravenous Access:  Labs drawn without difficulty.  Implanted Port.    Discharge Plan:   Patient will return May 23rd for next appointment.   Patient discharged in stable condition accompanied by: self.  Departure Mode: Ambulatory.      Rylie Bullard RN

## 2025-05-22 ENCOUNTER — HOME INFUSION BILLING (OUTPATIENT)
Dept: HOME HEALTH SERVICES | Facility: HOME HEALTH | Age: 60
End: 2025-05-22
Payer: COMMERCIAL

## 2025-05-22 PROCEDURE — B9004 PARENTERAL INFUS PUMP PORTAB: HCPCS | Mod: RR

## 2025-05-22 PROCEDURE — S9366 HIT TPN 2 LITER DIEM: HCPCS

## 2025-05-22 PROCEDURE — E1399 DURABLE MEDICAL EQUIPMENT MI: HCPCS

## 2025-05-23 ENCOUNTER — APPOINTMENT (OUTPATIENT)
Dept: LAB | Facility: CLINIC | Age: 60
End: 2025-05-23
Attending: OBSTETRICS & GYNECOLOGY
Payer: COMMERCIAL

## 2025-05-23 PROCEDURE — B4178 PARENTERAL SOL AMINO ACID >: HCPCS

## 2025-05-23 PROCEDURE — A4217 STERILE WATER/SALINE, 500 ML: HCPCS

## 2025-05-23 PROCEDURE — B4185 PARENTERAL SOL 10 GM LIPIDS: HCPCS

## 2025-05-23 PROCEDURE — S9366 HIT TPN 2 LITER DIEM: HCPCS

## 2025-05-23 PROCEDURE — S9374 HIT HYDRA 1 LITER DIEM: HCPCS | Mod: SH

## 2025-05-24 PROCEDURE — S9366 HIT TPN 2 LITER DIEM: HCPCS

## 2025-05-24 PROCEDURE — S9374 HIT HYDRA 1 LITER DIEM: HCPCS | Mod: SH

## 2025-05-25 PROCEDURE — S9366 HIT TPN 2 LITER DIEM: HCPCS

## 2025-05-26 PROCEDURE — S9366 HIT TPN 2 LITER DIEM: HCPCS

## 2025-05-27 PROCEDURE — S9366 HIT TPN 2 LITER DIEM: HCPCS

## 2025-05-28 ENCOUNTER — INFUSION THERAPY VISIT (OUTPATIENT)
Dept: INFUSION THERAPY | Facility: HOSPITAL | Age: 60
End: 2025-05-28
Attending: OBSTETRICS & GYNECOLOGY
Payer: COMMERCIAL

## 2025-05-28 DIAGNOSIS — Z78.9 ON TOTAL PARENTERAL NUTRITION (TPN): ICD-10-CM

## 2025-05-28 DIAGNOSIS — C56.1 OVARIAN CANCER, RIGHT (H): Primary | ICD-10-CM

## 2025-05-28 LAB
ALBUMIN SERPL BCG-MCNC: 3.6 G/DL (ref 3.5–5.2)
ALP SERPL-CCNC: 113 U/L (ref 40–150)
ALT SERPL W P-5'-P-CCNC: 15 U/L (ref 0–50)
ANION GAP SERPL CALCULATED.3IONS-SCNC: 9 MMOL/L (ref 7–15)
AST SERPL W P-5'-P-CCNC: 25 U/L (ref 0–45)
BASOPHILS # BLD AUTO: 0 10E3/UL (ref 0–0.2)
BASOPHILS NFR BLD AUTO: 0 %
BILIRUB SERPL-MCNC: 0.5 MG/DL
BILIRUBIN DIRECT (ROCHE PRO & PURE): 0.23 MG/DL (ref 0–0.45)
BUN SERPL-MCNC: 34.3 MG/DL (ref 8–23)
CALCIUM SERPL-MCNC: 9.3 MG/DL (ref 8.8–10.4)
CHLORIDE SERPL-SCNC: 106 MMOL/L (ref 98–107)
CREAT SERPL-MCNC: 1 MG/DL (ref 0.51–0.95)
EGFRCR SERPLBLD CKD-EPI 2021: 64 ML/MIN/1.73M2
ELLIPTOCYTES BLD QL SMEAR: SLIGHT
EOSINOPHIL # BLD AUTO: 0.1 10E3/UL (ref 0–0.7)
EOSINOPHIL NFR BLD AUTO: 4 %
ERYTHROCYTE [DISTWIDTH] IN BLOOD BY AUTOMATED COUNT: 19 % (ref 10–15)
FASTING STATUS PATIENT QL REPORTED: NO
GLUCOSE SERPL-MCNC: 93 MG/DL (ref 70–99)
HCO3 SERPL-SCNC: 23 MMOL/L (ref 22–29)
HCT VFR BLD AUTO: 29.6 % (ref 35–47)
HGB BLD-MCNC: 9.4 G/DL (ref 11.7–15.7)
IMM GRANULOCYTES # BLD: 0 10E3/UL
IMM GRANULOCYTES NFR BLD: 0 %
LYMPHOCYTES # BLD AUTO: 1.1 10E3/UL (ref 0.8–5.3)
LYMPHOCYTES NFR BLD AUTO: 32 %
MAGNESIUM SERPL-MCNC: 2 MG/DL (ref 1.7–2.3)
MCH RBC QN AUTO: 29.9 PG (ref 26.5–33)
MCHC RBC AUTO-ENTMCNC: 31.8 G/DL (ref 31.5–36.5)
MCV RBC AUTO: 94 FL (ref 78–100)
MONOCYTES # BLD AUTO: 0.4 10E3/UL (ref 0–1.3)
MONOCYTES NFR BLD AUTO: 11 %
NEUTROPHILS # BLD AUTO: 1.8 10E3/UL (ref 1.6–8.3)
NEUTROPHILS NFR BLD AUTO: 53 %
NRBC # BLD AUTO: 0 10E3/UL
NRBC BLD AUTO-RTO: 0 /100
PHOSPHATE SERPL-MCNC: 3.8 MG/DL (ref 2.5–4.5)
PLAT MORPH BLD: ABNORMAL
PLATELET # BLD AUTO: 140 10E3/UL (ref 150–450)
POLYCHROMASIA BLD QL SMEAR: SLIGHT
POTASSIUM SERPL-SCNC: 4.3 MMOL/L (ref 3.4–5.3)
PROT SERPL-MCNC: 6.5 G/DL (ref 6.4–8.3)
RBC # BLD AUTO: 3.14 10E6/UL (ref 3.8–5.2)
RBC MORPH BLD: ABNORMAL
SODIUM SERPL-SCNC: 138 MMOL/L (ref 135–145)
TRIGL SERPL-MCNC: 73 MG/DL
WBC # BLD AUTO: 3.5 10E3/UL (ref 4–11)

## 2025-05-28 PROCEDURE — 82248 BILIRUBIN DIRECT: CPT

## 2025-05-28 PROCEDURE — 80051 ELECTROLYTE PANEL: CPT

## 2025-05-28 PROCEDURE — 36591 DRAW BLOOD OFF VENOUS DEVICE: CPT

## 2025-05-28 PROCEDURE — 84100 ASSAY OF PHOSPHORUS: CPT

## 2025-05-28 PROCEDURE — 84478 ASSAY OF TRIGLYCERIDES: CPT

## 2025-05-28 PROCEDURE — S9366 HIT TPN 2 LITER DIEM: HCPCS

## 2025-05-28 PROCEDURE — 85025 COMPLETE CBC W/AUTO DIFF WBC: CPT

## 2025-05-28 PROCEDURE — 83735 ASSAY OF MAGNESIUM: CPT

## 2025-05-28 NOTE — PROGRESS NOTES
Patient presents for port labs for chemo on 5/30 and TPN labs.   Labs drawn from port without difficulty and needle removed.  Applied EMLA cream to port for 20 min, and then port reaccessed for patient to use for TPN at home.  She plans to RTC on 5/30 for chemo, labs are sufficient. Paola Sanz RN

## 2025-05-29 ENCOUNTER — HOME INFUSION BILLING (OUTPATIENT)
Dept: HOME HEALTH SERVICES | Facility: HOME HEALTH | Age: 60
End: 2025-05-29
Payer: COMMERCIAL

## 2025-05-29 ENCOUNTER — MEDICAL CORRESPONDENCE (OUTPATIENT)
Dept: HOME HEALTH SERVICES | Facility: HOME HEALTH | Age: 60
End: 2025-05-29
Payer: COMMERCIAL

## 2025-05-29 PROCEDURE — E1399 DURABLE MEDICAL EQUIPMENT MI: HCPCS

## 2025-05-29 PROCEDURE — S9366 HIT TPN 2 LITER DIEM: HCPCS

## 2025-05-30 PROCEDURE — S9366 HIT TPN 2 LITER DIEM: HCPCS

## 2025-05-30 PROCEDURE — B4185 PARENTERAL SOL 10 GM LIPIDS: HCPCS

## 2025-05-30 PROCEDURE — S9374 HIT HYDRA 1 LITER DIEM: HCPCS | Mod: SH

## 2025-05-30 PROCEDURE — A4217 STERILE WATER/SALINE, 500 ML: HCPCS

## 2025-05-30 PROCEDURE — B4178 PARENTERAL SOL AMINO ACID >: HCPCS

## 2025-05-31 PROCEDURE — B4185 PARENTERAL SOL 10 GM LIPIDS: HCPCS

## 2025-05-31 PROCEDURE — B4178 PARENTERAL SOL AMINO ACID >: HCPCS

## 2025-05-31 PROCEDURE — S9366 HIT TPN 2 LITER DIEM: HCPCS

## 2025-05-31 PROCEDURE — S9374 HIT HYDRA 1 LITER DIEM: HCPCS | Mod: SH

## 2025-06-01 PROCEDURE — B4178 PARENTERAL SOL AMINO ACID >: HCPCS

## 2025-06-01 PROCEDURE — A4217 STERILE WATER/SALINE, 500 ML: HCPCS

## 2025-06-01 PROCEDURE — S9366 HIT TPN 2 LITER DIEM: HCPCS

## 2025-06-02 ENCOUNTER — INFUSION THERAPY VISIT (OUTPATIENT)
Dept: INFUSION THERAPY | Facility: HOSPITAL | Age: 60
End: 2025-06-02
Attending: OBSTETRICS & GYNECOLOGY
Payer: COMMERCIAL

## 2025-06-02 VITALS
SYSTOLIC BLOOD PRESSURE: 148 MMHG | TEMPERATURE: 98.2 F | HEART RATE: 70 BPM | OXYGEN SATURATION: 100 % | DIASTOLIC BLOOD PRESSURE: 73 MMHG | RESPIRATION RATE: 16 BRPM

## 2025-06-02 DIAGNOSIS — C56.1 OVARIAN CANCER, RIGHT (H): Primary | ICD-10-CM

## 2025-06-02 PROCEDURE — B4178 PARENTERAL SOL AMINO ACID >: HCPCS

## 2025-06-02 PROCEDURE — S9366 HIT TPN 2 LITER DIEM: HCPCS

## 2025-06-02 PROCEDURE — B4185 PARENTERAL SOL 10 GM LIPIDS: HCPCS

## 2025-06-02 NOTE — PROGRESS NOTES
Infusion Nursing Note:  Jacki Smith presents today for port needle change.    Patient seen by provider today: No   present during visit today: Not Applicable.    Note: Michelle arrived ambulatory by herself for port needle change. Plan of care reviewed and she has no questions.  Port was de accessed and EMLA cream was applied x 20 minutes prior to port re access per patient request.    Intravenous Access:  Implanted Port. No labs at this visit.    Treatment Conditions:  Not Applicable.    Discharge Plan:   Patient will return June 9th for next appointment.   Patient discharged in stable condition accompanied by: self.  Departure Mode: Ambulatory.      Rylie Bullard RN

## 2025-06-03 PROCEDURE — A4217 STERILE WATER/SALINE, 500 ML: HCPCS

## 2025-06-03 PROCEDURE — B4185 PARENTERAL SOL 10 GM LIPIDS: HCPCS

## 2025-06-03 PROCEDURE — B4178 PARENTERAL SOL AMINO ACID >: HCPCS

## 2025-06-03 PROCEDURE — S9366 HIT TPN 2 LITER DIEM: HCPCS

## 2025-06-04 ENCOUNTER — THERAPY VISIT (OUTPATIENT)
Dept: PHYSICAL THERAPY | Facility: REHABILITATION | Age: 60
End: 2025-06-04
Attending: NURSE PRACTITIONER
Payer: COMMERCIAL

## 2025-06-04 ENCOUNTER — HOME INFUSION (OUTPATIENT)
Dept: HOME HEALTH SERVICES | Facility: HOME HEALTH | Age: 60
End: 2025-06-04
Payer: COMMERCIAL

## 2025-06-04 DIAGNOSIS — R30.0 DYSURIA: Primary | ICD-10-CM

## 2025-06-04 DIAGNOSIS — R32 URINARY INCONTINENCE: ICD-10-CM

## 2025-06-04 PROCEDURE — A4217 STERILE WATER/SALINE, 500 ML: HCPCS

## 2025-06-04 PROCEDURE — 97161 PT EVAL LOW COMPLEX 20 MIN: CPT | Mod: GP

## 2025-06-04 PROCEDURE — 97110 THERAPEUTIC EXERCISES: CPT | Mod: GP

## 2025-06-04 PROCEDURE — B4185 PARENTERAL SOL 10 GM LIPIDS: HCPCS

## 2025-06-04 PROCEDURE — S9366 HIT TPN 2 LITER DIEM: HCPCS

## 2025-06-04 PROCEDURE — B4178 PARENTERAL SOL AMINO ACID >: HCPCS

## 2025-06-04 PROCEDURE — 97112 NEUROMUSCULAR REEDUCATION: CPT | Mod: GP

## 2025-06-04 NOTE — PROGRESS NOTES
"PHYSICAL THERAPY EVALUATION  Type of Visit: Evaluation       Subjective     Pt is a 60 year old female here today regarding lack of bladder control with the goal of decreasing leakage. Currently pt has PEG tube placed with limited BMs (every 3-4 days) and reporting feeling of abdominal distention at times along with feeling that she cannot take a full breath due to the tube feeling as though it is pushing up on the lungs. Occasionally pain with urination but per pt not a big priority right now. Doesn't have to rush to the toilet anymore during the day but does need to at night. Pt feels a lot of this is related to when she was in the hospital for 3 weeks and got deconditioned. Now she has trouble walking to the end of her driveway \"to be fair it is kind of a long driveway.\" Pt reports that she drinks around 16oz oz/day of water \"I know I don't drink enough fluid.\" Activities that cause her to leak include are typically as she gets closer to the toilet, less so TRAY. 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 PMH 3 vaginal deliveries.     Pelvic Floor Distress Inventory    Please answer the following questions. If you are unsure about how to answer a question, give the best answer you can.     Do you usually experience pressure in the lower abdomen? (Patient-Rptd) Yes, moderately in the past 3 months  Do you usually experience heaviness or dullness in the pelvic area? (Patient-Rptd) Yes, somewhat in the past 3 months  Do you usually have a bulge or something falling out that you can see or feel in the vaginal area? (Patient-Rptd) No  Do you usually have to push on the vagina or around the rectum to have or complete a bowel movement? (Patient-Rptd) No  Do you usually experience a feeling of incomplete bladder emptying? (Patient-Rptd) Yes, quite a bit in the past three months  Do you ever have to push up on a bulge in the vaginal area with " your fingers to start or complete urination? (Patient-Rptd) No  Do you feel you need to strain too hard to have a bowel movement? (Patient-Rptd) Yes, somewhat in the past 3 months  Do you feel you have not completely emptied your bowels at the end of a bowel movement? (Patient-Rptd) Yes, somewhat in the past 3 months  Do you usually lose stool beyond your control if your stool is well formed? (Patient-Rptd) No  Do you usually lose stool beyond your control if your stool is loose? (Patient-Rptd) No  Do you usually lose gas from the rectum beyond your control? (Patient-Rptd) No  Do you usually have pain when you pass your stool? (Patient-Rptd) No  Do you experience a strong sense of urgency and have to rush to the bathroom to have a bowel movemet? (Patient-Rptd) No  Does a part of your bowel ever pass through the rectum and bulge outside during or after a bowel movement? (Patient-Rptd) No  Do you usually experience frequent urination? (Patient-Rptd) No  Do you usually experience urine leakage associated with a feeling of urgency that is a strong sensation of needing to go to the bathroom? (Patient-Rptd) Yes, quite a bit in the past three months  Do you usually experience urine leakage related to coughing, sneezing or laughing? (Patient-Rptd) Yes, quite a bit in the past three months  Do you usually experience small amounts of leakage (that is, drops)? (Patient-Rptd) Yes, quite a bit in the past three months  Do you usually experience difficulty emptying your bladder? (Patient-Rptd) Yes, quite a bit in the past three months  Do you usually experience pain or discomfort in the lower abdomen or genital region? (Patient-Rptd) No  Questions about activity:   Do you usually experience urine leakage during exercise such as walking, running, aerobics or tennis?    Do you usually have urine leakage when lifting or bending over?        PFDI score: PFDI-20 Total Score: (Patient-Rptd) 116.67       06/04/25 Main findings:   Bowels  every 3-4 days  Much difficulty with big belly/hard belly          Presenting condition or subjective complaint: Lack of control of bladder.  Leaking.  Date of onset: 05/02/25 (MD order)    Relevant medical history: Cancer   Dates & types of surgery:      Prior diagnostic imaging/testing results:       Prior therapy history for the same diagnosis, illness or injury: No      Living Environment  Social support: With a significant other or spouse   Type of home: House   Stairs to enter the home: Yes 4 Is there a railing: Yes     Ramp: No   Stairs inside the home: Yes 12 Is there a railing: Yes     Help at home: None  Equipment owned:       Employment: Yes Bookkeeping  Hobbies/Interests: Gardening.    Patient goals for therapy:      Pain assessment:      Objective      PELVIC EVALUATION  ADDITIONAL HISTORY:  Sex assigned at birth: Female  Gender identity: Female    Pronouns: She/Her Hers      Bladder History:  Feels bladder filling: No  Triggers for feeling of inability to wait to go to the bathroom: Yes Stsnding  How long can you wait to urinate:    Gets up at night to urinate: Yes 3  Can stop the flow of urine when urinating: Yes  Volume of urine usually released: Medium   Other issues: Trouble emptying bladder completely; Dribbling after urinating; Bladder infections  Number of bladder infections in last 12 months: 2  Fluid intake per day: 16oz      Medications taken for bladder: No     Activities causing urine leak: Jump; Run; Hurrying to the bathroom due to a strong urge to urinate (pee)    Amount of urine typically leaked:    Pads used to help with leaking: Yes I use this many pads per day: 4        Bowel History:  Frequency of bowel movement: Every 3 -4 days  Consistency of stool: Soft    Ignores the urge to defecate: No  Other bowel issues:    Length of time spent trying to have a bowel movement:      Sexual Function History:  Sexual orientation: Straight    Sexually active: No  Lubrication used:      Pelvic  pain:      Pain or difficulty with orgasms/erection/ejaculation:      State of menopause: Post-menopause (I am done with menopause)  Hormone medications: No      Are you currently pregnant: No  Number of previous pregnancies: 3  Number of deliveries: 3  If you have delivered before, did you have any of these issues during delivery: Tearing; Vaginal delivery  Do you get regular exercise: Yes  I do this type of exercise: Walk  Have you tried pelvic floor strengthening exercises for 4 weeks: No      Discussed reason for referral regarding pelvic health needs and external/internal pelvic floor muscle examination with patient/guardian.  Opportunity provided to ask questions and verbal consent for assessment and intervention was given.    OBJECTIVE  OB History    Para Term  AB Living   3 3 3 0 0 3   SAB IAB Ectopic Multiple Live Births   0 0 0 0 3      # Outcome Date GA Lbr Sukumar/2nd Weight Sex Type Anes PTL Lv   3 Term      Vag-Spont      2 Term      Vag-Spont      1 Term      Vag-Spont          GENERAL SCREENING    GAIT:  WFL    POSTURE:   Rounded shoulders     SPINE/HIP SCREEN (STRENGTH/ROM):   Globally anti-gravity with functional movement  General weakness in stabilizing muscles including hip abductors, adductors and extensors     ROM : WFL   Trunk rotation: mild dec B    PALPATION:  Tension noted B QLs and lumbar paraspinals, adductors    BREATHING SYMMETRY: Decreased rib cage mobility    PELVIC EXAM  The patient has provided verbal consent to proceed with the pelvic floor assessment and understands the procedure, including any necessary internal examination, before and during the assessment.    External Visual Inspection:  Active Pelvic Floor Muscles:  Voluntary contraction:  present  Voluntary Relaxation: absent  Cough( Involuntary Contraction):  present  Integumentary:   Tissue Concerns: normal appearing vagina with normal color and discharge, no lesions  Introitus: Unremarkable    Internal Digital  Palpation:  Muscles Assessed:    Layer 1  Urogenital Triangle(ischiocavernosus, Bulbospongiosus, Superficial Transverse Perineal Muscles) Tension noted   Pt declines pain or tenderness    Strength of Pelvic Floor Musculature:   Laycock's Modified Oxford Scale Score: Pelvic Floor Strength: pelvic MMT: 1+   Layer 2  Levator Ani (pubococcygeus, iliococcygeus, puborectalis) Tension noted   Pt declines pain or tenderness    Strength of Pelvic Floor Musculature:   Laycock's Modified Oxford Scale Score: Pelvic Floor Strength: pelvic MMT: 2+   Layer 3  Obturator Internus  Tension noted   Pt declines pain or tenderness    Strength of Pelvic Floor Musculature:   Laycock's Modified Oxford Scale Score: Pelvic Floor Strength: pelvic MMT: 2+     Response to Bearing Down : much difficulty     Compensations: Abdominals, Gluts, Breath holding  Endurance: Can maintain contraction for 7 sec   Patient is able to perform 3 quick contractions in 10 seconds     Coordination: poor    With diaphragm: poor  With TA: over-engagement  With cough: fair  With big belly/hard belly: poor    ABDOMINAL ASSESSMENT  Diastasis Rectus Abdominis (JOELLEN): JOELLEN presence: No  Abdominal Activation/Strength: approp.  General Fascial Tension/Restriction: Pain, tenderness RUQ consistent with findings of inc tension in area    Assessment & Plan   CLINICAL IMPRESSIONS  Medical Diagnosis: Dysuria (R30.0)  - Primary    Urinary incontinence (R32)    Treatment Diagnosis: Dysuria (R30.0)  - Primary    Urinary incontinence (R32)   Impression/Assessment: Patient is a 60 year old - year old female with urinary incontinence and dysuria .  The following significant findings have been identified: Pain, Decreased ROM/flexibility, Decreased joint mobility, Decreased strength, Decreased proprioception, Impaired muscle performance, Decreased activity tolerance, and Impaired posture. These impairments interfere with their ability to perform self care tasks, recreational activities,  household chores, and community mobility as compared to previous level of function. Pt is appropriate for skilled PT intervention.     Clinical Decision Making (Complexity):  Clinical Presentation: Stable/Uncomplicated  Clinical Presentation Rationale: based on medical and personal factors listed in PT evaluation  Clinical Decision Making (Complexity): Low complexity    PLAN OF CARE  Treatment Interventions:  Gait Training, Manual Therapy, Neuromuscular Re-education, Therapeutic Activity, Therapeutic Exercise, Self-Care/Home Management  Biofeedback, Cryotherapy, Dry Needling, E-stim, Mechanical Traction, Ultrasound    Long Term Goals     PT Goal 1  Goal Identifier: PFDI  Goal Description: Current PFDI score >70 indicates high distress; this score will be reduced by at least 20 points within 3 months  Rationale: to maximize safety and independence within the home  Goal Progress: progressing  Target Date: 09/01/25  PT Goal 2  Goal Identifier: Leakage  Goal Description: Reduce urinary leakage episodes to 1-2 times per week max and improve pelvic floor muscle strength/coordination through targeted exercises and bladder training strategies.  Rationale: to maximize safety and independence with performance of ADLs and functional tasks  Goal Progress: progressing  Target Date: 09/01/25  PT Goal 3  Goal Identifier: BM freq  Goal Description: Increase bowel movement frequency to a regular pattern within 12 weeks while improving stool consistency and optimizing toileting position by implementing dietary changes, hydration strategies, and pelvic floor muscle training, effectively managing symptoms of both diarrhea and constipation.  Rationale: to maximize safety and independence with performance of ADLs and functional tasks  Goal Progress: progressing  Target Date: 09/01/25      Frequency of Treatment: 1x/week  Duration of Treatment: 90 days    Recommended Referrals to Other Professionals:   Education Assessment:    Learner/Method: Patient  Education Comments: Education regarding mechanisms and rationale for physical therapy interventions selected to address their goals. Discussed self management strategies for ways patient can actively support progress towards goals. Education regarding activity precautions/restrictions. Addressed patients questions and concerns to their satisfaction prior to completion of visit.    Risks and benefits of evaluation/treatment have been explained.   Patient/Family/caregiver agrees with Plan of Care.     Evaluation Time:     PT Eval, Low Complexity Minutes (52386): 15     Signing Clinician: JAVID Pinzon

## 2025-06-05 ENCOUNTER — HOME INFUSION BILLING (OUTPATIENT)
Dept: HOME HEALTH SERVICES | Facility: HOME HEALTH | Age: 60
End: 2025-06-05
Payer: COMMERCIAL

## 2025-06-05 ENCOUNTER — MEDICAL CORRESPONDENCE (OUTPATIENT)
Dept: HOME HEALTH SERVICES | Facility: HOME HEALTH | Age: 60
End: 2025-06-05
Payer: COMMERCIAL

## 2025-06-05 PROCEDURE — B4178 PARENTERAL SOL AMINO ACID >: HCPCS

## 2025-06-05 PROCEDURE — B4185 PARENTERAL SOL 10 GM LIPIDS: HCPCS

## 2025-06-05 PROCEDURE — S9366 HIT TPN 2 LITER DIEM: HCPCS

## 2025-06-09 ENCOUNTER — NURSE TRIAGE (OUTPATIENT)
Dept: ONCOLOGY | Facility: CLINIC | Age: 60
End: 2025-06-09

## 2025-06-09 ENCOUNTER — INFUSION THERAPY VISIT (OUTPATIENT)
Dept: INFUSION THERAPY | Facility: HOSPITAL | Age: 60
End: 2025-06-09
Attending: OBSTETRICS & GYNECOLOGY
Payer: COMMERCIAL

## 2025-06-09 ENCOUNTER — RESULTS FOLLOW-UP (OUTPATIENT)
Dept: ONCOLOGY | Facility: CLINIC | Age: 60
End: 2025-06-09

## 2025-06-09 VITALS
DIASTOLIC BLOOD PRESSURE: 53 MMHG | HEART RATE: 73 BPM | SYSTOLIC BLOOD PRESSURE: 123 MMHG | TEMPERATURE: 98.3 F | OXYGEN SATURATION: 98 %

## 2025-06-09 DIAGNOSIS — Z78.9 ON TOTAL PARENTERAL NUTRITION (TPN): ICD-10-CM

## 2025-06-09 LAB
ALBUMIN SERPL BCG-MCNC: 3.3 G/DL (ref 3.5–5.2)
ALP SERPL-CCNC: 100 U/L (ref 40–150)
ALT SERPL W P-5'-P-CCNC: 13 U/L (ref 0–50)
ANION GAP SERPL CALCULATED.3IONS-SCNC: 9 MMOL/L (ref 7–15)
AST SERPL W P-5'-P-CCNC: 21 U/L (ref 0–45)
BASOPHILS # BLD AUTO: 0 10E3/UL (ref 0–0.2)
BASOPHILS NFR BLD AUTO: 0 %
BILIRUB SERPL-MCNC: 0.2 MG/DL
BILIRUBIN DIRECT (ROCHE PRO & PURE): 0.1 MG/DL (ref 0–0.45)
BUN SERPL-MCNC: 26.8 MG/DL (ref 8–23)
CALCIUM SERPL-MCNC: 8.7 MG/DL (ref 8.8–10.4)
CHLORIDE SERPL-SCNC: 106 MMOL/L (ref 98–107)
CREAT SERPL-MCNC: 0.77 MG/DL (ref 0.51–0.95)
DACRYOCYTES BLD QL SMEAR: SLIGHT
EGFRCR SERPLBLD CKD-EPI 2021: 88 ML/MIN/1.73M2
EOSINOPHIL # BLD AUTO: 0 10E3/UL (ref 0–0.7)
EOSINOPHIL NFR BLD AUTO: 3 %
ERYTHROCYTE [DISTWIDTH] IN BLOOD BY AUTOMATED COUNT: 18.1 % (ref 10–15)
FASTING STATUS PATIENT QL REPORTED: NO
GLUCOSE SERPL-MCNC: 108 MG/DL (ref 70–99)
HCO3 SERPL-SCNC: 22 MMOL/L (ref 22–29)
HCT VFR BLD AUTO: 25.5 % (ref 35–47)
HGB BLD-MCNC: 8.1 G/DL (ref 11.7–15.7)
IMM GRANULOCYTES # BLD: 0 10E3/UL
IMM GRANULOCYTES NFR BLD: 0 %
LYMPHOCYTES # BLD AUTO: 0.8 10E3/UL (ref 0.8–5.3)
LYMPHOCYTES NFR BLD AUTO: 56 %
MAGNESIUM SERPL-MCNC: 1.9 MG/DL (ref 1.7–2.3)
MCH RBC QN AUTO: 30.1 PG (ref 26.5–33)
MCHC RBC AUTO-ENTMCNC: 31.8 G/DL (ref 31.5–36.5)
MCV RBC AUTO: 95 FL (ref 78–100)
MONOCYTES # BLD AUTO: 0.3 10E3/UL (ref 0–1.3)
MONOCYTES NFR BLD AUTO: 19 %
NEUTROPHILS # BLD AUTO: 0.3 10E3/UL (ref 1.6–8.3)
NEUTROPHILS NFR BLD AUTO: 22 %
NRBC # BLD AUTO: 0 10E3/UL
NRBC BLD AUTO-RTO: 0 /100
PHOSPHATE SERPL-MCNC: 4 MG/DL (ref 2.5–4.5)
PLAT MORPH BLD: ABNORMAL
PLATELET # BLD AUTO: 126 10E3/UL (ref 150–450)
POLYCHROMASIA BLD QL SMEAR: SLIGHT
POTASSIUM SERPL-SCNC: 4.2 MMOL/L (ref 3.4–5.3)
PROT SERPL-MCNC: 5.9 G/DL (ref 6.4–8.3)
RBC # BLD AUTO: 2.69 10E6/UL (ref 3.8–5.2)
RBC MORPH BLD: ABNORMAL
SODIUM SERPL-SCNC: 137 MMOL/L (ref 135–145)
TRIGL SERPL-MCNC: 73 MG/DL
WBC # BLD AUTO: 1.4 10E3/UL (ref 4–11)

## 2025-06-09 PROCEDURE — 83735 ASSAY OF MAGNESIUM: CPT

## 2025-06-09 PROCEDURE — 85025 COMPLETE CBC W/AUTO DIFF WBC: CPT

## 2025-06-09 PROCEDURE — 84132 ASSAY OF SERUM POTASSIUM: CPT

## 2025-06-09 PROCEDURE — 82248 BILIRUBIN DIRECT: CPT

## 2025-06-09 PROCEDURE — 84478 ASSAY OF TRIGLYCERIDES: CPT

## 2025-06-09 PROCEDURE — 84100 ASSAY OF PHOSPHORUS: CPT

## 2025-06-09 PROCEDURE — 36591 DRAW BLOOD OFF VENOUS DEVICE: CPT

## 2025-06-09 NOTE — PROGRESS NOTES
Infusion Nursing Note:  Jacki Smith presents today for Labs, port needle change.    Patient seen by provider today: No   present during visit today: Not Applicable.    Note: Michelle arrived ambulatory by herself for labs and port needle change. Plan of care reviewed and she has no questions.  Labs were drawn, port was de accessed and EMLA cream was applied x 20 minutes prior to port re access per patient request.    Intravenous Access:  Labs drawn after several saline flushes and patient repositioning.  Implanted Port.    Discharge Plan:   Patient will return June 17th for next appointment.   Patient discharged in stable condition accompanied by: self.  Departure Mode: Ambulatory.      Rylie Bullard RN

## 2025-06-09 NOTE — TELEPHONE ENCOUNTER
"DATE/TIME OF CALL RECEIVED FROM Infusion nurse at [facility], who states pt comes in for weekly port access/change and labs:  06/09/25 at 9:34 AM. Pt has not yet be notified of results.   LAB TEST:  ANC 0.3   Other values: WBC 1.4, hgb 8.1, plt 126   Previous labs from 5/28/25: ANC 1.8, WBC 3.5, hgb 9.4, plt 140  PROVIDER NOTIFIED?: Yes  PROVIDER NAME:  Susannah Roman  TIME LAB VALUE REPORTED TO PROVIDER: 5985  MECHANISM OF PROVIDER NOTIFICATION: Page  Next labs and MIKE visit scheduled for 6/17/25. Next chemo treatment is scheduled for 6/19/25.   PROVIDER RESPONSE: [id number] return call from Susannah, who advises pt monitor for signs of infection and recheck next week as scheduled.   1010 call to pt, reviewed labs from today. Pt denies signs of infection. Says she over did it with eating over the weekend, has not had a BM in a couple days now and has abdomen discomfort. Yesterday, did not eat much and felt abdomen pain on right side. She said her bowels do no move like they should and said this has happened before. She states abdomen pain was worse yesterday compared to today, said \"I know what its from\". Confirmed pt is passing gas. Confirmed pt is taking 1 stool softener/day. Writer advised pt add in Senna or Miralax to help promote BM, suggested pt drink extra water, ambulate and drink a warm beverage to help promote BM as well. Advised pt monitor for signs of infection and call triage if any signs develop, encouraged good hand hygiene and advised she avoid contact with others that are ill. Pt voiced understanding.   "

## 2025-06-09 NOTE — PROGRESS NOTES
Discussed with triage that she had her chemo infusion 5/30 and she is nadiring;; discussed when to go to the ED. Her next cycle is scheduled 6/19; reviewed with Dr. Patino her last infusion and that her next cycle is the 6th.     JALEN Zamarripa, WHNP-BC, ANP-BC, AOCNP  Women's Health Nurse Practitioner  Adult Nurse Practitioner  Division of Gynecologic Oncology

## 2025-06-09 NOTE — PROGRESS NOTES
DATE/TIME OF CALL RECEIVED FROM LAB:  06/09/25 at 9:10 AM   LAB TEST:  ANC  LAB VALUE:  0.3  PROVIDER NOTIFIED?: Yes  PROVIDER NAME: [facility] RN Kiana Escobar  DATE/TIME LAB VALUE REPORTED TO PROVIDER: 6/9/25 at 0934  MECHANISM OF PROVIDER NOTIFICATION: Phone Call  PROVIDER RESPONSE: Triage will notify oncologist.

## 2025-06-11 ENCOUNTER — MEDICAL CORRESPONDENCE (OUTPATIENT)
Dept: HOME HEALTH SERVICES | Facility: HOME HEALTH | Age: 60
End: 2025-06-11
Payer: COMMERCIAL

## 2025-06-12 ENCOUNTER — HOME INFUSION BILLING (OUTPATIENT)
Dept: HOME HEALTH SERVICES | Facility: HOME HEALTH | Age: 60
End: 2025-06-12
Payer: COMMERCIAL

## 2025-06-12 ENCOUNTER — HOME INFUSION (OUTPATIENT)
Dept: HOME HEALTH SERVICES | Facility: HOME HEALTH | Age: 60
End: 2025-06-12
Payer: COMMERCIAL

## 2025-06-16 NOTE — PROGRESS NOTES
Virtual Visit Details    Type of service:  Video Visit     Originating Location (pt. 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  She presents today for follow up and disease management by video.      Oncology History:  22: Presented to an ED in [place] for evaluation of abdominal bloating and discomfort.  -Abdomen, pelvic CT: Radiographic Stage CAL ovarian cancer.   22: CA-410=[id number].   22: Pelvic ultrasound: c/w metastatic cancer.  22: Pelvic exam under anesthesia, diagnostic laparoscopy, biopsies, evacuation of ascites.   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      22, 22, 22: Cycles 1-3 of neoadjuvant chemotherapy with IV carboplatin + paclitaxel 175 mg/m2 every 21 days.   -Cycles 1,2: Carboplatin AUC 6.   -Cycle 3: Carboplatin AUC 5 with dose-reduction due to thrombcytopenia.   -22: Chest, abdomen, pelvic CT: Partial response.   22: Pelvic exam under anesthesia, diagnostic laparoscopy, conversion to laparotomy for optimal interval tumor debulking to no gross residual disease including peritoneal and diaphragm biopsies, bilateral salpingo-oophorectomy, infragastric omentectomy, bilateral hemidiaphragm stripping, peritoneal and mesenteric argon beam ablation, appendectomy.   -Pathology: High-grade serous carcinoma involving all resected specimens.   Caris Testing Results.  -Genomic ROXI low (6%)   -TMB low (1mut/Mb)  -Microsatellite stable/Mismatch Repair proficient  -PD-L1- (CPS 0%)  -NTRK 1/2/3  fusion not detected.   -ER-, HI+  -Genomic alterations in:  --TP53  -No genomic alterations in BRCA1,2.  -FOLR1+ (2+, 75%).      Jamila-ovary clinical trial screening: Negative for the immunoreactive subtype.     9/28/22, 10/19/22, 11/17/22: Cycles 4-6 of first-line chemotherapy with IV carboplatin AUC 5 + paclitaxel 175 mg/m2.  -12/2/22: Chest, abdomen, pelvic CT: No definitive evidence of disease.   -CA-125 439>>23.   -2/16/23: Chest, abdomen, pelvic CT to evaluate bloating: Stable findings, no definitive evidence of disease.    -5/25/23: Admitted  to [facility] for management of malignant ascites s/p therapeutic paracentesis, and partial SBO resolved with conservative management.   5/25/23: Abdomen, pelvic CT: Progressive disease.      10/5/22: [facility], reflexed to Common Hereditary Cancer Panel.   -No identifiable germline variants identified in ABRAXAS1, AKT1, APC, DEION, AXIN2, BARD1, BMPR1A, BRCA1, BRCA2, BRIP1, CDC73, CDH1, CDK4, CDKN2A, CHEK2, CTNNA1, DICER1, EPCAM, FANCC, FANCM, GREM1, HOXB13, KIT, MEN1, MLH1, MRE11, MSH2, MSH6, MUTYH, NBN, NF1, NTHL1, PALB2, PDGFRA, PIK3CA, PMS2, POLD1, POLE, PTEN, RAD50, RAD51C, RAD51D, RECQL, RINT1, SDHA, SDHB, SDHC, SDHD, SMAD4, SMARCA4, STK11, TP53, TSC1, TSC2, VHL, XRCC2.   -Variant of uncertain significance in MSH3 c.16C>T (p.Cbw3Nso)     6/13/23, 7/12/23, 8/14/23, 9/13/23, 10/11/23, 11/8/23, 12/6/23, 1/3/24, 2/1/24, 2/29/24, 3/28/24: Cycles 1-11 of second-line chemotherapy with IV carboplatin AUC 5 + pegylated liposomal doxorubicin (PLD) 30 mg/m2 every 28 days.   -9/27/23: Chest, abdomen, pelvic CT: Partial response. Catheter-associated thrombus.   -1/23/24: Chest, abdomen, pelvic CT: Stable disease.   -4/10/24: Chest, abdomen, pelvic CT: Stable disease.   -CA-125 78>>44.   5/10/24-1/24/25: Second- PARPi therapy with olaparib.   -Cycles 1-2: Olaparib 300 mg BID.   -Cycles 3+: Olaparib 200 mg BID with dose-reduction due to decreased  "creatinine clearance.   -7/5/24: Chest, abdomen, pelvic CT: Stable disease.   -10/15/24: Chest, abdomen, pelvic CT: Stable disease.   -CA-125 38>>110.     2/6/25: Third-line therapy with IV carboplatin AUC 5 + paclitaxel 135 mg/m2 + bevacizumab 15 mg/kg.  340.  -2/11/25-3/2/25: Complicated by bevacizumab-induced microperforation of the proximal small bowel. Managed with drain placement. Subsequent SBO attributed to post-perforation inflammation. Palliative venting G-tube placed, TPN initiated.      3/19/25, 4/9/25, 4/30/25: Third-line therapy with IV carboplatin AUC 4 + paclitaxel 135 mg/m2. Bevacizumab held indefinitely due to bowel perforation.   - 5/13/25: Chest, abdomen, pelvic CT: Stable disease.     Plan: Continue third-line therapy with palliative intent with IV carboplatin AUC 4 + paclitaxel 135 mg/m2 +every 3 weeks x 3 additional cycles followed by CT CAP and follow up with Dr. Patino. Carboplatin dose-reduction due to grade 2 thrombocytopenia; permanent discontinuation of bevacizumab.      5/30/25: Cycle 5 carboplatin and paclitaxel.   79.  6/19/25: Cycle 6 carboplatin and paclitaxel planned.   75.            Today she reports;    -Abdominal pain/bloating: Some bloating, she \"can tell when bowels aren't moving\" will feel bloated  -Appetite: Low appetite eating soft bland foods, continues on TPN at night, for the last 3-4 days she has not used a bag for her GT  -Bowel/bladder habits: Bowels are \"slow\", pelvic PT instructed bowel massage which she does  -Neuropathy symptoms: No  -Leg swelling: She has had some new left leg edema and saw her PCP and was diagnosed with a blood clot, she was started on Xeralto which \"upsets\" her stomach, does not want to do shots, she is wearing knee high compression stockings but does feel like she has some thigh swelling  -Fevers: No  -Chest pain: No  -SOB: DANIELLE, especially with leg  Low energy, still working              Past Medical History:    Past Medical " History:   Diagnosis Date    Female stress incontinence     Ovarian cancer, right (H) 06/16/2022    Stage IIIC high-grade serous carcinoma    Recurrent cold sores          Past Surgical History:    Past Surgical History:   Procedure Laterality Date    APPENDECTOMY  08/29/2022    Part of ovarian cancer tumor debulking    BLADDER SUSPENSION  08/13/2009    ENDOSCOPIC INSERTION TUBE GASTROSTOMY N/A 2/28/2025    Procedure: INSERTION, PEG TUBE (venting);  Surgeon: Porfirio Saunders MD;  Location: [facility] OR    HYSTERECTOMY  08/13/2009    For prolapse    IR FOLLOW UP VISIT INPATIENT  2/25/2025    IR PARACENTESIS  6/6/2022    IR PARACENTESIS  6/14/2022    IR PARACENTESIS  5/26/2023    IR PARACENTESIS  2/26/2025    IR RETROPERITONEAL ABSCESS DRAINAGE  2/18/2025    LAPAROSCOPY DIAGNOSTIC (GYN)  06/16/2022    SALPINGO-OOPHORECTOMY, COMBINED Bilateral 08/29/2022    Pelvic exam under anesthesia, diagnostic laparoscopy, conversion to laparotomy for optimal interval tumor debulking to no gross residual disease including peritoneal and diaphragm biopsies, bilateral salpingo-oophorectomy, infragastric omentectomy, bilateral hemidiaphragm stripping, peritoneal and mesenteric argon beam ablation, appendectomy.    TONSILLECTOMY  1973    TUBAL LIGATION Bilateral 09/01/1998         Health Maintenance Due   Topic Date Due    ADVANCE CARE PLANNING  Never done    YEARLY PREVENTIVE VISIT  Never done    COLORECTAL CANCER SCREENING  Never done    HIV SCREENING  Never done    HEPATITIS C SCREENING  Never done    PNEUMOCOCCAL VACCINE 50+ YEARS (1 of 2 - PCV) Never done    ZOSTER VACCINE (1 of 2) Never done    PAP  Never done    LIPID  Never done    COVID-19 VACCINE (5 - 2024-25 season) 09/01/2024    RSV VACCINE (1 - Risk 60-74 years 1-dose series) Never done       Current Medications:     Current Outpatient Medications   Medication Sig Dispense Refill    rivaroxaban ANTICOAGULANT (XARELTO) 15 MG TABS tablet Take 15 mg by mouth.      rivaroxaban  "ANTICOAGULANT (XARELTO) 15 MG TABS tablet Take 15 mg by mouth 2 times daily.      acetaminophen (TYLENOL) 500 MG tablet Take 500 mg by mouth every 6 hours as needed for mild pain.      cyclobenzaprine (FLEXERIL) 5 MG tablet Take 1-2 tablets (5-10 mg) by mouth 3 times daily as needed for muscle spasms. 40 tablet 1    diphenhydrAMINE-acetaminophen (TYLENOL PM)  MG tablet Take 1 tablet by mouth nightly as needed for sleep.      docusate sodium (DSS) 100 MG capsule Take 100 mg by mouth 2 times daily as needed for constipation.      Emergency Supply Kit, Central, Patient use for emergency only. Contents: 3 sodium chloride 0.9% flushes, 1 dressing kit, 1 microclave ext set 14\", 4 nitrile gloves (med), 6 alcohol prep pads, 1 bacitracin, 1 syringe (10 cc 20 G 1\"). Call [phone] to reorder. 057713 kit 0    escitalopram (LEXAPRO) 20 MG tablet Take 20 mg by mouth daily.      famotidine (PEPCID) 20 MG tablet Take 20 mg by mouth 2 times daily as needed.      hydrOXYzine HCl (ATARAX) 25 MG tablet Take 1-2 tablets (25-50 mg) by mouth every 6 hours as needed for anxiety. 30 tablet 0    Lidocaine (LIDOCARE) 4 % Patch Place 1 patch over 12 hours onto the skin daily as needed for moderate pain. To prevent lidocaine toxicity, patient should be patch free for 12 hrs daily. 6 patch 0    lidocaine-prilocaine (EMLA) 2.5-2.5 % external cream Apply topically as needed for moderate pain 30 g 1    LORazepam (ATIVAN) 0.5 MG tablet Take 0.5-1 mg by mouth every 6 hours as needed for anxiety.      Multiple Vitamin (INFUVITE ADULT) injection Add to infusion 10 mLs daily. Select 2 multivitamin vials, one of each color top. Draw up 5 mL from each vial and add to 1 TPN bag daily immediately prior to infusing.   Discard remainder of vials. 3600 mL 0    Multiple Vitamin (MULTI-VITAMIN DAILY PO) Take 1 tablet by mouth daily      omeprazole (PRILOSEC) 20 MG DR capsule Take 20 mg by mouth daily.      ondansetron (ZOFRAN ODT) 8 MG ODT tab Take 1 " tablet (8 mg) by mouth every 8 hours as needed for nausea. 30 tablet 0    ondansetron (ZOFRAN) 8 MG tablet Take 1 tablet (8 mg) by mouth every 8 hours as needed for nausea (vomiting). Do not take for 3 days after chemotherapy. 30 tablet 2    parenteral nutrition - DUALCHAMBER - see order for formula Infuse 1,600 mLs over 12 hours into the vein (central line) six times a week. TPN additives to be added prior to administration:   Infuvite-Adult 10 mL daily.  Taper up for 1 Hours. Taper down for 1 Hours. Plateau rate:145.5 mL/hr.  KVO: 5 mL/hr. 750724 mL 0    parenteral nutrition - PLAIN - see order for formula Infuse 1,600 mLs over 12 hours into the vein (central line) once a week. TPN additives to be added prior to administration:   Infuvite-  Adult 10 mL daily.  Taper up for 1 Hours. Taper down for 1 Hours Plateau rate:145.5  mL/hr.  KVO: 5 mL/hr. 017332 mL 0    prochlorperazine (COMPAZINE) 10 MG tablet Take 1 tablet (10 mg) by mouth every 6 hours as needed for nausea or vomiting. 30 tablet 2    [START ON 7/3/2025] rivaroxaban ANTICOAGULANT (XARELTO) 20 MG TABS tablet Take 20 mg by mouth daily.      sodium chloride 0.9 % 500 mL via elastomeric pump Infuse 1,000 mLs into the vein daily as needed (dehydration). Infuse 1- 2 elastomeric pump(s) ( 500-1000 ml) . Each elastomeric to infuse over 2 hours. 077268 mL 0    sodium chloride, PF, 0.9% PF flush Inject 10 mLs into the vein as needed for line flush. Flush IV before and after med administration as directed and/or at least every 24 hours, or prior to deaccessing for no further use and/or at least every 4 weeks when not accessed. 702894 mL 0    valACYclovir (VALTREX) 1000 mg tablet Take 1,000 mg by mouth 2 times daily as needed.           Allergies:      No Known Allergies     Social History:     Social History     Tobacco Use    Smoking status: Former     Current packs/day: 0.00     Average packs/day: 1 pack/day for 42.0 years (42.0 ttl pk-yrs)     Types: Cigarettes  "    Start date: 5/22/1980     Quit date: 5/22/2022     Years since quitting: 3.0    Smokeless tobacco: Never   Substance Use Topics    Alcohol use: Yes     Comment: Beer ~Q3 months.       History   Drug Use Unknown         Family History:     The patient's family history is notable for:    Family History   Problem Relation Age of Onset    Prostate Cancer Father     Breast Cancer Sister 48    Melanoma Sister     Skin Cancer Sister     Colon Cancer Maternal Grandmother          Physical Exam:     Ht 1.575 m (5' 2\")   BMI 25.17 kg/m    Body mass index is 25.17 kg/m .    General Appearance: alert, no distress    Eyes:  Eyes grossly normal.  No obvious discharge, erythema, or scleral/conjunctival abnormalities.    Respiratory: No noted audible wheeze, cough, or visible cyanosis.  No visible retractions or increased work of breathing.     Skin: no lesions or rashes on visible skin     Psychiatric: appropriate mood and affect. Mentation appears normal, affect normal/bright, judgement and insight intact, normal speech and appearance well-groomed                            The rest of a comprehensive physical examination is deferred due to video visit restrictions.     Recent Results (from the past 24 hours)   Comprehensive metabolic panel    Collection Time: 06/17/25  7:48 AM   Result Value Ref Range    Sodium 139 135 - 145 mmol/L    Potassium 4.6 3.4 - 5.3 mmol/L    Carbon Dioxide (CO2) 23 22 - 29 mmol/L    Anion Gap 10 7 - 15 mmol/L    Urea Nitrogen 30.7 (H) 8.0 - 23.0 mg/dL    Creatinine 0.85 0.51 - 0.95 mg/dL    GFR Estimate 78 >60 mL/min/1.73m2    Calcium 9.0 8.8 - 10.4 mg/dL    Chloride 106 98 - 107 mmol/L    Glucose 112 (H) 70 - 99 mg/dL    Alkaline Phosphatase 114 40 - 150 U/L    AST 21 0 - 45 U/L    ALT 10 0 - 50 U/L    Protein Total 6.2 (L) 6.4 - 8.3 g/dL    Albumin 3.3 (L) 3.5 - 5.2 g/dL    Bilirubin Total 0.3 <=1.2 mg/dL   Magnesium    Collection Time: 06/17/25  7:48 AM   Result Value Ref Range    Magnesium 2.0 " 1.7 - 2.3 mg/dL       Collection Time: 06/17/25  7:48 AM   Result Value Ref Range     75 (H) <=38 U/mL   CBC with platelets and differential    Collection Time: 06/17/25  7:48 AM   Result Value Ref Range    WBC Count 5.0 4.0 - 11.0 10e3/uL    RBC Count 2.57 (L) 3.80 - 5.20 10e6/uL    Hemoglobin 7.9 (L) 11.7 - 15.7 g/dL    Hematocrit 24.9 (L) 35.0 - 47.0 %    MCV 97 78 - 100 fL    MCH 30.7 26.5 - 33.0 pg    MCHC 31.7 31.5 - 36.5 g/dL    RDW 17.6 (H) 10.0 - 15.0 %    Platelet Count 116 (L) 150 - 450 10e3/uL    % Neutrophils 68 %    % Lymphocytes 18 %    % Monocytes 11 %    % Eosinophils 2 %    % Basophils 0 %    % Immature Granulocytes 1 %    NRBCs per 100 WBC 0 <1 /100    Absolute Neutrophils 3.4 1.6 - 8.3 10e3/uL    Absolute Lymphocytes 0.9 0.8 - 5.3 10e3/uL    Absolute Monocytes 0.5 0.0 - 1.3 10e3/uL    Absolute Eosinophils 0.1 0.0 - 0.7 10e3/uL    Absolute Basophils 0.0 0.0 - 0.2 10e3/uL    Absolute Immature Granulocytes 0.1 <=0.4 10e3/uL    Absolute NRBCs 0.0 10e3/uL          Assessment:    Jacki Smith is a 60 year old woman with a diagnosis of 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  She presents today for follow up and disease management by video.      30 minutes spent on the date of the encounter doing chart review, history and exam, documentation, and further activities as noted above.      Plan:     1.)        Ovarian cancer:  OK to proceed with planned treatment Thurs as labs are WDL.  RTC in 3 weeks for labs, provider visit, and next cycle; already scheduled.  Plan for CT and follow up with Dr. Patino after cycle 7.  Reviewed signs and symptoms for when she should contact the clinic or seek additional care.  Patient to contact the clinic with any questions or concerns in the interim.      Genetic risk factors were assessed and she has a VUS No identifiable germline variants identified in ABRAXAS1, AKT1, APC, DEION, AXIN2, BARD1, BMPR1A, BRCA1, BRCA2, BRIP1,  CDC73, CDH1, CDK4, CDKN2A, CHEK2, CTNNA1, DICER1, EPCAM, FANCC, FANCM, GREM1, HOXB13, KIT, MEN1, MLH1, MRE11, MSH2, MSH6, MUTYH, NBN, NF1, NTHL1, PALB2, PDGFRA, PIK3CA, PMS2, POLD1, POLE, PTEN, RAD50, RAD51C, RAD51D, RECQL, RINT1, SDHA, SDHB, SDHC, SDHD, SMAD4, SMARCA4, STK11, TP53, TSC1, TSC2, VHL, XRCC2.     Caris Testing Results.  -Genomic ROXI low (6%)   -TMB low (1mut/Mb)  -Microsatellite stable/Mismatch Repair proficient  -PD-L1- (CPS 0%)  -NTRK 1/2/3 fusion not detected.   -ER-, AL+  -Genomic alterations in:  --TP53  -No genomic alterations in BRCA1,2.  -FOLR1+ (2+, 75%).        Labs and/or tests reviewed include:  CBC. CMP. Mag. .     2.)        Health maintenance:  Issues addressed today include following up with PCP for annual health maintenance and non-gynecologic issues.      3.)        SBO: Recurrent SBO symptoms with multiple ED visits.  She has been able to manage her symptoms with bowel rest at home and leave her GT to gravity with improvement.  She has not needed to put her GT to gravity for the last 3-4 days.  Tolerating soft bland foods.     -Continue venting G-tube as needed.  -Continue TPN until able to tolerate enough oral intake.    Echo Sanz, BRIELLE, APRN, FNP-C, AOCNP  Oncology Nurse Practitioner  Division of Gynecologic Oncology  Pager: [phone]     CC  Patient Care Team:  Domenica Christianson MD as PCP - General  Charis Patino MD as MD (Gynecologic Oncology)  Anish Monroy MD as Assigned Palliative Care Provider  Pratima Vega, RN as Specialty Care Coordinator (Hematology & Oncology)  Myhre, Grace C, [facility] as Pharmacist  TeCharis holland MD as Home Infusion Following Provider (Gynecologic Oncology)  Clarisse Wells RD as [facility] [place] Registered Dietitian (Dietitian, Registered)  Echo Sanz APRN CNP as Assigned Cancer Care Provider  SELF, REFERRED

## 2025-06-17 ENCOUNTER — VIRTUAL VISIT (OUTPATIENT)
Dept: ONCOLOGY | Facility: HOSPITAL | Age: 60
End: 2025-06-17
Attending: OBSTETRICS & GYNECOLOGY
Payer: COMMERCIAL

## 2025-06-17 ENCOUNTER — INFUSION THERAPY VISIT (OUTPATIENT)
Dept: INFUSION THERAPY | Facility: HOSPITAL | Age: 60
End: 2025-06-17
Attending: OBSTETRICS & GYNECOLOGY
Payer: COMMERCIAL

## 2025-06-17 ENCOUNTER — RESULTS FOLLOW-UP (OUTPATIENT)
Dept: ONCOLOGY | Facility: HOSPITAL | Age: 60
End: 2025-06-17

## 2025-06-17 VITALS — HEIGHT: 62 IN | BODY MASS INDEX: 25.17 KG/M2

## 2025-06-17 DIAGNOSIS — C56.1 OVARIAN CANCER, RIGHT (H): Primary | ICD-10-CM

## 2025-06-17 DIAGNOSIS — Z51.11 ENCOUNTER FOR ANTINEOPLASTIC CHEMOTHERAPY: ICD-10-CM

## 2025-06-17 LAB
ALBUMIN SERPL BCG-MCNC: 3.3 G/DL (ref 3.5–5.2)
ALP SERPL-CCNC: 114 U/L (ref 40–150)
ALT SERPL W P-5'-P-CCNC: 10 U/L (ref 0–50)
ANION GAP SERPL CALCULATED.3IONS-SCNC: 10 MMOL/L (ref 7–15)
AST SERPL W P-5'-P-CCNC: 21 U/L (ref 0–45)
BASOPHILS # BLD AUTO: 0 10E3/UL (ref 0–0.2)
BASOPHILS NFR BLD AUTO: 0 %
BILIRUB SERPL-MCNC: 0.3 MG/DL
BUN SERPL-MCNC: 30.7 MG/DL (ref 8–23)
CALCIUM SERPL-MCNC: 9 MG/DL (ref 8.8–10.4)
CANCER AG125 SERPL-ACNC: 75 U/ML
CHLORIDE SERPL-SCNC: 106 MMOL/L (ref 98–107)
CREAT SERPL-MCNC: 0.85 MG/DL (ref 0.51–0.95)
EGFRCR SERPLBLD CKD-EPI 2021: 78 ML/MIN/1.73M2
EOSINOPHIL # BLD AUTO: 0.1 10E3/UL (ref 0–0.7)
EOSINOPHIL NFR BLD AUTO: 2 %
ERYTHROCYTE [DISTWIDTH] IN BLOOD BY AUTOMATED COUNT: 17.6 % (ref 10–15)
GLUCOSE SERPL-MCNC: 112 MG/DL (ref 70–99)
HCO3 SERPL-SCNC: 23 MMOL/L (ref 22–29)
HCT VFR BLD AUTO: 24.9 % (ref 35–47)
HGB BLD-MCNC: 7.9 G/DL (ref 11.7–15.7)
IMM GRANULOCYTES # BLD: 0.1 10E3/UL
IMM GRANULOCYTES NFR BLD: 1 %
LYMPHOCYTES # BLD AUTO: 0.9 10E3/UL (ref 0.8–5.3)
LYMPHOCYTES NFR BLD AUTO: 18 %
MAGNESIUM SERPL-MCNC: 2 MG/DL (ref 1.7–2.3)
MCH RBC QN AUTO: 30.7 PG (ref 26.5–33)
MCHC RBC AUTO-ENTMCNC: 31.7 G/DL (ref 31.5–36.5)
MCV RBC AUTO: 97 FL (ref 78–100)
MONOCYTES # BLD AUTO: 0.5 10E3/UL (ref 0–1.3)
MONOCYTES NFR BLD AUTO: 11 %
NEUTROPHILS # BLD AUTO: 3.4 10E3/UL (ref 1.6–8.3)
NEUTROPHILS NFR BLD AUTO: 68 %
NRBC # BLD AUTO: 0 10E3/UL
NRBC BLD AUTO-RTO: 0 /100
PLATELET # BLD AUTO: 116 10E3/UL (ref 150–450)
POTASSIUM SERPL-SCNC: 4.6 MMOL/L (ref 3.4–5.3)
PROT SERPL-MCNC: 6.2 G/DL (ref 6.4–8.3)
RBC # BLD AUTO: 2.57 10E6/UL (ref 3.8–5.2)
SODIUM SERPL-SCNC: 139 MMOL/L (ref 135–145)
WBC # BLD AUTO: 5 10E3/UL (ref 4–11)

## 2025-06-17 PROCEDURE — 98006 SYNCH AUDIO-VIDEO EST MOD 30: CPT | Performed by: NURSE PRACTITIONER

## 2025-06-17 PROCEDURE — 1125F AMNT PAIN NOTED PAIN PRSNT: CPT | Performed by: NURSE PRACTITIONER

## 2025-06-17 PROCEDURE — 36591 DRAW BLOOD OFF VENOUS DEVICE: CPT | Performed by: NURSE PRACTITIONER

## 2025-06-17 PROCEDURE — 83735 ASSAY OF MAGNESIUM: CPT | Performed by: NURSE PRACTITIONER

## 2025-06-17 PROCEDURE — 85004 AUTOMATED DIFF WBC COUNT: CPT | Performed by: NURSE PRACTITIONER

## 2025-06-17 PROCEDURE — 86304 IMMUNOASSAY TUMOR CA 125: CPT

## 2025-06-17 PROCEDURE — 82040 ASSAY OF SERUM ALBUMIN: CPT | Performed by: NURSE PRACTITIONER

## 2025-06-17 RX ORDER — EPINEPHRINE 1 MG/ML
0.3 INJECTION, SOLUTION, CONCENTRATE INTRAVENOUS EVERY 5 MIN PRN
Status: CANCELLED | OUTPATIENT
Start: 2025-06-20

## 2025-06-17 RX ORDER — HEPARIN SODIUM,PORCINE 10 UNIT/ML
5-20 VIAL (ML) INTRAVENOUS DAILY PRN
Status: CANCELLED | OUTPATIENT
Start: 2025-06-20

## 2025-06-17 RX ORDER — DIPHENHYDRAMINE HYDROCHLORIDE 50 MG/ML
25 INJECTION, SOLUTION INTRAMUSCULAR; INTRAVENOUS
Status: CANCELLED
Start: 2025-06-20

## 2025-06-17 RX ORDER — MEPERIDINE HYDROCHLORIDE 25 MG/ML
25 INJECTION INTRAMUSCULAR; INTRAVENOUS; SUBCUTANEOUS
Status: CANCELLED | OUTPATIENT
Start: 2025-06-20

## 2025-06-17 RX ORDER — DIPHENHYDRAMINE HCL 50 MG
50 CAPSULE ORAL ONCE
Status: CANCELLED
Start: 2025-06-20

## 2025-06-17 RX ORDER — DIPHENHYDRAMINE HYDROCHLORIDE 50 MG/ML
50 INJECTION, SOLUTION INTRAMUSCULAR; INTRAVENOUS
Status: CANCELLED
Start: 2025-06-20

## 2025-06-17 RX ORDER — ALBUTEROL SULFATE 0.83 MG/ML
2.5 SOLUTION RESPIRATORY (INHALATION)
Status: CANCELLED | OUTPATIENT
Start: 2025-06-20

## 2025-06-17 RX ORDER — METHYLPREDNISOLONE SODIUM SUCCINATE 40 MG/ML
40 INJECTION INTRAMUSCULAR; INTRAVENOUS
Status: CANCELLED
Start: 2025-06-20

## 2025-06-17 RX ORDER — LORAZEPAM 2 MG/ML
0.5 INJECTION INTRAMUSCULAR EVERY 4 HOURS PRN
Status: CANCELLED | OUTPATIENT
Start: 2025-06-20

## 2025-06-17 RX ORDER — PALONOSETRON 0.05 MG/ML
0.25 INJECTION, SOLUTION INTRAVENOUS ONCE
Status: CANCELLED | OUTPATIENT
Start: 2025-06-20

## 2025-06-17 RX ORDER — ALBUTEROL SULFATE 90 UG/1
1-2 INHALANT RESPIRATORY (INHALATION)
Status: CANCELLED
Start: 2025-06-20

## 2025-06-17 RX ORDER — DIPHENHYDRAMINE HYDROCHLORIDE 50 MG/ML
50 INJECTION, SOLUTION INTRAMUSCULAR; INTRAVENOUS ONCE
Status: CANCELLED
Start: 2025-06-20 | End: 2025-06-20

## 2025-06-17 RX ORDER — HEPARIN SODIUM (PORCINE) LOCK FLUSH IV SOLN 100 UNIT/ML 100 UNIT/ML
5 SOLUTION INTRAVENOUS
Status: CANCELLED | OUTPATIENT
Start: 2025-06-20

## 2025-06-17 ASSESSMENT — PAIN SCALES - GENERAL: PAINLEVEL_OUTOF10: MODERATE PAIN (6)

## 2025-06-17 NOTE — NURSING NOTE
Current patient location: [address] [place] [address] [place], [place]    Is the patient currently in the state of [place]? YES    Visit mode: VIDEO    If the visit is dropped, the patient can be reconnected by:VIDEO VISIT: Text to cell phone:   Telephone Information:   Mobile [phone]       Will anyone else be joining the visit? NO  (If patient encounters technical issues they should call [phone])    Are changes needed to the allergy or medication list? Yes  Xarelto 15 mg twice daily for 21 days.     Are refills needed on medications prescribed by this physician? NO    Rooming Documentation:  Questionnaire(s) not done per department protocol    Reason for visit: RECHECK (Return CCSL )    Sharlene BOOKER

## 2025-06-18 ENCOUNTER — MEDICAL CORRESPONDENCE (OUTPATIENT)
Dept: HOME HEALTH SERVICES | Facility: HOME HEALTH | Age: 60
End: 2025-06-18
Payer: COMMERCIAL

## 2025-06-18 ENCOUNTER — HOME INFUSION (OUTPATIENT)
Dept: HOME HEALTH SERVICES | Facility: HOME HEALTH | Age: 60
End: 2025-06-18

## 2025-06-18 ENCOUNTER — RESULTS FOLLOW-UP (OUTPATIENT)
Dept: ONCOLOGY | Facility: CLINIC | Age: 60
End: 2025-06-18

## 2025-06-18 DIAGNOSIS — C56.1 OVARIAN CANCER, RIGHT (H): ICD-10-CM

## 2025-06-19 ENCOUNTER — RESULTS FOLLOW-UP (OUTPATIENT)
Dept: ONCOLOGY | Facility: CLINIC | Age: 60
End: 2025-06-19

## 2025-06-19 ENCOUNTER — HOME INFUSION BILLING (OUTPATIENT)
Dept: HOME HEALTH SERVICES | Facility: HOME HEALTH | Age: 60
End: 2025-06-19
Payer: COMMERCIAL

## 2025-06-19 ENCOUNTER — INFUSION THERAPY VISIT (OUTPATIENT)
Dept: INFUSION THERAPY | Facility: HOSPITAL | Age: 60
End: 2025-06-19
Attending: OBSTETRICS & GYNECOLOGY
Payer: COMMERCIAL

## 2025-06-19 VITALS
HEART RATE: 67 BPM | OXYGEN SATURATION: 96 % | RESPIRATION RATE: 16 BRPM | DIASTOLIC BLOOD PRESSURE: 72 MMHG | SYSTOLIC BLOOD PRESSURE: 111 MMHG | TEMPERATURE: 98.6 F

## 2025-06-19 DIAGNOSIS — C56.1 OVARIAN CANCER, RIGHT (H): Primary | ICD-10-CM

## 2025-06-19 DIAGNOSIS — D63.0 ANEMIA IN NEOPLASTIC DISEASE: ICD-10-CM

## 2025-06-19 DIAGNOSIS — R30.0 DYSURIA: ICD-10-CM

## 2025-06-19 LAB
ABO + RH BLD: NORMAL
ALBUMIN UR-MCNC: 30 MG/DL
APPEARANCE UR: CLEAR
BILIRUB UR QL STRIP: NEGATIVE
BLD GP AB SCN SERPL QL: NEGATIVE
BLD PROD TYP BPU: NORMAL
BLOOD COMPONENT TYPE: NORMAL
CODING SYSTEM: NORMAL
COLOR UR AUTO: ABNORMAL
CROSSMATCH: NORMAL
GLUCOSE UR STRIP-MCNC: NEGATIVE MG/DL
HGB UR QL STRIP: ABNORMAL
ISSUE DATE AND TIME: NORMAL
KETONES UR STRIP-MCNC: NEGATIVE MG/DL
LEUKOCYTE ESTERASE UR QL STRIP: ABNORMAL
MUCOUS THREADS #/AREA URNS LPF: PRESENT /LPF
NITRATE UR QL: NEGATIVE
PH UR STRIP: 6 [PH] (ref 5–7)
RBC URINE: 2 /HPF
SP GR UR STRIP: 1.02 (ref 1–1.03)
SPECIMEN EXP DATE BLD: NORMAL
SQUAMOUS EPITHELIAL: <1 /HPF
UNIT ABO/RH: NORMAL
UNIT NUMBER: NORMAL
UNIT STATUS: NORMAL
UNIT TYPE ISBT: 6200
UROBILINOGEN UR STRIP-MCNC: 2 MG/DL
WBC URINE: 10 /HPF

## 2025-06-19 PROCEDURE — 81001 URINALYSIS AUTO W/SCOPE: CPT

## 2025-06-19 PROCEDURE — 36591 DRAW BLOOD OFF VENOUS DEVICE: CPT | Performed by: NURSE PRACTITIONER

## 2025-06-19 PROCEDURE — 86901 BLOOD TYPING SEROLOGIC RH(D): CPT | Performed by: NURSE PRACTITIONER

## 2025-06-19 PROCEDURE — 86923 COMPATIBILITY TEST ELECTRIC: CPT | Performed by: NURSE PRACTITIONER

## 2025-06-19 PROCEDURE — 250N000011 HC RX IP 250 OP 636: Performed by: NURSE PRACTITIONER

## 2025-06-19 PROCEDURE — 258N000003 HC RX IP 258 OP 636: Performed by: NURSE PRACTITIONER

## 2025-06-19 PROCEDURE — P9016 RBC LEUKOCYTES REDUCED: HCPCS | Performed by: NURSE PRACTITIONER

## 2025-06-19 RX ORDER — HEPARIN SODIUM (PORCINE) LOCK FLUSH IV SOLN 100 UNIT/ML 100 UNIT/ML
5 SOLUTION INTRAVENOUS
Status: DISCONTINUED | OUTPATIENT
Start: 2025-06-19 | End: 2025-06-19 | Stop reason: HOSPADM

## 2025-06-19 RX ORDER — HEPARIN SODIUM (PORCINE) LOCK FLUSH IV SOLN 100 UNIT/ML 100 UNIT/ML
5 SOLUTION INTRAVENOUS
OUTPATIENT
Start: 2025-06-19

## 2025-06-19 RX ORDER — DIPHENHYDRAMINE HYDROCHLORIDE 50 MG/ML
50 INJECTION, SOLUTION INTRAMUSCULAR; INTRAVENOUS ONCE
Status: COMPLETED | OUTPATIENT
Start: 2025-06-19 | End: 2025-06-19

## 2025-06-19 RX ORDER — DIPHENHYDRAMINE HYDROCHLORIDE 50 MG/ML
25 INJECTION, SOLUTION INTRAMUSCULAR; INTRAVENOUS
Status: DISCONTINUED | OUTPATIENT
Start: 2025-06-19 | End: 2025-06-19 | Stop reason: HOSPADM

## 2025-06-19 RX ORDER — MEPERIDINE HYDROCHLORIDE 50 MG/ML
25 INJECTION INTRAMUSCULAR; INTRAVENOUS; SUBCUTANEOUS
Status: DISCONTINUED | OUTPATIENT
Start: 2025-06-19 | End: 2025-06-19 | Stop reason: HOSPADM

## 2025-06-19 RX ORDER — METHYLPREDNISOLONE SODIUM SUCCINATE 40 MG/ML
40 INJECTION INTRAMUSCULAR; INTRAVENOUS
Status: DISCONTINUED | OUTPATIENT
Start: 2025-06-19 | End: 2025-06-19 | Stop reason: HOSPADM

## 2025-06-19 RX ORDER — ALBUTEROL SULFATE 90 UG/1
1-2 INHALANT RESPIRATORY (INHALATION)
Status: DISCONTINUED | OUTPATIENT
Start: 2025-06-19 | End: 2025-06-19 | Stop reason: HOSPADM

## 2025-06-19 RX ORDER — PALONOSETRON 0.05 MG/ML
0.25 INJECTION, SOLUTION INTRAVENOUS ONCE
Status: COMPLETED | OUTPATIENT
Start: 2025-06-19 | End: 2025-06-19

## 2025-06-19 RX ORDER — DIPHENHYDRAMINE HYDROCHLORIDE 50 MG/ML
50 INJECTION, SOLUTION INTRAMUSCULAR; INTRAVENOUS
Status: DISCONTINUED | OUTPATIENT
Start: 2025-06-19 | End: 2025-06-19 | Stop reason: HOSPADM

## 2025-06-19 RX ORDER — EPINEPHRINE 1 MG/ML
0.3 INJECTION, SOLUTION INTRAMUSCULAR; SUBCUTANEOUS EVERY 5 MIN PRN
OUTPATIENT
Start: 2025-06-19

## 2025-06-19 RX ORDER — DIPHENHYDRAMINE HYDROCHLORIDE 50 MG/ML
50 INJECTION, SOLUTION INTRAMUSCULAR; INTRAVENOUS
Start: 2025-06-19

## 2025-06-19 RX ORDER — HEPARIN SODIUM,PORCINE 10 UNIT/ML
5-20 VIAL (ML) INTRAVENOUS DAILY PRN
OUTPATIENT
Start: 2025-06-19

## 2025-06-19 RX ORDER — LORAZEPAM 2 MG/ML
0.5 INJECTION INTRAMUSCULAR EVERY 4 HOURS PRN
Status: DISCONTINUED | OUTPATIENT
Start: 2025-06-19 | End: 2025-06-19 | Stop reason: HOSPADM

## 2025-06-19 RX ORDER — ALBUTEROL SULFATE 0.83 MG/ML
2.5 SOLUTION RESPIRATORY (INHALATION)
Status: DISCONTINUED | OUTPATIENT
Start: 2025-06-19 | End: 2025-06-19 | Stop reason: HOSPADM

## 2025-06-19 RX ORDER — EPINEPHRINE 1 MG/ML
0.3 INJECTION, SOLUTION INTRAMUSCULAR; SUBCUTANEOUS EVERY 5 MIN PRN
Status: DISCONTINUED | OUTPATIENT
Start: 2025-06-19 | End: 2025-06-19 | Stop reason: HOSPADM

## 2025-06-19 RX ADMIN — CARBOPLATIN 345 MG: 10 INJECTION, SOLUTION INTRAVENOUS at 12:20

## 2025-06-19 RX ADMIN — DEXAMETHASONE SODIUM PHOSPHATE: 10 INJECTION, SOLUTION INTRAMUSCULAR; INTRAVENOUS at 08:35

## 2025-06-19 RX ADMIN — FAMOTIDINE 20 MG: 10 INJECTION INTRAVENOUS at 08:30

## 2025-06-19 RX ADMIN — SODIUM CHLORIDE 250 ML: 0.9 INJECTION, SOLUTION INTRAVENOUS at 08:22

## 2025-06-19 RX ADMIN — DIPHENHYDRAMINE HYDROCHLORIDE 50 MG: 50 INJECTION, SOLUTION INTRAMUSCULAR; INTRAVENOUS at 08:25

## 2025-06-19 RX ADMIN — PACLITAXEL 215 MG: 6 INJECTION, SOLUTION INTRAVENOUS at 09:20

## 2025-06-19 RX ADMIN — PALONOSETRON 0.25 MG: 0.05 INJECTION, SOLUTION INTRAVENOUS at 08:23

## 2025-06-19 NOTE — PROGRESS NOTES
"Infusion Nursing Note:  Jacki Smith presents today for D1C6 3-hour paclitaxel and carboplatin.    Patient seen by provider today: No   present during visit today: Not Applicable.    Note: Reviewed plan of care with patient and her visiting daughter Chanelle and all questions answered. Patient prefers IV diphenhydramine.    Patient stated she thinks she may have a UTI: denies any pain/burning with urination, afebrile, reports urine is \"not clear\" but also not cloudy or foul-smelling. Does have occasional flank pain. JALEN Mcmanus notified and urine sample sent to lab.    Patient c/o increased SOB and fatigue; 1 unit PRBC transfused for hgb 7.9 after completion of chemotherapy. Reviewed signs/symptoms of low hgb. Reviewed potential side effects of blood transfusion.    Premeds Given: aloxi, benadryl IV, dexamethasone IV, emend, and pepcid    Intravenous Access:  Implanted Port already accessed as patient self-administers TPN evenings. Blood return brisk throughout infusions    Treatment Conditions:  Lab Results   Component Value Date    HGB 7.9 (L) 06/17/2025    WBC 5.0 06/17/2025    ANEU 3.4 06/17/2025     (L) 06/17/2025        Lab Results   Component Value Date     06/17/2025    POTASSIUM 4.6 06/17/2025    MAG 2.0 06/17/2025    CR 0.85 06/17/2025    KINDRA 9.0 06/17/2025    BILITOTAL 0.3 06/17/2025    ALBUMIN 3.3 (L) 06/17/2025    ALT 10 06/17/2025    AST 21 06/17/2025       Results reviewed, labs MET treatment parameters, ok to proceed with treatment.      Post Infusion Assessment:  Patient tolerated infusion without incident.  Patient observed for approx 10 minutes at start of paclitaxel per protocol.  Blood return noted pre and post infusion.  Site patent and intact, free from redness, edema or discomfort.  Access saline locked and capped per patient request as she will use it later this evening for TPN.       Discharge Plan:   Patient will return 6/23 for next lab appointment and " port needle/dressing change.   Patient discharged in stable condition accompanied by: self.  Departure Mode: Ambulatory.      Radha Wilson RN

## 2025-06-23 ENCOUNTER — INFUSION THERAPY VISIT (OUTPATIENT)
Dept: INFUSION THERAPY | Facility: HOSPITAL | Age: 60
End: 2025-06-23
Attending: OBSTETRICS & GYNECOLOGY
Payer: COMMERCIAL

## 2025-06-23 DIAGNOSIS — Z78.9 ON TOTAL PARENTERAL NUTRITION (TPN): Primary | ICD-10-CM

## 2025-06-23 PROCEDURE — G0463 HOSPITAL OUTPT CLINIC VISIT: HCPCS

## 2025-06-23 NOTE — PROGRESS NOTES
Infusion Nursing Note:  Jackiberto Smith presents today for PAC dressing and needle change.    Patient seen by provider today: No   present during visit today: Not Applicable.    Note: Tolerated dressing change well.  Port needle de accessed then Emla cream applies for 30 minutes.  PAC re accessed per protocol and flushed.  Patient discharge to home ambulating. .    Premeds Given: none    Intravenous Access:PAC  Implanted Port.    Treatment Conditions:  Not Applicable.      Post Infusion Assessment:  No evidence of extravasations.       Discharge Plan:   Patient and/or family verbalized understanding of discharge instructions and all questions answered.      Alyssa Lang RN

## 2025-06-25 ENCOUNTER — MEDICAL CORRESPONDENCE (OUTPATIENT)
Dept: HOME HEALTH SERVICES | Facility: HOME HEALTH | Age: 60
End: 2025-06-25
Payer: COMMERCIAL

## 2025-06-26 ENCOUNTER — HOME INFUSION BILLING (OUTPATIENT)
Dept: HOME HEALTH SERVICES | Facility: HOME HEALTH | Age: 60
End: 2025-06-26
Payer: COMMERCIAL

## 2025-06-30 ENCOUNTER — NURSE TRIAGE (OUTPATIENT)
Dept: ONCOLOGY | Facility: CLINIC | Age: 60
End: 2025-06-30

## 2025-06-30 ENCOUNTER — MYC MEDICAL ADVICE (OUTPATIENT)
Dept: ONCOLOGY | Facility: CLINIC | Age: 60
End: 2025-06-30

## 2025-06-30 ENCOUNTER — INFUSION THERAPY VISIT (OUTPATIENT)
Dept: INFUSION THERAPY | Facility: HOSPITAL | Age: 60
End: 2025-06-30
Attending: OBSTETRICS & GYNECOLOGY
Payer: COMMERCIAL

## 2025-06-30 DIAGNOSIS — Z78.9 ON TOTAL PARENTERAL NUTRITION (TPN): ICD-10-CM

## 2025-06-30 DIAGNOSIS — C56.1 OVARIAN CANCER, RIGHT (H): Primary | ICD-10-CM

## 2025-06-30 LAB
ALBUMIN SERPL BCG-MCNC: 3.5 G/DL (ref 3.5–5.2)
ALP SERPL-CCNC: 113 U/L (ref 40–150)
ALT SERPL W P-5'-P-CCNC: 17 U/L (ref 0–50)
ANION GAP SERPL CALCULATED.3IONS-SCNC: 12 MMOL/L (ref 7–15)
AST SERPL W P-5'-P-CCNC: 23 U/L (ref 0–45)
BASOPHILS # BLD AUTO: 0 10E3/UL (ref 0–0.2)
BASOPHILS NFR BLD AUTO: 1 %
BILIRUB SERPL-MCNC: 0.2 MG/DL
BUN SERPL-MCNC: 26.3 MG/DL (ref 8–23)
CALCIUM SERPL-MCNC: 9.1 MG/DL (ref 8.8–10.4)
CHLORIDE SERPL-SCNC: 103 MMOL/L (ref 98–107)
CREAT SERPL-MCNC: 0.83 MG/DL (ref 0.51–0.95)
EGFRCR SERPLBLD CKD-EPI 2021: 80 ML/MIN/1.73M2
EOSINOPHIL # BLD AUTO: 0.1 10E3/UL (ref 0–0.7)
EOSINOPHIL NFR BLD AUTO: 6 %
ERYTHROCYTE [DISTWIDTH] IN BLOOD BY AUTOMATED COUNT: 15.9 % (ref 10–15)
FASTING STATUS PATIENT QL REPORTED: NO
GLUCOSE SERPL-MCNC: 106 MG/DL (ref 70–99)
HCO3 SERPL-SCNC: 21 MMOL/L (ref 22–29)
HCT VFR BLD AUTO: 25 % (ref 35–47)
HGB BLD-MCNC: 8 G/DL (ref 11.7–15.7)
IMM GRANULOCYTES # BLD: 0.1 10E3/UL
IMM GRANULOCYTES NFR BLD: 3 %
LYMPHOCYTES # BLD AUTO: 0.8 10E3/UL (ref 0.8–5.3)
LYMPHOCYTES NFR BLD AUTO: 51 %
MAGNESIUM SERPL-MCNC: 2.1 MG/DL (ref 1.7–2.3)
MCH RBC QN AUTO: 30.5 PG (ref 26.5–33)
MCHC RBC AUTO-ENTMCNC: 32 G/DL (ref 31.5–36.5)
MCV RBC AUTO: 95 FL (ref 78–100)
MONOCYTES # BLD AUTO: 0.4 10E3/UL (ref 0–1.3)
MONOCYTES NFR BLD AUTO: 26 %
NEUTROPHILS # BLD AUTO: 0.2 10E3/UL (ref 1.6–8.3)
NEUTROPHILS NFR BLD AUTO: 15 %
NRBC # BLD AUTO: 0 10E3/UL
NRBC BLD AUTO-RTO: 0 /100
PHOSPHATE SERPL-MCNC: 3.9 MG/DL (ref 2.5–4.5)
PLAT MORPH BLD: NORMAL
PLATELET # BLD AUTO: 72 10E3/UL (ref 150–450)
POTASSIUM SERPL-SCNC: 4.5 MMOL/L (ref 3.4–5.3)
PROT SERPL-MCNC: 6.3 G/DL (ref 6.4–8.3)
RBC # BLD AUTO: 2.62 10E6/UL (ref 3.8–5.2)
RBC MORPH BLD: NORMAL
SODIUM SERPL-SCNC: 136 MMOL/L (ref 135–145)
TRIGL SERPL-MCNC: 86 MG/DL
WBC # BLD AUTO: 1.6 10E3/UL (ref 4–11)

## 2025-06-30 PROCEDURE — 36591 DRAW BLOOD OFF VENOUS DEVICE: CPT

## 2025-06-30 PROCEDURE — 85004 AUTOMATED DIFF WBC COUNT: CPT

## 2025-06-30 PROCEDURE — 84155 ASSAY OF PROTEIN SERUM: CPT

## 2025-06-30 PROCEDURE — 83735 ASSAY OF MAGNESIUM: CPT

## 2025-06-30 PROCEDURE — 84478 ASSAY OF TRIGLYCERIDES: CPT

## 2025-06-30 PROCEDURE — 80069 RENAL FUNCTION PANEL: CPT

## 2025-06-30 NOTE — TELEPHONE ENCOUNTER
DATE/TIME OF CALL RECEIVED FROM LAB:  06/30/25 at 9:01 AM   Critical LAB TEST:  ANC 0.2   Other LAB VALUE:  8.0 hgb platelets 72   PROVIDER NOTIFIED?: Yes  PROVIDER NAME: Echo Sanz   DATE/TIME LAB VALUE REPORTED TO PROVIDER: N/A  MECHANISM OF PROVIDER NOTIFICATION: Nurse triage note sent to provider   PROVIDER RESPONSE: [facility] infusion staff did go over neutropenic precautions with patient. No need for transfusion today.

## 2025-06-30 NOTE — PROGRESS NOTES
DATE/TIME OF CALL RECEIVED FROM LAB:  06/30/25 at 8:08 AM   LAB TEST:  ANC  LAB VALUE:  0.2  PROVIDER NOTIFIED?: Yes  PROVIDER NAME: Christin triage RN at [facility]  DATE/TIME LAB VALUE REPORTED TO PROVIDER: 06/30/25 at 0901  MECHANISM OF PROVIDER NOTIFICATION: Phone Call  PROVIDER RESPONSE: Christin will notify provider. Writer informed Michelle of lab results and reviewed neutropenic precautions. Patient verbalized understanding.

## 2025-06-30 NOTE — PROGRESS NOTES
Infusion Nursing Note:  Jacki Smith presents today for Labs, port needle change.    Patient seen by provider today: No   present during visit today: Not Applicable.     Note: Michelle arrived ambulatory by herself for labs and port needle change. Plan of care reviewed and she has no questions.  Labs were drawn, port was de accessed and EMLA cream was applied x 30 minutes prior to port re access per patient request.     Intravenous Access:  Labs drawn without difficulty.  Implanted Port.     Discharge Plan:   Patient will return July 7th for next appointment.   Patient discharged in stable condition accompanied by: self.  Departure Mode: Ambulatory.        Rylie Bullard RN

## 2025-07-03 ENCOUNTER — MYC MEDICAL ADVICE (OUTPATIENT)
Dept: ONCOLOGY | Facility: CLINIC | Age: 60
End: 2025-07-03
Payer: COMMERCIAL

## 2025-07-03 ENCOUNTER — HOME INFUSION BILLING (OUTPATIENT)
Dept: HOME HEALTH SERVICES | Facility: HOME HEALTH | Age: 60
End: 2025-07-03
Payer: COMMERCIAL

## 2025-07-03 DIAGNOSIS — Z93.1 GASTROINTESTINAL TUBE PRESENT (H): Primary | ICD-10-CM

## 2025-07-06 LAB
ABO + RH BLD: NORMAL
BLD GP AB SCN SERPL QL: NEGATIVE
SPECIMEN EXP DATE BLD: NORMAL

## 2025-07-07 ENCOUNTER — INFUSION THERAPY VISIT (OUTPATIENT)
Dept: INFUSION THERAPY | Facility: HOSPITAL | Age: 60
End: 2025-07-07
Attending: OBSTETRICS & GYNECOLOGY
Payer: COMMERCIAL

## 2025-07-07 DIAGNOSIS — C56.1 OVARIAN CANCER, RIGHT (H): Primary | ICD-10-CM

## 2025-07-07 DIAGNOSIS — D63.0 ANEMIA IN NEOPLASTIC DISEASE: ICD-10-CM

## 2025-07-07 LAB
ALBUMIN SERPL BCG-MCNC: 3.4 G/DL (ref 3.5–5.2)
ALP SERPL-CCNC: 101 U/L (ref 40–150)
ALT SERPL W P-5'-P-CCNC: 12 U/L (ref 0–50)
ANION GAP SERPL CALCULATED.3IONS-SCNC: 11 MMOL/L (ref 7–15)
AST SERPL W P-5'-P-CCNC: 21 U/L (ref 0–45)
BASOPHILS # BLD AUTO: 0 10E3/UL (ref 0–0.2)
BASOPHILS NFR BLD AUTO: 0 %
BILIRUB SERPL-MCNC: 0.2 MG/DL
BLD PROD TYP BPU: NORMAL
BLOOD COMPONENT TYPE: NORMAL
BUN SERPL-MCNC: 33.1 MG/DL (ref 8–23)
CALCIUM SERPL-MCNC: 9.3 MG/DL (ref 8.8–10.4)
CANCER AG125 SERPL-ACNC: 62 U/ML
CHLORIDE SERPL-SCNC: 106 MMOL/L (ref 98–107)
CODING SYSTEM: NORMAL
CREAT SERPL-MCNC: 0.92 MG/DL (ref 0.51–0.95)
CROSSMATCH: NORMAL
EGFRCR SERPLBLD CKD-EPI 2021: 71 ML/MIN/1.73M2
EOSINOPHIL # BLD AUTO: 0.1 10E3/UL (ref 0–0.7)
EOSINOPHIL NFR BLD AUTO: 2 %
ERYTHROCYTE [DISTWIDTH] IN BLOOD BY AUTOMATED COUNT: 17 % (ref 10–15)
GLUCOSE SERPL-MCNC: 100 MG/DL (ref 70–99)
HCO3 SERPL-SCNC: 21 MMOL/L (ref 22–29)
HCT VFR BLD AUTO: 24.7 % (ref 35–47)
HGB BLD-MCNC: 7.8 G/DL (ref 11.7–15.7)
IMM GRANULOCYTES # BLD: 0.1 10E3/UL
IMM GRANULOCYTES NFR BLD: 1 %
ISSUE DATE AND TIME: NORMAL
LYMPHOCYTES # BLD AUTO: 0.9 10E3/UL (ref 0.8–5.3)
LYMPHOCYTES NFR BLD AUTO: 21 %
MAGNESIUM SERPL-MCNC: 2.1 MG/DL (ref 1.7–2.3)
MCH RBC QN AUTO: 30.8 PG (ref 26.5–33)
MCHC RBC AUTO-ENTMCNC: 31.6 G/DL (ref 31.5–36.5)
MCV RBC AUTO: 98 FL (ref 78–100)
MONOCYTES # BLD AUTO: 0.5 10E3/UL (ref 0–1.3)
MONOCYTES NFR BLD AUTO: 10 %
NEUTROPHILS # BLD AUTO: 3 10E3/UL (ref 1.6–8.3)
NEUTROPHILS NFR BLD AUTO: 66 %
NRBC # BLD AUTO: 0 10E3/UL
NRBC BLD AUTO-RTO: 0 /100
PLATELET # BLD AUTO: 143 10E3/UL (ref 150–450)
POTASSIUM SERPL-SCNC: 4.7 MMOL/L (ref 3.4–5.3)
PROT SERPL-MCNC: 6 G/DL (ref 6.4–8.3)
RBC # BLD AUTO: 2.53 10E6/UL (ref 3.8–5.2)
SODIUM SERPL-SCNC: 138 MMOL/L (ref 135–145)
UNIT ABO/RH: NORMAL
UNIT NUMBER: NORMAL
UNIT STATUS: NORMAL
UNIT TYPE ISBT: 6200
WBC # BLD AUTO: 4.5 10E3/UL (ref 4–11)

## 2025-07-07 PROCEDURE — 86923 COMPATIBILITY TEST ELECTRIC: CPT | Performed by: NURSE PRACTITIONER

## 2025-07-07 PROCEDURE — 82040 ASSAY OF SERUM ALBUMIN: CPT | Performed by: OBSTETRICS & GYNECOLOGY

## 2025-07-07 PROCEDURE — 86900 BLOOD TYPING SEROLOGIC ABO: CPT

## 2025-07-07 PROCEDURE — 36591 DRAW BLOOD OFF VENOUS DEVICE: CPT | Performed by: OBSTETRICS & GYNECOLOGY

## 2025-07-07 PROCEDURE — 83735 ASSAY OF MAGNESIUM: CPT | Performed by: OBSTETRICS & GYNECOLOGY

## 2025-07-07 PROCEDURE — 85004 AUTOMATED DIFF WBC COUNT: CPT | Performed by: OBSTETRICS & GYNECOLOGY

## 2025-07-07 PROCEDURE — 86304 IMMUNOASSAY TUMOR CA 125: CPT

## 2025-07-07 RX ORDER — HEPARIN SODIUM (PORCINE) LOCK FLUSH IV SOLN 100 UNIT/ML 100 UNIT/ML
5 SOLUTION INTRAVENOUS
Status: CANCELLED | OUTPATIENT
Start: 2025-07-07

## 2025-07-07 RX ORDER — DIPHENHYDRAMINE HYDROCHLORIDE 50 MG/ML
50 INJECTION, SOLUTION INTRAMUSCULAR; INTRAVENOUS
Status: CANCELLED
Start: 2025-07-07

## 2025-07-07 RX ORDER — HEPARIN SODIUM,PORCINE 10 UNIT/ML
5-20 VIAL (ML) INTRAVENOUS DAILY PRN
Status: CANCELLED | OUTPATIENT
Start: 2025-07-07

## 2025-07-07 RX ORDER — EPINEPHRINE 1 MG/ML
0.3 INJECTION, SOLUTION INTRAMUSCULAR; SUBCUTANEOUS EVERY 5 MIN PRN
Status: CANCELLED | OUTPATIENT
Start: 2025-07-07

## 2025-07-07 NOTE — PROGRESS NOTES
Infusion Nursing Note:  Jacki Smith presents today for Labs, port needle change.    Patient seen by provider today: No   present during visit today: Not Applicable.     Note: Michelle arrived ambulatory by herself for labs and port needle change. Plan of care reviewed and she has no questions.  Labs were drawn, port was de accessed and EMLA cream was applied x 30 minutes prior to port re access per patient request.  Hgb 7.8 today. Per blood product therapy plan, Michelle is to receive 1 unit PRBCs. She is not able to stay for a transfusion today. She prefers to get blood when she comes in for chemo on Wed. Secure chat message sent to Echo Sanz CNP, as Michelle has an appt with her tomorrow. Echo agrees with Michelle getting 1 unit on Wed.  T&S was added on and prepare order was released. Blood bank was notified patient will come back on 7/9 for transfusion.     Intravenous Access:  Labs drawn without difficulty.  Implanted Port.     Discharge Plan:   Patient will return July 9th for next appointment.   Patient discharged in stable condition accompanied by: self.  Departure Mode: Ambulatory.        Rylie Bullard RN

## 2025-07-07 NOTE — PROGRESS NOTES
Virtual Visit Details    Type of service:  Video Visit     Originating Location (pt. 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  She presents today for follow up and disease management by video.      Oncology History:  22: Presented to an ED in [place] for evaluation of abdominal bloating and discomfort.  -Abdomen, pelvic CT: Radiographic Stage CAL ovarian cancer.   22: [id number].   22: Pelvic ultrasound: c/w metastatic cancer.  22: Pelvic exam under anesthesia, diagnostic laparoscopy, biopsies, evacuation of ascites.   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      22, 22, 22: Cycles 1-3 of neoadjuvant chemotherapy with IV carboplatin + paclitaxel 175 mg/m2 every 21 days.   -Cycles 1,2: Carboplatin AUC 6.   -Cycle 3: Carboplatin AUC 5 with dose-reduction due to thrombcytopenia.   -22: Chest, abdomen, pelvic CT: Partial response.   22: Pelvic exam under anesthesia, diagnostic laparoscopy, conversion to laparotomy for optimal interval tumor debulking to no gross residual disease including peritoneal and diaphragm biopsies, bilateral salpingo-oophorectomy, infragastric omentectomy, bilateral hemidiaphragm stripping, peritoneal and mesenteric argon beam ablation, appendectomy.   -Pathology: High-grade serous carcinoma involving all resected specimens.   [facility] Testing Results.  -Genomic ROXI low (6%)   -TMB low (1mut/Mb)  -Microsatellite stable/Mismatch Repair proficient  -PD-L1- (CPS  0%)  -NTRK 1/2/3 fusion not detected.   -ER-, MO+  -Genomic alterations in:  --TP53  -No genomic alterations in BRCA1,2.  -FOLR1+ (2+, 75%).      Jamila-ovary clinical trial screening: Negative for the immunoreactive subtype.     9/28/22, 10/19/22, 11/17/22: Cycles 4-6 of first-line chemotherapy with IV carboplatin AUC 5 + paclitaxel 175 mg/m2.  -12/2/22: Chest, abdomen, pelvic CT: No definitive evidence of disease.   -CA-125 439>>23.   -2/16/23: Chest, abdomen, pelvic CT to evaluate bloating: Stable findings, no definitive evidence of disease.    -5/25/23: Admitted  to [facility] for management of malignant ascites s/p therapeutic paracentesis, and partial SBO resolved with conservative management.   5/25/23: Abdomen, pelvic CT: Progressive disease.      10/5/22: [facility], reflexed to Common Hereditary Cancer Panel.   -No identifiable germline variants identified in ABRAXAS1, AKT1, APC, DEION, AXIN2, BARD1, BMPR1A, BRCA1, BRCA2, BRIP1, CDC73, CDH1, CDK4, CDKN2A, CHEK2, CTNNA1, DICER1, EPCAM, FANCC, FANCM, GREM1, HOXB13, KIT, MEN1, MLH1, MRE11, MSH2, MSH6, MUTYH, NBN, NF1, NTHL1, PALB2, PDGFRA, PIK3CA, PMS2, POLD1, POLE, PTEN, RAD50, RAD51C, RAD51D, RECQL, RINT1, SDHA, SDHB, SDHC, SDHD, SMAD4, SMARCA4, STK11, TP53, TSC1, TSC2, VHL, XRCC2.   -Variant of uncertain significance in MSH3 c.16C>T (p.Ecu5Val)     6/13/23, 7/12/23, 8/14/23, 9/13/23, 10/11/23, 11/8/23, 12/6/23, 1/3/24, 2/1/24, 2/29/24, 3/28/24: Cycles 1-11 of second-line chemotherapy with IV carboplatin AUC 5 + pegylated liposomal doxorubicin (PLD) 30 mg/m2 every 28 days.   -9/27/23: Chest, abdomen, pelvic CT: Partial response. Catheter-associated thrombus.   -1/23/24: Chest, abdomen, pelvic CT: Stable disease.   -4/10/24: Chest, abdomen, pelvic CT: Stable disease.   -CA-125 78>>44.   5/10/24-1/24/25: Second- PARPi therapy with olaparib.   -Cycles 1-2: Olaparib 300 mg BID.   -Cycles 3+: Olaparib 200 mg BID with dose-reduction  "due to decreased creatinine clearance.   -7/5/24: Chest, abdomen, pelvic CT: Stable disease.   -10/15/24: Chest, abdomen, pelvic CT: Stable disease.   -CA-125 38>>110.     2/6/25: Third-line therapy with IV carboplatin AUC 5 + paclitaxel 135 mg/m2 + bevacizumab 15 mg/kg.  340.  -2/11/25-3/2/25: Complicated by bevacizumab-induced microperforation of the proximal small bowel. Managed with drain placement. Subsequent SBO attributed to post-perforation inflammation. Palliative venting G-tube placed, TPN initiated.      3/19/25, 4/9/25, 4/30/25: Third-line therapy with IV carboplatin AUC 4 + paclitaxel 135 mg/m2. Bevacizumab held indefinitely due to bowel perforation.   - 5/13/25: Chest, abdomen, pelvic CT: Stable disease.      Plan: Continue third-line therapy with palliative intent with IV carboplatin AUC 4 + paclitaxel 135 mg/m2 every 3 weeks x 3 additional cycles followed by CT CAP and follow up with Dr. Patino. Carboplatin dose-reduction due to grade 2 thrombocytopenia; permanent discontinuation of bevacizumab.       5/30/25: Cycle 5 carboplatin and paclitaxel.   79.  6/19/25: Cycle 6 carboplatin and paclitaxel.   75.    6/30/25: ANC found to be 0.2 on [facility] TPN labs.  Neulasta added to her treatment plan.    7/9/25: Cycle 7 carboplatin and paclitaxel planned.  Neulasta. Hgb 7.8 plan 1 unit(s) PRBC.   62.              Today she reports feeling ok overall.  Low energy today, tired.      -Abdominal pain/bloating: Her belly feels ok, occasional GI discomfort, GT \"worst part\", very uncomfortable wants it out  -Nausea or vomiting: No, she hasn't needed to put her GT to gravity in several weeks   -Appetite: Appetite is still low, eating but not very much yogurt, peeled apples, cherries  -Weight loss: No  -Bowel/bladder habits: Bowels are  \"working\", doing pelvic PT, she had a UTI and was started on Cipro then switched to Keflex  -Neuropathy symptoms: She developed some tingling in the bottoms of feet " after a pedicure, no impact to function, some baseline symptoms in the fingertips from raynaud's, no changes  -Leg swelling: She still has swelling in legs, L>R on xeralto now once daily, she was told not tow wear compression stockings   -Fevers: No  -Chest pain: No  -SOB: No SOB at rest, some DANIELLE with walking, a little bit worse then previous                Past Medical History:    Past Medical History:   Diagnosis Date    Female stress incontinence     Ovarian cancer, right (H) 06/16/2022    Stage IIIC high-grade serous carcinoma    Recurrent cold sores          Past Surgical History:    Past Surgical History:   Procedure Laterality Date    APPENDECTOMY  08/29/2022    Part of ovarian cancer tumor debulking    BLADDER SUSPENSION  08/13/2009    ENDOSCOPIC INSERTION TUBE GASTROSTOMY N/A 2/28/2025    Procedure: INSERTION, PEG TUBE (venting);  Surgeon: Porfirio Saunders MD;  Location: [facility] OR    HYSTERECTOMY  08/13/2009    For prolapse    IR FOLLOW UP VISIT INPATIENT  2/25/2025    IR PARACENTESIS  6/6/2022    IR PARACENTESIS  6/14/2022    IR PARACENTESIS  5/26/2023    IR PARACENTESIS  2/26/2025    IR RETROPERITONEAL ABSCESS DRAINAGE  2/18/2025    LAPAROSCOPY DIAGNOSTIC (GYN)  06/16/2022    SALPINGO-OOPHORECTOMY, COMBINED Bilateral 08/29/2022    Pelvic exam under anesthesia, diagnostic laparoscopy, conversion to laparotomy for optimal interval tumor debulking to no gross residual disease including peritoneal and diaphragm biopsies, bilateral salpingo-oophorectomy, infragastric omentectomy, bilateral hemidiaphragm stripping, peritoneal and mesenteric argon beam ablation, appendectomy.    TONSILLECTOMY  1973    TUBAL LIGATION Bilateral 09/01/1998         Health Maintenance Due   Topic Date Due    ADVANCE CARE PLANNING  Never done    YEARLY PREVENTIVE VISIT  Never done    COLORECTAL CANCER SCREENING  Never done    HIV SCREENING  Never done    HEPATITIS C SCREENING  Never done    PNEUMOCOCCAL VACCINE 50+ YEARS (1 of 2 -  "PCV) Never done    ZOSTER VACCINE (1 of 2) Never done    PAP  Never done    LIPID  Never done    COVID-19 VACCINE (5 - 2024-25 season) 09/01/2024    RSV VACCINE (1 - Risk 60-74 years 1-dose series) Never done    MAMMO SCREENING  08/04/2025       Current Medications:     Current Outpatient Medications   Medication Sig Dispense Refill    acetaminophen (TYLENOL) 500 MG tablet Take 500 mg by mouth every 6 hours as needed for mild pain.      cyclobenzaprine (FLEXERIL) 5 MG tablet Take 1-2 tablets (5-10 mg) by mouth 3 times daily as needed for muscle spasms. 40 tablet 1    diphenhydrAMINE-acetaminophen (TYLENOL PM)  MG tablet Take 1 tablet by mouth nightly as needed for sleep.      docusate sodium (DSS) 100 MG capsule Take 100 mg by mouth 2 times daily as needed for constipation.      Emergency Supply Kit, Central, Patient use for emergency only. Contents: 3 sodium chloride 0.9% flushes, 1 dressing kit, 1 microclave ext set 14\", 4 nitrile gloves (med), 6 alcohol prep pads, 1 bacitracin, 1 syringe (10 cc 20 G 1\"). Call [phone] to reorder. [id number] kit 0    escitalopram (LEXAPRO) 20 MG tablet Take 20 mg by mouth daily.      famotidine (PEPCID) 20 MG tablet Take 20 mg by mouth 2 times daily as needed.      hydrOXYzine HCl (ATARAX) 25 MG tablet Take 1-2 tablets (25-50 mg) by mouth every 6 hours as needed for anxiety. 30 tablet 0    Lidocaine (LIDOCARE) 4 % Patch Place 1 patch over 12 hours onto the skin daily as needed for moderate pain. To prevent lidocaine toxicity, patient should be patch free for 12 hrs daily. 6 patch 0    lidocaine-prilocaine (EMLA) 2.5-2.5 % external cream Apply topically as needed for moderate pain 30 g 1    LORazepam (ATIVAN) 0.5 MG tablet Take 0.5-1 mg by mouth every 6 hours as needed for anxiety.      Multiple Vitamin (INFUVITE ADULT) injection Add to infusion 10 mLs daily. Select 2 multivitamin vials, one of each color top. Draw up 5 mL from each vial and add to 1 TPN bag daily " immediately prior to infusing.   Discard remainder of vials. 3600 mL 0    Multiple Vitamin (MULTI-VITAMIN DAILY PO) Take 1 tablet by mouth daily      omeprazole (PRILOSEC) 20 MG DR capsule Take 20 mg by mouth daily.      ondansetron (ZOFRAN ODT) 8 MG ODT tab Take 1 tablet (8 mg) by mouth every 8 hours as needed for nausea. 30 tablet 0    ondansetron (ZOFRAN) 8 MG tablet Take 1 tablet (8 mg) by mouth every 8 hours as needed for nausea (vomiting). Do not take for 3 days after chemotherapy. 30 tablet 2    parenteral nutrition - DUALCHAMBER - see order for formula Infuse 1,600 mLs over 12 hours into the vein (central line) six times a week. TPN additives to be added prior to administration:   Infuvite-Adult 10 mL daily.  Taper up for 1 Hours. Taper down for 1 Hours. Plateau rate:145.5 mL/hr.  KVO: 5 mL/hr. 744285 mL 0    parenteral nutrition - PLAIN - see order for formula Infuse 1,600 mLs over 12 hours into the vein (central line) once a week. TPN additives to be added prior to administration:   Infuvite-  Adult 10 mL daily.  Taper up for 1 Hours. Taper down for 1 Hours Plateau rate:145.5  mL/hr.  KVO: 5 mL/hr. 811816 mL 0    prochlorperazine (COMPAZINE) 10 MG tablet Take 1 tablet (10 mg) by mouth every 6 hours as needed for nausea or vomiting. 30 tablet 2    rivaroxaban ANTICOAGULANT (XARELTO) 15 MG TABS tablet Take 15 mg by mouth.      rivaroxaban ANTICOAGULANT (XARELTO) 15 MG TABS tablet Take 15 mg by mouth 2 times daily.      rivaroxaban ANTICOAGULANT (XARELTO) 20 MG TABS tablet Take 20 mg by mouth daily.      sodium chloride 0.9 % 500 mL via elastomeric pump Infuse 1,000 mLs into the vein daily as needed (dehydration). Infuse 1- 2 elastomeric pump(s) ( 500-1000 ml) . Each elastomeric to infuse over 2 hours. 012807 mL 0    sodium chloride, PF, 0.9% PF flush Inject 10 mLs into the vein as needed for line flush. Flush IV before and after med administration as directed and/or at least every 24 hours, or prior to  "deaccessing for no further use and/or at least every 4 weeks when not accessed. 103641 mL 0    valACYclovir (VALTREX) 1000 mg tablet Take 1,000 mg by mouth 2 times daily as needed.           Allergies:      No Known Allergies     Social History:     Social History     Tobacco Use    Smoking status: Former     Current packs/day: 0.00     Average packs/day: 1 pack/day for 42.0 years (42.0 ttl pk-yrs)     Types: Cigarettes     Start date: 5/22/1980     Quit date: 5/22/2022     Years since quitting: 3.1    Smokeless tobacco: Never   Substance Use Topics    Alcohol use: Yes     Comment: Beer ~Q3 months.       History   Drug Use Unknown         Family History:     The patient's family history is notable for:    Family History   Problem Relation Age of Onset    Prostate Cancer Father     Breast Cancer Sister 48    Melanoma Sister     Skin Cancer Sister     Colon Cancer Maternal Grandmother          Physical Exam:     Ht 1.575 m (5' 2\")   Wt 61.2 kg (135 lb)   BMI 24.69 kg/m    Body mass index is 24.69 kg/m .    General Appearance: alert, no distress    Eyes:  Eyes grossly normal.  No obvious discharge, erythema, or scleral/conjunctival abnormalities.    Respiratory: No noted audible wheeze, cough, or visible cyanosis.  No visible retractions or increased work of breathing.     Skin: no lesions or rashes on visible skin     Psychiatric: appropriate mood and affect. Mentation appears normal, affect normal/bright, judgement and insight intact, normal speech and appearance well-groomed                            The rest of a comprehensive physical examination is deferred due to video visit restrictions.     Recent Results (from the past 48 hours)   Comprehensive metabolic panel    Collection Time: 07/07/25  8:03 AM   Result Value Ref Range    Sodium 138 135 - 145 mmol/L    Potassium 4.7 3.4 - 5.3 mmol/L    Carbon Dioxide (CO2) 21 (L) 22 - 29 mmol/L    Anion Gap 11 7 - 15 mmol/L    Urea Nitrogen 33.1 (H) 8.0 - 23.0 mg/dL    " Creatinine 0.92 0.51 - 0.95 mg/dL    GFR Estimate 71 >60 mL/min/1.73m2    Calcium 9.3 8.8 - 10.4 mg/dL    Chloride 106 98 - 107 mmol/L    Glucose 100 (H) 70 - 99 mg/dL    Alkaline Phosphatase 101 40 - 150 U/L    AST 21 0 - 45 U/L    ALT 12 0 - 50 U/L    Protein Total 6.0 (L) 6.4 - 8.3 g/dL    Albumin 3.4 (L) 3.5 - 5.2 g/dL    Bilirubin Total 0.2 <=1.2 mg/dL   Magnesium    Collection Time: 07/07/25  8:03 AM   Result Value Ref Range    Magnesium 2.1 1.7 - 2.3 mg/dL       Collection Time: 07/07/25  8:03 AM   Result Value Ref Range     62 (H) <=38 U/mL   CBC with platelets and differential    Collection Time: 07/07/25  8:03 AM   Result Value Ref Range    WBC Count 4.5 4.0 - 11.0 10e3/uL    RBC Count 2.53 (L) 3.80 - 5.20 10e6/uL    Hemoglobin 7.8 (L) 11.7 - 15.7 g/dL    Hematocrit 24.7 (L) 35.0 - 47.0 %    MCV 98 78 - 100 fL    MCH 30.8 26.5 - 33.0 pg    MCHC 31.6 31.5 - 36.5 g/dL    RDW 17.0 (H) 10.0 - 15.0 %    Platelet Count 143 (L) 150 - 450 10e3/uL    % Neutrophils 66 %    % Lymphocytes 21 %    % Monocytes 10 %    % Eosinophils 2 %    % Basophils 0 %    % Immature Granulocytes 1 %    NRBCs per 100 WBC 0 <1 /100    Absolute Neutrophils 3.0 1.6 - 8.3 10e3/uL    Absolute Lymphocytes 0.9 0.8 - 5.3 10e3/uL    Absolute Monocytes 0.5 0.0 - 1.3 10e3/uL    Absolute Eosinophils 0.1 0.0 - 0.7 10e3/uL    Absolute Basophils 0.0 0.0 - 0.2 10e3/uL    Absolute Immature Granulocytes 0.1 <=0.4 10e3/uL    Absolute NRBCs 0.0 10e3/uL   Adult Type and Screen    Collection Time: 07/07/25  8:03 AM   Result Value Ref Range    ABO/RH(D) A POS     Antibody Screen Negative Negative    SPECIMEN EXPIRATION DATE 7/10/2025 11:59:00 PM CDT    Prepare red blood cells (unit)    Collection Time: 07/07/25 10:48 AM   Result Value Ref Range    Blood Component Type Red Blood Cells     Product Code D1064G03     Unit Status Ready for issue     Unit Number [id number]     CROSSMATCH Compatible     CODING SYSTEM [id number]              Assessment:    Jacki Smith is a 60 year old woman with a diagnosis of 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  She presents today for follow up and disease management by video.     30 minutes spent on the date of the encounter doing chart review, history and exam, documentation, and further activities as noted above.      Plan:     1.)        Ovarian cancer:  OK to proceed with planned treatment tomorrow as labs are WDL.  RTC as scheduled for CT and follow up with Dr. Patino, next cycle (as needed).  Reviewed signs and symptoms for when she should contact the clinic or seek additional care.  Patient to contact the clinic with any questions or concerns in the interim.      Genetic risk factors were assessed and she has a VUS No identifiable germline variants identified in ABRAXAS1, AKT1, APC, DEION, AXIN2, BARD1, BMPR1A, BRCA1, BRCA2, BRIP1, CDC73, CDH1, CDK4, CDKN2A, CHEK2, CTNNA1, DICER1, EPCAM, FANCC, FANCM, GREM1, HOXB13, KIT, MEN1, MLH1, MRE11, MSH2, MSH6, MUTYH, NBN, NF1, NTHL1, PALB2, PDGFRA, PIK3CA, PMS2, POLD1, POLE, PTEN, RAD50, RAD51C, RAD51D, RECQL, RINT1, SDHA, SDHB, SDHC, SDHD, SMAD4, SMARCA4, STK11, TP53, TSC1, TSC2, VHL, XRCC2.     [facility]is Testing Results.  -Genomic ROXI low (6%)   -TMB low (1mut/Mb)  -Microsatellite stable/Mismatch Repair proficient  -PD-L1- (CPS 0%)  -NTRK 1/2/3 fusion not detected.   -ER-, AR+  -Genomic alterations in:  --TP53  -No genomic alterations in BRCA1,2.  -FOLR1+ (2+, 75%).        Labs and/or tests reviewed include:  CBC. CMP. Mag. .     2.)        Health maintenance:  Issues addressed today include following up with PCP for annual health maintenance and non-gynecologic issues.      3.)        SBO: Resolved.  Recurrent SBO symptoms with multiple ED visits.  She has been able to manage her symptoms with bowel rest at home and leave her GT to gravity with improvement.  She has not needed to put her GT to gravity for several weeks.   Tolerating small amounts of food.     -OK to remove GT per documentation per Dr. Patino, provided # to GI clinic to schedule.  -Continue TPN until able to tolerate enough oral intake.     4.)        Neutropenia:  Message received 6/30/25 from triage that pt's ANC 0.2.  ANC 3.0 today.  Neulasta added to treatment plan.  -Continue with day 2 Neulasta    5.) Lymphedema: Attributable to recent DVT also disease.  Encouraged continuing to use compression stockings as long as they are comfortable.  Discussed lymphedema therapy but she would prefer to hold off for now due to appointment burden currently.  -OK to use compression stockings.    6.) Anemia: Hgb 7.8.  Some increased fatigue and DANIELLE.    -1 unit(s) PRBC tomorrow while in infusion.      Echo Sanz, BRIELLE, APRN, FNP-C, AOCNP  Oncology Nurse Practitioner  Division of Gynecologic Oncology  Pager: [phone]     CC  Patient Care Team:  Domenica Christianson MD as PCP - General  Charis Patino MD as MD (Gynecologic Oncology)  Anish Monroy MD as Assigned Palliative Care Provider  Pratima Vega, RN as Specialty Care Coordinator (Hematology & Oncology)  Myhre, Grace C, [facility] as Pharmacist  Charis Patino MD as Home Infusion Following Provider (Gynecologic Oncology)  Clarisse Wells, RD as [facility] Registered Dietitian (Dietitian, Registered)  Echo Sanz APRN CNP as Assigned Cancer Care Provider  CHARIS PATINO

## 2025-07-08 ENCOUNTER — VIRTUAL VISIT (OUTPATIENT)
Dept: ONCOLOGY | Facility: HOSPITAL | Age: 60
End: 2025-07-08
Attending: OBSTETRICS & GYNECOLOGY
Payer: COMMERCIAL

## 2025-07-08 VITALS — HEIGHT: 62 IN | WEIGHT: 135 LBS | BODY MASS INDEX: 24.84 KG/M2

## 2025-07-08 DIAGNOSIS — C56.1 OVARIAN CANCER, RIGHT (H): Primary | ICD-10-CM

## 2025-07-08 DIAGNOSIS — Z51.11 ENCOUNTER FOR ANTINEOPLASTIC CHEMOTHERAPY: ICD-10-CM

## 2025-07-08 PROCEDURE — 1126F AMNT PAIN NOTED NONE PRSNT: CPT | Performed by: NURSE PRACTITIONER

## 2025-07-08 PROCEDURE — 98006 SYNCH AUDIO-VIDEO EST MOD 30: CPT | Performed by: NURSE PRACTITIONER

## 2025-07-08 RX ORDER — LORAZEPAM 2 MG/ML
0.5 INJECTION INTRAMUSCULAR EVERY 4 HOURS PRN
Status: CANCELLED | OUTPATIENT
Start: 2025-07-11

## 2025-07-08 RX ORDER — ALBUTEROL SULFATE 0.83 MG/ML
2.5 SOLUTION RESPIRATORY (INHALATION)
Status: CANCELLED | OUTPATIENT
Start: 2025-07-11

## 2025-07-08 RX ORDER — METHYLPREDNISOLONE SODIUM SUCCINATE 40 MG/ML
40 INJECTION INTRAMUSCULAR; INTRAVENOUS
Status: CANCELLED
Start: 2025-07-11

## 2025-07-08 RX ORDER — DIPHENHYDRAMINE HCL 50 MG
50 CAPSULE ORAL ONCE
Status: CANCELLED
Start: 2025-07-11

## 2025-07-08 RX ORDER — DIPHENHYDRAMINE HYDROCHLORIDE 50 MG/ML
50 INJECTION, SOLUTION INTRAMUSCULAR; INTRAVENOUS ONCE
Status: CANCELLED
Start: 2025-07-11 | End: 2025-07-11

## 2025-07-08 RX ORDER — PALONOSETRON 0.05 MG/ML
0.25 INJECTION, SOLUTION INTRAVENOUS ONCE
Status: CANCELLED | OUTPATIENT
Start: 2025-07-11

## 2025-07-08 RX ORDER — MEPERIDINE HYDROCHLORIDE 25 MG/ML
25 INJECTION INTRAMUSCULAR; INTRAVENOUS; SUBCUTANEOUS
Status: CANCELLED | OUTPATIENT
Start: 2025-07-11

## 2025-07-08 RX ORDER — DIPHENHYDRAMINE HYDROCHLORIDE 50 MG/ML
25 INJECTION, SOLUTION INTRAMUSCULAR; INTRAVENOUS
Status: CANCELLED
Start: 2025-07-11

## 2025-07-08 RX ORDER — EPINEPHRINE 1 MG/ML
0.3 INJECTION, SOLUTION, CONCENTRATE INTRAVENOUS EVERY 5 MIN PRN
Status: CANCELLED | OUTPATIENT
Start: 2025-07-11

## 2025-07-08 RX ORDER — HEPARIN SODIUM (PORCINE) LOCK FLUSH IV SOLN 100 UNIT/ML 100 UNIT/ML
5 SOLUTION INTRAVENOUS
Status: CANCELLED | OUTPATIENT
Start: 2025-07-11

## 2025-07-08 RX ORDER — HEPARIN SODIUM,PORCINE 10 UNIT/ML
5-20 VIAL (ML) INTRAVENOUS DAILY PRN
Status: CANCELLED | OUTPATIENT
Start: 2025-07-11

## 2025-07-08 RX ORDER — ALBUTEROL SULFATE 90 UG/1
1-2 INHALANT RESPIRATORY (INHALATION)
Status: CANCELLED
Start: 2025-07-11

## 2025-07-08 RX ORDER — DIPHENHYDRAMINE HYDROCHLORIDE 50 MG/ML
50 INJECTION, SOLUTION INTRAMUSCULAR; INTRAVENOUS
Status: CANCELLED
Start: 2025-07-11

## 2025-07-08 ASSESSMENT — PAIN SCALES - GENERAL: PAINLEVEL_OUTOF10: NO PAIN (0)

## 2025-07-08 NOTE — NURSING NOTE
Current patient location: [facility] [address] [facility]     Is the patient currently in the state of [place]? YES    Visit mode: VIDEO    If the visit is dropped, the patient can be reconnected by:VIDEO VISIT: Text to cell phone:   Telephone Information:   Mobile [phone]       Will anyone else be joining the visit? NO  (If patient encounters technical issues they should call [phone])    Are changes needed to the allergy or medication list? Pt stated no changes to allergies and Pt stated no med changes    Are refills needed on medications prescribed by this physician? NO    Rooming Documentation:  Not applicable    Reason for visit: FRED BOOKER

## 2025-07-09 ENCOUNTER — TELEPHONE (OUTPATIENT)
Dept: GASTROENTEROLOGY | Facility: CLINIC | Age: 60
End: 2025-07-09

## 2025-07-09 ENCOUNTER — INFUSION THERAPY VISIT (OUTPATIENT)
Dept: INFUSION THERAPY | Facility: HOSPITAL | Age: 60
End: 2025-07-09
Attending: OBSTETRICS & GYNECOLOGY
Payer: COMMERCIAL

## 2025-07-09 VITALS
RESPIRATION RATE: 16 BRPM | BODY MASS INDEX: 24.29 KG/M2 | DIASTOLIC BLOOD PRESSURE: 79 MMHG | SYSTOLIC BLOOD PRESSURE: 131 MMHG | OXYGEN SATURATION: 98 % | TEMPERATURE: 97.6 F | WEIGHT: 132.8 LBS | HEART RATE: 59 BPM

## 2025-07-09 DIAGNOSIS — D63.0 ANEMIA IN NEOPLASTIC DISEASE: ICD-10-CM

## 2025-07-09 DIAGNOSIS — Z46.59 ENCOUNTER FOR CARE RELATED TO FEEDING TUBE: Primary | ICD-10-CM

## 2025-07-09 DIAGNOSIS — C56.1 OVARIAN CANCER, RIGHT (H): Primary | ICD-10-CM

## 2025-07-09 PROCEDURE — 96367 TX/PROPH/DG ADDL SEQ IV INF: CPT

## 2025-07-09 PROCEDURE — 36430 TRANSFUSION BLD/BLD COMPNT: CPT

## 2025-07-09 PROCEDURE — 96375 TX/PRO/DX INJ NEW DRUG ADDON: CPT

## 2025-07-09 PROCEDURE — 96413 CHEMO IV INFUSION 1 HR: CPT

## 2025-07-09 PROCEDURE — 96415 CHEMO IV INFUSION ADDL HR: CPT

## 2025-07-09 PROCEDURE — 250N000011 HC RX IP 250 OP 636: Performed by: NURSE PRACTITIONER

## 2025-07-09 PROCEDURE — P9016 RBC LEUKOCYTES REDUCED: HCPCS | Performed by: NURSE PRACTITIONER

## 2025-07-09 PROCEDURE — 96417 CHEMO IV INFUS EACH ADDL SEQ: CPT

## 2025-07-09 PROCEDURE — 258N000003 HC RX IP 258 OP 636: Performed by: NURSE PRACTITIONER

## 2025-07-09 RX ORDER — EPINEPHRINE 1 MG/ML
0.3 INJECTION, SOLUTION INTRAMUSCULAR; SUBCUTANEOUS EVERY 5 MIN PRN
OUTPATIENT
Start: 2025-07-09

## 2025-07-09 RX ORDER — HEPARIN SODIUM (PORCINE) LOCK FLUSH IV SOLN 100 UNIT/ML 100 UNIT/ML
5 SOLUTION INTRAVENOUS
Status: DISCONTINUED | OUTPATIENT
Start: 2025-07-09 | End: 2025-07-09 | Stop reason: HOSPADM

## 2025-07-09 RX ORDER — EPINEPHRINE 1 MG/ML
0.3 INJECTION, SOLUTION INTRAMUSCULAR; SUBCUTANEOUS EVERY 5 MIN PRN
Status: DISCONTINUED | OUTPATIENT
Start: 2025-07-09 | End: 2025-07-09 | Stop reason: HOSPADM

## 2025-07-09 RX ORDER — DIPHENHYDRAMINE HYDROCHLORIDE 50 MG/ML
50 INJECTION, SOLUTION INTRAMUSCULAR; INTRAVENOUS
Status: DISCONTINUED | OUTPATIENT
Start: 2025-07-09 | End: 2025-07-09 | Stop reason: HOSPADM

## 2025-07-09 RX ORDER — PALONOSETRON 0.05 MG/ML
0.25 INJECTION, SOLUTION INTRAVENOUS ONCE
Status: COMPLETED | OUTPATIENT
Start: 2025-07-09 | End: 2025-07-09

## 2025-07-09 RX ORDER — ALBUTEROL SULFATE 0.83 MG/ML
2.5 SOLUTION RESPIRATORY (INHALATION)
Status: DISCONTINUED | OUTPATIENT
Start: 2025-07-09 | End: 2025-07-09 | Stop reason: HOSPADM

## 2025-07-09 RX ORDER — DIPHENHYDRAMINE HYDROCHLORIDE 50 MG/ML
50 INJECTION, SOLUTION INTRAMUSCULAR; INTRAVENOUS ONCE
Status: COMPLETED | OUTPATIENT
Start: 2025-07-09 | End: 2025-07-09

## 2025-07-09 RX ORDER — DIPHENHYDRAMINE HYDROCHLORIDE 50 MG/ML
50 INJECTION, SOLUTION INTRAMUSCULAR; INTRAVENOUS
Start: 2025-07-09

## 2025-07-09 RX ORDER — ALBUTEROL SULFATE 90 UG/1
1-2 INHALANT RESPIRATORY (INHALATION)
Status: DISCONTINUED | OUTPATIENT
Start: 2025-07-09 | End: 2025-07-09 | Stop reason: HOSPADM

## 2025-07-09 RX ORDER — HEPARIN SODIUM (PORCINE) LOCK FLUSH IV SOLN 100 UNIT/ML 100 UNIT/ML
5 SOLUTION INTRAVENOUS
OUTPATIENT
Start: 2025-07-09

## 2025-07-09 RX ORDER — HEPARIN SODIUM,PORCINE 10 UNIT/ML
5-20 VIAL (ML) INTRAVENOUS DAILY PRN
OUTPATIENT
Start: 2025-07-09

## 2025-07-09 RX ORDER — METHYLPREDNISOLONE SODIUM SUCCINATE 40 MG/ML
40 INJECTION INTRAMUSCULAR; INTRAVENOUS
Status: DISCONTINUED | OUTPATIENT
Start: 2025-07-09 | End: 2025-07-09 | Stop reason: HOSPADM

## 2025-07-09 RX ORDER — MEPERIDINE HYDROCHLORIDE 50 MG/ML
25 INJECTION INTRAMUSCULAR; INTRAVENOUS; SUBCUTANEOUS
Status: DISCONTINUED | OUTPATIENT
Start: 2025-07-09 | End: 2025-07-09 | Stop reason: HOSPADM

## 2025-07-09 RX ORDER — DIPHENHYDRAMINE HYDROCHLORIDE 50 MG/ML
25 INJECTION, SOLUTION INTRAMUSCULAR; INTRAVENOUS
Status: DISCONTINUED | OUTPATIENT
Start: 2025-07-09 | End: 2025-07-09 | Stop reason: HOSPADM

## 2025-07-09 RX ADMIN — DEXAMETHASONE SODIUM PHOSPHATE: 10 INJECTION, SOLUTION INTRAMUSCULAR; INTRAVENOUS at 08:42

## 2025-07-09 RX ADMIN — SODIUM CHLORIDE 250 ML: 0.9 INJECTION, SOLUTION INTRAVENOUS at 08:23

## 2025-07-09 RX ADMIN — CARBOPLATIN 320 MG: 10 INJECTION, SOLUTION INTRAVENOUS at 12:05

## 2025-07-09 RX ADMIN — PALONOSETRON 0.25 MG: 0.05 INJECTION, SOLUTION INTRAVENOUS at 08:25

## 2025-07-09 RX ADMIN — FAMOTIDINE 20 MG: 10 INJECTION INTRAVENOUS at 08:25

## 2025-07-09 RX ADMIN — PACLITAXEL 216 MG: 6 INJECTION, SOLUTION INTRAVENOUS at 09:08

## 2025-07-09 RX ADMIN — DIPHENHYDRAMINE HYDROCHLORIDE 50 MG: 50 INJECTION, SOLUTION INTRAMUSCULAR; INTRAVENOUS at 08:25

## 2025-07-09 RX ADMIN — SODIUM CHLORIDE 250 ML: 9 INJECTION, SOLUTION INTRAVENOUS at 13:05

## 2025-07-09 NOTE — TELEPHONE ENCOUNTER
[facility]    Phone Message    May a detailed message be left on voicemail: yes     Reason for Call: Other: Pt is calling wanting to get Peg tube removed but isn't sure if it would be Dr. Saunders who would take it out since he was the one that did the procedure. Please give a call back to discuss.      Action Taken: Message routed to:  [facility] Panc [facility] B[facility]    Travel Screening: Not Applicable     Date of Service:

## 2025-07-10 ENCOUNTER — TELEPHONE (OUTPATIENT)
Dept: GASTROENTEROLOGY | Facility: CLINIC | Age: 60
End: 2025-07-10
Payer: COMMERCIAL

## 2025-07-10 ENCOUNTER — INFUSION THERAPY VISIT (OUTPATIENT)
Dept: INFUSION THERAPY | Facility: HOSPITAL | Age: 60
End: 2025-07-10
Attending: OBSTETRICS & GYNECOLOGY
Payer: COMMERCIAL

## 2025-07-10 VITALS
DIASTOLIC BLOOD PRESSURE: 82 MMHG | HEART RATE: 62 BPM | SYSTOLIC BLOOD PRESSURE: 138 MMHG | RESPIRATION RATE: 18 BRPM | TEMPERATURE: 98.4 F | OXYGEN SATURATION: 100 %

## 2025-07-10 DIAGNOSIS — C56.1 OVARIAN CANCER, RIGHT (H): Primary | ICD-10-CM

## 2025-07-10 PROCEDURE — 250N000011 HC RX IP 250 OP 636: Mod: JZ | Performed by: NURSE PRACTITIONER

## 2025-07-10 PROCEDURE — 96372 THER/PROPH/DIAG INJ SC/IM: CPT | Performed by: NURSE PRACTITIONER

## 2025-07-10 RX ADMIN — PEGFILGRASTIM-JMDB 6 MG: 6 INJECTION SUBCUTANEOUS at 15:19

## 2025-07-10 NOTE — TELEPHONE ENCOUNTER
Returned call to patient to discus PEG removal, placed by Dr Saunders 2-28-25  Called patient to discuss.     Per Oncology note 7/9/25, OK to remove    3.)        SBO: Resolved.  Recurrent SBO symptoms with multiple ED visits.  She has been able to manage her symptoms with bowel rest at home and leave her GT to gravity with improvement.  She has not needed to put her GT to gravity for several weeks.  Tolerating small amounts of food.     -OK to remove GT per documentation per Dr. Patino, provided # to GI clinic to schedule.  -Continue TPN until able to tolerate enough oral intake.    Agreed we will organize removal with Dr. Saunders in [facility] On 8/5, orders placed, message sent to scheduling    ML

## 2025-07-10 NOTE — TELEPHONE ENCOUNTER
Procedure Scheduled: PEG removal  Procedure Date: 8/5/2025  Site:[facility]; [address], [address], [place] [address]  Endoscopist: EMILY  Insurance Coverage: [facility]  Ordering Provider: EMILY   Scheduled by (per the direction of): In basket from RNmiguel Coello per Georgie.                          "                                              LOS: 2 days   Patient Care Team:  Provider, No Known as PCP - General    Chief Complaint:  F/up hypoxia, abnormal chest imaging and medical problems listed below    Subjective   Interval History  I reviewed the admission note, progress notes, PMH, PSH, Family hx, social history, imagings and prior records on this admission, summarized the finding in my note and formulated a transition of care plan.      Overnight oximetry on room air done yesterday and I reviewed the result.  QASIM 0.2/H with hypoxic burden of 33 seconds only.  Patient admitted snoring and frequent awakening for nocturia.    REVIEW OF SYSTEMS:   CARDIOVASCULAR: No chest pain, chest pressure or chest discomfort. No palpitations or edema.   RESPIRATORY: No shortness of breath, cough or sputum.   CONSTITUTIONAL: No fever or chills.     Ventilator/Non-Invasive Ventilation Settings (From admission, onward)            None                Physical Exam:     Vital Signs  Temp:  [97.4 °F (36.3 °C)-98.3 °F (36.8 °C)] 97.4 °F (36.3 °C)  Heart Rate:  [79-95] 79  Resp:  [18-19] 18  BP: (158-179)/() 160/92    Intake/Output Summary (Last 24 hours) at 12/18/2021 1600  Last data filed at 12/18/2021 1400  Gross per 24 hour   Intake 600 ml   Output 3980 ml   Net -3380 ml     Flowsheet Rows      First Filed Value   Admission Height 190.5 cm (75\") Documented at 12/14/2021 2201   Admission Weight 181 kg (400 lb) Documented at 12/14/2021 2201          PPE used per hospital policy    General Appearance:   Alert, cooperative, in no acute distress   ENMT:  Mallampati score 4, moist mucous membrane   Eyes:  Pupils equal and reactive to light. EOMI   Neck:   Large. Trachea midline. No thyromegaly.   Lungs:    Clear to auscultation,respirations regular, even and nonlabored    Heart:   Regular rhythm and normal rate, normal S1 and S2, no         murmur   Skin:   No rash or ecchymosis   Abdomen:    Obese. Soft. No tenderness. No " HSM.   Neuro/psych:  Conscious, alert, oriented x3. Strength 5/5 in upper and lower  ext.  Appropriate mood and affect   Extremities:  No cyanosis, clubbing or edema.  Warm extremities and well-perfused          Results Review:        Results from last 7 days   Lab Units 12/18/21  0623 12/17/21  0830 12/17/21  0830 12/16/21  0652 12/16/21  0652   SODIUM mmol/L 140  --  141  143  --  140   POTASSIUM mmol/L 3.2*  --  3.4*  3.4*  --  3.6   CHLORIDE mmol/L 106  --  107  110*  --  108*   CO2 mmol/L 29.0  --  25.5  27.6  --  26.0   BUN mg/dL 11  --  10  11  --  12   CREATININE mg/dL 1.37*  --  1.51*  1.49*  --  1.66*   GLUCOSE mg/dL 94   < > 86  93   < > 94   CALCIUM mg/dL 8.0*  --  7.8*  7.7*  --  7.7*    < > = values in this interval not displayed.     Results from last 7 days   Lab Units 12/14/21  2200   TROPONIN T ng/mL <0.010     Results from last 7 days   Lab Units 12/17/21  0830 12/16/21  0651 12/15/21  0534   WBC 10*3/mm3 6.41 8.17 6.98   HEMOGLOBIN g/dL 11.3* 11.4* 10.8*   HEMATOCRIT % 35.4* 34.3* 32.4*   PLATELETS 10*3/mm3 287 290 279         Results from last 7 days   Lab Units 12/14/21  2200   PROBNP pg/mL 1,627.0*       Results from last 7 days   Lab Units 12/15/21  1200   D DIMER QUANT MCGFEU/mL 10.03*                     I reviewed the patient's new clinical results.        Medication Review:   amLODIPine, 10 mg, Oral, Q24H  bumetanide, 2 mg, Intravenous, Q8H  carvedilol, 12.5 mg, Oral, BID With Meals  cloNIDine, 1 patch, Transdermal, Weekly  empagliflozin, 10 mg, Oral, Daily  ferrous sulfate, 325 mg, Oral, BID With Meals  pravastatin, 20 mg, Oral, Nightly  vitamin D, 50,000 Units, Oral, Q7 Days             Diagnostic imaging:  I personally and independently reviewed the following images:    CT chest 12/15/2021: Small bilateral pleural effusion.  Bilateral lower lobe atelectases.    Assessment     Suspected obstructive sleep apnea  Loud snoring  Hypersomnolence  Morbid obesity  Uncontrolled high  blood pressure  Acute kidney injury  Current cigarette smoker  Hypoalbuminemia  Bilateral pleural effusion, L >but mild overall    All problems new to me      Plan     Outpatient HST despite negative MAURICIO.  Does not require oxygen.  Counseled for weight loss  Outpatient PFT.  Counseled against smoking    Oziel Chavez MD  12/18/21  16:00 EST          This note was dictated utilizing [employer] dictation

## 2025-07-10 NOTE — PROGRESS NOTES
Michelle arrives ambulatory to [facility] for her first Fulphila injection. She is feeling a little less fatigue and less short of breath after her transfusion yesterday. Fulphila hand out given and reviewed side effects and when to call provider. Tylenol and/or Claritin advised as needed for bone pain. Injection given left upper arm.

## 2025-07-11 ENCOUNTER — MEDICAL CORRESPONDENCE (OUTPATIENT)
Dept: HOME HEALTH SERVICES | Facility: HOME HEALTH | Age: 60
End: 2025-07-11
Payer: COMMERCIAL

## 2025-07-14 ENCOUNTER — INFUSION THERAPY VISIT (OUTPATIENT)
Dept: INFUSION THERAPY | Facility: HOSPITAL | Age: 60
End: 2025-07-14
Attending: OBSTETRICS & GYNECOLOGY
Payer: COMMERCIAL

## 2025-07-14 VITALS
RESPIRATION RATE: 16 BRPM | DIASTOLIC BLOOD PRESSURE: 70 MMHG | OXYGEN SATURATION: 100 % | SYSTOLIC BLOOD PRESSURE: 129 MMHG | TEMPERATURE: 97.9 F | HEART RATE: 70 BPM

## 2025-07-14 DIAGNOSIS — Z78.9 ON TOTAL PARENTERAL NUTRITION (TPN): ICD-10-CM

## 2025-07-14 DIAGNOSIS — C56.1 OVARIAN CANCER, RIGHT (H): ICD-10-CM

## 2025-07-14 LAB
ALBUMIN SERPL BCG-MCNC: 3.4 G/DL (ref 3.5–5.2)
ALP SERPL-CCNC: 113 U/L (ref 40–150)
ALT SERPL W P-5'-P-CCNC: 14 U/L (ref 0–50)
ANION GAP SERPL CALCULATED.3IONS-SCNC: 9 MMOL/L (ref 7–15)
AST SERPL W P-5'-P-CCNC: 24 U/L (ref 0–45)
BASOPHILS # BLD AUTO: 0 10E3/UL (ref 0–0.2)
BASOPHILS NFR BLD AUTO: 1 %
BILIRUB SERPL-MCNC: 0.3 MG/DL
BILIRUBIN DIRECT (ROCHE PRO & PURE): 0.12 MG/DL (ref 0–0.45)
BUN SERPL-MCNC: 30.8 MG/DL (ref 8–23)
CALCIUM SERPL-MCNC: 9.1 MG/DL (ref 8.8–10.4)
CHLORIDE SERPL-SCNC: 103 MMOL/L (ref 98–107)
CREAT SERPL-MCNC: 0.79 MG/DL (ref 0.51–0.95)
EGFRCR SERPLBLD CKD-EPI 2021: 85 ML/MIN/1.73M2
EOSINOPHIL # BLD AUTO: 0.1 10E3/UL (ref 0–0.7)
EOSINOPHIL NFR BLD AUTO: 3 %
ERYTHROCYTE [DISTWIDTH] IN BLOOD BY AUTOMATED COUNT: 16.3 % (ref 10–15)
FASTING STATUS PATIENT QL REPORTED: NO
GLUCOSE SERPL-MCNC: 107 MG/DL (ref 70–99)
HCO3 SERPL-SCNC: 22 MMOL/L (ref 22–29)
HCT VFR BLD AUTO: 29.2 % (ref 35–47)
HGB BLD-MCNC: 9.4 G/DL (ref 11.7–15.7)
IMM GRANULOCYTES # BLD: 0 10E3/UL
IMM GRANULOCYTES NFR BLD: 1 %
LYMPHOCYTES # BLD AUTO: 0.6 10E3/UL (ref 0.8–5.3)
LYMPHOCYTES NFR BLD AUTO: 29 %
MAGNESIUM SERPL-MCNC: 1.9 MG/DL (ref 1.7–2.3)
MCH RBC QN AUTO: 30.5 PG (ref 26.5–33)
MCHC RBC AUTO-ENTMCNC: 32.2 G/DL (ref 31.5–36.5)
MCV RBC AUTO: 95 FL (ref 78–100)
MONOCYTES # BLD AUTO: 0.2 10E3/UL (ref 0–1.3)
MONOCYTES NFR BLD AUTO: 10 %
NEUTROPHILS # BLD AUTO: 1.2 10E3/UL (ref 1.6–8.3)
NEUTROPHILS NFR BLD AUTO: 56 %
NRBC # BLD AUTO: 0 10E3/UL
NRBC BLD AUTO-RTO: 0 /100
PHOSPHATE SERPL-MCNC: 3.8 MG/DL (ref 2.5–4.5)
PLAT MORPH BLD: ABNORMAL
PLATELET # BLD AUTO: 129 10E3/UL (ref 150–450)
POTASSIUM SERPL-SCNC: 4.8 MMOL/L (ref 3.4–5.3)
PROT SERPL-MCNC: 6.2 G/DL (ref 6.4–8.3)
RBC # BLD AUTO: 3.08 10E6/UL (ref 3.8–5.2)
RBC MORPH BLD: ABNORMAL
SODIUM SERPL-SCNC: 134 MMOL/L (ref 135–145)
TARGETS BLD QL SMEAR: SLIGHT
TRIGL SERPL-MCNC: 130 MG/DL
WBC # BLD AUTO: 2.2 10E3/UL (ref 4–11)

## 2025-07-14 PROCEDURE — 85004 AUTOMATED DIFF WBC COUNT: CPT

## 2025-07-14 PROCEDURE — 84100 ASSAY OF PHOSPHORUS: CPT

## 2025-07-14 PROCEDURE — 83735 ASSAY OF MAGNESIUM: CPT

## 2025-07-14 PROCEDURE — 36591 DRAW BLOOD OFF VENOUS DEVICE: CPT

## 2025-07-14 PROCEDURE — 84478 ASSAY OF TRIGLYCERIDES: CPT

## 2025-07-14 PROCEDURE — 82947 ASSAY GLUCOSE BLOOD QUANT: CPT

## 2025-07-14 PROCEDURE — 82248 BILIRUBIN DIRECT: CPT

## 2025-07-14 RX ORDER — HEPARIN SODIUM (PORCINE) LOCK FLUSH IV SOLN 100 UNIT/ML 100 UNIT/ML
SOLUTION INTRAVENOUS
Status: COMPLETED
Start: 2025-07-14 | End: 2025-07-14

## 2025-07-14 NOTE — PROGRESS NOTES
Michelle arrives ambulatory to [facility] for her labs, port needle change and dressing change. Michelle has no new concerns today.

## 2025-07-15 ENCOUNTER — MEDICAL CORRESPONDENCE (OUTPATIENT)
Dept: HOME HEALTH SERVICES | Facility: HOME HEALTH | Age: 60
End: 2025-07-15
Payer: COMMERCIAL

## 2025-07-17 ENCOUNTER — HOME INFUSION BILLING (OUTPATIENT)
Dept: HOME HEALTH SERVICES | Facility: HOME HEALTH | Age: 60
End: 2025-07-17
Payer: COMMERCIAL

## 2025-07-17 PROCEDURE — E1399 DURABLE MEDICAL EQUIPMENT MI: HCPCS

## 2025-07-18 ENCOUNTER — MEDICAL CORRESPONDENCE (OUTPATIENT)
Dept: HOME HEALTH SERVICES | Facility: HOME HEALTH | Age: 60
End: 2025-07-18
Payer: COMMERCIAL

## 2025-07-18 PROCEDURE — A4217 STERILE WATER/SALINE, 500 ML: HCPCS

## 2025-07-18 PROCEDURE — S9366 HIT TPN 2 LITER DIEM: HCPCS

## 2025-07-18 PROCEDURE — B4185 PARENTERAL SOL 10 GM LIPIDS: HCPCS

## 2025-07-18 PROCEDURE — B4178 PARENTERAL SOL AMINO ACID >: HCPCS

## 2025-07-19 PROCEDURE — A4217 STERILE WATER/SALINE, 500 ML: HCPCS

## 2025-07-19 PROCEDURE — B4178 PARENTERAL SOL AMINO ACID >: HCPCS

## 2025-07-19 PROCEDURE — S9366 HIT TPN 2 LITER DIEM: HCPCS

## 2025-07-20 PROCEDURE — S9366 HIT TPN 2 LITER DIEM: HCPCS

## 2025-07-21 ENCOUNTER — INFUSION THERAPY VISIT (OUTPATIENT)
Dept: INFUSION THERAPY | Facility: HOSPITAL | Age: 60
End: 2025-07-21
Attending: OBSTETRICS & GYNECOLOGY
Payer: COMMERCIAL

## 2025-07-21 DIAGNOSIS — C56.1 OVARIAN CANCER, RIGHT (H): Primary | ICD-10-CM

## 2025-07-21 PROCEDURE — G0463 HOSPITAL OUTPT CLINIC VISIT: HCPCS

## 2025-07-21 PROCEDURE — S9366 HIT TPN 2 LITER DIEM: HCPCS

## 2025-07-21 NOTE — PROGRESS NOTES
Infusion Nursing Note:  Jacki Smith presents today for Port needle change.    Patient seen by provider today: No   present during visit today: Not Applicable.    Note: Michelle arrived ambulatory by herself for port needle change. Plan of care reviewed and she has no questions.  Port was de accessed and EMLA cream was applied x 30 minutes prior to port re access per patient request.    Intravenous Access:  Implanted Port.    Discharge Plan:   Patient will return July 28th for next appointment.   Patient discharged in stable condition accompanied by: self.  Departure Mode: Ambulatory.      Rylie Bullard RN

## 2025-07-22 PROCEDURE — S9366 HIT TPN 2 LITER DIEM: HCPCS

## 2025-07-23 ENCOUNTER — HOME INFUSION (OUTPATIENT)
Dept: HOME HEALTH SERVICES | Facility: HOME HEALTH | Age: 60
End: 2025-07-23

## 2025-07-23 DIAGNOSIS — C56.1 OVARIAN CANCER, RIGHT (H): ICD-10-CM

## 2025-07-23 PROCEDURE — S9366 HIT TPN 2 LITER DIEM: HCPCS

## 2025-07-24 ENCOUNTER — HOME INFUSION BILLING (OUTPATIENT)
Dept: HOME HEALTH SERVICES | Facility: HOME HEALTH | Age: 60
End: 2025-07-24
Payer: COMMERCIAL

## 2025-07-24 PROCEDURE — S9366 HIT TPN 2 LITER DIEM: HCPCS

## 2025-07-24 PROCEDURE — E1399 DURABLE MEDICAL EQUIPMENT MI: HCPCS

## 2025-07-24 PROCEDURE — A4245 ALCOHOL WIPES PER BOX: HCPCS

## 2025-07-25 PROCEDURE — B4185 PARENTERAL SOL 10 GM LIPIDS: HCPCS

## 2025-07-25 PROCEDURE — S9374 HIT HYDRA 1 LITER DIEM: HCPCS | Mod: SH

## 2025-07-25 PROCEDURE — B4178 PARENTERAL SOL AMINO ACID >: HCPCS

## 2025-07-25 PROCEDURE — S9366 HIT TPN 2 LITER DIEM: HCPCS

## 2025-07-25 PROCEDURE — A4217 STERILE WATER/SALINE, 500 ML: HCPCS

## 2025-07-26 PROCEDURE — A4217 STERILE WATER/SALINE, 500 ML: HCPCS

## 2025-07-26 PROCEDURE — B4178 PARENTERAL SOL AMINO ACID >: HCPCS

## 2025-07-26 PROCEDURE — S9366 HIT TPN 2 LITER DIEM: HCPCS

## 2025-07-26 PROCEDURE — S9374 HIT HYDRA 1 LITER DIEM: HCPCS | Mod: SH

## 2025-07-27 PROCEDURE — S9366 HIT TPN 2 LITER DIEM: HCPCS

## 2025-07-28 ENCOUNTER — INFUSION THERAPY VISIT (OUTPATIENT)
Dept: INFUSION THERAPY | Facility: HOSPITAL | Age: 60
End: 2025-07-28
Attending: OBSTETRICS & GYNECOLOGY
Payer: COMMERCIAL

## 2025-07-28 VITALS
SYSTOLIC BLOOD PRESSURE: 127 MMHG | DIASTOLIC BLOOD PRESSURE: 60 MMHG | RESPIRATION RATE: 16 BRPM | HEART RATE: 68 BPM | OXYGEN SATURATION: 100 % | TEMPERATURE: 98.1 F

## 2025-07-28 DIAGNOSIS — Z78.9 ON TOTAL PARENTERAL NUTRITION (TPN): ICD-10-CM

## 2025-07-28 LAB
ALBUMIN SERPL BCG-MCNC: 3.4 G/DL (ref 3.5–5.2)
ALP SERPL-CCNC: 140 U/L (ref 40–150)
ALT SERPL W P-5'-P-CCNC: 19 U/L (ref 0–50)
ANION GAP SERPL CALCULATED.3IONS-SCNC: 10 MMOL/L (ref 7–15)
AST SERPL W P-5'-P-CCNC: 27 U/L (ref 0–45)
BASOPHILS # BLD AUTO: 0 10E3/UL (ref 0–0.2)
BASOPHILS NFR BLD AUTO: 1 %
BILIRUB SERPL-MCNC: 0.2 MG/DL
BILIRUBIN DIRECT (ROCHE PRO & PURE): 0.11 MG/DL (ref 0–0.45)
BUN SERPL-MCNC: 26.8 MG/DL (ref 8–23)
CALCIUM SERPL-MCNC: 9.2 MG/DL (ref 8.8–10.4)
CANCER AG125 SERPL-ACNC: 62 U/ML
CHLORIDE SERPL-SCNC: 105 MMOL/L (ref 98–107)
CREAT SERPL-MCNC: 0.82 MG/DL (ref 0.51–0.95)
EGFRCR SERPLBLD CKD-EPI 2021: 81 ML/MIN/1.73M2
EOSINOPHIL # BLD AUTO: 0.1 10E3/UL (ref 0–0.7)
EOSINOPHIL NFR BLD AUTO: 1 %
ERYTHROCYTE [DISTWIDTH] IN BLOOD BY AUTOMATED COUNT: 16.4 % (ref 10–15)
FASTING STATUS PATIENT QL REPORTED: NORMAL
GLUCOSE SERPL-MCNC: 105 MG/DL (ref 70–99)
HCO3 SERPL-SCNC: 21 MMOL/L (ref 22–29)
HCT VFR BLD AUTO: 26.6 % (ref 35–47)
HGB BLD-MCNC: 8.5 G/DL (ref 11.7–15.7)
IMM GRANULOCYTES # BLD: 0.1 10E3/UL
IMM GRANULOCYTES NFR BLD: 1 %
LYMPHOCYTES # BLD AUTO: 1.1 10E3/UL (ref 0.8–5.3)
LYMPHOCYTES NFR BLD AUTO: 16 %
MAGNESIUM SERPL-MCNC: 2 MG/DL (ref 1.7–2.3)
MCH RBC QN AUTO: 29.9 PG (ref 26.5–33)
MCHC RBC AUTO-ENTMCNC: 32 G/DL (ref 31.5–36.5)
MCV RBC AUTO: 94 FL (ref 78–100)
MONOCYTES # BLD AUTO: 0.6 10E3/UL (ref 0–1.3)
MONOCYTES NFR BLD AUTO: 9 %
NEUTROPHILS # BLD AUTO: 4.7 10E3/UL (ref 1.6–8.3)
NEUTROPHILS NFR BLD AUTO: 72 %
NRBC # BLD AUTO: 0 10E3/UL
NRBC BLD AUTO-RTO: 0 /100
PHOSPHATE SERPL-MCNC: 3.7 MG/DL (ref 2.5–4.5)
PLATELET # BLD AUTO: 169 10E3/UL (ref 150–450)
POTASSIUM SERPL-SCNC: 4.8 MMOL/L (ref 3.4–5.3)
PROT SERPL-MCNC: 6.3 G/DL (ref 6.4–8.3)
RBC # BLD AUTO: 2.84 10E6/UL (ref 3.8–5.2)
SODIUM SERPL-SCNC: 136 MMOL/L (ref 135–145)
TRIGL SERPL-MCNC: 86 MG/DL
WBC # BLD AUTO: 6.4 10E3/UL (ref 4–11)

## 2025-07-28 PROCEDURE — 84478 ASSAY OF TRIGLYCERIDES: CPT

## 2025-07-28 PROCEDURE — S9366 HIT TPN 2 LITER DIEM: HCPCS

## 2025-07-28 PROCEDURE — 82040 ASSAY OF SERUM ALBUMIN: CPT

## 2025-07-28 PROCEDURE — 82248 BILIRUBIN DIRECT: CPT

## 2025-07-28 PROCEDURE — 86304 IMMUNOASSAY TUMOR CA 125: CPT | Performed by: OBSTETRICS & GYNECOLOGY

## 2025-07-28 PROCEDURE — 85004 AUTOMATED DIFF WBC COUNT: CPT

## 2025-07-28 PROCEDURE — 83735 ASSAY OF MAGNESIUM: CPT

## 2025-07-28 PROCEDURE — 36591 DRAW BLOOD OFF VENOUS DEVICE: CPT

## 2025-07-28 PROCEDURE — 84100 ASSAY OF PHOSPHORUS: CPT

## 2025-07-28 NOTE — PROGRESS NOTES
Infusion Nursing Note:  Jacki Smith presents today for PORT Lab Draw and PORT Needle Change with New Dressing.    Patient seen by provider today: No   present during visit today: Not Applicable.    Note: Patient ambulated into Infusion Care by herself. Patient is alert and oriented and able to communicate her needs to staff. PORT flushed with Normal Saline and had good blood return. Labs drawn and PORT line de-accessed. Patient placed EMLA cream over her PORT site and a Tegaderm dressing was applied. After 20 minutes the Tegaderm was removed and the EMLA cream wiped off. PORT site cleansed and a new POWER PORT needle was inserted. Biopatch and Tegaderm applied and a new swabcap placed on the line. All questions answered and patient was discharged home. Patient will return on 07/29/25 for a CT.      Intravenous Access:  Implanted Port.    Treatment Conditions:  Lab Results   Component Value Date    HGB 8.5 (L) 07/28/2025    WBC 6.4 07/28/2025    ANEU 4.7 07/28/2025     07/28/2025        Lab Results   Component Value Date     07/28/2025    POTASSIUM 4.8 07/28/2025    MAG 2.0 07/28/2025    CR 0.82 07/28/2025    KINDRA 9.2 07/28/2025    BILITOTAL 0.2 07/28/2025    ALBUMIN 3.4 (L) 07/28/2025    ALT 19 07/28/2025    AST 27 07/28/2025       Not Applicable.      Post Infusion Assessment:  No evidence of extravasations.       Discharge Plan:   Patient and/or family verbalized understanding of discharge instructions and all questions answered.  Patient discharged in stable condition accompanied by: self.  Departure Mode: Ambulatory.      Cristin Rinaldi, RN,OCN

## 2025-07-29 ENCOUNTER — HOSPITAL ENCOUNTER (OUTPATIENT)
Dept: CT IMAGING | Facility: CLINIC | Age: 60
Discharge: HOME | End: 2025-07-29
Attending: OBSTETRICS & GYNECOLOGY
Payer: COMMERCIAL

## 2025-07-29 DIAGNOSIS — C56.1 OVARIAN CANCER, RIGHT (H): ICD-10-CM

## 2025-07-29 PROCEDURE — 71260 CT THORAX DX C+: CPT

## 2025-07-29 PROCEDURE — 250N000011 HC RX IP 250 OP 636: Performed by: OBSTETRICS & GYNECOLOGY

## 2025-07-29 PROCEDURE — S9366 HIT TPN 2 LITER DIEM: HCPCS

## 2025-07-29 RX ORDER — IOPAMIDOL 755 MG/ML
90 INJECTION, SOLUTION INTRAVASCULAR ONCE
Status: COMPLETED | OUTPATIENT
Start: 2025-07-29 | End: 2025-07-29

## 2025-07-29 RX ADMIN — IOPAMIDOL 90 ML: 755 INJECTION, SOLUTION INTRAVENOUS at 14:24

## 2025-07-30 PROCEDURE — S9366 HIT TPN 2 LITER DIEM: HCPCS

## 2025-07-31 ENCOUNTER — HOME INFUSION BILLING (OUTPATIENT)
Dept: HOME HEALTH SERVICES | Facility: HOME HEALTH | Age: 60
End: 2025-07-31
Payer: COMMERCIAL

## 2025-07-31 ENCOUNTER — MEDICAL CORRESPONDENCE (OUTPATIENT)
Dept: HOME HEALTH SERVICES | Facility: HOME HEALTH | Age: 60
End: 2025-07-31

## 2025-07-31 PROCEDURE — S9366 HIT TPN 2 LITER DIEM: HCPCS

## 2025-07-31 PROCEDURE — E1399 DURABLE MEDICAL EQUIPMENT MI: HCPCS

## 2025-08-01 PROCEDURE — B4185 PARENTERAL SOL 10 GM LIPIDS: HCPCS

## 2025-08-01 PROCEDURE — A4217 STERILE WATER/SALINE, 500 ML: HCPCS

## 2025-08-01 PROCEDURE — B4178 PARENTERAL SOL AMINO ACID >: HCPCS

## 2025-08-01 PROCEDURE — S9374 HIT HYDRA 1 LITER DIEM: HCPCS | Mod: SH

## 2025-08-01 PROCEDURE — S9366 HIT TPN 2 LITER DIEM: HCPCS

## 2025-08-02 PROCEDURE — A4217 STERILE WATER/SALINE, 500 ML: HCPCS

## 2025-08-02 PROCEDURE — S9374 HIT HYDRA 1 LITER DIEM: HCPCS | Mod: SH

## 2025-08-02 PROCEDURE — S9366 HIT TPN 2 LITER DIEM: HCPCS

## 2025-08-02 PROCEDURE — B4178 PARENTERAL SOL AMINO ACID >: HCPCS

## 2025-08-03 PROCEDURE — S9366 HIT TPN 2 LITER DIEM: HCPCS

## 2025-08-04 ENCOUNTER — INFUSION THERAPY VISIT (OUTPATIENT)
Dept: INFUSION THERAPY | Facility: HOSPITAL | Age: 60
End: 2025-08-04
Payer: COMMERCIAL

## 2025-08-04 ENCOUNTER — MYC REFILL (OUTPATIENT)
Dept: ONCOLOGY | Facility: CLINIC | Age: 60
End: 2025-08-04

## 2025-08-04 VITALS
SYSTOLIC BLOOD PRESSURE: 138 MMHG | OXYGEN SATURATION: 100 % | DIASTOLIC BLOOD PRESSURE: 62 MMHG | RESPIRATION RATE: 16 BRPM | TEMPERATURE: 98.2 F | HEART RATE: 61 BPM

## 2025-08-04 DIAGNOSIS — C56.1 OVARIAN CANCER, RIGHT (H): ICD-10-CM

## 2025-08-04 DIAGNOSIS — Z51.11 ENCOUNTER FOR ANTINEOPLASTIC CHEMOTHERAPY: ICD-10-CM

## 2025-08-04 DIAGNOSIS — Z78.9 ON TOTAL PARENTERAL NUTRITION (TPN): Primary | ICD-10-CM

## 2025-08-04 PROCEDURE — S9366 HIT TPN 2 LITER DIEM: HCPCS

## 2025-08-04 RX ORDER — ONDANSETRON 8 MG/1
8 TABLET, ORALLY DISINTEGRATING ORAL EVERY 8 HOURS PRN
Qty: 30 TABLET | Refills: 0 | Status: SHIPPED | OUTPATIENT
Start: 2025-08-04

## 2025-08-05 ENCOUNTER — HOSPITAL ENCOUNTER (OUTPATIENT)
Facility: CLINIC | Age: 60
Discharge: HOME | End: 2025-08-05
Attending: INTERNAL MEDICINE | Admitting: INTERNAL MEDICINE
Payer: COMMERCIAL

## 2025-08-05 VITALS
OXYGEN SATURATION: 100 % | DIASTOLIC BLOOD PRESSURE: 61 MMHG | RESPIRATION RATE: 14 BRPM | HEART RATE: 61 BPM | SYSTOLIC BLOOD PRESSURE: 109 MMHG

## 2025-08-05 LAB — PROVATION GI EXAM: NORMAL

## 2025-08-05 PROCEDURE — S9366 HIT TPN 2 LITER DIEM: HCPCS

## 2025-08-05 PROCEDURE — 999N000099 HC STATISTIC MODERATE SEDATION < 10 MIN: Performed by: INTERNAL MEDICINE

## 2025-08-05 PROCEDURE — 250N000011 HC RX IP 250 OP 636: Performed by: INTERNAL MEDICINE

## 2025-08-05 RX ORDER — ONDANSETRON 2 MG/ML
4 INJECTION INTRAMUSCULAR; INTRAVENOUS
Status: DISCONTINUED | OUTPATIENT
Start: 2025-08-05 | End: 2025-08-05 | Stop reason: HOSPADM

## 2025-08-05 RX ORDER — LIDOCAINE 40 MG/G
CREAM TOPICAL
Status: DISCONTINUED | OUTPATIENT
Start: 2025-08-05 | End: 2025-08-05 | Stop reason: HOSPADM

## 2025-08-05 RX ORDER — FENTANYL CITRATE 50 UG/ML
INJECTION, SOLUTION INTRAMUSCULAR; INTRAVENOUS PRN
Status: DISCONTINUED | OUTPATIENT
Start: 2025-08-05 | End: 2025-08-05 | Stop reason: HOSPADM

## 2025-08-05 ASSESSMENT — ACTIVITIES OF DAILY LIVING (ADL)
ADLS_ACUITY_SCORE: 59

## 2025-08-06 ENCOUNTER — MEDICAL CORRESPONDENCE (OUTPATIENT)
Dept: HOME HEALTH SERVICES | Facility: HOME HEALTH | Age: 60
End: 2025-08-06
Payer: COMMERCIAL

## 2025-08-06 PROCEDURE — S9366 HIT TPN 2 LITER DIEM: HCPCS

## 2025-08-07 ENCOUNTER — HOME INFUSION BILLING (OUTPATIENT)
Dept: HOME HEALTH SERVICES | Facility: HOME HEALTH | Age: 60
End: 2025-08-07
Payer: COMMERCIAL

## 2025-08-07 PROCEDURE — E1399 DURABLE MEDICAL EQUIPMENT MI: HCPCS

## 2025-08-07 PROCEDURE — S9366 HIT TPN 2 LITER DIEM: HCPCS

## 2025-08-08 PROCEDURE — B4185 PARENTERAL SOL 10 GM LIPIDS: HCPCS

## 2025-08-08 PROCEDURE — S9366 HIT TPN 2 LITER DIEM: HCPCS

## 2025-08-08 PROCEDURE — B4178 PARENTERAL SOL AMINO ACID >: HCPCS

## 2025-08-08 PROCEDURE — A4217 STERILE WATER/SALINE, 500 ML: HCPCS

## 2025-08-08 PROCEDURE — S9374 HIT HYDRA 1 LITER DIEM: HCPCS | Mod: SH

## 2025-08-09 PROCEDURE — B4178 PARENTERAL SOL AMINO ACID >: HCPCS

## 2025-08-09 PROCEDURE — A4217 STERILE WATER/SALINE, 500 ML: HCPCS

## 2025-08-09 PROCEDURE — S9374 HIT HYDRA 1 LITER DIEM: HCPCS | Mod: SH

## 2025-08-09 PROCEDURE — S9366 HIT TPN 2 LITER DIEM: HCPCS

## 2025-08-10 ENCOUNTER — HEALTH MAINTENANCE LETTER (OUTPATIENT)
Age: 60
End: 2025-08-10

## 2025-08-10 PROCEDURE — S9374 HIT HYDRA 1 LITER DIEM: HCPCS | Mod: SH

## 2025-08-10 PROCEDURE — S9366 HIT TPN 2 LITER DIEM: HCPCS

## 2025-08-11 ENCOUNTER — INFUSION THERAPY VISIT (OUTPATIENT)
Dept: INFUSION THERAPY | Facility: HOSPITAL | Age: 60
End: 2025-08-11
Attending: OBSTETRICS & GYNECOLOGY
Payer: COMMERCIAL

## 2025-08-11 DIAGNOSIS — Z51.11 ENCOUNTER FOR ANTINEOPLASTIC CHEMOTHERAPY: ICD-10-CM

## 2025-08-11 DIAGNOSIS — Z78.9 ON TOTAL PARENTERAL NUTRITION (TPN): ICD-10-CM

## 2025-08-11 DIAGNOSIS — C56.1 OVARIAN CANCER, RIGHT (H): ICD-10-CM

## 2025-08-11 LAB
ALBUMIN SERPL BCG-MCNC: 3.6 G/DL (ref 3.5–5.2)
ALP SERPL-CCNC: 108 U/L (ref 40–150)
ALT SERPL W P-5'-P-CCNC: 11 U/L (ref 0–50)
ANION GAP SERPL CALCULATED.3IONS-SCNC: 9 MMOL/L (ref 7–15)
AST SERPL W P-5'-P-CCNC: 21 U/L (ref 0–45)
BASOPHILS # BLD AUTO: 0 10E3/UL (ref 0–0.2)
BASOPHILS NFR BLD AUTO: 1 %
BILIRUB SERPL-MCNC: 0.5 MG/DL
BILIRUBIN DIRECT (ROCHE PRO & PURE): 0.2 MG/DL (ref 0–0.45)
BUN SERPL-MCNC: 35.3 MG/DL (ref 8–23)
CALCIUM SERPL-MCNC: 9.3 MG/DL (ref 8.8–10.4)
CHLORIDE SERPL-SCNC: 104 MMOL/L (ref 98–107)
CREAT SERPL-MCNC: 1.07 MG/DL (ref 0.51–0.95)
EGFRCR SERPLBLD CKD-EPI 2021: 59 ML/MIN/1.73M2
EOSINOPHIL # BLD AUTO: 0.2 10E3/UL (ref 0–0.7)
EOSINOPHIL NFR BLD AUTO: 5 %
ERYTHROCYTE [DISTWIDTH] IN BLOOD BY AUTOMATED COUNT: 16.7 % (ref 10–15)
FASTING STATUS PATIENT QL REPORTED: NO
GLUCOSE SERPL-MCNC: 111 MG/DL (ref 70–99)
HCO3 SERPL-SCNC: 23 MMOL/L (ref 22–29)
HCT VFR BLD AUTO: 28.9 % (ref 35–47)
HGB BLD-MCNC: 9 G/DL (ref 11.7–15.7)
IMM GRANULOCYTES # BLD: 0 10E3/UL
IMM GRANULOCYTES NFR BLD: 0 %
LYMPHOCYTES # BLD AUTO: 1 10E3/UL (ref 0.8–5.3)
LYMPHOCYTES NFR BLD AUTO: 20 %
MAGNESIUM SERPL-MCNC: 2.1 MG/DL (ref 1.7–2.3)
MCH RBC QN AUTO: 30.8 PG (ref 26.5–33)
MCHC RBC AUTO-ENTMCNC: 31.1 G/DL (ref 31.5–36.5)
MCV RBC AUTO: 99 FL (ref 78–100)
MONOCYTES # BLD AUTO: 0.4 10E3/UL (ref 0–1.3)
MONOCYTES NFR BLD AUTO: 9 %
NEUTROPHILS # BLD AUTO: 3.1 10E3/UL (ref 1.6–8.3)
NEUTROPHILS NFR BLD AUTO: 65 %
NRBC # BLD AUTO: 0 10E3/UL
NRBC BLD AUTO-RTO: 0 /100
PHOSPHATE SERPL-MCNC: 4.5 MG/DL (ref 2.5–4.5)
PLATELET # BLD AUTO: 125 10E3/UL (ref 150–450)
POTASSIUM SERPL-SCNC: 4.6 MMOL/L (ref 3.4–5.3)
PROT SERPL-MCNC: 6.2 G/DL (ref 6.4–8.3)
RBC # BLD AUTO: 2.92 10E6/UL (ref 3.8–5.2)
SODIUM SERPL-SCNC: 136 MMOL/L (ref 135–145)
TRIGL SERPL-MCNC: 79 MG/DL
WBC # BLD AUTO: 4.8 10E3/UL (ref 4–11)

## 2025-08-11 PROCEDURE — 36591 DRAW BLOOD OFF VENOUS DEVICE: CPT

## 2025-08-11 PROCEDURE — 84478 ASSAY OF TRIGLYCERIDES: CPT

## 2025-08-11 PROCEDURE — 82947 ASSAY GLUCOSE BLOOD QUANT: CPT

## 2025-08-11 PROCEDURE — 85004 AUTOMATED DIFF WBC COUNT: CPT

## 2025-08-11 PROCEDURE — S9366 HIT TPN 2 LITER DIEM: HCPCS

## 2025-08-11 PROCEDURE — 84100 ASSAY OF PHOSPHORUS: CPT

## 2025-08-11 PROCEDURE — S9374 HIT HYDRA 1 LITER DIEM: HCPCS | Mod: SH

## 2025-08-11 PROCEDURE — 83735 ASSAY OF MAGNESIUM: CPT

## 2025-08-11 PROCEDURE — 82248 BILIRUBIN DIRECT: CPT

## 2025-08-12 PROCEDURE — S9374 HIT HYDRA 1 LITER DIEM: HCPCS | Mod: SH

## 2025-08-12 PROCEDURE — S9366 HIT TPN 2 LITER DIEM: HCPCS

## 2025-08-13 PROCEDURE — S9374 HIT HYDRA 1 LITER DIEM: HCPCS | Mod: SH

## 2025-08-13 PROCEDURE — S9366 HIT TPN 2 LITER DIEM: HCPCS

## 2025-08-14 ENCOUNTER — MEDICAL CORRESPONDENCE (OUTPATIENT)
Dept: HOME HEALTH SERVICES | Facility: HOME HEALTH | Age: 60
End: 2025-08-14
Payer: COMMERCIAL

## 2025-08-14 ENCOUNTER — HOME INFUSION BILLING (OUTPATIENT)
Dept: HOME HEALTH SERVICES | Facility: HOME HEALTH | Age: 60
End: 2025-08-14
Payer: COMMERCIAL

## 2025-08-14 PROCEDURE — S9374 HIT HYDRA 1 LITER DIEM: HCPCS | Mod: SH

## 2025-08-14 PROCEDURE — E1399 DURABLE MEDICAL EQUIPMENT MI: HCPCS

## 2025-08-14 PROCEDURE — S9366 HIT TPN 2 LITER DIEM: HCPCS

## 2025-08-15 PROCEDURE — S9366 HIT TPN 2 LITER DIEM: HCPCS

## 2025-08-15 PROCEDURE — B4185 PARENTERAL SOL 10 GM LIPIDS: HCPCS

## 2025-08-15 PROCEDURE — S9374 HIT HYDRA 1 LITER DIEM: HCPCS | Mod: SH

## 2025-08-15 PROCEDURE — A4217 STERILE WATER/SALINE, 500 ML: HCPCS

## 2025-08-15 PROCEDURE — B4178 PARENTERAL SOL AMINO ACID >: HCPCS

## 2025-08-16 PROCEDURE — S9374 HIT HYDRA 1 LITER DIEM: HCPCS | Mod: SH

## 2025-08-16 PROCEDURE — S9366 HIT TPN 2 LITER DIEM: HCPCS

## 2025-08-17 PROCEDURE — S9366 HIT TPN 2 LITER DIEM: HCPCS

## 2025-08-17 PROCEDURE — A4217 STERILE WATER/SALINE, 500 ML: HCPCS

## 2025-08-17 PROCEDURE — B4178 PARENTERAL SOL AMINO ACID >: HCPCS

## 2025-08-17 PROCEDURE — S9374 HIT HYDRA 1 LITER DIEM: HCPCS | Mod: SH

## 2025-08-18 ENCOUNTER — INFUSION THERAPY VISIT (OUTPATIENT)
Dept: INFUSION THERAPY | Facility: HOSPITAL | Age: 60
End: 2025-08-18
Attending: OBSTETRICS & GYNECOLOGY
Payer: COMMERCIAL

## 2025-08-18 VITALS
RESPIRATION RATE: 16 BRPM | SYSTOLIC BLOOD PRESSURE: 138 MMHG | DIASTOLIC BLOOD PRESSURE: 81 MMHG | HEART RATE: 64 BPM | OXYGEN SATURATION: 100 % | TEMPERATURE: 98.2 F

## 2025-08-18 DIAGNOSIS — C56.1 OVARIAN CANCER, RIGHT (H): ICD-10-CM

## 2025-08-18 DIAGNOSIS — Z78.9 ON TOTAL PARENTERAL NUTRITION (TPN): Primary | ICD-10-CM

## 2025-08-18 PROCEDURE — S9374 HIT HYDRA 1 LITER DIEM: HCPCS | Mod: SH

## 2025-08-18 PROCEDURE — S9366 HIT TPN 2 LITER DIEM: HCPCS

## 2025-08-18 PROCEDURE — G0463 HOSPITAL OUTPT CLINIC VISIT: HCPCS

## 2025-08-19 PROCEDURE — S9374 HIT HYDRA 1 LITER DIEM: HCPCS | Mod: SH

## 2025-08-19 PROCEDURE — S9366 HIT TPN 2 LITER DIEM: HCPCS

## 2025-08-20 ENCOUNTER — MEDICAL CORRESPONDENCE (OUTPATIENT)
Dept: HOME HEALTH SERVICES | Facility: HOME HEALTH | Age: 60
End: 2025-08-20
Payer: COMMERCIAL

## 2025-08-20 PROCEDURE — S9374 HIT HYDRA 1 LITER DIEM: HCPCS | Mod: SH

## 2025-08-20 PROCEDURE — S9366 HIT TPN 2 LITER DIEM: HCPCS

## 2025-08-21 ENCOUNTER — HOME INFUSION BILLING (OUTPATIENT)
Dept: HOME HEALTH SERVICES | Facility: HOME HEALTH | Age: 60
End: 2025-08-21
Payer: COMMERCIAL

## 2025-08-21 PROCEDURE — A4245 ALCOHOL WIPES PER BOX: HCPCS

## 2025-08-21 PROCEDURE — S9374 HIT HYDRA 1 LITER DIEM: HCPCS | Mod: SH

## 2025-08-21 PROCEDURE — E1399 DURABLE MEDICAL EQUIPMENT MI: HCPCS

## 2025-08-21 PROCEDURE — S9366 HIT TPN 2 LITER DIEM: HCPCS

## 2025-08-21 PROCEDURE — B4178 PARENTERAL SOL AMINO ACID >: HCPCS

## 2025-08-21 PROCEDURE — A4217 STERILE WATER/SALINE, 500 ML: HCPCS

## 2025-08-22 PROCEDURE — B4178 PARENTERAL SOL AMINO ACID >: HCPCS

## 2025-08-22 PROCEDURE — A4217 STERILE WATER/SALINE, 500 ML: HCPCS

## 2025-08-22 PROCEDURE — S9374 HIT HYDRA 1 LITER DIEM: HCPCS | Mod: SH

## 2025-08-22 PROCEDURE — S9366 HIT TPN 2 LITER DIEM: HCPCS

## 2025-08-22 PROCEDURE — B4185 PARENTERAL SOL 10 GM LIPIDS: HCPCS

## 2025-08-23 PROCEDURE — S9366 HIT TPN 2 LITER DIEM: HCPCS

## 2025-08-23 PROCEDURE — S9374 HIT HYDRA 1 LITER DIEM: HCPCS | Mod: SH

## 2025-08-24 PROCEDURE — S9366 HIT TPN 2 LITER DIEM: HCPCS

## 2025-08-24 PROCEDURE — B4178 PARENTERAL SOL AMINO ACID >: HCPCS

## 2025-08-24 PROCEDURE — A4217 STERILE WATER/SALINE, 500 ML: HCPCS

## 2025-08-24 PROCEDURE — S9374 HIT HYDRA 1 LITER DIEM: HCPCS | Mod: SH

## 2025-08-25 ENCOUNTER — INFUSION THERAPY VISIT (OUTPATIENT)
Dept: INFUSION THERAPY | Facility: HOSPITAL | Age: 60
End: 2025-08-25
Attending: OBSTETRICS & GYNECOLOGY
Payer: COMMERCIAL

## 2025-08-25 DIAGNOSIS — Z78.9 ON TOTAL PARENTERAL NUTRITION (TPN): Primary | ICD-10-CM

## 2025-08-25 LAB
ALBUMIN SERPL BCG-MCNC: 3.6 G/DL (ref 3.5–5.2)
ALP SERPL-CCNC: 85 U/L (ref 40–150)
ALT SERPL W P-5'-P-CCNC: 10 U/L (ref 0–50)
ANION GAP SERPL CALCULATED.3IONS-SCNC: 10 MMOL/L (ref 7–15)
AST SERPL W P-5'-P-CCNC: 20 U/L (ref 0–45)
BASOPHILS # BLD AUTO: 0.03 10E3/UL (ref 0–0.2)
BASOPHILS NFR BLD AUTO: 0.5 %
BILIRUB SERPL-MCNC: 0.5 MG/DL
BILIRUBIN DIRECT (ROCHE PRO & PURE): 0.22 MG/DL (ref 0–0.45)
BUN SERPL-MCNC: 26.3 MG/DL (ref 8–23)
CALCIUM SERPL-MCNC: 9.5 MG/DL (ref 8.8–10.4)
CANCER AG125 SERPL-ACNC: 117 U/ML
CHLORIDE SERPL-SCNC: 105 MMOL/L (ref 98–107)
CREAT SERPL-MCNC: 1.27 MG/DL (ref 0.51–0.95)
EGFRCR SERPLBLD CKD-EPI 2021: 48 ML/MIN/1.73M2
EOSINOPHIL # BLD AUTO: 0.24 10E3/UL (ref 0–0.7)
EOSINOPHIL NFR BLD AUTO: 3.6 %
ERYTHROCYTE [DISTWIDTH] IN BLOOD BY AUTOMATED COUNT: 15.8 % (ref 10–15)
FASTING STATUS PATIENT QL REPORTED: NO
GLUCOSE SERPL-MCNC: 99 MG/DL (ref 70–99)
HCO3 SERPL-SCNC: 22 MMOL/L (ref 22–29)
HCT VFR BLD AUTO: 29.2 % (ref 35–47)
HGB BLD-MCNC: 9.3 G/DL (ref 11.7–15.7)
IMM GRANULOCYTES # BLD: <0.03 10E3/UL
IMM GRANULOCYTES NFR BLD: 0.3 %
LYMPHOCYTES # BLD AUTO: 0.87 10E3/UL (ref 0.8–5.3)
LYMPHOCYTES NFR BLD AUTO: 13.2 %
MAGNESIUM SERPL-MCNC: 2.1 MG/DL (ref 1.7–2.3)
MCH RBC QN AUTO: 30.9 PG (ref 26.5–33)
MCHC RBC AUTO-ENTMCNC: 31.8 G/DL (ref 31.5–36.5)
MCV RBC AUTO: 97 FL (ref 78–100)
MONOCYTES # BLD AUTO: 0.32 10E3/UL (ref 0–1.3)
MONOCYTES NFR BLD AUTO: 4.9 %
NEUTROPHILS # BLD AUTO: 5.1 10E3/UL (ref 1.6–8.3)
NEUTROPHILS NFR BLD AUTO: 77.5 %
NRBC # BLD AUTO: <0.03 10E3/UL
NRBC BLD AUTO-RTO: 0 /100
PHOSPHATE SERPL-MCNC: 4.6 MG/DL (ref 2.5–4.5)
PLATELET # BLD AUTO: 125 10E3/UL (ref 150–450)
POTASSIUM SERPL-SCNC: 4.7 MMOL/L (ref 3.4–5.3)
PROT SERPL-MCNC: 6.2 G/DL (ref 6.4–8.3)
RBC # BLD AUTO: 3.01 10E6/UL (ref 3.8–5.2)
SODIUM SERPL-SCNC: 137 MMOL/L (ref 135–145)
TRIGL SERPL-MCNC: 76 MG/DL
WBC # BLD AUTO: 6.58 10E3/UL (ref 4–11)

## 2025-08-25 PROCEDURE — S9374 HIT HYDRA 1 LITER DIEM: HCPCS | Mod: SH

## 2025-08-25 PROCEDURE — 36591 DRAW BLOOD OFF VENOUS DEVICE: CPT

## 2025-08-25 PROCEDURE — 83735 ASSAY OF MAGNESIUM: CPT | Performed by: OBSTETRICS & GYNECOLOGY

## 2025-08-25 PROCEDURE — 82248 BILIRUBIN DIRECT: CPT | Performed by: OBSTETRICS & GYNECOLOGY

## 2025-08-25 PROCEDURE — 82040 ASSAY OF SERUM ALBUMIN: CPT | Performed by: OBSTETRICS & GYNECOLOGY

## 2025-08-25 PROCEDURE — S9366 HIT TPN 2 LITER DIEM: HCPCS

## 2025-08-25 PROCEDURE — 86304 IMMUNOASSAY TUMOR CA 125: CPT | Performed by: NURSE PRACTITIONER

## 2025-08-25 PROCEDURE — 84100 ASSAY OF PHOSPHORUS: CPT | Performed by: OBSTETRICS & GYNECOLOGY

## 2025-08-25 PROCEDURE — 84478 ASSAY OF TRIGLYCERIDES: CPT | Performed by: OBSTETRICS & GYNECOLOGY

## 2025-08-25 PROCEDURE — 85025 COMPLETE CBC W/AUTO DIFF WBC: CPT | Performed by: OBSTETRICS & GYNECOLOGY

## 2025-08-26 PROCEDURE — S9366 HIT TPN 2 LITER DIEM: HCPCS

## 2025-08-26 PROCEDURE — S9374 HIT HYDRA 1 LITER DIEM: HCPCS | Mod: SH

## 2025-08-27 PROCEDURE — S9366 HIT TPN 2 LITER DIEM: HCPCS

## 2025-08-27 PROCEDURE — S9374 HIT HYDRA 1 LITER DIEM: HCPCS | Mod: SH

## 2025-08-28 ENCOUNTER — HOME INFUSION BILLING (OUTPATIENT)
Dept: HOME HEALTH SERVICES | Facility: HOME HEALTH | Age: 60
End: 2025-08-28
Payer: COMMERCIAL

## 2025-08-28 PROCEDURE — B4178 PARENTERAL SOL AMINO ACID >: HCPCS

## 2025-08-28 PROCEDURE — S9366 HIT TPN 2 LITER DIEM: HCPCS

## 2025-08-28 PROCEDURE — S9374 HIT HYDRA 1 LITER DIEM: HCPCS | Mod: SH

## 2025-08-28 PROCEDURE — A4217 STERILE WATER/SALINE, 500 ML: HCPCS

## 2025-09-02 ENCOUNTER — INFUSION THERAPY VISIT (OUTPATIENT)
Dept: INFUSION THERAPY | Facility: HOSPITAL | Age: 60
End: 2025-09-02
Payer: COMMERCIAL

## 2025-09-02 VITALS
DIASTOLIC BLOOD PRESSURE: 82 MMHG | SYSTOLIC BLOOD PRESSURE: 146 MMHG | WEIGHT: 132.1 LBS | HEART RATE: 59 BPM | TEMPERATURE: 98.2 F | BODY MASS INDEX: 24.16 KG/M2 | OXYGEN SATURATION: 100 %

## 2025-09-02 DIAGNOSIS — Z78.9 ON TOTAL PARENTERAL NUTRITION (TPN): Primary | ICD-10-CM

## 2025-09-02 LAB
CRP SERPL-MCNC: 7.96 MG/L
FERRITIN SERPL-MCNC: 414 NG/ML (ref 11–328)

## 2025-09-02 PROCEDURE — 83785 ASSAY OF MANGANESE: CPT | Performed by: OBSTETRICS & GYNECOLOGY

## 2025-09-02 PROCEDURE — 82728 ASSAY OF FERRITIN: CPT | Performed by: OBSTETRICS & GYNECOLOGY

## 2025-09-02 PROCEDURE — 36591 DRAW BLOOD OFF VENOUS DEVICE: CPT | Performed by: OBSTETRICS & GYNECOLOGY

## 2025-09-02 PROCEDURE — 84255 ASSAY OF SELENIUM: CPT | Performed by: OBSTETRICS & GYNECOLOGY

## 2025-09-02 PROCEDURE — 86140 C-REACTIVE PROTEIN: CPT | Performed by: OBSTETRICS & GYNECOLOGY

## 2025-09-02 PROCEDURE — 82525 ASSAY OF COPPER: CPT | Performed by: OBSTETRICS & GYNECOLOGY

## 2025-09-02 PROCEDURE — 84630 ASSAY OF ZINC: CPT | Performed by: OBSTETRICS & GYNECOLOGY

## 2025-09-04 ENCOUNTER — HOME INFUSION BILLING (OUTPATIENT)
Dept: HOME HEALTH SERVICES | Facility: HOME HEALTH | Age: 60
End: 2025-09-04
Payer: COMMERCIAL

## 2025-09-04 LAB
COPPER SERPL-MCNC: 106.2 UG/DL
MANGANESE BLD-MCNC: 12.4 UG/L
SELENIUM SERPL-MCNC: 82.9 UG/L
ZINC SERPL-MCNC: 65.1 UG/DL

## (undated) DEVICE — KIT ENDO FIRST STEP DISINFECTANT 200ML W/POUCH EP-4

## (undated) DEVICE — DRAPE SHEET MED 44X70" 9355

## (undated) DEVICE — ENDO BITE BLOCK ADULT OMNI-BLOC

## (undated) DEVICE — SOL WATER IRRIG 1000ML BOTTLE 2F7114

## (undated) DEVICE — PACK ENDOSCOPY GI CUSTOM UMMC

## (undated) DEVICE — ENDO TUBING CO2 SMARTCAP STERILE DISP 100145CO2EXT

## (undated) DEVICE — KIT PG 24FR SIL RADOPQ XTRN RTNT RING PUL MIC SECUR-LOK

## (undated) DEVICE — DRAPE STERI TOWEL LG 1010

## (undated) DEVICE — SUCTION MANIFOLD NEPTUNE 2 SYS 4 PORT 0702-020-000

## (undated) DEVICE — DRAINAGE BAG

## (undated) DEVICE — LINEN TOWEL PACK X5 5464

## (undated) DEVICE — PREP CHLORAPREP 26ML TINTED HI-LITE ORANGE 930815

## (undated) DEVICE — WIPE PREMOIST CLEANSING WASHCLOTHS 7988

## (undated) RX ORDER — HEPARIN SODIUM (PORCINE) LOCK FLUSH IV SOLN 100 UNIT/ML 100 UNIT/ML
SOLUTION INTRAVENOUS
Status: DISPENSED
Start: 2025-01-10

## (undated) RX ORDER — HYDROMORPHONE HYDROCHLORIDE 1 MG/ML
INJECTION, SOLUTION INTRAMUSCULAR; INTRAVENOUS; SUBCUTANEOUS
Status: DISPENSED
Start: 2025-02-28

## (undated) RX ORDER — LIDOCAINE HYDROCHLORIDE 10 MG/ML
INJECTION, SOLUTION EPIDURAL; INFILTRATION; INTRACAUDAL; PERINEURAL
Status: DISPENSED
Start: 2025-02-26

## (undated) RX ORDER — HEPARIN SODIUM (PORCINE) LOCK FLUSH IV SOLN 100 UNIT/ML 100 UNIT/ML
SOLUTION INTRAVENOUS
Status: DISPENSED
Start: 2024-01-23

## (undated) RX ORDER — FENTANYL CITRATE 50 UG/ML
INJECTION, SOLUTION INTRAMUSCULAR; INTRAVENOUS
Status: DISPENSED
Start: 2025-02-28

## (undated) RX ORDER — LIDOCAINE HYDROCHLORIDE 20 MG/ML
JELLY TOPICAL
Status: DISPENSED
Start: 2025-04-23

## (undated) RX ORDER — HYDROMORPHONE HCL IN WATER/PF 6 MG/30 ML
PATIENT CONTROLLED ANALGESIA SYRINGE INTRAVENOUS
Status: DISPENSED
Start: 2025-02-28

## (undated) RX ORDER — FENTANYL CITRATE 50 UG/ML
INJECTION, SOLUTION INTRAMUSCULAR; INTRAVENOUS
Status: DISPENSED
Start: 2025-02-18

## (undated) RX ORDER — CEFAZOLIN SODIUM 1 G/3ML
INJECTION, POWDER, FOR SOLUTION INTRAMUSCULAR; INTRAVENOUS
Status: DISPENSED
Start: 2025-02-28

## (undated) RX ORDER — HEPARIN SODIUM (PORCINE) LOCK FLUSH IV SOLN 100 UNIT/ML 100 UNIT/ML
SOLUTION INTRAVENOUS
Status: DISPENSED
Start: 2024-10-15

## (undated) RX ORDER — LIDOCAINE HYDROCHLORIDE 10 MG/ML
INJECTION, SOLUTION EPIDURAL; INFILTRATION; INTRACAUDAL; PERINEURAL
Status: DISPENSED
Start: 2025-02-18

## (undated) RX ORDER — HEPARIN SODIUM (PORCINE) LOCK FLUSH IV SOLN 100 UNIT/ML 100 UNIT/ML
SOLUTION INTRAVENOUS
Status: DISPENSED
Start: 2023-09-27

## (undated) RX ORDER — HEPARIN SODIUM (PORCINE) LOCK FLUSH IV SOLN 100 UNIT/ML 100 UNIT/ML
SOLUTION INTRAVENOUS
Status: DISPENSED
Start: 2024-04-10

## (undated) RX ORDER — FENTANYL CITRATE 50 UG/ML
INJECTION, SOLUTION INTRAMUSCULAR; INTRAVENOUS
Status: DISPENSED
Start: 2025-08-05

## (undated) RX ORDER — EPHEDRINE SULFATE 50 MG/ML
INJECTION, SOLUTION INTRAMUSCULAR; INTRAVENOUS; SUBCUTANEOUS
Status: DISPENSED
Start: 2025-02-28

## (undated) RX ORDER — HEPARIN SODIUM (PORCINE) LOCK FLUSH IV SOLN 100 UNIT/ML 100 UNIT/ML
SOLUTION INTRAVENOUS
Status: DISPENSED
Start: 2024-07-05

## (undated) RX ORDER — OXYCODONE HYDROCHLORIDE 5 MG/1
TABLET ORAL
Status: DISPENSED
Start: 2025-02-28